# Patient Record
Sex: MALE | Race: BLACK OR AFRICAN AMERICAN | Employment: OTHER | URBAN - METROPOLITAN AREA
[De-identification: names, ages, dates, MRNs, and addresses within clinical notes are randomized per-mention and may not be internally consistent; named-entity substitution may affect disease eponyms.]

---

## 2018-05-22 LAB — COLONOSCOPY, EXTERNAL: NORMAL

## 2019-05-10 ENCOUNTER — APPOINTMENT (OUTPATIENT)
Dept: CT IMAGING | Age: 54
DRG: 199 | End: 2019-05-10
Attending: NURSE PRACTITIONER
Payer: MEDICAID

## 2019-05-10 ENCOUNTER — HOSPITAL ENCOUNTER (INPATIENT)
Age: 54
LOS: 1 days | Discharge: HOME | DRG: 199 | End: 2019-05-11
Attending: EMERGENCY MEDICINE | Admitting: FAMILY MEDICINE
Payer: MEDICAID

## 2019-05-10 ENCOUNTER — APPOINTMENT (OUTPATIENT)
Dept: GENERAL RADIOLOGY | Age: 54
DRG: 199 | End: 2019-05-10
Attending: NURSE PRACTITIONER
Payer: MEDICAID

## 2019-05-10 DIAGNOSIS — R07.9 CHEST PAIN, UNSPECIFIED TYPE: Primary | ICD-10-CM

## 2019-05-10 PROBLEM — I16.1 HYPERTENSIVE EMERGENCY: Status: ACTIVE | Noted: 2019-05-10

## 2019-05-10 PROBLEM — F14.10 COCAINE ABUSE (HCC): Status: ACTIVE | Noted: 2019-05-10

## 2019-05-10 LAB
ALBUMIN SERPL-MCNC: 2.2 G/DL (ref 3.5–5)
ALBUMIN/GLOB SERPL: 0.4 {RATIO} (ref 1.1–2.2)
ALP SERPL-CCNC: 180 U/L (ref 45–117)
ALT SERPL-CCNC: 104 U/L (ref 12–78)
AMPHET UR QL SCN: NEGATIVE
ANION GAP SERPL CALC-SCNC: 7 MMOL/L (ref 5–15)
APPEARANCE UR: CLEAR
AST SERPL-CCNC: 105 U/L (ref 15–37)
BACTERIA URNS QL MICRO: NEGATIVE /HPF
BARBITURATES UR QL SCN: NEGATIVE
BASOPHILS # BLD: 0.1 K/UL (ref 0–0.1)
BASOPHILS NFR BLD: 1 % (ref 0–1)
BENZODIAZ UR QL: NEGATIVE
BILIRUB SERPL-MCNC: 0.3 MG/DL (ref 0.2–1)
BILIRUB UR QL: NEGATIVE
BUN SERPL-MCNC: 14 MG/DL (ref 6–20)
BUN/CREAT SERPL: 11 (ref 12–20)
CALCIUM SERPL-MCNC: 8.4 MG/DL (ref 8.5–10.1)
CANNABINOIDS UR QL SCN: NEGATIVE
CHLORIDE SERPL-SCNC: 106 MMOL/L (ref 97–108)
CK MB CFR SERPL CALC: 0.9 % (ref 0–2.5)
CK MB SERPL-MCNC: 1.8 NG/ML (ref 5–25)
CK SERPL-CCNC: 201 U/L (ref 39–308)
CO2 SERPL-SCNC: 28 MMOL/L (ref 21–32)
COCAINE UR QL SCN: POSITIVE
COLOR UR: ABNORMAL
CREAT SERPL-MCNC: 1.3 MG/DL (ref 0.7–1.3)
DIFFERENTIAL METHOD BLD: ABNORMAL
DRUG SCRN COMMENT,DRGCM: ABNORMAL
EOSINOPHIL # BLD: 0.4 K/UL (ref 0–0.4)
EOSINOPHIL NFR BLD: 5 % (ref 0–7)
EPITH CASTS URNS QL MICRO: ABNORMAL /LPF
ERYTHROCYTE [DISTWIDTH] IN BLOOD BY AUTOMATED COUNT: 11.9 % (ref 11.5–14.5)
ETHANOL SERPL-MCNC: <10 MG/DL
GLOBULIN SER CALC-MCNC: 5.3 G/DL (ref 2–4)
GLUCOSE SERPL-MCNC: 98 MG/DL (ref 65–100)
GLUCOSE UR STRIP.AUTO-MCNC: NEGATIVE MG/DL
HCT VFR BLD AUTO: 45.7 % (ref 36.6–50.3)
HGB BLD-MCNC: 14.6 G/DL (ref 12.1–17)
HGB UR QL STRIP: NEGATIVE
IMM GRANULOCYTES # BLD AUTO: 0 K/UL (ref 0–0.04)
IMM GRANULOCYTES NFR BLD AUTO: 0 % (ref 0–0.5)
KETONES UR QL STRIP.AUTO: NEGATIVE MG/DL
LEUKOCYTE ESTERASE UR QL STRIP.AUTO: NEGATIVE
LIPASE SERPL-CCNC: 381 U/L (ref 73–393)
LYMPHOCYTES # BLD: 3.4 K/UL (ref 0.8–3.5)
LYMPHOCYTES NFR BLD: 45 % (ref 12–49)
MCH RBC QN AUTO: 30 PG (ref 26–34)
MCHC RBC AUTO-ENTMCNC: 31.9 G/DL (ref 30–36.5)
MCV RBC AUTO: 94 FL (ref 80–99)
METHADONE UR QL: NEGATIVE
MONOCYTES # BLD: 0.6 K/UL (ref 0–1)
MONOCYTES NFR BLD: 9 % (ref 5–13)
NEUTS SEG # BLD: 2.9 K/UL (ref 1.8–8)
NEUTS SEG NFR BLD: 40 % (ref 32–75)
NITRITE UR QL STRIP.AUTO: NEGATIVE
NRBC # BLD: 0 K/UL (ref 0–0.01)
NRBC BLD-RTO: 0 PER 100 WBC
OPIATES UR QL: NEGATIVE
PCP UR QL: NEGATIVE
PH UR STRIP: 6 [PH] (ref 5–8)
PLATELET # BLD AUTO: 130 K/UL (ref 150–400)
PMV BLD AUTO: 13 FL (ref 8.9–12.9)
POTASSIUM SERPL-SCNC: 3.9 MMOL/L (ref 3.5–5.1)
PROT SERPL-MCNC: 7.5 G/DL (ref 6.4–8.2)
PROT UR STRIP-MCNC: >300 MG/DL
RBC # BLD AUTO: 4.86 M/UL (ref 4.1–5.7)
RBC #/AREA URNS HPF: ABNORMAL /HPF (ref 0–5)
SODIUM SERPL-SCNC: 141 MMOL/L (ref 136–145)
SP GR UR REFRACTOMETRY: 1.02 (ref 1–1.03)
TROPONIN I SERPL-MCNC: <0.05 NG/ML
UA: UC IF INDICATED,UAUC: ABNORMAL
UROBILINOGEN UR QL STRIP.AUTO: 2 EU/DL (ref 0.2–1)
WBC # BLD AUTO: 7.3 K/UL (ref 4.1–11.1)
WBC URNS QL MICRO: ABNORMAL /HPF (ref 0–4)

## 2019-05-10 PROCEDURE — 85025 COMPLETE CBC W/AUTO DIFF WBC: CPT

## 2019-05-10 PROCEDURE — 93005 ELECTROCARDIOGRAM TRACING: CPT

## 2019-05-10 PROCEDURE — 82550 ASSAY OF CK (CPK): CPT

## 2019-05-10 PROCEDURE — 80307 DRUG TEST PRSMV CHEM ANLYZR: CPT

## 2019-05-10 PROCEDURE — 81001 URINALYSIS AUTO W/SCOPE: CPT

## 2019-05-10 PROCEDURE — 84484 ASSAY OF TROPONIN QUANT: CPT

## 2019-05-10 PROCEDURE — 74011250636 HC RX REV CODE- 250/636: Performed by: FAMILY MEDICINE

## 2019-05-10 PROCEDURE — 74011250636 HC RX REV CODE- 250/636: Performed by: NURSE PRACTITIONER

## 2019-05-10 PROCEDURE — 36415 COLL VENOUS BLD VENIPUNCTURE: CPT

## 2019-05-10 PROCEDURE — 80053 COMPREHEN METABOLIC PANEL: CPT

## 2019-05-10 PROCEDURE — 71045 X-RAY EXAM CHEST 1 VIEW: CPT

## 2019-05-10 PROCEDURE — 70450 CT HEAD/BRAIN W/O DYE: CPT

## 2019-05-10 PROCEDURE — 99285 EMERGENCY DEPT VISIT HI MDM: CPT

## 2019-05-10 PROCEDURE — 96374 THER/PROPH/DIAG INJ IV PUSH: CPT

## 2019-05-10 PROCEDURE — 65660000000 HC RM CCU STEPDOWN

## 2019-05-10 PROCEDURE — 74011250637 HC RX REV CODE- 250/637: Performed by: FAMILY MEDICINE

## 2019-05-10 PROCEDURE — 83690 ASSAY OF LIPASE: CPT

## 2019-05-10 RX ORDER — SODIUM CHLORIDE 0.9 % (FLUSH) 0.9 %
5-40 SYRINGE (ML) INJECTION AS NEEDED
Status: DISCONTINUED | OUTPATIENT
Start: 2019-05-10 | End: 2019-05-10 | Stop reason: SDUPTHER

## 2019-05-10 RX ORDER — LORAZEPAM 1 MG/1
0.5 TABLET ORAL
Status: DISCONTINUED | OUTPATIENT
Start: 2019-05-10 | End: 2019-05-10

## 2019-05-10 RX ORDER — HEPARIN SODIUM 5000 [USP'U]/ML
5000 INJECTION, SOLUTION INTRAVENOUS; SUBCUTANEOUS EVERY 8 HOURS
Status: DISCONTINUED | OUTPATIENT
Start: 2019-05-10 | End: 2019-05-11 | Stop reason: HOSPADM

## 2019-05-10 RX ORDER — HYDRALAZINE HYDROCHLORIDE 20 MG/ML
10 INJECTION INTRAMUSCULAR; INTRAVENOUS
Status: COMPLETED | OUTPATIENT
Start: 2019-05-10 | End: 2019-05-10

## 2019-05-10 RX ORDER — LORAZEPAM 0.5 MG/1
0.5 TABLET ORAL EVERY 6 HOURS
Status: DISCONTINUED | OUTPATIENT
Start: 2019-05-10 | End: 2019-05-11 | Stop reason: HOSPADM

## 2019-05-10 RX ORDER — SODIUM CHLORIDE 0.9 % (FLUSH) 0.9 %
5-40 SYRINGE (ML) INJECTION AS NEEDED
Status: DISCONTINUED | OUTPATIENT
Start: 2019-05-10 | End: 2019-05-11 | Stop reason: HOSPADM

## 2019-05-10 RX ORDER — SODIUM CHLORIDE 0.9 % (FLUSH) 0.9 %
5-40 SYRINGE (ML) INJECTION EVERY 8 HOURS
Status: DISCONTINUED | OUTPATIENT
Start: 2019-05-10 | End: 2019-05-11 | Stop reason: HOSPADM

## 2019-05-10 RX ORDER — HYDRALAZINE HYDROCHLORIDE 20 MG/ML
20 INJECTION INTRAMUSCULAR; INTRAVENOUS
Status: DISCONTINUED | OUTPATIENT
Start: 2019-05-10 | End: 2019-05-11 | Stop reason: HOSPADM

## 2019-05-10 RX ORDER — SODIUM CHLORIDE 0.9 % (FLUSH) 0.9 %
5-40 SYRINGE (ML) INJECTION EVERY 8 HOURS
Status: DISCONTINUED | OUTPATIENT
Start: 2019-05-10 | End: 2019-05-10 | Stop reason: SDUPTHER

## 2019-05-10 RX ORDER — AMLODIPINE BESYLATE 5 MG/1
10 TABLET ORAL DAILY
Status: DISCONTINUED | OUTPATIENT
Start: 2019-05-10 | End: 2019-05-11

## 2019-05-10 RX ADMIN — HYDRALAZINE HYDROCHLORIDE 10 MG: 20 INJECTION, SOLUTION INTRAMUSCULAR; INTRAVENOUS at 11:01

## 2019-05-10 RX ADMIN — HYDRALAZINE HYDROCHLORIDE 20 MG: 20 INJECTION, SOLUTION INTRAMUSCULAR; INTRAVENOUS at 17:33

## 2019-05-10 RX ADMIN — ACETAMINOPHEN 650 MG: 650 SOLUTION ORAL at 20:21

## 2019-05-10 RX ADMIN — HEPARIN SODIUM 5000 UNITS: 5000 INJECTION INTRAVENOUS; SUBCUTANEOUS at 15:45

## 2019-05-10 RX ADMIN — Medication 10 ML: at 15:49

## 2019-05-10 RX ADMIN — AMLODIPINE BESYLATE 10 MG: 5 TABLET ORAL at 15:44

## 2019-05-10 NOTE — ED NOTES
Provided patient with a dinner tray. Updated on plan of care which included managing his blood pressure and getting a bed upstairs for him to be further monitored.

## 2019-05-10 NOTE — ED NOTES
Contacted nursing supervision in regards to getting an inpatient bed due to holding the patient in the ER.

## 2019-05-10 NOTE — ED NOTES
Verbal shift change report given to ELISE Brown (oncoming nurse) by Robert Menjivar (offgoing nurse). Report included the following information SBAR.

## 2019-05-10 NOTE — PROGRESS NOTES
CM reviewed chart. CM confirmed his demographics. Pt said he lives with his daughter Veto Hines [phone], also his emergency contact. Pt said his mother will make his medical decisions Kanika Harrison [phone]. Pt said he does have Medicaid, but does not have the card with him. CM agreed to have a PCP follow-up set up for him. CM scheduled follow-up appointment with [facility] on 5/16/19 at 11am.       Care Management Interventions  PCP Verified by CM: No  Mode of Transport at Discharge: Other (see comment)(pt daughter will transport home )  Transition of Care Consult (CM Consult): Other  Current Support Network: Other(pt lives with his daughter Veto Hines )  Confirm Follow Up Transport: Family  Discharge Location  Discharge Placement: Home      Date of previous inpatient admission/ ED visit? 5/9/14    What brought the patient back to ED? Chest pain, skin problem, and dizziness     Did patient decline recommended services during last admission/ ED visit (if yes, what)? No     Has patient seen a provider since their last inpatient admission/ED visit (if yes, when)? No     CM Interventions:  From previous inpatient admission/ED visit: discharge plan   From current inpatient admission/ED visit: discharge plan- PCP appointment on 5/16/19.      Lucila Fagan, MSW  [phone]

## 2019-05-10 NOTE — PROGRESS NOTES
Spiritual Care Partner Volunteer visited patient in [employer] at [facility] on 5/10/19.   Documented by:  Anabel Fritz

## 2019-05-10 NOTE — ED NOTES
TRANSFER - OUT REPORT:    Verbal report given to [employer] (name) on Kimi Vail  being transferred to [facility] telemetry (unit) for routine progression of care       Report consisted of patients Situation, Background, Assessment and   Recommendations(SBAR). Information from the following report(s) SBAR was reviewed with the receiving nurse. Lines:   Peripheral IV 05/10/19 Right Antecubital (Active)   Site Assessment Clean, dry, & intact 5/10/2019 10:31 AM   Phlebitis Assessment 0 5/10/2019 10:31 AM   Infiltration Assessment 0 5/10/2019 10:31 AM   Dressing Status Clean, dry, & intact 5/10/2019 10:31 AM   Dressing Type Transparent 5/10/2019 10:31 AM   Hub Color/Line Status Pink;Capped;Flushed;Patent 5/10/2019 10:31 AM   Action Taken Blood drawn 5/10/2019 10:31 AM        Opportunity for questions and clarification was provided.       Patient transported with:   Registered Nurse

## 2019-05-10 NOTE — PROGRESS NOTES
TRANSFER - IN REPORT:    Verbal report received from NAVEED Espana(name) on Evans Valencia Sleight  being received from ED(unit) for routine progression of care      Report consisted of patients Situation, Background, Assessment and   Recommendations(SBAR). Information from the following report(s) SBAR, Kardex, Intake/Output, MAR, Recent Results and Cardiac Rhythm NSR was reviewed with the receiving nurse. Opportunity for questions and clarification was provided.

## 2019-05-10 NOTE — ED TRIAGE NOTES
Per pt reports dizziness with standing, headache and blurred vision x 10 days, stopped taking Lisinopril x 6+ months. Pt also reports having a \"knot\" on the right upper chest x 6+ months, +sob and denies chest pain.

## 2019-05-10 NOTE — H&P
[facility] HISTORY AND PHYSICAL Name:  Otto Merritt 
MR#:  [id number] :  1965 ACCOUNT #:  [de-identified] ADMIT DATE:  05/10/2019 CHIEF COMPLAINT:  Headache, chest pain. HISTORY OF PRESENT ILLNESS:  The patient is a 41-year-old -American male with past medical history significant for hypertension and cocaine abuse, who presented to the emergency room with chest pain and headache. The patient reports symptom for a long time, 6 months. The patient reports that he has not been taking his antihypertensive medications for many months, symptom progressively worse. He comes today because he had left-sided numbness in the arm and right-sided chest pain. The patient reports persistent symptom without any alleviating factor. He states that since being in the emergency room, he is feeling better. The patient reports that he has not seen his primary doctor and presented to the emergency room because of the left arm numbness and also tingling and severe headache. PAST MEDICAL HISTORY:  Hypertension. Past Medical History:  
Diagnosis Date  Hypertension PAST SURGICAL HISTORY:  History reviewed. No pertinent surgical history. FAMILY HISTORY:  History reviewed. No pertinent family history. SOCIAL HISTORY:  
Social History Socioeconomic History  Marital status: SINGLE Spouse name: Not on file  Number of children: Not on file  Years of education: Not on file  Highest education level: Not on file Occupational History  Not on file Social Needs  Financial resource strain: Not on file  Food insecurity:  
  Worry: Not on file Inability: Not on file  Transportation needs:  
  Medical: Not on file Non-medical: Not on file Tobacco Use  Smoking status: Current Every Day Smoker Packs/day: 1.00  Smokeless tobacco: Never Used Substance and Sexual Activity  Alcohol use: Yes  Drug use:  Yes  
 Types: Marijuana  Sexual activity: Not on file Lifestyle  Physical activity:  
  Days per week: Not on file Minutes per session: Not on file  Stress: Not on file Relationships  Social connections:  
  Talks on phone: Not on file Gets together: Not on file Attends Voodoo service: Not on file Active member of club or organization: Not on file Attends meetings of clubs or organizations: Not on file Relationship status: Not on file  Intimate partner violence:  
  Fear of current or ex partner: Not on file Emotionally abused: Not on file Physically abused: Not on file Forced sexual activity: Not on file Other Topics Concern  Not on file Social History Narrative  Not on file ALLERGIES: No Known Allergies REVIEW OF SYSTEMS: Review of Systems Constitutional: Negative for chills, fever and weight loss. Respiratory: Negative for cough and shortness of breath. Cardiovascular: Positive for chest pain. Negative for palpitations. Musculoskeletal: Negative for back pain, falls, joint pain, myalgias and neck pain. Skin: Negative for rash. Neurological: Positive for tingling and headaches. Negative for dizziness, tremors, sensory change, focal weakness and weakness. Psychiatric/Behavioral: Negative for depression, hallucinations, substance abuse and suicidal ideas. The patient is not nervous/anxious and does not have insomnia. All other systems reviewed and are negative. PHYSICAL EXAMINATION: Physical Exam  
Constitutional: He is oriented to person, place, and time. No distress. HENT:  
Head: Normocephalic and atraumatic. Eyes: Right eye exhibits no discharge. Left eye exhibits no discharge. No scleral icterus. Neck: No tracheal deviation present. No thyromegaly present. Cardiovascular: Normal rate, regular rhythm, normal heart sounds and intact distal pulses. Exam reveals no gallop. No murmur heard. Pulmonary/Chest: No respiratory distress. He has no wheezes. He has no rales. He exhibits tenderness. Abdominal: He exhibits no distension and no mass. There is no tenderness. There is no rebound and no guarding. Musculoskeletal: He exhibits no edema or tenderness. Lymphadenopathy:  
  He has no cervical adenopathy. Neurological: He is alert and oriented to person, place, and time. He displays normal reflexes. No cranial nerve deficit. He exhibits normal muscle tone. Coordination normal. GCS score is 15. Skin: No rash noted. He is not diaphoretic. No erythema. No pallor. Psychiatric: Affect and judgment normal.  
 
 
LABORATORY DATA:  
Recent Results (from the past 12 hour(s)) METABOLIC PANEL, COMPREHENSIVE Collection Time: 05/11/19  3:40 AM  
Result Value Ref Range Sodium 140 136 - 145 mmol/L Potassium 3.5 3.5 - 5.1 mmol/L Chloride 107 97 - 108 mmol/L  
 CO2 25 21 - 32 mmol/L Anion gap 8 5 - 15 mmol/L Glucose 98 65 - 100 mg/dL BUN 18 6 - 20 MG/DL Creatinine 1.24 0.70 - 1.30 MG/DL  
 BUN/Creatinine ratio 15 12 - 20 GFR est AA >60 >60 ml/min/1.73m2 GFR est non-AA >60 >60 ml/min/1.73m2 Calcium 8.2 (L) 8.5 - 10.1 MG/DL Bilirubin, total 0.4 0.2 - 1.0 MG/DL  
 ALT (SGPT) 88 (H) 12 - 78 U/L  
 AST (SGOT) 83 (H) 15 - 37 U/L Alk. phosphatase 147 (H) 45 - 117 U/L Protein, total 6.7 6.4 - 8.2 g/dL Albumin 1.9 (L) 3.5 - 5.0 g/dL Globulin 4.8 (H) 2.0 - 4.0 g/dL A-G Ratio 0.4 (L) 1.1 - 2.2 IMAGING STUDY: 1. No acute disease in CXR and normal CT of head ASSESSMENT AND PLAN:  The patient is a 44-year-old being admitted with hypertensive urgency. 1.  Hypertensive urgency. We will start the patient on hydralazine, p.r.n. We will start the patient on p.o. amlodipine, admit to telemetry unit, cardiology consult, and echo ordered. 2.  Cocaine abuse.   The patient counseled regarding the complication and side effects of cocaine including a stroke, heart attack, and congestive heart failure. 3.  Deep venous thrombosis prophylaxis, heparin. 4.  Code status, full code. 5.  Baseline, the patient independent. Michael Saldivar MD 
 
 
[id number] 
D:  05/10/2019 15:48 T:  05/10/2019 16:52 
JOB #:  [id number]

## 2019-05-10 NOTE — ED NOTES
Pt presents to ED with c/o dizziness, headache and blurred vision x over six months. Reported he stopped taking lisinopril 6 months ago. Reported he thought he was getting better so he took himself off it. Reports chest pain and SOB. Reports having numbness and tingling in bilateral extremities. Pt is alert, oriented and appropriate. Ambulatory on arrival.       Emergency Department Nursing Plan of Care       The Nursing Plan of Care is developed from the Nursing assessment and Emergency Department Attending provider initial evaluation. The plan of care may be reviewed in the ED Provider note.     The Plan of Care was developed with the following considerations:   Patient / Family readiness to learn indicated by:verbalized understanding  Persons(s) to be included in education: patient  Barriers to Learning/Limitations:No    Signed     Ed Escalante    5/10/2019   10:21 AM

## 2019-05-10 NOTE — PROGRESS NOTES
[facility] Medication Reconciliation     Recommendations/Findings:   1) Patient states that it has been about 6 months since he last took lisinopril. He denies taking any other prescription or OTC medications on a regular basis. Total Time Spent: 20 minutes    Information obtained from:patient, previous medical records    Past Medical History/Disease States:  Past Medical History:   Diagnosis Date    Hypertension          Patient allergies:    Allergies as of 05/10/2019    (No Known Allergies)         None            Thank you,    Mariana Goldberg, PHARMD, BCPS  Contact: [phone]

## 2019-05-10 NOTE — ED NOTES
Pt states he got tired of waiting so went outside, ? for a cigarette. Pt had an IV in situ, does not appear to have been tampered with. Pt does not show any sign of illicit drug use. Staff for PCU have been informed that pt has been outside. Pt has been taken to [address] [place] [address] now. Pt is alert and oriented, states he has a headache still.

## 2019-05-10 NOTE — ED PROVIDER NOTES
EMERGENCY DEPARTMENT HISTORY AND PHYSICAL EXAM      Date: 5/10/2019  Patient Name: Irina Loya    History of Presenting Illness     Chief Complaint   Patient presents with    Dizziness    Skin Problem       History Provided By: Patient    Chief Complaint: headache  Duration: 6 Months  Timing:  Gradual and Progressive  Location: generalized headache  Quality: Aching  Severity: 6 out of 10  Modifying Factors: none  Associated Symptoms: right sided chest pain and numbness tingling of extremities      Additional History (Context): Irina Loya is a 48 y.o. male with hypertension who presents with headache for the past 6 months. Patient states he had been taking lisinopril and took himself off because he was feeling better. Patient endorses feelings of numbness and tingling in his extremities. He endorses right-sided chest pain. He admits to cocaine use last week and states he does smoke marijuana. He said he drank a beer yesterday. He denies previous cardiac work-up. He denies cough wheezing fever abdominal pain nausea vomiting diarrhea. Eyes visual disturbance speech difficulty. He has no other complaints at this time. PCP: Jo Ann Mckeon MD    Current Facility-Administered Medications   Medication Dose Route Frequency Provider Last Rate Last Dose    sodium chloride (NS) flush 5-40 mL  5-40 mL IntraVENous Q8H Savilla Rubinstein, MD        sodium chloride (NS) flush 5-40 mL  5-40 mL IntraVENous PRN Savilla Rubinstein, MD        heparin (porcine) injection 5,000 Units  5,000 Units SubCUTAneous Q8H Savilla Rubinstein, MD        hydrALAZINE (APRESOLINE) 20 mg/mL injection 20 mg  20 mg IntraVENous Q6H PRN Savilla Rubinstein, MD        amLODIPine (NORVASC) tablet 10 mg  10 mg Oral DAILY Savilla Rubinstein, MD           Past History     Past Medical History:  Past Medical History:   Diagnosis Date    Hypertension        Past Surgical History:  No past surgical history on file.     Family History:  No family history on file. Social History:  Social History     Tobacco Use    Smoking status: Current Every Day Smoker     Packs/day: 1.00   Substance Use Topics    Alcohol use: Yes    Drug use: Yes     Types: Marijuana       Allergies:  No Known Allergies      Review of Systems   Review of Systems   Constitutional: Negative for chills, fatigue and fever. HENT: Negative for congestion and sore throat. Eyes: Negative for redness. Respiratory: Positive for shortness of breath. Negative for cough, chest tightness and wheezing. Cardiovascular: Positive for chest pain. Gastrointestinal: Negative for abdominal pain. Genitourinary: Negative for dysuria. Musculoskeletal: Negative for arthralgias, back pain, myalgias, neck pain and neck stiffness. Skin: Negative for rash. Neurological: Positive for numbness and headaches. Negative for dizziness, syncope, weakness and light-headedness. Tingling   Hematological: Negative for adenopathy. All other systems reviewed and are negative. Physical Exam     Vitals:    05/10/19 1300 05/10/19 1315 05/10/19 1345 05/10/19 1449   BP: (!) 194/98 (!) 151/96 (!) 190/98 176/89   Pulse: 75 72 77 77   Resp: 17 22 21 21   Temp:       SpO2: 97% 98% 98% 98%   Weight:       Height:         Physical Exam   Constitutional: He is oriented to person, place, and time. He appears well-developed and well-nourished. HENT:   Head: Normocephalic and atraumatic. Right Ear: External ear normal.   Mouth/Throat: Oropharynx is clear and moist.   Eyes: Conjunctivae are normal. Right eye exhibits no discharge. Left eye exhibits no discharge. Neck: Normal range of motion. Neck supple. Cardiovascular: Normal rate and regular rhythm. Pulmonary/Chest: Effort normal and breath sounds normal. No respiratory distress. He has no wheezes. Abdominal: Soft. Bowel sounds are normal. There is no tenderness. Musculoskeletal: Normal range of motion. He exhibits no edema. Lymphadenopathy:     He has no cervical adenopathy. Neurological: He is alert and oriented to person, place, and time. No cranial nerve deficit. Skin: Skin is warm and dry. Psychiatric: He has a normal mood and affect. His behavior is normal. Judgment and thought content normal.   Nursing note and vitals reviewed. Diagnostic Study Results     Labs -     Recent Results (from the past 12 hour(s))   URINALYSIS W/ REFLEX CULTURE    Collection Time: 05/10/19 10:30 AM   Result Value Ref Range    Color YELLOW/STRAW      Appearance CLEAR CLEAR      Specific gravity 1.025 1.003 - 1.030      pH (UA) 6.0 5.0 - 8.0      Protein >300 (A) NEG mg/dL    Glucose NEGATIVE  NEG mg/dL    Ketone NEGATIVE  NEG mg/dL    Bilirubin NEGATIVE  NEG      Blood NEGATIVE  NEG      Urobilinogen 2.0 (H) 0.2 - 1.0 EU/dL    Nitrites NEGATIVE  NEG      Leukocyte Esterase NEGATIVE  NEG      WBC 0-4 0 - 4 /hpf    RBC 0-5 0 - 5 /hpf    Epithelial cells FEW FEW /lpf    Bacteria NEGATIVE  NEG /hpf    UA:UC IF INDICATED CULTURE NOT INDICATED BY UA RESULT CNI     METABOLIC PANEL, COMPREHENSIVE    Collection Time: 05/10/19 10:45 AM   Result Value Ref Range    Sodium 141 136 - 145 mmol/L    Potassium 3.9 3.5 - 5.1 mmol/L    Chloride 106 97 - 108 mmol/L    CO2 28 21 - 32 mmol/L    Anion gap 7 5 - 15 mmol/L    Glucose 98 65 - 100 mg/dL    BUN 14 6 - 20 MG/DL    Creatinine 1.30 0.70 - 1.30 MG/DL    BUN/Creatinine ratio 11 (L) 12 - 20      GFR est AA >60 >60 ml/min/1.73m2    GFR est non-AA 58 (L) >60 ml/min/1.73m2    Calcium 8.4 (L) 8.5 - 10.1 MG/DL    Bilirubin, total 0.3 0.2 - 1.0 MG/DL    ALT (SGPT) 104 (H) 12 - 78 U/L    AST (SGOT) 105 (H) 15 - 37 U/L    Alk.  phosphatase 180 (H) 45 - 117 U/L    Protein, total 7.5 6.4 - 8.2 g/dL    Albumin 2.2 (L) 3.5 - 5.0 g/dL    Globulin 5.3 (H) 2.0 - 4.0 g/dL    A-G Ratio 0.4 (L) 1.1 - 2.2     CBC WITH AUTOMATED DIFF    Collection Time: 05/10/19 10:45 AM   Result Value Ref Range    WBC 7.3 4.1 - 11.1 K/uL RBC 4.86 4.10 - 5.70 M/uL    HGB 14.6 12.1 - 17.0 g/dL    HCT 45.7 36.6 - 50.3 %    MCV 94.0 80.0 - 99.0 FL    MCH 30.0 26.0 - 34.0 PG    MCHC 31.9 30.0 - 36.5 g/dL    RDW 11.9 11.5 - 14.5 %    PLATELET 946 (L) 558 - 400 K/uL    MPV 13.0 (H) 8.9 - 12.9 FL    NRBC 0.0 0  WBC    ABSOLUTE NRBC 0.00 0.00 - 0.01 K/uL    NEUTROPHILS 40 32 - 75 %    LYMPHOCYTES 45 12 - 49 %    MONOCYTES 9 5 - 13 %    EOSINOPHILS 5 0 - 7 %    BASOPHILS 1 0 - 1 %    IMMATURE GRANULOCYTES 0 0.0 - 0.5 %    ABS. NEUTROPHILS 2.9 1.8 - 8.0 K/UL    ABS. LYMPHOCYTES 3.4 0.8 - 3.5 K/UL    ABS. MONOCYTES 0.6 0.0 - 1.0 K/UL    ABS. EOSINOPHILS 0.4 0.0 - 0.4 K/UL    ABS. BASOPHILS 0.1 0.0 - 0.1 K/UL    ABS. IMM. GRANS. 0.0 0.00 - 0.04 K/UL    DF AUTOMATED     CK W/ CKMB & INDEX    Collection Time: 05/10/19 10:45 AM   Result Value Ref Range     39 - 308 U/L    CK - MB 1.8 <3.6 NG/ML    CK-MB Index 0.9 0.0 - 2.5     TROPONIN I    Collection Time: 05/10/19 10:45 AM   Result Value Ref Range    Troponin-I, Qt. <0.05 <0.05 ng/mL   DRUG SCREEN, URINE    Collection Time: 05/10/19 10:45 AM   Result Value Ref Range    AMPHETAMINES NEGATIVE  NEG      BARBITURATES NEGATIVE  NEG      BENZODIAZEPINES NEGATIVE  NEG      COCAINE POSITIVE (A) NEG      METHADONE NEGATIVE  NEG      OPIATES NEGATIVE  NEG      PCP(PHENCYCLIDINE) NEGATIVE  NEG      THC (TH-CANNABINOL) NEGATIVE  NEG      Drug screen comment (NOTE)    LIPASE    Collection Time: 05/10/19 10:45 AM   Result Value Ref Range    Lipase 381 73 - 393 U/L   ETHYL ALCOHOL    Collection Time: 05/10/19 10:45 AM   Result Value Ref Range    ALCOHOL(ETHYL),SERUM <10 <10 MG/DL       Radiologic Studies -   CT HEAD WO CONT   Final Result   IMPRESSION:   1. No evidence of acute intracranial abnormality. XR CHEST PORT   Final Result   Impression:   1. No acute disease           CT Results  (Last 48 hours)               05/10/19 1055  CT HEAD WO CONT Final result    Impression:  IMPRESSION:   1.  No evidence of acute intracranial abnormality. Narrative:  EXAM:  CT HEAD WO CONT       INDICATION:   Headache       COMPARISON: None. TECHNIQUE: Unenhanced CT of the head was performed using 5 mm images. Brain and   bone windows were generated. CT dose reduction was achieved through use of a   standardized protocol tailored for this examination and automatic exposure   control for dose modulation. FINDINGS:   The ventricles are normal in size and position. Basilar cisterns are patent. No   midline shift. There is no evidence of acute infarct, hemorrhage, or extraaxial   fluid collection. The paranasal sinuses, mastoid air cells, and middle ears are clear. The orbital   contents are within normal limits. There are no significant osseous or   extracranial soft tissue lesions. CXR Results  (Last 48 hours)               05/10/19 1046  XR CHEST PORT Final result    Impression:  Impression:   1. No acute disease           Narrative:  INDICATION:  chest pain        Exam: Portable chest 1036. Comparison: None. Findings: Cardiomediastinal silhouette is within normal limits. Pulmonary   vasculature is not engorged. There are no focal parenchymal opacities,   effusions, or pneumothorax. Right hemidiaphragm is elevated                   Medical Decision Making   I am the first provider for this patient. I reviewed the vital signs, available nursing notes, past medical history, past surgical history, family history and social history. Vital Signs-Reviewed the patient's vital signs. Pulse Oximetry Analysis - 98% on RA    Cardiac Monitor:  Rate: 77  Rhythm:NSR    EKG: Interpreted by the EP. Dr Arizmendi Power   Time Interpreted:10:25am    Rate: 76   Rhythm:NSR   Interpretation:LVH   Comparison:none    Records Reviewed: Nursing Notes    ED Course:    Patient has not complained of chest pain during stay in emergency department. He has had no other complaints.       Disposition:  Admit to hospitalist.    Provider Notes (Medical Decision Making):   DDX uncontrolled hypertension malignant hypertension cocaine induced chest pain  Patient will need admission for tension control as well as further evaluation of chest pain. Procedures:  Procedures    For Hospitalized Patients:    1. Hospitalization Decision Time:  The decision to hospitalize the patient was made by Dr. sheri Roldan . Consultation has been made to Dr. Mckinley Stephen cardiology as well. Patient agrees to admission. Diagnosis     Clinical Impression:   1.  Chest pain, unspecified type        _____________________________

## 2019-05-10 NOTE — CONSULTS
Cardiology consultation:    I was asked by the [facility] ED to assess Mr. Michael Martel. He is a 48year old male coming to the hospital for chest pain and longstanding headache, subsequently discovered to have uncontrolled hypertension with positive screen for cocaine. He has been a diagnosed hypertyensive for several years, previously treated with clonoidine, which he stopped on his own, more recently treated with lisinopril, which he also stopped. 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 He developed left pectoralis pain directly inferior to the left clavicle after an exercise session several days ago and when the pain would not improve he came to the ED. He has not had anginal pattern chest pain. He is a decidedly vague historian who does not identify a primary care source. He denies PND and orthopnea as well as effort symptoms. On exam, he is an early middle aged male appearing less than stated age. He is in no distress supine. BP on initial presentation was 222/103, today it is 181/97. Cardiac auscultation is normal, with normal S2, increased S1 at aortic focus, regular rhythm, no audible murmur or gallop. No abdominal bruit or pulsation. No edema or DVT, [peripheral pulses normal.     12 lead EKG shows sinus rhythm with partial changes for LVH. QS complexes in the midprecordial leads. Chest X ray shows mild LV prominence and increased lung vascular volume:        Hemogram is normal except for mild thrombocytopenia, Chem profile is as follows:    Results for Narinder Cox (MRN [id number]) as of 5/11/2019 00:56   Ref.  Range 5/10/2019 10:45   Sodium Latest Ref Range: 136 - 145 mmol/L 141   Potassium Latest Ref Range: 3.5 - 5.1 mmol/L 3.9   Chloride Latest Ref Range: 97 - 108 mmol/L 106   CO2 Latest Ref Range: 21 - 32 mmol/L 28   Anion gap Latest Ref Range: 5 - 15 mmol/L 7   Glucose Latest Ref Range: 65 - 100 mg/dL 98   BUN Latest Ref Range: 6 - 20 MG/DL 14   Creatinine Latest Ref Range: 0.70 - 1.30 MG/DL 1.30   BUN/Creatinine ratio Latest Ref Range: 12 - 20   11 (L)   Calcium Latest Ref Range: 8.5 - 10.1 MG/DL 8.4 (L)   GFR est non-AA Latest Ref Range: >60 ml/min/1.73m2 58 (L)   GFR est AA Latest Ref Range: >60 ml/min/1.73m2 >60   Bilirubin, total Latest Ref Range: 0.2 - 1.0 MG/DL 0.3   Protein, total Latest Ref Range: 6.4 - 8.2 g/dL 7.5   Albumin Latest Ref Range: 3.5 - 5.0 g/dL 2.2 (L)   Globulin Latest Ref Range: 2.0 - 4.0 g/dL 5.3 (H)   A-G Ratio Latest Ref Range: 1.1 - 2.2   0.4 (L)   ALT (SGPT) Latest Ref Range: 12 - 78 U/L 104 (H)   AST Latest Ref Range: 15 - 37 U/L 105 (H)   Alk. phosphatase Latest Ref Range: 45 - 117 U/L 180 (H)   Lipase Latest Ref Range: 73 - 393 U/L 381   CK Latest Ref Range: 39 - 308 U/L 201   CK - MB Latest Ref Range: <3.6 NG/ML 1.8   CK-MB Index Latest Ref Range: 0.0 - 2.5   0.9   Troponin-I, Qt. Latest Ref Range: <0.05 ng/mL <0.05     Impression; Uncontrolled hypertension                          Unreliable compliance with treatment. Likely pulled superior pectoralis group muscle on right due to poor conditioning, no training    CKD II    Prognosis is quite poor unless he starts to take medications and even stop  follow up    Suggest: CCB as primary BP management of blood pressure    Add labetalol next as needed.      Will follow thank you    Thank you for this consultation    Dmitry Dailey MD [facility] [place]

## 2019-05-11 VITALS
SYSTOLIC BLOOD PRESSURE: 143 MMHG | BODY MASS INDEX: 27.4 KG/M2 | RESPIRATION RATE: 18 BRPM | OXYGEN SATURATION: 98 % | TEMPERATURE: 96.6 F | DIASTOLIC BLOOD PRESSURE: 73 MMHG | HEART RATE: 56 BPM | WEIGHT: 185 LBS | HEIGHT: 69 IN

## 2019-05-11 LAB
ALBUMIN SERPL-MCNC: 1.9 G/DL (ref 3.5–5)
ALBUMIN/GLOB SERPL: 0.4 {RATIO} (ref 1.1–2.2)
ALP SERPL-CCNC: 147 U/L (ref 45–117)
ALT SERPL-CCNC: 88 U/L (ref 12–78)
ANION GAP SERPL CALC-SCNC: 8 MMOL/L (ref 5–15)
AST SERPL-CCNC: 83 U/L (ref 15–37)
BILIRUB SERPL-MCNC: 0.4 MG/DL (ref 0.2–1)
BUN SERPL-MCNC: 18 MG/DL (ref 6–20)
BUN/CREAT SERPL: 15 (ref 12–20)
CALCIUM SERPL-MCNC: 8.2 MG/DL (ref 8.5–10.1)
CHLORIDE SERPL-SCNC: 107 MMOL/L (ref 97–108)
CO2 SERPL-SCNC: 25 MMOL/L (ref 21–32)
CREAT SERPL-MCNC: 1.24 MG/DL (ref 0.7–1.3)
GLOBULIN SER CALC-MCNC: 4.8 G/DL (ref 2–4)
GLUCOSE SERPL-MCNC: 98 MG/DL (ref 65–100)
POTASSIUM SERPL-SCNC: 3.5 MMOL/L (ref 3.5–5.1)
PROT SERPL-MCNC: 6.7 G/DL (ref 6.4–8.2)
SODIUM SERPL-SCNC: 140 MMOL/L (ref 136–145)

## 2019-05-11 PROCEDURE — 74011250637 HC RX REV CODE- 250/637: Performed by: FAMILY MEDICINE

## 2019-05-11 PROCEDURE — 80053 COMPREHEN METABOLIC PANEL: CPT

## 2019-05-11 PROCEDURE — 74011250636 HC RX REV CODE- 250/636: Performed by: FAMILY MEDICINE

## 2019-05-11 PROCEDURE — 74011250637 HC RX REV CODE- 250/637: Performed by: INTERNAL MEDICINE

## 2019-05-11 RX ORDER — DILTIAZEM HYDROCHLORIDE 30 MG/1
90 TABLET, FILM COATED ORAL
Status: DISCONTINUED | OUTPATIENT
Start: 2019-05-11 | End: 2019-05-11 | Stop reason: HOSPADM

## 2019-05-11 RX ORDER — DILTIAZEM HYDROCHLORIDE 90 MG/1
90 TABLET, FILM COATED ORAL
Qty: 90 TAB | Refills: 1 | Status: ON HOLD | OUTPATIENT
Start: 2019-05-11 | End: 2020-06-28

## 2019-05-11 RX ORDER — LABETALOL 100 MG/1
100 TABLET, FILM COATED ORAL 2 TIMES DAILY
Status: DISCONTINUED | OUTPATIENT
Start: 2019-05-11 | End: 2019-05-11 | Stop reason: HOSPADM

## 2019-05-11 RX ORDER — HYDRALAZINE HYDROCHLORIDE 10 MG/1
10 TABLET, FILM COATED ORAL 3 TIMES DAILY
Qty: 30 TAB | Refills: 0 | Status: SHIPPED | OUTPATIENT
Start: 2019-05-11 | End: 2019-05-16 | Stop reason: ALTCHOICE

## 2019-05-11 RX ADMIN — DILTIAZEM HYDROCHLORIDE 90 MG: 30 TABLET, FILM COATED ORAL at 11:40

## 2019-05-11 RX ADMIN — Medication 10 ML: at 06:56

## 2019-05-11 RX ADMIN — LORAZEPAM 0.5 MG: 0.5 TABLET ORAL at 06:56

## 2019-05-11 RX ADMIN — Medication 10 ML: at 00:22

## 2019-05-11 RX ADMIN — LORAZEPAM 0.5 MG: 0.5 TABLET ORAL at 00:24

## 2019-05-11 RX ADMIN — HYDRALAZINE HYDROCHLORIDE 20 MG: 20 INJECTION, SOLUTION INTRAMUSCULAR; INTRAVENOUS at 00:23

## 2019-05-11 RX ADMIN — HEPARIN SODIUM 5000 UNITS: 5000 INJECTION INTRAVENOUS; SUBCUTANEOUS at 08:25

## 2019-05-11 RX ADMIN — HEPARIN SODIUM 5000 UNITS: 5000 INJECTION INTRAVENOUS; SUBCUTANEOUS at 00:24

## 2019-05-11 RX ADMIN — DILTIAZEM HYDROCHLORIDE 90 MG: 30 TABLET, FILM COATED ORAL at 03:32

## 2019-05-11 RX ADMIN — DILTIAZEM HYDROCHLORIDE 90 MG: 30 TABLET, FILM COATED ORAL at 08:25

## 2019-05-11 RX ADMIN — LABETALOL HYDROCHLORIDE 100 MG: 100 TABLET, FILM COATED ORAL at 08:24

## 2019-05-11 RX ADMIN — LORAZEPAM 0.5 MG: 0.5 TABLET ORAL at 11:40

## 2019-05-11 NOTE — DISCHARGE SUMMARY
Hospitalist Discharge Summary     Patient ID:  Ari Pierce  [id number]  60 y.o.  1965  5/10/2019    PCP on record: Dinora Barton MD    Admit date: 5/10/2019  Discharge date and time: 5/11/2019    DISCHARGE DIAGNOSIS:  Hypertensive urgency   ECHO    CONSULTATIONS:  IP CONSULT TO CARDIOLOGY    Excerpted HPI from H&P of Vladimir Walton MD:  HISTORY OF PRESENT ILLNESS:  The patient is a 49-year-old -American male with past medical history significant for hypertension and cocaine abuse, who presented to the emergency room with chest pain and headache. The patient reports symptom for a long time, 6 months. The patient reports that he has not been taking his antihypertensive medications for many months, symptom progressively worse. He comes today because he had left-sided numbness in the arm and right-sided chest pain. The patient reports persistent symptom without any alleviating factor. He states that since being in the emergency room, he is feeling better. The patient reports that he has not seen his primary doctor and presented to the emergency room because of the left arm numbness and also tingling and severe headache.        ______________________________________________________________________  DISCHARGE SUMMARY/HOSPITAL COURSE:  for full details see H&P, daily progress notes, labs, consult notes. ASSESSMENT AND PLAN:  The patient is a 49-year-old admitted with hypertensive urgency. 1.  Hypertensive urgency.  BP stable on current medication  ECHO reviewed , discussed with cardiology   2.  Cocaine abuse.  The patient counseled regarding the complication and side effects of cocaine including a stroke, heart attack, and congestive heart failure. 3.  Deep venous thrombosis prophylaxis, heparin. 4.  Code status, full code.   5.  Baseline, the patient independent.          _______________________________________________________________________  Patient seen and examined by me on discharge day. Pertinent Findings:  Gen:    Not in distress  Chest: Clear lungs  CVS:   Regular rhythm. No edema  Abd:  Soft, not distended, not tender  Neuro:  Alert, oriented x4  _______________________________________________________________________  DISCHARGE MEDICATIONS:   Current Discharge Medication List      START taking these medications    Details   dilTIAZem (CARDIZEM) 90 mg tablet Take 1 Tab by mouth Before breakfast, lunch, and dinner. Qty: 90 Tab, Refills: 1      hydrALAZINE (APRESOLINE) 10 mg tablet Take 1 Tab by mouth three (3) times daily. HOLD IF YOUR BLOOD PRESSURE BELOW 140/90  Qty: 30 Tab, Refills: 0               Patient Follow Up Instructions: Activity: Activity as tolerated  Diet: Cardiac Diet  Wound Care: None needed    Follow-up with PCP  in a few days.   Follow-up tests/labs BMP  Follow-up Information     Follow up With Specialties Details [address]. [facility] [id number] [phone]          ________________________________________________________________    Risk of deterioration: Moderate    Condition at Discharge:  Stable  __________________________________________________________________    Disposition  Home with family, no needs    ____________________________________________________________________    Code Status: Full Code  ___________________________________________________________________      Total time in minutes spent coordinating this discharge (includes going over instructions, follow-up, prescriptions, and preparing report for sign off to her PCP) :  35 minutes    Signed:  Ford De Paz MD

## 2019-05-11 NOTE — DISCHARGE INSTRUCTIONS
Patient Education        Chest Pain: Care Instructions  Your Care Instructions    There are many things that can cause chest pain. Some are not serious and will get better on their own in a few days. But some kinds of chest pain need more testing and treatment. Your doctor may have recommended a follow-up visit in the next 8 to 12 hours. If you are not getting better, you may need more tests or treatment. Even though your doctor has released you, you still need to watch for any problems. The doctor carefully checked you, but sometimes problems can develop later. If you have new symptoms or if your symptoms do not get better, get medical care right away. If you have worse or different chest pain or pressure that lasts more than 5 minutes or you passed out (lost consciousness), call 911 or seek other emergency help right away. A medical visit is only one step in your treatment. Even if you feel better, you still need to do what your doctor recommends, such as going to all suggested follow-up appointments and taking medicines exactly as directed. This will help you recover and help prevent future problems. How can you care for yourself at home? · Rest until you feel better. · Take your medicine exactly as prescribed. Call your doctor if you think you are having a problem with your medicine. · Do not drive after taking a prescription pain medicine. When should you call for help? Call 911 if:    · You passed out (lost consciousness).     · You have severe difficulty breathing.     · You have symptoms of a heart attack. These may include:  ? Chest pain or pressure, or a strange feeling in your chest.  ? Sweating. ? Shortness of breath. ? Nausea or vomiting. ? Pain, pressure, or a strange feeling in your back, neck, jaw, or upper belly or in one or both shoulders or arms. ? Lightheadedness or sudden weakness. ? A fast or irregular heartbeat.   After you call 911, the  may tell you to chew 1 adult-strength or 2 to 4 low-dose aspirin. Wait for an ambulance. Do not try to drive yourself.    Call your doctor today if:    · You have any trouble breathing.     · Your chest pain gets worse.     · You are dizzy or lightheaded, or you feel like you may faint.     · You are not getting better as expected.     · You are having new or different chest pain. Where can you learn more? Go to http://clair-iva.info/. Enter A120 in the search box to learn more about \"Chest Pain: Care Instructions. \"  Current as of: September 23, 2018  Content Version: 11.9  © 4313-6917 [employer]. Care instructions adapted under license by [employer] (which disclaims liability or warranty for this information). If you have questions about a medical condition or this instruction, always ask your healthcare professional. [place] [address] any warranty or liability for your use of this information. Patient Education        Acute High Blood Pressure: Care Instructions  Your Care Instructions    Acute high blood pressure is very high blood pressure. It's a serious problem. Very high blood pressure can damage your brain, heart, eyes, and kidneys. You may have been given medicines to lower your blood pressure. You may have gotten them as pills or through a needle in one of your veins. This is called an IV. And maybe you were given other medicines too. These can be needed when high blood pressure causes other problems. To keep your blood pressure at a lower level, you may need to make healthy lifestyle changes. And you will probably need to take medicines. Be sure to follow up with your doctor about your blood pressure and what you can do about it. Follow-up care is a key part of your treatment and safety. Be sure to make and go to all appointments, and call your doctor if you are having problems.  It's also a good idea to know your test results and keep a list of the medicines you take. How can you care for yourself at home? · See your doctor as often as he or she recommends. This is to make sure your blood pressure is under control. You will probably go at least 2 times a year. But it may be more often at first.  · Take your blood pressure medicine exactly as prescribed. You may take one or more types. They include diuretics, beta-blockers, ACE inhibitors, calcium channel blockers, and angiotensin II receptor blockers. Call your doctor if you think you are having a problem with your medicine. · If you take blood pressure medicine, talk to your doctor before you take decongestants or anti-inflammatory medicine, such as ibuprofen. These can raise blood pressure. · Learn how to check your blood pressure at home. Check it often. · Ask your doctor if it's okay to drink alcohol. · Talk to your doctor about lifestyle changes that can help blood pressure. These include being active and not smoking. When should you call for help? Call 911 anytime you think you may need emergency care. This may mean having symptoms that suggest that your blood pressure is causing a serious heart or blood vessel problem. Your blood pressure may be over 180/120.   For example, call 911 if:    · You have symptoms of a heart attack. These may include:  ? Chest pain or pressure, or a strange feeling in the chest.  ? Sweating. ? Shortness of breath. ? Nausea or vomiting. ? Pain, pressure, or a strange feeling in the back, neck, jaw, or upper belly or in one or both shoulders or arms. ? Lightheadedness or sudden weakness. ? A fast or irregular heartbeat.     · You have symptoms of a stroke. These may include:  ? Sudden numbness, tingling, weakness, or loss of movement in your face, arm, or leg, especially on only one side of your body. ? Sudden vision changes. ? Sudden trouble speaking. ? Sudden confusion or trouble understanding simple statements.   ? Sudden problems with walking or balance. ? A sudden, severe headache that is different from past headaches.     · You have severe back or belly pain.    Do not wait until your blood pressure comes down on its own. Get help right away.   Call your doctor now or seek immediate care if:    · Your blood pressure is much higher than normal (such as 180/120 or higher), but you don't have symptoms.     · You think high blood pressure is causing symptoms, such as:  ? Severe headache.  ? Blurry vision.    Watch closely for changes in your health, and be sure to contact your doctor if:    · Your blood pressure measures higher than your doctor recommends at least 2 times. That means the top number is higher or the bottom number is higher, or both.     · You think you may be having side effects from your blood pressure medicine. Where can you learn more? Go to http://clair-iva.info/. Enter C058 in the search box to learn more about \"Acute High Blood Pressure: Care Instructions. \"  Current as of: July 22, 2018  Content Version: 11.9  © 4472-5696 [employer]. Care instructions adapted under license by [employer] (which disclaims liability or warranty for this information). If you have questions about a medical condition or this instruction, always ask your healthcare professional. [place] [address] any warranty or liability for your use of this information.

## 2019-05-11 NOTE — PROGRESS NOTES
Cardiology Progress Note      5/11/2019 1:23 PM    Admit Date: 5/10/2019    Admit Diagnosis: Hypertensive emergency [I16.1]  Cocaine abuse ([address] [F14.10]  Chest pain [R07.9]      Subjective:     Tyrell Malagon is feeling better today. Denies HA, SOB, chest pain. Is tolerating HTN medications well. No overnight events. Visit Vitals  /66 (BP 1 Location: Left arm, BP Patient Position: At rest;Supine)   Pulse 64   Temp 97.9 °F (36.6 °C)   Resp 18   Ht 5' 9\" (1.753 m)   Wt 185 lb (83.9 kg)   SpO2 99%   BMI 27.32 kg/m²     Current Facility-Administered Medications   Medication Dose Route Frequency    dilTIAZem (CARDIZEM) IR tablet 90 mg  90 mg Oral TIDAC    labetalol (NORMODYNE) tablet 100 mg  100 mg Oral BID    sodium chloride (NS) flush 5-40 mL  5-40 mL IntraVENous Q8H    sodium chloride (NS) flush 5-40 mL  5-40 mL IntraVENous PRN    heparin (porcine) injection 5,000 Units  5,000 Units SubCUTAneous Q8H    hydrALAZINE (APRESOLINE) 20 mg/mL injection 20 mg  20 mg IntraVENous Q6H PRN    LORazepam (ATIVAN) tablet 0.5 mg  0.5 mg Oral Q6H    acetaminophen (TYLENOL) solution 650 mg  650 mg Oral Q4H PRN       A comprehensive ROS was negative and as noted in HPI. Objective:      Physical Exam:  Neck: supple, symmetrical, trachea midline, no adenopathy, thyroid: not enlarged, symmetric, no tenderness/mass/nodules, no carotid bruit and no JVD  Heart: regular rate and rhythm, S1, S2 normal, no murmur, click, rub or gallop  Lungs: clear to auscultation bilaterally  Abdomen: soft, non-tender. Bowel sounds normal. No masses,  no organomegaly  Extremities: extremities normal, atraumatic, no cyanosis or edema  Pulses: 2+ and symmetric  Neurologic: Grossly normal  GEN: WDWN adult male laying comfortably supine in hospital bed. Normal affect. NAD.     Data Review:   Labs:    Recent Results (from the past 24 hour(s))   METABOLIC PANEL, COMPREHENSIVE    Collection Time: 05/11/19  3:40 AM   Result Value Ref Range Sodium 140 136 - 145 mmol/L    Potassium 3.5 3.5 - 5.1 mmol/L    Chloride 107 97 - 108 mmol/L    CO2 25 21 - 32 mmol/L    Anion gap 8 5 - 15 mmol/L    Glucose 98 65 - 100 mg/dL    BUN 18 6 - 20 MG/DL    Creatinine 1.24 0.70 - 1.30 MG/DL    BUN/Creatinine ratio 15 12 - 20      GFR est AA >60 >60 ml/min/1.73m2    GFR est non-AA >60 >60 ml/min/1.73m2    Calcium 8.2 (L) 8.5 - 10.1 MG/DL    Bilirubin, total 0.4 0.2 - 1.0 MG/DL    ALT (SGPT) 88 (H) 12 - 78 U/L    AST (SGOT) 83 (H) 15 - 37 U/L    Alk. phosphatase 147 (H) 45 - 117 U/L    Protein, total 6.7 6.4 - 8.2 g/dL    Albumin 1.9 (L) 3.5 - 5.0 g/dL    Globulin 4.8 (H) 2.0 - 4.0 g/dL    A-G Ratio 0.4 (L) 1.1 - 2.2         Telemetry: normal sinus rhythm      Assessment:     Active Problems:    Chest pain (5/10/2019)      Cocaine abuse ([address] (5/10/2019)      Hypertensive emergency (5/10/2019)    Hemodynamically stable 47 yo male with h/o HTN and positive for cocaine upon admission yesterday. HA on admission is resolved. Pt is tolerating BP meds well and states he feels much better today. Plan:     Disposition: Pt can be discharged with outpatient follow up to be scheduled for this week with Dr. Evette Hodges. HTN: Contine Diltiazem 90 mg every day, Hydralazine 10 mg TID; hold Hydralazine for BP <110/70. (pt has BP monitor at home)    Pt encouraged to establish and keep regular follow up with outpatient cardiology. Reitertation of lifestyle modifications will be necessary until pt can establish care, maintain med management and avoid cardiotoxic substances (I.e. Cocaine). Discussed with Dr. Evette Hodges. Thank you for this opportunity to provide care for this patient.     Lilliana Francis PA-C

## 2019-05-11 NOTE — PROGRESS NOTES
Bedside shift change report given to Blair LUCIO (oncoming nurse) by Catherine Ngo (offgoing nurse). Report included the following information SBAR, Kardex, Intake/Output, MAR, Recent Results and Cardiac Rhythm NSR.

## 2019-05-11 NOTE — PROGRESS NOTES
0710hrs . Judson Light Bedside and Verbal shift change report given to Sherry Casey (oncoming nurse) by Annika Orozco (offgoing nurse). Report included the following information SBAR, Kardex, Intake/Output, MAR, Recent Results, Med Rec Status and Cardiac Rhythm SR/SB . 1715hrs Discharged instruction was discussed with patient, opportunity for questions and clarification was provided. IV access was removed. Medication education was also provided. BRAIN checklist was discussed with patient. 1735hrs Discharged patient to home, he was picked up by his daughter.

## 2019-05-11 NOTE — PROGRESS NOTES
Cardiology:    Patient was advised in very clear terms about the need for ongoing follow up and scrupulous continuation of blood pressure medications. He was also advised about the absolute need to avoid cocaine in any form.  He has been offered follow up in our clinic if interested,     Myke Thompson MD [facility]

## 2019-05-14 ENCOUNTER — TELEPHONE (OUTPATIENT)
Dept: CASE MANAGEMENT | Age: 54
End: 2019-05-14

## 2019-05-14 LAB
ATRIAL RATE: 68 BPM
CALCULATED P AXIS, ECG09: 70 DEGREES
CALCULATED R AXIS, ECG10: 46 DEGREES
CALCULATED T AXIS, ECG11: 76 DEGREES
DIAGNOSIS, 93000: NORMAL
P-R INTERVAL, ECG05: 162 MS
Q-T INTERVAL, ECG07: 438 MS
QRS DURATION, ECG06: 100 MS
QTC CALCULATION (BEZET), ECG08: 465 MS
VENTRICULAR RATE, ECG03: 68 BPM

## 2019-05-14 NOTE — TELEPHONE ENCOUNTER
Case Management Follow-up call  CM reviewed chart. CM called pt to discuss discharge plan. Wrong number is listed on the pt chart. Unable to reach pt.        Renata Waterman, MSW  [phone]

## 2019-05-16 ENCOUNTER — OFFICE VISIT (OUTPATIENT)
Dept: INTERNAL MEDICINE CLINIC | Age: 54
End: 2019-05-16

## 2019-05-16 VITALS
RESPIRATION RATE: 17 BRPM | HEIGHT: 69 IN | WEIGHT: 185 LBS | SYSTOLIC BLOOD PRESSURE: 200 MMHG | HEART RATE: 86 BPM | OXYGEN SATURATION: 98 % | TEMPERATURE: 98.6 F | DIASTOLIC BLOOD PRESSURE: 107 MMHG | BODY MASS INDEX: 27.4 KG/M2

## 2019-05-16 DIAGNOSIS — R06.09 DOE (DYSPNEA ON EXERTION): ICD-10-CM

## 2019-05-16 DIAGNOSIS — I10 MALIGNANT HYPERTENSION: Primary | ICD-10-CM

## 2019-05-16 DIAGNOSIS — Z76.89 ENCOUNTER TO ESTABLISH CARE: ICD-10-CM

## 2019-05-16 DIAGNOSIS — F14.10 COCAINE ABUSE (HCC): ICD-10-CM

## 2019-05-16 DIAGNOSIS — R42 DIZZINESS: ICD-10-CM

## 2019-05-16 RX ORDER — LABETALOL 100 MG/1
100 TABLET, FILM COATED ORAL 2 TIMES DAILY
Qty: 60 TAB | Refills: 2 | Status: SHIPPED | OUTPATIENT
Start: 2019-05-16 | End: 2019-05-24 | Stop reason: SDUPTHER

## 2019-05-16 RX ORDER — HYDRALAZINE HYDROCHLORIDE 100 MG/1
TABLET, FILM COATED ORAL
Refills: 0 | COMMUNITY
Start: 2019-05-12 | End: 2019-05-16 | Stop reason: SINTOL

## 2019-05-16 NOTE — PROGRESS NOTES
Norm CANDICE Hill is a 48 y.o. male and presents with [facility] for chest pain dizziness); Skin Problem (rash on the left shoulder, pt states that he thinks its a side effect of the medication ); and Medication Reaction (pt states that he believes that the medication is he currently taking is causing him to have dizziness and Shortness of breath and sneezing, pt states that he sneezed so much that his nose started bleeding, pt states that he stopped taking  medication yesterday )    Subjective:  Pt here to establish care following recent hospitalization from 5/10 to 5/11 for chest pain and SOB. Contacted or attempted contact within two days of d/c by NN. Diagnosed with hypertensive emergency, cocaine use, and chest pain. Was given medication and counseling to stop using drugs. Instructed to schedule appt with PCP. Reports feeling BETTER THAN when in hospital, but KING continues. Stopped taking BOTH meds due to dizziness and rash. Also with swelling to right collar bone. No fall or injury reported.      Review of Systems  Constitutional: negative for fevers, chills, anorexia and weight loss  Respiratory:  negative for cough, hemoptysis, dyspnea, and wheezing  CV:   negative for chest pain, palpitations, and lower extremity edema  GI:   negative for nausea, vomiting, diarrhea, abdominal pain, and melena  Endo:               negative for polyuria,polydipsia,polyphagia, and heat intolerance  Genitourinary: negative for frequency, urgency, dysuria, retention, and hematuria  Integument:  negative for rash, ulcerations, and pruritus  Hematologic:  negative for easy bruising and bleeding  Musculoskel: negative for arthralgias, muscle weakness,and joint pain/swelling  Neurological:  negative for headaches, dizziness, vertigo,and memory/gait problems  Behavl/Psych: negative for feelings of anxiety, depression, suicide, and mood changes    Past Medical History:   Diagnosis Date    Hypertension      History reviewed. No pertinent surgical history. Social History     Socioeconomic History    Marital status: SINGLE     Spouse name: Not on file    Number of children: Not on file    Years of education: Not on file    Highest education level: Not on file   Tobacco Use    Smoking status: Current Every Day Smoker     Packs/day: 1.00    Smokeless tobacco: Never Used   Substance and Sexual Activity    Alcohol use: Yes    Drug use: Yes     Types: Marijuana     History reviewed. No pertinent family history. Current Outpatient Medications   Medication Sig Dispense Refill    hydrALAZINE (APRESOLINE) 100 mg tablet TAKE ONE TABLET BY MOUTH THREE TIMES A DAY , HOLD IF YOUR BLOOD PRESSURE IS BELOW 140/90  0    dilTIAZem (CARDIZEM) 90 mg tablet Take 1 Tab by mouth Before breakfast, lunch, and dinner. 90 Tab 1    hydrALAZINE (APRESOLINE) 10 mg tablet Take 1 Tab by mouth three (3) times daily. HOLD IF YOUR BLOOD PRESSURE BELOW 140/90 30 Tab 0     No Known Allergies    Objective:  Visit Vitals  BP (!) 200/107 (BP 1 Location: Left arm, BP Patient Position: Sitting)   Pulse 86   Temp 98.6 °F (37 °C) (Oral)   Resp 17   Ht 5' 9\" (1.753 m)   Wt 185 lb (83.9 kg)   SpO2 98%   BMI 27.32 kg/m²     Wt Readings from Last 3 Encounters:   05/16/19 185 lb (83.9 kg)   05/10/19 185 lb (83.9 kg)   05/05/14 200 lb (90.7 kg)     Physical Exam:   General appearance - alert, well appearing, and in no distress. Mental status - A/O x 4, normal mood and affect. Neck - No JVD. Right clavicle with mild enlargement. NT. Chest - CTA. Symmetric chest rise. No wheezing, rales or rhonchi. Heart - Normal rate, regular rhythm. Normal S1, S2. No MGR or clicks. Abdomen - Soft,non-distended. Normoactive BS in all quadrants. NT, no mass or HSM. Ext- Radial, DP pulses, 2+ bilaterally. No pedal edema, clubbing, or cyanosis. Skin-Warm and dry. No hyperpigmentation, ulcerations, or suspicious lesions.   Neuro - Normal speech, no focal findings or movement disorder. Normal strength, gait, and muscle tone. Assessment/Plan:  Stopped nitrate and started BB instead. Continued diltiazem. Will return next week, will get lipid profile then. Medication Side Effects and Warnings were discussed with patient: yes   Patient Labs were reviewed: yes  Patient Past Records were reviewed: yes    See below for other orders       No diagnosis found. Orders Placed This Encounter    hydrALAZINE (APRESOLINE) 100 mg tablet     Sig: TAKE ONE TABLET BY MOUTH THREE TIMES A DAY , HOLD IF YOUR BLOOD PRESSURE IS BELOW 140/90     Refill:  0       Norm X Rosario Negrete expressed understanding of plan. An After Visit Summary was offered/printed and given to the patient.

## 2019-05-16 NOTE — PATIENT INSTRUCTIONS
Low Sodium Diet (2,000 Milligram): Care Instructions  Your Care Instructions    Too much sodium causes your body to hold on to extra water. This can raise your blood pressure and force your heart and kidneys to work harder. In very serious cases, this could cause you to be put in the hospital. It might even be life-threatening. By limiting sodium, you will feel better and lower your risk of serious problems. The most common source of sodium is salt. People get most of the salt in their diet from canned, prepared, and packaged foods. Fast food and restaurant meals also are very high in sodium. Your doctor will probably limit your sodium to less than 2,000 milligrams (mg) a day. This limit counts all the sodium in prepared and packaged foods and any salt you add to your food. Follow-up care is a key part of your treatment and safety. Be sure to make and go to all appointments, and call your doctor if you are having problems. It's also a good idea to know your test results and keep a list of the medicines you take. How can you care for yourself at home? Read food labels  · Read labels on cans and food packages. The labels tell you how much sodium is in each serving. Make sure that you look at the serving size. If you eat more than the serving size, you have eaten more sodium. · Food labels also tell you the Percent Daily Value for sodium. Choose products with low Percent Daily Values for sodium. · Be aware that sodium can come in forms other than salt, including monosodium glutamate (MSG), sodium citrate, and sodium bicarbonate (baking soda). MSG is often added to Asian food. When you eat out, you can sometimes ask for food without MSG or added salt. Buy low-sodium foods  · Buy foods that are labeled \"unsalted\" (no salt added), \"sodium-free\" (less than 5 mg of sodium per serving), or \"low-sodium\" (less than 140 mg of sodium per serving).  Foods labeled \"reduced-sodium\" and \"light sodium\" may still have too much sodium. Be sure to read the label to see how much sodium you are getting. · Buy fresh vegetables, or frozen vegetables without added sauces. Buy low-sodium versions of canned vegetables, soups, and other canned goods. Prepare low-sodium meals  · Cut back on the amount of salt you use in cooking. This will help you adjust to the taste. Do not add salt after cooking. One teaspoon of salt has about 2,300 mg of sodium. · Take the salt shaker off the table. · Flavor your food with garlic, lemon juice, onion, vinegar, herbs, and spices. Do not use soy sauce, lite soy sauce, steak sauce, onion salt, garlic salt, celery salt, mustard, or ketchup on your food. · Use low-sodium salad dressings, sauces, and ketchup. Or make your own salad dressings and sauces without adding salt. · Use less salt (or none) when recipes call for it. You can often use half the salt a recipe calls for without losing flavor. Other foods such as rice, pasta, and grains do not need added salt. · Rinse canned vegetables, and cook them in fresh water. This removes some--but not all--of the salt. · Avoid water that is naturally high in sodium or that has been treated with water softeners, which add sodium. Call your local water company to find out the sodium content of your water supply. If you buy bottled water, read the label and choose a sodium-free brand. Avoid high-sodium foods  · Avoid eating:  ? Smoked, cured, salted, and canned meat, fish, and poultry. ? Ham, nowak, hot dogs, and luncheon meats. ? Regular, hard, and processed cheese and regular peanut butter. ? Crackers with salted tops, and other salted snack foods such as pretzels, chips, and salted popcorn. ? Frozen prepared meals, unless labeled low-sodium. ? Canned and dried soups, broths, and bouillon, unless labeled sodium-free or low-sodium. ? Canned vegetables, unless labeled sodium-free or low-sodium. ? Western Amrita fries, pizza, tacos, and other fast foods.   ? Gerlauro Torrez, olives, ketchup, and other condiments, especially soy sauce, unless labeled sodium-free or low-sodium. Where can you learn more? Go to http://clair-iva.info/. Enter H292 in the search box to learn more about \"Low Sodium Diet (2,000 Milligram): Care Instructions. \"  Current as of: March 28, 2018  Content Version: 11.9  © 2178-1216 [employer]. Care instructions adapted under license by [employer] (which disclaims liability or warranty for this information). If you have questions about a medical condition or this instruction, always ask your healthcare professional. [address] any warranty or liability for your use of this information.

## 2019-05-16 NOTE — PROGRESS NOTES
Pt is here for   Chief Complaint   Patient presents with   [address] [facility] for chest pain dizziness    Skin Problem     rash on the left shoulder, pt states that he thinks its a side effect of the medication     Medication Reaction     pt states that he believes that the medication is he currently taking is causing him to have dizziness and Shortness of breath and sneezing, pt states that he sneezed so much that his nose started bleeding, pt states that he stopped taking  medication yesterday      Pt denies pain at this time     1. Have you been to the ER, urgent care clinic since your last visit? Hospitalized since your last visit? Yes When: 5/10/19 [facility] for Chest pain    2. Have you seen or consulted any other health care providers outside of the [address] [place] [address] since your last visit? Include any pap smears or colon screening.  No    Pt states this morning his BP was 194/102

## 2019-05-24 ENCOUNTER — OFFICE VISIT (OUTPATIENT)
Dept: INTERNAL MEDICINE CLINIC | Age: 54
End: 2019-05-24

## 2019-05-24 ENCOUNTER — HOSPITAL ENCOUNTER (OUTPATIENT)
Dept: GENERAL RADIOLOGY | Age: 54
Discharge: HOME | End: 2019-05-24
Payer: MEDICAID

## 2019-05-24 VITALS
SYSTOLIC BLOOD PRESSURE: 190 MMHG | TEMPERATURE: 97.2 F | HEART RATE: 63 BPM | BODY MASS INDEX: 27.4 KG/M2 | DIASTOLIC BLOOD PRESSURE: 89 MMHG | OXYGEN SATURATION: 94 % | RESPIRATION RATE: 17 BRPM | WEIGHT: 185 LBS | HEIGHT: 69 IN

## 2019-05-24 DIAGNOSIS — M89.8X1 PAIN OF RIGHT CLAVICLE: ICD-10-CM

## 2019-05-24 DIAGNOSIS — I10 MALIGNANT HYPERTENSION: Primary | ICD-10-CM

## 2019-05-24 PROBLEM — F14.10 COCAINE ABUSE (HCC): Status: RESOLVED | Noted: 2019-05-10 | Resolved: 2019-05-24

## 2019-05-24 PROCEDURE — 73000 X-RAY EXAM OF COLLAR BONE: CPT

## 2019-05-24 RX ORDER — LABETALOL 100 MG/1
100 TABLET, FILM COATED ORAL 2 TIMES DAILY
Qty: 60 TAB | Refills: 2 | Status: SHIPPED | OUTPATIENT
Start: 2019-05-24 | End: 2019-11-06 | Stop reason: SDUPTHER

## 2019-05-24 RX ORDER — IBUPROFEN 600 MG/1
600 TABLET ORAL
Qty: 20 TAB | Refills: 0 | Status: SHIPPED | OUTPATIENT
Start: 2019-05-24 | End: 2019-08-06 | Stop reason: SDUPTHER

## 2019-05-24 NOTE — PATIENT INSTRUCTIONS
Broken Collarbone: Care Instructions  Your Care Instructions    You have broken or cracked your collarbone, or clavicle. The collarbone is the long, slightly curved bone that connects the shoulder to the chest. It supports the shoulder. A broken collarbone may take 6 weeks or longer to heal. You will need to wear an arm sling to keep the broken bone from moving while it heals. At first, it may hurt to move your arm. This will get better with time. You heal best when you take good care of yourself. Eat a variety of healthy foods, and don't smoke. Follow-up care is a key part of your treatment and safety. Be sure to make and go to all appointments, and call your doctor if you are having problems. It's also a good idea to know your test results and keep a list of the medicines you take. How can you care for yourself at home? · Wear the sling day and night for as long as your doctor tells you to. You may take off the sling when you bathe. When the sling is off, avoid arm positions or motions that cause or increase pain. · Put ice or a cold pack on your collarbone for 10 to 20 minutes at a time. Try to do this every 1 to 2 hours for the next 3 days (when you are awake) or until the swelling goes down. Put a thin cloth between the ice and your skin. · Be safe with medicines. Take pain medicines exactly as directed. ? If the doctor gave you a prescription medicine for pain, take it as prescribed. ? If you are not taking a prescription pain medicine, ask your doctor if you can take an over-the-counter medicine. ? Do not take two or more pain medicines at the same time unless the doctor told you to. Many pain medicines have acetaminophen, which is Tylenol. Too much acetaminophen (Tylenol) can be harmful. · Try sleeping with pillows propped under your arm for comfort. · After a few days, put your fingers, wrist, and elbow through their full range of motion several times a day.  This will keep them from getting stiff. You may get instructions on rehabilitation exercises you can do when your shoulder starts to heal.  · You may use warm packs after the first 3 days for 15 to 20 minutes at a time to ease pain. · You may notice a bump where the collarbone is broken. Over time, the bump will get smaller. A small bump may remain, but it should not affect your arm's strength or movement. When should you call for help? Call your doctor now or seek immediate medical care if:    · Your fingers become numb, tingly, cool, or pale.     · You cannot move your arm.    Watch closely for changes in your health, and be sure to contact your doctor if:    · You have new or increased pain.     · You have new or increased swelling.     · You do not get better as expected. Where can you learn more? Go to http://clair-iva.info/. Enter P186 in the search box to learn more about \"Broken Collarbone: Care Instructions. \"  Current as of: September 20, 2018  Content Version: 11.9  © 2720-3853 [employer]. Care instructions adapted under license by [employer] (which disclaims liability or warranty for this information). If you have questions about a medical condition or this instruction, always ask your healthcare professional. [address] any warranty or liability for your use of this information.

## 2019-05-24 NOTE — PROGRESS NOTES
Aline Omalley is a 48 y.o. male and presents with Follow-up (HTN, collar bone pain )    Subjective:  Hypertension Review:  The patient has hypertension  Diet and Lifestyle: generally does  try to follow a  low sodium diet, exercises sporadically   Home BP Monitoring: is not measured at home. Pertinent ROS: taking medications as instructed, except LABETOLOL, did NOT realize no medication side effects noted. No TIA's, chest pain on exertion, dyspnea on exertion, or swelling of ankles. BP Readings from Last 3 Encounters:   05/24/19 190/89   05/16/19 (!) 200/107   05/11/19 143/73       Also continues with right collar bone pain. Worse with arm raising. Onset 7 months ago. No fall or injury reported. No treatments tried or work up in the past. Pain Scale: 6/10      Review of Systems  Constitutional: negative for fevers, chills, anorexia and weight loss  Respiratory:  negative for cough, hemoptysis, dyspnea, and wheezing  CV:   negative for chest pain, palpitations, and lower extremity edema  GI:   negative for nausea, vomiting, diarrhea, abdominal pain, and melena  Endo:               negative for polyuria,polydipsia,polyphagia, and heat intolerance  Genitourinary: negative for frequency, urgency, dysuria, retention, and hematuria  Integument:  negative for rash, ulcerations, and pruritus  Hematologic:  negative for easy bruising and bleeding  Musculoskel: negative for arthralgias, muscle weakness,and joint pain/swelling  Neurological:  negative for headaches, dizziness, vertigo,and memory/gait problems  Behavl/Psych: negative for feelings of anxiety, depression, suicide, and mood changes    Past Medical History:   Diagnosis Date    Hypertension      History reviewed. No pertinent surgical history.   Social History     Socioeconomic History    Marital status: SINGLE     Spouse name: Not on file    Number of children: Not on file    Years of education: Not on file    Highest education level: Not on file Tobacco Use    Smoking status: Current Every Day Smoker     Packs/day: 1.00    Smokeless tobacco: Never Used   Substance and Sexual Activity    Alcohol use: Yes    Drug use: Yes     Types: Marijuana    Sexual activity: Not Currently     History reviewed. No pertinent family history. Current Outpatient Medications   Medication Sig Dispense Refill    labetalol (NORMODYNE) 100 mg tablet Take 1 Tab by mouth two (2) times a day. 60 Tab 2    ibuprofen (MOTRIN) 600 mg tablet Take 1 Tab by mouth every six (6) hours as needed for Pain. 20 Tab 0    dilTIAZem (CARDIZEM) 90 mg tablet Take 1 Tab by mouth Before breakfast, lunch, and dinner. 90 Tab 1     No Known Allergies    Objective:  Visit Vitals  /89 (BP 1 Location: Right arm, BP Patient Position: Sitting)   Pulse 63   Temp 97.2 °F (36.2 °C) (Oral)   Resp 17   Ht 5' 9\" (1.753 m)   Wt 185 lb (83.9 kg)   SpO2 94%   BMI 27.32 kg/m²     Wt Readings from Last 3 Encounters:   05/24/19 185 lb (83.9 kg)   05/16/19 185 lb (83.9 kg)   05/10/19 185 lb (83.9 kg)     Physical Exam:   General appearance - alert, well appearing, and in no distress. Mental status - A/O x 4, normal mood and affect. Neck - No JVD. Right clavicle with mild enlargement. NT. Chest - CTA. Symmetric chest rise. No wheezing, rales or rhonchi. Heart - Normal rate, regular rhythm. Normal S1, S2. No MGR or clicks. Abdomen - Soft,non-distended. Normoactive BS in all quadrants. NT, no mass or HSM. Ext- Radial, DP pulses, 2+ bilaterally. No pedal edema, clubbing, or cyanosis. Skin-Warm and dry. No hyperpigmentation, ulcerations, or suspicious lesions. Neuro - Normal speech, no focal findings or movement disorder. Normal strength, gait, and muscle tone. Assessment/Plan:  Asked to START Labetolol as prescribed. Xray for clavicle. Trial of NSAID.    Medication Side Effects and Warnings were discussed with patient: yes   Patient Labs were reviewed: yes  Patient Past Records were reviewed: yes    See below for other orders   Follow-up and Dispositions    · Return in about 2 weeks (around 6/7/2019) for HTN, xray review. ICD-10-CM ICD-9-CM    1. 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 Pain of right clavicle M89.8X1 733.90 XR CLAVICLE RT      ibuprofen (MOTRIN) 600 mg tablet     Orders Placed This Encounter    XR CLAVICLE RT     Standing Status:   Future     Number of Occurrences:   1     Standing Expiration Date:   6/24/2020     Order Specific Question:   Reason for Exam     Answer:   right clavicle pain    labetalol (NORMODYNE) 100 mg tablet     Sig: Take 1 Tab by mouth two (2) times a day. Dispense:  60 Tab     Refill:  2    ibuprofen (MOTRIN) 600 mg tablet     Sig: Take 1 Tab by mouth every six (6) hours as needed for Pain. Dispense:  20 Tab     Refill:  0       Darrenmatthew Bello expressed understanding of plan. An After Visit Summary was offered/printed and given to the patient.

## 2019-05-24 NOTE — PROGRESS NOTES
Pt is here for   Chief Complaint   Patient presents with    Follow-up     HTN, collar bone pain      Pt states pain level is a 6/10 shoulder    1. Have you been to the ER, urgent care clinic since your last visit? Hospitalized since your last visit? No    2. Have you seen or consulted any other health care providers outside of the [address] [place] [address] since your last visit? Include any pap smears or colon screening.  No

## 2019-06-11 ENCOUNTER — OFFICE VISIT (OUTPATIENT)
Dept: INTERNAL MEDICINE CLINIC | Age: 54
End: 2019-06-11

## 2019-06-11 VITALS
OXYGEN SATURATION: 95 % | HEIGHT: 69 IN | SYSTOLIC BLOOD PRESSURE: 199 MMHG | TEMPERATURE: 96.7 F | BODY MASS INDEX: 26.66 KG/M2 | WEIGHT: 180 LBS | DIASTOLIC BLOOD PRESSURE: 106 MMHG | RESPIRATION RATE: 17 BRPM | HEART RATE: 82 BPM

## 2019-06-11 DIAGNOSIS — Z91.199 CURRENT NON-ADHERENCE TO MEDICAL TREATMENT: ICD-10-CM

## 2019-06-11 DIAGNOSIS — F14.10 COCAINE ABUSE (HCC): ICD-10-CM

## 2019-06-11 DIAGNOSIS — M89.8X1 PAIN OF RIGHT CLAVICLE: ICD-10-CM

## 2019-06-11 DIAGNOSIS — I10 MALIGNANT HYPERTENSION: Primary | ICD-10-CM

## 2019-06-11 RX ORDER — SPIRONOLACTONE 25 MG/1
25 TABLET ORAL DAILY
Qty: 90 TAB | Refills: 3 | Status: ON HOLD | OUTPATIENT
Start: 2019-06-11 | End: 2020-06-28

## 2019-06-11 NOTE — PROGRESS NOTES
Pt Is here for   Chief Complaint   Patient presents with    Follow-up     HTN, Xray results         1. Have you been to the ER, urgent care clinic since your last visit? Hospitalized since your last visit? No    2. Have you seen or consulted any other health care providers outside of the [address] [place] [address] since your last visit? Include any pap smears or colon screening.  No

## 2019-06-11 NOTE — PATIENT INSTRUCTIONS
Spironolactone (By mouth)   Spironolactone ([id number]-[id number]-[id number]-tone)  Treats high blood pressure, edema (fluid retention), or high levels of aldosterone (a hormone). This medicine is a potassium-sparing diuretic (water pill). Brand Name(s): Aldactone   There may be other brand names for this medicine. When This Medicine Should Not Be Used: This medicine is not right for everyone. Do not use it if you had an allergic reaction to spironolactone, or if you have Lignum disease. How to Use This Medicine:   Tablet  · Take your medicine as directed. Your dose may need to be changed several times to find what works best for you. · The oral liquid may be taken with or without food, but it should be taken the same way (with or without food) each day. · Measure the oral liquid medicine with a marked measuring spoon, oral syringe, or medicine cup. Shake well before each use. · Missed dose: Take a dose as soon as you remember. If it is almost time for your next dose, wait until then and take a regular dose. Do not take extra medicine to make up for a missed dose. · Store the medicine in a closed container at room temperature, away from heat, moisture, and direct light. Drugs and Foods to Avoid:   Ask your doctor or pharmacist before using any other medicine, including over-the-counter medicines, vitamins, and herbal products. · Do not use this medicine together with eplerenone. · Some foods and medicines can affect how spironolactone works.  Tell your doctor if you are using any of the following:   ¨ Cholestyramine, cisplatin, digoxin, heparin, lithium, trimethoprim  ¨ Another blood pressure medicine, including an angiotensin receptor blocker (ARB) or an ACE inhibitor  ¨ NSAID pain or arthritis medicine (including aspirin, celecoxib, diclofenac, ibuprofen, indomethacin, naproxen)  ¨ Steroid medicine (including hydrocortisone, methylprednisolone, prednisolone, prednisone)  · Ask your doctor before you use any medicine, supplement, or salt substitute that contains potassium. You could have high levels of potassium in your blood that would cause serious health problems. · Alcohol, narcotic pain relievers, or sleeping pills may cause you to feel more lightheaded, dizzy, or faint when used with this medicine. Warnings While Using This Medicine:   · Tell your doctor if you are pregnant or breastfeeding, or if you have kidney disease, liver disease, diabetes, gout, or trouble urinating. · This medicine may cause the following problems:  ¨ Low blood pressure  ¨ Worsening of kidney function  ¨ Electrolyte imbalance  ¨ High uric acid and blood sugar  · This medicine may make you dizzy or drowsy. Do not drive or do anything else that could be dangerous until you know how this medicine affects you. · Do not stop using the medicine without asking your doctor, even if you feel well. This medicine will not cure your high blood pressure, but it will help keep it in the normal range. You may have to take blood pressure medicine for the rest of your life. · Tell any doctor or dentist who treats you that you are using this medicine. · Your doctor will do lab tests at regular visits to check on the effects of this medicine. Keep all appointments. · Keep all medicine out of the reach of children. Never share your medicine with anyone.   Possible Side Effects While Using This Medicine:   Call your doctor right away if you notice any of these side effects:  · Allergic reaction: Itching or hives, swelling in your face or hands, swelling or tingling in your mouth or throat, chest tightness, trouble breathing  · Blistering, peeling, red skin rash  · Blood in your stools or dark stools, vomiting blood or material that looks like coffee grounds  · Confusion, weakness, uneven heartbeat, trouble breathing, numbness or tingling in your hands, feet, or lips  · Dry mouth, increased thirst, muscle cramps, nausea, vomiting  · Increased hunger or thirst, change in how much or how often you urinate, unusual weight loss  · Lightheadedness, dizziness, drowsiness, fainting  · Muscle twitching  · Unusual bleeding, bruising, or weakness  If you notice these less serious side effects, talk with your doctor:   · Breast swelling, enlargement, pain, or tenderness  If you notice other side effects that you think are caused by this medicine, tell your doctor. Call your doctor for medical advice about side effects. You may report side effects to FDA at 4-865-ILY-3033  © 2017 [facility] Information is for End User's use only and may not be sold, redistributed or otherwise used for commercial purposes. The above information is an  only. It is not intended as medical advice for individual conditions or treatments. Talk to your doctor, nurse or pharmacist before following any medical regimen to see if it is safe and effective for you.

## 2019-06-11 NOTE — PROGRESS NOTES
Marguerite Miller is a 48 y.o. male and presents with Follow-up (HTN, Xray results)    Subjective:  Hypertension Review:  The patient has hypertension  Diet and Lifestyle: generally does  try to follow a  low sodium diet, exercises sporadically   Home BP Monitoring: is not measured at home. Pertinent ROS: taking medications as instructed, except LABETOLOL only once daily however. no medication side effects noted. No TIA's, chest pain on exertion, dyspnea on exertion, or swelling of ankles. BP Readings from Last 3 Encounters:   06/11/19 (!) 199/106   05/24/19 190/89   05/16/19 (!) 200/107     Right collar bone pain improved. Worse with arm raising. Pain Scale: 0 - No pain/10. Has tried ibuprofen with relief. Last use of cocaine Wednesday, recently started NA again this weekend. Review of Systems  Constitutional: negative for fevers, chills, anorexia and weight loss  Respiratory:  negative for cough, hemoptysis, dyspnea, and wheezing  CV:   negative for chest pain, palpitations, and lower extremity edema  GI:   negative for nausea, vomiting, diarrhea, abdominal pain, and melena  Endo:               negative for polyuria,polydipsia,polyphagia, and heat intolerance  Genitourinary: negative for frequency, urgency, dysuria, retention, and hematuria  Integument:  negative for rash, ulcerations, and pruritus  Hematologic:  negative for easy bruising and bleeding  Musculoskel: negative for arthralgias, muscle weakness,and joint pain/swelling  Neurological:  negative for headaches, dizziness, vertigo,and memory/gait problems  Behavl/Psych: negative for feelings of anxiety, depression, suicide, and mood changes    Past Medical History:   Diagnosis Date    Hypertension      History reviewed. No pertinent surgical history.   Social History     Socioeconomic History    Marital status: SINGLE     Spouse name: Not on file    Number of children: Not on file    Years of education: Not on file    Highest education level: Not on file   Tobacco Use    Smoking status: Current Every Day Smoker     Packs/day: 1.00    Smokeless tobacco: Never Used   Substance and Sexual Activity    Alcohol use: Yes    Drug use: Yes     Types: Marijuana    Sexual activity: Not Currently     History reviewed. No pertinent family history. Current Outpatient Medications   Medication Sig Dispense Refill    spironolactone (ALDACTONE) 25 mg tablet Take 1 Tab by mouth daily. Indications: high blood pressure 90 Tab 3    labetalol (NORMODYNE) 100 mg tablet Take 1 Tab by mouth two (2) times a day. 60 Tab 2    ibuprofen (MOTRIN) 600 mg tablet Take 1 Tab by mouth every six (6) hours as needed for Pain. 20 Tab 0    dilTIAZem (CARDIZEM) 90 mg tablet Take 1 Tab by mouth Before breakfast, lunch, and dinner. 90 Tab 1     No Known Allergies    Objective:  Visit Vitals  BP (!) 199/106 (BP 1 Location: Left arm, BP Patient Position: Sitting)   Pulse 82   Temp 96.7 °F (35.9 °C) (Oral)   Resp 17   Ht 5' 9\" (1.753 m)   Wt 180 lb (81.6 kg)   SpO2 95%   BMI 26.58 kg/m²     Wt Readings from Last 3 Encounters:   06/11/19 180 lb (81.6 kg)   05/24/19 185 lb (83.9 kg)   05/16/19 185 lb (83.9 kg)     Physical Exam:   General appearance - alert, well appearing, and in no distress. Mental status - A/O x 4, normal mood and affect. Neck - No JVD. Right clavicle with mild enlargement. NT. Chest - CTA. Symmetric chest rise. No wheezing, rales or rhonchi. Heart - Normal rate, regular rhythm. Normal S1, S2. No MGR or clicks. Abdomen - Soft,non-distended. Normoactive BS in all quadrants. NT, no mass or HSM. Ext- Radial, DP pulses, 2+ bilaterally. No pedal edema, clubbing, or cyanosis. Skin-Warm and dry. No hyperpigmentation, ulcerations, or suspicious lesions. Neuro - Normal speech, no focal findings or movement disorder. Normal strength, gait, and muscle tone. Assessment/plan:  Take Labetolol BID, Aldactone started. Asked to get sponsor and remain sober until next OV. Medication Side Effects and Warnings were discussed with patient: yes   Patient Labs were reviewed: yes  Patient Past Records were reviewed: yes    See below for other orders   Follow-up and Dispositions    · Return in about 2 weeks (around 6/25/2019) for HTN, sponsor? .           ICD-10-CM ICD-9-CM    1. 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 Cocaine abuse ([facility] [address] F14.10 305.60    3. Pain of right clavicle M89.8X1 733.90    4. Current non-adherence to medical treatment Z91.19 V15.81      Orders Placed This Encounter    spironolactone (ALDACTONE) 25 mg tablet     Sig: Take 1 Tab by mouth daily. Indications: high blood pressure     Dispense:  90 Tab     Refill:  3       Gary Console expressed understanding of plan. An After Visit Summary was offered/printed and given to the patient.

## 2019-08-06 DIAGNOSIS — M89.8X1 PAIN OF RIGHT CLAVICLE: ICD-10-CM

## 2019-08-06 RX ORDER — IBUPROFEN 600 MG/1
TABLET ORAL
Qty: 20 TAB | Refills: 0 | Status: ON HOLD | OUTPATIENT
Start: 2019-08-06 | End: 2020-06-28

## 2019-11-06 DIAGNOSIS — I10 MALIGNANT HYPERTENSION: ICD-10-CM

## 2019-11-06 RX ORDER — LABETALOL 100 MG/1
TABLET, FILM COATED ORAL
Qty: 60 TAB | Refills: 1 | Status: SHIPPED | OUTPATIENT
Start: 2019-11-06 | End: 2020-02-13

## 2020-02-12 DIAGNOSIS — I10 MALIGNANT HYPERTENSION: ICD-10-CM

## 2020-02-13 RX ORDER — LABETALOL 100 MG/1
TABLET, FILM COATED ORAL
Qty: 60 TAB | Refills: 0 | Status: ON HOLD | OUTPATIENT
Start: 2020-02-13 | End: 2020-06-28

## 2020-06-12 ENCOUNTER — APPOINTMENT (OUTPATIENT)
Dept: GENERAL RADIOLOGY | Age: 55
End: 2020-06-12
Attending: EMERGENCY MEDICINE
Payer: MEDICAID

## 2020-06-12 ENCOUNTER — APPOINTMENT (OUTPATIENT)
Dept: CT IMAGING | Age: 55
End: 2020-06-12
Attending: EMERGENCY MEDICINE
Payer: MEDICAID

## 2020-06-12 ENCOUNTER — HOSPITAL ENCOUNTER (EMERGENCY)
Age: 55
Discharge: LEFT BEFORE DISCHARGE | End: 2020-06-13
Attending: EMERGENCY MEDICINE | Admitting: STUDENT IN AN ORGANIZED HEALTH CARE EDUCATION/TRAINING PROGRAM
Payer: MEDICAID

## 2020-06-12 DIAGNOSIS — N17.9 AKI (ACUTE KIDNEY INJURY) (HCC): ICD-10-CM

## 2020-06-12 DIAGNOSIS — I21.4 NSTEMI (NON-ST ELEVATED MYOCARDIAL INFARCTION) (HCC): Primary | ICD-10-CM

## 2020-06-12 LAB
ALBUMIN SERPL-MCNC: 2 G/DL (ref 3.5–5)
ALBUMIN/GLOB SERPL: 0.4 {RATIO} (ref 1.1–2.2)
ALP SERPL-CCNC: 165 U/L (ref 45–117)
ALT SERPL-CCNC: 174 U/L (ref 12–78)
ANION GAP SERPL CALC-SCNC: 9 MMOL/L (ref 5–15)
APPEARANCE UR: CLEAR
AST SERPL-CCNC: 299 U/L (ref 15–37)
BACTERIA URNS QL MICRO: NEGATIVE /HPF
BASOPHILS # BLD: 0.1 K/UL (ref 0–0.1)
BASOPHILS NFR BLD: 1 % (ref 0–1)
BILIRUB SERPL-MCNC: 0.6 MG/DL (ref 0.2–1)
BILIRUB UR QL: NEGATIVE
BUN SERPL-MCNC: 44 MG/DL (ref 6–20)
BUN/CREAT SERPL: 15 (ref 12–20)
CALCIUM SERPL-MCNC: 8.1 MG/DL (ref 8.5–10.1)
CHLORIDE SERPL-SCNC: 104 MMOL/L (ref 97–108)
CO2 SERPL-SCNC: 22 MMOL/L (ref 21–32)
COLOR UR: ABNORMAL
COMMENT, HOLDF: NORMAL
CREAT SERPL-MCNC: 3 MG/DL (ref 0.7–1.3)
DIFFERENTIAL METHOD BLD: ABNORMAL
EOSINOPHIL # BLD: 0.2 K/UL (ref 0–0.4)
EOSINOPHIL NFR BLD: 3 % (ref 0–7)
EPITH CASTS URNS QL MICRO: ABNORMAL /LPF
ERYTHROCYTE [DISTWIDTH] IN BLOOD BY AUTOMATED COUNT: 11.9 % (ref 11.5–14.5)
GLOBULIN SER CALC-MCNC: 5.3 G/DL (ref 2–4)
GLUCOSE SERPL-MCNC: 71 MG/DL (ref 65–100)
GLUCOSE UR STRIP.AUTO-MCNC: NEGATIVE MG/DL
HCT VFR BLD AUTO: 42.9 % (ref 36.6–50.3)
HGB BLD-MCNC: 13.5 G/DL (ref 12.1–17)
HGB UR QL STRIP: ABNORMAL
HYALINE CASTS URNS QL MICRO: ABNORMAL /LPF (ref 0–5)
IMM GRANULOCYTES # BLD AUTO: 0 K/UL (ref 0–0.04)
IMM GRANULOCYTES NFR BLD AUTO: 0 % (ref 0–0.5)
KETONES UR QL STRIP.AUTO: NEGATIVE MG/DL
LEUKOCYTE ESTERASE UR QL STRIP.AUTO: NEGATIVE
LIPASE SERPL-CCNC: 403 U/L (ref 73–393)
LYMPHOCYTES # BLD: 2.8 K/UL (ref 0.8–3.5)
LYMPHOCYTES NFR BLD: 39 % (ref 12–49)
MCH RBC QN AUTO: 29.3 PG (ref 26–34)
MCHC RBC AUTO-ENTMCNC: 31.5 G/DL (ref 30–36.5)
MCV RBC AUTO: 93.3 FL (ref 80–99)
MONOCYTES # BLD: 0.7 K/UL (ref 0–1)
MONOCYTES NFR BLD: 10 % (ref 5–13)
NEUTS SEG # BLD: 3.3 K/UL (ref 1.8–8)
NEUTS SEG NFR BLD: 47 % (ref 32–75)
NITRITE UR QL STRIP.AUTO: NEGATIVE
NRBC # BLD: 0 K/UL (ref 0–0.01)
NRBC BLD-RTO: 0 PER 100 WBC
PH UR STRIP: 5.5 [PH] (ref 5–8)
PLATELET # BLD AUTO: 138 K/UL (ref 150–400)
PMV BLD AUTO: 12.6 FL (ref 8.9–12.9)
POTASSIUM SERPL-SCNC: 4.1 MMOL/L (ref 3.5–5.1)
PROT SERPL-MCNC: 7.3 G/DL (ref 6.4–8.2)
PROT UR STRIP-MCNC: 300 MG/DL
RBC # BLD AUTO: 4.6 M/UL (ref 4.1–5.7)
RBC #/AREA URNS HPF: ABNORMAL /HPF (ref 0–5)
SAMPLES BEING HELD,HOLD: NORMAL
SODIUM SERPL-SCNC: 135 MMOL/L (ref 136–145)
SP GR UR REFRACTOMETRY: 1.01 (ref 1–1.03)
TROPONIN I SERPL-MCNC: 0.07 NG/ML
UR CULT HOLD, URHOLD: NORMAL
UROBILINOGEN UR QL STRIP.AUTO: 0.2 EU/DL (ref 0.2–1)
WBC # BLD AUTO: 7.2 K/UL (ref 4.1–11.1)
WBC URNS QL MICRO: ABNORMAL /HPF (ref 0–4)

## 2020-06-12 PROCEDURE — 71046 X-RAY EXAM CHEST 2 VIEWS: CPT

## 2020-06-12 PROCEDURE — 84484 ASSAY OF TROPONIN QUANT: CPT

## 2020-06-12 PROCEDURE — 36415 COLL VENOUS BLD VENIPUNCTURE: CPT

## 2020-06-12 PROCEDURE — 99285 EMERGENCY DEPT VISIT HI MDM: CPT

## 2020-06-12 PROCEDURE — 85025 COMPLETE CBC W/AUTO DIFF WBC: CPT

## 2020-06-12 PROCEDURE — 74011250637 HC RX REV CODE- 250/637: Performed by: EMERGENCY MEDICINE

## 2020-06-12 PROCEDURE — 80053 COMPREHEN METABOLIC PANEL: CPT

## 2020-06-12 PROCEDURE — 81001 URINALYSIS AUTO W/SCOPE: CPT

## 2020-06-12 PROCEDURE — 83690 ASSAY OF LIPASE: CPT

## 2020-06-12 PROCEDURE — 74011250636 HC RX REV CODE- 250/636: Performed by: EMERGENCY MEDICINE

## 2020-06-12 PROCEDURE — 74176 CT ABD & PELVIS W/O CONTRAST: CPT

## 2020-06-12 PROCEDURE — 93005 ELECTROCARDIOGRAM TRACING: CPT

## 2020-06-12 RX ORDER — GUAIFENESIN 100 MG/5ML
162 LIQUID (ML) ORAL
Status: COMPLETED | OUTPATIENT
Start: 2020-06-12 | End: 2020-06-12

## 2020-06-12 RX ORDER — NITROGLYCERIN 0.4 MG/1
0.4 TABLET SUBLINGUAL
Status: DISCONTINUED | OUTPATIENT
Start: 2020-06-12 | End: 2020-06-13 | Stop reason: HOSPADM

## 2020-06-12 RX ADMIN — ASPIRIN 81 MG 162 MG: 81 TABLET ORAL at 23:42

## 2020-06-12 RX ADMIN — NITROGLYCERIN 0.4 MG: 0.4 TABLET, ORALLY DISINTEGRATING SUBLINGUAL at 23:42

## 2020-06-12 RX ADMIN — SODIUM CHLORIDE 1000 ML: 900 INJECTION, SOLUTION INTRAVENOUS at 21:20

## 2020-06-12 NOTE — LETTER
[address] 55 
[address] [facility] [address] [id number] [phone] Work/School Note Date: 6/12/2020 To Whom It May concern: 
 
Makayla Weller was seen and treated today in the emergency room by the following provider(s): 
Attending Provider: Sindy House MD.   
 
 
Sincerely, Benita Castle MD

## 2020-06-12 NOTE — ED TRIAGE NOTES
Arrives via EMS for c/o of mid upper that radiates to the LEFT. Otoniel n/v Works at [employer] and was found by a coworker hunched over for 15 min. States supervisor did not let him take his bp medicine.      Hx of Hep C

## 2020-06-12 NOTE — ED NOTES
Pt found in room making pb and jelly sandwich. Pt educated that he is NPO until MD sees him. Pt states understanding and is putting food back into his backpack.

## 2020-06-13 VITALS
BODY MASS INDEX: 26.58 KG/M2 | HEIGHT: 69 IN | DIASTOLIC BLOOD PRESSURE: 105 MMHG | HEART RATE: 82 BPM | TEMPERATURE: 99 F | SYSTOLIC BLOOD PRESSURE: 183 MMHG | RESPIRATION RATE: 10 BRPM | OXYGEN SATURATION: 97 %

## 2020-06-13 PROBLEM — R77.8 ELEVATED TROPONIN: Status: ACTIVE | Noted: 2020-06-13

## 2020-06-13 PROBLEM — N17.9 AKI (ACUTE KIDNEY INJURY) (HCC): Status: ACTIVE | Noted: 2020-06-13

## 2020-06-13 LAB
ATRIAL RATE: 66 BPM
CALCULATED P AXIS, ECG09: 70 DEGREES
CALCULATED R AXIS, ECG10: 66 DEGREES
CALCULATED T AXIS, ECG11: 75 DEGREES
DIAGNOSIS, 93000: NORMAL
P-R INTERVAL, ECG05: 154 MS
Q-T INTERVAL, ECG07: 458 MS
QRS DURATION, ECG06: 96 MS
QTC CALCULATION (BEZET), ECG08: 480 MS
VENTRICULAR RATE, ECG03: 66 BPM

## 2020-06-13 PROCEDURE — 99218 HC RM OBSERVATION: CPT

## 2020-06-13 RX ORDER — MORPHINE SULFATE 2 MG/ML
2 INJECTION, SOLUTION INTRAMUSCULAR; INTRAVENOUS
Status: CANCELLED | OUTPATIENT
Start: 2020-06-13

## 2020-06-13 RX ORDER — DILTIAZEM HYDROCHLORIDE 30 MG/1
90 TABLET, FILM COATED ORAL
Status: CANCELLED | OUTPATIENT
Start: 2020-06-13

## 2020-06-13 RX ORDER — HEPARIN SODIUM 5000 [USP'U]/ML
5000 INJECTION, SOLUTION INTRAVENOUS; SUBCUTANEOUS EVERY 8 HOURS
Status: CANCELLED | OUTPATIENT
Start: 2020-06-13

## 2020-06-13 RX ORDER — NALOXONE HYDROCHLORIDE 0.4 MG/ML
0.4 INJECTION, SOLUTION INTRAMUSCULAR; INTRAVENOUS; SUBCUTANEOUS AS NEEDED
Status: CANCELLED | OUTPATIENT
Start: 2020-06-13

## 2020-06-13 RX ORDER — ACETAMINOPHEN 325 MG/1
650 TABLET ORAL
Status: CANCELLED | OUTPATIENT
Start: 2020-06-13

## 2020-06-13 RX ORDER — HYDRALAZINE HYDROCHLORIDE 20 MG/ML
10 INJECTION INTRAMUSCULAR; INTRAVENOUS
Status: DISCONTINUED | OUTPATIENT
Start: 2020-06-13 | End: 2020-06-13

## 2020-06-13 RX ORDER — ONDANSETRON 2 MG/ML
4 INJECTION INTRAMUSCULAR; INTRAVENOUS
Status: CANCELLED | OUTPATIENT
Start: 2020-06-13

## 2020-06-13 RX ORDER — SPIRONOLACTONE 25 MG/1
25 TABLET ORAL DAILY
Status: CANCELLED | OUTPATIENT
Start: 2020-06-13

## 2020-06-13 RX ORDER — SODIUM CHLORIDE 0.9 % (FLUSH) 0.9 %
5-40 SYRINGE (ML) INJECTION AS NEEDED
Status: CANCELLED | OUTPATIENT
Start: 2020-06-13

## 2020-06-13 RX ORDER — SODIUM CHLORIDE 0.9 % (FLUSH) 0.9 %
5-40 SYRINGE (ML) INJECTION EVERY 8 HOURS
Status: CANCELLED | OUTPATIENT
Start: 2020-06-13

## 2020-06-13 NOTE — ED NOTES
Pt noted resting with eyes closed in the room with one leg on the stretcher and the other leg off the stretcher.

## 2020-06-13 NOTE — ED NOTES
Pt then reports that he \"wants to think about it. \"     Patient refuses to be placed on monitor. \"I want to breathe. \"

## 2020-06-13 NOTE — ED NOTES
Pt was found in room out of stretcher. RN and SN extern at the bedside educating pt on remaining on the stretcher. Pt requesting to use phone to call family. Pt assisted in calling family on hospital phone when pt began c/o RIGHT leg cramp. Pt became agitated when bp cuff went off saying \"it's too tight. \" bp taken off, pt continued to c/o of RIGHT leg cramp. Pt reminded that his family was on hospital phone. Pt began talking on phone and became more agitated, yelling at phone that he wanted to leave. Pt hung phone up. RN asked pt why he wanted to leave and he responded by saying he was tired of being in the hospital. Pt remained on stretcher.

## 2020-06-13 NOTE — ED PROVIDER NOTES
80-year-old male with history of hypertension presents with complaints of left upper abdominal pain starting approximately 2 hours ago. Patient reports that a coworker called 911 he was not sure why an ambulance was called. Patient reports he woke up in an ambulance with paramedics talking to him. Patient concerned that his supervisor did not allow him to take his blood pressure medication. Denies any recent illness. Denies fever, chills, nausea, vomiting. Denies shortness of breath, diaphoresis. Denies coronavirus exposure. Patient reports pain improved prior to ER evaluation. No other complaints. Regular tobacco use    Denies alcohol and drug use  Primary care physician-[place]               Past Medical History:   Diagnosis Date    Hypertension        History reviewed. No pertinent surgical history. History reviewed. No pertinent family history. Social History     Socioeconomic History    Marital status: SINGLE     Spouse name: Not on file    Number of children: Not on file    Years of education: Not on file    Highest education level: Not on file   Occupational History    Not on file   Social Needs    Financial resource strain: Not on file    Food insecurity     Worry: Not on file     Inability: Not on file    Transportation needs     Medical: Not on file     Non-medical: Not on file   Tobacco Use    Smoking status: Current Every Day Smoker     Packs/day: 1.00    Smokeless tobacco: Never Used   Substance and Sexual Activity    Alcohol use:  Yes    Drug use: Yes     Types: Marijuana    Sexual activity: Not Currently   Lifestyle    Physical activity     Days per week: Not on file     Minutes per session: Not on file    Stress: Not on file   Relationships    Social connections     Talks on phone: Not on file     Gets together: Not on file     Attends Yarsani service: Not on file     Active member of club or organization: Not on file     Attends meetings of clubs or organizations: Not on file     Relationship status: Not on file    Intimate partner violence     Fear of current or ex partner: Not on file     Emotionally abused: Not on file     Physically abused: Not on file     Forced sexual activity: Not on file   Other Topics Concern    Not on file   Social History Narrative    Not on file         ALLERGIES: Patient has no known allergies. Review of Systems   Constitutional: Negative for chills and fever. HENT: Negative for congestion, nosebleeds and sore throat. Eyes: Negative for pain and discharge. Respiratory: Negative for cough and shortness of breath. Cardiovascular: Negative for chest pain and palpitations. Gastrointestinal: Positive for abdominal pain. Negative for constipation, nausea and vomiting. Genitourinary: Negative for decreased urine volume, dysuria, flank pain and urgency. Musculoskeletal: Negative for gait problem and myalgias. Skin: Negative for rash and wound. Neurological: Negative for seizures and syncope. Hematological: Does not bruise/bleed easily. Psychiatric/Behavioral: Negative for confusion, self-injury and suicidal ideas. Vitals:    06/12/20 1931 06/12/20 2015   BP: (!) 198/94 189/87   Pulse: 84 84   Resp: 18 14   Temp: 99 °F (37.2 °C)    SpO2: 94% 100%   Height: 5' 9\" (1.753 m)             Physical Exam  Vitals signs and nursing note reviewed. Constitutional:       Appearance: He is well-developed. HENT:      Head: Normocephalic and atraumatic. Eyes:      Pupils: Pupils are equal, round, and reactive to light. Neck:      Musculoskeletal: Normal range of motion and neck supple. Cardiovascular:      Rate and Rhythm: Normal rate and regular rhythm. Heart sounds: Normal heart sounds. Pulmonary:      Effort: Pulmonary effort is normal. No respiratory distress. Breath sounds: Normal breath sounds. No wheezing. Abdominal:      General: Bowel sounds are normal.      Palpations: Abdomen is soft. Tenderness: There is abdominal tenderness in the left upper quadrant. There is no guarding or rebound. Musculoskeletal: Normal range of motion. Skin:     General: Skin is warm and dry. Neurological:      Mental Status: He is alert and oriented to person, place, and time. Psychiatric:         Behavior: Behavior normal.          MDM  Number of Diagnoses or Management Options  ECHO (acute kidney injury) [facility]):   NSTEMI (non-ST elevated myocardial infarction) [facility]):   Diagnosis management comments: 54-year-old male with history of hypertension presents with complaints of left upper abdominal pain and possible syncope. Denies Chest pain patient is afebrile, nontoxic, hemodynamically stable, left upper abdominal mild tenderness to palpation, no respiratory distress, clear to auscultation bilaterally, normal room oxygen saturation. Plan- EKG, cardiac monitor, CBC/CMP/cardiac enzymes, chest x-ray, CT abdomen pelvis. LAbs remarkable for troponin of 0.7, creatinine 3  Chest x-ray unremarkable  CT a/p unremarkable         Amount and/or Complexity of Data Reviewed  Clinical lab tests: ordered and reviewed  Tests in the radiology section of CPT®: ordered and reviewed  Independent visualization of images, tracings, or specimens: yes    Patient Progress  Patient progress: stable         Procedures  ED EKG interpretation:  Rhythm: normal sinus rhythm; and regular . Rate (approx.): 66; Axis: normal; P wave: normal; QRS interval: normal ; ST/T wave: normal; unchanged from 5/10/19, no acute ischemia. This EKG was interpreted by Jamison Cunha MD,ED Provider. 11:13 PM  Patient sleeping, no distress. Discussed results with patient and agreeable with plan for admission. Ken Fan Serve for Admission  11:23 PM    ED Room Number: XF71/41  Patient Name and age:  Milvia Thomas 47 y.o.  male  Working Diagnosis:   1. NSTEMI (non-ST elevated myocardial infarction) ([address]    2.  ECHO (acute kidney injury) ([facility] [address]        COVID-19 Suspicion:  no    Code Status:  Full Code  Readmission: no  Isolation Requirements:  no  Recommended Level of Care:  telemetry  Department:[facility] Adult ED - 21     11:25 PM-11:55pm  Patient reluctant to stay in the hospital.  Discussed leaving [address] with patient. Patient reports he needs time to decide. Advised patient that he can leave if he feels he needs to and welcome back at any time. 12:17 AM  Patient decided he will leave AMA. Accepts responsibility for himself. Welcome at any time.

## 2020-06-13 NOTE — ED NOTES
Patient reports wanting to leave AMA. Education and risks mentioned to patient, accompanied by RN and MD. Patient reports understanding. Pt signs AMA form.

## 2020-06-13 NOTE — ED NOTES
Pt requesting to leave. Pt pacing around room. MD at the bedside.      Report given to Vandana Sadler

## 2020-06-13 NOTE — ED NOTES
Patient refuses vital signs after nitroglycerin administration. IV DC and patient ambulated out of ED with steady gait, showing no signs of distress. Pt reports relief of most intense pain. All questions answered.

## 2020-06-15 ENCOUNTER — NURSE TRIAGE (OUTPATIENT)
Dept: OTHER | Facility: CLINIC | Age: 55
End: 2020-06-15

## 2020-06-15 NOTE — TELEPHONE ENCOUNTER
Reason for Disposition   General information question, no triage required and triager able to answer question    Protocols used: INFORMATION ONLY CALL-ADULT-AH    Pt calling needing to schedule ED follow up from ED. He states he had a heart attack and left the ED on Saturday. No COVID exposure or symptoms. Call soft transferred to [facility] to schedule appt.

## 2020-06-28 ENCOUNTER — HOSPITAL ENCOUNTER (INPATIENT)
Age: 55
LOS: 10 days | Discharge: HOME | DRG: 469 | End: 2020-07-08
Attending: STUDENT IN AN ORGANIZED HEALTH CARE EDUCATION/TRAINING PROGRAM | Admitting: HOSPITALIST
Payer: MEDICAID

## 2020-06-28 ENCOUNTER — APPOINTMENT (OUTPATIENT)
Dept: CT IMAGING | Age: 55
DRG: 469 | End: 2020-06-28
Attending: HOSPITALIST
Payer: MEDICAID

## 2020-06-28 ENCOUNTER — APPOINTMENT (OUTPATIENT)
Dept: GENERAL RADIOLOGY | Age: 55
DRG: 469 | End: 2020-06-28
Attending: NURSE PRACTITIONER
Payer: MEDICAID

## 2020-06-28 DIAGNOSIS — R06.02 SOB (SHORTNESS OF BREATH): Primary | ICD-10-CM

## 2020-06-28 DIAGNOSIS — N17.9 ACUTE KIDNEY INJURY (HCC): ICD-10-CM

## 2020-06-28 LAB
ALBUMIN SERPL-MCNC: 1.9 G/DL (ref 3.5–5)
ALBUMIN/GLOB SERPL: 0.4 {RATIO} (ref 1.1–2.2)
ALP SERPL-CCNC: 178 U/L (ref 45–117)
ALT SERPL-CCNC: 106 U/L (ref 12–78)
ANION GAP SERPL CALC-SCNC: 7 MMOL/L (ref 5–15)
APPEARANCE UR: CLEAR
AST SERPL-CCNC: 105 U/L (ref 15–37)
ATRIAL RATE: 82 BPM
BACTERIA URNS QL MICRO: NEGATIVE /HPF
BASOPHILS # BLD: 0.1 K/UL (ref 0–0.1)
BASOPHILS NFR BLD: 1 % (ref 0–1)
BILIRUB SERPL-MCNC: 0.3 MG/DL (ref 0.2–1)
BILIRUB UR QL: NEGATIVE
BNP SERPL-MCNC: 167 PG/ML
BUN SERPL-MCNC: 47 MG/DL (ref 6–20)
BUN/CREAT SERPL: 12 (ref 12–20)
CALCIUM SERPL-MCNC: 8.1 MG/DL (ref 8.5–10.1)
CALCULATED P AXIS, ECG09: 74 DEGREES
CALCULATED R AXIS, ECG10: 58 DEGREES
CALCULATED T AXIS, ECG11: 94 DEGREES
CHLORIDE SERPL-SCNC: 107 MMOL/L (ref 97–108)
CO2 SERPL-SCNC: 23 MMOL/L (ref 21–32)
COLOR UR: ABNORMAL
COMMENT, HOLDF: NORMAL
COMMENT, HOLDF: NORMAL
CREAT SERPL-MCNC: 3.91 MG/DL (ref 0.7–1.3)
CREAT UR-MCNC: 60.5 MG/DL
D DIMER PPP FEU-MCNC: 0.81 MG/L FEU (ref 0–0.65)
DIAGNOSIS, 93000: NORMAL
DIFFERENTIAL METHOD BLD: NORMAL
EOSINOPHIL # BLD: 0.3 K/UL (ref 0–0.4)
EOSINOPHIL #/AREA URNS HPF: NEGATIVE /[HPF]
EOSINOPHIL NFR BLD: 4 % (ref 0–7)
EPITH CASTS URNS QL MICRO: ABNORMAL /LPF
ERYTHROCYTE [DISTWIDTH] IN BLOOD BY AUTOMATED COUNT: 11.8 % (ref 11.5–14.5)
GLOBULIN SER CALC-MCNC: 5.2 G/DL (ref 2–4)
GLUCOSE SERPL-MCNC: 96 MG/DL (ref 65–100)
GLUCOSE UR STRIP.AUTO-MCNC: NEGATIVE MG/DL
HAV IGM SER QL: NONREACTIVE
HBV CORE IGM SER QL: NONREACTIVE
HBV SURFACE AG SER QL: <0.1 INDEX
HBV SURFACE AG SER QL: NEGATIVE
HCT VFR BLD AUTO: 39.7 % (ref 36.6–50.3)
HCV AB SER IA-ACNC: >11 INDEX
HCV AB SER IA-ACNC: >11 INDEX
HCV AB SERPL QL IA: REACTIVE
HCV AB SERPL QL IA: REACTIVE
HCV COMMENT,HCGAC: ABNORMAL
HCV COMMENT,HCGAC: ABNORMAL
HGB BLD-MCNC: 12.8 G/DL (ref 12.1–17)
HGB UR QL STRIP: ABNORMAL
HIV 1+2 AB+HIV1 P24 AG SERPL QL IA: NONREACTIVE
HIV12 RESULT COMMENT, HHIVC: NORMAL
HYALINE CASTS URNS QL MICRO: ABNORMAL /LPF (ref 0–5)
IMM GRANULOCYTES # BLD AUTO: 0 K/UL (ref 0–0.04)
IMM GRANULOCYTES NFR BLD AUTO: 0 % (ref 0–0.5)
KETONES UR QL STRIP.AUTO: NEGATIVE MG/DL
LEUKOCYTE ESTERASE UR QL STRIP.AUTO: NEGATIVE
LYMPHOCYTES # BLD: 3 K/UL (ref 0.8–3.5)
LYMPHOCYTES NFR BLD: 43 % (ref 12–49)
MCH RBC QN AUTO: 29.3 PG (ref 26–34)
MCHC RBC AUTO-ENTMCNC: 32.2 G/DL (ref 30–36.5)
MCV RBC AUTO: 90.8 FL (ref 80–99)
MONOCYTES # BLD: 0.8 K/UL (ref 0–1)
MONOCYTES NFR BLD: 12 % (ref 5–13)
NEUTS SEG # BLD: 2.8 K/UL (ref 1.8–8)
NEUTS SEG NFR BLD: 40 % (ref 32–75)
NITRITE UR QL STRIP.AUTO: NEGATIVE
NRBC # BLD: 0 K/UL (ref 0–0.01)
NRBC BLD-RTO: 0 PER 100 WBC
P-R INTERVAL, ECG05: 158 MS
PH UR STRIP: 5.5 [PH] (ref 5–8)
PLATELET # BLD AUTO: 180 K/UL (ref 150–400)
PMV BLD AUTO: 12.1 FL (ref 8.9–12.9)
POTASSIUM SERPL-SCNC: 3.8 MMOL/L (ref 3.5–5.1)
PROT SERPL-MCNC: 7.1 G/DL (ref 6.4–8.2)
PROT UR STRIP-MCNC: 300 MG/DL
Q-T INTERVAL, ECG07: 404 MS
QRS DURATION, ECG06: 100 MS
QTC CALCULATION (BEZET), ECG08: 472 MS
RBC # BLD AUTO: 4.37 M/UL (ref 4.1–5.7)
RBC #/AREA URNS HPF: ABNORMAL /HPF (ref 0–5)
SAMPLES BEING HELD,HOLD: NORMAL
SAMPLES BEING HELD,HOLD: NORMAL
SODIUM SERPL-SCNC: 137 MMOL/L (ref 136–145)
SODIUM UR-SCNC: 74 MMOL/L
SP GR UR REFRACTOMETRY: 1.01 (ref 1–1.03)
SP1: ABNORMAL
SP2: ABNORMAL
SP3: ABNORMAL
TROPONIN I SERPL-MCNC: <0.05 NG/ML
UROBILINOGEN UR QL STRIP.AUTO: 0.2 EU/DL (ref 0.2–1)
VENTRICULAR RATE, ECG03: 82 BPM
WBC # BLD AUTO: 7 K/UL (ref 4.1–11.1)
WBC URNS QL MICRO: ABNORMAL /HPF (ref 0–4)

## 2020-06-28 PROCEDURE — 99284 EMERGENCY DEPT VISIT MOD MDM: CPT

## 2020-06-28 PROCEDURE — 93005 ELECTROCARDIOGRAM TRACING: CPT

## 2020-06-28 PROCEDURE — 71250 CT THORAX DX C-: CPT

## 2020-06-28 PROCEDURE — 65660000000 HC RM CCU STEPDOWN

## 2020-06-28 PROCEDURE — 74011250636 HC RX REV CODE- 250/636: Performed by: HOSPITALIST

## 2020-06-28 PROCEDURE — 82784 ASSAY IGA/IGD/IGG/IGM EACH: CPT

## 2020-06-28 PROCEDURE — 74011250637 HC RX REV CODE- 250/637: Performed by: HOSPITALIST

## 2020-06-28 PROCEDURE — 87389 HIV-1 AG W/HIV-1&-2 AB AG IA: CPT

## 2020-06-28 PROCEDURE — 84300 ASSAY OF URINE SODIUM: CPT

## 2020-06-28 PROCEDURE — 80074 ACUTE HEPATITIS PANEL: CPT

## 2020-06-28 PROCEDURE — 83880 ASSAY OF NATRIURETIC PEPTIDE: CPT

## 2020-06-28 PROCEDURE — 36415 COLL VENOUS BLD VENIPUNCTURE: CPT

## 2020-06-28 PROCEDURE — 85379 FIBRIN DEGRADATION QUANT: CPT

## 2020-06-28 PROCEDURE — 82570 ASSAY OF URINE CREATININE: CPT

## 2020-06-28 PROCEDURE — 87205 SMEAR GRAM STAIN: CPT

## 2020-06-28 PROCEDURE — 85025 COMPLETE CBC W/AUTO DIFF WBC: CPT

## 2020-06-28 PROCEDURE — 80053 COMPREHEN METABOLIC PANEL: CPT

## 2020-06-28 PROCEDURE — 84165 PROTEIN E-PHORESIS SERUM: CPT

## 2020-06-28 PROCEDURE — 86803 HEPATITIS C AB TEST: CPT

## 2020-06-28 PROCEDURE — 81001 URINALYSIS AUTO W/SCOPE: CPT

## 2020-06-28 PROCEDURE — 84484 ASSAY OF TROPONIN QUANT: CPT

## 2020-06-28 PROCEDURE — 71046 X-RAY EXAM CHEST 2 VIEWS: CPT

## 2020-06-28 RX ORDER — ACETAMINOPHEN 325 MG/1
650 TABLET ORAL
Status: DISCONTINUED | OUTPATIENT
Start: 2020-06-28 | End: 2020-07-08 | Stop reason: HOSPADM

## 2020-06-28 RX ORDER — DILTIAZEM HYDROCHLORIDE 30 MG/1
90 TABLET, FILM COATED ORAL
Status: CANCELLED | OUTPATIENT
Start: 2020-06-28

## 2020-06-28 RX ORDER — ONDANSETRON 2 MG/ML
4 INJECTION INTRAMUSCULAR; INTRAVENOUS
Status: DISCONTINUED | OUTPATIENT
Start: 2020-06-28 | End: 2020-07-08 | Stop reason: HOSPADM

## 2020-06-28 RX ORDER — AMLODIPINE BESYLATE 5 MG/1
10 TABLET ORAL DAILY
Status: DISCONTINUED | OUTPATIENT
Start: 2020-06-28 | End: 2020-07-08 | Stop reason: HOSPADM

## 2020-06-28 RX ORDER — HEPARIN SODIUM 5000 [USP'U]/ML
5000 INJECTION, SOLUTION INTRAVENOUS; SUBCUTANEOUS EVERY 12 HOURS
Status: DISCONTINUED | OUTPATIENT
Start: 2020-06-28 | End: 2020-07-06

## 2020-06-28 RX ORDER — HYDRALAZINE HYDROCHLORIDE 50 MG/1
50 TABLET, FILM COATED ORAL
Status: DISCONTINUED | OUTPATIENT
Start: 2020-06-28 | End: 2020-07-08 | Stop reason: HOSPADM

## 2020-06-28 RX ORDER — SODIUM CHLORIDE 0.9 % (FLUSH) 0.9 %
5-40 SYRINGE (ML) INJECTION AS NEEDED
Status: DISCONTINUED | OUTPATIENT
Start: 2020-06-28 | End: 2020-07-08 | Stop reason: HOSPADM

## 2020-06-28 RX ORDER — AMLODIPINE BESYLATE 5 MG/1
5 TABLET ORAL DAILY
COMMUNITY
End: 2020-07-08

## 2020-06-28 RX ORDER — SODIUM CHLORIDE 0.9 % (FLUSH) 0.9 %
5-40 SYRINGE (ML) INJECTION EVERY 8 HOURS
Status: DISCONTINUED | OUTPATIENT
Start: 2020-06-28 | End: 2020-07-08 | Stop reason: HOSPADM

## 2020-06-28 RX ORDER — HYDROCHLOROTHIAZIDE 25 MG/1
25 TABLET ORAL DAILY
COMMUNITY
End: 2020-07-08

## 2020-06-28 RX ADMIN — HEPARIN SODIUM 5000 UNITS: 5000 INJECTION INTRAVENOUS; SUBCUTANEOUS at 20:11

## 2020-06-28 RX ADMIN — AMLODIPINE BESYLATE 10 MG: 5 TABLET ORAL at 20:11

## 2020-06-28 RX ADMIN — Medication 10 ML: at 21:36

## 2020-06-28 NOTE — ED PROVIDER NOTES
This is a 80-year-old male who presents ambulatory to the emergency room with complaints of shortness of breath. Patient states about a week ago he started developing shortness of breath that has increased. Patient noticed today his lower extremities are both swollen mostly at his ankles and his blood pressure has been elevating despite him being compliant with his amlodipine. Patient was seen at [facility] and given HCTZ. Also states he uses cocaine as 4-5 times per week. Patient denies any chest pain, dizziness, nausea or vomiting, fever or chills. No known COVID exposure. States he is generally short of breath both sitting and with exertion. Denies any calf pain or tenderness. Denies any recent travel. There are no further complaints at this time. Of note: On chart review was noted to have a NSTEMI, ECHO and left AMA. Mario Bender NP  Past Medical History:  No date: Hypertension  History reviewed. No pertinent surgical history. Past Medical History:   Diagnosis Date    Hypertension        History reviewed. No pertinent surgical history. History reviewed. No pertinent family history. Social History     Socioeconomic History    Marital status: SINGLE     Spouse name: Not on file    Number of children: Not on file    Years of education: Not on file    Highest education level: Not on file   Occupational History    Not on file   Social Needs    Financial resource strain: Not on file    Food insecurity     Worry: Not on file     Inability: Not on file    Transportation needs     Medical: Not on file     Non-medical: Not on file   Tobacco Use    Smoking status: Current Every Day Smoker     Packs/day: 1.00    Smokeless tobacco: Never Used   Substance and Sexual Activity    Alcohol use:  Yes    Drug use: Yes     Types: Marijuana    Sexual activity: Not Currently   Lifestyle    Physical activity     Days per week: Not on file     Minutes per session: Not on file    Stress: Not on file   Relationships    Social connections     Talks on phone: Not on file     Gets together: Not on file     Attends Synagogue service: Not on file     Active member of club or organization: Not on file     Attends meetings of clubs or organizations: Not on file     Relationship status: Not on file    Intimate partner violence     Fear of current or ex partner: Not on file     Emotionally abused: Not on file     Physically abused: Not on file     Forced sexual activity: Not on file   Other Topics Concern    Not on file   Social History Narrative    Not on file         ALLERGIES: Patient has no known allergies. Review of Systems   Constitutional: Negative for activity change, appetite change, chills, fatigue and fever. HENT: Negative for congestion, ear discharge, ear pain, sinus pressure, sinus pain, sore throat and trouble swallowing. Eyes: Negative for photophobia, pain, redness, itching and visual disturbance. Respiratory: Positive for shortness of breath. Negative for chest tightness. Cardiovascular: Positive for leg swelling. Negative for chest pain and palpitations. Gastrointestinal: Negative for abdominal distention, abdominal pain, nausea and vomiting. Endocrine: Negative. Genitourinary: Negative for difficulty urinating, frequency and urgency. Musculoskeletal: Negative for back pain, neck pain and neck stiffness. Skin: Negative for color change, pallor, rash and wound. Allergic/Immunologic: Negative. Neurological: Negative for dizziness, syncope, weakness and headaches. Hematological: Does not bruise/bleed easily. Psychiatric/Behavioral: Negative for behavioral problems. The patient is not nervous/anxious. Vitals:    06/28/20 1401   BP: 175/85   Pulse: 87   Resp: 18   Temp: 98.3 °F (36.8 °C)   SpO2: 97%            Physical Exam  Vitals signs and nursing note reviewed. Constitutional:       General: He is not in acute distress.      Appearance: Normal appearance. He is well-developed. He is not ill-appearing. HENT:      Head: Normocephalic and atraumatic. Right Ear: External ear normal.      Left Ear: External ear normal.      Nose: Nose normal.      Mouth/Throat:      Mouth: Mucous membranes are moist.   Eyes:      General:         Right eye: No discharge. Left eye: No discharge. Conjunctiva/sclera: Conjunctivae normal.      Pupils: Pupils are equal, round, and reactive to light. Neck:      Musculoskeletal: Normal range of motion and neck supple. Vascular: No JVD. Trachea: No tracheal deviation. Cardiovascular:      Rate and Rhythm: Normal rate and regular rhythm. Pulses: Normal pulses. Heart sounds: Normal heart sounds. No murmur. No gallop. Pulmonary:      Effort: Pulmonary effort is normal. No respiratory distress. Breath sounds: Normal breath sounds. No wheezing or rales. Chest:      Chest wall: No tenderness. Abdominal:      General: Bowel sounds are normal. There is no distension. Palpations: Abdomen is soft. Tenderness: There is no abdominal tenderness. There is no guarding or rebound. Genitourinary:     Comments: Negative    Musculoskeletal: Normal range of motion. General: No tenderness. Skin:     General: Skin is warm and dry. Capillary Refill: Capillary refill takes less than 2 seconds. Coloration: Skin is not pale. Findings: No erythema or rash. Neurological:      General: No focal deficit present. Mental Status: He is alert and oriented to person, place, and time. Motor: No weakness. Coordination: Coordination normal.   Psychiatric:         Mood and Affect: Mood normal.         Behavior: Behavior normal.         Thought Content:  Thought content normal.         Judgment: Judgment normal.          MDM  Number of Diagnoses or Management Options  Acute kidney injury [facility]): established and worsening  SOB (shortness of breath): new and requires workup  Diagnosis management comments: Differential diagnosis includes pneumonia, viral syndrome, sequela of cocaine use  And others. Admit to the hospitalist service for further evaluation and treatment. Patient in agreement with plan of care and states he will stay, will not sign out AMA. Discussed with Dr. Nathalia Barrett who is in agreement with plan of care. Amount and/or Complexity of Data Reviewed  Clinical lab tests: ordered and reviewed  Tests in the radiology section of CPT®: ordered and reviewed  Discuss the patient with other providers: yes (Dr. Nathalia Barrett  )      Labs Reviewed   METABOLIC PANEL, COMPREHENSIVE - Abnormal; Notable for the following components:       Result Value    BUN 47 (*)     Creatinine 3.91 (*)     GFR est AA 20 (*)     GFR est non-AA 16 (*)     Calcium 8.1 (*)     ALT (SGPT) 106 (*)     AST (SGOT) 105 (*)     Alk. phosphatase 178 (*)     Albumin 1.9 (*)     Globulin 5.2 (*)     A-G Ratio 0.4 (*)     All other components within normal limits   NT-PRO BNP - Abnormal; Notable for the following components:    NT pro- (*)     All other components within normal limits   D DIMER - Abnormal; Notable for the following components:    D-dimer 0.81 (*)     All other components within normal limits   CBC WITH AUTOMATED DIFF   TROPONIN I   SAMPLES BEING HELD   PROTEIN ELECTROPHORESIS   URINALYSIS W/ RFLX MICROSCOPIC   EOSINOPHILS, URINE   SODIUM, UR, RANDOM   CREATININE, UR, RANDOM   HEPATITIS PANEL, ACUTE   HEPATITIS C AB   IMMUNOELECTROPHORESIS (IMMUNOFIX.)   HIV 1/2 AG/AB, 4TH GENERATION,W RFLX CONFIRM   SAMPLES BEING HELD     Xr Chest Pa Lat    Result Date: 6/28/2020  IMPRESSION: No acute cardiopulmonary process. Ct Chest Wo Cont    Result Date: 6/28/2020  IMPRESSION: Clear lungs. Hospitalist Perfect Serve for Admission  4:37 PM    ED Room Number: R37/R37  Patient Name and age:  Wally Shipley 47 y.o.  male  Working Diagnosis:   1. SOB (shortness of breath)    2.  Acute kidney injury ([facility] [address]        COVID-19 Suspicion:  no    Code Status:  Full Code  Readmission: no  Isolation Requirements:  no  Recommended Level of Care:  telemetry  Department:[facility] Adult ED - 21   Other:  Last cocaine use Friday, agrees with admission despite last time [place]    Procedures

## 2020-06-28 NOTE — ED TRIAGE NOTES
Patient arrives via EMS from home for shortness of breath and increased swelling to legs X 1 week, Reports being seen at [facility] last week and given HCTZ. Patient reports shortness of breath worsening while lying down. + cocaine on Friday reports using every 4-5 days.

## 2020-06-28 NOTE — ROUTINE PROCESS
TRANSFER - OUT REPORT:    Verbal report given to Mirna Peng RN(name) on [facility]  being transferred to [facility](unit) for routine progression of care       Report consisted of patients Situation, Background, Assessment and   Recommendations(SBAR). Information from the following report(s) SBAR was reviewed with the receiving nurse. Lines:   Peripheral IV 06/28/20 Left Antecubital (Active)   Site Assessment Clean, dry, & intact 6/28/2020  2:06 PM   Phlebitis Assessment 0 6/28/2020  2:06 PM   Infiltration Assessment 0 6/28/2020  2:06 PM   Dressing Status Clean, dry, & intact 6/28/2020  2:06 PM   Hub Color/Line Status Green;Capped;Flushed;Patent 6/28/2020  2:06 PM   Action Taken Blood drawn 6/28/2020  2:06 PM        Opportunity for questions and clarification was provided.       Patient transported with:  Transportation

## 2020-06-28 NOTE — H&P
History & Physical    Primary Care Provider: Lena Rivera NP  Source of Information: Patient     History of Presenting Illness:   Silverio Issa is a 47 y.o. male who presents with SOB and leg edema     This is a 51-year-old male who presents ambulatory to the emergency room with complaints of shortness of breath and leg edema. Patient states about a week ago he started developing shortness of breath that has increased. Patient noticed today his lower extremities are both swollen mostly at his ankles and his blood pressure has been elevating despite him being compliant with his amlodipine. Patient was seen at [facility] and given HCTZ. Also states he uses cocaine as 4-5 times per week. Patient was seen in our ER on 6/12 due to LUQ pain and possible syncope at work and the work up in [facility] 19 found his Cr was 3.0, troponin 0.07 and ABD/PELVIC none contrast CT were normal. He was recommended for admission but he signed out AMA from ER. Patient denies any chest pain, dizziness, nausea or vomiting, fever or chills. No known COVID exposure. States he is generally short of breath both sitting and with exertion. Denies any calf pain or tenderness. Denies any recent travel. There are no further complaints at this time     Review of Systems:  General: HPI, no changes of weight  HEENT: no headache, no vision changes, no nose discharge, no hearing changes   RES: no wheezing, no cough, HPI   CVS: no cp, no palpitation. Muscular: no joint swelling, no muscle pain,HPI   Skin: no rash, no itching    GI: no vomiting, no diarrhea  : no dysuria, no hematuria  Hemo: no gum bleeding, no petechial   Neuro: no sensation changes, no focal weakness   Endo: no polydipsia   Psych: denied depression     Past Medical History:   Diagnosis Date    Hypertension       History reviewed. No pertinent surgical history. Prior to Admission medications    Medication Sig Start Date End Date Taking?  Authorizing Provider   labetaloL (NORMODYNE) 100 mg tablet TAKE ONE TABLET BY MOUTH TWICE A DAY 2/13/20   Darren Bauer NP   ibuprofen (MOTRIN) 600 mg tablet TAKE ONE TABLET BY MOUTH EVERY 6 HOURS AS NEEDED FOR PAIN 8/6/19   Darren Bauer NP   spironolactone (ALDACTONE) 25 mg tablet Take 1 Tab by mouth daily. Indications: high blood pressure 6/11/19   Darren Bauer NP   dilTIAZem (CARDIZEM) 90 mg tablet Take 1 Tab by mouth Before breakfast, lunch, and dinner. 5/11/19   Abraham Gonzales MD     No Known Allergies   History reviewed. No pertinent family history. SOCIAL HISTORY:  Patient resides:  Independently x   Assisted Living    SNF    With family care       Smoking history:   None    Former    Chronic x     Alcohol history:   None    Social x   Chronic      Ambulates:   Independently x   w/cane    w/walker    w/wc    CODE STATUS:  DNR    Full x   Other      Objective:     Physical Exam:     Visit Vitals  /85 (BP 1 Location: Right arm, BP Patient Position: Sitting)   Pulse 87   Temp 98.3 °F (36.8 °C)   Resp 18   SpO2 97%      O2 Device: Room air    General:  Alert, cooperative, no distress, appears stated age. Head:  Normocephalic, without obvious abnormality, atraumatic. Eyes:  Conjunctivae/corneas clear. PERRL, EOMs intact. Nose: Nares normal. Septum midline. Mucosa normal. No drainage or sinus tenderness. Throat: Lips, mucosa, and tongue normal. Teeth and gums normal.   Neck: Supple, symmetrical, trachea midline, no adenopathy, thyroid: no enlargement/tenderness/nodules, no carotid bruit and no JVD. Back:   Symmetric, no curvature. ROM normal. No CVA tenderness. Lungs:   Clear to auscultation bilaterally. Chest wall:  No tenderness or deformity. Heart:  Regular rate and rhythm, S1, S2 normal, no murmur, click, rub or gallop. Abdomen:   Soft, non-tender. Bowel sounds normal. No masses,  No organomegaly. Extremities: Extremities normal, atraumatic, ++ lower  leg and ankle edema.    Pulses: 2+ and symmetric all extremities. Skin: Skin color, texture, turgor normal. No rashes or lesions   Neurologic: CNII-XII intact. No focal weakness                Data Review:     Recent Days:  Recent Labs     06/28/20  1413   WBC 7.0   HGB 12.8   HCT 39.7        Recent Labs     06/28/20  1413      K 3.8      CO2 23   GLU 96   BUN 47*   CREA 3.91*   CA 8.1*   ALB 1.9*   *     No results for input(s): PH, PCO2, PO2, HCO3, FIO2 in the last 72 hours. 24 Hour Results:  Recent Results (from the past 24 hour(s))   EKG, 12 LEAD, INITIAL    Collection Time: 06/28/20  2:07 PM   Result Value Ref Range    Ventricular Rate 82 BPM    Atrial Rate 82 BPM    P-R Interval 158 ms    QRS Duration 100 ms    Q-T Interval 404 ms    QTC Calculation (Bezet) 472 ms    Calculated P Axis 74 degrees    Calculated R Axis 58 degrees    Calculated T Axis 94 degrees    Diagnosis       Normal sinus rhythm  Voltage criteria for left ventricular hypertrophy  Nonspecific T wave abnormality  Prolonged QT  When compared with ECG of 12-JUN-2020 21:35,  Nonspecific T wave abnormality now evident in Inferior leads  Nonspecific T wave abnormality, worse in Lateral leads  Confirmed by Claude Recio M.D., Destiney العراقي ([id number]) on 6/28/2020 4:49:09 PM     CBC WITH AUTOMATED DIFF    Collection Time: 06/28/20  2:13 PM   Result Value Ref Range    WBC 7.0 4.1 - 11.1 K/uL    RBC 4.37 4.10 - 5.70 M/uL    HGB 12.8 12.1 - 17.0 g/dL    HCT 39.7 36.6 - 50.3 %    MCV 90.8 80.0 - 99.0 FL    MCH 29.3 26.0 - 34.0 PG    MCHC 32.2 30.0 - 36.5 g/dL    RDW 11.8 11.5 - 14.5 %    PLATELET 668 296 - 935 K/uL    MPV 12.1 8.9 - 12.9 FL    NRBC 0.0 0  WBC    ABSOLUTE NRBC 0.00 0.00 - 0.01 K/uL    NEUTROPHILS 40 32 - 75 %    LYMPHOCYTES 43 12 - 49 %    MONOCYTES 12 5 - 13 %    EOSINOPHILS 4 0 - 7 %    BASOPHILS 1 0 - 1 %    IMMATURE GRANULOCYTES 0 0.0 - 0.5 %    ABS. NEUTROPHILS 2.8 1.8 - 8.0 K/UL    ABS. LYMPHOCYTES 3.0 0.8 - 3.5 K/UL    ABS.  MONOCYTES 0.8 0.0 - 1.0 K/UL    ABS. EOSINOPHILS 0.3 0.0 - 0.4 K/UL    ABS. BASOPHILS 0.1 0.0 - 0.1 K/UL    ABS. IMM. GRANS. 0.0 0.00 - 0.04 K/UL    DF AUTOMATED     METABOLIC PANEL, COMPREHENSIVE    Collection Time: 06/28/20  2:13 PM   Result Value Ref Range    Sodium 137 136 - 145 mmol/L    Potassium 3.8 3.5 - 5.1 mmol/L    Chloride 107 97 - 108 mmol/L    CO2 23 21 - 32 mmol/L    Anion gap 7 5 - 15 mmol/L    Glucose 96 65 - 100 mg/dL    BUN 47 (H) 6 - 20 MG/DL    Creatinine 3.91 (H) 0.70 - 1.30 MG/DL    BUN/Creatinine ratio 12 12 - 20      GFR est AA 20 (L) >60 ml/min/1.73m2    GFR est non-AA 16 (L) >60 ml/min/1.73m2    Calcium 8.1 (L) 8.5 - 10.1 MG/DL    Bilirubin, total 0.3 0.2 - 1.0 MG/DL    ALT (SGPT) 106 (H) 12 - 78 U/L    AST (SGOT) 105 (H) 15 - 37 U/L    Alk. phosphatase 178 (H) 45 - 117 U/L    Protein, total 7.1 6.4 - 8.2 g/dL    Albumin 1.9 (L) 3.5 - 5.0 g/dL    Globulin 5.2 (H) 2.0 - 4.0 g/dL    A-G Ratio 0.4 (L) 1.1 - 2.2     TROPONIN I    Collection Time: 06/28/20  2:13 PM   Result Value Ref Range    Troponin-I, Qt. <0.05 <0.05 ng/mL   SAMPLES BEING HELD    Collection Time: 06/28/20  2:13 PM   Result Value Ref Range    SAMPLES BEING HELD 1blu,1red     COMMENT        Add-on orders for these samples will be processed based on acceptable specimen integrity and analyte stability, which may vary by analyte. NT-PRO BNP    Collection Time: 06/28/20  2:13 PM   Result Value Ref Range    NT pro- (H) <125 PG/ML         Imaging:   Xr Chest Pa Lat    Result Date: 6/28/2020  IMPRESSION: No acute cardiopulmonary process. ABD PELVIC CT 6/12  FINDINGS:   LOWER THORAX: No significant abnormality in the incidentally imaged lower chest.  LIVER: No mass. BILIARY TREE: Gallbladder is within normal limits. CBD is not dilated. SPLEEN: within normal limits. PANCREAS: No focal abnormality. ADRENALS: Unremarkable. KIDNEYS/URETERS: No calculus or hydronephrosis. STOMACH: Unremarkable.   SMALL BOWEL: No dilatation or wall thickening. COLON: No dilatation or wall thickening. APPENDIX: Unremarkable. PERITONEUM: No ascites or pneumoperitoneum. RETROPERITONEUM: No lymphadenopathy or aortic aneurysm. REPRODUCTIVE ORGANS:  URINARY BLADDER: No mass or calculus. BONES: No destructive bone lesion. ABDOMINAL WALL: No mass or hernia. ADDITIONAL COMMENTS: N/A     IMPRESSION  IMPRESSION:  No significant abnormalities. Assessment:     Active Problems:    Acute renal failure (ARF) ([address] (6/28/2020)           Plan:     1. Acute renal failure: Cr up to 3.9 now, was 1.2 in May last year, and found 3.0 on 6/12 and now even worse to 3.9. UA on 6/12 noticed moderate blood and 300 protein. May have acute glomerulonephritis. ABD/PELVIC ct on 6/12 no obstruction, no stones. Will not repeat for now. Will check UA with microscope, urine eosinophil, urine cr, urine sodium. SPEP, IF, hepc, HIV. Renal consult. Pt is actively using cocaine, maybe cocaine induced. 2. Sob: could due to fluid overload related to GN,  May have CHF, none contrast chest CT to further evaluate lung,if normal, will need further work up.  noticed slightly + troponin on 6/12, today is normal. Should worry about PE, will check V/Q scan. Check echo   3. Leg edema: likely due to prop 1, venous doppler pending. May need lasix if indicated. 4. Cocaine abuse: avoid bb   5. HTN: start norvasc, prn hydralazine   6. Hepatitis: ALT> AST, noticed LFT elevated even in 5/2019. May have chronic hep c.  Hepatitis panel pending        Signed By: Richie Gauthier MD     June 28, 2020

## 2020-06-29 ENCOUNTER — APPOINTMENT (OUTPATIENT)
Dept: NON INVASIVE DIAGNOSTICS | Age: 55
DRG: 469 | End: 2020-06-29
Attending: HOSPITALIST
Payer: MEDICAID

## 2020-06-29 ENCOUNTER — APPOINTMENT (OUTPATIENT)
Dept: VASCULAR SURGERY | Age: 55
DRG: 469 | End: 2020-06-29
Attending: HOSPITALIST
Payer: MEDICAID

## 2020-06-29 ENCOUNTER — APPOINTMENT (OUTPATIENT)
Dept: NUCLEAR MEDICINE | Age: 55
DRG: 469 | End: 2020-06-29
Attending: HOSPITALIST
Payer: MEDICAID

## 2020-06-29 LAB
ALBUMIN SERPL-MCNC: 1.7 G/DL (ref 3.5–5)
ALBUMIN/GLOB SERPL: 0.3 {RATIO} (ref 1.1–2.2)
ALP SERPL-CCNC: 165 U/L (ref 45–117)
ALT SERPL-CCNC: 91 U/L (ref 12–78)
ANION GAP SERPL CALC-SCNC: 8 MMOL/L (ref 5–15)
AST SERPL-CCNC: 88 U/L (ref 15–37)
BASOPHILS # BLD: 0.1 K/UL (ref 0–0.1)
BASOPHILS NFR BLD: 1 % (ref 0–1)
BILIRUB SERPL-MCNC: 0.4 MG/DL (ref 0.2–1)
BUN SERPL-MCNC: 43 MG/DL (ref 6–20)
BUN/CREAT SERPL: 13 (ref 12–20)
CALCIUM SERPL-MCNC: 8.2 MG/DL (ref 8.5–10.1)
CHLORIDE SERPL-SCNC: 109 MMOL/L (ref 97–108)
CHOLEST SERPL-MCNC: 170 MG/DL
CK SERPL-CCNC: 149 U/L (ref 39–308)
CO2 SERPL-SCNC: 24 MMOL/L (ref 21–32)
CREAT SERPL-MCNC: 3.26 MG/DL (ref 0.7–1.3)
DIFFERENTIAL METHOD BLD: NORMAL
ECHO AO ROOT DIAM: 2.99 CM
ECHO AV AREA PEAK VELOCITY: 2.75 CM2
ECHO AV AREA/BSA PEAK VELOCITY: 1.5 CM2/M2
ECHO AV PEAK GRADIENT: 11.81 MMHG
ECHO EST RA PRESSURE: 3 MMHG
ECHO LA AREA 4C: 14.33 CM2
ECHO LA VOL 2C: 39.9 ML (ref 18–58)
ECHO LA VOL 4C: 34.99 ML (ref 18–58)
ECHO LA VOLUME INDEX A2C: 21.18 ML/M2 (ref 16–28)
ECHO LA VOLUME INDEX A4C: 18.57 ML/M2 (ref 16–28)
ECHO LV INTERNAL DIMENSION DIASTOLIC: 4.27 CM (ref 4.2–5.9)
ECHO LV INTERNAL DIMENSION SYSTOLIC: 2.61 CM
ECHO LV IVSD: 1.02 CM (ref 0.6–1)
ECHO LV MASS 2D: 151.9 G (ref 88–224)
ECHO LV MASS INDEX 2D: 80.6 G/M2 (ref 49–115)
ECHO LV POSTERIOR WALL DIASTOLIC: 1.09 CM (ref 0.6–1)
ECHO LVOT DIAM: 2.15 CM
ECHO LVOT PEAK GRADIENT: 6.76 MMHG
ECHO MV A VELOCITY: 68.52 CM/S
ECHO MV AREA PHT: 2.84 CM2
ECHO MV E DECELERATION TIME (DT): 0.27 S
ECHO MV E VELOCITY: 52.53 CM/S
ECHO MV E/A RATIO: 0.77
ECHO MV PRESSURE HALF TIME (PHT): 0.08 S
ECHO PV PEAK INSTANTANEOUS GRADIENT SYSTOLIC: 6.72 MMHG
ECHO RIGHT VENTRICULAR SYSTOLIC PRESSURE (RVSP): 19 MMHG
ECHO RV INTERNAL DIMENSION: 3.91 CM
ECHO RV TAPSE: 2.59 CM (ref 1.5–2)
ECHO TV REGURGITANT PEAK GRADIENT: 16 MMHG
EOSINOPHIL # BLD: 0.4 K/UL (ref 0–0.4)
EOSINOPHIL NFR BLD: 6 % (ref 0–7)
ERYTHROCYTE [DISTWIDTH] IN BLOOD BY AUTOMATED COUNT: 11.6 % (ref 11.5–14.5)
EST. AVERAGE GLUCOSE BLD GHB EST-MCNC: 88 MG/DL
GLOBULIN SER CALC-MCNC: 5 G/DL (ref 2–4)
GLUCOSE SERPL-MCNC: 89 MG/DL (ref 65–100)
HBA1C MFR BLD: 4.7 % (ref 4–5.6)
HCT VFR BLD AUTO: 40.5 % (ref 36.6–50.3)
HDLC SERPL-MCNC: 49 MG/DL
HDLC SERPL: 3.5 {RATIO} (ref 0–5)
HGB BLD-MCNC: 13.2 G/DL (ref 12.1–17)
IMM GRANULOCYTES # BLD AUTO: 0 K/UL (ref 0–0.04)
IMM GRANULOCYTES NFR BLD AUTO: 0 % (ref 0–0.5)
LDLC SERPL CALC-MCNC: 73.4 MG/DL (ref 0–100)
LIPID PROFILE,FLP: ABNORMAL
LYMPHOCYTES # BLD: 3.4 K/UL (ref 0.8–3.5)
LYMPHOCYTES NFR BLD: 48 % (ref 12–49)
MAGNESIUM SERPL-MCNC: 2.2 MG/DL (ref 1.6–2.4)
MCH RBC QN AUTO: 29.5 PG (ref 26–34)
MCHC RBC AUTO-ENTMCNC: 32.6 G/DL (ref 30–36.5)
MCV RBC AUTO: 90.4 FL (ref 80–99)
MONOCYTES # BLD: 0.8 K/UL (ref 0–1)
MONOCYTES NFR BLD: 11 % (ref 5–13)
NEUTS SEG # BLD: 2.4 K/UL (ref 1.8–8)
NEUTS SEG NFR BLD: 34 % (ref 32–75)
NRBC # BLD: 0 K/UL (ref 0–0.01)
NRBC BLD-RTO: 0 PER 100 WBC
PHOSPHATE SERPL-MCNC: 4.3 MG/DL (ref 2.6–4.7)
PLATELET # BLD AUTO: 176 K/UL (ref 150–400)
PMV BLD AUTO: 12.4 FL (ref 8.9–12.9)
POTASSIUM SERPL-SCNC: 3.5 MMOL/L (ref 3.5–5.1)
PROT SERPL-MCNC: 6.7 G/DL (ref 6.4–8.2)
RBC # BLD AUTO: 4.48 M/UL (ref 4.1–5.7)
SODIUM SERPL-SCNC: 141 MMOL/L (ref 136–145)
TRIGL SERPL-MCNC: 238 MG/DL (ref ?–150)
VLDLC SERPL CALC-MCNC: 47.6 MG/DL
WBC # BLD AUTO: 7.1 K/UL (ref 4.1–11.1)

## 2020-06-29 PROCEDURE — 74011250636 HC RX REV CODE- 250/636: Performed by: INTERNAL MEDICINE

## 2020-06-29 PROCEDURE — 74011250637 HC RX REV CODE- 250/637: Performed by: INTERNAL MEDICINE

## 2020-06-29 PROCEDURE — 80053 COMPREHEN METABOLIC PANEL: CPT

## 2020-06-29 PROCEDURE — 93970 EXTREMITY STUDY: CPT

## 2020-06-29 PROCEDURE — 84100 ASSAY OF PHOSPHORUS: CPT

## 2020-06-29 PROCEDURE — 65660000000 HC RM CCU STEPDOWN

## 2020-06-29 PROCEDURE — 74011250636 HC RX REV CODE- 250/636: Performed by: HOSPITALIST

## 2020-06-29 PROCEDURE — 74011250637 HC RX REV CODE- 250/637: Performed by: HOSPITALIST

## 2020-06-29 PROCEDURE — 83036 HEMOGLOBIN GLYCOSYLATED A1C: CPT

## 2020-06-29 PROCEDURE — 85025 COMPLETE CBC W/AUTO DIFF WBC: CPT

## 2020-06-29 PROCEDURE — 93306 TTE W/DOPPLER COMPLETE: CPT

## 2020-06-29 PROCEDURE — 82550 ASSAY OF CK (CPK): CPT

## 2020-06-29 PROCEDURE — 83735 ASSAY OF MAGNESIUM: CPT

## 2020-06-29 PROCEDURE — 36415 COLL VENOUS BLD VENIPUNCTURE: CPT

## 2020-06-29 PROCEDURE — A9540 TC99M MAA: HCPCS

## 2020-06-29 PROCEDURE — 80061 LIPID PANEL: CPT

## 2020-06-29 RX ORDER — HYDRALAZINE HYDROCHLORIDE 10 MG/1
10 TABLET, FILM COATED ORAL 3 TIMES DAILY
Status: DISCONTINUED | OUTPATIENT
Start: 2020-06-29 | End: 2020-06-30

## 2020-06-29 RX ORDER — POTASSIUM CHLORIDE 750 MG/1
20 TABLET, FILM COATED, EXTENDED RELEASE ORAL
Status: COMPLETED | OUTPATIENT
Start: 2020-06-29 | End: 2020-06-29

## 2020-06-29 RX ORDER — SODIUM CHLORIDE 9 MG/ML
25 INJECTION, SOLUTION INTRAVENOUS CONTINUOUS
Status: DISCONTINUED | OUTPATIENT
Start: 2020-06-29 | End: 2020-07-03

## 2020-06-29 RX ADMIN — ACETAMINOPHEN 650 MG: 325 TABLET ORAL at 20:27

## 2020-06-29 RX ADMIN — ACETAMINOPHEN 650 MG: 325 TABLET ORAL at 15:02

## 2020-06-29 RX ADMIN — Medication 10 ML: at 06:17

## 2020-06-29 RX ADMIN — POTASSIUM CHLORIDE 20 MEQ: 750 TABLET, FILM COATED, EXTENDED RELEASE ORAL at 17:41

## 2020-06-29 RX ADMIN — HYDRALAZINE HYDROCHLORIDE 10 MG: 10 TABLET, FILM COATED ORAL at 21:27

## 2020-06-29 RX ADMIN — HEPARIN SODIUM 5000 UNITS: 5000 INJECTION INTRAVENOUS; SUBCUTANEOUS at 19:21

## 2020-06-29 RX ADMIN — SODIUM CHLORIDE 75 ML/HR: 900 INJECTION, SOLUTION INTRAVENOUS at 17:41

## 2020-06-29 RX ADMIN — HEPARIN SODIUM 5000 UNITS: 5000 INJECTION INTRAVENOUS; SUBCUTANEOUS at 08:59

## 2020-06-29 RX ADMIN — Medication 10 ML: at 22:00

## 2020-06-29 RX ADMIN — ACETAMINOPHEN 650 MG: 325 TABLET ORAL at 09:38

## 2020-06-29 NOTE — PROGRESS NOTES
NUTRITION  Pt seen for:     [x]           Supplements  [x]             PO intake check   []           Food Allergies  []             Food Preferences/tolerances    []           Rescreen   []             Education    []           Diet order clarification []             Other            RECOMMENDATIONS:      Continue with regular diet as ordered    RD has added Ensure High protein BID, HS snack of PB&crackers    SUBJECTIVE/OBJECTIVE:   Information obtained from:  Pt's nurse (pt was DES getting several tests performed). Pt is a 47year old male admitted on 6/28 with SOB, BLE swelling. Pt's medical hx includes: cocaine abuse, prior MI, ECHO. Spoke with pt's RN, who reports pt is eating everything, and wanting more. RD has added several supplements, and also HS snack of PB&Crax as pt likes these. Pt certainly has significant medical issues going on--much due to his drug abuse--but he is currently not at nutritional risk. Pt has lost weight over the past year, but this is mainly d/t his drug abuse, not because he couldn't eat.     Diet:  regular  Supplements:  RD has added  Intake: [x]           Good     []           Fair      []           Poor   Patient Vitals for the past 100 hrs:   % Diet Eaten   06/29/20 0832 100 %   06/28/20 2105 100 %       Weight Changes:   [x]            Loss  []            Gain  []            Stable  Wt Readings from Last 10 Encounters:   06/29/20 73 kg (161 lb)   06/11/19 81.6 kg (180 lb)   05/24/19 83.9 kg (185 lb)   05/16/19 83.9 kg (185 lb)   05/10/19 83.9 kg (185 lb)   05/05/14 90.7 kg (200 lb)   05/03/14 90.7 kg (200 lb)       Nutrition Problems Identified:  [x]       None: not at nutrition risk  []           Specified food preferences   []           Dislikes supplements              []           Allergies  []           Difficulty chewing or swallowing   []           Poor dentition   []           Nausea/Vomiting  []           Constipation  []           Diarrhea    PLAN:   [] Obtained/adjusted food preferences/tolerances and/or snacks options   []           Dislikes supplements will try a substitution   []           Modify diet for food allergies  []           Adjust texture due to difficulty chewing   [x]    Continue current diet  []           Educated patient  [x]           Add Supplements  []          Amanda Tavares [id number], [facility]  C: [phone]

## 2020-06-29 NOTE — PROGRESS NOTES
0000: Bedside and Verbal shift change report given to GRACIA Pittman RN (oncoming nurse) by [employer], RN (offgoing nurse). Report included the following information SBAR, Kardex, Intake/Output, MAR, Accordion, Recent Results and Alarm Parameters . 0710:  Nuclear medicine confirming need for V/Q scan, will be able to complete around [phone].    0730: Bedside and Verbal shift change report given to ROMEO Farrar RN (oncoming nurse) by GRACIA Pittman RN (offgoing nurse). Report included the following information SBAR, Kardex, Intake/Output, MAR, Accordion, Recent Results, Cardiac Rhythm NSR and Alarm Parameters .

## 2020-06-29 NOTE — PROGRESS NOTES
[address] [facility] Adult  Hospitalist Group                                                                                          Hospitalist Progress Note  Ann Marie Zamora MD  Answering service: [phone] from in house phone        Date of Service:  2020  NAME:  Harlan Ruff  :  1965  MRN:  [id number]      Admission Summary:   59-year-old male who presents ambulatory to the emergency room with complaints of shortness of breath and leg edema    Interval history / Subjective:     Patient seen and examined    States that his breathing is better today, denied any chest pain,nausea/vomiting today     Assessment & Plan:     #Acute renal failure:  -likely pre renal.CK wnl.  -Cr trending down 3.9-->3.2  -Repeat labs tomorrow and further work up based on clinical course  -on IVF  -Nephrology following      #Chronic Hep C  Patient said he was diagnosed with hep C but was never treated    #HTN:  -on amlodipine. BP remains high   -Hold HCTZ  -will avoid beta blockers, add hydralazine      #Cocaine abuse:  -counseled to quit    #Transaminitis:improving  -due to hepc C,drug abuse    #DVT ruled out      Code status: full  DVT prophylaxis: heparin    Care Plan discussed with: patient,nurse  Anticipated Disposition:TBD  Anticipated Discharge:TBD     Hospital Problems  Date Reviewed: 2019          Codes Class Noted POA    Acute renal failure (ARF) ([facility] ICD-10-CM: N17.9  ICD-9-CM: 584.9  2020 Unknown                Review of Systems:   As per HPI      Vital Signs:    Last 24hrs VS reviewed since prior progress note.  Most recent are:  Visit Vitals  /85 (BP 1 Location: Right arm, BP Patient Position: At rest)   Pulse 75   Temp 98.2 °F (36.8 °C)   Resp 16   Ht 5' 9\" (1.753 m)   Wt 73 kg (161 lb)   SpO2 96%   BMI 23.78 kg/m²         Intake/Output Summary (Last 24 hours) at 2020 1834  Last data filed at 2020 1212  Gross per 24 hour   Intake 596 ml   Output 1005 ml   Net -409 ml        Physical Examination:             Constitutional:  No acute distress, cooperative, pleasant    ENT:  Oral mucosa moist, oropharynx benign,EOMI,ancietric sclera   Resp:  CTA bilaterally. No wheezing/rhonchi/rales. No accessory muscle use   CV:  Regular rhythm, normal rate, no murmurs, gallops, rubs    GI:  Soft, non distended, non tender. normoactive bowel sounds    Musculoskeletal:  Minimal LE edema, warm, 2+ pulses throughout    Neurologic:  Moves all extremities. AAOx3, CN II-XII reviewed     Psych: Not anxious nor agitated. Data Review:    Review and/or order of clinical lab test  Review and/or order of tests in the radiology section of CPT  Review and/or order of tests in the medicine section of CPT      Labs:     Recent Labs     06/29/20  0346 06/28/20  1413   WBC 7.1 7.0   HGB 13.2 12.8   HCT 40.5 39.7    180     Recent Labs     06/29/20  0346 06/28/20  1413    137   K 3.5 3.8   * 107   CO2 24 23   BUN 43* 47*   CREA 3.26* 3.91*   GLU 89 96   CA 8.2* 8.1*   MG 2.2  --    PHOS 4.3  --      Recent Labs     06/29/20  0346 06/28/20  1413   ALT 91* 106*   * 178*   TBILI 0.4 0.3   TP 6.7 7.1   ALB 1.7* 1.9*   GLOB 5.0* 5.2*     No results for input(s): INR, PTP, APTT, INREXT in the last 72 hours. No results for input(s): FE, TIBC, PSAT, FERR in the last 72 hours. No results found for: FOL, RBCF   No results for input(s): PH, PCO2, PO2 in the last 72 hours.   Recent Labs     06/29/20  0346 06/28/20  1413     --    TROIQ  --  <0.05     Lab Results   Component Value Date/Time    Cholesterol, total 170 06/29/2020 03:46 AM    HDL Cholesterol 49 06/29/2020 03:46 AM    LDL, calculated 73.4 06/29/2020 03:46 AM    Triglyceride 238 (H) 06/29/2020 03:46 AM    CHOL/HDL Ratio 3.5 06/29/2020 03:46 AM     No results found for: [facility]  Lab Results   Component Value Date/Time    Color YELLOW/STRAW 06/28/2020 08:19 PM    Appearance CLEAR 06/28/2020 08:19 PM    Specific gravity 1.012 06/28/2020 08:19 PM    pH (UA) 5.5 06/28/2020 08:19 PM    Protein 300 (A) 06/28/2020 08:19 PM    Glucose Negative 06/28/2020 08:19 PM    Ketone Negative 06/28/2020 08:19 PM    Bilirubin Negative 06/28/2020 08:19 PM    Urobilinogen 0.2 06/28/2020 08:19 PM    Nitrites Negative 06/28/2020 08:19 PM    Leukocyte Esterase Negative 06/28/2020 08:19 PM    Epithelial cells FEW 06/28/2020 08:19 PM    Bacteria Negative 06/28/2020 08:19 PM    WBC 0-4 06/28/2020 08:19 PM    RBC 0-5 06/28/2020 08:19 PM         Medications Reviewed:     Current Facility-Administered Medications   Medication Dose Route Frequency    0.9% sodium chloride infusion  75 mL/hr IntraVENous CONTINUOUS    amLODIPine (NORVASC) tablet 10 mg  10 mg Oral DAILY    sodium chloride (NS) flush 5-40 mL  5-40 mL IntraVENous Q8H    sodium chloride (NS) flush 5-40 mL  5-40 mL IntraVENous PRN    acetaminophen (TYLENOL) tablet 650 mg  650 mg Oral Q4H PRN    ondansetron (ZOFRAN) injection 4 mg  4 mg IntraVENous Q4H PRN    heparin (porcine) injection 5,000 Units  5,000 Units SubCUTAneous Q12H    hydrALAZINE (APRESOLINE) tablet 50 mg  50 mg Oral Q8H PRN     ______________________________________________________________________  EXPECTED LENGTH OF STAY: 2d 4h  ACTUAL LENGTH OF STAY:          1                 Marty Mancuso MD

## 2020-06-29 NOTE — PROGRESS NOTES
Spiritual Care Assessment/Progress Note  [facility]      NAME: Betina Hwang      MRN: [id number]  AGE: 47 y.o.  SEX: male  Catholic Affiliation: Spiritism   Language: English     6/29/2020     Total Time (in minutes): 25     Spiritual Assessment begun in [facility] through conversation with:         [x]Patient        [] Family    [] Friend(s)        Reason for Consult: Advance medical directive consult, Initial/Spiritual assessment, patient floor     Spiritual beliefs: (Please include comment if needed)     [x] Identifies with a doc tradition: Robyn Simmons        [] Supported by a doc community:            [] Claims no spiritual orientation:           [] Seeking spiritual identity:                [] Adheres to an individual form of spirituality:           [] Not able to assess:                           Identified resources for coping:      [x] Prayer                               [] Music                  [] Guided Imagery     [x] Family/friends                 [] Pet visits     [] Devotional reading                         [] Unknown     [] Other:                                               Interventions offered during this visit: (See comments for more details)    Patient Interventions: Affirmation of emotions/emotional suffering, Affirmation of doc, Catharsis/review of pertinent events in supportive environment, Coping skills reviewed/reinforced, End of life issues discussed, Iconic (affirming the presence of God/Higher Power), Prayer (actual)           Plan of Care:     [] Support spiritual and/or cultural needs    [x] Support AMD and/or advance care planning process      [] Support grieving process   [] Coordinate Rites and/or Rituals    [] Coordination with community clergy   [] No spiritual needs identified at this time   [] Detailed Plan of Care below (See Comments)  [] Make referral to Music Therapy  [] Make referral to Pet Therapy     [] Make referral to Addiction services  [] Make referral to [facility]  [] Make referral to Spiritual Care Partner  [] No future visits requested        [x] Follow up visits as needed     Comments:  visit for initial spiritual assessment and Advance Directive (AMD) consult. Patient reclining in bed resting and watching television. Good eye contact, friendly. Says he is feeling confused. Provided spiritual presence and listening as he spoke of his present thought, feelings, and concerns. Says he is concerned because he has been having feelings of being forgotten about and not cared for by anyone. Provided him the opportunity to reflect on these feelings and process them. In the end, he could not find any basis for them since he says he has good support form family and others and is not all alone. Described a good support system and has been in touch with family. He spoke about his health and the events leading to this hospitalization. Hopes to be able to be discharged hoem as soon as able. He requested prayer and a prayer was offered. He appeared comforted and encouraged as a result of this prayer and visit and expressed gratitude for this prayer and visit.  requested for Advance Directive (AMD) consult. Patient requested information concerning AMD.  Provided a blank copy of AMD, a copy of the booklet, \"Your Right to Decide, and an information card explaining  availability, pastoral care services, and contact information. Explained the document and answered all of his questions. Patient would like to review this material prior to making a decision. Patient says he will ask his nurse to contact the  should he wish to complete AMD during this hospitalization. Visited by Rev. Shaka Daniels MDiv, [facility], [facility]   paging service: Leroy-BEULAH ([phone])

## 2020-06-29 NOTE — ACP (ADVANCE CARE PLANNING)
Advance Care Planning     Advance Care Planning Activator (Inpatient)  Conversation Note      Date of ACP Conversation: 06/29/20     Conversation Conducted with:   Patient with Decision Making Capacity    ACP Activator: Mónica Nogueira makes decisions on behalf of the incapacitated patient: Decision Maker is asked to consider and make decisions based on patient values, known preferences, or best interests. Health Care Decision Maker:    Current Designated Health Care Decision Maker:   Primary Decision Maker: Vanna Sequeira Partner - [phone]    Secondary Decision Maker: Colleen Thayer [facility] [phone]    Care Preferences    Ventilation: \"If you were in your present state of health and suddenly became very ill and were unable to breathe on your own, what would your preference be about the use of a ventilator (breathing machine) if it were available to you? \"  YES    \"If your health worsens and it becomes clear that your chance of recovery is unlikely, what would your preference be about the use of a ventilator (breathing machine) if it were available to you? \" YES    Resuscitation  \"CPR works best to restart the heart when there is a sudden event, like a heart attack, in someone who is otherwise healthy. Unfortunately, CPR does not typically restart the heart for people who have serious health conditions or who are very sick. \" YES    [x] Yes  [] No   Educated Patient / Andres Hwang regarding differences between Advance Directives and portable DNR orders.     Length of ACP Conversation in minutes:  15    Conversation Outcomes:  [x] ACP discussion completed  [] Existing advance directive reviewed with patient; no changes to patient's previously recorded wishes     [] New Advance Directive completed   [] Portable Do Not Resuscitate prepared for Provider review and signature  [] POLST/POST/MOLST/MOST prepared for Provider review and signature      Follow-up plan:    [] Schedule follow-up conversation to continue planning  [x] Referred individual to Provider for additional questions/concerns   [] Advised patient/agent/surrogate to review completed ACP document and update if needed with changes in condition, patient preferences or care setting     [] This note routed to one or more involved healthcare providers      CRM: Julieth Sosa, MPH,  Preetas Josiah; Z: [phone]

## 2020-06-29 NOTE — PROGRESS NOTES
0730- assumed care of pt this am. Pt is resting in bed with noted chest rise and fall. 0745- pt now alert and oriented resting in bed. Pt has no complaints at this time. Pt updated about procedures to be done today, unsure times.

## 2020-06-29 NOTE — CONSULTS
Assessment:  ECHO: Cr 3.9-> 3.2mg/dl. Pre-renal state -> improving with IVF. R/o Cocaine induced Rhabdomyolysis. HTN: Poorly controlled    Cocaine abuse    Hypokalemia    Plan/Recommendations:  Check CK level  Gentle saline  Oral KCl 20meq x1 dose  Renal ultrasound  PRN IV Hydralazine  Strict I/Os, avoid nephrotoxins  Am labs    Discussed with patient    Thanks for the consultation. Renal service will follow patient with you. Please contact me with any questions or concerns. Initial Consult note         Patient name: Tala Fatima  MR no: [id number]  Date of admission: 2020  Date of consultation: 2020  Requested by: Dr. Shiloh Joseph  Reason for consult: ECHO    Patient seen and examined. History obtained from patient and chart review. Relevant labs, data and notes reviewed. HPI: Tala Fatima is a 47 y.o. male with PMH significant for HTN who presented yesterday to ED with SOB/LE edema. Patient was noted to have SBP in the 170s. Serum Cr 3.9mg/dl. Admitted to using cocaine multiple times  Has been having issues with uncontrolled HTN-> reports being admitted at [facility] last week. No CP. Denies any NSAID use. No hematuria. No new rashes. Nephrology consulted to evaluate and manage ECHO. Denies having seen a nephrologist in the past.     PMH:  Past Medical History:   Diagnosis Date    Hypertension      PSH:  History reviewed. No pertinent surgical history. Social history:   Social History     Tobacco Use    Smoking status: Current Every Day Smoker     Packs/day: 1.00    Smokeless tobacco: Never Used   Substance Use Topics    Alcohol use: Yes    Drug use: Yes     Types: Marijuana       Family history:   Mother with ESRD previously on HD ()    No Known Allergies    Current Facility-Administered Medications   Medication Dose Route Frequency Last Dose    amLODIPine (NORVASC) tablet 10 mg  10 mg Oral DAILY 10 mg at 20    sodium chloride (NS) flush 5-40 mL  5-40 mL IntraVENous Q8H 10 mL at 06/29/20 0617    sodium chloride (NS) flush 5-40 mL  5-40 mL IntraVENous PRN      acetaminophen (TYLENOL) tablet 650 mg  650 mg Oral Q4H  mg at 06/29/20 1502    ondansetron (ZOFRAN) injection 4 mg  4 mg IntraVENous Q4H PRN      heparin (porcine) injection 5,000 Units  5,000 Units SubCUTAneous Q12H 5,000 Units at 06/29/20 0859    hydrALAZINE (APRESOLINE) tablet 50 mg  50 mg Oral Q8H PRN         ROS (besides HPI):    General: No fever. No weight changes  ENT: No hearing loss or visual changes  Cardiovascular: No Chest pain. +LE edema  Pulmonary: +SOB  GI: No abdominal pain. No Nausea/Vomiting/Diarrhea. No blood in stool  : No blood in urine. No foamy or cloudy urine  Musculoskeletal: No joint swelling or redness. No morning stiffness  Endocrine: no cold or heat intolerance  Psych: denies anxiety or depression  Neuro: No light headedness or dizziness    Objective   Visit Vitals  /85 (BP 1 Location: Right arm, BP Patient Position: At rest)   Pulse 75   Temp 98.2 °F (36.8 °C)   Resp 16   Ht 5' 9\" (1.753 m)   Wt 73 kg (161 lb)   SpO2 96%   BMI 23.78 kg/m²       Physical Exam:    Gen: NAD    HEENT: AT/NC, EOMI, moist mucous membrane, no scleral icterus    Neck: no JVD, no cervical lymphadenopathy, no carotid bruit    Lungs/Chest wall: Breath sounds normal. Symmetrical chest wall expansion. No accessory muscle use. Clear to auscultation    Cardiovascular: Normal S1/S2, normal rate, regular rhythm. Abdomen: soft, NT, ND, BS+, no HSM    Ext: no clubbing or cyanosis. No edema    Skin: warm and dry. No rashes    : no CVA tenderness    CNS: alert awake. Answers appropriately.      Labs/Data:    Lab Results   Component Value Date/Time    Sodium 141 06/29/2020 03:46 AM    Potassium 3.5 06/29/2020 03:46 AM    Chloride 109 (H) 06/29/2020 03:46 AM    CO2 24 06/29/2020 03:46 AM    Anion gap 8 06/29/2020 03:46 AM    Glucose 89 06/29/2020 03:46 AM    BUN 43 (H) 06/29/2020 03:46 AM    Creatinine 3.26 (H) 06/29/2020 03:46 AM    BUN/Creatinine ratio 13 06/29/2020 03:46 AM    GFR est AA 24 (L) 06/29/2020 03:46 AM    GFR est non-AA 20 (L) 06/29/2020 03:46 AM    Calcium 8.2 (L) 06/29/2020 03:46 AM       Lab Results   Component Value Date/Time    WBC 7.1 06/29/2020 03:46 AM    HGB 13.2 06/29/2020 03:46 AM    HCT 40.5 06/29/2020 03:46 AM    PLATELET 225 08/50/1759 03:46 AM    MCV 90.4 06/29/2020 03:46 AM       Urine analysis: 6/28 Reviewed      Renal US: ordered    No components found for: SPEP, UPEP  No results found for: PUQ, PROTU2, PROTU1, BJP1, CPE1, IMEL1, MET2  No results found for: MCACR, MCA1, MCA2, MCA3, MCAU, MCAU2, MCALPOCT      Intake/Output Summary (Last 24 hours) at 6/29/2020 1657  Last data filed at 6/29/2020 1212  Gross per 24 hour   Intake 596 ml   Output 1005 ml   Net -409 ml       Wt Readings from Last 3 Encounters:   06/29/20 73 kg (161 lb)   06/11/19 81.6 kg (180 lb)   05/24/19 83.9 kg (185 lb)       Signed by:  Lupe Cid MD  Nephrology and Hypertension  [facility]

## 2020-06-29 NOTE — PROGRESS NOTES
Pharmacist Admission Medication Reconciliation:    Information obtained from: This medication history was obtained from the patient by phone. RxQuery data available¹:  YES    Summary:     Medications added: amlodipine, HCTZ  Medications deleted: diltiazem, ibuprofen, labetalol, spironolactone     Active and held inpatient orders were reviewed and no changes are needed. ¹RxQuery pharmacy benefit data reflects medications processed through the patient's insurance, however this data does NOT capture whether the medication is currently being taken by the patient. Allergies:  Patient has no known allergies. Significant PMH/Disease States:   Past Medical History:   Diagnosis Date    Hypertension      Chief Complaint for this Admission:    Chief Complaint   Patient presents with    Shortness of Breath     Prior to Admission Medications:   Prior to Admission Medications   Prescriptions Last Dose Informant Patient Reported? Taking? amLODIPine (NORVASC) 5 mg tablet 6/28/2020 at 8:30  Yes Yes   Sig: Take 5 mg by mouth daily. hydroCHLOROthiazide (HYDRODIURIL) 25 mg tablet 6/28/2020 at 8:30  Yes Yes   Sig: Take 25 mg by mouth daily. Facility-Administered Medications: None       Thank you for allowing me to participate in this patient's care. Please call  or  with any questions. Verna Long, Pharm. D., BCPS, BCPPS

## 2020-06-29 NOTE — PROGRESS NOTES
TRANSITIONS OF CARE PLAN:   1. DESTINATION: TBD   2. TRANSPORT: Sister  3. ADDITIONAL SUPPORT: Sister, [place] Serve  4. DME: [facility]  5. HOME HEALTH: TBD  6. CODE STATUS/AMD STATUS: Full Code; not on file; ACP completed, AMD referral submitted  7. FOLLOW UP APPOINTMENTS: PCP, likely Nephro  8. STILL NEEDS: Nephro Consult pending, Echo pending, VQ Scan, possible cardio cnosult    Reason for Admission:   Acute Renal Failure                   RUR Score:          12%           Plan for utilizing home health:      TBD    PCP: First and Last name:  NP [address]   Name of Practice: [address]   Are you a current patient: Yes/No: YES   Approximate date of last visit: 4-5 months   Can you participate in a virtual visit with your PCP: YES                    Current Advanced Directive/Advance Care Plan: Full Code; not on file - ACP completed, AMD referral submitted                         Transition of Care Plan:     Patient has no hx of HH, IPR, or SNF. Pharmacy preference is [facility] on [address]. Patient lives with sharlene in a first floor apartment with no exterior or interior steps. Patient has 4 adult children, 3 in the area and 1 in [place]. Patient is also supported by his rodricke and sister. CM to follow for discussion regarding substance abuse support. Care Management Interventions  PCP Verified by CM: Yes(Last seen by EDIS Soto 4-5 months ago)  Palliative Care Criteria Met (RRAT>21 & CHF Dx)?: No  Mode of Transport at Discharge:  Other (see comment)(sister to transport)  Transition of Care Consult (CM Consult): Discharge Planning  MyChart Signup: No  Discharge Durable Medical Equipment: No(has DME of: [facility])  Health Maintenance Reviewed: Yes(cm met with patient, with patient alert and oriented x 4)  Physical Therapy Consult: No  Occupational Therapy Consult: No  Speech Therapy Consult: No  Current Support Network: Own Home, Lives with Spouse, Family Lives [place] Follow Up Transport: Self(independent in adls, to include driving)  [employer] Provided?: No  Discharge Location  Discharge Placement: Unable to determine at this time(lives with sharlene in a first floor apartment, no interior or exterior steps)    CRM: Jan Mayorga, MPH, [address] [place] [address]; Z: [phone]

## 2020-06-29 NOTE — ACP (ADVANCE CARE PLANNING)
requested for Advance Directive (AMD) consult. Patient requested information concerning AMD.  Provided a blank copy of AMD, a copy of the booklet, \"Your Right to Decide, and an information card explaining  availability, pastoral care services, and contact information. Explained the document and answered all of his questions. Patient would like to review this material prior to making a decision. Patient says he will ask his nurse to contact the  should he wish to complete AMD during this hospitalization. Visited by Rev. Solo De Dios MDiv, [facility], [facility]   paging service: [phone])

## 2020-06-30 ENCOUNTER — APPOINTMENT (OUTPATIENT)
Dept: ULTRASOUND IMAGING | Age: 55
DRG: 469 | End: 2020-06-30
Attending: INTERNAL MEDICINE
Payer: MEDICAID

## 2020-06-30 LAB
ALBUMIN SERPL ELPH-MCNC: 2.6 G/DL (ref 2.9–4.4)
ALBUMIN SERPL-MCNC: 1.7 G/DL (ref 3.5–5)
ALBUMIN SERPL-MCNC: 1.8 G/DL (ref 3.5–5)
ALBUMIN/GLOB SERPL: 0.3 {RATIO} (ref 1.1–2.2)
ALBUMIN/GLOB SERPL: 0.7 {RATIO} (ref 0.7–1.7)
ALP SERPL-CCNC: 172 U/L (ref 45–117)
ALPHA1 GLOB SERPL ELPH-MCNC: 0.2 G/DL (ref 0–0.4)
ALPHA2 GLOB SERPL ELPH-MCNC: 1 G/DL (ref 0.4–1)
ALT SERPL-CCNC: 84 U/L (ref 12–78)
ANION GAP SERPL CALC-SCNC: 6 MMOL/L (ref 5–15)
ANION GAP SERPL CALC-SCNC: 6 MMOL/L (ref 5–15)
AST SERPL-CCNC: 78 U/L (ref 15–37)
B-GLOBULIN SERPL ELPH-MCNC: 1 G/DL (ref 0.7–1.3)
BASOPHILS # BLD: 0 K/UL (ref 0–0.1)
BASOPHILS NFR BLD: 1 % (ref 0–1)
BILIRUB SERPL-MCNC: 0.3 MG/DL (ref 0.2–1)
BUN SERPL-MCNC: 34 MG/DL (ref 6–20)
BUN SERPL-MCNC: 34 MG/DL (ref 6–20)
BUN/CREAT SERPL: 13 (ref 12–20)
BUN/CREAT SERPL: 13 (ref 12–20)
CALCIUM SERPL-MCNC: 7.8 MG/DL (ref 8.5–10.1)
CALCIUM SERPL-MCNC: 8 MG/DL (ref 8.5–10.1)
CHLORIDE SERPL-SCNC: 109 MMOL/L (ref 97–108)
CHLORIDE SERPL-SCNC: 110 MMOL/L (ref 97–108)
CK SERPL-CCNC: 100 U/L (ref 39–308)
CO2 SERPL-SCNC: 22 MMOL/L (ref 21–32)
CO2 SERPL-SCNC: 23 MMOL/L (ref 21–32)
CREAT SERPL-MCNC: 2.55 MG/DL (ref 0.7–1.3)
CREAT SERPL-MCNC: 2.55 MG/DL (ref 0.7–1.3)
CREAT UR-MCNC: 45.1 MG/DL
DIFFERENTIAL METHOD BLD: NORMAL
EOSINOPHIL # BLD: 0.3 K/UL (ref 0–0.4)
EOSINOPHIL NFR BLD: 4 % (ref 0–7)
ERYTHROCYTE [DISTWIDTH] IN BLOOD BY AUTOMATED COUNT: 11.6 % (ref 11.5–14.5)
GAMMA GLOB SERPL ELPH-MCNC: 1.7 G/DL (ref 0.4–1.8)
GLOBULIN SER CALC-MCNC: 3.9 G/DL (ref 2.2–3.9)
GLOBULIN SER CALC-MCNC: 5.2 G/DL (ref 2–4)
GLUCOSE SERPL-MCNC: 81 MG/DL (ref 65–100)
GLUCOSE SERPL-MCNC: 82 MG/DL (ref 65–100)
HCT VFR BLD AUTO: 40.8 % (ref 36.6–50.3)
HGB BLD-MCNC: 13.1 G/DL (ref 12.1–17)
IGA SERPL-MCNC: 512 MG/DL (ref 90–386)
IGG SERPL-MCNC: 2125 MG/DL (ref 603–1613)
IGM SERPL-MCNC: 100 MG/DL (ref 20–172)
IMM GRANULOCYTES # BLD AUTO: 0 K/UL (ref 0–0.04)
IMM GRANULOCYTES NFR BLD AUTO: 0 % (ref 0–0.5)
LYMPHOCYTES # BLD: 3.2 K/UL (ref 0.8–3.5)
LYMPHOCYTES NFR BLD: 41 % (ref 12–49)
M PROTEIN SERPL ELPH-MCNC: ABNORMAL G/DL
MAGNESIUM SERPL-MCNC: 1.9 MG/DL (ref 1.6–2.4)
MCH RBC QN AUTO: 29.3 PG (ref 26–34)
MCHC RBC AUTO-ENTMCNC: 32.1 G/DL (ref 30–36.5)
MCV RBC AUTO: 91.3 FL (ref 80–99)
MONOCYTES # BLD: 0.8 K/UL (ref 0–1)
MONOCYTES NFR BLD: 10 % (ref 5–13)
NEUTS SEG # BLD: 3.4 K/UL (ref 1.8–8)
NEUTS SEG NFR BLD: 44 % (ref 32–75)
NRBC # BLD: 0 K/UL (ref 0–0.01)
NRBC BLD-RTO: 0 PER 100 WBC
PHOSPHATE SERPL-MCNC: 2.7 MG/DL (ref 2.6–4.7)
PHOSPHATE SERPL-MCNC: 2.8 MG/DL (ref 2.6–4.7)
PLATELET # BLD AUTO: 173 K/UL (ref 150–400)
PMV BLD AUTO: 12.1 FL (ref 8.9–12.9)
POTASSIUM SERPL-SCNC: 3.8 MMOL/L (ref 3.5–5.1)
POTASSIUM SERPL-SCNC: 3.8 MMOL/L (ref 3.5–5.1)
PROT PATTERN SERPL IFE-IMP: ABNORMAL
PROT SERPL-MCNC: 6.5 G/DL (ref 6–8.5)
PROT SERPL-MCNC: 7 G/DL (ref 6.4–8.2)
PROT UR-MCNC: 319 MG/DL (ref 0–11.9)
PROT/CREAT UR-RTO: 7.1
RBC # BLD AUTO: 4.47 M/UL (ref 4.1–5.7)
SODIUM SERPL-SCNC: 137 MMOL/L (ref 136–145)
SODIUM SERPL-SCNC: 139 MMOL/L (ref 136–145)
WBC # BLD AUTO: 7.7 K/UL (ref 4.1–11.1)

## 2020-06-30 PROCEDURE — 76770 US EXAM ABDO BACK WALL COMP: CPT

## 2020-06-30 PROCEDURE — 36415 COLL VENOUS BLD VENIPUNCTURE: CPT

## 2020-06-30 PROCEDURE — 87522 HEPATITIS C REVRS TRNSCRPJ: CPT

## 2020-06-30 PROCEDURE — 74011250636 HC RX REV CODE- 250/636: Performed by: INTERNAL MEDICINE

## 2020-06-30 PROCEDURE — 83735 ASSAY OF MAGNESIUM: CPT

## 2020-06-30 PROCEDURE — 74011250637 HC RX REV CODE- 250/637: Performed by: HOSPITALIST

## 2020-06-30 PROCEDURE — 80069 RENAL FUNCTION PANEL: CPT

## 2020-06-30 PROCEDURE — 74011250637 HC RX REV CODE- 250/637: Performed by: INTERNAL MEDICINE

## 2020-06-30 PROCEDURE — 74011250636 HC RX REV CODE- 250/636: Performed by: HOSPITALIST

## 2020-06-30 PROCEDURE — 82550 ASSAY OF CK (CPK): CPT

## 2020-06-30 PROCEDURE — 84100 ASSAY OF PHOSPHORUS: CPT

## 2020-06-30 PROCEDURE — 65660000000 HC RM CCU STEPDOWN

## 2020-06-30 PROCEDURE — 80053 COMPREHEN METABOLIC PANEL: CPT

## 2020-06-30 PROCEDURE — 85025 COMPLETE CBC W/AUTO DIFF WBC: CPT

## 2020-06-30 PROCEDURE — 84156 ASSAY OF PROTEIN URINE: CPT

## 2020-06-30 RX ORDER — HYDRALAZINE HYDROCHLORIDE 25 MG/1
25 TABLET, FILM COATED ORAL 3 TIMES DAILY
Status: DISCONTINUED | OUTPATIENT
Start: 2020-06-30 | End: 2020-07-03

## 2020-06-30 RX ORDER — CLONIDINE HYDROCHLORIDE 0.1 MG/1
0.1 TABLET ORAL EVERY 8 HOURS
Status: DISCONTINUED | OUTPATIENT
Start: 2020-06-30 | End: 2020-07-04

## 2020-06-30 RX ADMIN — ACETAMINOPHEN 650 MG: 325 TABLET ORAL at 06:49

## 2020-06-30 RX ADMIN — HYDRALAZINE HYDROCHLORIDE 25 MG: 25 TABLET, FILM COATED ORAL at 09:03

## 2020-06-30 RX ADMIN — Medication 10 ML: at 06:00

## 2020-06-30 RX ADMIN — SODIUM CHLORIDE 75 ML/HR: 900 INJECTION, SOLUTION INTRAVENOUS at 17:49

## 2020-06-30 RX ADMIN — HYDRALAZINE HYDROCHLORIDE 50 MG: 50 TABLET, FILM COATED ORAL at 03:06

## 2020-06-30 RX ADMIN — HEPARIN SODIUM 5000 UNITS: 5000 INJECTION INTRAVENOUS; SUBCUTANEOUS at 20:47

## 2020-06-30 RX ADMIN — CLONIDINE HYDROCHLORIDE 0.1 MG: 0.1 TABLET ORAL at 20:46

## 2020-06-30 RX ADMIN — CLONIDINE HYDROCHLORIDE 0.1 MG: 0.1 TABLET ORAL at 13:24

## 2020-06-30 RX ADMIN — HYDRALAZINE HYDROCHLORIDE 25 MG: 25 TABLET, FILM COATED ORAL at 20:47

## 2020-06-30 RX ADMIN — ACETAMINOPHEN 650 MG: 325 TABLET ORAL at 03:05

## 2020-06-30 RX ADMIN — AMLODIPINE BESYLATE 10 MG: 5 TABLET ORAL at 09:03

## 2020-06-30 RX ADMIN — HEPARIN SODIUM 5000 UNITS: 5000 INJECTION INTRAVENOUS; SUBCUTANEOUS at 09:03

## 2020-06-30 RX ADMIN — SODIUM CHLORIDE 75 ML/HR: 900 INJECTION, SOLUTION INTRAVENOUS at 06:23

## 2020-06-30 RX ADMIN — HYDRALAZINE HYDROCHLORIDE 25 MG: 25 TABLET, FILM COATED ORAL at 16:40

## 2020-06-30 RX ADMIN — Medication 10 ML: at 13:25

## 2020-06-30 NOTE — PROGRESS NOTES
Problem: Falls - Risk of  Goal: *Absence of Falls  Description: Document Ivar Du Fall Risk and appropriate interventions in the flowsheet. Outcome: Progressing Towards Goal  Note: Fall Risk Interventions:            Medication Interventions: Evaluate medications/consider consulting pharmacy                   Problem: Patient Education: Go to Patient Education Activity  Goal: Patient/Family Education  Outcome: Progressing Towards Goal     Problem: Nutrition Deficit  Goal: *Optimize nutritional status  Outcome: Progressing Towards Goal     Problem: Discharge Planning  Goal: *Discharge to safe environment  Description: See CM Notes.   CRM: Angelica Flaherty, MPH, [facility]; Z: [phone]     Outcome: Progressing Towards Goal  Goal: *Knowledge of medication management  Outcome: Progressing Towards Goal  Goal: *Knowledge of discharge instructions  Outcome: Progressing Towards Goal

## 2020-06-30 NOTE — PROGRESS NOTES
Bedside and Verbal shift change report given to [address] (oncoming nurse) by Hardy Green (offgoing nurse). Report included the following information SBAR, MAR and Cardiac Rhythm Sinus Rhythm.

## 2020-06-30 NOTE — PROGRESS NOTES
6/30/2020 -   LALY:  - RUR: 13%  - Disposition is TBD pending progression  - Transport to be provided by sister    - IVF continues  - Renal Ultrasound is pending  - Further workup is dependent on lab trends  CRM: Nicky Kaplan, MPH, [address] [place] [address]; Z: [phone]

## 2020-06-30 NOTE — PROGRESS NOTES
Bedside and Verbal shift change report given to [address] (oncoming nurse) by Donna Chaney (offgoing nurse). Report included the following information SBAR, Kardex, ED Summary, OR Summary, Intake/Output, MAR, Recent Results and Cardiac Rhythm NSR,ST depression.

## 2020-06-30 NOTE — PROGRESS NOTES
[facility] Adult  Hospitalist Group                                                                                          Hospitalist Progress Note  Suzy Chapa MD  Answering service: [phone] OR [phone] from in house phone        Date of Service:  2020  NAME:  Sen Benites  :  1965  MRN:  [id number]      Admission Summary:   59-year-old male who presents ambulatory to the emergency room with complaints of shortness of breath and leg edema    Interval history / Subjective:     Patient seen and examined    States that his breathing is better today, denied any chest pain,nausea/vomiting today     Assessment & Plan:     #Acute renal failure:  #Proteinuria:  -urine protein/cr -7.1  -Cr trending down to 2.5. With significant proteinuria and h/o hep C and drug abuse-will need further eval of glomerulonephritis  -Nephrology following- labs pending complements,LUCIA,cryo and ANCA  -May need renal biopsy  -Renal USG-Increased parenchymal echogenicity of the kidneys, consistent with medical  renal disease. #Chronic Hep C  Patient said he was diagnosed with hep C but was never treated  -Hep C RNA pending    #HTN:  -on amlodipine. Added hydralazine today  -Hold HCTZ  -will avoid beta blockers due to cocaine use      #Cocaine abuse:  -counseled to quit    #Transaminitis:improving  -due to hepc C,drug abuse  Check labs tomorrow    #DVT ruled out      Code status: full  DVT prophylaxis: heparin    Care Plan discussed with: patient,nurse  Anticipated Disposition:TBD  Anticipated Discharge:TBD     Hospital Problems  Date Reviewed: 2019          Codes Class Noted POA    Acute renal failure (ARF) ([facility] ICD-10-CM: N17.9  ICD-9-CM: 584.9  2020 Unknown                Review of Systems:   As per HPI      Vital Signs:    Last 24hrs VS reviewed since prior progress note.  Most recent are:  Visit Vitals  /84 (BP 1 Location: Right arm, BP Patient Position: At rest)   Pulse 90   Temp 98.6 °F (37 °C)   Resp 16   Ht 5' 9\" (1.753 m)   Wt 71.1 kg (156 lb 12 oz)   SpO2 96%   BMI 23.15 kg/m²         Intake/Output Summary (Last 24 hours) at 6/30/2020 1741  Last data filed at 6/30/2020 1158  Gross per 24 hour   Intake 1602.5 ml   Output 575 ml   Net 1027.5 ml        Physical Examination:             Constitutional:  No acute distress, cooperative, pleasant    ENT:  Oral mucosa moist, oropharynx benign,EOMI,ancietric sclera   Resp:  clear to auscultation b/l,no wheezing   CV:  Regular rhythm, normal rate, S1,S2 wnl    GI:  Soft, non distended, non tender. normoactive bowel sounds    Musculoskeletal:  Minimal LE edema, warm, 2+ pulses throughout    Neurologic:  Moves all extremities. AAOx3, CN II-XII reviewed     Psych: Not anxious nor agitated. Skin; no rashes       Data Review:    Review and/or order of clinical lab test  Review and/or order of tests in the radiology section of CPT  Review and/or order of tests in the medicine section of CPT      Labs:     Recent Labs     06/30/20 0620 06/29/20  0346   WBC 7.7 7.1   HGB 13.1 13.2   HCT 40.8 40.5    176     Recent Labs     06/30/20 0305 06/30/20 0258 06/29/20  0346    137 141   K 3.8 3.8 3.5   * 109* 109*   CO2 23 22 24   BUN 34* 34* 43*   CREA 2.55* 2.55* 3.26*   GLU 81 82 89   CA 7.8* 8.0* 8.2*   MG  --  1.9 2.2   PHOS 2.7 2.8 4.3     Recent Labs     06/30/20  0305 06/30/20  0258 06/29/20  0346 06/28/20  1413   ALT  --  84* 91* 106*   AP  --  172* 165* 178*   TBILI  --  0.3 0.4 0.3   TP  --  7.0 6.7 6.5  7.1   ALB 1.7* 1.8* 1.7* 1.9*   GLOB  --  5.2* 5.0* 5.2*     No results for input(s): INR, PTP, APTT, INREXT, INREXT in the last 72 hours. No results for input(s): FE, TIBC, PSAT, FERR in the last 72 hours. No results found for: FOL, RBCF   No results for input(s): PH, PCO2, PO2 in the last 72 hours.   Recent Labs     06/30/20  0258 06/29/20  0346 06/28/20  1413    149  --    TROIQ  --   --  <0.05     Lab Results Component Value Date/Time    Cholesterol, total 170 06/29/2020 03:46 AM    HDL Cholesterol 49 06/29/2020 03:46 AM    LDL, calculated 73.4 06/29/2020 03:46 AM    Triglyceride 238 (H) 06/29/2020 03:46 AM    CHOL/HDL Ratio 3.5 06/29/2020 03:46 AM     No results found for: [facility]  Lab Results   Component Value Date/Time    Color YELLOW/STRAW 06/28/2020 08:19 PM    Appearance CLEAR 06/28/2020 08:19 PM    Specific gravity 1.012 06/28/2020 08:19 PM    pH (UA) 5.5 06/28/2020 08:19 PM    Protein 300 (A) 06/28/2020 08:19 PM    Glucose Negative 06/28/2020 08:19 PM    Ketone Negative 06/28/2020 08:19 PM    Bilirubin Negative 06/28/2020 08:19 PM    Urobilinogen 0.2 06/28/2020 08:19 PM    Nitrites Negative 06/28/2020 08:19 PM    Leukocyte Esterase Negative 06/28/2020 08:19 PM    Epithelial cells FEW 06/28/2020 08:19 PM    Bacteria Negative 06/28/2020 08:19 PM    WBC 0-4 06/28/2020 08:19 PM    RBC 0-5 06/28/2020 08:19 PM         Medications Reviewed:     Current Facility-Administered Medications   Medication Dose Route Frequency    hydrALAZINE (APRESOLINE) tablet 25 mg  25 mg Oral TID    cloNIDine HCL (CATAPRES) tablet 0.1 mg  0.1 mg Oral Q8H    0.9% sodium chloride infusion  75 mL/hr IntraVENous CONTINUOUS    amLODIPine (NORVASC) tablet 10 mg  10 mg Oral DAILY    sodium chloride (NS) flush 5-40 mL  5-40 mL IntraVENous Q8H    sodium chloride (NS) flush 5-40 mL  5-40 mL IntraVENous PRN    acetaminophen (TYLENOL) tablet 650 mg  650 mg Oral Q4H PRN    ondansetron (ZOFRAN) injection 4 mg  4 mg IntraVENous Q4H PRN    heparin (porcine) injection 5,000 Units  5,000 Units SubCUTAneous Q12H    hydrALAZINE (APRESOLINE) tablet 50 mg  50 mg Oral Q8H PRN     ______________________________________________________________________  EXPECTED LENGTH OF STAY: 2d 4h  ACTUAL LENGTH OF STAY:          2                 Keshav Madrid MD

## 2020-07-01 LAB
ALBUMIN SERPL-MCNC: 1.6 G/DL (ref 3.5–5)
ALBUMIN SERPL-MCNC: 1.6 G/DL (ref 3.5–5)
ALBUMIN/GLOB SERPL: 0.3 {RATIO} (ref 1.1–2.2)
ALP SERPL-CCNC: 152 U/L (ref 45–117)
ALT SERPL-CCNC: 73 U/L (ref 12–78)
ANION GAP SERPL CALC-SCNC: 7 MMOL/L (ref 5–15)
AST SERPL-CCNC: 76 U/L (ref 15–37)
BASOPHILS # BLD: 0 K/UL (ref 0–0.1)
BASOPHILS NFR BLD: 1 % (ref 0–1)
BILIRUB DIRECT SERPL-MCNC: <0.1 MG/DL (ref 0–0.2)
BILIRUB SERPL-MCNC: 0.2 MG/DL (ref 0.2–1)
BUN SERPL-MCNC: 28 MG/DL (ref 6–20)
BUN/CREAT SERPL: 12 (ref 12–20)
CALCIUM SERPL-MCNC: 7.6 MG/DL (ref 8.5–10.1)
CHLORIDE SERPL-SCNC: 111 MMOL/L (ref 97–108)
CO2 SERPL-SCNC: 23 MMOL/L (ref 21–32)
CREAT SERPL-MCNC: 2.26 MG/DL (ref 0.7–1.3)
DIFFERENTIAL METHOD BLD: NORMAL
EOSINOPHIL # BLD: 0.3 K/UL (ref 0–0.4)
EOSINOPHIL NFR BLD: 4 % (ref 0–7)
ERYTHROCYTE [DISTWIDTH] IN BLOOD BY AUTOMATED COUNT: 11.8 % (ref 11.5–14.5)
GLOBULIN SER CALC-MCNC: 4.7 G/DL (ref 2–4)
GLUCOSE SERPL-MCNC: 129 MG/DL (ref 65–100)
HCT VFR BLD AUTO: 41 % (ref 36.6–50.3)
HGB BLD-MCNC: 12.8 G/DL (ref 12.1–17)
IMM GRANULOCYTES # BLD AUTO: 0 K/UL (ref 0–0.04)
IMM GRANULOCYTES NFR BLD AUTO: 0 % (ref 0–0.5)
LYMPHOCYTES # BLD: 2.9 K/UL (ref 0.8–3.5)
LYMPHOCYTES NFR BLD: 40 % (ref 12–49)
MCH RBC QN AUTO: 29.3 PG (ref 26–34)
MCHC RBC AUTO-ENTMCNC: 31.2 G/DL (ref 30–36.5)
MCV RBC AUTO: 93.8 FL (ref 80–99)
MONOCYTES # BLD: 0.8 K/UL (ref 0–1)
MONOCYTES NFR BLD: 11 % (ref 5–13)
NEUTS SEG # BLD: 3.2 K/UL (ref 1.8–8)
NEUTS SEG NFR BLD: 44 % (ref 32–75)
NRBC # BLD: 0 K/UL (ref 0–0.01)
NRBC BLD-RTO: 0 PER 100 WBC
PHOSPHATE SERPL-MCNC: 2.1 MG/DL (ref 2.6–4.7)
PLATELET # BLD AUTO: 170 K/UL (ref 150–400)
PMV BLD AUTO: 12.3 FL (ref 8.9–12.9)
POTASSIUM SERPL-SCNC: 4 MMOL/L (ref 3.5–5.1)
PROT SERPL-MCNC: 6.3 G/DL (ref 6.4–8.2)
RBC # BLD AUTO: 4.37 M/UL (ref 4.1–5.7)
SODIUM SERPL-SCNC: 141 MMOL/L (ref 136–145)
WBC # BLD AUTO: 7.2 K/UL (ref 4.1–11.1)

## 2020-07-01 PROCEDURE — 86160 COMPLEMENT ANTIGEN: CPT

## 2020-07-01 PROCEDURE — 74011250637 HC RX REV CODE- 250/637: Performed by: HOSPITALIST

## 2020-07-01 PROCEDURE — 36415 COLL VENOUS BLD VENIPUNCTURE: CPT

## 2020-07-01 PROCEDURE — 74011250636 HC RX REV CODE- 250/636: Performed by: HOSPITALIST

## 2020-07-01 PROCEDURE — 80076 HEPATIC FUNCTION PANEL: CPT

## 2020-07-01 PROCEDURE — 82595 ASSAY OF CRYOGLOBULIN: CPT

## 2020-07-01 PROCEDURE — 74011250636 HC RX REV CODE- 250/636: Performed by: INTERNAL MEDICINE

## 2020-07-01 PROCEDURE — 86038 ANTINUCLEAR ANTIBODIES: CPT

## 2020-07-01 PROCEDURE — 86334 IMMUNOFIX E-PHORESIS SERUM: CPT

## 2020-07-01 PROCEDURE — 65660000000 HC RM CCU STEPDOWN

## 2020-07-01 PROCEDURE — 80069 RENAL FUNCTION PANEL: CPT

## 2020-07-01 PROCEDURE — 85025 COMPLETE CBC W/AUTO DIFF WBC: CPT

## 2020-07-01 PROCEDURE — 74011250637 HC RX REV CODE- 250/637: Performed by: INTERNAL MEDICINE

## 2020-07-01 PROCEDURE — 83883 ASSAY NEPHELOMETRY NOT SPEC: CPT

## 2020-07-01 PROCEDURE — 83520 IMMUNOASSAY QUANT NOS NONAB: CPT

## 2020-07-01 RX ADMIN — Medication 10 ML: at 13:29

## 2020-07-01 RX ADMIN — HEPARIN SODIUM 5000 UNITS: 5000 INJECTION INTRAVENOUS; SUBCUTANEOUS at 08:32

## 2020-07-01 RX ADMIN — CLONIDINE HYDROCHLORIDE 0.1 MG: 0.1 TABLET ORAL at 03:43

## 2020-07-01 RX ADMIN — HYDRALAZINE HYDROCHLORIDE 25 MG: 25 TABLET, FILM COATED ORAL at 08:32

## 2020-07-01 RX ADMIN — Medication 10 ML: at 22:00

## 2020-07-01 RX ADMIN — Medication 10 ML: at 03:44

## 2020-07-01 RX ADMIN — HYDRALAZINE HYDROCHLORIDE 25 MG: 25 TABLET, FILM COATED ORAL at 22:12

## 2020-07-01 RX ADMIN — SODIUM CHLORIDE 75 ML/HR: 900 INJECTION, SOLUTION INTRAVENOUS at 08:37

## 2020-07-01 RX ADMIN — ACETAMINOPHEN 650 MG: 325 TABLET ORAL at 16:12

## 2020-07-01 RX ADMIN — CLONIDINE HYDROCHLORIDE 0.1 MG: 0.1 TABLET ORAL at 21:01

## 2020-07-01 RX ADMIN — HEPARIN SODIUM 5000 UNITS: 5000 INJECTION INTRAVENOUS; SUBCUTANEOUS at 21:01

## 2020-07-01 RX ADMIN — AMLODIPINE BESYLATE 10 MG: 5 TABLET ORAL at 08:32

## 2020-07-01 RX ADMIN — CLONIDINE HYDROCHLORIDE 0.1 MG: 0.1 TABLET ORAL at 12:21

## 2020-07-01 RX ADMIN — HYDRALAZINE HYDROCHLORIDE 25 MG: 25 TABLET, FILM COATED ORAL at 16:12

## 2020-07-01 NOTE — PROGRESS NOTES
7/1/2020 -   LALY:  - RUR: 13%    - Patient may need a kidney biopsy   - IVF continues  - Labs continue to be monitored    10:40 -   CM met with patient at bedside to discuss addiction rehab services. Patient identified that he has been using cocaine for 30+ years, including at least 2 inpatient addiction rehab programs ([facility] and [facility]). Patient stated that recently he has decreased his use of cocaine and does not use every day. Patient is interested in additional information for addiction and sobriety support services. CM provided patient with contact information for [facility] ([phone]), [facility] ([phone]), [facility] ([phone]), and [facility] ([phone]). CM discussed need for patient to contact facility's directly to initiate review process; patient expressed understanding and plan to call today. CM to follow up 7/2.   CRM: Julieth Sosa, MPH, [address]; Z: [phone]

## 2020-07-01 NOTE — PROGRESS NOTES
1930 Bedside and Verbal shift change report given to Cat RN (oncoming nurse) by Florencia Cooper (offgoing nurse). Report included the following information SBAR.     0345 Labs drawn    0730 Bedside and Verbal shift change report given to [address] (oncoming nurse) by Cat RN (offgoing nurse). Report included the following information SBAR.

## 2020-07-01 NOTE — PROGRESS NOTES
[facility] Adult  Hospitalist Group                                                                                          Hospitalist Progress Note  Ananth English MD  Answering service: [phone] OR [phone] from in house phone        Date of Service:  2020  NAME:  Zackary Momin  :  1965  MRN:  [id number]      Admission Summary:   58-year-old male who presents ambulatory to the emergency room with complaints of shortness of breath and leg edema    Interval history / Subjective:     Patient seen and examined    States that he has some  LE edema today, otherwise denied any problems. Assessment & Plan:     #Acute renal failure:  #Proteinuria:  -urine protein/cr -7.1  -Cr 2.2. With significant proteinuria and h/o hep C and drug abuse-will need further eval of glomerulonephritis  -Nephrology following- labs pending complements,LUCIA,cryo and ANCA  -May need renal biopsy  -Renal USG-Increased parenchymal echogenicity of the kidneys, consistent with medical  renal disease. #Chronic Hep C  Patient said he was diagnosed with hep C but was never treated  -Hep C RNA pending    #HTN:  -on amlodipine,clonidine and hydralazine.  -Hold HCTZ  -will avoid beta blockers due to cocaine use      #Cocaine abuse:  Snorts cocaine,denied use of IV drugs  -counseled to quit  -HIV negative. #Transaminitis:improving  -due to hepc C,drug abuse  Check labs tomorrow    #DVT ruled out      Code status: full  DVT prophylaxis: heparin    Care Plan discussed with: patient,nurse  Anticipated Disposition:TBD  Anticipated Discharge:TBD     Hospital Problems  Date Reviewed: 2019          Codes Class Noted POA    Acute renal [facility] ([facility]) ([facility] ICD-10-CM: N17.9  ICD-9-CM: 584.9  2020 Unknown                Review of Systems:   As per HPI      Vital Signs:    Last 24hrs VS reviewed since prior progress note.  Most recent are:  Visit Vitals  /83 (BP 1 Location: Right arm, BP Patient Position: At rest)   Pulse 69   Temp 98.1 °F (36.7 °C)   Resp 17   Ht 5' 9\" (1.753 m)   Wt 71 kg (156 lb 8.4 oz)   SpO2 97%   BMI 23.11 kg/m²         Intake/Output Summary (Last 24 hours) at 7/1/2020 1728  Last data filed at 7/1/2020 1214  Gross per 24 hour   Intake 2788.75 ml   Output 1950 ml   Net 838.75 ml        Physical Examination:             Constitutional:  No acute distress, cooperative, pleasant    ENT:  Oral mucosa moist, oropharynx benign,EOMI,ancietric sclera   Resp:  no wheezing,clear to auscultation b/l   CV:  Regular rhythm, normal rate, S1,S2 wnl    GI:  Soft, non distended, non tender. normoactive bowel sounds    Musculoskeletal:  Minimal LE edema    Neurologic:  Moves all extremities. AAOx3, CN II-XII reviewed     Psych: Not anxious nor agitated. Skin; no rashes       Data Review:    Review and/or order of clinical lab test  Review and/or order of tests in the medicine section of [facility]      Labs:     Recent Labs     07/01/20 0345 06/30/20 0620   WBC 7.2 7.7   HGB 12.8 13.1   HCT 41.0 40.8    173     Recent Labs     07/01/20 0345 06/30/20 0305 06/30/20 0258 06/29/20 0346    139 137 141   K 4.0 3.8 3.8 3.5   * 110* 109* 109*   CO2 23 23 22 24   BUN 28* 34* 34* 43*   CREA 2.26* 2.55* 2.55* 3.26*   * 81 82 89   CA 7.6* 7.8* 8.0* 8.2*   MG  --   --  1.9 2.2   PHOS 2.1* 2.7 2.8 4.3     Recent Labs     07/01/20 0345 06/30/20 0305 06/30/20 0258 06/29/20 0346   ALT 73  --  84* 91*   *  --  172* 165*   TBILI 0.2  --  0.3 0.4   TP 6.3*  --  7.0 6.7   ALB 1.6*  1.6* 1.7* 1.8* 1.7*   GLOB 4.7*  --  5.2* 5.0*     No results for input(s): INR, PTP, APTT, INREXT, INREXT in the last 72 hours. No results for input(s): FE, TIBC, PSAT, FERR in the last 72 hours. No results found for: FOL, RBCF   No results for input(s): PH, PCO2, PO2 in the last 72 hours.   Recent Labs     06/30/20  0258 06/29/20  0346    149     Lab Results   Component Value Date/Time    Cholesterol, total 170 06/29/2020 03:46 AM    HDL Cholesterol 49 06/29/2020 03:46 AM    LDL, calculated 73.4 06/29/2020 03:46 AM    Triglyceride 238 (H) 06/29/2020 03:46 AM    CHOL/HDL Ratio 3.5 06/29/2020 03:46 AM     No results found for: [facility]  Lab Results   Component Value Date/Time    Color YELLOW/STRAW 06/28/2020 08:19 PM    Appearance CLEAR 06/28/2020 08:19 PM    Specific gravity 1.012 06/28/2020 08:19 PM    pH (UA) 5.5 06/28/2020 08:19 PM    Protein 300 (A) 06/28/2020 08:19 PM    Glucose Negative 06/28/2020 08:19 PM    Ketone Negative 06/28/2020 08:19 PM    Bilirubin Negative 06/28/2020 08:19 PM    Urobilinogen 0.2 06/28/2020 08:19 PM    Nitrites Negative 06/28/2020 08:19 PM    Leukocyte Esterase Negative 06/28/2020 08:19 PM    Epithelial cells FEW 06/28/2020 08:19 PM    Bacteria Negative 06/28/2020 08:19 PM    WBC 0-4 06/28/2020 08:19 PM    RBC 0-5 06/28/2020 08:19 PM         Medications Reviewed:     Current Facility-Administered Medications   Medication Dose Route Frequency    hydrALAZINE (APRESOLINE) tablet 25 mg  25 mg Oral TID    cloNIDine HCL (CATAPRES) tablet 0.1 mg  0.1 mg Oral Q8H    0.9% sodium chloride infusion  25 mL/hr IntraVENous CONTINUOUS    amLODIPine (NORVASC) tablet 10 mg  10 mg Oral DAILY    sodium chloride (NS) flush 5-40 mL  5-40 mL IntraVENous Q8H    sodium chloride (NS) flush 5-40 mL  5-40 mL IntraVENous PRN    acetaminophen (TYLENOL) tablet 650 mg  650 mg Oral Q4H PRN    ondansetron (ZOFRAN) injection 4 mg  4 mg IntraVENous Q4H PRN    heparin (porcine) injection 5,000 Units  5,000 Units SubCUTAneous Q12H    hydrALAZINE (APRESOLINE) tablet 50 mg  50 mg Oral Q8H PRN     ______________________________________________________________________  EXPECTED LENGTH OF STAY: 2d 4h  ACTUAL LENGTH OF STAY:          3                 Tony Boyd MD

## 2020-07-01 NOTE — PROGRESS NOTES
Bedside and Verbal shift change report given to Aramis (oncoming nurse) by Nilam Donovan (offgoing nurse). Report included the following information SBAR, MAR and Cardiac Rhythm Sinus Rhythm.

## 2020-07-01 NOTE — PROGRESS NOTES
Patient name: Kel Vyas  MRN: [id number]    Nephrology Progress note:    Assessment:  ECHO: Cr 3.9-> 3.2->2.5->2.2mg/dl. Partially Pre-renal state -> improving with IVF. R/o Cocaine induced kidney injury. CK unremarkable. No urine eos to suggest AIN. No significant hematuria. +Significant nephrotic range proteinuria (7.1gm) is concerning particularly given + underlying Hep C     HTN: Poorly controlled     Cocaine abuse: chronic use     Hypokalemia    Hep C+: heroin use    Plan/Recommendations:  Gentle saline-> drop to KVO  F/u Hep C PCR  F/u Cryo along with GN w/u  Discussed with patient-> will need to consider CT guided renal biopsy  Renally adjust new meds  Avoid nephrotoxins  Am labs      Subjective:  \" I'm definitely better\". ROS:   No nausea, no vomiting  No chest pain, no shortness of breath    Exam:  Visit Vitals  /88 (BP 1 Location: Right arm, BP Patient Position: At rest)   Pulse 68   Temp 97.9 °F (36.6 °C)   Resp 16   Ht 5' 9\" (1.753 m)   Wt 71 kg (156 lb 8.4 oz)   SpO2 98%   BMI 23.11 kg/m²     Wt Readings from Last 3 Encounters:   07/01/20 71 kg (156 lb 8.4 oz)   06/11/19 81.6 kg (180 lb)   05/24/19 83.9 kg (185 lb)       Intake/Output Summary (Last 24 hours) at 7/1/2020 1423  Last data filed at 7/1/2020 1214  Gross per 24 hour   Intake 3028.75 ml   Output 2250 ml   Net 778.75 ml       Gen: NAD  HEENT:  AT/NC  Lungs/Chest wall: Clear. No accessory muscle use. Cardiovascular: Regular rate, normal rhythm. Abdomen/: Soft, NT, ND, BS+.    Ext:  No peripheral edema  CNS: alert and awake. Answers appropriately      Current Facility-Administered Medications   Medication Dose Route Frequency Last Dose    hydrALAZINE (APRESOLINE) tablet 25 mg  25 mg Oral TID 25 mg at 07/01/20 0832    cloNIDine HCL (CATAPRES) tablet 0.1 mg  0.1 mg Oral Q8H 0.1 mg at 07/01/20 1221    0.9% sodium chloride infusion  75 mL/hr IntraVENous CONTINUOUS 75 mL/hr at 07/01/20 0837    amLODIPine (NORVASC) tablet 10 mg  10 mg Oral DAILY 10 mg at 07/01/20 6670    sodium chloride (NS) flush 5-40 mL  5-40 mL IntraVENous Q8H 10 mL at 07/01/20 1329    sodium chloride (NS) flush 5-40 mL  5-40 mL IntraVENous PRN      acetaminophen (TYLENOL) tablet 650 mg  650 mg Oral Q4H  mg at 06/30/20 0649    ondansetron (ZOFRAN) injection 4 mg  4 mg IntraVENous Q4H PRN      heparin (porcine) injection 5,000 Units  5,000 Units SubCUTAneous Q12H 5,000 Units at 07/01/20 7347    hydrALAZINE (APRESOLINE) tablet 50 mg  50 mg Oral Q8H PRN 50 mg at 06/30/20 0306       Labs/Data:    Lab Results   Component Value Date/Time    WBC 7.2 07/01/2020 03:45 AM    HGB 12.8 07/01/2020 03:45 AM    HCT 41.0 07/01/2020 03:45 AM    PLATELET 943 26/95/9125 03:45 AM    MCV 93.8 07/01/2020 03:45 AM       Lab Results   Component Value Date/Time    Sodium 141 07/01/2020 03:45 AM    Potassium 4.0 07/01/2020 03:45 AM    Chloride 111 (H) 07/01/2020 03:45 AM    CO2 23 07/01/2020 03:45 AM    Anion gap 7 07/01/2020 03:45 AM    Glucose 129 (H) 07/01/2020 03:45 AM    BUN 28 (H) 07/01/2020 03:45 AM    Creatinine 2.26 (H) 07/01/2020 03:45 AM    BUN/Creatinine ratio 12 07/01/2020 03:45 AM    GFR est AA 37 (L) 07/01/2020 03:45 AM    GFR est non-AA 30 (L) 07/01/2020 03:45 AM    Calcium 7.6 (L) 07/01/2020 03:45 AM       Patient seen and examined. Chart reviewed. Labs, data and other pertinent notes reviewed in last 24 hrs.     Discussed with patient and Dr. Malcom Mariscal by:  Dallas Tomas MD  [address] [facility]

## 2020-07-02 LAB
ALBUMIN SERPL-MCNC: 1.5 G/DL (ref 3.5–5)
ALBUMIN/GLOB SERPL: 0.3 {RATIO} (ref 1.1–2.2)
ALP SERPL-CCNC: 159 U/L (ref 45–117)
ALT SERPL-CCNC: 74 U/L (ref 12–78)
ANION GAP SERPL CALC-SCNC: 6 MMOL/L (ref 5–15)
AST SERPL-CCNC: 69 U/L (ref 15–37)
BILIRUB SERPL-MCNC: 0.2 MG/DL (ref 0.2–1)
BUN SERPL-MCNC: 25 MG/DL (ref 6–20)
BUN/CREAT SERPL: 12 (ref 12–20)
C-ANCA TITR SER IF: NORMAL TITER
C3 SERPL-MCNC: 100 MG/DL (ref 82–167)
C4 SERPL-MCNC: 17 MG/DL (ref 14–44)
CALCIUM SERPL-MCNC: 7.7 MG/DL (ref 8.5–10.1)
CHLORIDE SERPL-SCNC: 111 MMOL/L (ref 97–108)
CO2 SERPL-SCNC: 23 MMOL/L (ref 21–32)
CREAT SERPL-MCNC: 2.04 MG/DL (ref 0.7–1.3)
ERYTHROCYTE [DISTWIDTH] IN BLOOD BY AUTOMATED COUNT: 11.7 % (ref 11.5–14.5)
GLOBULIN SER CALC-MCNC: 4.4 G/DL (ref 2–4)
GLUCOSE SERPL-MCNC: 100 MG/DL (ref 65–100)
HCT VFR BLD AUTO: 37.8 % (ref 36.6–50.3)
HCV RNA SERPL NAA+PROBE-ACNC: NORMAL IU/ML
HCV RNA SERPL NAA+PROBE-LOG IU: 6.95 LOG10 IU/ML
HGB BLD-MCNC: 12 G/DL (ref 12.1–17)
KAPPA LC FREE SER-MCNC: 195.2 MG/L (ref 3.3–19.4)
KAPPA LC FREE/LAMBDA FREE SER: 1.4 {RATIO} (ref 0.26–1.65)
LAMBDA LC FREE SERPL-MCNC: 139.9 MG/L (ref 5.7–26.3)
MAGNESIUM SERPL-MCNC: 1.8 MG/DL (ref 1.6–2.4)
MCH RBC QN AUTO: 29.4 PG (ref 26–34)
MCHC RBC AUTO-ENTMCNC: 31.7 G/DL (ref 30–36.5)
MCV RBC AUTO: 92.6 FL (ref 80–99)
MYELOPEROXIDASE AB SER IA-ACNC: <9 U/ML (ref 0–9)
NRBC # BLD: 0 K/UL (ref 0–0.01)
NRBC BLD-RTO: 0 PER 100 WBC
P-ANCA ATYPICAL TITR SER IF: NORMAL TITER
P-ANCA TITR SER IF: NORMAL TITER
PHOSPHATE SERPL-MCNC: 2.5 MG/DL (ref 2.6–4.7)
PLATELET # BLD AUTO: 174 K/UL (ref 150–400)
PMV BLD AUTO: 12.1 FL (ref 8.9–12.9)
POTASSIUM SERPL-SCNC: 3.9 MMOL/L (ref 3.5–5.1)
PROT SERPL-MCNC: 5.9 G/DL (ref 6.4–8.2)
PROTEINASE3 AB SER IA-ACNC: <3.5 U/ML (ref 0–3.5)
RBC # BLD AUTO: 4.08 M/UL (ref 4.1–5.7)
SERIAL MONITORING: NORMAL
SODIUM SERPL-SCNC: 140 MMOL/L (ref 136–145)
WBC # BLD AUTO: 7.3 K/UL (ref 4.1–11.1)

## 2020-07-02 PROCEDURE — 74011250637 HC RX REV CODE- 250/637: Performed by: HOSPITALIST

## 2020-07-02 PROCEDURE — 85027 COMPLETE CBC AUTOMATED: CPT

## 2020-07-02 PROCEDURE — 74011250636 HC RX REV CODE- 250/636: Performed by: HOSPITALIST

## 2020-07-02 PROCEDURE — 83735 ASSAY OF MAGNESIUM: CPT

## 2020-07-02 PROCEDURE — 36415 COLL VENOUS BLD VENIPUNCTURE: CPT

## 2020-07-02 PROCEDURE — 74011250636 HC RX REV CODE- 250/636: Performed by: INTERNAL MEDICINE

## 2020-07-02 PROCEDURE — 87522 HEPATITIS C REVRS TRNSCRPJ: CPT

## 2020-07-02 PROCEDURE — 80053 COMPREHEN METABOLIC PANEL: CPT

## 2020-07-02 PROCEDURE — 65660000000 HC RM CCU STEPDOWN

## 2020-07-02 PROCEDURE — 87902 NFCT AGT GNTYP ALYS HEP C: CPT

## 2020-07-02 PROCEDURE — 84100 ASSAY OF PHOSPHORUS: CPT

## 2020-07-02 PROCEDURE — 74011250637 HC RX REV CODE- 250/637: Performed by: INTERNAL MEDICINE

## 2020-07-02 RX ADMIN — HYDRALAZINE HYDROCHLORIDE 25 MG: 25 TABLET, FILM COATED ORAL at 17:21

## 2020-07-02 RX ADMIN — CLONIDINE HYDROCHLORIDE 0.1 MG: 0.1 TABLET ORAL at 20:24

## 2020-07-02 RX ADMIN — HEPARIN SODIUM 5000 UNITS: 5000 INJECTION INTRAVENOUS; SUBCUTANEOUS at 08:32

## 2020-07-02 RX ADMIN — ACETAMINOPHEN 650 MG: 325 TABLET ORAL at 00:31

## 2020-07-02 RX ADMIN — Medication 10 ML: at 06:24

## 2020-07-02 RX ADMIN — CLONIDINE HYDROCHLORIDE 0.1 MG: 0.1 TABLET ORAL at 03:06

## 2020-07-02 RX ADMIN — Medication 10 ML: at 22:45

## 2020-07-02 RX ADMIN — ACETAMINOPHEN 650 MG: 325 TABLET ORAL at 10:19

## 2020-07-02 RX ADMIN — AMLODIPINE BESYLATE 10 MG: 5 TABLET ORAL at 08:31

## 2020-07-02 RX ADMIN — Medication 10 ML: at 17:21

## 2020-07-02 RX ADMIN — CLONIDINE HYDROCHLORIDE 0.1 MG: 0.1 TABLET ORAL at 12:30

## 2020-07-02 RX ADMIN — HYDRALAZINE HYDROCHLORIDE 25 MG: 25 TABLET, FILM COATED ORAL at 22:44

## 2020-07-02 RX ADMIN — HEPARIN SODIUM 5000 UNITS: 5000 INJECTION INTRAVENOUS; SUBCUTANEOUS at 20:24

## 2020-07-02 RX ADMIN — SODIUM CHLORIDE 25 ML/HR: 900 INJECTION, SOLUTION INTRAVENOUS at 09:54

## 2020-07-02 RX ADMIN — HYDRALAZINE HYDROCHLORIDE 25 MG: 25 TABLET, FILM COATED ORAL at 08:32

## 2020-07-02 NOTE — PROGRESS NOTES
Patient name: Everton Casey  MRN: [id number]    Nephrology Progress note:    Assessment:  ECHO: Cr 3.9-> 3.2->2.5->2.2->2.0mg/dl. Partially Pre-renal state -> improving with IVF. R/o Cocaine induced kidney injury-> CK unremarkable. No urine eos to suggest AIN. No significant hematuria. +Significant nephrotic range proteinuria (7.1gm) is concerning particularly given + underlying Hep C     HTN: Poorly controlled     Cocaine abuse: chronic use     Hypokalemia    Hep C+: new dx-> awaiting PCR-> +heroin use    Plan/Recommendations:  F/u Hep C PCR  F/u Cryo along with GN w/u  Discussed with patient-> will need to consider CT guided renal biopsy early next week-> agreeable  Renally adjust new meds  Avoid nephrotoxins  Am labs      Subjective:  \" I feel good\" No complaints    ROS:   No nausea, no vomiting  No chest pain, no shortness of breath    Exam:  Visit Vitals  BP (!) 148/91 (BP 1 Location: Right arm, BP Patient Position: Sitting)   Pulse 62   Temp 97.8 °F (36.6 °C)   Resp 14   Ht 5' 9\" (1.753 m)   Wt 71.4 kg (157 lb 6.5 oz)   SpO2 100%   BMI 23.25 kg/m²     Wt Readings from Last 3 Encounters:   07/02/20 71.4 kg (157 lb 6.5 oz)   06/11/19 81.6 kg (180 lb)   05/24/19 83.9 kg (185 lb)       Intake/Output Summary (Last 24 hours) at 7/2/2020 1311  Last data filed at 7/2/2020 1224  Gross per 24 hour   Intake 2392.5 ml   Output 2250 ml   Net 142.5 ml       Gen: NAD  HEENT:  AT/NC  Lungs/Chest wall: Clear. No accessory muscle use. Cardiovascular: Regular rate, normal rhythm.   Abdomen/: Soft, NT, ND, BS+. Ext:  No peripheral edema  CNS: alert and awake. Answers appropriately      Current Facility-Administered Medications   Medication Dose Route Frequency Last Dose    hydrALAZINE (APRESOLINE) tablet 25 mg  25 mg Oral TID 25 mg at 07/02/20 4984    cloNIDine HCL (CATAPRES) tablet 0.1 mg  0.1 mg Oral Q8H 0.1 mg at 07/02/20 1230    0.9% sodium chloride infusion  25 mL/hr IntraVENous CONTINUOUS 25 mL/hr at 07/02/20 0954    amLODIPine (NORVASC) tablet 10 mg  10 mg Oral DAILY 10 mg at 07/02/20 0831    sodium chloride (NS) flush 5-40 mL  5-40 mL IntraVENous Q8H 10 mL at 07/02/20 7666    sodium chloride (NS) flush 5-40 mL  5-40 mL IntraVENous PRN      acetaminophen (TYLENOL) tablet 650 mg  650 mg Oral Q4H  mg at 07/02/20 1019    ondansetron (ZOFRAN) injection 4 mg  4 mg IntraVENous Q4H PRN      heparin (porcine) injection 5,000 Units  5,000 Units SubCUTAneous Q12H 5,000 Units at 07/02/20 5633    hydrALAZINE (APRESOLINE) tablet 50 mg  50 mg Oral Q8H PRN 50 mg at 06/30/20 0306       Labs/Data:    Lab Results   Component Value Date/Time    WBC 7.3 07/02/2020 03:09 AM    HGB 12.0 (L) 07/02/2020 03:09 AM    HCT 37.8 07/02/2020 03:09 AM    PLATELET 668 90/81/6325 03:09 AM    MCV 92.6 07/02/2020 03:09 AM       Lab Results   Component Value Date/Time    Sodium 140 07/02/2020 03:09 AM    Potassium 3.9 07/02/2020 03:09 AM    Chloride 111 (H) 07/02/2020 03:09 AM    CO2 23 07/02/2020 03:09 AM    Anion gap 6 07/02/2020 03:09 AM    Glucose 100 07/02/2020 03:09 AM    BUN 25 (H) 07/02/2020 03:09 AM    Creatinine 2.04 (H) 07/02/2020 03:09 AM    BUN/Creatinine ratio 12 07/02/2020 03:09 AM    GFR est AA 41 (L) 07/02/2020 03:09 AM    GFR est non-AA 34 (L) 07/02/2020 03:09 AM    Calcium 7.7 (L) 07/02/2020 03:09 AM       Patient seen and examined. Chart reviewed. Labs, data and other pertinent notes reviewed in last 24 hrs.     Discussed with patient     Signed by:  Oumou Wagner MD  [address] [facility]

## 2020-07-02 NOTE — PROGRESS NOTES
[facility]  Hospitalist Group                                                                                          Hospitalist Progress Note  Osbaldo Del Angel MD  Answering service: [phone] from in house phone        Date of Service:  2020  NAME:  Catherine York  :  1965  MRN:  [id number]      Admission Summary:   42-year-old male who presents ambulatory to the emergency room with complaints of shortness of breath and leg edema    Interval history / Subjective:   Patient seen and examined  Feeling ok. Edema + but feeling better. Understands the risks. Willing for Renal Bx next week. Assessment & Plan:     #Acute renal failure: improving creatinine  #Nephrotic range Proteinuria  -urine protein/cr -7.1  -Cr 2.2. With significant proteinuria and h/o hep C and drug abuse-will need further eval of glomerulonephritis  -Nephrology following- labs pending complements,LUCIA,cryo and ANCA  -CT guided renal biopsy - early next week  -Renal USG-Increased parenchymal echogenicity of the kidneys, consistent with medical  renal disease. #Chronic Hep C  Patient said he was diagnosed with hep C but was never treated  -Hep C PCR RNA pending. Ordered    #HTN  -on amlodipine,clonidine and hydralazine.  -Hold HCTZ  -will avoid beta blockers due to cocaine use    #Cocaine abuse  Snorts cocaine,denied use of IV drugs  -counseled to quit  -HIV negative. #Transaminitis: improving  -due to hepc C,drug abuse  Check labs tomorrow    #DVT ruled out      Code status: full  DVT prophylaxis: heparin    Care Plan discussed with: patient,nurse  Anticipated Disposition:TBD  Anticipated Discharge:TBD     Hospital Problems  Date Reviewed: 2019          Codes Class Noted POA    [facility] ICD-10-CM: N17.9  ICD-9-CM: 584.9  2020 Unknown                Review of Systems:   As per HPI    Vital Signs:    Last 24hrs VS reviewed since prior progress note.  Most recent are:  Visit Vitals  /90 (BP 1 Location: Right arm, BP Patient Position: At rest)   Pulse 61   Temp 97.7 °F (36.5 °C)   Resp 14   Ht 5' 9\" (1.753 m)   Wt 71.4 kg (157 lb 6.5 oz)   SpO2 99%   BMI 23.25 kg/m²         Intake/Output Summary (Last 24 hours) at 7/2/2020 1653  Last data filed at 7/2/2020 1612  Gross per 24 hour   Intake 2392.5 ml   Output 1950 ml   Net 442.5 ml        Physical Examination:             Constitutional:  No acute distress, cooperative, pleasant    ENT:  Oral mucosa moist, oropharynx benign,EOMI,ancietric sclera   Resp:  no wheezing,clear to auscultation b/l   CV:  Regular rhythm, normal rate, S1,S2 wnl    GI:  Soft, non distended, non tender. normoactive bowel sounds    Musculoskeletal:  Minimal LE edema    Neurologic:  Moves all extremities. AAOx3, CN II-XII reviewed     Psych: Not anxious nor agitated. Skin; no rashes       Data Review:    Review and/or order of clinical lab test  Review and/or order of tests in the medicine section of CPT    Nm Lung Scan Perf    Result Date: 6/29/2020  IMPRESSION: Normal perfusion imaging. The patient could not tolerate ventilation imaging. Xr Chest Pa Lat    Result Date: 6/28/2020  IMPRESSION: No acute cardiopulmonary process. Ct Chest Wo Cont    Result Date: 6/28/2020  IMPRESSION: Clear lungs. Us Retroperitoneum Comp    Result Date: 6/30/2020  IMPRESSION: 1. No hydronephrosis. 2. Increased parenchymal echogenicity of the kidneys, consistent with medical renal disease.        Labs:     Recent Labs     07/02/20  0309 07/01/20 0345   WBC 7.3 7.2   HGB 12.0* 12.8   HCT 37.8 41.0    170     Recent Labs     07/02/20  0309 07/01/20  0345 06/30/20  0305 06/30/20  0258    141 139 137   K 3.9 4.0 3.8 3.8   * 111* 110* 109*   CO2 23 23 23 22   BUN 25* 28* 34* 34*   CREA 2.04* 2.26* 2.55* 2.55*    129* 81 82   CA 7.7* 7.6* 7.8* 8.0*   MG 1.8  --   --  1.9   PHOS 2.5* 2.1* 2.7 2.8     Recent Labs     07/02/20  0305 07/01/20  0345 06/30/20  0305 06/30/20  0258   ALT 74 73  --  84*   * 152*  --  172*   TBILI 0.2 0.2  --  0.3   TP 5.9* 6.3*  --  7.0   ALB 1.5* 1.6*  1.6* 1.7* 1.8*   GLOB 4.4* 4.7*  --  5.2*     No results for input(s): INR, PTP, APTT, INREXT, INREXT in the last 72 hours. No results for input(s): FE, TIBC, PSAT, FERR in the last 72 hours. No results found for: FOL, RBCF   No results for input(s): PH, PCO2, PO2 in the last 72 hours.   Recent Labs     06/30/20 0258        Lab Results   Component Value Date/Time    Cholesterol, total 170 06/29/2020 03:46 AM    HDL Cholesterol 49 06/29/2020 03:46 AM    LDL, calculated 73.4 06/29/2020 03:46 AM    Triglyceride 238 (H) 06/29/2020 03:46 AM    CHOL/HDL Ratio 3.5 06/29/2020 03:46 AM     No results found for: Cyntha Kimberliet  Lab Results   Component Value Date/Time    Color YELLOW/STRAW 06/28/2020 08:19 PM    Appearance CLEAR 06/28/2020 08:19 PM    Specific gravity 1.012 06/28/2020 08:19 PM    pH (UA) 5.5 06/28/2020 08:19 PM    Protein 300 (A) 06/28/2020 08:19 PM    Glucose Negative 06/28/2020 08:19 PM    Ketone Negative 06/28/2020 08:19 PM    Bilirubin Negative 06/28/2020 08:19 PM    Urobilinogen 0.2 06/28/2020 08:19 PM    Nitrites Negative 06/28/2020 08:19 PM    Leukocyte Esterase Negative 06/28/2020 08:19 PM    Epithelial cells FEW 06/28/2020 08:19 PM    Bacteria Negative 06/28/2020 08:19 PM    WBC 0-4 06/28/2020 08:19 PM    RBC 0-5 06/28/2020 08:19 PM         Medications Reviewed:     Current Facility-Administered Medications   Medication Dose Route Frequency    hydrALAZINE (APRESOLINE) tablet 25 mg  25 mg Oral TID    cloNIDine HCL (CATAPRES) tablet 0.1 mg  0.1 mg Oral Q8H    0.9% sodium chloride infusion  25 mL/hr IntraVENous CONTINUOUS    amLODIPine (NORVASC) tablet 10 mg  10 mg Oral DAILY    sodium chloride (NS) flush 5-40 mL  5-40 mL IntraVENous Q8H    sodium chloride (NS) flush 5-40 mL  5-40 mL IntraVENous PRN    acetaminophen (TYLENOL) tablet 650 mg  650 mg Oral Q4H PRN    ondansetron (ZOFRAN) injection 4 mg  4 mg IntraVENous Q4H PRN    heparin (porcine) injection 5,000 Units  5,000 Units SubCUTAneous Q12H    hydrALAZINE (APRESOLINE) tablet 50 mg  50 mg Oral Q8H PRN     ______________________________________________________________________  EXPECTED LENGTH OF STAY: 2d 4h  ACTUAL LENGTH OF STAY:          4                 Augusto Haq MD

## 2020-07-02 NOTE — PROGRESS NOTES
7/2/2020 -   LALY:  - RUR: 14%  - Disposition is TBD: [facility] [address]; info is on AVS    - Patient may need a kidney biopsy   - IVF continues  - Labs continue to be monitored    10:10 -   CM met with patient at bedside to discuss rehab resources. Patient has reached out to [address] with plan to contact the facility again today to speak with  Kira Osorio).     CRM: Dianne Encinas, MPH, [address] [place] [address]; Z: [phone]

## 2020-07-02 NOTE — PROGRESS NOTES
Bedside and Verbal shift change report given to [address] [place] [address] (oncoming nurse) by Miguel Smith (offgoing nurse). Report included the following information SBAR, Kardex, ED Summary, Procedure Summary, Intake/Output, MAR, Recent Results and Cardiac Rhythm NSR.

## 2020-07-02 NOTE — PROGRESS NOTES
Bedside shift change report given to Luisa Pradhan RN (oncoming nurse) by Felipe Whitaker RN (offgoing nurse). Report included the following information SBAR, Kardex, ED Summary, Procedure Summary, Intake/Output, MAR, Accordion, Recent Results, Med Rec Status, Cardiac Rhythm NSR, Alarm Parameters , Pre Procedure Checklist, Procedure Verification and Quality Measures. Problem: Falls - Risk of  Goal: *Absence of Falls  Description: Document Dorys Damian Fall Risk and appropriate interventions in the flowsheet.   Outcome: Progressing Towards Goal  Note: Fall Risk Interventions:    Medication Interventions: Evaluate medications/consider consulting pharmacy    Problem: Acute Renal Failure: Day 3  Goal: Medications  Outcome: Progressing Towards Goal  Goal: Respiratory  Outcome: Progressing Towards Goal

## 2020-07-03 LAB
ALBUMIN SERPL-MCNC: 1.6 G/DL (ref 3.5–5)
ANION GAP SERPL CALC-SCNC: 6 MMOL/L (ref 5–15)
BUN SERPL-MCNC: 25 MG/DL (ref 6–20)
BUN/CREAT SERPL: 13 (ref 12–20)
CALCIUM SERPL-MCNC: 8.2 MG/DL (ref 8.5–10.1)
CHLORIDE SERPL-SCNC: 112 MMOL/L (ref 97–108)
CO2 SERPL-SCNC: 22 MMOL/L (ref 21–32)
CREAT SERPL-MCNC: 1.99 MG/DL (ref 0.7–1.3)
GLUCOSE SERPL-MCNC: 94 MG/DL (ref 65–100)
PHOSPHATE SERPL-MCNC: 2.9 MG/DL (ref 2.6–4.7)
POTASSIUM SERPL-SCNC: 4.1 MMOL/L (ref 3.5–5.1)
SODIUM SERPL-SCNC: 140 MMOL/L (ref 136–145)

## 2020-07-03 PROCEDURE — 74011250637 HC RX REV CODE- 250/637: Performed by: INTERNAL MEDICINE

## 2020-07-03 PROCEDURE — 74011250637 HC RX REV CODE- 250/637: Performed by: HOSPITALIST

## 2020-07-03 PROCEDURE — 36415 COLL VENOUS BLD VENIPUNCTURE: CPT

## 2020-07-03 PROCEDURE — 80069 RENAL FUNCTION PANEL: CPT

## 2020-07-03 PROCEDURE — 74011250636 HC RX REV CODE- 250/636: Performed by: INTERNAL MEDICINE

## 2020-07-03 PROCEDURE — 65660000000 HC RM CCU STEPDOWN

## 2020-07-03 PROCEDURE — 74011250636 HC RX REV CODE- 250/636: Performed by: HOSPITALIST

## 2020-07-03 RX ORDER — HYDRALAZINE HYDROCHLORIDE 50 MG/1
50 TABLET, FILM COATED ORAL 3 TIMES DAILY
Status: DISCONTINUED | OUTPATIENT
Start: 2020-07-03 | End: 2020-07-05

## 2020-07-03 RX ADMIN — AMLODIPINE BESYLATE 10 MG: 5 TABLET ORAL at 08:40

## 2020-07-03 RX ADMIN — HEPARIN SODIUM 5000 UNITS: 5000 INJECTION INTRAVENOUS; SUBCUTANEOUS at 20:07

## 2020-07-03 RX ADMIN — HEPARIN SODIUM 5000 UNITS: 5000 INJECTION INTRAVENOUS; SUBCUTANEOUS at 08:41

## 2020-07-03 RX ADMIN — CLONIDINE HYDROCHLORIDE 0.1 MG: 0.1 TABLET ORAL at 20:07

## 2020-07-03 RX ADMIN — Medication 10 ML: at 13:32

## 2020-07-03 RX ADMIN — SODIUM CHLORIDE 25 ML/HR: 900 INJECTION, SOLUTION INTRAVENOUS at 13:03

## 2020-07-03 RX ADMIN — CLONIDINE HYDROCHLORIDE 0.1 MG: 0.1 TABLET ORAL at 12:06

## 2020-07-03 RX ADMIN — ACETAMINOPHEN 650 MG: 325 TABLET ORAL at 20:07

## 2020-07-03 RX ADMIN — CLONIDINE HYDROCHLORIDE 0.1 MG: 0.1 TABLET ORAL at 03:45

## 2020-07-03 RX ADMIN — HYDRALAZINE HYDROCHLORIDE 50 MG: 50 TABLET, FILM COATED ORAL at 22:22

## 2020-07-03 RX ADMIN — Medication 10 ML: at 08:10

## 2020-07-03 RX ADMIN — ACETAMINOPHEN 650 MG: 325 TABLET ORAL at 10:32

## 2020-07-03 RX ADMIN — HYDRALAZINE HYDROCHLORIDE 50 MG: 50 TABLET, FILM COATED ORAL at 16:02

## 2020-07-03 RX ADMIN — HYDRALAZINE HYDROCHLORIDE 50 MG: 50 TABLET, FILM COATED ORAL at 08:40

## 2020-07-03 RX ADMIN — Medication 10 ML: at 20:12

## 2020-07-03 NOTE — PROGRESS NOTES
Patient name: Eloy Barboza  MRN: [id number]    Nephrology Progress note:    Assessment:  ECHO: Creatinine peaked at 3.9; now stable at ~ 2. Partially Pre-renal state -> improvied with IVF. R/o Cocaine induced kidney injury-> CK unremarkable. No urine eos to suggest AIN. No significant hematuria. nephrotic range proteinuria (7.1gm) is concerning particularly given + underlying Hep C     HTN: Poorly controlled     Cocaine abuse: chronic use     Hypokalemia    Hep C+: new dx-> awaiting PCR-> +heroin use    Plan/Recommendations:  Creatinine stable at 2, good UOP  Increase Hydralazine dose (would like SBP < 130-140 pre biopsy, if done)  F/u Hep C PCR  F/u Cryo along with GN w/u  Discussed with patient-> will need to consider CT guided renal biopsy early next week-> agreeable  Renally adjust new meds  Avoid nephrotoxins  Am labs      Subjective:  \" I feel good\" No complaints    ROS:   No nausea, no vomiting  No chest pain, no shortness of breath    Exam:  Visit Vitals  /84   Pulse 60   Temp 97.7 °F (36.5 °C)   Resp 18   Ht 5' 9\" (1.753 m)   Wt 71.4 kg (157 lb 6.5 oz)   SpO2 100%   BMI 23.25 kg/m²     Wt Readings from Last 3 Encounters:   07/03/20 71.4 kg (157 lb 6.5 oz)   06/11/19 81.6 kg (180 lb)   05/24/19 83.9 kg (185 lb)       Intake/Output Summary (Last 24 hours) at 7/3/2020 0723  Last data filed at 7/3/2020 0030  Gross per 24 hour   Intake 960 ml   Output 2000 ml   Net -1040 ml       Gen: NAD  HEENT:  AT/NC  Lungs/Chest wall: Clear. No accessory muscle use. Cardiovascular: Regular rate, normal rhythm. Abdomen/: Soft, NT, ND, BS+. Ext:  No peripheral edema  CNS: alert and awake. Answers appropriately      Current Facility-Administered Medications   Medication Dose Route Frequency Last Dose    hydrALAZINE (APRESOLINE) tablet 25 mg  25 mg Oral TID 25 mg at 07/02/20 2244    cloNIDine HCL (CATAPRES) tablet 0.1 mg  0.1 mg Oral Q8H 0.1 mg at 07/03/20 0345    0.9% sodium chloride infusion  25 mL/hr IntraVENous CONTINUOUS 25 mL/hr at 07/02/20 0954    amLODIPine (NORVASC) tablet 10 mg  10 mg Oral DAILY 10 mg at 07/02/20 0831    sodium chloride (NS) flush 5-40 mL  5-40 mL IntraVENous Q8H 10 mL at 07/02/20 2245    sodium chloride (NS) flush 5-40 mL  5-40 mL IntraVENous PRN      acetaminophen (TYLENOL) tablet 650 mg  650 mg Oral Q4H  mg at 07/02/20 1019    ondansetron (ZOFRAN) injection 4 mg  4 mg IntraVENous Q4H PRN      heparin (porcine) injection 5,000 Units  5,000 Units SubCUTAneous Q12H 5,000 Units at 07/02/20 2024    hydrALAZINE (APRESOLINE) tablet 50 mg  50 mg Oral Q8H PRN 50 mg at 06/30/20 0306       Labs/Data:    Lab Results   Component Value Date/Time    WBC 7.3 07/02/2020 03:09 AM    HGB 12.0 (L) 07/02/2020 03:09 AM    HCT 37.8 07/02/2020 03:09 AM    PLATELET 276 80/75/8268 03:09 AM    MCV 92.6 07/02/2020 03:09 AM       Lab Results   Component Value Date/Time    Sodium 140 07/03/2020 03:47 AM    Potassium 4.1 07/03/2020 03:47 AM    Chloride 112 (H) 07/03/2020 03:47 AM    CO2 22 07/03/2020 03:47 AM    Anion gap 6 07/03/2020 03:47 AM    Glucose 94 07/03/2020 03:47 AM    BUN 25 (H) 07/03/2020 03:47 AM    Creatinine 1.99 (H) 07/03/2020 03:47 AM    BUN/Creatinine ratio 13 07/03/2020 03:47 AM    GFR est AA 43 (L) 07/03/2020 03:47 AM    GFR est non-AA 35 (L) 07/03/2020 03:47 AM    Calcium 8.2 (L) 07/03/2020 03:47 AM       Patient seen and examined. Chart reviewed. Labs, data and other pertinent notes reviewed in last 24 hrs.     Discussed with patient Signed by:  Jackie Kerns MD  [address]

## 2020-07-03 NOTE — PROGRESS NOTES
Problem: Falls - Risk of  Goal: *Absence of Falls  Description: Document Carlos Medrano Fall Risk and appropriate interventions in the flowsheet.   Outcome: Progressing Towards Goal  Note: Fall Risk Interventions:            Medication Interventions: Teach patient to arise slowly                   Problem: Nutrition Deficit  Goal: *Optimize nutritional status  Outcome: Progressing Towards Goal

## 2020-07-03 NOTE — PROGRESS NOTES
Bedside and Verbal shift change report given to NAVEED Weller (oncoming nurse) by Dany Gallegos (offgoing nurse). Report included the following information SBAR, Kardex, ED Summary, Procedure Summary, Intake/Output, MAR, Recent Results and Cardiac Rhythm NSR.

## 2020-07-03 NOTE — PROGRESS NOTES
7/3/2020 -   LALY:  - RUR: 15%  - Disposition is TBD: [facility]; info is on AVS    - Patient to have renal biopsy next week  - Hep C panels are pending  CRM: Yvonne Paniagua, MPH, [address] [place] [address]; Z: [phone]

## 2020-07-04 LAB
ALBUMIN SERPL-MCNC: 1.6 G/DL (ref 3.5–5)
ANION GAP SERPL CALC-SCNC: 6 MMOL/L (ref 5–15)
BUN SERPL-MCNC: 30 MG/DL (ref 6–20)
BUN/CREAT SERPL: 14 (ref 12–20)
CALCIUM SERPL-MCNC: 7.6 MG/DL (ref 8.5–10.1)
CHLORIDE SERPL-SCNC: 111 MMOL/L (ref 97–108)
CO2 SERPL-SCNC: 23 MMOL/L (ref 21–32)
CREAT SERPL-MCNC: 2.07 MG/DL (ref 0.7–1.3)
ERYTHROCYTE [DISTWIDTH] IN BLOOD BY AUTOMATED COUNT: 11.9 % (ref 11.5–14.5)
GLUCOSE SERPL-MCNC: 110 MG/DL (ref 65–100)
HCT VFR BLD AUTO: 39.3 % (ref 36.6–50.3)
HGB BLD-MCNC: 12.2 G/DL (ref 12.1–17)
MCH RBC QN AUTO: 29 PG (ref 26–34)
MCHC RBC AUTO-ENTMCNC: 31 G/DL (ref 30–36.5)
MCV RBC AUTO: 93.3 FL (ref 80–99)
NRBC # BLD: 0 K/UL (ref 0–0.01)
NRBC BLD-RTO: 0 PER 100 WBC
PHOSPHATE SERPL-MCNC: 3 MG/DL (ref 2.6–4.7)
PLATELET # BLD AUTO: 165 K/UL (ref 150–400)
PMV BLD AUTO: 11.8 FL (ref 8.9–12.9)
POTASSIUM SERPL-SCNC: 4.1 MMOL/L (ref 3.5–5.1)
RBC # BLD AUTO: 4.21 M/UL (ref 4.1–5.7)
SODIUM SERPL-SCNC: 140 MMOL/L (ref 136–145)
WBC # BLD AUTO: 8.2 K/UL (ref 4.1–11.1)

## 2020-07-04 PROCEDURE — 74011250637 HC RX REV CODE- 250/637: Performed by: INTERNAL MEDICINE

## 2020-07-04 PROCEDURE — 80069 RENAL FUNCTION PANEL: CPT

## 2020-07-04 PROCEDURE — 65660000000 HC RM CCU STEPDOWN

## 2020-07-04 PROCEDURE — 74011250637 HC RX REV CODE- 250/637: Performed by: HOSPITALIST

## 2020-07-04 PROCEDURE — 36415 COLL VENOUS BLD VENIPUNCTURE: CPT

## 2020-07-04 PROCEDURE — 74011250637 HC RX REV CODE- 250/637: Performed by: NURSE PRACTITIONER

## 2020-07-04 PROCEDURE — 85027 COMPLETE CBC AUTOMATED: CPT

## 2020-07-04 PROCEDURE — 74011250636 HC RX REV CODE- 250/636: Performed by: HOSPITALIST

## 2020-07-04 RX ORDER — LANOLIN ALCOHOL/MO/W.PET/CERES
3 CREAM (GRAM) TOPICAL
Status: DISCONTINUED | OUTPATIENT
Start: 2020-07-04 | End: 2020-07-08 | Stop reason: HOSPADM

## 2020-07-04 RX ORDER — CLONIDINE HYDROCHLORIDE 0.2 MG/1
0.2 TABLET ORAL EVERY 8 HOURS
Status: DISCONTINUED | OUTPATIENT
Start: 2020-07-04 | End: 2020-07-08 | Stop reason: HOSPADM

## 2020-07-04 RX ORDER — CARVEDILOL 3.12 MG/1
3.12 TABLET ORAL 2 TIMES DAILY WITH MEALS
Status: DISCONTINUED | OUTPATIENT
Start: 2020-07-04 | End: 2020-07-08 | Stop reason: HOSPADM

## 2020-07-04 RX ADMIN — MELATONIN 3 MG: at 23:15

## 2020-07-04 RX ADMIN — HYDRALAZINE HYDROCHLORIDE 50 MG: 50 TABLET, FILM COATED ORAL at 21:38

## 2020-07-04 RX ADMIN — HYDRALAZINE HYDROCHLORIDE 50 MG: 50 TABLET, FILM COATED ORAL at 16:35

## 2020-07-04 RX ADMIN — Medication 10 ML: at 21:38

## 2020-07-04 RX ADMIN — CLONIDINE HYDROCHLORIDE 0.2 MG: 0.2 TABLET ORAL at 19:59

## 2020-07-04 RX ADMIN — HEPARIN SODIUM 5000 UNITS: 5000 INJECTION INTRAVENOUS; SUBCUTANEOUS at 20:00

## 2020-07-04 RX ADMIN — CLONIDINE HYDROCHLORIDE 0.1 MG: 0.1 TABLET ORAL at 03:37

## 2020-07-04 RX ADMIN — CARVEDILOL 3.12 MG: 3.12 TABLET, FILM COATED ORAL at 12:48

## 2020-07-04 RX ADMIN — ACETAMINOPHEN 650 MG: 325 TABLET ORAL at 16:35

## 2020-07-04 RX ADMIN — Medication 10 ML: at 03:38

## 2020-07-04 RX ADMIN — HEPARIN SODIUM 5000 UNITS: 5000 INJECTION INTRAVENOUS; SUBCUTANEOUS at 09:29

## 2020-07-04 RX ADMIN — AMLODIPINE BESYLATE 10 MG: 5 TABLET ORAL at 09:28

## 2020-07-04 RX ADMIN — HYDRALAZINE HYDROCHLORIDE 50 MG: 50 TABLET, FILM COATED ORAL at 09:29

## 2020-07-04 RX ADMIN — Medication 10 ML: at 16:35

## 2020-07-04 RX ADMIN — MELATONIN 3 MG: at 01:01

## 2020-07-04 RX ADMIN — CLONIDINE HYDROCHLORIDE 0.2 MG: 0.2 TABLET ORAL at 12:07

## 2020-07-04 NOTE — PROGRESS NOTES
[facility] [facility] Adult  Hospitalist Group                                                                                          Hospitalist Progress Note  Ruma Cardozo MD  Answering service: [phone] from in house phone        Date of Service:  7/3/2020  NAME:  Tala Fatima  :  1965  MRN:  [id number]      Admission Summary:   79-year-old male who presents ambulatory to the emergency room with complaints of shortness of breath and leg edema    Interval history / Subjective: Follow up ARF. Patient seen and examined at the bedside. Labs, images and notes reviewed  Discussed with nursing staff, orders reviewed. Plan discussed with patient/Family  Episode of nosebleed early a.m. Resolved spontaneously. No dizziness or lightheadedness. Creatinine continues to improve. No other overnight events or concerns. Assessment & Plan:     #Acute renal failure: improving creatinine  #Nephrotic range Proteinuria  -urine protein/cr -7.1  -Cr 2.2. With significant proteinuria and h/o hep C and drug abuse-will need further eval of glomerulonephritis  -Nephrology following- labs pending complements,LUCIA,cryo and ANCA  -CT guided renal biopsy - early next week. Better BP control warranted prebiopsy. Appreciate nephrology assistance. -Renal USG-Increased parenchymal echogenicity of the kidneys, consistent with medical  renal disease.  -Polyclonal gammopathy with increased kappa and lambda chains with maintained-normal ratio  -Cryo pending  -C-ANCA, p-ANCA, C3 WNL  -LUCIA pending    #Chronic Hep C  Patient said he was diagnosed with hep C but was never treated  -Hep C PCR RNA pending.  Ordered    #HTN, less adequately controlled  -on amlodipine,clonidine and hydralazine.  -Hydralazine uptitrated per nephrology  -Hold HCTZ  -will avoid beta blockers due to cocaine use and bradycardia HR in 60s  -If needed would consider adding alpha blockade cautiously    #Cocaine abuse  -Suggested fever for smoking it, less snorting- few times only,denied use of IV drugs  -counseled to quit  -HIV negative. #Transaminitis: improving  -due to hepc C,drug abuse  -Check CMP periodically    #DVT ruled out      Code status: full  DVT prophylaxis: heparin    Care Plan discussed with: patient,nurse  Anticipated Disposition:TBD  Anticipated Discharge:TBD     Hospital Problems  Date Reviewed: 6/11/2019          Codes Class Noted POA    Acute renal failure (ARF) ([facility] [address] ICD-10-CM: N17.9  ICD-9-CM: 584.9  6/28/2020 Unknown                Review of Systems:   As per HPI    Vital Signs:    Last 24hrs VS reviewed since prior progress note. Most recent are:  Visit Vitals  BP (!) 160/93 (BP 1 Location: Right arm, BP Patient Position: At rest)   Pulse 77   Temp 98.1 °F (36.7 °C)   Resp 16   Ht 5' 9\" (1.753 m)   Wt 71.4 kg (157 lb 6.5 oz)   SpO2 99%   BMI 23.25 kg/m²         Intake/Output Summary (Last 24 hours) at 7/3/2020 2132  Last data filed at 7/3/2020 1755  Gross per 24 hour   Intake 960 ml   Output 1650 ml   Net -690 ml        Physical Examination:             Constitutional:  No acute distress, cooperative, pleasant    ENT:  Oral mucosa moist, oropharynx benign,EOMI,ancietric sclera   Resp:  no wheezing,clear to auscultation b/l   CV:  Regular rhythm, normal rate, S1,S2 wnl    GI:  Soft, non distended, non tender. normoactive bowel sounds    Musculoskeletal:  Minimal LE edema    Neurologic:  Moves all extremities. AAOx3, CN II-XII reviewed     Psych: Not anxious nor agitated. Skin; no rashes       Data Review:    Review and/or order of clinical lab test  Review and/or order of tests in the medicine section of CPT    Nm Lung Scan Perf    Result Date: 6/29/2020  IMPRESSION: Normal perfusion imaging. The patient could not tolerate ventilation imaging. Xr Chest Pa Lat    Result Date: 6/28/2020  IMPRESSION: No acute cardiopulmonary process. Ct Chest Wo Cont    Result Date: 6/28/2020  IMPRESSION: Clear lungs.      Us Retroperitoneum Comp    Result Date: 6/30/2020  IMPRESSION: 1. No hydronephrosis. 2. Increased parenchymal echogenicity of the kidneys, consistent with medical renal disease. Labs:     Recent Labs     07/02/20 0309 07/01/20 0345   WBC 7.3 7.2   HGB 12.0* 12.8   HCT 37.8 41.0    170     Recent Labs     07/03/20 0347 07/02/20 0309 07/01/20 0345    140 141   K 4.1 3.9 4.0   * 111* 111*   CO2 22 23 23   BUN 25* 25* 28*   CREA 1.99* 2.04* 2.26*   GLU 94 100 129*   CA 8.2* 7.7* 7.6*   MG  --  1.8  --    PHOS 2.9 2.5* 2.1*     Recent Labs     07/03/20 0347 07/02/20 0309 07/01/20 0345   ALT  --  74 73   AP  --  159* 152*   TBILI  --  0.2 0.2   TP  --  5.9* 6.3*   ALB 1.6* 1.5* 1.6*  1.6*   GLOB  --  4.4* 4.7*     No results for input(s): INR, PTP, APTT, INREXT, INREXT in the last 72 hours. No results for input(s): FE, TIBC, PSAT, FERR in the last 72 hours. No results found for: FOL, RBCF   No results for input(s): PH, PCO2, PO2 in the last 72 hours. No results for input(s): CPK, CKNDX, TROIQ in the last 72 hours.     No lab exists for component: CPKMB  Lab Results   Component Value Date/Time    Cholesterol, total 170 06/29/2020 03:46 AM    HDL Cholesterol 49 06/29/2020 03:46 AM    LDL, calculated 73.4 06/29/2020 03:46 AM    Triglyceride 238 (H) 06/29/2020 03:46 AM    CHOL/HDL Ratio 3.5 06/29/2020 03:46 AM     No results found for: [facility]  Lab Results   Component Value Date/Time    Color YELLOW/STRAW 06/28/2020 08:19 PM    Appearance CLEAR 06/28/2020 08:19 PM    Specific gravity 1.012 06/28/2020 08:19 PM    pH (UA) 5.5 06/28/2020 08:19 PM    Protein 300 (A) 06/28/2020 08:19 PM    Glucose Negative 06/28/2020 08:19 PM    Ketone Negative 06/28/2020 08:19 PM    Bilirubin Negative 06/28/2020 08:19 PM    Urobilinogen 0.2 06/28/2020 08:19 PM    Nitrites Negative 06/28/2020 08:19 PM    Leukocyte Esterase Negative 06/28/2020 08:19 PM    Epithelial cells FEW 06/28/2020 08:19 PM    Bacteria Negative 06/28/2020 08:19 PM    WBC 0-4 06/28/2020 08:19 PM    RBC 0-5 06/28/2020 08:19 PM         Medications Reviewed:     Current Facility-Administered Medications   Medication Dose Route Frequency    hydrALAZINE (APRESOLINE) tablet 50 mg  50 mg Oral TID    cloNIDine HCL (CATAPRES) tablet 0.1 mg  0.1 mg Oral Q8H    amLODIPine (NORVASC) tablet 10 mg  10 mg Oral DAILY    sodium chloride (NS) flush 5-40 mL  5-40 mL IntraVENous Q8H    sodium chloride (NS) flush 5-40 mL  5-40 mL IntraVENous PRN    acetaminophen (TYLENOL) tablet 650 mg  650 mg Oral Q4H PRN    ondansetron (ZOFRAN) injection 4 mg  4 mg IntraVENous Q4H PRN    heparin (porcine) injection 5,000 Units  5,000 Units SubCUTAneous Q12H    hydrALAZINE (APRESOLINE) tablet 50 mg  50 mg Oral Q8H PRN     ______________________________________________________________________  EXPECTED LENGTH OF STAY: 2d 4h  ACTUAL LENGTH OF STAY:          5                 Ashley Talbert MD

## 2020-07-04 NOTE — PROGRESS NOTES
Problem: Falls - Risk of  Goal: *Absence of Falls  Description: Document Nell Dudley Fall Risk and appropriate interventions in the flowsheet.   Outcome: Progressing Towards Goal  Note: Fall Risk Interventions:            Medication Interventions: Teach patient to arise slowly    Elimination Interventions: Call light in reach              Problem: Nutrition Deficit  Goal: *Optimize nutritional status  Outcome: Progressing Towards Goal     Problem: Pain  Goal: *Control of Pain  Outcome: Progressing Towards Goal

## 2020-07-04 NOTE — PROGRESS NOTES
[facility] [facility] Adult  Hospitalist Group                                                                                          Hospitalist Progress Note  Geraldo Mace MD  Answering service: [phone] from in house phone        Date of Service:  2020  NAME:  Kylie Herbert  :  1965  MRN:  [id number]      Admission Summary:   59-year-old male who presents ambulatory to the emergency room with complaints of shortness of breath and leg edema    Interval history / Subjective: Follow up ARF. Patient seen and examined at the bedside. Labs, images and notes reviewed  Discussed with nursing staff, orders reviewed. Plan discussed with patient/Family  Doing ok. No overnight events. No new concerns. Answered questions related to proteinuria and renal Bx. D/w pt about better BP control. Assessment & Plan:     #Acute renal failure: stable creatinine  #Nephrotic range Proteinuria  -urine protein/cr -7.1  -Cr 2.2. With significant proteinuria and h/o hep C and drug abuse-will need further eval of glomerulonephritis  -Nephrology following- labs pending complements,LUCIA,cryo and ANCA  -CT guided renal biopsy - early next week. Better BP control warranted prebiopsy. Appreciate nephrology assistance. -Renal USG-Increased parenchymal echogenicity of the kidneys, consistent with medical  renal disease.  -Polyclonal gammopathy with increased kappa and lambda chains with maintained-normal ratio  -Cryo pending  -C-ANCA, p-ANCA, C3 WNL  -LUCIA pending    #Chronic Hep C  Patient said he was diagnosed with hep C but was never treated  -Hep C PCR RNA pending.  Ordered    #HTN, less adequately controlled  -on amlodipine,clonidine and hydralazine.  -Hydralazine further uptitrated per nephrology  -Hold HCTZ  -will add low dose Coreg very cautiously given cocaine use and bradycardia HR in 60s  -If needed would consider adding alpha blockade cautiously    #Cocaine abuse  -Suggested fever for smoking it, less snorting- few times only,denied use of IV drugs  -counseled to quit  -HIV negative. #Transaminitis: improving  -due to hepc C,drug abuse  -Check CMP periodically    #DVT ruled out      Code status: full  DVT prophylaxis: heparin    Care Plan discussed with: patient,nurse  Anticipated Disposition:TBD  Anticipated Discharge:TBD     Hospital Problems  Date Reviewed: 6/11/2019          Codes Class Noted POA    [facility] ICD-10-CM: N17.9  ICD-9-CM: 584.9  6/28/2020 Unknown                Review of Systems:   As per HPI    Vital Signs:    Last 24hrs VS reviewed since prior progress note. Most recent are:  Visit Vitals  /88   Pulse 67   Temp 97.4 °F (36.3 °C)   Resp 18   Ht 5' 9\" (1.753 m)   Wt 75.9 kg (167 lb 5.3 oz)   SpO2 98%   BMI 24.71 kg/m²         Intake/Output Summary (Last 24 hours) at 7/4/2020 1324  Last data filed at 7/4/2020 1002  Gross per 24 hour   Intake 960 ml   Output 550 ml   Net 410 ml        Physical Examination:             Constitutional:  No acute distress, cooperative, pleasant    ENT:  Oral mucosa moist, oropharynx benign,EOMI,ancietric sclera   Resp:  no wheezing,clear to auscultation b/l   CV:  Regular rhythm, normal rate, S1,S2 wnl    GI:  Soft, non distended, non tender. normoactive bowel sounds    Musculoskeletal:  Minimal LE edema    Neurologic:  Moves all extremities. AAOx3, CN II-XII reviewed     Psych: Not anxious nor agitated. Skin; no rashes       Data Review:    Review and/or order of clinical lab test  Review and/or order of tests in the medicine section of CPT    Nm Lung Scan Perf    Result Date: 6/29/2020  IMPRESSION: Normal perfusion imaging. The patient could not tolerate ventilation imaging. Xr Chest Pa Lat    Result Date: 6/28/2020  IMPRESSION: No acute cardiopulmonary process. Ct Chest Wo Cont    Result Date: 6/28/2020  IMPRESSION: Clear lungs. Us Retroperitoneum Comp    Result Date: 6/30/2020  IMPRESSION: 1. No hydronephrosis. 2. Increased parenchymal echogenicity of the kidneys, consistent with medical renal disease. Labs:     Recent Labs     07/04/20 0327 07/02/20 0309   WBC 8.2 7.3   HGB 12.2 12.0*   HCT 39.3 37.8    174     Recent Labs     07/04/20 0327 07/03/20 0347 07/02/20 0309    140 140   K 4.1 4.1 3.9   * 112* 111*   CO2 23 22 23   BUN 30* 25* 25*   CREA 2.07* 1.99* 2.04*   * 94 100   CA 7.6* 8.2* 7.7*   MG  --   --  1.8   PHOS 3.0 2.9 2.5*     Recent Labs     07/04/20 0327 07/03/20 0347 07/02/20 0309   ALT  --   --  74   AP  --   --  159*   TBILI  --   --  0.2   TP  --   --  5.9*   ALB 1.6* 1.6* 1.5*   GLOB  --   --  4.4*     No results for input(s): INR, PTP, APTT, INREXT, INREXT in the last 72 hours. No results for input(s): FE, TIBC, PSAT, FERR in the last 72 hours. No results found for: FOL, RBCF   No results for input(s): PH, PCO2, PO2 in the last 72 hours. No results for input(s): CPK, CKNDX, TROIQ in the last 72 hours.     No lab exists for component: CPKMB  Lab Results   Component Value Date/Time    Cholesterol, total 170 06/29/2020 03:46 AM    HDL Cholesterol 49 06/29/2020 03:46 AM    LDL, calculated 73.4 06/29/2020 03:46 AM    Triglyceride 238 (H) 06/29/2020 03:46 AM    CHOL/HDL Ratio 3.5 06/29/2020 03:46 AM     No results found for: [facility]  Lab Results   Component Value Date/Time    Color YELLOW/STRAW 06/28/2020 08:19 PM    Appearance CLEAR 06/28/2020 08:19 PM    Specific gravity 1.012 06/28/2020 08:19 PM    pH (UA) 5.5 06/28/2020 08:19 PM    Protein 300 (A) 06/28/2020 08:19 PM    Glucose Negative 06/28/2020 08:19 PM    Ketone Negative 06/28/2020 08:19 PM    Bilirubin Negative 06/28/2020 08:19 PM    Urobilinogen 0.2 06/28/2020 08:19 PM    Nitrites Negative 06/28/2020 08:19 PM    Leukocyte Esterase Negative 06/28/2020 08:19 PM    Epithelial cells FEW 06/28/2020 08:19 PM    Bacteria Negative 06/28/2020 08:19 PM    WBC 0-4 06/28/2020 08:19 PM    RBC 0-5 06/28/2020 08:19 PM Medications Reviewed:     Current Facility-Administered Medications   Medication Dose Route Frequency    melatonin tablet 3 mg  3 mg Oral QHS PRN    cloNIDine HCL (CATAPRES) tablet 0.2 mg  0.2 mg Oral Q8H    carvediloL (COREG) tablet 3.125 mg  3.125 mg Oral BID WITH MEALS    hydrALAZINE (APRESOLINE) tablet 50 mg  50 mg Oral TID    amLODIPine (NORVASC) tablet 10 mg  10 mg Oral DAILY    sodium chloride (NS) flush 5-40 mL  5-40 mL IntraVENous Q8H    sodium chloride (NS) flush 5-40 mL  5-40 mL IntraVENous PRN    acetaminophen (TYLENOL) tablet 650 mg  650 mg Oral Q4H PRN    ondansetron (ZOFRAN) injection 4 mg  4 mg IntraVENous Q4H PRN    heparin (porcine) injection 5,000 Units  5,000 Units SubCUTAneous Q12H    hydrALAZINE (APRESOLINE) tablet 50 mg  50 mg Oral Q8H PRN     ______________________________________________________________________  EXPECTED LENGTH OF STAY: 2d 4h  ACTUAL LENGTH OF STAY:          6                 Brandyn Jacobson MD

## 2020-07-04 NOTE — ROUTINE PROCESS
Bedside and Verbal shift change report given to Hank Broussard 23 (oncoming nurse) by Mello Ariza RN (offgoing nurse). Report included the following information SBAR, Kardex, Intake/Output, MAR, Recent Results and Cardiac Rhythm NSR.

## 2020-07-04 NOTE — PROGRESS NOTES
Bedside shift change report given to Mary Lou (oncoming nurse) by Kaley Mccoy (offgoing nurse). Report included the following information SBAR, Kardex, MAR and Cardiac Rhythm NSR.

## 2020-07-04 NOTE — PROGRESS NOTES
Patient name: Antoinette Mccarty  MRN: [id number]    Nephrology Progress note:    Assessment:  ECHO: Creatinine peaked at 3.9; now stable at ~ 2. Partially Pre-renal state -> improvied with IVF. R/o Cocaine induced kidney injury-> CK unremarkable. No urine eos to suggest AIN. No significant hematuria. nephrotic range proteinuria (7.1gm) is concerning particularly given + underlying Hep C     HTN: Poorly controlled     Cocaine abuse: chronic use     Hypokalemia    Hep C+: new dx-> awaiting PCR-> +heroin use    Plan/Recommendations:    Creatinine stable at 2, good UOP    Increased Hydralazine dose (would like SBP < 130-140 pre biopsy, if done)    Increased clonidine. LUCIA, cryo pending. Hep B, ANCA, HIV negative. Normal light chain ratio. C3 OK. C4 at low end of normal.  Discussed with patient-> will need to consider CT guided renal biopsy early next week-> agreeable        Subjective:  \" I feel fine. \" No N/V. No dyspnea. No pain. Exam:  Visit Vitals  /88   Pulse 65   Temp 97.3 °F (36.3 °C)   Resp 18   Ht 5' 9\" (1.753 m)   Wt 75.9 kg (167 lb 5.3 oz)   SpO2 99%   BMI 24.71 kg/m²     Wt Readings from Last 3 Encounters:   07/04/20 75.9 kg (167 lb 5.3 oz)   06/11/19 81.6 kg (180 lb)   05/24/19 83.9 kg (185 lb)       Intake/Output Summary (Last 24 hours) at 7/4/2020 1101  Last data filed at 7/4/2020 1002  Gross per 24 hour   Intake 1200 ml   Output 550 ml   Net 650 ml       Gen: NAD  HEENT:  AT/NC  Lungs/Chest wall: Clear. No accessory muscle use.    Cardiovascular: Regular rate, normal rhythm. Abdomen/: Soft, NT, ND, BS+. Ext:  No peripheral edema  CNS: alert and awake. Answers appropriately      Current Facility-Administered Medications   Medication Dose Route Frequency Last Dose    melatonin tablet 3 mg  3 mg Oral QHS PRN 3 mg at 07/04/20 0101    hydrALAZINE (APRESOLINE) tablet 50 mg  50 mg Oral TID 50 mg at 07/04/20 0929    cloNIDine HCL (CATAPRES) tablet 0.1 mg  0.1 mg Oral Q8H 0.1 mg at 07/04/20 0337    amLODIPine (NORVASC) tablet 10 mg  10 mg Oral DAILY 10 mg at 07/04/20 8372    sodium chloride (NS) flush 5-40 mL  5-40 mL IntraVENous Q8H 10 mL at 07/04/20 0338    sodium chloride (NS) flush 5-40 mL  5-40 mL IntraVENous PRN      acetaminophen (TYLENOL) tablet 650 mg  650 mg Oral Q4H  mg at 07/03/20 2007    ondansetron (ZOFRAN) injection 4 mg  4 mg IntraVENous Q4H PRN      heparin (porcine) injection 5,000 Units  5,000 Units SubCUTAneous Q12H 5,000 Units at 07/04/20 0929    hydrALAZINE (APRESOLINE) tablet 50 mg  50 mg Oral Q8H PRN 50 mg at 06/30/20 0306       Labs/Data:    Lab Results   Component Value Date/Time    WBC 8.2 07/04/2020 03:27 AM    HGB 12.2 07/04/2020 03:27 AM    HCT 39.3 07/04/2020 03:27 AM    PLATELET 109 55/[birth year]/1963 03:27 AM    MCV 93.3 07/04/2020 03:27 AM       Lab Results   Component Value Date/Time    Sodium 140 07/04/2020 03:27 AM    Potassium 4.1 07/04/2020 03:27 AM    Chloride 111 (H) 07/04/2020 03:27 AM    CO2 23 07/04/2020 03:27 AM    Anion gap 6 07/04/2020 03:27 AM    Glucose 110 (H) 07/04/2020 03:27 AM    BUN 30 (H) 07/04/2020 03:27 AM    Creatinine 2.07 (H) 07/04/2020 03:27 AM    BUN/Creatinine ratio 14 07/04/2020 03:27 AM    GFR est AA 41 (L) 07/04/2020 03:27 AM    GFR est non-AA 34 (L) 07/04/2020 03:27 AM    Calcium 7.6 (L) 07/04/2020 03:27 AM       Patient seen and examined. Chart reviewed. Labs, data and other pertinent notes reviewed in last 24 hrs.     Discussed with patient     Signed by:  Mary Ann Braxton MD  [address] [facility]

## 2020-07-04 NOTE — PROGRESS NOTES
Problem: Falls - Risk of  Goal: *Absence of Falls  Description: Document Trixie Alvarez Fall Risk and appropriate interventions in the flowsheet.   Outcome: Progressing Towards Goal  Note: Fall Risk Interventions:            Medication Interventions: Teach patient to arise slowly    Elimination Interventions: Call light in reach              Problem: Patient Education: Go to Patient Education Activity  Goal: Patient/Family Education  Outcome: Progressing Towards Goal     Problem: Nutrition Deficit  Goal: *Optimize nutritional status  Outcome: Progressing Towards Goal     Problem: Discharge Planning  Goal: *Knowledge of medication management  Outcome: Progressing Towards Goal     Problem: Acute Renal Failure: Day 4  Goal: Activity/Safety  Outcome: Progressing Towards Goal  Goal: Nutrition/Diet  Outcome: Progressing Towards Goal  Goal: Respiratory  Outcome: Progressing Towards Goal  Goal: *Tolerating diet  Outcome: Progressing Towards Goal

## 2020-07-04 NOTE — PROGRESS NOTES
1253: pt asked RN if he could get something to help him sleep; RN paged hospitalist, Tomasa Anderson NP, ordered melatonin 3mg    0110: pt started walking laps around the unit, stating he \"felt short of breath and clogged up\" with observation pt looked restless and uncomfortable; pt blew nose, clear mucus was present; lung sounds were clear, VS: RR 18, /82, O2 100%; pt asked \"how long will till the sleep med kick in\" RN let pt know the melatonin will take some time to work. Will continue to monitor.

## 2020-07-05 LAB
ALBUMIN SERPL-MCNC: 1.7 G/DL (ref 3.5–5)
ANION GAP SERPL CALC-SCNC: 7 MMOL/L (ref 5–15)
BUN SERPL-MCNC: 28 MG/DL (ref 6–20)
BUN/CREAT SERPL: 14 (ref 12–20)
CALCIUM SERPL-MCNC: 8.4 MG/DL (ref 8.5–10.1)
CHLORIDE SERPL-SCNC: 110 MMOL/L (ref 97–108)
CO2 SERPL-SCNC: 22 MMOL/L (ref 21–32)
COMMENT, HOLDF: NORMAL
CREAT SERPL-MCNC: 2.06 MG/DL (ref 0.7–1.3)
ERYTHROCYTE [DISTWIDTH] IN BLOOD BY AUTOMATED COUNT: 12 % (ref 11.5–14.5)
GLUCOSE SERPL-MCNC: 94 MG/DL (ref 65–100)
HCT VFR BLD AUTO: 40.4 % (ref 36.6–50.3)
HCV GENOTYPE: NORMAL
HCV GENTYP SERPL NAA+PROBE: NORMAL
HCV RNA SERPL NAA+PROBE-ACNC: NORMAL IU/ML
HCV RNA SERPL NAA+PROBE-LOG IU: 6.66 LOG10 IU/ML
HGB BLD-MCNC: 12.8 G/DL (ref 12.1–17)
MCH RBC QN AUTO: 29.5 PG (ref 26–34)
MCHC RBC AUTO-ENTMCNC: 31.7 G/DL (ref 30–36.5)
MCV RBC AUTO: 93.1 FL (ref 80–99)
NRBC # BLD: 0 K/UL (ref 0–0.01)
NRBC BLD-RTO: 0 PER 100 WBC
PHOSPHATE SERPL-MCNC: 3.6 MG/DL (ref 2.6–4.7)
PLATELET # BLD AUTO: 182 K/UL (ref 150–400)
PLEASE NOTE, 550474: NORMAL
PMV BLD AUTO: 12.8 FL (ref 8.9–12.9)
POTASSIUM SERPL-SCNC: 4.2 MMOL/L (ref 3.5–5.1)
RBC # BLD AUTO: 4.34 M/UL (ref 4.1–5.7)
SAMPLES BEING HELD,HOLD: NORMAL
SODIUM SERPL-SCNC: 139 MMOL/L (ref 136–145)
TEST INFORMATION, 550045: NORMAL
WBC # BLD AUTO: 8.1 K/UL (ref 4.1–11.1)

## 2020-07-05 PROCEDURE — 74011000250 HC RX REV CODE- 250: Performed by: NURSE PRACTITIONER

## 2020-07-05 PROCEDURE — 74011250637 HC RX REV CODE- 250/637: Performed by: INTERNAL MEDICINE

## 2020-07-05 PROCEDURE — 36415 COLL VENOUS BLD VENIPUNCTURE: CPT

## 2020-07-05 PROCEDURE — 74011250637 HC RX REV CODE- 250/637: Performed by: HOSPITALIST

## 2020-07-05 PROCEDURE — 80069 RENAL FUNCTION PANEL: CPT

## 2020-07-05 PROCEDURE — 74011250636 HC RX REV CODE- 250/636: Performed by: HOSPITALIST

## 2020-07-05 PROCEDURE — 65660000000 HC RM CCU STEPDOWN

## 2020-07-05 PROCEDURE — 85027 COMPLETE CBC AUTOMATED: CPT

## 2020-07-05 RX ORDER — BACITRACIN 500 UNIT/G
1 PACKET (EA) TOPICAL 3 TIMES DAILY
Status: DISCONTINUED | OUTPATIENT
Start: 2020-07-05 | End: 2020-07-08 | Stop reason: HOSPADM

## 2020-07-05 RX ADMIN — CLONIDINE HYDROCHLORIDE 0.2 MG: 0.2 TABLET ORAL at 20:34

## 2020-07-05 RX ADMIN — CLONIDINE HYDROCHLORIDE 0.2 MG: 0.2 TABLET ORAL at 03:41

## 2020-07-05 RX ADMIN — Medication 10 ML: at 13:27

## 2020-07-05 RX ADMIN — HYDRALAZINE HYDROCHLORIDE 75 MG: 50 TABLET, FILM COATED ORAL at 22:41

## 2020-07-05 RX ADMIN — CARVEDILOL 3.12 MG: 3.12 TABLET, FILM COATED ORAL at 09:07

## 2020-07-05 RX ADMIN — CLONIDINE HYDROCHLORIDE 0.2 MG: 0.2 TABLET ORAL at 12:16

## 2020-07-05 RX ADMIN — HEPARIN SODIUM 5000 UNITS: 5000 INJECTION INTRAVENOUS; SUBCUTANEOUS at 09:08

## 2020-07-05 RX ADMIN — HEPARIN SODIUM 5000 UNITS: 5000 INJECTION INTRAVENOUS; SUBCUTANEOUS at 20:35

## 2020-07-05 RX ADMIN — Medication 10 ML: at 09:18

## 2020-07-05 RX ADMIN — Medication 10 ML: at 22:42

## 2020-07-05 RX ADMIN — Medication 10 ML: at 16:34

## 2020-07-05 RX ADMIN — BACITRACIN 1 PACKET: 500 OINTMENT TOPICAL at 16:34

## 2020-07-05 RX ADMIN — HYDRALAZINE HYDROCHLORIDE 75 MG: 50 TABLET, FILM COATED ORAL at 16:35

## 2020-07-05 RX ADMIN — HYDRALAZINE HYDROCHLORIDE 75 MG: 50 TABLET, FILM COATED ORAL at 09:07

## 2020-07-05 RX ADMIN — BACITRACIN 1 PACKET: 500 OINTMENT TOPICAL at 22:40

## 2020-07-05 RX ADMIN — BACITRACIN 1 PACKET: 500 OINTMENT TOPICAL at 09:08

## 2020-07-05 RX ADMIN — CARVEDILOL 3.12 MG: 3.12 TABLET, FILM COATED ORAL at 16:35

## 2020-07-05 RX ADMIN — AMLODIPINE BESYLATE 10 MG: 5 TABLET ORAL at 09:07

## 2020-07-05 RX ADMIN — ACETAMINOPHEN 650 MG: 325 TABLET ORAL at 12:16

## 2020-07-05 NOTE — PROGRESS NOTES
[address] [facility] Adult  Hospitalist Group                                                                                          Hospitalist Progress Note  Nhung Carbone MD  Answering service: [phone] OR [phone] from in house phone        Date of Service:  2020  NAME:  Iliana Whitney  :  1965  MRN:  [id number]      Admission Summary:   25-year-old male who presents ambulatory to the emergency room with complaints of shortness of breath and leg edema    Interval history / Subjective: Follow up ARF. Patient seen and examined at the bedside. Labs, images and notes reviewed  Discussed with nursing staff, orders reviewed. Plan discussed with patient/Family  Doing ok. BP still 150s. HR in 60s on Coreg lowest dose. Tolerating ok. No other concerns or overnight events. Agreeable with Bx. Willing for disposition to [facility] [address] rehab. Assessment & Plan:     #Acute renal failure: stable creatinine around 2.07  #Nephrotic range Proteinuria  -urine protein/cr -7.1  -Cr 2.2. With significant proteinuria and h/o hep C and drug abuse-will need further eval of glomerulonephritis  -Nephrology following- labs noted  -CT guided renal biopsy - early next week. Better BP control warranted prebiopsy. Appreciate nephrology assistance. -Renal USG-Increased parenchymal echogenicity of the kidneys, consistent with medical  renal disease.  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 -C4 at low end of normal.    #Chronic Hep C  Patient said he was diagnosed with hep C but was never treated  -Hep C PCR RNA pending.  Ordered    #HTN, less adequately controlled  -on amlodipine,clonidine and hydralazine.  -Hydralazine further uptitrated from 50 mg to 75 mg TID  -Hold HCTZ  -Tolerating low dose Coreg 3.125 mg BID, HR in 60s, caution given cocaine use, HR monitoring  -If needed would consider adding alpha blockade cautiously    #Cocaine abuse  -Suggested fever for smoking it, less snorting- few times only,denied use of IV drugs  -counseled to quit  -HIV negative. #Transaminitis: improving  -due to hepc C,drug abuse  -Check CMP periodically    #DVT ruled out      Code status: full  DVT prophylaxis: heparin    Care Plan discussed with: patient,nurse  Anticipated Disposition:TBD  Anticipated Discharge:TBD. Likely CT guided renal Bx per renal next week     Hospital Problems  Date Reviewed: 6/11/2019          Codes Class Noted POA    Acute renal failure (ARF) ([facility] [address] ICD-10-CM: N17.9  ICD-9-CM: 584.9  6/28/2020 Unknown                Review of Systems:   As per HPI    Vital Signs:    Last 24hrs VS reviewed since prior progress note. Most recent are:  Visit Vitals  /80 (BP 1 Location: Right arm, BP Patient Position: At rest)   Pulse 61   Temp 98.2 °F (36.8 °C)   Resp 16   Ht 5' 9\" (1.753 m)   Wt 76.1 kg (167 lb 12.3 oz)   SpO2 99%   BMI 24.78 kg/m²         Intake/Output Summary (Last 24 hours) at 7/5/2020 0846  Last data filed at 7/4/2020 1545  Gross per 24 hour   Intake 240 ml   Output 300 ml   Net -60 ml        Physical Examination:             Constitutional:  No acute distress, cooperative, pleasant    ENT:  Oral mucosa moist, oropharynx benign,EOMI,ancietric sclera   Resp:  no wheezing,clear to auscultation b/l   CV:  Regular rhythm, normal rate, S1,S2 wnl    GI:  Soft, non distended, non tender. normoactive bowel sounds    Musculoskeletal:  Minimal LE edema    Neurologic:  Moves all extremities. AAOx3, CN II-XII reviewed     Psych: Not anxious nor agitated. Skin; no rashes       Data Review:    Review and/or order of clinical lab test  Review and/or order of tests in the medicine section of CPT    Nm Lung Scan Perf    Result Date: 6/29/2020  IMPRESSION: Normal perfusion imaging. The patient could not tolerate ventilation imaging.      Xr Chest Pa Lat    Result Date: 6/28/2020  IMPRESSION: No acute cardiopulmonary process. Ct Chest Wo Cont    Result Date: 6/28/2020  IMPRESSION: Clear lungs. Us Retroperitoneum Comp    Result Date: 6/30/2020  IMPRESSION: 1. No hydronephrosis. 2. Increased parenchymal echogenicity of the kidneys, consistent with medical renal disease. Labs:     Recent Labs     07/05/20 0340 07/04/20 0327   WBC 8.1 8.2   HGB 12.8 12.2   HCT 40.4 39.3    165     Recent Labs     07/05/20 0340 07/04/20 0327 07/03/20 0347    140 140   K 4.2 4.1 4.1   * 111* 112*   CO2 22 23 22   BUN 28* 30* 25*   CREA 2.06* 2.07* 1.99*   GLU 94 110* 94   CA 8.4* 7.6* 8.2*   PHOS 3.6 3.0 2.9     Recent Labs     07/05/20 0340 07/04/20 0327 07/03/20 0347   ALB 1.7* 1.6* 1.6*     No results for input(s): INR, PTP, APTT, INREXT, INREXT in the last 72 hours. No results for input(s): FE, TIBC, PSAT, FERR in the last 72 hours. No results found for: FOL, RBCF   No results for input(s): PH, PCO2, PO2 in the last 72 hours. No results for input(s): CPK, CKNDX, TROIQ in the last 72 hours.     No lab exists for component: CPKMB  Lab Results   Component Value Date/Time    Cholesterol, total 170 06/29/2020 03:46 AM    HDL Cholesterol 49 06/29/2020 03:46 AM    LDL, calculated 73.4 06/29/2020 03:46 AM    Triglyceride 238 (H) 06/29/2020 03:46 AM    CHOL/HDL Ratio 3.5 06/29/2020 03:46 AM     No results found for: [facility]  Lab Results   Component Value Date/Time    Color YELLOW/STRAW 06/28/2020 08:19 PM    Appearance CLEAR 06/28/2020 08:19 PM    Specific gravity 1.012 06/28/2020 08:19 PM    pH (UA) 5.5 06/28/2020 08:19 PM    Protein 300 (A) 06/28/2020 08:19 PM    Glucose Negative 06/28/2020 08:19 PM    Ketone Negative 06/28/2020 08:19 PM    Bilirubin Negative 06/28/2020 08:19 PM    Urobilinogen 0.2 06/28/2020 08:19 PM    Nitrites Negative 06/28/2020 08:19 PM    Leukocyte Esterase Negative 06/28/2020 08:19 PM    Epithelial cells FEW 06/28/2020 08:19 PM    Bacteria Negative 06/28/2020 08:19 PM    WBC 0-4 06/28/2020 08:19 PM    RBC 0-5 06/28/2020 08:19 PM         Medications Reviewed:     Current Facility-Administered Medications   Medication Dose Route Frequency    bacitracin 500 unit/gram packet 1 Packet  1 Packet Topical TID    hydrALAZINE (APRESOLINE) tablet 75 mg  75 mg Oral TID    melatonin tablet 3 mg  3 mg Oral QHS PRN    cloNIDine HCL (CATAPRES) tablet 0.2 mg  0.2 mg Oral Q8H    carvediloL (COREG) tablet 3.125 mg  3.125 mg Oral BID WITH MEALS    amLODIPine (NORVASC) tablet 10 mg  10 mg Oral DAILY    sodium chloride (NS) flush 5-40 mL  5-40 mL IntraVENous Q8H    sodium chloride (NS) flush 5-40 mL  5-40 mL IntraVENous PRN    acetaminophen (TYLENOL) tablet 650 mg  650 mg Oral Q4H PRN    ondansetron (ZOFRAN) injection 4 mg  4 mg IntraVENous Q4H PRN    heparin (porcine) injection 5,000 Units  5,000 Units SubCUTAneous Q12H    hydrALAZINE (APRESOLINE) tablet 50 mg  50 mg Oral Q8H PRN     ______________________________________________________________________  EXPECTED LENGTH OF STAY: 2d 4h  ACTUAL LENGTH OF STAY:          7                 Laine Yung MD

## 2020-07-05 NOTE — PROGRESS NOTES
0315: pt called out stating that he got a nose bleed and stated that he was \" feeling like he can't breathe\"; day shift RN stated he had a nose bleeds during the day; I was his RN last night and he had a similar episode around; Visit Vitals  /80 (BP 1 Location: Left arm, BP Patient Position: Sitting)   Pulse 62   Temp 99.1 °F (37.3 °C)   Resp 18   Ht 5' 9\" (1.753 m)   Wt 75.9 kg (167 lb 5.3 oz)   SpO2 100%   BMI 24.71 kg/m²   will call hospitalist.    0470: Oriana Abreu NP came on to unit and assessed pt. Ordered bactracin for nares; will continue to monitor. Bedside and Verbal shift change report given to Clemente Spear (oncoming nurse) by Sujatha Asher (offgoing nurse). Report included the following information SBAR, Intake/Output, Recent Results and Cardiac Rhythm NSR.

## 2020-07-05 NOTE — PROGRESS NOTES
Patient name: Catherine York  MRN: [id number]    Nephrology Progress note:    Assessment:  ECHO: Creatinine peaked at 3.9; now stable at ~ 2. Partially Pre-renal state -> improvied with IVF. R/o Cocaine induced kidney injury-> CK unremarkable. No urine eos to suggest AIN. No significant hematuria. nephrotic range proteinuria (7.1gm) is concerning particularly given + underlying Hep C     HTN: Poorly controlled     Cocaine abuse: chronic use     Hypokalemia    Hep C+: new dx-> awaiting PCR-> +heroin use    Plan/Recommendations:    Creatinine stable at 2, good UOP    Increased Hydralazine dose (would like SBP < 130-140 pre biopsy, if done)    Increased clonidine. Overall SBP better. LUCIA, cryo pending. Hep B, ANCA, HIV negative. Normal light chain ratio. C3 OK. C4 at low end of normal.    Discussed with patient-> will need CT guided renal biopsy early next week and he agrees to proceed. Will check coags in AM.        Subjective:  \" I feel better. \"  No N/V. No dyspnea. No pain. No HA. No CP.       Exam:  Visit Vitals  /75 (BP 1 Location: Left arm, BP Patient Position: At rest)   Pulse 65   Temp 97.5 °F (36.4 °C)   Resp 16   Ht 5' 9\" (1.753 m)   Wt 76.1 kg (167 lb 12.3 oz)   SpO2 95%   BMI 24.78 kg/m²     Wt Readings from Last 3 Encounters:   07/05/20 76.1 kg (167 lb 12.3 oz)   06/11/19 81.6 kg (180 lb)   05/24/19 83.9 kg (185 lb)       Intake/Output Summary (Last 24 hours) at 7/5/2020 1223  Last data filed at 7/4/2020 1545  Gross per 24 hour   Intake --   Output 300 ml   Net -300 ml       Gen: NAD  HEENT:  AT/NC  Lungs/Chest wall: Clear. No accessory muscle use. Cardiovascular: Regular rate, normal rhythm. Abdomen/: Soft, NT, ND, BS+. Ext:  No peripheral edema  CNS: alert and awake.  Answers appropriately      Current Facility-Administered Medications   Medication Dose Route Frequency Last Dose    bacitracin 500 unit/gram packet 1 Packet  1 Packet Topical TID 1 Packet at 07/05/20 0908    hydrALAZINE (APRESOLINE) tablet 75 mg  75 mg Oral TID 75 mg at 07/05/20 0907    melatonin tablet 3 mg  3 mg Oral QHS PRN 3 mg at 07/04/20 2315    cloNIDine HCL (CATAPRES) tablet 0.2 mg  0.2 mg Oral Q8H 0.2 mg at 07/05/20 1216    carvediloL (COREG) tablet 3.125 mg  3.125 mg Oral BID WITH MEALS 3.125 mg at 07/05/20 0907    amLODIPine (NORVASC) tablet 10 mg  10 mg Oral DAILY 10 mg at 07/05/20 5681    sodium chloride (NS) flush 5-40 mL  5-40 mL IntraVENous Q8H 10 mL at 07/05/20 0918    sodium chloride (NS) flush 5-40 mL  5-40 mL IntraVENous PRN      acetaminophen (TYLENOL) tablet 650 mg  650 mg Oral Q4H  mg at 07/05/20 1216    ondansetron (ZOFRAN) injection 4 mg  4 mg IntraVENous Q4H PRN      heparin (porcine) injection 5,000 Units  5,000 Units SubCUTAneous Q12H 5,000 Units at 07/05/20 0908    hydrALAZINE (APRESOLINE) tablet 50 mg  50 mg Oral Q8H PRN 50 mg at 06/30/20 0306       Labs/Data:    Lab Results   Component Value Date/Time    WBC 8.1 07/05/2020 03:40 AM    HGB 12.8 07/05/2020 03:40 AM    HCT 40.4 07/05/2020 03:40 AM    PLATELET 017 32/93/5419 03:40 AM    MCV 93.1 07/05/2020 03:40 AM       Lab Results   Component Value Date/Time    Sodium 139 07/05/2020 03:40 AM    Potassium 4.2 07/05/2020 03:40 AM    Chloride 110 (H) 07/05/2020 03:40 AM    CO2 22 07/05/2020 03:40 AM    Anion gap 7 07/05/2020 03:40 AM    Glucose 94 07/05/2020 03:40 AM    BUN 28 (H) 07/05/2020 03:40 AM    Creatinine 2.06 (H) 07/05/2020 03:40 AM    BUN/Creatinine ratio 14 07/05/2020 03:40 AM    GFR est AA 41 (L) 07/05/2020 03:40 AM    GFR est non-AA 34 (L) 07/05/2020 03:40 AM    Calcium 8.4 (L) 07/05/2020 03:40 AM       Patient seen and examined. Chart reviewed. Labs, data and other pertinent notes reviewed in last 24 hrs.     Discussed with patient     Signed by:  Laura Wu MD  [facility]

## 2020-07-05 NOTE — PROGRESS NOTES
Problem: Falls - Risk of  Goal: *Absence of Falls  Description: Document Haydee Dear Fall Risk and appropriate interventions in the flowsheet.   Outcome: Progressing Towards Goal  Note: Fall Risk Interventions:            Medication Interventions: Teach patient to arise slowly    Elimination Interventions: Call light in reach              Problem: Nutrition Deficit  Goal: *Optimize nutritional status  Outcome: Progressing Towards Goal     Problem: Pain  Goal: *Control of Pain  Outcome: Progressing Towards Goal

## 2020-07-06 LAB
ALBUMIN SERPL-MCNC: 1.8 G/DL (ref 3.5–5)
ANA TITR SER IF: NEGATIVE {TITER}
ANION GAP SERPL CALC-SCNC: 7 MMOL/L (ref 5–15)
APTT PPP: 28.3 SEC (ref 22.1–32)
BUN SERPL-MCNC: 33 MG/DL (ref 6–20)
BUN/CREAT SERPL: 14 (ref 12–20)
CALCIUM SERPL-MCNC: 8.2 MG/DL (ref 8.5–10.1)
CHLORIDE SERPL-SCNC: 109 MMOL/L (ref 97–108)
CO2 SERPL-SCNC: 22 MMOL/L (ref 21–32)
COMMENT, HOLDF: NORMAL
CREAT SERPL-MCNC: 2.36 MG/DL (ref 0.7–1.3)
ERYTHROCYTE [DISTWIDTH] IN BLOOD BY AUTOMATED COUNT: 12.2 % (ref 11.5–14.5)
GLUCOSE SERPL-MCNC: 107 MG/DL (ref 65–100)
HCT VFR BLD AUTO: 39.4 % (ref 36.6–50.3)
HGB BLD-MCNC: 12.6 G/DL (ref 12.1–17)
INR PPP: 1 (ref 0.9–1.1)
MCH RBC QN AUTO: 30 PG (ref 26–34)
MCHC RBC AUTO-ENTMCNC: 32 G/DL (ref 30–36.5)
MCV RBC AUTO: 93.8 FL (ref 80–99)
NRBC # BLD: 0 K/UL (ref 0–0.01)
NRBC BLD-RTO: 0 PER 100 WBC
PHOSPHATE SERPL-MCNC: 4 MG/DL (ref 2.6–4.7)
PLATELET # BLD AUTO: 180 K/UL (ref 150–400)
PMV BLD AUTO: 12.1 FL (ref 8.9–12.9)
POTASSIUM SERPL-SCNC: 4 MMOL/L (ref 3.5–5.1)
PROTHROMBIN TIME: 10.5 SEC (ref 9–11.1)
RBC # BLD AUTO: 4.2 M/UL (ref 4.1–5.7)
SAMPLES BEING HELD,HOLD: NORMAL
SODIUM SERPL-SCNC: 138 MMOL/L (ref 136–145)
THERAPEUTIC RANGE,PTTT: NORMAL SECS (ref 58–77)
WBC # BLD AUTO: 7.7 K/UL (ref 4.1–11.1)

## 2020-07-06 PROCEDURE — 85027 COMPLETE CBC AUTOMATED: CPT

## 2020-07-06 PROCEDURE — 74011250637 HC RX REV CODE- 250/637: Performed by: NURSE PRACTITIONER

## 2020-07-06 PROCEDURE — 85730 THROMBOPLASTIN TIME PARTIAL: CPT

## 2020-07-06 PROCEDURE — 85610 PROTHROMBIN TIME: CPT

## 2020-07-06 PROCEDURE — 74011000250 HC RX REV CODE- 250: Performed by: NURSE PRACTITIONER

## 2020-07-06 PROCEDURE — 65660000000 HC RM CCU STEPDOWN

## 2020-07-06 PROCEDURE — 80069 RENAL FUNCTION PANEL: CPT

## 2020-07-06 PROCEDURE — 74011250637 HC RX REV CODE- 250/637: Performed by: INTERNAL MEDICINE

## 2020-07-06 PROCEDURE — 74011250637 HC RX REV CODE- 250/637: Performed by: HOSPITALIST

## 2020-07-06 PROCEDURE — 74011250636 HC RX REV CODE- 250/636: Performed by: HOSPITALIST

## 2020-07-06 PROCEDURE — 36415 COLL VENOUS BLD VENIPUNCTURE: CPT

## 2020-07-06 RX ORDER — HYDRALAZINE HYDROCHLORIDE 50 MG/1
100 TABLET, FILM COATED ORAL 3 TIMES DAILY
Status: DISCONTINUED | OUTPATIENT
Start: 2020-07-06 | End: 2020-07-08 | Stop reason: HOSPADM

## 2020-07-06 RX ORDER — HYDRALAZINE HYDROCHLORIDE 25 MG/1
25 TABLET, FILM COATED ORAL ONCE
Status: COMPLETED | OUTPATIENT
Start: 2020-07-06 | End: 2020-07-06

## 2020-07-06 RX ORDER — FLUTICASONE PROPIONATE 50 MCG
2 SPRAY, SUSPENSION (ML) NASAL
Status: DISCONTINUED | OUTPATIENT
Start: 2020-07-06 | End: 2020-07-08 | Stop reason: HOSPADM

## 2020-07-06 RX ORDER — CALCIUM CARBONATE 200(500)MG
200 TABLET,CHEWABLE ORAL
Status: DISCONTINUED | OUTPATIENT
Start: 2020-07-06 | End: 2020-07-08 | Stop reason: HOSPADM

## 2020-07-06 RX ADMIN — CALCIUM CARBONATE (ANTACID) CHEW TAB 500 MG 200 MG: 500 CHEW TAB at 21:29

## 2020-07-06 RX ADMIN — HYDRALAZINE HYDROCHLORIDE 25 MG: 25 TABLET, FILM COATED ORAL at 11:18

## 2020-07-06 RX ADMIN — BACITRACIN 1 PACKET: 500 OINTMENT TOPICAL at 21:30

## 2020-07-06 RX ADMIN — AMLODIPINE BESYLATE 10 MG: 5 TABLET ORAL at 08:49

## 2020-07-06 RX ADMIN — Medication 10 ML: at 11:19

## 2020-07-06 RX ADMIN — CLONIDINE HYDROCHLORIDE 0.2 MG: 0.2 TABLET ORAL at 07:16

## 2020-07-06 RX ADMIN — HEPARIN SODIUM 5000 UNITS: 5000 INJECTION INTRAVENOUS; SUBCUTANEOUS at 08:50

## 2020-07-06 RX ADMIN — ACETAMINOPHEN 650 MG: 325 TABLET ORAL at 16:50

## 2020-07-06 RX ADMIN — BACITRACIN 1 PACKET: 500 OINTMENT TOPICAL at 08:50

## 2020-07-06 RX ADMIN — CARVEDILOL 3.12 MG: 3.12 TABLET, FILM COATED ORAL at 16:50

## 2020-07-06 RX ADMIN — HYDRALAZINE HYDROCHLORIDE 75 MG: 50 TABLET, FILM COATED ORAL at 08:48

## 2020-07-06 RX ADMIN — BACITRACIN 1 PACKET: 500 OINTMENT TOPICAL at 15:35

## 2020-07-06 RX ADMIN — CLONIDINE HYDROCHLORIDE 0.2 MG: 0.2 TABLET ORAL at 15:35

## 2020-07-06 RX ADMIN — CARVEDILOL 3.12 MG: 3.12 TABLET, FILM COATED ORAL at 08:49

## 2020-07-06 RX ADMIN — HYDRALAZINE HYDROCHLORIDE 100 MG: 50 TABLET, FILM COATED ORAL at 15:35

## 2020-07-06 RX ADMIN — HYDRALAZINE HYDROCHLORIDE 100 MG: 50 TABLET, FILM COATED ORAL at 21:30

## 2020-07-06 RX ADMIN — CLONIDINE HYDROCHLORIDE 0.2 MG: 0.2 TABLET ORAL at 23:53

## 2020-07-06 RX ADMIN — Medication 10 ML: at 21:32

## 2020-07-06 RX ADMIN — Medication 10 ML: at 07:16

## 2020-07-06 NOTE — PROGRESS NOTES
[address] [facility] Adult  Hospitalist Group                                                                                          Hospitalist Progress Note  Della Patel MD  Answering service: [phone] from in house phone        Date of Service:  2020  NAME:  Harlan Ruff  :  1965  MRN:  [id number]      Admission Summary:   27-year-old male who presents ambulatory to the emergency room with complaints of shortness of breath and leg edema    Interval history / Subjective: Follow up ARF. Patient seen and examined at the bedside. Labs, images and notes reviewed  Discussed with nursing staff, orders reviewed. Plan discussed with patient/Family  Feeling ok. No overnight events. BP still 150s. Goal 130-140's. Would uptitrate Hydralazine to 100mg TID. HR in 60s on Coreg lowest dose. Tolerating ok. No other concerns or overnight events. Agreeable with Bx. Willing for disposition to [facility] [address] rehab. CM would assist with the same. Assessment & Plan:     #Acute renal failure: creatinine up at 2.36  #Nephrotic range Proteinuria  -urine protein/cr -7.1  -Cr 2.2. With significant proteinuria and h/o hep C and drug abuse-will need further eval of glomerulonephritis  -Nephrology following- labs noted. [place] further to renal team.  -CT guided renal biopsy - early next week. Better BP control warranted prebiopsy. Appreciate nephrology assistance. -Renal USG-Increased parenchymal echogenicity of the kidneys, consistent with medical  renal disease.  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 -C4 at low end of normal.    #Chronic Hep C  Patient said he was diagnosed with hep C but was never treated  -Hep C PCR RNA pending. Ordered    #HTN, better controlled  -on amlodipine,clonidine and hydralazine.  -Goal Pre-Bx 130-140s. Currently in 140s.   -Hydralazine further uptitrated from 75 mg to 100 mg TID  -Hold HCTZ  -Tolerating low dose Coreg 3.125 mg BID, HR in 60s, caution given cocaine use, HR monitoring  -If needed would consider adding alpha blockade cautiously    #Cocaine abuse  -Suggested fever for smoking it, less snorting- few times only,denied use of IV drugs  -counseled to quit  -HIV negative. #Transaminitis: improving  -due to hepc C,drug abuse  -Check CMP periodically    #DVT ruled out      Code status: full  DVT prophylaxis: heparin    Care Plan discussed with: patient,nurse  Anticipated Disposition:TBD. [facility] [place] Pt has to make the call. Pt aware. Appreciate CM assistance. Anticipated Discharge: TBD. Likely CT guided renal Bx per renal next week     Hospital Problems  Date Reviewed: 6/11/2019          Codes Class Noted POA    Acute renal failure (ARF) ([facility] [address] ICD-10-CM: N17.9  ICD-9-CM: 584.9  6/28/2020 Unknown                Review of Systems:   As per HPI    Vital Signs:    Last 24hrs VS reviewed since prior progress note. Most recent are:  Visit Vitals  /78   Pulse 68   Temp 98.1 °F (36.7 °C)   Resp 16   Ht 5' 9\" (1.753 m)   Wt 77.1 kg (169 lb 15.6 oz)   SpO2 95%   BMI 25.10 kg/m²         Intake/Output Summary (Last 24 hours) at 7/6/2020 0907  Last data filed at 7/6/2020 0846  Gross per 24 hour   Intake 960 ml   Output 600 ml   Net 360 ml        Physical Examination:             Constitutional:  No acute distress, cooperative, pleasant    ENT:  Oral mucosa moist, oropharynx benign,EOMI,ancietric sclera   Resp:  no wheezing,clear to auscultation b/l   CV:  Regular rhythm, normal rate, S1,S2 wnl    GI:  Soft, non distended, non tender. normoactive bowel sounds    Musculoskeletal:  Minimal LE edema    Neurologic:  Moves all extremities. AAOx3, CN II-XII reviewed     Psych: Not anxious nor agitated.   Skin; no rashes       Data Review:    Review and/or order of clinical lab test  Review and/or order of tests in the medicine section of CPT    Nm Lung Scan Perf    Result Date: 6/29/2020  IMPRESSION: Normal perfusion imaging. The patient could not tolerate ventilation imaging. Xr Chest Pa Lat    Result Date: 6/28/2020  IMPRESSION: No acute cardiopulmonary process. Ct Chest Wo Cont    Result Date: 6/28/2020  IMPRESSION: Clear lungs. Us Retroperitoneum Comp    Result Date: 6/30/2020  IMPRESSION: 1. No hydronephrosis. 2. Increased parenchymal echogenicity of the kidneys, consistent with medical renal disease. Labs:     Recent Labs     07/06/20  0421 07/05/20  0340   WBC 7.7 8.1   HGB 12.6 12.8   HCT 39.4 40.4    182     Recent Labs     07/06/20  0433 07/05/20  0340 07/04/20  0327    139 140   K 4.0 4.2 4.1   * 110* 111*   CO2 22 22 23   BUN 33* 28* 30*   CREA 2.36* 2.06* 2.07*   * 94 110*   CA 8.2* 8.4* 7.6*   PHOS 4.0 3.6 3.0     Recent Labs     07/06/20  0433 07/05/20  0340 07/04/20  0327   ALB 1.8* 1.7* 1.6*     Recent Labs     07/06/20  0421   INR 1.0   PTP 10.5   APTT 28.3      No results for input(s): FE, TIBC, PSAT, FERR in the last 72 hours. No results found for: FOL, RBCF   No results for input(s): PH, PCO2, PO2 in the last 72 hours. No results for input(s): CPK, CKNDX, TROIQ in the last 72 hours.     No lab exists for component: CPKMB  Lab Results   Component Value Date/Time    Cholesterol, total 170 06/29/2020 03:46 AM    HDL Cholesterol 49 06/29/2020 03:46 AM    LDL, calculated 73.4 06/29/2020 03:46 AM    Triglyceride 238 (H) 06/29/2020 03:46 AM    CHOL/HDL Ratio 3.5 06/29/2020 03:46 AM     No results found for: [facility]  Lab Results   Component Value Date/Time    Color YELLOW/STRAW 06/28/2020 08:19 PM    Appearance CLEAR 06/28/2020 08:19 PM    Specific gravity 1.012 06/28/2020 08:19 PM    pH (UA) 5.5 06/28/2020 08:19 PM    Protein 300 (A) 06/28/2020 08:19 PM    Glucose Negative 06/28/2020 08:19 PM    Ketone Negative 06/28/2020 08:19 PM Bilirubin Negative 06/28/2020 08:19 PM    Urobilinogen 0.2 06/28/2020 08:19 PM    Nitrites Negative 06/28/2020 08:19 PM    Leukocyte Esterase Negative 06/28/2020 08:19 PM    Epithelial cells FEW 06/28/2020 08:19 PM    Bacteria Negative 06/28/2020 08:19 PM    WBC 0-4 06/28/2020 08:19 PM    RBC 0-5 06/28/2020 08:19 PM         Medications Reviewed:     Current Facility-Administered Medications   Medication Dose Route Frequency    hydrALAZINE (APRESOLINE) tablet 100 mg  100 mg Oral TID    hydrALAZINE (APRESOLINE) tablet 25 mg  25 mg Oral ONCE    bacitracin 500 unit/gram packet 1 Packet  1 Packet Topical TID    melatonin tablet 3 mg  3 mg Oral QHS PRN    cloNIDine HCL (CATAPRES) tablet 0.2 mg  0.2 mg Oral Q8H    carvediloL (COREG) tablet 3.125 mg  3.125 mg Oral BID WITH MEALS    amLODIPine (NORVASC) tablet 10 mg  10 mg Oral DAILY    sodium chloride (NS) flush 5-40 mL  5-40 mL IntraVENous Q8H    sodium chloride (NS) flush 5-40 mL  5-40 mL IntraVENous PRN    acetaminophen (TYLENOL) tablet 650 mg  650 mg Oral Q4H PRN    ondansetron (ZOFRAN) injection 4 mg  4 mg IntraVENous Q4H PRN    heparin (porcine) injection 5,000 Units  5,000 Units SubCUTAneous Q12H    hydrALAZINE (APRESOLINE) tablet 50 mg  50 mg Oral Q8H PRN     ______________________________________________________________________  EXPECTED LENGTH OF STAY: 2d 4h  ACTUAL LENGTH OF STAY:          8                 Krishan Nick MD

## 2020-07-06 NOTE — PROGRESS NOTES
7/6/2020 -   LALY:  - RUR: 14%  - Disposition is TBD; patient is wanting to have an inpatient rehab placement    - Patient to have kidney biopsy this week pending nephro input    10:35 -   CM met with patient at bedside to discuss disposition planning; patient has not heard back from [facility] and requested for this CM to contact the agency. CM contacted [facility]: [phone]) and was informed that the program serves only veterans and HIV+ patients. CM advised patient of the above. Patient plans to contact [facility], and CM to research potential additional resources.   CRM: Dianne Encinas, MPH,  Preetas Josiah; Z: [phone]

## 2020-07-06 NOTE — PROGRESS NOTES
Pt is alert and oriented X 4. Up and chelsey. Hourly rounding. NSR, room air. Bedside and Verbal shift change report given to [employer] (oncoming nurse) by [facility] (offgoing nurse). Report included the following information SBAR, Kardex, Procedure Summary, Intake/Output, MAR, Recent Results, Med Rec Status and Cardiac Rhythm paced.

## 2020-07-06 NOTE — PROGRESS NOTES
RENAL  PROGRESS NOTE        Subjective:   Some SOB  Objective:   VITALS SIGNS:    Visit Vitals  /78   Pulse 68   Temp 98.1 °F (36.7 °C)   Resp 16   Ht 5' 9\" (1.753 m)   Wt 77.1 kg (169 lb 15.6 oz)   SpO2 95%   BMI 25.10 kg/m²       O2 Device: Room air       Temp (24hrs), Av.7 °F (36.5 °C), Min:97.4 °F (36.3 °C), Max:98.1 °F (36.7 °C)         PHYSICAL EXAM:  +1 edema    DATA REVIEW:     INTAKE / OUTPUT:   Last shift:      701 - 1900  In: 360 [P.O.:360]  Out: 600 [Urine:600]  Last 3 shifts: 1901 - 700  In: 600 [P.O.:600]  Out: -     Intake/Output Summary (Last 24 hours) at 2020 1115  Last data filed at 2020 0846  Gross per 24 hour   Intake 960 ml   Output 600 ml   Net 360 ml         LABS:   Recent Labs     20  0421 20  0340 20  0327   WBC 7.7 8.1 8.2   HGB 12.6 12.8 12.2   HCT 39.4 40.4 39.3    182 165     Recent Labs     20  0433 20  0421 20  0340 20  0327     --  139 140   K 4.0  --  4.2 4.1   *  --  110* 111*   CO2 22  --  22 23   *  --  94 110*   BUN 33*  --  28* 30*   CREA 2.36*  --  2.06* 2.07*   CA 8.2*  --  8.4* 7.6*   PHOS 4.0  --  3.6 3.0   ALB 1.8*  --  1.7* 1.6*   INR  --  1.0  --   --            Assessment:       ECHO: Creatinine peaked at 3.9; now stable at ~ 2.  with nephrotic syndrome  (7.1gm) is concerning particularly given + underlying Hep C;pos cryog but C3. C4 normal   ;need kidney biopsy     HTN: Poorly controlled     Cocaine abuse: chronic use     Hypokalemia     Hep C+     Plan:   Aware of risk of kidney biopsy /bleeding(incudes the need of PRBC,intervention to stop bleed),he is aware of benefit of biopsy for diagnostic and therapeutic purposes  Kidney biopsy in AM  Stop hep sq    Discussed in length with him  Harmeet Young MD

## 2020-07-06 NOTE — PROGRESS NOTES
Problem: Falls - Risk of  Goal: *Absence of Falls  Description: Document Slime Roland Fall Risk and appropriate interventions in the flowsheet.   Outcome: Progressing Towards Goal  Note: Fall Risk Interventions:            Medication Interventions: Teach patient to arise slowly    Elimination Interventions: Call light in reach              Problem: General Medical Care Plan  Goal: *Vital signs within specified parameters  Outcome: Progressing Towards Goal  Goal: *Labs within defined limits  Outcome: Progressing Towards Goal  Goal: *Skin integrity maintained  Outcome: Progressing Towards Goal  Goal: *Anxiety reduced or absent  Outcome: Progressing Towards Goal

## 2020-07-07 ENCOUNTER — APPOINTMENT (OUTPATIENT)
Dept: CT IMAGING | Age: 55
DRG: 469 | End: 2020-07-07
Attending: INTERNAL MEDICINE
Payer: MEDICAID

## 2020-07-07 LAB
ALBUMIN SERPL-MCNC: 1.7 G/DL (ref 3.5–5)
ALBUMIN SERPL-MCNC: 1.7 G/DL (ref 3.5–5)
ALBUMIN/GLOB SERPL: 0.4 {RATIO} (ref 1.1–2.2)
ALP SERPL-CCNC: 165 U/L (ref 45–117)
ALT SERPL-CCNC: 91 U/L (ref 12–78)
ANION GAP SERPL CALC-SCNC: 7 MMOL/L (ref 5–15)
AST SERPL-CCNC: 78 U/L (ref 15–37)
BILIRUB DIRECT SERPL-MCNC: <0.1 MG/DL (ref 0–0.2)
BILIRUB SERPL-MCNC: 0.2 MG/DL (ref 0.2–1)
BUN SERPL-MCNC: 35 MG/DL (ref 6–20)
BUN/CREAT SERPL: 15 (ref 12–20)
CALCIUM SERPL-MCNC: 8.2 MG/DL (ref 8.5–10.1)
CHLORIDE SERPL-SCNC: 106 MMOL/L (ref 97–108)
CO2 SERPL-SCNC: 24 MMOL/L (ref 21–32)
CREAT SERPL-MCNC: 2.35 MG/DL (ref 0.7–1.3)
GLOBULIN SER CALC-MCNC: 4.8 G/DL (ref 2–4)
GLUCOSE SERPL-MCNC: 97 MG/DL (ref 65–100)
HCT VFR BLD AUTO: 39.6 % (ref 36.6–50.3)
PHOSPHATE SERPL-MCNC: 4.6 MG/DL (ref 2.6–4.7)
POTASSIUM SERPL-SCNC: 4.5 MMOL/L (ref 3.5–5.1)
PROT SERPL-MCNC: 6.5 G/DL (ref 6.4–8.2)
SODIUM SERPL-SCNC: 137 MMOL/L (ref 136–145)

## 2020-07-07 PROCEDURE — 74011250637 HC RX REV CODE- 250/637: Performed by: INTERNAL MEDICINE

## 2020-07-07 PROCEDURE — 0TB13ZX EXCISION OF LEFT KIDNEY, PERCUTANEOUS APPROACH, DIAGNOSTIC: ICD-10-PCS | Performed by: RADIOLOGY

## 2020-07-07 PROCEDURE — 77030014007 HC SPNG HEMSTAT J&J -B

## 2020-07-07 PROCEDURE — 65660000000 HC RM CCU STEPDOWN

## 2020-07-07 PROCEDURE — 77030020268 HC MISC GENERAL SUPPLY

## 2020-07-07 PROCEDURE — 80076 HEPATIC FUNCTION PANEL: CPT

## 2020-07-07 PROCEDURE — 85014 HEMATOCRIT: CPT

## 2020-07-07 PROCEDURE — 99152 MOD SED SAME PHYS/QHP 5/>YRS: CPT

## 2020-07-07 PROCEDURE — 74011000250 HC RX REV CODE- 250: Performed by: NURSE PRACTITIONER

## 2020-07-07 PROCEDURE — 77030014115

## 2020-07-07 PROCEDURE — 80069 RENAL FUNCTION PANEL: CPT

## 2020-07-07 PROCEDURE — 74011250636 HC RX REV CODE- 250/636: Performed by: RADIOLOGY

## 2020-07-07 PROCEDURE — 74011250637 HC RX REV CODE- 250/637: Performed by: HOSPITALIST

## 2020-07-07 PROCEDURE — 77030003666 HC NDL SPINAL BD -A

## 2020-07-07 PROCEDURE — 36415 COLL VENOUS BLD VENIPUNCTURE: CPT

## 2020-07-07 PROCEDURE — 74011250636 HC RX REV CODE- 250/636

## 2020-07-07 RX ORDER — MIDAZOLAM HYDROCHLORIDE 1 MG/ML
5 INJECTION, SOLUTION INTRAMUSCULAR; INTRAVENOUS
Status: DISCONTINUED | OUTPATIENT
Start: 2020-07-07 | End: 2020-07-07

## 2020-07-07 RX ORDER — CLONIDINE HYDROCHLORIDE 0.1 MG/1
0.1 TABLET ORAL AS NEEDED
Status: DISCONTINUED | OUTPATIENT
Start: 2020-07-07 | End: 2020-07-08 | Stop reason: HOSPADM

## 2020-07-07 RX ORDER — LIDOCAINE HYDROCHLORIDE 20 MG/ML
20 INJECTION, SOLUTION INFILTRATION; PERINEURAL
Status: DISCONTINUED | OUTPATIENT
Start: 2020-07-07 | End: 2020-07-07 | Stop reason: HOSPADM

## 2020-07-07 RX ORDER — MIDAZOLAM HYDROCHLORIDE 1 MG/ML
INJECTION, SOLUTION INTRAMUSCULAR; INTRAVENOUS
Status: COMPLETED
Start: 2020-07-07 | End: 2020-07-07

## 2020-07-07 RX ORDER — FENTANYL CITRATE 50 UG/ML
200 INJECTION, SOLUTION INTRAMUSCULAR; INTRAVENOUS
Status: DISCONTINUED | OUTPATIENT
Start: 2020-07-07 | End: 2020-07-07

## 2020-07-07 RX ORDER — SODIUM CHLORIDE 9 MG/ML
50 INJECTION, SOLUTION INTRAVENOUS CONTINUOUS
Status: DISCONTINUED | OUTPATIENT
Start: 2020-07-07 | End: 2020-07-07 | Stop reason: HOSPADM

## 2020-07-07 RX ADMIN — CLONIDINE HYDROCHLORIDE 0.2 MG: 0.2 TABLET ORAL at 23:37

## 2020-07-07 RX ADMIN — HYDRALAZINE HYDROCHLORIDE 100 MG: 50 TABLET, FILM COATED ORAL at 16:30

## 2020-07-07 RX ADMIN — SODIUM CHLORIDE 50 ML/HR: 900 INJECTION, SOLUTION INTRAVENOUS at 11:00

## 2020-07-07 RX ADMIN — Medication 10 ML: at 22:20

## 2020-07-07 RX ADMIN — Medication 10 ML: at 06:59

## 2020-07-07 RX ADMIN — CARVEDILOL 3.12 MG: 3.12 TABLET, FILM COATED ORAL at 16:31

## 2020-07-07 RX ADMIN — CLONIDINE HYDROCHLORIDE 0.2 MG: 0.2 TABLET ORAL at 07:00

## 2020-07-07 RX ADMIN — HYDRALAZINE HYDROCHLORIDE 100 MG: 50 TABLET, FILM COATED ORAL at 08:08

## 2020-07-07 RX ADMIN — BACITRACIN 1 PACKET: 500 OINTMENT TOPICAL at 16:31

## 2020-07-07 RX ADMIN — FENTANYL CITRATE 25 MCG: 50 INJECTION INTRAMUSCULAR; INTRAVENOUS at 11:57

## 2020-07-07 RX ADMIN — MIDAZOLAM HYDROCHLORIDE 1 MG: 1 INJECTION, SOLUTION INTRAMUSCULAR; INTRAVENOUS at 11:30

## 2020-07-07 RX ADMIN — AMLODIPINE BESYLATE 10 MG: 5 TABLET ORAL at 08:08

## 2020-07-07 RX ADMIN — BACITRACIN 1 PACKET: 500 OINTMENT TOPICAL at 22:20

## 2020-07-07 RX ADMIN — CARVEDILOL 3.12 MG: 3.12 TABLET, FILM COATED ORAL at 08:08

## 2020-07-07 RX ADMIN — BACITRACIN 1 PACKET: 500 OINTMENT TOPICAL at 08:08

## 2020-07-07 RX ADMIN — Medication 10 ML: at 14:25

## 2020-07-07 RX ADMIN — HYDRALAZINE HYDROCHLORIDE 100 MG: 50 TABLET, FILM COATED ORAL at 22:20

## 2020-07-07 RX ADMIN — MIDAZOLAM HYDROCHLORIDE 1 MG: 1 INJECTION, SOLUTION INTRAMUSCULAR; INTRAVENOUS at 11:40

## 2020-07-07 RX ADMIN — FENTANYL CITRATE 50 MCG: 50 INJECTION INTRAMUSCULAR; INTRAVENOUS at 11:30

## 2020-07-07 NOTE — PROGRESS NOTES
Pt. In angio holding for further monitoring. Po fluids offered and provided as well as crackers.  Voicing no complaints

## 2020-07-07 NOTE — PROGRESS NOTES
RENAL  PROGRESS NOTE        Subjective:   Doing well,in IR waiting for kidney biopsy  Objective:   VITALS SIGNS:    Visit Vitals  /68 (BP 1 Location: Right arm, BP Patient Position: Head of bed elevated (Comment degrees))   Pulse 62   Temp 98.2 °F (36.8 °C)   Resp 12   Ht 5' 9\" (1.753 m)   Wt 77.2 kg (170 lb 3.1 oz)   SpO2 100%   BMI 25.13 kg/m²       O2 Device: Room air       Temp (24hrs), Av °F (36.7 °C), Min:97.6 °F (36.4 °C), Max:98.4 °F (36.9 °C)         PHYSICAL EXAM:  +1 edema    DATA REVIEW:     INTAKE / OUTPUT:   Last shift:      701 - 1900  In: 0   Out: 100 [Urine:100]  Last 3 shifts: 1901 -  07  In: 960 [P.O.:960]  Out: 1000 [Urine:1000]    Intake/Output Summary (Last 24 hours) at 2020 1139  Last data filed at 2020 0751  Gross per 24 hour   Intake 360 ml   Output 500 ml   Net -140 ml         LABS:   Recent Labs     20  0421 20  0340   WBC 7.7 8.1   HGB 12.6 12.8   HCT 39.4 40.4    182     Recent Labs     20  0447 20  0433 20  0421 20  0340    138  --  139   K 4.5 4.0  --  4.2    109*  --  110*   CO2 24 22  --  22   GLU 97 107*  --  94   BUN 35* 33*  --  28*   CREA 2.35* 2.36*  --  2.06*   CA 8.2* 8.2*  --  8.4*   PHOS 4.6 4.0  --  3.6   ALB 1.7*  1.7* 1.8*  --  1.7*   TBILI 0.2  --   --   --    ALT 91*  --   --   --    INR  --   --  1.0  --            Assessment:       ECHO: Creatinine peaked at 3.9; now stable at ~ 2.  with nephrotic syndrome  (7.1gm) is concerning particularly given + underlying Hep C;pos cryog but C3. C4 normal   ;need kidney biopsy     HTN: Poorly controlled     Cocaine abuse: chronic use     Hypokalemia     Hep C+     Plan:   Aware of risk of kidney biopsy /bleeding(incudes the need of PRBC,intervention to stop bleed),he is aware of benefit of biopsy for diagnostic and therapeutic purposes  Kidney biopsy today  Post biopsy orders including h/h  Hep sq on hold       Discussed in length with him,radiology team  Deshaun Urias MD

## 2020-07-07 NOTE — PROGRESS NOTES
Bedside and Verbal shift change report given to [address] [place] [address] (oncoming nurse) by Hardy Green (offgoing nurse). Report included the following information SBAR, MAR, Recent Results and Med Rec Status.

## 2020-07-07 NOTE — ROUTINE PROCESS
TRANSFER - OUT REPORT:    Verbal report given to Lemuel(name) on Betina Knee  being transferred to [facility](unit) for routine progression of care       Report consisted of patients Situation, Background, Assessment and   Recommendations(SBAR). Information from the following report(s) SBAR, Procedure Summary and MAR was reviewed with the receiving nurse. Lines:   Peripheral IV 06/28/20 Left Antecubital (Active)   Site Assessment Clean, dry, & intact 7/7/2020  7:51 AM   Phlebitis Assessment 0 7/7/2020  7:51 AM   Infiltration Assessment 0 7/7/2020  7:51 AM   Dressing Status Clean, dry, & intact 7/7/2020  7:51 AM   Dressing Type Tape;Transparent 7/7/2020  7:51 AM   Hub Color/Line Status Capped 7/7/2020  7:51 AM   Action Taken Open ports on tubing capped 7/7/2020  7:51 AM   Alcohol Cap Used Yes 7/7/2020  7:51 AM        Opportunity for questions and clarification was provided.       Patient transported with:   [employer]

## 2020-07-07 NOTE — PROGRESS NOTES
Pt arrives via stretcher to angio department accompanied by the transporter for CT guided renal bx procedure. All assessments completed and consent was reviewed. Education given was regarding procedure, conscious sedation, post-procedure care and  management/follow-up. Opportunity for questions was provided and all questions and concerns were addressed.

## 2020-07-07 NOTE — PROGRESS NOTES
Problem: Falls - Risk of  Goal: *Absence of Falls  Description: Document Bryon Jorgensen Fall Risk and appropriate interventions in the flowsheet. Outcome: Progressing Towards Goal  Note: Fall Risk Interventions:            Medication Interventions: Patient to call before getting OOB    Elimination Interventions: Call light in reach         Patient is alert and oriented X 4. Up and chelsey. No chest pain or SOB. Complains of acid reflux, gas pain 2/10. Provider notified. Orders received. CHG bath given. NPO since midnight. Heparin held for kidney biopsy.              Bedside and Verbal shift change report given to [address]) by Miki (offgoing nurse).  Report included the following information SBAR, Kardex, Procedure Summary, Intake/Output, MAR, Recent Results, Med Rec Status and Cardiac Rhythm.

## 2020-07-07 NOTE — H&P
Radiology History and Physical    Patient: Kylie Dec 47 y.o. male       Chief Complaint: Shortness of Breath      History of Present Illness: Nephrotic syndrome    History:    Past Medical History:   Diagnosis Date    Hypertension      History reviewed. No pertinent family history. Social History     Socioeconomic History    Marital status: SINGLE     Spouse name: Not on file    Number of children: Not on file    Years of education: Not on file    Highest education level: Not on file   Occupational History    Not on file   Social Needs    Financial resource strain: Not on file    Food insecurity     Worry: Not on file     Inability: Not on file    Transportation needs     Medical: Not on file     Non-medical: Not on file   Tobacco Use    Smoking status: Current Every Day Smoker     Packs/day: 1.00    Smokeless tobacco: Never Used   Substance and Sexual Activity    Alcohol use:  Yes    Drug use: Yes     Types: Marijuana    Sexual activity: Not Currently   Lifestyle    Physical activity     Days per week: Not on file     Minutes per session: Not on file    Stress: Not on file   Relationships    Social connections     Talks on phone: Not on file     Gets together: Not on file     Attends Bahai service: Not on file     Active member of club or organization: Not on file     Attends meetings of clubs or organizations: Not on file     Relationship status: Not on file    Intimate partner violence     Fear of current or ex partner: Not on file     Emotionally abused: Not on file     Physically abused: Not on file     Forced sexual activity: Not on file   Other Topics Concern    Not on file   Social History Narrative    Not on file       Allergies: No Known Allergies    Current Medications:  Current Facility-Administered Medications   Medication Dose Route Frequency    midazolam (PF) (VERSED) 1 mg/mL injection        0.9% sodium chloride infusion  50 mL/hr IntraVENous CONTINUOUS    midazolam (VERSED) injection 5 mg  5 mg IntraVENous Rad Multiple    fentaNYL citrate (PF) injection 200 mcg  200 mcg IntraVENous Rad Multiple    hydrALAZINE (APRESOLINE) tablet 100 mg  100 mg Oral TID    fluticasone propionate (FLONASE) 50 mcg/actuation nasal spray 2 Spray  2 Spray Both Nostrils DAILY PRN    calcium carbonate (TUMS) chewable tablet 200 mg [elemental]  200 mg Oral TID PRN    bacitracin 500 unit/gram packet 1 Packet  1 Packet Topical TID    melatonin tablet 3 mg  3 mg Oral QHS PRN    cloNIDine HCL (CATAPRES) tablet 0.2 mg  0.2 mg Oral Q8H    carvediloL (COREG) tablet 3.125 mg  3.125 mg Oral BID WITH MEALS    amLODIPine (NORVASC) tablet 10 mg  10 mg Oral DAILY    sodium chloride (NS) flush 5-40 mL  5-40 mL IntraVENous Q8H    sodium chloride (NS) flush 5-40 mL  5-40 mL IntraVENous PRN    acetaminophen (TYLENOL) tablet 650 mg  650 mg Oral Q4H PRN    ondansetron (ZOFRAN) injection 4 mg  4 mg IntraVENous Q4H PRN    hydrALAZINE (APRESOLINE) tablet 50 mg  50 mg Oral Q8H PRN        Physical Exam:  Blood pressure 133/68, pulse 62, temperature 98.2 °F (36.8 °C), resp. rate 12, height 5' 9\" (1.753 m), weight 77.2 kg (170 lb 3.1 oz), SpO2 100 %. LUNG: clear to auscultation bilaterally, HEART: regular rate and rhythm      Alerts:    Hospital Problems  Date Reviewed: 6/11/2019          Codes Class Noted POA    Acute renal failure (ARF) ([facility] ICD-10-CM: N17.9  ICD-9-CM: 584.9  6/28/2020 Unknown              Laboratory:      Recent Labs     07/07/20  0447  07/06/20  0421   HGB  --   --  12.6   HCT  --   --  39.4   WBC  --   --  7.7   PLT  --   --  180   INR  --   --  1.0   BUN 35*   < >  --    CREA 2.35*   < >  --    K 4.5   < >  --     < > = values in this interval not displayed. Plan of Care/Planned Procedure:  Risks, benefits, and alternatives reviewed with patient and he agrees to proceed with the procedure. Moderate Sedation performed with Versed and Fentanyl.   CT guided renal biopsy site to be determined      Rex Mooney MD

## 2020-07-07 NOTE — PROGRESS NOTES
7/7/2020 -   LALY:  - RUR: 14%  - Disposition is TBD - potential placement at [facility]  - Transport will likely be via patient's sister    - Patient to have kidney biopsy today, 7/7  - BP is still elevated    09:10 -   CM spoke with patient regarding disposition planning. Patient has not called [facility] yet; CM encouraged patient to call this morning, and CM reminded patient of need to establish initial point of contact on own. Patient expressed understanding and plan to call; CM to follow up with patient this afternoon.   CRM: Moises Jo, MPH, [address] [place] [address]; Z: [phone]

## 2020-07-08 VITALS
WEIGHT: 167.55 LBS | SYSTOLIC BLOOD PRESSURE: 122 MMHG | DIASTOLIC BLOOD PRESSURE: 68 MMHG | TEMPERATURE: 97.8 F | BODY MASS INDEX: 24.82 KG/M2 | RESPIRATION RATE: 20 BRPM | HEIGHT: 69 IN | HEART RATE: 75 BPM | OXYGEN SATURATION: 96 %

## 2020-07-08 LAB
ALBUMIN SERPL-MCNC: 1.7 G/DL (ref 3.5–5)
ANION GAP SERPL CALC-SCNC: 7 MMOL/L (ref 5–15)
BUN SERPL-MCNC: 41 MG/DL (ref 6–20)
BUN/CREAT SERPL: 16 (ref 12–20)
CALCIUM SERPL-MCNC: 8 MG/DL (ref 8.5–10.1)
CHLORIDE SERPL-SCNC: 104 MMOL/L (ref 97–108)
CO2 SERPL-SCNC: 24 MMOL/L (ref 21–32)
CREAT SERPL-MCNC: 2.52 MG/DL (ref 0.7–1.3)
ERYTHROCYTE [DISTWIDTH] IN BLOOD BY AUTOMATED COUNT: 12.6 % (ref 11.5–14.5)
GLUCOSE SERPL-MCNC: 96 MG/DL (ref 65–100)
HCT VFR BLD AUTO: 39.9 % (ref 36.6–50.3)
HGB BLD-MCNC: 12.5 G/DL (ref 12.1–17)
MCH RBC QN AUTO: 29.4 PG (ref 26–34)
MCHC RBC AUTO-ENTMCNC: 31.3 G/DL (ref 30–36.5)
MCV RBC AUTO: 93.9 FL (ref 80–99)
NRBC # BLD: 0 K/UL (ref 0–0.01)
NRBC BLD-RTO: 0 PER 100 WBC
PHOSPHATE SERPL-MCNC: 5 MG/DL (ref 2.6–4.7)
PLATELET # BLD AUTO: 201 K/UL (ref 150–400)
PMV BLD AUTO: 11.7 FL (ref 8.9–12.9)
POTASSIUM SERPL-SCNC: 4.4 MMOL/L (ref 3.5–5.1)
RBC # BLD AUTO: 4.25 M/UL (ref 4.1–5.7)
SODIUM SERPL-SCNC: 135 MMOL/L (ref 136–145)
WBC # BLD AUTO: 7.9 K/UL (ref 4.1–11.1)

## 2020-07-08 PROCEDURE — 85027 COMPLETE CBC AUTOMATED: CPT

## 2020-07-08 PROCEDURE — 74011250637 HC RX REV CODE- 250/637: Performed by: HOSPITALIST

## 2020-07-08 PROCEDURE — 74011250637 HC RX REV CODE- 250/637: Performed by: INTERNAL MEDICINE

## 2020-07-08 PROCEDURE — 36415 COLL VENOUS BLD VENIPUNCTURE: CPT

## 2020-07-08 PROCEDURE — 74011000250 HC RX REV CODE- 250: Performed by: NURSE PRACTITIONER

## 2020-07-08 PROCEDURE — 80069 RENAL FUNCTION PANEL: CPT

## 2020-07-08 RX ORDER — FLUTICASONE PROPIONATE 50 MCG
SPRAY, SUSPENSION (ML) NASAL
Qty: 1 BOTTLE | Refills: 0 | Status: SHIPPED | OUTPATIENT
Start: 2020-07-08 | End: 2021-03-18

## 2020-07-08 RX ORDER — ACETAMINOPHEN 325 MG/1
650 TABLET ORAL
Qty: 30 TAB | Refills: 0 | Status: SHIPPED
Start: 2020-07-08 | End: 2021-03-18

## 2020-07-08 RX ORDER — CLONIDINE HYDROCHLORIDE 0.2 MG/1
0.2 TABLET ORAL EVERY 8 HOURS
Qty: 90 TAB | Refills: 0 | Status: SHIPPED | OUTPATIENT
Start: 2020-07-08 | End: 2021-01-15

## 2020-07-08 RX ORDER — HYDRALAZINE HYDROCHLORIDE 100 MG/1
100 TABLET, FILM COATED ORAL 3 TIMES DAILY
Qty: 90 TAB | Refills: 0 | Status: SHIPPED | OUTPATIENT
Start: 2020-07-08 | End: 2021-03-18

## 2020-07-08 RX ORDER — CARVEDILOL 3.12 MG/1
3.12 TABLET ORAL 2 TIMES DAILY WITH MEALS
Qty: 60 TAB | Refills: 0 | Status: SHIPPED | OUTPATIENT
Start: 2020-07-08 | End: 2021-03-18

## 2020-07-08 RX ORDER — AMLODIPINE BESYLATE 10 MG/1
10 TABLET ORAL DAILY
Qty: 30 TAB | Refills: 0 | Status: SHIPPED | OUTPATIENT
Start: 2020-07-09 | End: 2022-01-19

## 2020-07-08 RX ADMIN — Medication 10 ML: at 06:27

## 2020-07-08 RX ADMIN — CLONIDINE HYDROCHLORIDE 0.2 MG: 0.2 TABLET ORAL at 06:25

## 2020-07-08 RX ADMIN — CARVEDILOL 3.12 MG: 3.12 TABLET, FILM COATED ORAL at 08:33

## 2020-07-08 RX ADMIN — BACITRACIN 1 PACKET: 500 OINTMENT TOPICAL at 08:33

## 2020-07-08 RX ADMIN — ACETAMINOPHEN 650 MG: 325 TABLET ORAL at 08:42

## 2020-07-08 RX ADMIN — AMLODIPINE BESYLATE 10 MG: 5 TABLET ORAL at 08:33

## 2020-07-08 RX ADMIN — HYDRALAZINE HYDROCHLORIDE 100 MG: 50 TABLET, FILM COATED ORAL at 08:33

## 2020-07-08 NOTE — PROGRESS NOTES
Problem: Falls - Risk of  Goal: *Absence of Falls  Description: Document De[place] Fall Risk and appropriate interventions in the flowsheet. Outcome: Progressing Towards Goal  Note: Fall Risk Interventions:            Medication Interventions: Teach patient to arise slowly, Patient to call before getting OOB    Elimination Interventions: Call light in reach              Problem: Patient Education: Go to Patient Education Activity  Goal: Patient/Family Education  Outcome: Progressing Towards Goal     Problem: Nutrition Deficit  Goal: *Optimize nutritional status  Outcome: Progressing Towards Goal     Problem: Discharge Planning  Goal: *Discharge to safe environment  Description: See CM Notes.   CRM: Joey Tucker, MPH, [facility]; Z: [phone]     Outcome: Progressing Towards Goal     Problem: Acute Renal Failure: Day 4  Goal: Discharge Planning  Outcome: Progressing Towards Goal

## 2020-07-08 NOTE — PROGRESS NOTES
Problem: Falls - Risk of  Goal: *Absence of Falls  Description: Document Dl Mondragon Fall Risk and appropriate interventions in the flowsheet. Outcome: Progressing Towards Goal  Note: Fall Risk Interventions:        Pt ambulates in gregorio without difficulty.     Medication Interventions: Teach patient to arise slowly, Patient to call before getting OOB    Elimination Interventions: Call light in reach              Problem: Acute Renal Failure: Day 2  Goal: Diagnostic Test/Procedures  Outcome: Progressing Towards Goal  Note: Continue to monitor lab values

## 2020-07-08 NOTE — DISCHARGE SUMMARY
Discharge Summary       PATIENT ID: Silverio Issa  MRN: [id number]   YOB: 1965    DATE OF ADMISSION: 6/28/2020  2:18 PM    DATE OF DISCHARGE: 07/08/20   PRIMARY CARE PROVIDER: Lena Rivera NP     ATTENDING PHYSICIAN: Anneliese Garduno MD  DISCHARGING PROVIDER: Anneliese Garduno MD    To contact this individual call [phone] and ask the  to page. If unavailable ask to be transferred the Adult Hospitalist Department. CONSULTATIONS: IP CONSULT TO NEPHROLOGY    PROCEDURES/SURGERIES: * No surgery found *    ADMITTING DIAGNOSES & HOSPITAL COURSE:   # Acute renal failure, Creatinine peaked at 3.9; now stable at ~ 2. S/p renal [id number] on 7/7/2020. Positive cryoglobulin, normal C3-C4, ANCA negative, hepatitis B/HIV negative  # Nephrotic range Proteinuria, urine protein/cr - 7.1  #Chronic hepatitis C  #Hypertension, better controlled on amlodipine, carvedilol, clonidine and hydralazine  #History of chronic cocaine use. Not willing for inpatient rehab for the addiction at present. Suggested us to have good social support and going to live with his . #Transaminitis, secondary to hepatitis C and drug abuse    Admission Summary:   51-year-old male who presents ambulatory to the emergency room with complaints of shortness of breath and leg edema     Interval history / Subjective: Follow up ARF. Patient seen and examined at the bedside. Labs, images and notes reviewed  Discussed with nursing staff, orders reviewed. Plan discussed with patient/Family  Feeling well. Wants to go home. Nephrology okay with the discharge. CBC/hemoglobin stable. No loss hematuria renal biopsy on 7/7. No flank pain. No abdominal pain or any other concerns. No other overnight events. DISCHARGE DIAGNOSES / PLAN:      #Acute renal failure: Creatinine peaked at 3.9, now stable around 2.   #Nephrotic range Proteinuria, 7.1 g  -urine protein/cr -7.1  -With significant proteinuria and h/o hep C and drug abuse-eval of glomerulonephritis  -Nephrology following- labs noted. CT guided renal Bx done on 7/7. Report awaiting. -CBC-H/H stable post biopsy  -Renal USG-Increased parenchymal echogenicity of the kidneys, consistent with medical renal disease.  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 -C4 at low end of normal.  -No aspirin for 1 week  -No heavy lifting for 2-3 weeks  -Nephrology follow-up in 1 week    #Chronic Hep C  -Patient said he was diagnosed with hep C but was never treated  -Hep C PCR RNA noted  -Outpatient hepatology consultation encouraged.     #HTN, better controlled  -on amlodipine, carvedilol, clonidine and hydralazine.  -Goal Pre-Bx 130-140s. -Hydralazine further uptitrated from 75 mg to 100 mg TID, tolerating well with better blood pressure control  -DC HCTZ  -Tolerating low dose Coreg 3.125 mg BID, HR in 60s, caution given cocaine use, HR monitoring  -Further follow-up and monitoring with PCP, nephrologist     #Cocaine abuse  -Suggested fever for smoking it, less snorting- few times only,denied use of IV drugs  -counseled to quit. Patient agreeable and motivated. -HIV negative.  -Patient refusing voluntary inpatient direction program.  Suggested to have good support system with [facility] and is going to live with his . Patient has contact numbers for direction rehab centers. Counseled extensively about cessation and abstinence. Patient voiced understanding the same.     #Transaminitis: improving  -due to hepc C,drug abuse  -Check CMP periodically  -Outpatient hepatology follow-up as noted above.        Code status: full  DVT prophylaxis: heparin     Care Plan discussed with: patient,nurse  Anticipated Disposition: Home with  today.      ADDITIONAL CARE RECOMMENDATIONS:   Outpatient PCP follow-up in 1 week with CBC, CMP, BP monitoring  Outpatient nephrology follow-up in 1 week with CBC, CMP, BP monitoring, renal biopsy follow-up  Outpatient hepatology establishment and follow-up in 1 month for hepatitis C positive status with transaminitis. Defer to PCP for further monitoring and arrangement if any needed  · It is important that you take the medication exactly as they are prescribed. · Keep your medication in the bottles provided by the pharmacist and keep a list of the medication names, dosages, and times to be taken in your wallet. · Do not take other medications without consulting your doctor. · No drinking alcohol or driving car or operating machinery if you are on narcotic pain medications. Donot take sedating mediations if you are sleepy or confused. · Fall Precautions  · Keep Well Hydrated  · Report to your medical provider if you feel you have  developed allergies to medications  · Follow up with your PCP or Consultant for medication adjustments and refills  · Monitor for signs of fevers,chills,bleeding,chest pain and seek medical attention if you do so. PENDING TEST RESULTS:   At the time of discharge the following test results are still pending: Renal biopsy follow-up    FOLLOW UP APPOINTMENTS:    Follow-up Information     Follow up With Specialties Details Why Contact Info    [facility]   Potential inpatient addiction rehab resource P: [phone])    [facility]   Potential inpatient addiction rehab resource P: [address]   Potential inpatient and outpatient addiction rehab resources P: [address], Marilee Adams NP Nurse Practitioner In 1 week For post hospital follow up, CBC, Alvar[address] As[address], monitoring [place]  [address] [facility] [address] [address] [facility] [address]      Kaushik Monique MD Nephrology In 1 week For Renal biopsy lesion and follow up related to kidney issues including Protein leak in urine and renal failure.  [address] [place] [address] [id number]  [address] [place] [address]  [phone] DIET: Cardiac-Renal Diet    ACTIVITY: Activity as tolerated    WOUND CARE: N/A    EQUIPMENT needed: N/A      DISCHARGE MEDICATIONS:  Current Discharge Medication List      START taking these medications    Details   acetaminophen (TYLENOL) 325 mg tablet Take 2 Tabs by mouth every six (6) hours as needed for Pain or Fever. Qty: 30 Tab, Refills: 0      carvediloL (COREG) 3.125 mg tablet Take 1 Tab by mouth two (2) times daily (with meals). Indications: high blood pressure  Qty: 60 Tab, Refills: 0      cloNIDine HCL (CATAPRES) 0.2 mg tablet Take 1 Tab by mouth every eight (8) hours. Hold if:  SBP is less than or equal to 130  Qty: 90 Tab, Refills: 0      fluticasone propionate (FLONASE) 50 mcg/actuation nasal spray 2 sprays in each nostrils daily  Qty: 1 Bottle, Refills: 0      hydrALAZINE (APRESOLINE) 100 mg tablet Take 1 Tab by mouth three (3) times daily. Qty: 90 Tab, Refills: 0         CONTINUE these medications which have CHANGED    Details   amLODIPine (NORVASC) 10 mg tablet Take 1 Tab by mouth daily. Qty: 30 Tab, Refills: 0         STOP taking these medications       hydroCHLOROthiazide (HYDRODIURIL) 25 mg tablet Comments:   Reason for Stopping:                 NOTIFY YOUR PHYSICIAN FOR ANY OF THE FOLLOWING:   Fever over 101 degrees for 24 hours. Chest pain, shortness of breath, fever, chills, nausea, vomiting, diarrhea, change in mentation, falling, weakness, bleeding. Severe pain or pain not relieved by medications. Or, any other signs or symptoms that you may have questions about.     DISPOSITION:  X Home With:   OT  PT  HH  RN       Long term SNF/Inpatient Rehab    Independent/assisted living    Hospice    Other:       PATIENT CONDITION AT DISCHARGE:     Functional status    Poor     Deconditioned    X Independent      Cognition   X Lucid     Forgetful     Dementia      Catheters/lines (plus indication)    [place]     PICC     PEG    X None      Code status   X Full code     DNR      PHYSICAL EXAMINATION AT DISCHARGE:  Constitutional:  No acute distress, cooperative, pleasant    ENT:  Oral mucosa moist, oropharynx benign,EOMI,ancietric sclera   Resp:  no wheezing,clear to auscultation b/l   CV:  Regular rhythm, normal rate, S1,S2 wnl    GI:  Soft, non distended, non tender. normoactive bowel sounds    Musculoskeletal:  Minimal LE edema    Neurologic:  Moves all extremities. AAOx3, CN II-XII reviewed                         Psych: Not anxious nor agitated. Skin; no rashes        CHRONIC MEDICAL DIAGNOSES:  Problem List as of 7/8/2020 Date Reviewed: 6/11/2019          Codes Class Noted - Resolved    Acute renal failure (ARF) ([facility] ICD-10-CM: N17.9  ICD-9-CM: 584.9  6/28/2020 - Present        Elevated troponin ICD-10-CM: R79.89  ICD-9-CM: 790.6  6/13/2020 - Present        ECHO (acute kidney injury) ([facility] ICD-10-CM: N17.9  ICD-9-CM: 584.9  6/13/2020 - Present        Chest pain ICD-10-CM: R07.9  ICD-9-CM: 786.50  5/10/2019 - Present        Hypertensive emergency ICD-10-CM: I16.1  ICD-9-CM: 401.9  5/10/2019 - Present        RESOLVED: Cocaine abuse ([facility] ICD-10-CM: F14.10  ICD-9-CM: 305.60  5/10/2019 - 5/24/2019            Nm Lung Scan Perf    Result Date: 6/29/2020  IMPRESSION: Normal perfusion imaging. The patient could not tolerate ventilation imaging. Xr Chest Pa Lat    Result Date: 6/28/2020  IMPRESSION: No acute cardiopulmonary process. Ct Chest Wo Cont    Result Date: 6/28/2020  IMPRESSION: Clear lungs. Us Retroperitoneum Comp    Result Date: 6/30/2020  IMPRESSION: 1. No hydronephrosis. 2. Increased parenchymal echogenicity of the kidneys, consistent with medical renal disease. Ct Bx Renal Ndl Perc    Result Date: 7/7/2020  IMPRESSION: Successful CT guided left kidney biopsy. Pathology pending.      Recent Results (from the past 24 hour(s))   HEMATOCRIT    Collection Time: 07/07/20  5:06 PM   Result Value Ref Range    HCT 39.6 36.6 - 50.3 %   RENAL FUNCTION PANEL    Collection Time: 07/08/20  4:56 AM   Result Value Ref Range    Sodium 135 (L) 136 - 145 mmol/L    Potassium 4.4 3.5 - 5.1 mmol/L    Chloride 104 97 - 108 mmol/L    CO2 24 21 - 32 mmol/L    Anion gap 7 5 - 15 mmol/L    Glucose 96 65 - 100 mg/dL    BUN 41 (H) 6 - 20 MG/DL    Creatinine 2.52 (H) 0.70 - 1.30 MG/DL    BUN/Creatinine ratio 16 12 - 20      GFR est AA 32 (L) >60 ml/min/1.73m2    GFR est non-AA 27 (L) >60 ml/min/1.73m2    Calcium 8.0 (L) 8.5 - 10.1 MG/DL    Phosphorus 5.0 (H) 2.6 - 4.7 MG/DL    Albumin 1.7 (L) 3.5 - 5.0 g/dL   CBC W/O DIFF    Collection Time: 07/08/20 12:41 PM   Result Value Ref Range    WBC 7.9 4.1 - 11.1 K/uL    RBC 4.25 4. 10 - 5.70 M/uL    HGB 12.5 12.1 - 17.0 g/dL    HCT 39.9 36.6 - 50.3 %    MCV 93.9 80.0 - 99.0 FL    MCH 29.4 26.0 - 34.0 PG    MCHC 31.3 30.0 - 36.5 g/dL    RDW 12.6 11.5 - 14.5 %    PLATELET 727 866 - 081 K/uL    MPV 11.7 8.9 - 12.9 FL    NRBC 0.0 0  WBC    ABSOLUTE NRBC 0.00 0.00 - 0.01 K/uL       Greater than 40 minutes were spent with the patient on counseling and coordination of care    Signed:   Kimberly Brown MD  7/8/2020  2:40 PM

## 2020-07-08 NOTE — DISCHARGE INSTRUCTIONS
Discharge Instructions       PATIENT ID: Tonny Brown  MRN: [id number]   YOB: 1965    DATE OF ADMISSION: 6/28/2020  2:18 PM    DATE OF DISCHARGE: 7/8/2020    PRIMARY CARE PROVIDER: Fortino West NP     ATTENDING PHYSICIAN: Leticia Acuna MD  DISCHARGING PROVIDER: Tiarra Truong MD    To contact this individual call [phone] and ask the  to page. If unavailable ask to be transferred the Adult Hospitalist Department. DISCHARGE DIAGNOSES   # Acute renal failure, Creatinine peaked at 3.9; now stable at ~ 2. S/p renal [id number] on 7/7/2020. Positive cryoglobulin, normal C3-C4, ANCA negative, hepatitis B/HIV negative  # Nephrotic range Proteinuria, urine protein/cr - 7.1  #Chronic hepatitis C  #Hypertension, better controlled on amlodipine, carvedilol, clonidine and hydralazine  #History of chronic cocaine use. Not willing for inpatient rehab for the addiction at present. Suggested us to have good social support and going to live with his .   #Transaminitis, secondary to hepatitis C and drug abuse    CONSULTATIONS: IP CONSULT TO NEPHROLOGY    PROCEDURES/SURGERIES: * No surgery found *    PENDING TEST RESULTS:   At the time of discharge the following test results are still pending: Renal biopsy with nephrologist-kidney doctor    FOLLOW UP APPOINTMENTS:   Follow-up Information     Follow up With Specialties Details Why Contact Info    [facility]   Potential inpatient addiction rehab resource P: [phone])    [facility]   Potential inpatient addiction rehab resource P: [address]   Potential inpatient and outpatient addiction rehab resources P: [address], [address], NP Nurse Practitioner In 1 week For post hospital follow up, CBC, [address], monitoring [facility]  [address] [facility]      Elizabeth Lehman MD Nephrology In 1 week For Renal biopsy lesion and follow up related to kidney issues including Protein leak in urine and renal failure. Nicole Lange MD Hepatology, Liver Disease, Internal Medicine Call Please call office to set up an appointment with liver doctor-hepatologist [address] [address]  [phone]             ADDITIONAL CARE RECOMMENDATIONS:   Outpatient PCP follow-up in 1 week with CBC, CMP, BP monitoring  Outpatient nephrology follow-up in 1 week with CBC, CMP, BP monitoring, renal biopsy follow-up  Outpatient hepatology establishment and follow-up in 1 month for hepatitis C positive status with transaminitis. Defer to PCP for further monitoring and arrangement if any needed  · It is important that you take the medication exactly as they are prescribed. · Keep your medication in the bottles provided by the pharmacist and keep a list of the medication names, dosages, and times to be taken in your wallet. · Do not take other medications without consulting your doctor. · No drinking alcohol or driving car or operating machinery if you are on narcotic pain medications. Donot take sedating mediations if you are sleepy or confused. · Fall Precautions  · Keep Well Hydrated  · Report to your medical provider if you feel you have  developed allergies to medications  · Follow up with your PCP or Consultant for medication adjustments and refills  · Monitor for signs of fevers,chills,bleeding,chest pain and seek medical attention if you do so. DIET: Cardiac-Renal Diet    ACTIVITY: Activity as tolerated    WOUND CARE: N/A    EQUIPMENT needed: N/A      DISCHARGE MEDICATIONS:   See Medication Reconciliation Form    · It is important that you take the medication exactly as they are prescribed. · Keep your medication in the bottles provided by the pharmacist and keep a list of the medication names, dosages, and times to be taken in your wallet.    · Do not take other medications without consulting your doctor. NOTIFY YOUR PHYSICIAN FOR ANY OF THE FOLLOWING:   Fever over 101 degrees for 24 hours. Chest pain, shortness of breath, fever, chills, nausea, vomiting, diarrhea, change in mentation, falling, weakness, bleeding. Severe pain or pain not relieved by medications. Or, any other signs or symptoms that you may have questions about. DISPOSITION:  x  Home With:   OT  PT  HH  RN       SNF/Inpatient Rehab/LTAC    Independent/assisted living    Hospice    Other:     CDMP Checked:   Yes x     PROBLEM LIST Updated:  Yes x        My Medications      START taking these medications      Instructions Each Dose to Equal Morning Noon Evening Bedtime   acetaminophen 325 mg tablet  Commonly known as:  TYLENOL    Your last dose was: Your next dose is: Take 2 Tabs by mouth every six (6) hours as needed for Pain or Fever. 650 mg                 carvediloL 3.125 mg tablet  Commonly known as:  COREG    Your last dose was: Your next dose is: Take 1 Tab by mouth two (2) times daily (with meals). Indications: high blood pressure   3.125 mg                 cloNIDine HCL 0.2 mg tablet  Commonly known as:  CATAPRES    Your last dose was: Your next dose is: Take 1 Tab by mouth every eight (8) hours. Hold if:  SBP is less than or equal to 130   0.2 mg                 fluticasone propionate 50 mcg/actuation nasal spray  Commonly known as:  FLONASE    Your last dose was: Your next dose is:          2 sprays in each nostrils daily                  hydrALAZINE 100 mg tablet  Commonly known as:  APRESOLINE    Your last dose was: Your next dose is: Take 1 Tab by mouth three (3) times daily.    100 mg                    CHANGE how you take these medications      Instructions Each Dose to Equal Morning Noon Evening Bedtime   amLODIPine 10 mg tablet  Commonly known as:  NORVASC  Start taking on:  July 9, 2020  What changed:    · medication strength  · how much to take    Your last dose was: Your next dose is: Take 1 Tab by mouth daily. 10 mg                    STOP taking these medications    hydroCHLOROthiazide 25 mg tablet  Commonly known as:  HYDRODIURIL              Where to Get Your Medications      Information on where to get these meds will be given to you by the nurse or doctor.     Ask your nurse or doctor about these medications  · acetaminophen 325 mg tablet  · amLODIPine 10 mg tablet  · carvediloL 3.125 mg tablet  · cloNIDine HCL 0.2 mg tablet  · fluticasone propionate 50 mcg/actuation nasal spray  · hydrALAZINE 100 mg tablet         Signed:   Emily Woo MD  7/8/2020  2:40 PM

## 2020-07-08 NOTE — PROGRESS NOTES
[address] Adult  Hospitalist Group                                                                                          Hospitalist Progress Note  Levi Ching MD  Answering service: [phone] from in house phone        Date of Service:  2020  NAME:  Ananth Mortensen  :  1965  MRN:  [id number]      Admission Summary:   80-year-old male who presents ambulatory to the emergency room with complaints of shortness of breath and leg edema    Interval history / Subjective: Follow up ARF. Patient seen and examined at the bedside. Labs, images and notes reviewed  Discussed with nursing staff, orders reviewed. Plan discussed with patient/Family  Feeling ok. Tolerated Renal Bx well. No other concerns or overnight events. Assessment & Plan:     #Acute renal failure: creatinine up at 2.36  #Nephrotic range Proteinuria  -urine protein/cr -7.1  -Cr 2.2. With significant proteinuria and h/o hep C and drug abuse-will need further eval of glomerulonephritis  -Nephrology following- labs noted. CT guided renal Bx.  -Hold Heparin. H/H monitoring.  -Renal USG-Increased parenchymal echogenicity of the kidneys, consistent with medical  renal disease.  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 -C4 at low end of normal.    #Chronic Hep C  Patient said he was diagnosed with hep C but was never treated  -Hep C PCR RNA pending. Ordered    #HTN, better controlled  -on amlodipine,clonidine and hydralazine.  -Goal Pre-Bx 130-140s. Currently in 140s.   -Hydralazine further uptitrated from 75 mg to 100 mg TID  -Hold HCTZ  -Tolerating low dose Coreg 3.125 mg BID, HR in 60s, caution given cocaine use, HR monitoring  -If needed would consider adding alpha blockade cautiously    #Cocaine abuse  -Suggested fever for smoking it, less snorting- few times only,denied use of IV drugs  -counseled to quit  -HIV negative. #Transaminitis: improving  -due to hepc C,drug abuse  -Check CMP periodically    #DVT ruled out      Code status: full  DVT prophylaxis: heparin    Care Plan discussed with: patient,nurse  Anticipated Disposition:TBD. To [facility] addiction [facility] [place]. Pt has to make the call. Pt aware. Appreciate CM assistance. Anticipated Discharge: TBD. Post CT Guided renal bx and further plans. Hospital Problems  Date Reviewed: 6/11/2019          Codes Class Noted POA    Acute renal failure (ARF) ([facility] [address] ICD-10-CM: N17.9  ICD-9-CM: 584.9  6/28/2020 Unknown                Review of Systems:   As per HPI    Vital Signs:    Last 24hrs VS reviewed since prior progress note. Most recent are:  Visit Vitals  /57 (BP 1 Location: Right arm, BP Patient Position: At rest)   Pulse 72   Temp 98.2 °F (36.8 °C)   Resp 18   Ht 5' 9\" (1.753 m)   Wt 77.2 kg (170 lb 3.1 oz)   SpO2 94%   BMI 25.13 kg/m²         Intake/Output Summary (Last 24 hours) at 7/7/2020 2157  Last data filed at 7/7/2020 1257  Gross per 24 hour   Intake 0 ml   Output 400 ml   Net -400 ml        Physical Examination:        Constitutional:  No acute distress, cooperative, pleasant    ENT:  Oral mucosa moist, oropharynx benign,EOMI,ancietric sclera   Resp:  no wheezing,clear to auscultation b/l   CV:  Regular rhythm, normal rate, S1,S2 wnl    GI:  Soft, non distended, non tender. normoactive bowel sounds    Musculoskeletal:  Minimal LE edema    Neurologic:  Moves all extremities. AAOx3, CN II-XII reviewed     Psych: Not anxious nor agitated. Skin; no rashes       Data Review:    Review and/or order of clinical lab test  Review and/or order of tests in the medicine section of CPT    Nm Lung Scan Perf    Result Date: 6/29/2020  IMPRESSION: Normal perfusion imaging. The patient could not tolerate ventilation imaging.      Xr Chest Pa Lat    Result Date: 6/28/2020  IMPRESSION: No acute cardiopulmonary process. Ct Chest Wo Cont    Result Date: 6/28/2020  IMPRESSION: Clear lungs. Us Retroperitoneum Comp    Result Date: 6/30/2020  IMPRESSION: 1. No hydronephrosis. 2. Increased parenchymal echogenicity of the kidneys, consistent with medical renal disease. Ct Bx Renal Ndl Perc    Result Date: 7/7/2020  IMPRESSION: Successful CT guided left kidney biopsy. Pathology pending. Labs:     Recent Labs     07/07/20  1706 07/06/20  0421 07/05/20  0340   WBC  --  7.7 8.1   HGB  --  12.6 12.8   HCT 39.6 39.4 40.4   PLT  --  180 182     Recent Labs     07/07/20 0447 07/06/20  0433 07/05/20  0340    138 139   K 4.5 4.0 4.2    109* 110*   CO2 24 22 22   BUN 35* 33* 28*   CREA 2.35* 2.36* 2.06*   GLU 97 107* 94   CA 8.2* 8.2* 8.4*   PHOS 4.6 4.0 3.6     Recent Labs     07/07/20 0447 07/06/20  0433 07/05/20  0340   ALT 91*  --   --    *  --   --    TBILI 0.2  --   --    TP 6.5  --   --    ALB 1.7*  1.7* 1.8* 1.7*   GLOB 4.8*  --   --      Recent Labs     07/06/20 0421   INR 1.0   PTP 10.5   APTT 28.3      No results for input(s): FE, TIBC, PSAT, FERR in the last 72 hours. No results found for: FOL, RBCF   No results for input(s): PH, PCO2, PO2 in the last 72 hours. No results for input(s): CPK, CKNDX, TROIQ in the last 72 hours.     No lab exists for component: CPKMB  Lab Results   Component Value Date/Time    Cholesterol, total 170 06/29/2020 03:46 AM    HDL Cholesterol 49 06/29/2020 03:46 AM    LDL, calculated 73.4 06/29/2020 03:46 AM    Triglyceride 238 (H) 06/29/2020 03:46 AM    CHOL/HDL Ratio 3.5 06/29/2020 03:46 AM     No results found for: [facility]  Lab Results   Component Value Date/Time    Color YELLOW/STRAW 06/28/2020 08:19 PM    Appearance CLEAR 06/28/2020 08:19 PM    Specific gravity 1.012 06/28/2020 08:19 PM    pH (UA) 5.5 06/28/2020 08:19 PM    Protein 300 (A) 06/28/2020 08:19 PM    Glucose Negative 06/28/2020 08:19 PM    Ketone Negative 06/28/2020 08:19 PM Bilirubin Negative 06/28/2020 08:19 PM    Urobilinogen 0.2 06/28/2020 08:19 PM    Nitrites Negative 06/28/2020 08:19 PM    Leukocyte Esterase Negative 06/28/2020 08:19 PM    Epithelial cells FEW 06/28/2020 08:19 PM    Bacteria Negative 06/28/2020 08:19 PM    WBC 0-4 06/28/2020 08:19 PM    RBC 0-5 06/28/2020 08:19 PM         Medications Reviewed:     Current Facility-Administered Medications   Medication Dose Route Frequency    cloNIDine HCL (CATAPRES) tablet 0.1 mg  0.1 mg Oral PRN    hydrALAZINE (APRESOLINE) tablet 100 mg  100 mg Oral TID    fluticasone propionate (FLONASE) 50 mcg/actuation nasal spray 2 Spray  2 Spray Both Nostrils DAILY PRN    calcium carbonate (TUMS) chewable tablet 200 mg [elemental]  200 mg Oral TID PRN    bacitracin 500 unit/gram packet 1 Packet  1 Packet Topical TID    melatonin tablet 3 mg  3 mg Oral QHS PRN    cloNIDine HCL (CATAPRES) tablet 0.2 mg  0.2 mg Oral Q8H    carvediloL (COREG) tablet 3.125 mg  3.125 mg Oral BID WITH MEALS    amLODIPine (NORVASC) tablet 10 mg  10 mg Oral DAILY    sodium chloride (NS) flush 5-40 mL  5-40 mL IntraVENous Q8H    sodium chloride (NS) flush 5-40 mL  5-40 mL IntraVENous PRN    acetaminophen (TYLENOL) tablet 650 mg  650 mg Oral Q4H PRN    ondansetron (ZOFRAN) injection 4 mg  4 mg IntraVENous Q4H PRN    hydrALAZINE (APRESOLINE) tablet 50 mg  50 mg Oral Q8H PRN     ______________________________________________________________________  EXPECTED LENGTH OF STAY: 2d 4h  ACTUAL LENGTH OF STAY:          9                 Krishan Nick MD

## 2020-07-08 NOTE — PROGRESS NOTES
7/8/2020 -   LALY:  - RUR: 14%  - Disposition is for discharge to own home with transport via his sister    - Renal biopsy results are pending   - Nephro input is pending biopsy results    09:00 -   CM met with patient who identified that he has spoken to his  this morning regarding potential addiction rehab placement. Per the patient, his family and [employer] community have a plan in place to help support his sober living, and patient has declined inpatient placement at this time. CM offered to place resource information on AVS, to which patient agreed.   CRM: Joey Tucker, MPH, [address] [place] [address]; Z: [phone]

## 2020-07-08 NOTE — PROGRESS NOTES
RENAL  PROGRESS NOTE        Subjective:   Doing well post biopsy,no macrohematuria as per him,no back pain  Objective:   VITALS SIGNS:    Visit Vitals  /72 (BP 1 Location: Right arm, BP Patient Position: At rest)   Pulse 66   Temp 98.3 °F (36.8 °C)   Resp 18   Ht 5' 9\" (1.753 m)   Wt 76 kg (167 lb 8.8 oz)   SpO2 96%   BMI 24.74 kg/m²       O2 Device: Room air   O2 Flow Rate (L/min): 3 l/min   Temp (24hrs), Av.9 °F (36.6 °C), Min:97.4 °F (36.3 °C), Max:98.3 °F (36.8 °C)         PHYSICAL EXAM:  +1 edema    DATA REVIEW:     INTAKE / OUTPUT:   Last shift:      701 - 1900  In: 240 [P.O.:240]  Out: -   Last 3 shifts: 1901 - 700  In: 360 [P.O.:360]  Out: 800 [Urine:800]    Intake/Output Summary (Last 24 hours) at 2020 1057  Last data filed at 2020 0834  Gross per 24 hour   Intake 240 ml   Output 300 ml   Net -60 ml         LABS:   Recent Labs     20  1706 20  0421   WBC  --  7.7   HGB  --  12.6   HCT 39.6 39.4   PLT  --  180     Recent Labs     20  0456 20  0447 20  0433 20  0421   * 137 138  --    K 4.4 4.5 4.0  --     106 109*  --    CO2 24 24 22  --    GLU 96 97 107*  --    BUN 41* 35* 33*  --    CREA 2.52* 2.35* 2.36*  --    CA 8.0* 8.2* 8.2*  --    PHOS 5.0* 4.6 4.0  --    ALB 1.7* 1.7*  1.7* 1.8*  --    TBILI  --  0.2  --   --    ALT  --  91*  --   --    INR  --   --   --  1.0           Assessment:       ECHO: Creatinine peaked at 3.9; now stable at ~ 2.  with nephrotic syndrome  (7.1gm) is concerning particularly given + underlying Hep C;pos cryog but C3. C4 normal   ;need kidney biopsy     HTN: Poorly controlled     Cocaine abuse: chronic use     Hypokalemia     Hep C+     Plan:    s/p Kidney biopsy on 20  Can dc home today if CBC stable  No ASA for 1 week  No heavy lifting for 2-3 weeks  Follow up in 1 week in the office       Discussed in length with him,   Sonia Manuel MD

## 2020-07-09 ENCOUNTER — PATIENT OUTREACH (OUTPATIENT)
Dept: FAMILY MEDICINE CLINIC | Age: 55
End: 2020-07-09

## 2020-07-09 NOTE — PROGRESS NOTES
Patient contacted regarding recent discharge and COVID-19 risk. Discussed COVID-19 related testing which was not done at this time. Test results were not done. Patient informed of results, if available? n/a    Care Transition Nurse/ Ambulatory Care Manager contacted the patient by telephone to perform post discharge assessment. Verified name and  with patient as identifiers. Patient has following risk factors of: HTN, ECHO, CTN/ACM reviewed discharge instructions, medical action plan and red flags related to discharge diagnosis. Reviewed and educated them on any new and changed medications related to discharge diagnosis. Advised obtaining a 90-day supply of all daily and as-needed medications. Education provided regarding infection prevention, and signs and symptoms of COVID-19 and when to seek medical attention with patient who verbalized understanding. Discussed exposure protocols and quarantine from [address] you at higher risk for severe illness  and given an opportunity for questions and concerns. The patient agrees to contact the COVID-19 hotline [phone] or PCP office for questions related to their healthcare. CTN/ACM provided contact information for future reference. From CDC: Are you at higher risk for severe illness?  Wash your hands often.  Avoid close contact (6 feet, which is about two arm lengths) with people who are sick.  Put distance between yourself and other people if COVID-19 is spreading in your community.  Clean and disinfect frequently touched surfaces.  Avoid all cruise travel and non-essential air travel.  Call your healthcare professional if you have concerns about COVID-19 and your underlying condition or if you are sick.     For more information on steps you can take to protect yourself, see CDC's How to Protect Yourself      Patient/family/caregiver given information for Cherri Rodrigez and agrees to enroll no  Patient's preferred e-mail:n/a  Patient's preferred phone number:n/a  Based on Loop alert triggers, patient will be contacted by nurse care manager for worsening symptoms. Plan for follow-up call in 7-14 days based on severity of symptoms and risk factors.   Follow up scheduled for 7/16/20

## 2020-07-15 LAB
CRYOGLOB SER QL 1D COLD INC: ABNORMAL
Lab: 0 %
Lab: NORMAL

## 2020-07-23 ENCOUNTER — PATIENT OUTREACH (OUTPATIENT)
Dept: CASE MANAGEMENT | Age: 55
End: 2020-07-23

## 2020-07-23 NOTE — PROGRESS NOTES
Patient resolved from Transition of Care episode on 7/23/20  Discussed COVID-19 related testing which was not done at this time. Test results were not done. Patient informed of results, if available? n/a     Patient/family has been provided the following resources and education related to COVID-19:                         Signs, symptoms and red flags related to COVID-19            CDC exposure and quarantine guidelines            [facility] exposure contact - [phone]            Contact for their local Department of Health                 Patient currently reports that the following symptoms have improved:  .for acute renal failure    No further outreach scheduled with this CTN/ACM/LPN/HC/ MA. Episode of Care resolved. Patient has this CTN/ACM/LPN/HC/MA contact information if future needs arise.

## 2020-08-12 ENCOUNTER — TELEPHONE (OUTPATIENT)
Dept: INTERNAL MEDICINE CLINIC | Age: 55
End: 2020-08-12

## 2020-08-14 NOTE — TELEPHONE ENCOUNTER
----- Message from Kristy Montalvo sent at 8/12/2020 11:50 AM EDT -----  Regarding: NP Charles/Refill  Medication Refill    Caller (if not patient):      Relationship of caller (if not patient):      Best contact number(s): [phone]      Name of medication and dosage if known: \"hydrochlorothiazide\" , and three other Rx that she doesn't know the name of      Is patient out of this medication (yes/no): yes      Pharmacy name: [facility] listed in chart? (yes/no):n/a    Pharmacy phone number: [phone]      Details to clarify the request: 185/190; headaches; transferred.       Kristy Montalvo
Is he referring to hydralazine, clonidine, coreg, and amlodipine? How long has he been out? When does he see cardio again, as they would fill several of these.
Unable to leave voice message for pt
1 person assist

## 2020-08-31 ENCOUNTER — VIRTUAL VISIT (OUTPATIENT)
Dept: INTERNAL MEDICINE CLINIC | Age: 55
End: 2020-08-31
Payer: MEDICAID

## 2020-08-31 DIAGNOSIS — N17.9 ACUTE RENAL FAILURE, UNSPECIFIED ACUTE RENAL FAILURE TYPE (HCC): Primary | ICD-10-CM

## 2020-08-31 DIAGNOSIS — R60.0 BILATERAL LEG EDEMA: ICD-10-CM

## 2020-08-31 DIAGNOSIS — B18.2 CHRONIC HEPATITIS C WITHOUT HEPATIC COMA (HCC): ICD-10-CM

## 2020-08-31 DIAGNOSIS — R06.02 SOB (SHORTNESS OF BREATH): ICD-10-CM

## 2020-08-31 PROCEDURE — 99442 PR PHYS/QHP TELEPHONE EVALUATION 11-20 MIN: CPT | Performed by: NURSE PRACTITIONER

## 2020-08-31 RX ORDER — HYDROXYZINE 50 MG/1
TABLET, FILM COATED ORAL
COMMUNITY
Start: 2020-08-27 | End: 2021-03-18

## 2020-08-31 RX ORDER — DIPHENHYDRAMINE HCL 25 MG
CAPSULE ORAL
COMMUNITY
Start: 2020-08-27 | End: 2021-01-15

## 2020-08-31 RX ORDER — HYDROCHLOROTHIAZIDE 25 MG/1
TABLET ORAL
COMMUNITY
Start: 2020-06-22 | End: 2021-03-18

## 2020-08-31 RX ORDER — SERTRALINE HYDROCHLORIDE 25 MG/1
TABLET, FILM COATED ORAL
COMMUNITY
Start: 2020-08-27 | End: 2021-03-18

## 2020-08-31 NOTE — PROGRESS NOTES
Deyanira Ray is a 47 y.o. male evaluated via audio only technology on 8/31/2020. Consent: He and/or his health care decision maker is aware that he may receive a bill for this audio only encounter, depending on his insurance coverage, and has provided verbal consent to proceed: Yes    I communicated with the patient and/or health care decision maker about the nature and details of the following:  Assessment & Plan:   Diagnoses and all orders for this visit:    1. Acute renal failure, unspecified acute renal failure type ([address]  -     CBC WITH AUTOMATED DIFF  -     METABOLIC PANEL, COMPREHENSIVE    2. Chronic hepatitis C without hepatic coma (HCC)  -     METABOLIC PANEL, COMPREHENSIVE  -     REFERRAL TO LIVER HEPATOLOGY    3. SOB (shortness of breath)    4. Bilateral leg edema      Pt instructed to keep appts as scheduled, call [facility] to schedule lab collection TODAY. Given # for hepatology to address hep C and elevated liver enzymes as this may partially explain his persistent edema and dyspnea also. Follow-up and Dispositions    · Return in about 4 weeks (around 9/28/2020) for OV- edema, dyspnea, and lab review. 12  Subjective:   Deyanira Ray is a 47 y.o. male who was seen for Transitions Of Care [facility] 6/28/2020 for heart attack Phone Call)      Pt is here for hospital follow-up transition of care from 6/28 to 7/8 for SOB and leg swelling. Diagnosed with ARF. Was given lab testing, ECHO, ultrasound imaging, and biopsy. Instructed to schedule appt with NEPHROLOGY/HEPATOLOGY/PCP. Has seen specialist since hospitalization about 1-2 weeks later, but reports no labs done. Will return on the 3rd. Reports feeling SAME AS when in hospital, has SOB and leg swelling. Recalls elevated BP with last OV to see NEPHROLOGY, thinks he was started on new med but unsure of name. Currently in rehab for detox at [facility]. Last use of cocaine, ~ 3 weeks ago.      Prior to Admission medications    Medication Sig Start Date End Date Taking? Authorizing Provider   hydroCHLOROthiazide (HYDRODIURIL) 25 mg tablet 25 mg = 1 tab each dose, PO, daily, # 30 tab, 0 Refills 6/22/20  Yes Provider, Historical   amLODIPine (NORVASC) 10 mg tablet Take 1 Tab by mouth daily. 7/9/20  Yes Ade Carmona MD   carvediloL (COREG) 3.125 mg tablet Take 1 Tab by mouth two (2) times daily (with meals). Indications: high blood pressure 7/8/20  Yes Joaquin Laguna MD   cloNIDine HCL (CATAPRES) 0.2 mg tablet Take 1 Tab by mouth every eight (8) hours. Hold if:  SBP is less than or equal to 130 7/8/20  Yes Ade Carmona MD   fluticasone propionate (FLONASE) 50 mcg/actuation nasal spray 2 sprays in each nostrils daily 7/8/20  Yes Ade Carmona MD   hydrALAZINE (APRESOLINE) 100 mg tablet Take 1 Tab by mouth three (3) times daily. 7/8/20  Yes Ade Carmona MD   hydrOXYzine HCL (ATARAX) 50 mg tablet  8/27/20   Provider, Historical   sertraline (ZOLOFT) 25 mg tablet  8/27/20   Provider, Historical   nicotine (NICORETTE) 4 mg gum  8/27/20   Provider, Historical   acetaminophen (TYLENOL) 325 mg tablet Take 2 Tabs by mouth every six (6) hours as needed for Pain or Fever. 7/8/20   Ade Carmona MD     No Known Allergies    Patient Active Problem List   Diagnosis Code    Chest pain R07.9    Hypertensive emergency I16.1    Elevated troponin R79.89    ECHO (acute kidney injury) ([email or web address] N17.9    Acute renal failure (ARF) ([facility] N17.9     Patient Active Problem List    Diagnosis Date Noted    Acute renal failure (ARF) ([email or web address] 06/28/2020    Elevated troponin 06/13/2020    ECHO (acute kidney injury) ([email or web address] 06/13/2020    Chest pain 05/10/2019    Hypertensive emergency 05/10/2019     Current Outpatient Medications   Medication Sig Dispense Refill    hydroCHLOROthiazide (HYDRODIURIL) 25 mg tablet 25 mg = 1 tab each dose, PO, daily, # 30 tab, 0 Refills      amLODIPine (NORVASC) 10 mg tablet Take 1 Tab by mouth daily.  30 Tab 0    carvediloL (COREG) 3.125 mg tablet Take 1 Tab by mouth two (2) times daily (with meals). Indications: high blood pressure 60 Tab 0    cloNIDine HCL (CATAPRES) 0.2 mg tablet Take 1 Tab by mouth every eight (8) hours. Hold if:  SBP is less than or equal to 130 90 Tab 0    fluticasone propionate (FLONASE) 50 mcg/actuation nasal spray 2 sprays in each nostrils daily 1 Bottle 0    hydrALAZINE (APRESOLINE) 100 mg tablet Take 1 Tab by mouth three (3) times daily. 90 Tab 0    hydrOXYzine HCL (ATARAX) 50 mg tablet       sertraline (ZOLOFT) 25 mg tablet       nicotine (NICORETTE) 4 mg gum       acetaminophen (TYLENOL) 325 mg tablet Take 2 Tabs by mouth every six (6) hours as needed for Pain or Fever. 30 Tab 0     No Known Allergies  Past Medical History:   Diagnosis Date    Hypertension      History reviewed. No pertinent surgical history. History reviewed. No pertinent family history. Social History     Tobacco Use    Smoking status: Current Every Day Smoker     Packs/day: 1.00    Smokeless tobacco: Never Used   Substance Use Topics    Alcohol use: Yes       ROS    I affirm this is a Patient-Initiated Episode with a Patient who has not had a related appointment within my department in the past 7 days or scheduled within the next 24 hours.     Total Time: minutes: 11-20 minutes    Note: not billable if this call serves to triage the patient into an appointment for the relevant concern      Jaz Aguilar NP

## 2020-08-31 NOTE — PATIENT INSTRUCTIONS
Hepatitis C: Care Instructions Your Care Instructions Hepatitis C is an infection of the liver caused by a virus. This virus spreads when blood or body fluids from an infected person enter another person's body. This occurs most often when people share needles that have the virus on them. In the past, people got the virus through blood transfusions and organ transplants. But since 1992, all donated blood and organs have been screened for hepatitis C. So getting the virus this way is now very rare. Less often, hepatitis C can spread through sex and sharing items such as razor blades or toothbrushes. Needles used for tattoos and body piercings can also spread the virus. The virus doesn't always cause symptoms. But you may feel tired. And you may have a headache, sore muscles, nausea, and pain in the upper right belly. Other symptoms include yellowish skin and dark urine. Home treatment can help ease symptoms. And your doctor may prescribe antiviral medicine. Long-term infection can lead to severe liver damage. So make sure to go to your follow-up appointments. Follow-up care is a key part of your treatment and safety. Be sure to make and go to all appointments, and call your doctor if you are having problems. It's also a good idea to know your test results and keep a list of the medicines you take. How can you care for yourself at home? · Be safe with medicines. If your doctor prescribes antiviral medicine, take it exactly as prescribed. Call your doctor if you think you are having a problem with your medicine. · Do not drink alcohol. Alcohol can damage the liver. Tell your doctor if you need help to quit. Counseling, support groups, and sometimes medicines can help you stay sober. · Do not take drugs or herbal medicines. They can make liver problems worse. · Make sure your doctor knows all of the medicines you take. Some medicines, such as acetaminophen (Tylenol), can make liver problems worse. Do not take any new medicines unless your doctor tells you to. This includes over-the-counter medicines. · Maintain a healthy lifestyle. Get plenty of exercise if you feel up to it. Eat a healthy diet. · Drink plenty of fluids, enough so that your urine is light yellow or clear like water. If you have kidney, heart, or liver disease and have to limit fluids, talk with your doctor before you increase the amount of fluids you drink. · Get the vaccines (if you have not already) to protect yourself from hepatitis A and hepatitis B, influenza, and pneumococcus. · The infection can make you itch. Keep cool and stay out of the sun. Try to wear cotton clothing. Talk to your doctor about using over-the-counter medicines for itching. These include diphenhydramine (Benadryl) and chlorpheniramine (Chlor-Trimeton). Follow the instructions on the label. · If you feel depressed, talk to your doctor about treatment. Many people who have long-term illnesses get depressed. Keep in mind that antiviral medicine can make depression worse. To avoid spreading hepatitis C to others · Tell the people that you live with or have sex with about your illness as soon as you can. · Don't share needles to inject drugs. Don't share other equipment (such as cotton, spoons, and water) with others. Find out if a needle exchange program is available in your area, and use it. Get into a drug treatment program. 
· Practice safer sex. Reduce your number of sex partners if you have more than one. Unless you are in a long-term relationship in which neither partner has sex with anyone else, always use latex condoms when you have sex. · Don't donate blood or blood products, organs, semen, or eggs (ova). · Make sure that all equipment is sterilized if you get a tattoo, have your body pierced, or have acupuncture. · Do not share your personal items. These include razors, toothbrushes, towels, and nail files. · Tell your doctor, dentist, and anyone else who may come in contact with your blood about your illness. · Prevent others from coming in contact with your blood and other body fluids. Keep any cuts, scrapes, or blisters covered. · Wash your handsand any object that has come in contact with your bloodthoroughly with water and soap. When should you call for help? [id number] anytime you think you may need emergency care. For example, call if: 
· You have trouble breathing. · You vomit blood or what looks like coffee grounds. Call your doctor now or seek immediate medical care if: 
· You feel very sleepy or confused. · You have a fever. · There is a new or increasing yellow tint to your skin or the whites of your eyes. · You have new or worse belly pain. · You have any abnormal bleeding, such as: 
? Nosebleeds. ? Vaginal bleeding that is different (heavier, more frequent, at a different time of the month) than what you are used to. 
? Bloody or black stools, or rectal bleeding. ? Bloody or pink urine. Watch closely for changes in your health, and be sure to contact your doctor if: 
· You have any problems. · Your belly is getting bigger. · You are gaining weight. Where can you learn more? Go to http://www.gray.com/ Enter P042 in the search box to learn more about \"Hepatitis C: Care Instructions. \" Current as of: February 11, 2020               Content Version: 12.5 © 2095-8334 [employer]`. Care instructions adapted under license by [employer] (which disclaims liability or warranty for this information). If you have questions about a medical condition or this instruction, always ask your healthcare professional. [place] [address] any warranty or liability for your use of this information. Acute Kidney Injury: Care Instructions Your Care Instructions Acute kidney injury (ECHO) is a sudden decrease in kidney function.  This can happen over a period of hours, days or, in some cases, weeks. ECHO used to be called acute renal failure, but kidney failure doesn't always happen with ECHO. Common causes of ECHO are dehydration, blood loss, and medicines. When ECHO happens, the kidneys have trouble removing waste and excess fluids from the body. The waste and fluids build up and become harmful. Kidney function may return to normal if the cause of ECHO is treated quickly. Your chance of a full recovery depends on what caused the problem, how quickly the cause was treated, and what other medical problems you have. A machine may be used to help your kidneys remove waste and fluids for a short period of time. This is called dialysis. Follow-up care is a key part of your treatment and safety. Be sure to make and go to all appointments, and call your doctor if you are having problems. It's also a good idea to know your test results and keep a list of the medicines you take. How can you care for yourself at home? · Talk to your doctor about how much fluid you should drink. · Eat a balanced diet. Talk to your doctor or a dietitian about what type of diet may be best for you. You may need to limit sodium, potassium, and phosphorus. · If you need dialysis, follow the instructions and schedule for dialysis that your doctor gives you. · Do not smoke. Smoking can make your condition worse. If you need help quitting, talk to your doctor about stop-smoking programs and medicines. These can increase your chances of quitting for good. · Do not drink alcohol. · Review all of your medicines with your doctor. Do not take any medicines, including nonsteroidal anti-inflammatory drugs (NSAIDs) such as ibuprofen (Advil, Motrin) or naproxen (Aleve), unless your doctor says it is safe for you to do so. · Make sure that anyone treating you for any health problem knows that you have had ECHO. When should you call for help? [id number] anytime you think you may need emergency care. For example, call if: 
· You passed out (lost consciousness). Call your doctor now or seek immediate medical care if: 
· You have new or worse nausea and vomiting. · You have much less urine than normal, or you have no urine. · You are feeling confused or cannot think clearly. · You have new or more blood in your urine. · You have new swelling. · You are dizzy or lightheaded, or feel like you may faint. Watch closely for changes in your health, and be sure to contact your doctor if: 
· You do not get better as expected. Where can you learn more? Go to http://www.gray.com/ Enter X214 in the search box to learn more about \"Acute Kidney Injury: Care Instructions. \" Current as of: August 12, 2019               Content Version: 12.5 © 2438-1540 [employer]. Care instructions adapted under license by [employer] (which disclaims liability or warranty for this information). If you have questions about a medical condition or this instruction, always ask your healthcare professional. [address] any warranty or liability for your use of this information.

## 2020-08-31 NOTE — PROGRESS NOTES
Pt is here for   Chief Complaint   Patient presents with   [address]     [facility] 6/28/2020 for heart attack Phone Call         Pt denies pain at this time    1. Have you been to the ER, urgent care clinic since your last visit? Hospitalized since your last visit? Yes When: 6/28/2020 [facility] for heart attack    2. Have you seen or consulted any other health care providers outside of the [address] [place] [address] since your last visit? Include any pap smears or colon screening.  No

## 2020-09-12 LAB
ALBUMIN SERPL-MCNC: 3.2 G/DL (ref 3.8–4.9)
ALBUMIN/GLOB SERPL: 0.8 {RATIO} (ref 1.2–2.2)
ALP SERPL-CCNC: 166 IU/L (ref 39–117)
ALT SERPL-CCNC: 55 IU/L (ref 0–44)
AST SERPL-CCNC: 55 IU/L (ref 0–40)
BASOPHILS # BLD AUTO: 0.1 X10E3/UL (ref 0–0.2)
BASOPHILS NFR BLD AUTO: 1 %
BILIRUB SERPL-MCNC: 0.3 MG/DL (ref 0–1.2)
BUN SERPL-MCNC: 43 MG/DL (ref 6–24)
BUN/CREAT SERPL: 11 (ref 9–20)
CALCIUM SERPL-MCNC: 8.3 MG/DL (ref 8.7–10.2)
CHLORIDE SERPL-SCNC: 97 MMOL/L (ref 96–106)
CO2 SERPL-SCNC: 23 MMOL/L (ref 20–29)
CREAT SERPL-MCNC: 3.82 MG/DL (ref 0.76–1.27)
EOSINOPHIL # BLD AUTO: 0.3 X10E3/UL (ref 0–0.4)
EOSINOPHIL NFR BLD AUTO: 4 %
ERYTHROCYTE [DISTWIDTH] IN BLOOD BY AUTOMATED COUNT: 11.1 % (ref 11.6–15.4)
GLOBULIN SER CALC-MCNC: 4.1 G/DL (ref 1.5–4.5)
GLUCOSE SERPL-MCNC: 116 MG/DL (ref 65–99)
HCT VFR BLD AUTO: 37.7 % (ref 37.5–51)
HGB BLD-MCNC: 12.2 G/DL (ref 13–17.7)
IMM GRANULOCYTES # BLD AUTO: 0 X10E3/UL (ref 0–0.1)
IMM GRANULOCYTES NFR BLD AUTO: 0 %
INTERPRETATION: NORMAL
LYMPHOCYTES # BLD AUTO: 4.1 X10E3/UL (ref 0.7–3.1)
LYMPHOCYTES NFR BLD AUTO: 42 %
MCH RBC QN AUTO: 28.9 PG (ref 26.6–33)
MCHC RBC AUTO-ENTMCNC: 32.4 G/DL (ref 31.5–35.7)
MCV RBC AUTO: 89 FL (ref 79–97)
MONOCYTES # BLD AUTO: 1.2 X10E3/UL (ref 0.1–0.9)
MONOCYTES NFR BLD AUTO: 13 %
NEUTROPHILS # BLD AUTO: 3.8 X10E3/UL (ref 1.4–7)
NEUTROPHILS NFR BLD AUTO: 40 %
PLATELET # BLD AUTO: 152 X10E3/UL (ref 150–450)
POTASSIUM SERPL-SCNC: 5.1 MMOL/L (ref 3.5–5.2)
PROT SERPL-MCNC: 7.3 G/DL (ref 6–8.5)
RBC # BLD AUTO: 4.22 X10E6/UL (ref 4.14–5.8)
SODIUM SERPL-SCNC: 134 MMOL/L (ref 134–144)
WBC # BLD AUTO: 9.5 X10E3/UL (ref 3.4–10.8)

## 2020-09-14 ENCOUNTER — HOSPITAL ENCOUNTER (EMERGENCY)
Age: 55
Discharge: HOME | End: 2020-09-14
Attending: EMERGENCY MEDICINE | Admitting: EMERGENCY MEDICINE
Payer: MEDICAID

## 2020-09-14 ENCOUNTER — APPOINTMENT (OUTPATIENT)
Dept: GENERAL RADIOLOGY | Age: 55
End: 2020-09-14
Attending: EMERGENCY MEDICINE
Payer: MEDICAID

## 2020-09-14 VITALS
OXYGEN SATURATION: 97 % | DIASTOLIC BLOOD PRESSURE: 81 MMHG | HEART RATE: 70 BPM | TEMPERATURE: 98 F | RESPIRATION RATE: 13 BRPM | SYSTOLIC BLOOD PRESSURE: 171 MMHG

## 2020-09-14 DIAGNOSIS — R60.0 PEDAL EDEMA: Primary | ICD-10-CM

## 2020-09-14 DIAGNOSIS — R06.02 SOB (SHORTNESS OF BREATH): ICD-10-CM

## 2020-09-14 DIAGNOSIS — N18.9 CHRONIC KIDNEY DISEASE, UNSPECIFIED CKD STAGE: ICD-10-CM

## 2020-09-14 LAB
ALBUMIN SERPL-MCNC: 2.3 G/DL (ref 3.5–5)
ALBUMIN/GLOB SERPL: 0.4 {RATIO} (ref 1.1–2.2)
ALP SERPL-CCNC: 178 U/L (ref 45–117)
ALT SERPL-CCNC: 74 U/L (ref 12–78)
ANION GAP SERPL CALC-SCNC: 5 MMOL/L (ref 5–15)
AST SERPL-CCNC: 68 U/L (ref 15–37)
ATRIAL RATE: 72 BPM
BASOPHILS # BLD: 0.1 K/UL (ref 0–0.1)
BASOPHILS NFR BLD: 1 % (ref 0–1)
BILIRUB SERPL-MCNC: 0.3 MG/DL (ref 0.2–1)
BNP SERPL-MCNC: 392 PG/ML
BUN SERPL-MCNC: 48 MG/DL (ref 6–20)
BUN/CREAT SERPL: 13 (ref 12–20)
CALCIUM SERPL-MCNC: 8.6 MG/DL (ref 8.5–10.1)
CALCULATED P AXIS, ECG09: 67 DEGREES
CALCULATED R AXIS, ECG10: 57 DEGREES
CALCULATED T AXIS, ECG11: 66 DEGREES
CHLORIDE SERPL-SCNC: 103 MMOL/L (ref 97–108)
CO2 SERPL-SCNC: 27 MMOL/L (ref 21–32)
COMMENT, HOLDF: NORMAL
CREAT SERPL-MCNC: 3.66 MG/DL (ref 0.7–1.3)
DIAGNOSIS, 93000: NORMAL
DIFFERENTIAL METHOD BLD: ABNORMAL
EOSINOPHIL # BLD: 0.4 K/UL (ref 0–0.4)
EOSINOPHIL NFR BLD: 6 % (ref 0–7)
ERYTHROCYTE [DISTWIDTH] IN BLOOD BY AUTOMATED COUNT: 11.9 % (ref 11.5–14.5)
GLOBULIN SER CALC-MCNC: 5.5 G/DL (ref 2–4)
GLUCOSE SERPL-MCNC: 104 MG/DL (ref 65–100)
HCT VFR BLD AUTO: 36 % (ref 36.6–50.3)
HGB BLD-MCNC: 11.4 G/DL (ref 12.1–17)
IMM GRANULOCYTES # BLD AUTO: 0 K/UL (ref 0–0.04)
IMM GRANULOCYTES NFR BLD AUTO: 0 % (ref 0–0.5)
LYMPHOCYTES # BLD: 2.6 K/UL (ref 0.8–3.5)
LYMPHOCYTES NFR BLD: 45 % (ref 12–49)
MCH RBC QN AUTO: 29.4 PG (ref 26–34)
MCHC RBC AUTO-ENTMCNC: 31.7 G/DL (ref 30–36.5)
MCV RBC AUTO: 92.8 FL (ref 80–99)
MONOCYTES # BLD: 0.8 K/UL (ref 0–1)
MONOCYTES NFR BLD: 14 % (ref 5–13)
NEUTS SEG # BLD: 2 K/UL (ref 1.8–8)
NEUTS SEG NFR BLD: 34 % (ref 32–75)
NRBC # BLD: 0 K/UL (ref 0–0.01)
NRBC BLD-RTO: 0 PER 100 WBC
P-R INTERVAL, ECG05: 174 MS
PLATELET # BLD AUTO: 128 K/UL (ref 150–400)
PMV BLD AUTO: 12 FL (ref 8.9–12.9)
POTASSIUM SERPL-SCNC: 4.8 MMOL/L (ref 3.5–5.1)
PROT SERPL-MCNC: 7.8 G/DL (ref 6.4–8.2)
Q-T INTERVAL, ECG07: 450 MS
QRS DURATION, ECG06: 98 MS
QTC CALCULATION (BEZET), ECG08: 492 MS
RBC # BLD AUTO: 3.88 M/UL (ref 4.1–5.7)
RBC MORPH BLD: ABNORMAL
SAMPLES BEING HELD,HOLD: NORMAL
SODIUM SERPL-SCNC: 135 MMOL/L (ref 136–145)
TROPONIN I SERPL-MCNC: <0.05 NG/ML
VENTRICULAR RATE, ECG03: 72 BPM
WBC # BLD AUTO: 5.9 K/UL (ref 4.1–11.1)

## 2020-09-14 PROCEDURE — 84484 ASSAY OF TROPONIN QUANT: CPT

## 2020-09-14 PROCEDURE — 85025 COMPLETE CBC W/AUTO DIFF WBC: CPT

## 2020-09-14 PROCEDURE — 83880 ASSAY OF NATRIURETIC PEPTIDE: CPT

## 2020-09-14 PROCEDURE — 36415 COLL VENOUS BLD VENIPUNCTURE: CPT

## 2020-09-14 PROCEDURE — 93005 ELECTROCARDIOGRAM TRACING: CPT

## 2020-09-14 PROCEDURE — 87635 SARS-COV-2 COVID-19 AMP PRB: CPT

## 2020-09-14 PROCEDURE — 99285 EMERGENCY DEPT VISIT HI MDM: CPT

## 2020-09-14 PROCEDURE — 80053 COMPREHEN METABOLIC PANEL: CPT

## 2020-09-14 PROCEDURE — 71046 X-RAY EXAM CHEST 2 VIEWS: CPT

## 2020-09-14 NOTE — ED TRIAGE NOTES
Pt c/o bilateral leg pain and swelling x 3-4 days, +sob, fever of 104, had some chest pressure, +lower ext edema noted

## 2020-09-14 NOTE — ED PROVIDER NOTES
Mr. Stephanie Lopez is a 68-year-old male who presents to the ER with complaints of leg swelling. He said that his symptoms have been present for 3 to 4 days. He does report a history of mild swelling in his legs, however, he said the swelling is never been as much that is his right now. He reports that he feels somewhat short of breath. He said that he feels a little bit short of breath here in the ER. Said the shortness of breath is not made worse with exertion. Denies any chest pain. He said occasionally has some abdominal pain, however, he denies any pain in his abdomen right now. He reports having at home of 102 103. He reports a very mild cough. No runny nose or sore throat. He denies any changes with his urine or bowel movements. He said that he has been taking all of his medicines as prescribed. Past Medical History:   Diagnosis Date    Hepatitis C     Hypertension        History reviewed. No pertinent surgical history. History reviewed. No pertinent family history. Social History     Socioeconomic History    Marital status: SINGLE     Spouse name: Not on file    Number of children: Not on file    Years of education: Not on file    Highest education level: Not on file   Occupational History    Not on file   Social Needs    Financial resource strain: Not on file    Food insecurity     Worry: Not on file     Inability: Not on file    Transportation needs     Medical: Not on file     Non-medical: Not on file   Tobacco Use    Smoking status: Current Every Day Smoker     Packs/day: 1.00    Smokeless tobacco: Never Used   Substance and Sexual Activity    Alcohol use:  Yes    Drug use: Yes     Types: Marijuana    Sexual activity: Not Currently   Lifestyle    Physical activity     Days per week: Not on file     Minutes per session: Not on file    Stress: Not on file   Relationships    Social connections     Talks on phone: Not on file     Gets together: Not on file     Attends Restorationism service: Not on file     Active member of club or organization: Not on file     Attends meetings of clubs or organizations: Not on file     Relationship status: Not on file    Intimate partner violence     Fear of current or ex partner: Not on file     Emotionally abused: Not on file     Physically abused: Not on file     Forced sexual activity: Not on file   Other Topics Concern    Not on file   Social History Narrative    Not on file         ALLERGIES: Patient has no known allergies. Review of Systems   Constitutional: Positive for fever. Negative for chills. HENT: Negative for rhinorrhea and sore throat. Respiratory: Positive for shortness of breath. Negative for cough. Cardiovascular: Positive for leg swelling. Negative for chest pain. Gastrointestinal: Negative for abdominal pain, diarrhea, nausea and vomiting. Genitourinary: Negative for dysuria and hematuria. Musculoskeletal: Negative for arthralgias and myalgias. Skin: Negative for pallor and rash. Neurological: Negative for dizziness, weakness and light-headedness. All other systems reviewed and are negative. Vitals:    09/14/20 1007 09/14/20 1235   BP: (!) 173/91 (!) 168/85   Pulse: 73 69   Resp: 16 18   Temp: 98.9 °F (37.2 °C)    SpO2: 98% 99%            Physical Exam     Vital signs reviewed. Nursing notes reviewed.     Const:  No acute distress, well developed, well nourished  Head:  Atraumatic, normocephalic  Eyes:  PERRL, conjunctiva normal, no scleral icterus  Neck:  Supple, trachea midline  Cardiovascular: Regular rate and rhythm   resp:  No resp distress, no increased work of breathing  Abd:  Soft, non-tender, non-distended, no rebound, no guarding, no CVA tenderness  MSK: Bilateral pedal edema to the upper calves, normal ROM  Neuro:  Alert and oriented x3, no cranial nerve defect  Skin:  No erythema, no wounds to the legs or feet  Psych: normal mood and affect, behavior is normal, judgement and thought content is normal          MDM  Number of Diagnoses or Management Options  Chronic kidney disease, unspecified CKD stage:   Pedal edema:   SOB (shortness of breath):      Amount and/or Complexity of Data Reviewed  Clinical lab tests: reviewed  Tests in the radiology section of CPT®: reviewed  Decide to obtain previous medical records or to obtain history from someone other than the patient: yes    Patient Progress  Patient progress: stable          Mr. Sandford Koyanagi is a 54yo male who presents to the ER with bilateral lower extremity edema and SOB. He is well appearing and is not SOB in the ER. His swelling is bilateral.  I do not believe that he has a DVT. No erythema or signs of infection. No edema on xray. Pt. Has known CKD. His creatinine is no different than his last check, but this could be a factor in his sx. Pt. To f/u with his PCP and nephrologist or return to the ER with new or worsening sx.       Procedures

## 2020-09-14 NOTE — DISCHARGE INSTRUCTIONS
Patient Education        Leg and Ankle Edema: Care Instructions  Your Care Instructions  Swelling in the legs, ankles, and feet is called edema. It is common after you sit or stand for a while. Long plane flights or car rides often cause swelling in the legs and feet. You may also have swelling if you have to stand for long periods of time at your job. Problems with the veins in the legs (varicose veins) and changes in hormones can also cause swelling. Sometimes the swelling in the ankles and feet is caused by a more serious problem, such as heart failure, infection, blood clots, or liver or kidney disease. Follow-up care is a key part of your treatment and safety. Be sure to make and go to all appointments, and call your doctor if you are having problems. It's also a good idea to know your test results and keep a list of the medicines you take. How can you care for yourself at home? · If your doctor gave you medicine, take it as prescribed. Call your doctor if you think you are having a problem with your medicine. · Whenever you are resting, raise your legs up. Try to keep the swollen area higher than the level of your heart. · Take breaks from standing or sitting in one position. ? Walk around to increase the blood flow in your lower legs. ? Move your feet and ankles often while you stand, or tighten and relax your leg muscles. · Wear support stockings. Put them on in the morning, before swelling gets worse. · Eat a balanced diet. Lose weight if you need to. · Limit the amount of salt (sodium) in your diet. Salt holds fluid in the body and may increase swelling. When should you call for help? Call 911 anytime you think you may need emergency care. For example, call if:    · You have symptoms of a blood clot in your lung (called a pulmonary embolism). These may include:  ? Sudden chest pain. ? Trouble breathing. ? Coughing up blood.    Call your doctor now or seek immediate medical care if:    · You have signs of a blood clot, such as:  ? Pain in your calf, back of the knee, thigh, or groin. ? Redness and swelling in your leg or groin.     · You have symptoms of infection, such as:  ? Increased pain, swelling, warmth, or redness. ? Red streaks or pus. ? A fever. Watch closely for changes in your health, and be sure to contact your doctor if:    · Your swelling is getting worse.     · You have new or worsening pain in your legs.     · You do not get better as expected. Where can you learn more? Go to http://www.gray.com/  Enter Z412 in the search box to learn more about \"Leg and Ankle Edema: Care Instructions. \"  Current as of: June 26, 2019               Content Version: 12.6  © 5337-3979 [employer]. Care instructions adapted under license by [employer] (which disclaims liability or warranty for this information). If you have questions about a medical condition or this instruction, always ask your healthcare professional. [place] [address] any warranty or liability for your use of this information.

## 2020-09-14 NOTE — PROGRESS NOTES
Followed by nephrology already, no further intervention needed for worsening renal function this time by this writer. Liver function stable, referred to hepatology at visit on 31st. Has appt scheduled 10/6. Anemia overall stable considering worsening renal function, also deferred to nephrology at this time.

## 2020-09-15 LAB
HEALTH STATUS, XMCV2T: NORMAL
SARS-COV-2, COV2NT: NOT DETECTED
SOURCE, COVRS: NORMAL
SPECIMEN SOURCE, FCOV2M: NORMAL
SPECIMEN TYPE, XMCV1T: NORMAL

## 2020-09-17 ENCOUNTER — HOSPITAL ENCOUNTER (EMERGENCY)
Age: 55
Discharge: HOME | End: 2020-09-17
Attending: EMERGENCY MEDICINE | Admitting: EMERGENCY MEDICINE
Payer: MEDICAID

## 2020-09-17 ENCOUNTER — APPOINTMENT (OUTPATIENT)
Dept: GENERAL RADIOLOGY | Age: 55
End: 2020-09-17
Attending: EMERGENCY MEDICINE
Payer: MEDICAID

## 2020-09-17 VITALS
OXYGEN SATURATION: 97 % | RESPIRATION RATE: 18 BRPM | SYSTOLIC BLOOD PRESSURE: 182 MMHG | HEART RATE: 78 BPM | DIASTOLIC BLOOD PRESSURE: 97 MMHG | TEMPERATURE: 98.2 F

## 2020-09-17 DIAGNOSIS — R60.9 EDEMA, UNSPECIFIED TYPE: Primary | ICD-10-CM

## 2020-09-17 DIAGNOSIS — M79.604 PAIN IN BOTH LOWER EXTREMITIES: ICD-10-CM

## 2020-09-17 DIAGNOSIS — M79.605 PAIN IN BOTH LOWER EXTREMITIES: ICD-10-CM

## 2020-09-17 LAB
ALBUMIN SERPL-MCNC: 2.4 G/DL (ref 3.5–5)
ALBUMIN/GLOB SERPL: 0.4 {RATIO} (ref 1.1–2.2)
ALP SERPL-CCNC: 222 U/L (ref 45–117)
ALT SERPL-CCNC: 91 U/L (ref 12–78)
AMPHET UR QL SCN: NEGATIVE
ANION GAP SERPL CALC-SCNC: 4 MMOL/L (ref 5–15)
APPEARANCE UR: CLEAR
AST SERPL-CCNC: 98 U/L (ref 15–37)
ATRIAL RATE: 76 BPM
BACTERIA URNS QL MICRO: NEGATIVE /HPF
BARBITURATES UR QL SCN: NEGATIVE
BASOPHILS # BLD: 0.1 K/UL (ref 0–0.1)
BASOPHILS NFR BLD: 1 % (ref 0–1)
BENZODIAZ UR QL: NEGATIVE
BILIRUB SERPL-MCNC: 0.3 MG/DL (ref 0.2–1)
BILIRUB UR QL: NEGATIVE
BNP SERPL-MCNC: 227 PG/ML
BUN SERPL-MCNC: 58 MG/DL (ref 6–20)
BUN/CREAT SERPL: 15 (ref 12–20)
CALCIUM SERPL-MCNC: 8.7 MG/DL (ref 8.5–10.1)
CALCULATED P AXIS, ECG09: 66 DEGREES
CALCULATED R AXIS, ECG10: 48 DEGREES
CALCULATED T AXIS, ECG11: 75 DEGREES
CANNABINOIDS UR QL SCN: NEGATIVE
CHLORIDE SERPL-SCNC: 102 MMOL/L (ref 97–108)
CO2 SERPL-SCNC: 29 MMOL/L (ref 21–32)
COCAINE UR QL SCN: NEGATIVE
COLOR UR: ABNORMAL
CREAT SERPL-MCNC: 3.82 MG/DL (ref 0.7–1.3)
DIAGNOSIS, 93000: NORMAL
DIFFERENTIAL METHOD BLD: ABNORMAL
DRUG SCRN COMMENT,DRGCM: NORMAL
EOSINOPHIL # BLD: 0.4 K/UL (ref 0–0.4)
EOSINOPHIL NFR BLD: 6 % (ref 0–7)
EPITH CASTS URNS QL MICRO: ABNORMAL /LPF
ERYTHROCYTE [DISTWIDTH] IN BLOOD BY AUTOMATED COUNT: 11.7 % (ref 11.5–14.5)
GLOBULIN SER CALC-MCNC: 5.9 G/DL (ref 2–4)
GLUCOSE SERPL-MCNC: 73 MG/DL (ref 65–100)
GLUCOSE UR STRIP.AUTO-MCNC: NEGATIVE MG/DL
HCT VFR BLD AUTO: 37.6 % (ref 36.6–50.3)
HGB BLD-MCNC: 11.9 G/DL (ref 12.1–17)
HGB UR QL STRIP: NEGATIVE
HYALINE CASTS URNS QL MICRO: ABNORMAL /LPF (ref 0–5)
IMM GRANULOCYTES # BLD AUTO: 0 K/UL (ref 0–0.04)
IMM GRANULOCYTES NFR BLD AUTO: 0 % (ref 0–0.5)
KETONES UR QL STRIP.AUTO: NEGATIVE MG/DL
LEUKOCYTE ESTERASE UR QL STRIP.AUTO: NEGATIVE
LIPASE SERPL-CCNC: 325 U/L (ref 73–393)
LYMPHOCYTES # BLD: 2.7 K/UL (ref 0.8–3.5)
LYMPHOCYTES NFR BLD: 44 % (ref 12–49)
MAGNESIUM SERPL-MCNC: 2.1 MG/DL (ref 1.6–2.4)
MCH RBC QN AUTO: 29.2 PG (ref 26–34)
MCHC RBC AUTO-ENTMCNC: 31.6 G/DL (ref 30–36.5)
MCV RBC AUTO: 92.2 FL (ref 80–99)
METHADONE UR QL: NEGATIVE
MONOCYTES # BLD: 0.8 K/UL (ref 0–1)
MONOCYTES NFR BLD: 13 % (ref 5–13)
NEUTS SEG # BLD: 2.2 K/UL (ref 1.8–8)
NEUTS SEG NFR BLD: 36 % (ref 32–75)
NITRITE UR QL STRIP.AUTO: NEGATIVE
NRBC # BLD: 0 K/UL (ref 0–0.01)
NRBC BLD-RTO: 0 PER 100 WBC
OPIATES UR QL: NEGATIVE
P-R INTERVAL, ECG05: 156 MS
PCP UR QL: NEGATIVE
PH UR STRIP: 7 [PH] (ref 5–8)
PHOSPHATE SERPL-MCNC: 5.2 MG/DL (ref 2.6–4.7)
PLATELET # BLD AUTO: 141 K/UL (ref 150–400)
PMV BLD AUTO: 11.6 FL (ref 8.9–12.9)
POTASSIUM SERPL-SCNC: 4.5 MMOL/L (ref 3.5–5.1)
PROT SERPL-MCNC: 8.3 G/DL (ref 6.4–8.2)
PROT UR STRIP-MCNC: 100 MG/DL
Q-T INTERVAL, ECG07: 410 MS
QRS DURATION, ECG06: 92 MS
QTC CALCULATION (BEZET), ECG08: 461 MS
RBC # BLD AUTO: 4.08 M/UL (ref 4.1–5.7)
RBC #/AREA URNS HPF: ABNORMAL /HPF (ref 0–5)
SODIUM SERPL-SCNC: 135 MMOL/L (ref 136–145)
SP GR UR REFRACTOMETRY: 1.01 (ref 1–1.03)
TROPONIN I SERPL-MCNC: <0.05 NG/ML
UR CULT HOLD, URHOLD: NORMAL
UROBILINOGEN UR QL STRIP.AUTO: 1 EU/DL (ref 0.2–1)
VENTRICULAR RATE, ECG03: 76 BPM
WBC # BLD AUTO: 6.2 K/UL (ref 4.1–11.1)
WBC URNS QL MICRO: ABNORMAL /HPF (ref 0–4)

## 2020-09-17 PROCEDURE — 93005 ELECTROCARDIOGRAM TRACING: CPT

## 2020-09-17 PROCEDURE — 83690 ASSAY OF LIPASE: CPT

## 2020-09-17 PROCEDURE — 74011250636 HC RX REV CODE- 250/636: Performed by: EMERGENCY MEDICINE

## 2020-09-17 PROCEDURE — 36415 COLL VENOUS BLD VENIPUNCTURE: CPT

## 2020-09-17 PROCEDURE — 80053 COMPREHEN METABOLIC PANEL: CPT

## 2020-09-17 PROCEDURE — 84100 ASSAY OF PHOSPHORUS: CPT

## 2020-09-17 PROCEDURE — 85025 COMPLETE CBC W/AUTO DIFF WBC: CPT

## 2020-09-17 PROCEDURE — 83880 ASSAY OF NATRIURETIC PEPTIDE: CPT

## 2020-09-17 PROCEDURE — 80307 DRUG TEST PRSMV CHEM ANLYZR: CPT

## 2020-09-17 PROCEDURE — 81001 URINALYSIS AUTO W/SCOPE: CPT

## 2020-09-17 PROCEDURE — 71046 X-RAY EXAM CHEST 2 VIEWS: CPT

## 2020-09-17 PROCEDURE — 84484 ASSAY OF TROPONIN QUANT: CPT

## 2020-09-17 PROCEDURE — 83735 ASSAY OF MAGNESIUM: CPT

## 2020-09-17 PROCEDURE — 96374 THER/PROPH/DIAG INJ IV PUSH: CPT

## 2020-09-17 PROCEDURE — 99284 EMERGENCY DEPT VISIT MOD MDM: CPT

## 2020-09-17 RX ORDER — FUROSEMIDE 10 MG/ML
20 INJECTION INTRAMUSCULAR; INTRAVENOUS
Status: COMPLETED | OUTPATIENT
Start: 2020-09-17 | End: 2020-09-17

## 2020-09-17 RX ADMIN — FUROSEMIDE 20 MG: 10 INJECTION, SOLUTION INTRAMUSCULAR; INTRAVENOUS at 12:01

## 2020-09-17 NOTE — ED TRIAGE NOTES
Pt c/o bilateral leg swelling, here this week for the same, +edema noted, denies cp or sob, +swelling slightly more than when seen earlier this week

## 2020-09-17 NOTE — DISCHARGE INSTRUCTIONS
Patient Education        Leg and Ankle Edema: Care Instructions  Your Care Instructions  Swelling in the legs, ankles, and feet is called edema. It is common after you sit or stand for a while. Long plane flights or car rides often cause swelling in the legs and feet. You may also have swelling if you have to stand for long periods of time at your job. Problems with the veins in the legs (varicose veins) and changes in hormones can also cause swelling. Sometimes the swelling in the ankles and feet is caused by a more serious problem, such as heart failure, infection, blood clots, or liver or kidney disease. Follow-up care is a key part of your treatment and safety. Be sure to make and go to all appointments, and call your doctor if you are having problems. It's also a good idea to know your test results and keep a list of the medicines you take. How can you care for yourself at home? · If your doctor gave you medicine, take it as prescribed. Call your doctor if you think you are having a problem with your medicine. · Whenever you are resting, raise your legs up. Try to keep the swollen area higher than the level of your heart. · Take breaks from standing or sitting in one position. ? Walk around to increase the blood flow in your lower legs. ? Move your feet and ankles often while you stand, or tighten and relax your leg muscles. · Wear support stockings. Put them on in the morning, before swelling gets worse. · Eat a balanced diet. Lose weight if you need to. · Limit the amount of salt (sodium) in your diet. Salt holds fluid in the body and may increase swelling. When should you call for help? Call 911 anytime you think you may need emergency care. For example, call if:    · You have symptoms of a blood clot in your lung (called a pulmonary embolism). These may include:  ? Sudden chest pain. ? Trouble breathing. ? Coughing up blood.    Call your doctor now or seek immediate medical care if:    · You have signs of a blood clot, such as:  ? Pain in your calf, back of the knee, thigh, or groin. ? Redness and swelling in your leg or groin.     · You have symptoms of infection, such as:  ? Increased pain, swelling, warmth, or redness. ? Red streaks or pus. ? A fever. Watch closely for changes in your health, and be sure to contact your doctor if:    · Your swelling is getting worse.     · You have new or worsening pain in your legs.     · You do not get better as expected. Where can you learn more? Go to http://www.gray.com/  Enter E910 in the search box to learn more about \"Leg and Ankle Edema: Care Instructions. \"  Current as of: June 26, 2019               Content Version: 12.6  © 0736-3790 [employer]wise, [employer]orporated. Care instructions adapted under license by [employer] (which disclaims liability or warranty for this information). If you have questions about a medical condition or this instruction, always ask your healthcare professional. [address] any warranty or liability for your use of this information.

## 2020-09-17 NOTE — ED PROVIDER NOTES
Please note that this dictation was completed with [employer], the computer voice recognition software.  Quite often unanticipated grammatical, syntax, homophones, and other interpretive errors are inadvertently transcribed by the computer software.  Please disregard these errors.  Please excuse any errors that have escaped final proofreading. Fluid from the waist down x 4 d; worsening  'jegs and feet on 'fire'; Needs liver and my kidneys checked/ in rehab now/ had used cocaine / heroin / last month was last time  'was here the other day/ getting worse not better/ 'they give me my pills at the house so I know Sunshine Cavazos been taking them';     59-year-old Afro-American male past medical history Hellen Martinez for hepatitis C hypertension presents the ER by EMS complaining of \"leg swelling getting worse times the past 4 to 5 days/leg and foot pain secondary to the swelling, \"feels like my feet are on fire\", and requesting to have labs checked his renal and liver functions. Patient states he has a long history of heroin and cocaine abuse last used approximately 3 to 4 weeks ago denies current usage. Patient is currently in a rehab facility where they dispense his medicines to him so he states he knows he has been compliant with his medications. Patient states he was here for evaluation the other day, we will recheck labs for recheck and EKG we will not send this COVID test as it was negative the other day and is not symptomatic currently. pt denies HA, vison changes, diff swallowing, CP, SOB, Abd pain, F/Ch, N/V, D/Cons or other current systemic complaints    Social/ PSH reviewed in EMR    EMR Chart Reviewed           Past Medical History:   Diagnosis Date    Hepatitis C     Hypertension        History reviewed. No pertinent surgical history. History reviewed. No pertinent family history.     Social History     Socioeconomic History    Marital status: SINGLE     Spouse name: Not on file    Number of children: Not on file  Years of education: Not on file    Highest education level: Not on file   Occupational History    Not on file   Social Needs    Financial resource strain: Not on file    Food insecurity     Worry: Not on file     Inability: Not on file    Transportation needs     Medical: Not on file     Non-medical: Not on file   Tobacco Use    Smoking status: Current Every Day Smoker     Packs/day: 1.00    Smokeless tobacco: Never Used   Substance and Sexual Activity    Alcohol use: Yes    Drug use: Yes     Types: Marijuana    Sexual activity: Not Currently   Lifestyle    Physical activity     Days per week: Not on file     Minutes per session: Not on file    Stress: Not on file   Relationships    Social connections     Talks on phone: Not on file     Gets together: Not on file     Attends Evangelical service: Not on file     Active member of club or organization: Not on file     Attends meetings of clubs or organizations: Not on file     Relationship status: Not on file    Intimate partner violence     Fear of current or ex partner: Not on file     Emotionally abused: Not on file     Physically abused: Not on file     Forced sexual activity: Not on file   Other Topics Concern    Not on file   Social History Narrative    Not on file         ALLERGIES: Patient has no known allergies. Review of Systems   Constitutional: Positive for activity change. Negative for appetite change, chills, fatigue and fever. HENT: Negative for trouble swallowing and voice change. Eyes: Negative for visual disturbance. Respiratory: Negative for cough, choking, chest tightness and shortness of breath. Cardiovascular: Positive for leg swelling. Negative for chest pain and palpitations. Gastrointestinal: Negative for abdominal pain. Genitourinary: Negative for dysuria. Musculoskeletal: Positive for arthralgias, gait problem and myalgias. Skin: Negative for rash and wound.    Neurological: Negative for facial asymmetry and speech difficulty. All other systems reviewed and are negative. Vitals:    09/17/20 1020   BP: (!) 192/94   Pulse: 90   Resp: 16   Temp: 97.6 °F (36.4 °C)   SpO2: 98%            Physical Exam  Vitals signs and nursing note reviewed. Constitutional:       General: He is not in acute distress. Appearance: Normal appearance. He is well-developed. He is not ill-appearing, toxic-appearing or diaphoretic. Comments: NAD, AxOx4, speaking in complete sentences     HENT:      Head: Normocephalic and atraumatic. Right Ear: External ear normal.      Left Ear: External ear normal.      Mouth/Throat:      Pharynx: No oropharyngeal exudate. Eyes:      General: No scleral icterus. Right eye: No discharge. Left eye: No discharge. Extraocular Movements: Extraocular movements intact. Conjunctiva/sclera: Conjunctivae normal.      Pupils: Pupils are equal, round, and reactive to light. Neck:      Musculoskeletal: Normal range of motion and neck supple. No muscular tenderness. Cardiovascular:      Rate and Rhythm: Normal rate and regular rhythm. Pulses: Normal pulses. Heart sounds: Normal heart sounds. No murmur. No friction rub. No gallop. Pulmonary:      Effort: Pulmonary effort is normal. No respiratory distress. Breath sounds: Normal breath sounds. No wheezing or rales. Chest:      Chest wall: No tenderness. Abdominal:      General: Bowel sounds are normal. There is no distension. Palpations: Abdomen is soft. There is no mass. Tenderness: There is no abdominal tenderness. There is no guarding or rebound. Genitourinary:     Comments: Pt denies urinary/ Testicular/ scrotal or penile  complaints  Musculoskeletal: Normal range of motion. General: Swelling and tenderness present. No deformity or signs of injury. Right lower leg: Edema present. Left lower leg: Edema present.       Comments: Noted BLE , equal   Lymphadenopathy: Cervical: No cervical adenopathy. Skin:     General: Skin is warm and dry. Capillary Refill: Capillary refill takes less than 2 seconds. Coloration: Skin is not jaundiced or pale. Findings: No bruising, erythema, lesion or rash. Neurological:      General: No focal deficit present. Mental Status: He is alert and oriented to person, place, and time. Cranial Nerves: No cranial nerve deficit. Sensory: No sensory deficit. Motor: No weakness. Coordination: Coordination normal.      Gait: Gait normal.      Deep Tendon Reflexes: Reflexes normal.      Comments: pt has motor/ CV/ Sensation grossly intact to all extremities, R = L in strength;          MDM       Procedures    Chief Complaint   Patient presents with    Leg Swelling       10:22 AM  The patients presenting problems have been discussed, and they are in agreement with the care plan formulated and outlined with them. I have encouraged them to ask questions as they arise throughout their visit. MEDICATIONS GIVEN:  Medications - No data to display    LABS REVIEWED:  Labs Reviewed - No data to display    RADIOLOGY RESULTS:  The following have been ordered and reviewed:  _____________________________________________________________________  _____________________________________________________________________    EKG interpretation:   Rhythm: normal sinus rhythm in an LVH pattern; and regular . Rate (approx.): 76; Axis: normal; P wave: normal; QRS interval: normal ; ST/T wave: normal; Negative acute significant segmental elevations/ improved LVH pattern compoared to study dated 09/14/2020    PROCEDURES:        CONSULTATIONS:       PROGRESS NOTES:      DIAGNOSIS:    1. Edema, unspecified type    2. Pain in both lower extremities        PLAN:  1-increase lasix or decrease PO liquids x 3d; Cardiology/ Nephrology follow-up      ED COURSE: The patients hospital course has been uncomplicated.       12:56 PM  Tj Somers results have been reviewed with him. He has been counseled regarding his diagnosis. He verbally conveys understanding and agreement of the signs, symptoms, diagnosis, treatment and prognosis and additionally agrees to Call/ Arrange follow up as recommended with Wilver Cramer, NP in 24 - 48 hours. He also agrees with the care-plan and conveys that all of his questions have been answered. I have also put together some discharge instructions for him that include: 1) educational information regarding their diagnosis, 2) how to care for their diagnosis at home, as well a 3) list of reasons why they would want to return to the ED prior to their follow-up appointment, should their condition change or for concerns.

## 2020-10-02 ENCOUNTER — TELEPHONE (OUTPATIENT)
Dept: HEMATOLOGY | Age: 55
End: 2020-10-02

## 2021-01-15 ENCOUNTER — OFFICE VISIT (OUTPATIENT)
Dept: HEMATOLOGY | Age: 56
End: 2021-01-15
Payer: MEDICAID

## 2021-01-15 VITALS
WEIGHT: 190 LBS | TEMPERATURE: 97.7 F | DIASTOLIC BLOOD PRESSURE: 76 MMHG | RESPIRATION RATE: 18 BRPM | HEART RATE: 99 BPM | SYSTOLIC BLOOD PRESSURE: 138 MMHG | HEIGHT: 69 IN | OXYGEN SATURATION: 98 % | BODY MASS INDEX: 28.14 KG/M2

## 2021-01-15 DIAGNOSIS — B18.2 CHRONIC HEPATITIS C WITHOUT HEPATIC COMA (HCC): Primary | ICD-10-CM

## 2021-01-15 PROBLEM — I10 HYPERTENSION: Status: ACTIVE | Noted: 2019-05-10

## 2021-01-15 PROBLEM — N17.9 ACUTE RENAL FAILURE (ARF) (HCC): Status: RESOLVED | Noted: 2020-06-28 | Resolved: 2021-01-15

## 2021-01-15 PROBLEM — N18.9 CKD (CHRONIC KIDNEY DISEASE): Status: ACTIVE | Noted: 2020-06-13

## 2021-01-15 PROBLEM — R77.8 ELEVATED TROPONIN: Status: RESOLVED | Noted: 2020-06-13 | Resolved: 2021-01-15

## 2021-01-15 PROCEDURE — 99203 OFFICE O/P NEW LOW 30 MIN: CPT | Performed by: INTERNAL MEDICINE

## 2021-01-15 RX ORDER — FUROSEMIDE 40 MG/1
TABLET ORAL DAILY
COMMUNITY
Start: 2020-11-20 | End: 2021-03-18

## 2021-01-15 RX ORDER — LISINOPRIL 40 MG/1
TABLET ORAL
Status: ON HOLD | COMMUNITY
Start: 2020-11-20 | End: 2022-01-17 | Stop reason: ALTCHOICE

## 2021-01-15 RX ORDER — ASPIRIN 81 MG/1
81 TABLET ORAL DAILY
COMMUNITY
Start: 2020-11-20 | End: 2021-03-18

## 2021-01-15 RX ORDER — FUROSEMIDE 20 MG/1
20 TABLET ORAL DAILY
COMMUNITY
Start: 2020-10-20 | End: 2022-01-19

## 2021-01-15 RX ORDER — POTASSIUM CHLORIDE 20 MEQ/1
20 TABLET, EXTENDED RELEASE ORAL DAILY
COMMUNITY
Start: 2020-11-20 | End: 2021-03-18

## 2021-01-15 NOTE — PROGRESS NOTES
Sean Dance [address]      Lise Jack MD, Rick Hale, Amy Mukherjee MD, MPH      Chris Wyatt, PA-ERIC Baeza, [facility]-BC     Hawa Smith, [facility]   [facility]    Cleopatra Palacios, [facility]       Alvaroani Foster [facility] 136    at [address]    [address] [facility], [address] [place] [address], [facility] 22.    [phone]    FAX: [address] [place] [address] Avenue    [facility]    [address], [address], [address]    [phone]    FAX: [phone]       Patient Care Team:  Curt Rivera NP as PCP - General (Nurse Practitioner)  Curt Rivera NP as PCP - [facility] Giselle Corona Provider  Other, MD Brian      Problem List  Date Reviewed: 1/15/2021          Codes Class Noted    Chronic hepatitis C [facility]) ICD-10-CM: B18.2  ICD-9-CM: 070.54  1/15/2021        CKD (chronic kidney disease) ICD-10-CM: N18.9  ICD-9-CM: 585.9  6/13/2020        Chest pain ICD-10-CM: R07.9  ICD-9-CM: 786.50  5/10/2019        Hypertension ICD-10-CM: I10  ICD-9-CM: 401.9  5/10/2019              The clinicians listed above have asked me to see Godwin Brennan in consultation regarding chronic HCV and its management. All medical records sent by the referring physicians were reviewed including imaging studies     The patient is a 54 y.o. Black male who was found to have abnormalities in liver chemistries and subsequently tested positive for chronic HCV in 2010. Risk factors for acquiring HCV are inhaling cocaine in 1980s - 8/2020. There was no history of acute icteric hepatitis at the time of these risk factors. CT scan of the liver was performed in 6/2020. The results of the imaging   demonstrated a normal appearing liver. An assessment of liver fibrosis with biopsy or elastography has not been performed.       The patient has never received treatment for chronic HCV. The patient has no symptoms which can be attributed to the liver disorder. The patient is not currently experiencing the following symptoms of liver disease:    Fatigue, pain in the right side over the liver,     The patient has Moderate limitations in functional activities which can be attributed to other medical problems that are not related to the liver disease. ASSESSMENT AND PLAN:  Chronic HCV   Chronic HCV of unclear severity. Liver transaminases are elevated. ALP is elevated. Liver function is normal.  Serum albumin is depressed. The platelet count is depressed. Based upon laboratory studies the patient may have advanced liver disease or cirrhosis. Will perform laboratory testing to monitor liver function and degree of liver injury. This included BMP, hepatic panel, CBC with platelet count, INR. Will perform and/or review results of HCV viral load, HCV genotype to define the specific treatment and duration of treatment that will be required. Will perform serologic and virologic studies to assess for other causes of chronic liver disease. Will perform imaging of the liver with ultrasound. The need to perform an assessment of liver fibrosis was discussed with the patient. The Fibroscan can assess liver fibrosis and determine if a patient has advanced fibrosis or cirrhosis without the need for liver biopsy. This will be performed at the next office visit. Chronic HCV Treatment  The patient has not been treated for HCV. The patient has HCV genotype that is not yet defined. Discussed the treatment alternatives. The SVR/cure rate for HCV now exceeds 97% without significant side effects for most patients with HCV. The specific treatment is dependent upon genotype, viral load and histology. Screening for Esophageal varices   The patient has not had an EGD to screen for varices.     Will schedule for EGD to assess for varices     Screening for Hepatocellular Carcinoma  HCC screening has not been not been performed   AFP was ordered today and ultrasound will be scheduled. Treatment of other medical problems in patients with chronic liver disease  There are no contraindications for the patient to take most medications that are necessary for treatment of other medical issues. The patient has cirrhrosis and should avoid taking NSAIDs which are associated with a higher rate of developing ECHO. Counseling for alcohol in patients with chronic liver disease  The patient was counseled regarding alcohol consumption and the effect of alcohol on chronic liver disease. The patient does not consume any significant amount of alcohol. Substance Use  The patient was counseled regarding the risk of overdose and death from using opioids and other narcotic drugs. Discussed the risk of becoming reinfected with HCV once they are cured if they resume IV drug use or inhaling drugs nasally. The patient has not used drugs since 8/2020. Vaccinations   The need for vaccination against viral hepatitis A and B will be assessed with serologic and instituted as appropriate. Routine vaccinations against other bacterial and viral agents can be performed as indicated. Annual flu vaccination should be administered if indicated. ALLERGIES  No Known Allergies    MEDICATIONS  Current Outpatient Medications   Medication Sig    potassium chloride (K-DUR, KLOR-CON) 20 mEq tablet Take 20 mEq by mouth daily.  lisinopriL (PRINIVIL, ZESTRIL) 40 mg tablet 40 mg = 1 tab each dose, PO, daily, 0 Refills    aspirin delayed-release 81 mg tablet Take 81 mg by mouth daily.  furosemide (LASIX) 40 mg tablet daily.  furosemide (LASIX) 20 mg tablet every evening.     hydroCHLOROthiazide (HYDRODIURIL) 25 mg tablet 25 mg = 1 tab each dose, PO, daily, # 30 tab, 0 Refills    hydrOXYzine HCL (ATARAX) 50 mg tablet     sertraline (ZOLOFT) 25 mg tablet     amLODIPine (NORVASC) 10 mg tablet Take 1 Tab by mouth daily.  acetaminophen (TYLENOL) 325 mg tablet Take 2 Tabs by mouth every six (6) hours as needed for Pain or Fever.  carvediloL (COREG) 3.125 mg tablet Take 1 Tab by mouth two (2) times daily (with meals). Indications: high blood pressure    fluticasone propionate (FLONASE) 50 mcg/actuation nasal spray 2 sprays in each nostrils daily    hydrALAZINE (APRESOLINE) 100 mg tablet Take 1 Tab by mouth three (3) times daily. No current facility-administered medications for this visit. SYSTEM REVIEW NOT RELATED TO LIVER DISEASE OR REVIEWED ABOVE:  Constitution systems: Negative for fever, chills, weight gain, weight loss. Eyes: Negative for visual changes. ENT: Negative for sore throat, painful swallowing. Respiratory: Negative for cough, hemoptysis, SOB. Cardiology: Negative for chest pain, palpitations. GI:  Negative for constipation or diarrhea. : Negative for urinary frequency, dysuria, hematuria, nocturia. Skin: Negative for rash. Hematology: Negative for easy bruising, blood clots. Musculo-skelatal: Negative for back pain, muscle pain, weakness. Neurologic: Negative for headaches, dizziness, vertigo, memory problems not related to HE. Psychology: Negative for anxiety, depression. FAMILY HISTORY:  The father  of accident. The mother  of ESRD. There is no family history of liver disease. SOCIAL HISTORY:  The patient has never been . The patient has 4 children, and 4 grandchildren. The patient stopped using tobacco products in 2020. The patient has never consumed significant amounts of alcohol. The patient used to work construction. The patient has not worked since .         PHYSICAL EXAMINATION:  Visit Vitals  /76 (BP 1 Location: Right arm, BP Patient Position: Sitting)   Pulse 99   Temp 97.7 °F (36.5 °C) (Temporal)   Resp 18   Ht 5' 9\" (1.753 m) Wt 190 lb (86.2 kg)   SpO2 98%   BMI 28.06 kg/m²     General: No acute distress. Eyes: Sclera anicteric. ENT: No oral lesions. Thyroid normal.  Nodes: No adenopathy. Skin: No spider angiomata. No jaundice. No palmar erythema. Respiratory: Lungs clear to auscultation. Cardiovascular: Regular heart rate. No murmurs. No JVD. Abdomen: Soft non-tender. Liver size normal to percussion/palpation. Spleen not palpable. No obvious ascites. Extremities: No edema. No muscle wasting. No gross arthritic changes. Neurologic: Alert and oriented. Cranial nerves grossly intact. No asterixis.     LABORATORY STUDIES:  Liver [place] [address] & Units 9/17/2020 9/14/2020   WBC 4.1 - 11.1 K/uL 6.2 5.9   ANC 1.8 - 8.0 K/UL 2.2 2.0   HGB 12.1 - 17.0 g/dL 11.9 (L) 11.4 (L)    - 400 K/uL 141 (L) 128 (L)   INR 0.9 - 1.1       AST 15 - 37 U/L 98 (H) 68 (H)   ALT 12 - 78 U/L 91 (H) 74   Alk Phos 45 - 117 U/L 222 (H) 178 (H)   Bili, Total 0.2 - 1.0 MG/DL 0.3 0.3   Bili, Direct 0.0 - 0.2 MG/DL     Albumin 3.5 - 5.0 g/dL 2.4 (L) 2.3 (L)   BUN 6 - 20 MG/DL 58 (H) 48 (H)   Creat 0.70 - 1.30 MG/DL 3.82 (H) 3.66 (H)   Na 136 - 145 mmol/L 135 (L) 135 (L)   K 3.5 - 5.1 mmol/L 4.5 4.8   Cl 97 - 108 mmol/L 102 103   CO2 21 - 32 mmol/L 29 27   Glucose 65 - 100 mg/dL 73 104 (H)   Magnesium 1.6 - 2.4 mg/dL 2.1      SEROLOGIES:  Serologies Latest Ref Rng & Units 7/2/2020 7/1/2020   HCV RT-PCR, Quant IU/mL 4,570,000    HCV Log10 log10 IU/mL 6.660    LUCIA, IFA   Negative   C-ANCA Neg:<1:20 titer  <1:20   P-ANCA Neg:<1:20 titer  <1:20   ANCA Neg:<1:20 titer  <1:20     Serologies Latest Ref Rng & Units 6/28/2020   Hep B Surface Ag Index <0.10   Hep B Surface Ag Interp NEG   Negative   Hep C Ab NR   REACTIVE (A)     LIVER HISTOLOGY:  Not available or performed    ENDOSCOPIC PROCEDURES:  Not available or performed    RADIOLOGY:  Not available or performed    OTHER TESTING:  Not available or performed    FOLLOW-UP:  All of the issues listed above in the Assessment and Plan were discussed with the patient. All questions were answered. The patient expressed a clear understanding of the above. [address] [place] [address] in 4 weeks for Fibroscan to review all data and determine the treatment plan.       MD Sherri Elkins[address] [place] [address].  [phone]  [address] [place] [address]

## 2021-01-15 NOTE — PROGRESS NOTES
Identified pt with two pt identifiers(name and ). Reviewed record in preparation for visit and have obtained necessary documentation. Chief Complaint   Patient presents with   [address] [facility] F/U      Vitals:    01/15/21 1028   BP: 138/76   Pulse: 99   Resp: 18   Temp: 97.7 °F (36.5 °C)   TempSrc: Temporal   SpO2: 98%   Weight: 190 lb (86.2 kg)   Height: 5' 9\" (1.753 m)   PainSc:   7   PainLoc: Hip       Health Maintenance Review: Patient reminded of \"due or due soon\" health maintenance. I have asked the patient to contact his/her primary care provider (PCP) for follow-up on his/her health maintenance. Coordination of Care Questionnaire:  :   1) Have you been to an emergency room, urgent care, or hospitalized since your last visit? If yes, where when, and reason for visit? no       2. Have seen or consulted any other health care provider since your last visit? If yes, where when, and reason for visit? NO      Patient is accompanied by self I have received verbal consent from Cm Awan to discuss any/all medical information while they are present in the room.

## 2021-01-17 LAB
HAV AB SER QL IA: POSITIVE
HBV CORE AB SERPL QL IA: NEGATIVE

## 2021-01-18 LAB
AFP L3 MFR SERPL: 5.8 % (ref 0–9.9)
AFP SERPL-MCNC: 35.4 NG/ML (ref 0–8)

## 2021-01-19 LAB
HCV RNA SERPL NAA+PROBE-ACNC: NORMAL IU/ML
HCV RNA SERPL NAA+PROBE-LOG IU: 6.18 LOG10 IU/ML
TEST INFORMATION:, 550031: NORMAL

## 2021-01-20 LAB
ALBUMIN SERPL-MCNC: 3.2 G/DL (ref 3.5–5)
ALBUMIN/GLOB SERPL: 0.7 {RATIO} (ref 1.1–2.2)
ALP SERPL-CCNC: 230 U/L (ref 45–117)
ALT SERPL-CCNC: 90 U/L (ref 12–78)
ANION GAP SERPL CALC-SCNC: 8 MMOL/L (ref 5–15)
AST SERPL-CCNC: 71 U/L (ref 15–37)
BASOPHILS # BLD: 0.1 K/UL (ref 0–0.1)
BASOPHILS NFR BLD: 1 % (ref 0–1)
BILIRUB DIRECT SERPL-MCNC: 0.1 MG/DL (ref 0–0.2)
BILIRUB SERPL-MCNC: 0.3 MG/DL (ref 0.2–1)
BUN SERPL-MCNC: 63 MG/DL (ref 6–20)
BUN/CREAT SERPL: 16 (ref 12–20)
CALCIUM SERPL-MCNC: 8.4 MG/DL (ref 8.5–10.1)
CHLORIDE SERPL-SCNC: 105 MMOL/L (ref 97–108)
CO2 SERPL-SCNC: 25 MMOL/L (ref 21–32)
CREAT SERPL-MCNC: 4 MG/DL (ref 0.7–1.3)
DIFFERENTIAL METHOD BLD: ABNORMAL
EOSINOPHIL # BLD: 0.4 K/UL (ref 0–0.4)
EOSINOPHIL NFR BLD: 6 % (ref 0–7)
ERYTHROCYTE [DISTWIDTH] IN BLOOD BY AUTOMATED COUNT: 12.2 % (ref 11.5–14.5)
FERRITIN SERPL-MCNC: 831 NG/ML (ref 26–388)
GLOBULIN SER CALC-MCNC: 4.9 G/DL (ref 2–4)
GLUCOSE SERPL-MCNC: 93 MG/DL (ref 65–100)
HBV SURFACE AB SER QL: NONREACTIVE
HBV SURFACE AB SER-ACNC: <3.1 MIU/ML
HBV SURFACE AG SER QL: 0.51 INDEX
HBV SURFACE AG SER QL: NEGATIVE
HCT VFR BLD AUTO: 31.8 % (ref 36.6–50.3)
HCV GENTYP SERPL NAA+PROBE: NORMAL
HCV RNA SERPL QL NAA+PROBE: POSITIVE
HGB BLD-MCNC: 9.9 G/DL (ref 12.1–17)
IMM GRANULOCYTES # BLD AUTO: 0 K/UL (ref 0–0.04)
IMM GRANULOCYTES NFR BLD AUTO: 0 % (ref 0–0.5)
INR PPP: 1.1 (ref 0.9–1.1)
IRON SATN MFR SERPL: 45 % (ref 20–50)
IRON SERPL-MCNC: 133 UG/DL (ref 35–150)
LYMPHOCYTES # BLD: 3.4 K/UL (ref 0.8–3.5)
LYMPHOCYTES NFR BLD: 48 % (ref 12–49)
MCH RBC QN AUTO: 27.8 PG (ref 26–34)
MCHC RBC AUTO-ENTMCNC: 31.1 G/DL (ref 30–36.5)
MCV RBC AUTO: 89.3 FL (ref 80–99)
MONOCYTES # BLD: 0.8 K/UL (ref 0–1)
MONOCYTES NFR BLD: 11 % (ref 5–13)
NEUTS SEG # BLD: 2.4 K/UL (ref 1.8–8)
NEUTS SEG NFR BLD: 34 % (ref 32–75)
NRBC # BLD: 0 K/UL (ref 0–0.01)
NRBC BLD-RTO: 0 PER 100 WBC
PLATELET # BLD AUTO: 115 K/UL (ref 150–400)
PLEASE NOTE, 550474: NORMAL
PMV BLD AUTO: 12.7 FL (ref 8.9–12.9)
POTASSIUM SERPL-SCNC: 4.3 MMOL/L (ref 3.5–5.1)
PROT SERPL-MCNC: 8.1 G/DL (ref 6.4–8.2)
PROTHROMBIN TIME: 11.7 SEC (ref 9–11.1)
RBC # BLD AUTO: 3.56 M/UL (ref 4.1–5.7)
SODIUM SERPL-SCNC: 138 MMOL/L (ref 136–145)
TIBC SERPL-MCNC: 294 UG/DL (ref 250–450)
WBC # BLD AUTO: 6.9 K/UL (ref 4.1–11.1)

## 2021-01-28 ENCOUNTER — HOSPITAL ENCOUNTER (OUTPATIENT)
Dept: ULTRASOUND IMAGING | Age: 56
Discharge: HOME | End: 2021-01-28
Attending: INTERNAL MEDICINE
Payer: MEDICAID

## 2021-01-28 DIAGNOSIS — B18.2 CHRONIC HEPATITIS C WITHOUT HEPATIC COMA (HCC): ICD-10-CM

## 2021-01-28 PROCEDURE — 76705 ECHO EXAM OF ABDOMEN: CPT

## 2021-02-15 ENCOUNTER — OFFICE VISIT (OUTPATIENT)
Dept: HEMATOLOGY | Age: 56
End: 2021-02-15
Payer: MEDICAID

## 2021-02-15 VITALS
HEIGHT: 69 IN | SYSTOLIC BLOOD PRESSURE: 162 MMHG | DIASTOLIC BLOOD PRESSURE: 89 MMHG | RESPIRATION RATE: 18 BRPM | HEART RATE: 92 BPM | TEMPERATURE: 97.9 F | WEIGHT: 183.8 LBS | OXYGEN SATURATION: 99 % | BODY MASS INDEX: 27.22 KG/M2

## 2021-02-15 DIAGNOSIS — D64.9 ANEMIA, UNSPECIFIED TYPE: ICD-10-CM

## 2021-02-15 DIAGNOSIS — B18.2 CHRONIC HEPATITIS C WITHOUT HEPATIC COMA (HCC): Primary | ICD-10-CM

## 2021-02-15 PROBLEM — D50.9 IRON DEFICIENCY ANEMIA: Status: ACTIVE | Noted: 2021-02-15

## 2021-02-15 PROCEDURE — 99214 OFFICE O/P EST MOD 30 MIN: CPT | Performed by: NURSE PRACTITIONER

## 2021-02-15 NOTE — PROGRESS NOTES
Identified pt with two pt identifiers(name and ). Reviewed record in preparation for visit and have obtained necessary documentation. Chief Complaint   Patient presents with    Hepatitis C     f/u      Vitals:    02/15/21 1055   BP: (!) 166/92   Pulse: 92   Resp: 18   Temp: 97.9 °F (36.6 °C)   TempSrc: Temporal   SpO2: 99%   Weight: 183 lb 12.8 oz (83.4 kg)   Height: 5' 9\" (1.753 m)   PainSc:   8   PainLoc: Abdomen   Patient states he has had pain and blood in the stool for a week or more    Health Maintenance Review: Patient reminded of \"due or due soon\" health maintenance. I have asked the patient to contact his/her primary care provider (PCP) for follow-up on his/her health maintenance. Coordination of Care Questionnaire:  :   1) Have you been to an emergency room, urgent care, or hospitalized since your last visit? If yes, where when, and reason for visit? no       2. Have seen or consulted any other health care provider since your last visit? If yes, where when, and reason for visit? NO      Patient is accompanied by self I have received verbal consent from Cm Awan to discuss any/all medical information while they are present in the room.

## 2021-02-15 NOTE — PROGRESS NOTES
[address] [facility], [place] SPARROW Conchita Qua, MD, MPH      CONNER Phelps Che, [facility]     Hawa Smith, [facility]   SKYLA Eaton, [facility]       Alvaro Foster [facility] 136    at [address], [address] [place] [address], [facility] 22.    [phone]    FAX: [address] [place] [address], [address]    [phone]    FAX: [phone]     Patient Care Team:  Lukas Sierra NP as PCP - General (Nurse Practitioner)  Lukas Sierra NP as PCP - [facility]  Brian Damon MD    Problem List  Date Reviewed: 1/15/2021          Codes Class Noted    Chronic hepatitis C [facility]) ICD-10-CM: B18.2  ICD-9-CM: 070.54  1/15/2021        CKD (chronic kidney disease) ICD-10-CM: N18.9  ICD-9-CM: 585.9  6/13/2020        Chest pain ICD-10-CM: R07.9  ICD-9-CM: 786.50  5/10/2019        Hypertension ICD-10-CM: I10  ICD-9-CM: 401.9  5/10/2019            Krishna Barragan is being seen at [address] for management of chronic HCV. The active problem list, all pertinent past medical history, medications, radiologic findings and laboratory findings related to the liver disorder were reviewed with the patient. The patient is a 54 y.o. Black male who was found to have abnormalities in liver chemistries and subsequently tested positive for chronic HCV in 2010. CT scan of the liver was performed in 6/2020. The results of the imaging demonstrated a normal appearing liver. An assessment of liver fibrosis with biopsy or elastography has not been performed. A FibroScan is not approved by his insurance. The patient has never received treatment for chronic HCV.       The patient has no symptoms which can be attributed to the liver disorder. The patient is not currently experiencing the following symptoms of liver disease: fatigue or pain in the right side over the liver. The patient has moderate limitations in functional activities which can be attributed to other medical problems not related to the liver disease. ASSESSMENT AND PLAN:  Chronic HCV   Chronic HCV of unclear severity. Liver transaminases are elevated. ALP is elevated. Liver function is normal. Serum albumin is depressed. The platelet count is depressed. Based upon laboratory studies, the patient may have advanced liver disease or cirrhosis. FibroScan is not a covered procedure by his insurance. I will order a FibroSure and order the medication based on his results. Chronic HCV treatment  The patient has not been treated for HCV. The patient has HCV genotype 1B. Treatment agreement signed in the office today. Will likely order Mavyret once the FibroSure has resulted. His imaging does not show signs of cirrhosis per report. Will need to see what FSure shows. Screening for esophageal varices   The patient has not had an EGD to screen for varices. EGD scheduled with [facility] 3/25/2021. Anemia  His hemoglobin shows a 2 point drop from previous check. He is having some rectal bleeding (blood on tissue) but no karlee blood loss or black stool. I will see where it is today, along with an iron panel. His baseline anemia likely due to his chronic kidney disease, but this represents an acute drop. Screening for hepatocellular carcinoma  1/2021: AFP elevated with normal L3%. Ultrasound without mass or lesion. Treatment of other medical problems in patients with chronic liver disease  There are no contraindications for the patient to take most medications necessary for treatment of other medical issues.     The patient has cirrhosis and should avoid taking NSAIDs which are associated with a higher rate of developing ECHO. Counseling for alcohol in patients with chronic liver disease  The patient was counseled regarding alcohol consumption and the effect of alcohol on chronic liver disease. The patient previously drank alcohol in excess. He has not drank since 10/2020. Substance use  The patient was counseled regarding the risk of overdose and death from using opioids and other narcotic drugs. Discussed the risk of becoming reinfected with HCV once they are cured if they resume IV drug use or inhaling drugs nasally. The patient has not used drugs since 10/2020. Vaccinations   Recommend vaccination against viral hepatitis B. He has documented immunity to viral hepatitis A. Routine vaccinations against other bacterial and viral agents can be performed as indicated. Annual flu vaccination should be administered if indicated. ALLERGIES  Allergies   Allergen Reactions    Baclofen Rash     All over       MEDICATIONS  Current Outpatient Medications   Medication Sig    potassium chloride (K-DUR, KLOR-CON) 20 mEq tablet Take 20 mEq by mouth daily.  lisinopriL (PRINIVIL, ZESTRIL) 40 mg tablet 40 mg = 1 tab each dose, PO, daily, 0 Refills    aspirin delayed-release 81 mg tablet Take 81 mg by mouth daily.  furosemide (LASIX) 20 mg tablet every evening.  furosemide (LASIX) 40 mg tablet daily.  hydroCHLOROthiazide (HYDRODIURIL) 25 mg tablet 25 mg = 1 tab each dose, PO, daily, # 30 tab, 0 Refills    hydrOXYzine HCL (ATARAX) 50 mg tablet     sertraline (ZOLOFT) 25 mg tablet     amLODIPine (NORVASC) 10 mg tablet Take 1 Tab by mouth daily.  acetaminophen (TYLENOL) 325 mg tablet Take 2 Tabs by mouth every six (6) hours as needed for Pain or Fever.  carvediloL (COREG) 3.125 mg tablet Take 1 Tab by mouth two (2) times daily (with meals).  Indications: high blood pressure    fluticasone propionate (FLONASE) 50 mcg/actuation nasal spray 2 sprays in each nostrils daily    hydrALAZINE (APRESOLINE) 100 mg tablet Take 1 Tab by mouth three (3) times daily. No current facility-administered medications for this visit. SYSTEM REVIEW NOT RELATED TO LIVER DISEASE OR REVIEWED ABOVE:  Constitution systems: Negative for fever, chills, weight gain, weight loss. Eyes: Negative for visual changes. ENT: Negative for sore throat, painful swallowing. Respiratory: Negative for cough, hemoptysis, SOB. Cardiology: Negative for chest pain, palpitations. GI:  Negative for constipation or diarrhea. : Negative for urinary frequency, dysuria, hematuria, nocturia. Skin: Negative for rash. Hematology: Negative for easy bruising, blood clots. Musculo-skeletal: Negative for back pain, muscle pain, weakness. Neurologic: Negative for headaches, dizziness, vertigo, memory problems not related to HE. Psychology: Negative for anxiety, depression. FAMILY HISTORY:  The father  of an accident. The mother  of ESRD. There is no family history of liver disease. SOCIAL HISTORY:  The patient has never been . The patient has 4 children and 4 grandchildren. The patient stopped using tobacco products in 2020. The patient drank alcohol in excess. He has not drank alcohol since 10/2020. The patient used to work construction. The patient has not worked since . PHYSICAL EXAMINATION:  Visit Vitals  BP (!) 162/89   Pulse 92   Temp 97.9 °F (36.6 °C) (Temporal)   Resp 18   Ht 5' 9\" (1.753 m)   Wt 183 lb 12.8 oz (83.4 kg)   SpO2 99%   BMI 27.14 kg/m²     General: No acute distress. Eyes: Sclera anicteric. ENT: No oral lesions. Nodes: No adenopathy. Skin: No spider angiomata. No jaundice. No palmar erythema. Respiratory: Lungs clear to auscultation. Cardiovascular: Regular heart rate. No murmurs. No JVD. Abdomen: Soft non-tender, liver size normal to percussion/palpation. Spleen not palpable. No obvious ascites. Extremities: No edema. No muscle wasting.  No gross arthritic changes. Neurologic: Alert and oriented. Cranial nerves grossly intact. No asterixis.     LABORATORY STUDIES:  [address] & Units 1/15/2021 9/17/2020   WBC 4.1 - 11.1 K/uL 6.9 6.2   ANC 1.8 - 8.0 K/UL 2.4 2.2   HGB 12.1 - 17.0 g/dL 9.9 (L) 11.9 (L)    - 400 K/uL 115 (L) 141 (L)   INR 0.9 - 1.1   1.1    AST 15 - 37 U/L 71 (H) 98 (H)   ALT 12 - 78 U/L 90 (H) 91 (H)   Alk Phos 45 - 117 U/L 230 (H) 222 (H)   Bili, Total 0.2 - 1.0 MG/DL 0.3 0.3   Bili, Direct 0.0 - 0.2 MG/DL 0.1    Albumin 3.5 - 5.0 g/dL 3.2 (L) 2.4 (L)   BUN 6 - 20 MG/DL 63 (H) 58 (H)   Creat 0.70 - 1.30 MG/DL 4.00 (H) 3.82 (H)   Na 136 - 145 mmol/L 138 135 (L)   K 3.5 - 5.1 mmol/L 4.3 4.5   Cl 97 - 108 mmol/L 105 102   CO2 21 - 32 mmol/L 25 29   Glucose 65 - 100 mg/dL 93 73   Magnesium 1.6 - 2.4 mg/dL  2.1     Liver [facility] Latest Ref Rng & Units 9/14/2020   WBC 4.1 - 11.1 K/uL 5.9   ANC 1.8 - 8.0 K/UL 2.0   HGB 12.1 - 17.0 g/dL 11.4 (L)    - 400 K/uL 128 (L)   INR 0.9 - 1.1      AST 15 - 37 U/L 68 (H)   ALT 12 - 78 U/L 74   Alk Phos 45 - 117 U/L 178 (H)   Bili, Total 0.2 - 1.0 MG/DL 0.3   Bili, Direct 0.0 - 0.2 MG/DL    Albumin 3.5 - 5.0 g/dL 2.3 (L)   BUN 6 - 20 MG/DL 48 (H)   Creat 0.70 - 1.30 MG/DL 3.66 (H)   Na 136 - 145 mmol/L 135 (L)   K 3.5 - 5.1 mmol/L 4.8   Cl 97 - 108 mmol/L 103   CO2 21 - 32 mmol/L 27   Glucose 65 - 100 mg/dL 104 (H)   Magnesium 1.6 - 2.4 mg/dL      SEROLOGIES:  Serologies Latest Ref Rng & Units 1/15/2021 7/2/2020   Hep A Ab, Total Negative   Positive (A)    Hep B Surface Ag Index 0.51    Hep B Surface Ag Interp Negative   Negative    Hep B Core Ab, Total Negative   Negative    Hep B Surface Ab mIU/mL <3.10    Hep B Surface Ab Interp NONREACTIVE   NONREACTIVE    Hep C Ab NR       Hep C Genotype  1b    HCV RT-PCR, Quant IU/mL 1,510,000 4,570,000   HCV Log10 log10 IU/mL  6.660   Ferritin 26 - 388 NG/ (H)    Iron % Saturation 20 - 50 % 45    LUCIA, IFA C-ANCA Neg:<1:20 titer     P-ANCA Neg:<1:20 titer     ANCA Neg:<1:20 titer       Serologies Latest Ref Rng & Units 7/1/2020 6/30/2020   Hep A Ab, Total Negative       Hep B Surface Ag Index     Hep B Surface Ag Interp Negative       Hep B Core Ab, Total Negative       Hep B Surface Ab mIU/mL     Hep B Surface Ab Interp NONREACTIVE       Hep C Ab NR       Hep C Genotype      HCV RT-PCR, Quant IU/mL  8,940,000   HCV Log10 log10 IU/mL  6.951   Ferritin 26 - 388 NG/ML     Iron % Saturation 20 - 50 %     LUCIA, IFA  Negative    C-ANCA Neg:<1:20 titer <1:20    P-ANCA Neg:<1:20 titer <1:20    ANCA Neg:<1:20 titer <1:20      Serologies Latest Ref Rng & Units 6/28/2020   Hep A Ab, Total Negative      Hep B Surface Ag Index <0.10   Hep B Surface Ag Interp Negative   Negative   Hep B Core Ab, Total Negative      Hep B Surface Ab mIU/mL    Hep B Surface Ab Interp NONREACTIVE      Hep C Ab NR   REACTIVE (A)   Hep C Genotype     HCV RT-PCR, Quant IU/mL    HCV Log10 log10 IU/mL    Ferritin 26 - 388 NG/ML    Iron % Saturation 20 - 50 %    LUCIA, IFA     C-ANCA Neg:<1:20 titer    P-ANCA Neg:<1:20 titer    ANCA Neg:<1:20 titer      LIVER HISTOLOGY:  Not available or performed    ENDOSCOPIC PROCEDURES:  Not available or performed    RADIOLOGY:  1/2021. RUQ ultrasound. Heterogenous liver without mass, lesion. No ascites. Cholelithiasis and gallbladder sludge. OTHER TESTING:  Not available or performed    FOLLOW-UP:  All of the issues listed above in the assessment and plan were discussed with the patient. All questions were answered. The patient expressed a clear understanding of the above. [facility] 87 4 weeks after initiation of medical therapy. The patient was advised to call the office when he receives his medication to provide us with his start date and to schedule his 4 week treatment follow up appointment.      Antonino Roberts, [facility]  Liver [facility], [address] [place], [place]  22.  [phone]

## 2021-02-16 LAB
ERYTHROCYTE [DISTWIDTH] IN BLOOD BY AUTOMATED COUNT: 12.4 % (ref 11.6–15.4)
HCT VFR BLD AUTO: 38.8 % (ref 37.5–51)
HGB BLD-MCNC: 13 G/DL (ref 13–17.7)
IRON SATN MFR SERPL: 46 % (ref 15–55)
IRON SERPL-MCNC: 158 UG/DL (ref 38–169)
MCH RBC QN AUTO: 29 PG (ref 26.6–33)
MCHC RBC AUTO-ENTMCNC: 33.5 G/DL (ref 31.5–35.7)
MCV RBC AUTO: 86 FL (ref 79–97)
PLATELET # BLD AUTO: 169 X10E3/UL (ref 150–450)
RBC # BLD AUTO: 4.49 X10E6/UL (ref 4.14–5.8)
TIBC SERPL-MCNC: 347 UG/DL (ref 250–450)
UIBC SERPL-MCNC: 189 UG/DL (ref 111–343)
WBC # BLD AUTO: 7.6 X10E3/UL (ref 3.4–10.8)

## 2021-02-17 LAB
A2 MACROGLOB SERPL-MCNC: 387 MG/DL (ref 110–276)
ALT SERPL W P-5'-P-CCNC: 98 IU/L (ref 0–55)
APO A-I SERPL-MCNC: 165 MG/DL (ref 101–178)
BILIRUB SERPL-MCNC: 0.3 MG/DL (ref 0–1.2)
COMMENT: ABNORMAL
FIBROSIS SCORING:, 550107: ABNORMAL
FIBROSIS STAGE SERPL QL: ABNORMAL
GGT SERPL-CCNC: 64 IU/L (ref 0–65)
HAPTOGLOB SERPL-MCNC: 96 MG/DL (ref 29–370)
INTERPRETATION, 550106: ABNORMAL
LIVER FIBR SCORE SERPL CALC.FIBROSURE: 0.52 (ref 0–0.21)
NECROINFLAMM ACTIVITY SCORING:, 550121: ABNORMAL
NECROINFLAMMATORY ACT GRADE SERPL QL: ABNORMAL
NECROINFLAMMATORY ACT SCORE SERPL: 0.64 (ref 0–0.17)
SERVICE CMNT-IMP: ABNORMAL

## 2021-02-18 LAB
AMPHETAMINES BLD QL SCN: NEGATIVE NG/ML
BARBITURATES SERPLBLD QL: NEGATIVE UG/ML
CANNABINOIDS BLD QL SCN: NEGATIVE NG/ML
COCAINE+BZE SERPLBLD QL SCN: NEGATIVE NG/ML
OPIATES BLD QL SCN: NEGATIVE NG/ML
OXYCODONES, 738315: NEGATIVE NG/ML
PCP BLD QL SCN: NEGATIVE NG/ML

## 2021-03-18 RX ORDER — AMLODIPINE BESYLATE 10 MG/1
10 TABLET ORAL
Status: ON HOLD | COMMUNITY
End: 2022-01-17

## 2021-03-18 RX ORDER — NALOXONE HYDROCHLORIDE 4 MG/.1ML
1 SPRAY NASAL
Status: ON HOLD | COMMUNITY
End: 2022-05-17

## 2021-03-18 RX ORDER — POTASSIUM CHLORIDE 1500 MG/1
20 TABLET, FILM COATED, EXTENDED RELEASE ORAL DAILY
COMMUNITY
End: 2022-03-25

## 2021-03-18 RX ORDER — BUPRENORPHINE AND NALOXONE 8; 2 MG/1; MG/1
1 FILM, SOLUBLE BUCCAL; SUBLINGUAL 2 TIMES DAILY
COMMUNITY

## 2021-03-21 ENCOUNTER — HOSPITAL ENCOUNTER (OUTPATIENT)
Dept: PREADMISSION TESTING | Age: 56
Discharge: HOME | End: 2021-03-21
Payer: MEDICAID

## 2021-03-21 ENCOUNTER — TRANSCRIBE ORDER (OUTPATIENT)
Dept: REGISTRATION | Age: 56
End: 2021-03-21

## 2021-03-21 DIAGNOSIS — Z01.812 PRE-PROCEDURE LAB EXAM: Primary | ICD-10-CM

## 2021-03-21 DIAGNOSIS — Z01.812 PRE-PROCEDURE LAB EXAM: ICD-10-CM

## 2021-03-21 PROCEDURE — U0003 INFECTIOUS AGENT DETECTION BY NUCLEIC ACID (DNA OR RNA); SEVERE ACUTE RESPIRATORY SYNDROME CORONAVIRUS 2 (SARS-COV-2) (CORONAVIRUS DISEASE [COVID-19]), AMPLIFIED PROBE TECHNIQUE, MAKING USE OF HIGH THROUGHPUT TECHNOLOGIES AS DESCRIBED BY CMS-2020-01-R: HCPCS

## 2021-03-22 LAB — SARS-COV-2, COV2NT: NOT DETECTED

## 2021-03-23 RX ORDER — GLECAPREVIR AND PIBRENTASVIR 40; 100 MG/1; MG/1
3 TABLET, FILM COATED ORAL DAILY
Qty: 84 TAB | Refills: 1 | Status: SHIPPED | OUTPATIENT
Start: 2021-03-23 | End: 2022-03-12 | Stop reason: ALTCHOICE

## 2021-03-25 ENCOUNTER — HOSPITAL ENCOUNTER (OUTPATIENT)
Age: 56
Setting detail: OUTPATIENT SURGERY
Discharge: HOME | End: 2021-03-25
Attending: INTERNAL MEDICINE | Admitting: INTERNAL MEDICINE
Payer: MEDICAID

## 2021-03-25 ENCOUNTER — ANESTHESIA EVENT (OUTPATIENT)
Dept: ENDOSCOPY | Age: 56
End: 2021-03-25
Payer: MEDICAID

## 2021-03-25 ENCOUNTER — ANESTHESIA (OUTPATIENT)
Dept: ENDOSCOPY | Age: 56
End: 2021-03-25
Payer: MEDICAID

## 2021-03-25 VITALS
SYSTOLIC BLOOD PRESSURE: 141 MMHG | HEIGHT: 69 IN | DIASTOLIC BLOOD PRESSURE: 90 MMHG | RESPIRATION RATE: 21 BRPM | TEMPERATURE: 98.6 F | WEIGHT: 186 LBS | OXYGEN SATURATION: 97 % | HEART RATE: 75 BPM | BODY MASS INDEX: 27.55 KG/M2

## 2021-03-25 DIAGNOSIS — Z00.00 NORMAL EXAM: ICD-10-CM

## 2021-03-25 PROCEDURE — 2709999900 HC NON-CHARGEABLE SUPPLY: Performed by: INTERNAL MEDICINE

## 2021-03-25 PROCEDURE — 74011250636 HC RX REV CODE- 250/636: Performed by: NURSE ANESTHETIST, CERTIFIED REGISTERED

## 2021-03-25 PROCEDURE — 76040000019: Performed by: INTERNAL MEDICINE

## 2021-03-25 PROCEDURE — 76060000031 HC ANESTHESIA FIRST 0.5 HR: Performed by: INTERNAL MEDICINE

## 2021-03-25 PROCEDURE — 74011000250 HC RX REV CODE- 250: Performed by: NURSE ANESTHETIST, CERTIFIED REGISTERED

## 2021-03-25 PROCEDURE — 43235 EGD DIAGNOSTIC BRUSH WASH: CPT | Performed by: INTERNAL MEDICINE

## 2021-03-25 RX ORDER — SODIUM CHLORIDE 9 MG/ML
50 INJECTION, SOLUTION INTRAVENOUS CONTINUOUS
Status: DISCONTINUED | OUTPATIENT
Start: 2021-03-25 | End: 2021-03-25 | Stop reason: HOSPADM

## 2021-03-25 RX ORDER — SODIUM CHLORIDE 0.9 % (FLUSH) 0.9 %
5-40 SYRINGE (ML) INJECTION AS NEEDED
Status: DISCONTINUED | OUTPATIENT
Start: 2021-03-25 | End: 2021-03-25 | Stop reason: HOSPADM

## 2021-03-25 RX ORDER — FLUMAZENIL 0.1 MG/ML
0.2 INJECTION INTRAVENOUS
Status: DISCONTINUED | OUTPATIENT
Start: 2021-03-25 | End: 2021-03-25 | Stop reason: HOSPADM

## 2021-03-25 RX ORDER — PROPOFOL 10 MG/ML
INJECTION, EMULSION INTRAVENOUS AS NEEDED
Status: DISCONTINUED | OUTPATIENT
Start: 2021-03-25 | End: 2021-03-25 | Stop reason: HOSPADM

## 2021-03-25 RX ORDER — LIDOCAINE HYDROCHLORIDE 20 MG/ML
INJECTION, SOLUTION EPIDURAL; INFILTRATION; INTRACAUDAL; PERINEURAL AS NEEDED
Status: DISCONTINUED | OUTPATIENT
Start: 2021-03-25 | End: 2021-03-25 | Stop reason: HOSPADM

## 2021-03-25 RX ORDER — SODIUM CHLORIDE 9 MG/ML
INJECTION, SOLUTION INTRAVENOUS
Status: DISCONTINUED | OUTPATIENT
Start: 2021-03-25 | End: 2021-03-25 | Stop reason: HOSPADM

## 2021-03-25 RX ORDER — SODIUM CHLORIDE 0.9 % (FLUSH) 0.9 %
5-40 SYRINGE (ML) INJECTION EVERY 8 HOURS
Status: DISCONTINUED | OUTPATIENT
Start: 2021-03-25 | End: 2021-03-25 | Stop reason: HOSPADM

## 2021-03-25 RX ORDER — FENTANYL CITRATE 50 UG/ML
50-200 INJECTION, SOLUTION INTRAMUSCULAR; INTRAVENOUS
Status: DISCONTINUED | OUTPATIENT
Start: 2021-03-25 | End: 2021-03-25 | Stop reason: HOSPADM

## 2021-03-25 RX ORDER — EPINEPHRINE 0.1 MG/ML
1 INJECTION INTRACARDIAC; INTRAVENOUS
Status: DISCONTINUED | OUTPATIENT
Start: 2021-03-25 | End: 2021-03-25 | Stop reason: HOSPADM

## 2021-03-25 RX ORDER — DEXTROMETHORPHAN/PSEUDOEPHED 2.5-7.5/.8
1.2 DROPS ORAL
Status: DISCONTINUED | OUTPATIENT
Start: 2021-03-25 | End: 2021-03-25 | Stop reason: HOSPADM

## 2021-03-25 RX ORDER — NALOXONE HYDROCHLORIDE 0.4 MG/ML
0.4 INJECTION, SOLUTION INTRAMUSCULAR; INTRAVENOUS; SUBCUTANEOUS
Status: DISCONTINUED | OUTPATIENT
Start: 2021-03-25 | End: 2021-03-25 | Stop reason: HOSPADM

## 2021-03-25 RX ORDER — ATROPINE SULFATE 0.1 MG/ML
0.5 INJECTION INTRAVENOUS
Status: DISCONTINUED | OUTPATIENT
Start: 2021-03-25 | End: 2021-03-25 | Stop reason: HOSPADM

## 2021-03-25 RX ORDER — MIDAZOLAM HYDROCHLORIDE 1 MG/ML
5-10 INJECTION, SOLUTION INTRAMUSCULAR; INTRAVENOUS
Status: DISCONTINUED | OUTPATIENT
Start: 2021-03-25 | End: 2021-03-25 | Stop reason: HOSPADM

## 2021-03-25 RX ADMIN — PROPOFOL 50 MG: 10 INJECTION, EMULSION INTRAVENOUS at 12:44

## 2021-03-25 RX ADMIN — PROPOFOL 50 MG: 10 INJECTION, EMULSION INTRAVENOUS at 12:43

## 2021-03-25 RX ADMIN — PROPOFOL 100 MG: 10 INJECTION, EMULSION INTRAVENOUS at 12:40

## 2021-03-25 RX ADMIN — PROPOFOL 50 MG: 10 INJECTION, EMULSION INTRAVENOUS at 12:42

## 2021-03-25 RX ADMIN — SODIUM CHLORIDE: 900 INJECTION, SOLUTION INTRAVENOUS at 12:34

## 2021-03-25 RX ADMIN — LIDOCAINE HYDROCHLORIDE 100 MG: 20 INJECTION, SOLUTION EPIDURAL; INFILTRATION; INTRACAUDAL; PERINEURAL at 12:39

## 2021-03-25 RX ADMIN — PROPOFOL 50 MG: 10 INJECTION, EMULSION INTRAVENOUS at 12:41

## 2021-03-25 NOTE — ANESTHESIA PREPROCEDURE EVALUATION
Relevant Problems   No relevant active problems       Anesthetic History   No history of anesthetic complications            Review of Systems / Medical History  Patient summary reviewed, nursing notes reviewed and pertinent labs reviewed    Pulmonary  Within defined limits                 Neuro/Psych   Within defined limits           Cardiovascular  Within defined limits  Hypertension                   GI/Hepatic/Renal  Within defined limits       Renal disease  Liver disease     Endo/Other  Within defined limits           Other Findings              Physical Exam    Airway  Mallampati: II  TM Distance: > 6 cm  Neck ROM: normal range of motion   Mouth opening: Normal     Cardiovascular  Regular rate and rhythm,  S1 and S2 normal,  no murmur, click, rub, or gallop             Dental  No notable dental hx       Pulmonary  Breath sounds clear to auscultation               Abdominal  GI exam deferred       Other Findings            Anesthetic Plan    ASA: 3  Anesthesia type: MAC            Anesthetic plan and risks discussed with: Patient

## 2021-03-25 NOTE — H&P
Chuy Pollock MD, Laney Amaro MD, MPH      Alyson Perez, CONNER Cochran, [facility]-BC     April S Sarah, [facility]   Maykel Alphonse, FNP-C    Lemuel James, [facility]       Alvaro Piper [place] 136    at [address], [address] [place]  22.    [phone]    FAX: [address] [place] [address], [address]    [phone]    FAX: [phone]         PRE-PROCEDURE NOTE - EGD    H and P from last office visit reviewed. Allergies reviewed. Out-patient medicaton list reviewed. Patient Active Problem List   Diagnosis Code    Chest pain R07.9    Hypertension I10    CKD (chronic kidney disease) N18.9    Chronic hepatitis C (HCC) B18.2    Iron deficiency anemia D50.9       Allergies   Allergen Reactions    Baclofen Rash     All over       No current facility-administered medications on file prior to encounter. Current Outpatient Medications on File Prior to Encounter   Medication Sig Dispense Refill    naloxone (Narcan) 4 mg/actuation nasal spray 1 [place] by IntraNASal route once as needed for Overdose. Use 1 spray intranasally, then discard. Repeat with new spray every 2 min as needed for opioid overdose symptoms, alternating nostrils.  amLODIPine (NORVASC) 10 mg tablet Take 10 mg by mouth daily as needed.  potassium chloride SR (K-TAB) 20 mEq tablet Take 20 mEq by mouth daily.  buprenorphine-naloxone (Suboxone) 8-2 mg film sublingaul film 1 Film by SubLINGual route two (2) times a day.  lisinopriL (PRINIVIL, ZESTRIL) 40 mg tablet 40 mg = 1 tab each dose, PO, daily, 0 Refills      furosemide (LASIX) 20 mg tablet Take 20 mg by mouth daily.       amLODIPine (NORVASC) 10 mg tablet Take 1 Tab by mouth daily. 30 Tab 0       For EGD to assess for esophageal and gastric varices. Plan to perform banding if indicated based upon variceal size and appearance. The risks of the procedure were discussed with the patient. These included reaction to anesthesia, pain, perforation and bleeding. All questions were answered. The patient wishes to proceed with the procedure. The patient was counseled at length about the risks of natty Covid-19 in the fani-operative and post-operative states including the recovery window of their procedure. The patient was made aware that natty Covid-19 after a surgical procedure may worsen their prognosis for recovering from the virus and lend to a higher morbidity and or mortality risk. The patient was given the options of postponing their procedure. All of the risks, benefits, and alternatives were discussed. The patient does  wish to proceed with the procedure. PHYSICAL EXAMINATION:  There were no vitals taken for this visit. General: No acute distress. Eyes: Sclera anicteric. ENT: No oral lesions. Thyroid normal.  Nodes: No adenopathy. Skin: No spider angiomata. No jaundice. No palmar erythema. Respiratory: Lungs clear to auscultation. Cardiovascular: Regular heart rate. No murmurs. No JVD. Abdomen: Soft non-tender, liver size normal to percussion/palpation. Spleen not palpable. No obvious ascites. Extremities: No edema. No muscle wasting. No gross arthritic changes. Neurologic: Alert and oriented. Cranial nerves grossly intact. No asterixis.       MOST RECENT LABORATORY STUDIES:  Lab Results   Component Value Date/Time    WBC 7.6 02/15/2021 12:17 PM    HGB 13.0 02/15/2021 12:17 PM    HCT 38.8 02/15/2021 12:17 PM    PLATELET 651 96/74/0963 12:17 PM    MCV 86 02/15/2021 12:17 PM     Lab Results   Component Value Date/Time    INR 1.1 01/15/2021 11:25 AM    INR 1.0 07/06/2020 04:21 AM    Prothrombin time 11.7 (H) 01/15/2021 11:25 AM    Prothrombin time 10.5 07/06/2020 04:21 AM       ASSESSMENT AND PLAN:  EGD to assess for esophageal and/or gastric varices.   Sedation per anesthesiology      Jermain Reed MD  [address] 22.  [phone]  [address] [place] [address]

## 2021-03-25 NOTE — PROCEDURES
[facility], MD, Tigist Metzger MD, MPH      Jone Mohan, CONNER Zamorat, [facility]     Hawa Smith, [facility]   Kartik Miranda BRIGID Mata [facility]       Alvaro Foster [place] 136    at [address], [address] [place] [address] 22.    [phone]    FAX: [address] [place] [address], [address] [place] [address] - [address]    [phone]    FAX: [phone]         UPPER ENDOSCOPY PROCEDURE NOTE    Floridalma Diallo  1965    INDICATION: Cirrhosis. Screening for esophageal varices with variceal ligation if  indicated. : Winsome Peña MD    SURGICAL ASSISTANT:  None    PROSTHETIC DEVISES, TISSUE GRAFTS, ORGAN TRANSPLANTS:  Not applicable     ANESTHESIA/SEDATION: Sedation per anesthesiology      PROCEDURE DESCRIPTION:  [employer] consent was obtained from the patient for the procedure. All risks and benefits of the procedure explained. The procedure was performed in the endoscopy suite. The patient was laying on a stretcher and moved to the left lateral decubitus position prior to administration of sedation. Sedation was administered by anesthesiology. See their note for details. The endoscope was inserted into the mouth and advanced under direct vision to the second portion of the duodenum. Careful inspection of upper gastrointestinal tract was made as the endoscope was inserted and withdrawn. Retroflexion of the endoscope to view of the cardia of the stomach was performed. The findings and interventions are described below.       FINDINGS:  Esophagus:    Normal.      Stomach:   Normal    Duodenum:   Normal bulb and second portion    SPECIMENS COLLECTED: None    INTERVENTIONS:  None    COMPLICATIONS: None. The patient tolerated the procedure well. EBL: Negligible. RECOMMENDATIONS:  Observe until discharge parameters are achieved. May resume previous diet. Repeat endoscopy to reassess varices and need for banding in 3 years. Follow-up Liver [facility] office as scheduled.       MD Sha Mcconnell [facility] [address] [place] [address] 22.  [phone]  [address] [place] [address]

## 2021-03-25 NOTE — ROUTINE PROCESS
Radha Waldropllor  1965  [id number]    Situation:  Verbal report received from: Gerald Ortiz RN  Procedure: Procedure(s):  ESOPHAGOGASTRODUODENOSCOPY (EGD)   :-    Background:    Preoperative diagnosis: egd  Postoperative diagnosis: Normal    :  Dr. Yves Primrose  Assistant(s): Endoscopy Technician-1: Anastasia Holguin  Endoscopy RN-1: Michelle Mcduffie RN    Specimens: * No specimens in log *  H. Pylori  no    Assessment:  Intra-procedure medications   Anesthesia gave intra-procedure sedation and medications, see anesthesia flow sheet yes    Intravenous fluids: NS@ KVO     Vital signs stable     Abdominal assessment: round and soft     Recommendation:  Discharge patient per MD order.     Family or Friend   Permission to share finding with family or friend yes

## 2021-03-25 NOTE — ANESTHESIA POSTPROCEDURE EVALUATION
Procedure(s):  ESOPHAGOGASTRODUODENOSCOPY (EGD)   :-.    MAC    Anesthesia Post Evaluation        Patient location during evaluation: PACU  Patient participation: complete - patient participated  Level of consciousness: awake and alert  Pain management: adequate  Airway patency: patent  Anesthetic complications: no  Cardiovascular status: acceptable  Respiratory status: acceptable  Hydration status: acceptable  Comments: I have seen and evaluated the patient and is ready for discharge. Marisol Alfredo MD    Post anesthesia nausea and vomiting:  none      INITIAL Post-op Vital signs:   Vitals Value Taken Time   /77 03/25/21 1255   Temp 36.7 °C (98.1 °F) 03/25/21 1250   Pulse 80 03/25/21 1257   Resp 18 03/25/21 1257   SpO2 97 % 03/25/21 1257   Vitals shown include unvalidated device data.

## 2021-03-25 NOTE — DISCHARGE INSTRUCTIONS
0873 [facility] MD Tresa, Roscoe Huffman MD, MPH      CONNER Branham, [facility]     April S Sarah, [facility]   Dale Harley P-C Luwanna Closs, [facility]       [facility] 136    at [address], [address] [place]  22.    [phone]    FAX: [address] [place] [address], [address]    [phone]    FAX: [phone]         ENDOSCOPY DISCHARGE INSTRUCTIONS      Milvia Thomas  1965  Date: 3/25/2021    DISCOMFORT:  Use lozenges or warm salt water gargle for sore thoat  Apply warm compress to IV site if red. If redness or soreness persists call the office. You may experience gas and bloating. Walking and belching will help relieve this. You may experience chest discomfort or pressure especially if banding of esophageal varices was performed. DIET:  You may resume your normal diet. ACTIVITY:  Spend the remainder of the day resting. Avoid any strenuous activity. You may not operate a vehicle for 12 hours. You may not engage in an occupation involving machinery or appliances for rest of today. Avoid making any critical or financial decisions for at least 24 hour. Call the [facility] [address] of [address] if you have any of the following:  Increasing chest or abdominal pain, nausea, vomiting, vomiting blood, abdominal distension or swelling, fever or chills, bloody discharge from nose or mouth or shortness of breath. Follow-up Instructions:  Call Dr. Lambert Girard for any questions or problems at the phone number listed above. If a biopsy was performed, you will be contacted by the office staff or Dr Lambert Girard within 1 week.    If you have not heard from us by then you may call the office at the phone number listed above to inquire about the results. ENDOSCOPY FINDINGS:  Your endoscopy was normal.    Will repeat EGD to look for development of varices in 3 years. Keep follow-up appointment with *** on ***/***/***.    DISCHARGE SUMMARY from the Nurse: The following personal items collected during your admission are returned to you:   Dental Appliance: Dental Appliances: None  Vision: Visual Aid: None  Hearing Aid:    Jewelry:    Clothing:    Other Valuables:    Valuables sent to safe:                          Learning About Coronavirus (COVID-19)  Coronavirus (COVID-19): Overview  What is coronavirus ([id number])? The coronavirus disease (COVID-19) is caused by a virus. It is an illness that was first found in [place], [place], in December 2019. It has since spread worldwide. The virus can cause fever, cough, and trouble breathing. In severe cases, it can cause pneumonia and make it hard to breathe without help. It can cause death. Coronaviruses are a large group of viruses. They cause the common cold. They also cause more serious illnesses like Middle East respiratory syndrome (MERS) and severe acute respiratory syndrome (SARS). COVID-19 is caused by a novel coronavirus. That means it's a new type that has not been seen in people before. This virus spreads person-to-person through droplets from coughing and sneezing. It can also spread when you are close to someone who is infected. And it can spread when you touch something that has the virus on it, such as a doorknob or a tabletop. What can you do to protect yourself from coronavirus (COVID-19)? The best way to protect yourself from getting sick is to:  · Avoid areas where there is an outbreak. · Avoid contact with people who may be infected. · Wash your hands often with soap or alcohol-based hand sanitizers. · Avoid crowds and try to stay at least 6 feet away from other people.   · Wash your hands often, especially after you cough or sneeze. Use soap and water, and scrub for at least 20 seconds. If soap and water aren't available, use an alcohol-based hand . · Avoid touching your mouth, nose, and eyes. What can you do to avoid spreading the virus to others? To help avoid spreading the virus to others:  · Cover your mouth with a tissue when you cough or sneeze. Then throw the tissue in the trash. · Use a disinfectant to clean things that you touch often. · Stay home if you are sick or have been exposed to the virus. Don't go to school, work, or public areas. And don't use public transportation. · If you are sick:  ? Leave your home only if you need to get medical care. But call the doctor's office first so they know you're coming. And wear a face mask, if you have one.  ? If you have a face mask, wear it whenever you're around other people. It can help stop the spread of the virus when you cough or sneeze. ? Clean and disinfect your home every day. Use household  and disinfectant wipes or sprays. Take special care to clean things that you grab with your hands. These include doorknobs, remote controls, phones, and handles on your refrigerator and microwave. And don't forget countertops, tabletops, bathrooms, and computer keyboards. When to call for help  Call 911 anytime you think you may need emergency care. For example, call if:  · You have severe trouble breathing. (You can't talk at all.)  · You have constant chest pain or pressure. · You are severely dizzy or lightheaded. · You are confused or can't think clearly. · Your face and lips have a blue color. · You pass out (lose consciousness) or are very hard to wake up. Call your doctor now if you develop symptoms such as:  · Shortness of breath. · Fever. · Cough. If you need to get care, call ahead to the doctor's office for instructions before you go.  Make sure you wear a face mask, if you have one, to prevent exposing other people to the virus. Where can you get the latest information? The following health organizations are tracking and studying this virus. Their websites contain the most up-to-date information. Shawnee Gonsales also learn what to do if you think you may have been exposed to the virus. · U.S. Centers for Disease Control and Prevention (CDC): The CDC provides updated news about the disease and travel advice. The website also tells you how to prevent the spread of infection. www.cdc.gov  · World Health Organization [facility]): WHO offers information about the virus outbreaks. WHO also has travel advice. www.who.int  Current as of: April 1, 2020               Content Version: 12.4  © 6849-0280 [employer]. Care instructions adapted under license by your healthcare professional. If you have questions about a medical condition or this instruction, always ask your healthcare professional. [address] any warranty or liability for your use of this information.

## 2021-03-25 NOTE — PROGRESS NOTES

## 2021-04-29 ENCOUNTER — OFFICE VISIT (OUTPATIENT)
Dept: HEMATOLOGY | Age: 56
End: 2021-04-29
Payer: MEDICAID

## 2021-04-29 VITALS
TEMPERATURE: 96.9 F | BODY MASS INDEX: 26.22 KG/M2 | DIASTOLIC BLOOD PRESSURE: 79 MMHG | OXYGEN SATURATION: 100 % | RESPIRATION RATE: 17 BRPM | HEART RATE: 73 BPM | WEIGHT: 177 LBS | SYSTOLIC BLOOD PRESSURE: 177 MMHG | HEIGHT: 69 IN

## 2021-04-29 DIAGNOSIS — B18.2 CHRONIC HEPATITIS C WITHOUT HEPATIC COMA (HCC): Primary | ICD-10-CM

## 2021-04-29 LAB
ERYTHROCYTE [DISTWIDTH] IN BLOOD BY AUTOMATED COUNT: 11.4 % (ref 11.6–15.4)
HCT VFR BLD AUTO: 37 % (ref 37.5–51)
HGB BLD-MCNC: 12.2 G/DL (ref 13–17.7)
MCH RBC QN AUTO: 28.7 PG (ref 26.6–33)
MCHC RBC AUTO-ENTMCNC: 33 G/DL (ref 31.5–35.7)
MCV RBC AUTO: 87 FL (ref 79–97)
PLATELET # BLD AUTO: 222 X10E3/UL (ref 150–450)
RBC # BLD AUTO: 4.25 X10E6/UL (ref 4.14–5.8)
WBC # BLD AUTO: 9.2 X10E3/UL (ref 3.4–10.8)

## 2021-04-29 PROCEDURE — 99213 OFFICE O/P EST LOW 20 MIN: CPT | Performed by: NURSE PRACTITIONER

## 2021-04-29 NOTE — PROGRESS NOTES
Identified pt with two pt identifiers(name and ). Reviewed record in preparation for visit and have obtained necessary documentation. Chief Complaint   Patient presents with    Hepatitis C     4 week HCV f/u      Vitals:    21 0916 21 0924   BP: (!) 179/87 (!) 177/79   Pulse: 73    Resp: 17    Temp: 96.9 °F (36.1 °C)    TempSrc: Temporal    SpO2: 100%    Weight: 177 lb (80.3 kg)    Height: 5' 9\" (1.753 m)    PainSc:   0 - No pain      Patient states he has no appetite and has to force himself to eat for the last couple of weeks. He has pain when digesting food as well  Health Maintenance Review: Patient reminded of \"due or due soon\" health maintenance. I have asked the patient to contact his/her primary care provider (PCP) for follow-up on his/her health maintenance. Coordination of Care Questionnaire:  :   1) Have you been to an emergency room, urgent care, or hospitalized since your last visit? If yes, where when, and reason for visit? no       2. Have seen or consulted any other health care provider since your last visit? If yes, where when, and reason for visit? NO      Patient is accompanied by self I have received verbal consent from Leif Card to discuss any/all medical information while they are present in the room.

## 2021-04-29 NOTE — PROGRESS NOTES
Meg Mejía MD, Marcus Monge, Abdiel Brooks MD, MPH      CONNER Lopez, [facility]     Hawa Smith, [facility]   Austin Claudio, FNP-C    Slime Hernodn, [facility]       Alvaro Foster [address] 136    at [address], [address] [place] [address], [facility] 22.    [phone]    FAX: [address] [place] [address], [address]    [phone]    FAX: [phone]     Patient Care Team:  Lemuel Drake NP as PCP - General (Nurse Practitioner)  Lemuel Drake NP as PCP - [facility]  Other, MD Brian    Problem List  Date Reviewed: 1/15/2021          Codes Class Noted    Iron deficiency anemia ICD-10-CM: D50.9  ICD-9-CM: 280.9  2/15/2021        Chronic hepatitis C ([facility] [address] ICD-10-CM: B18.2  ICD-9-CM: 070.54  1/15/2021        CKD (chronic kidney disease) ICD-10-CM: N18.9  ICD-9-CM: 585.9  6/13/2020        Chest pain ICD-10-CM: R07.9  ICD-9-CM: 786.50  5/10/2019        Hypertension ICD-10-CM: I10  ICD-9-CM: 401.9  5/10/2019            Anika Gee is being seen at [address] for management of chronic HCV. The active problem list, all pertinent past medical history, medications, radiologic findings and laboratory findings related to the liver disorder were reviewed with the patient. The patient is a 54 y.o. Black male who was found to have abnormalities in liver chemistries and subsequently tested positive for chronic HCV in 2010. CT scan of the liver was performed in 6/2020. The results of the imaging demonstrated a normal appearing liver. An assessment of liver fibrosis with HCV FibroSure suggested F2 fibrosis. The patient is currently on 8 weeks of Almon Paci (started 3/2021).     The patient has no symptoms which can be attributed to the liver disorder. The patient is not currently experiencing the following symptoms of liver disease: fatigue or pain in the right side over the liver. The patient has moderate limitations in functional activities which can be attributed to other medical problems not related to the liver disease. ASSESSMENT AND PLAN:  Chronic HCV   Chronic HCV with F2 fibrosis. Liver transaminases are elevated. ALP is elevated. Liver function is normal. Serum albumin is depressed. The platelet count is depressed. Based upon laboratory studies, the patient may have advanced liver disease or cirrhosis. Chronic HCV treatment  The patient is currently on 8 weeks of [address]. He is having some itching but otherwise, is tolerating it ok. He has received his refill. Screening for esophageal varices   His FibroSure showed F2 fibrosis but he had thrombocytopenia. His EGD was performed by [facility] 3/25/2021 with normal anatomy. Anemia  His hemoglobin at last check was normal.     Screening for hepatocellular carcinoma  1/2021: AFP elevated with normal L3%. Ultrasound without mass or lesion. Treatment of other medical problems in patients with chronic liver disease  There are no contraindications for the patient to take most medications necessary for treatment of other medical issues. The patient has cirrhosis and should avoid taking NSAIDs which are associated with a higher rate of developing ECHO. Counseling for alcohol in patients with chronic liver disease  The patient was counseled regarding alcohol consumption and the effect of alcohol on chronic liver disease. The patient previously drank alcohol in excess. He has not drank since 10/2020. Substance use  He had a relapse. We reviewed staying drug free. He remains on suboxone. Vaccinations   Recommend vaccination against viral hepatitis B.  He has documented immunity to viral hepatitis A. Routine vaccinations against other bacterial and viral agents can be performed as indicated. Annual flu vaccination should be administered if indicated. ALLERGIES  Allergies   Allergen Reactions    Baclofen Rash     All over     MEDICATIONS  Current Outpatient Medications   Medication Sig    glecaprevir-pibrentasvir (Mavyret) 100-40 mg tab Take 3 Tabs by mouth daily. Indications: chronic infection of genotype 1 hepatitis C virus    naloxone (Narcan) 4 mg/actuation nasal spray 1 [place] by IntraNASal route once as needed for Overdose. Use 1 spray intranasally, then discard. Repeat with new spray every 2 min as needed for opioid overdose symptoms, alternating nostrils.  amLODIPine (NORVASC) 10 mg tablet Take 10 mg by mouth daily as needed.  potassium chloride SR (K-TAB) 20 mEq tablet Take 20 mEq by mouth daily.  buprenorphine-naloxone (Suboxone) 8-2 mg film sublingaul film 1 Film by SubLINGual route two (2) times a day.  lisinopriL (PRINIVIL, ZESTRIL) 40 mg tablet 40 mg = 1 tab each dose, PO, daily, 0 Refills    furosemide (LASIX) 20 mg tablet Take 20 mg by mouth daily.  amLODIPine (NORVASC) 10 mg tablet Take 1 Tab by mouth daily. No current facility-administered medications for this visit. FAMILY HISTORY:  The father  of an accident. The mother  of ESRD. There is no family history of liver disease. SOCIAL HISTORY:  The patient has never been . The patient has 4 children and 4 grandchildren. The patient stopped using tobacco products in 2020. The patient drank alcohol in excess. He has not drank alcohol since 10/2020. The patient used to work construction. The patient has not worked since . PHYSICAL EXAMINATION:  Visit Vitals  BP (!) 177/79   Pulse 73   Temp 96.9 °F (36.1 °C) (Temporal)   Resp 17   Ht 5' 9\" (1.753 m)   Wt 177 lb (80.3 kg)   SpO2 100%   BMI 26.14 kg/m²     General: No acute distress. Eyes: Sclera anicteric.    ENT: No oral lesions. Nodes: No adenopathy. Skin: No spider angiomata. No jaundice. No palmar erythema. Respiratory: Lungs clear to auscultation. Cardiovascular: Regular heart rate. No murmurs. No JVD. Abdomen: Soft non-tender, liver size normal to percussion/palpation. Spleen not palpable. No obvious ascites. Extremities: No edema. No muscle wasting. No gross arthritic changes. Neurologic: Alert and oriented. Cranial nerves grossly intact. No asterixis.     LABORATORY STUDIES:  [address] & Units 2/15/2021 1/15/2021   WBC 3.4 - 10.8 x10E3/uL 7.6 6.9   ANC 1.8 - 8.0 K/UL  2.4   HGB 13.0 - 17.7 g/dL 13.0 9.9 (L)    - 450 x10E3/uL 169 115 (L)   INR 0.9 - 1.1    1.1   AST 15 - 37 U/L  71 (H)   ALT 0 - 55 IU/L 98 (H) 90 (H)   Alk Phos 45 - 117 U/L  230 (H)   Bili, Total 0.0 - 1.2 mg/dL 0.3 0.3   Bili, Direct 0.0 - 0.2 MG/DL  0.1   Albumin 3.5 - 5.0 g/dL  3.2 (L)   BUN 6 - 20 MG/DL  63 (H)   Creat 0.70 - 1.30 MG/DL  4.00 (H)   Na 136 - 145 mmol/L  138   K 3.5 - 5.1 mmol/L  4.3   Cl 97 - 108 mmol/L  105   CO2 21 - 32 mmol/L  25   Glucose 65 - 100 mg/dL  93   Magnesium 1.6 - 2.4 mg/dL       Liver [facility] Latest Ref Rng & Units 9/17/2020   WBC 3.4 - 10.8 x10E3/uL 6.2   ANC 1.8 - 8.0 K/UL 2.2   HGB 13.0 - 17.7 g/dL 11.9 (L)    - 450 x10E3/uL 141 (L)   INR 0.9 - 1.1      AST 15 - 37 U/L 98 (H)   ALT 0 - 55 IU/L 91 (H)   Alk Phos 45 - 117 U/L 222 (H)   Bili, Total 0.0 - 1.2 mg/dL 0.3   Bili, Direct 0.0 - 0.2 MG/DL    Albumin 3.5 - 5.0 g/dL 2.4 (L)   BUN 6 - 20 MG/DL 58 (H)   Creat 0.70 - 1.30 MG/DL 3.82 (H)   Na 136 - 145 mmol/L 135 (L)   K 3.5 - 5.1 mmol/L 4.5   Cl 97 - 108 mmol/L 102   CO2 21 - 32 mmol/L 29   Glucose 65 - 100 mg/dL 73   Magnesium 1.6 - 2.4 mg/dL 2.1     SEROLOGIES:  Serologies Latest Ref Rng & Units 1/15/2021 7/2/2020   Hep A Ab, Total Negative   Positive (A)    Hep B Surface Ag Index 0.51    Hep B Surface Ag Interp Negative   Negative    Hep B Core Ab, Total Negative   Negative    Hep B Surface Ab mIU/mL <3.10    Hep B Surface Ab Interp NONREACTIVE   NONREACTIVE    Hep C Ab NR       Hep C Genotype  1b    HCV RT-PCR, Quant IU/mL 1,510,000 4,570,000   HCV Log10 log10 IU/mL  6.660   Ferritin 26 - 388 NG/ (H)    Iron % Saturation 15 - 55 % 45    LUCIA, IFA      C-ANCA Neg:<1:20 titer     P-ANCA Neg:<1:20 titer     ANCA Neg:<1:20 titer       Serologies Latest Ref Rng & Units 7/1/2020   Hep A Ab, Total Negative      Hep B Surface Ag Index    Hep B Surface Ag Interp Negative      Hep B Core Ab, Total Negative      Hep B Surface Ab mIU/mL    Hep B Surface Ab Interp NONREACTIVE      Hep C Ab NR      Hep C Genotype     HCV RT-PCR, Quant IU/mL    HCV Log10 log10 IU/mL    Ferritin 26 - 388 NG/ML    Iron % Saturation 15 - 55 %    LUCIA, IFA  Negative   C-ANCA Neg:<1:20 titer <1:20   P-ANCA Neg:<1:20 titer <1:20   ANCA Neg:<1:20 titer <1:20     LIVER HISTOLOGY:  2/15/2021. HCV FibroSure. F2    ENDOSCOPIC PROCEDURES:  3/25/2021. EGD by [facility]. Normal anatomy. No varices. RADIOLOGY:  1/2021. RUQ ultrasound. Heterogenous liver without mass, lesion. No ascites. Cholelithiasis and gallbladder sludge. OTHER TESTING:  Not available or performed    FOLLOW-UP:  All of the issues listed above in the assessment and plan were discussed with the patient. All questions were answered. The patient expressed a clear understanding of the above. Follow up in 4 months to check SVR.      Radha Pascual, [facility]  Liver [place] [address], [address] [place]  22.  [phone]

## 2021-04-30 LAB
ALBUMIN SERPL-MCNC: 4 G/DL (ref 3.8–4.9)
ALP SERPL-CCNC: 174 IU/L (ref 39–117)
ALT SERPL-CCNC: 14 IU/L (ref 0–44)
AST SERPL-CCNC: 21 IU/L (ref 0–40)
BILIRUB DIRECT SERPL-MCNC: 0.11 MG/DL (ref 0–0.4)
BILIRUB SERPL-MCNC: 0.3 MG/DL (ref 0–1.2)
BUN SERPL-MCNC: 38 MG/DL (ref 6–24)
BUN/CREAT SERPL: 10 (ref 9–20)
CALCIUM SERPL-MCNC: 9.3 MG/DL (ref 8.7–10.2)
CHLORIDE SERPL-SCNC: 112 MMOL/L (ref 96–106)
CO2 SERPL-SCNC: 21 MMOL/L (ref 20–29)
CREAT SERPL-MCNC: 3.91 MG/DL (ref 0.76–1.27)
GLUCOSE SERPL-MCNC: 78 MG/DL (ref 65–99)
POTASSIUM SERPL-SCNC: 5.9 MMOL/L (ref 3.5–5.2)
PROT SERPL-MCNC: 7.7 G/DL (ref 6–8.5)
SODIUM SERPL-SCNC: 145 MMOL/L (ref 134–144)

## 2021-05-02 LAB — HCV RNA SERPL QL NAA+PROBE: NEGATIVE

## 2021-06-03 ENCOUNTER — HOSPITAL ENCOUNTER (EMERGENCY)
Age: 56
Discharge: HOME | End: 2021-06-04
Attending: EMERGENCY MEDICINE
Payer: MEDICAID

## 2021-06-03 DIAGNOSIS — I10 ESSENTIAL HYPERTENSION: ICD-10-CM

## 2021-06-03 DIAGNOSIS — T50.901A POLYSUBSTANCE OVERDOSE, ACCIDENTAL OR UNINTENTIONAL, INITIAL ENCOUNTER: Primary | ICD-10-CM

## 2021-06-03 PROCEDURE — 96360 HYDRATION IV INFUSION INIT: CPT

## 2021-06-03 PROCEDURE — 74011250636 HC RX REV CODE- 250/636: Performed by: EMERGENCY MEDICINE

## 2021-06-03 PROCEDURE — 82077 ASSAY SPEC XCP UR&BREATH IA: CPT

## 2021-06-03 PROCEDURE — 99284 EMERGENCY DEPT VISIT MOD MDM: CPT

## 2021-06-03 RX ORDER — LORAZEPAM 2 MG/ML
0.5 INJECTION INTRAMUSCULAR
Status: DISCONTINUED | OUTPATIENT
Start: 2021-06-03 | End: 2021-06-04

## 2021-06-03 RX ADMIN — SODIUM CHLORIDE 1000 ML: 9 INJECTION, SOLUTION INTRAVENOUS at 23:45

## 2021-06-04 VITALS
OXYGEN SATURATION: 100 % | SYSTOLIC BLOOD PRESSURE: 199 MMHG | HEIGHT: 69 IN | TEMPERATURE: 98.7 F | BODY MASS INDEX: 26.66 KG/M2 | HEART RATE: 98 BPM | RESPIRATION RATE: 20 BRPM | WEIGHT: 180 LBS | DIASTOLIC BLOOD PRESSURE: 91 MMHG

## 2021-06-04 LAB — ETHANOL SERPL-MCNC: <10 MG/DL

## 2021-06-04 PROCEDURE — 96361 HYDRATE IV INFUSION ADD-ON: CPT

## 2021-06-04 PROCEDURE — 74011250637 HC RX REV CODE- 250/637: Performed by: EMERGENCY MEDICINE

## 2021-06-04 RX ORDER — LISINOPRIL 20 MG/1
20 TABLET ORAL
Status: COMPLETED | OUTPATIENT
Start: 2021-06-04 | End: 2021-06-04

## 2021-06-04 RX ORDER — AMLODIPINE BESYLATE 5 MG/1
5 TABLET ORAL
Status: COMPLETED | OUTPATIENT
Start: 2021-06-04 | End: 2021-06-04

## 2021-06-04 RX ADMIN — AMLODIPINE BESYLATE 5 MG: 5 TABLET ORAL at 00:33

## 2021-06-04 RX ADMIN — LISINOPRIL 20 MG: 20 TABLET ORAL at 00:33

## 2021-06-04 NOTE — ED NOTES
Patient remains hypertensive. Dr. Jun Emanuel aware. Patient  given copy of dc instructions and 0 script(s). Patient  verbalized understanding of instructions and script (s). Patient given a current medication reconciliation form and verbalized understanding of their medications. Patient  verbalized understanding of the importance of discussing medications with  his or her physician or clinic they will be following up with. Patient alert and oriented and in no acute distress. Patient discharged home ambulatory.

## 2021-06-04 NOTE — ED NOTES
Patient sitting up in chair, eating and drinking po fluids. Tolerating po well. Will continue to monitor.

## 2021-06-04 NOTE — ED PROVIDER NOTES
59-year-old male with a history of chronic kidney disease, hypertension, hep C arrives via EMS with chief complaint of heroin and cocaine overdose. Bystanders alerted police to the fact that he was somnolent. Patient has pinpoint pupils but is breathing and answering questions. Admits to using heroin and cocaine, stating \"I relapsed. \" Denies pain, dyspnea, fever, or nausea/vomiting. Past Medical History:   Diagnosis Date    Chronic kidney disease     Hypertension     Liver disease        No past surgical history on file. No family history on file. Social History     Socioeconomic History    Marital status: SINGLE     Spouse name: Not on file    Number of children: Not on file    Years of education: Not on file    Highest education level: Not on file   Occupational History    Not on file   Tobacco Use    Smoking status: Current Some Day Smoker     Packs/day: 1.00     Last attempt to quit: 2020     Years since quittin.8    Smokeless tobacco: Never Used    Tobacco comment: started back smoking in last couple months   Vaping Use    Vaping Use: Never used   Substance and Sexual Activity    Alcohol use: Not Currently    Drug use: Yes     Types: Marijuana, Cocaine, Heroin     Comment: last used 1 week ago    Sexual activity: Not Currently   Other Topics Concern    Not on file   Social History Narrative    Not on file     Social Determinants of Health     Financial Resource Strain:     Difficulty of Paying Living Expenses:    Food Insecurity:     Worried About Running Out of Food in the Last Year:     [address] in the Last Year:    Transportation Needs:     Lack of Transportation (Medical):      Lack of Transportation (Non-Medical):    Physical Activity:     Days of Exercise per Week:     Minutes of Exercise per Session:    Stress:     Feeling of Stress :    Social Connections:     Frequency of Communication with Friends and Family:     Frequency of Social Gatherings with Friends and Family:     Attends [employer] Services:     Active Member of Clubs or Organizations:     Attends Club or Organization Meetings:     Marital Status:    Intimate Partner Violence:     Fear of Current or Ex-Partner:     Emotionally Abused:     Physically Abused:     Sexually Abused: ALLERGIES: Baclofen    Review of Systems   Constitutional: Positive for activity change. Negative for fever. HENT: Negative. Negative for drooling, facial swelling and trouble swallowing. Eyes: Negative. Negative for discharge and redness. Respiratory: Negative. Negative for chest tightness, shortness of breath and wheezing. Cardiovascular: Negative. Negative for chest pain. Gastrointestinal: Negative. Negative for abdominal distention, abdominal pain, constipation, diarrhea, nausea and vomiting. Endocrine: Negative. Genitourinary: Negative. Negative for difficulty urinating and dysuria. Musculoskeletal: Negative. Negative for arthralgias and myalgias. Skin: Negative. Negative for color change and rash. Allergic/Immunologic: Negative. Neurological: Negative. Negative for syncope, facial asymmetry and speech difficulty. Hematological: Negative. Psychiatric/Behavioral: Negative. Negative for agitation and confusion. All other systems reviewed and are negative. There were no vitals filed for this visit. Physical Exam  Vitals and nursing note reviewed. Constitutional:       Appearance: Normal appearance. He is well-developed. HENT:      Head: Normocephalic and atraumatic. Eyes:      Conjunctiva/sclera: Conjunctivae normal.   Cardiovascular:      Rate and Rhythm: Normal rate and regular rhythm. Pulmonary:      Effort: No accessory muscle usage or respiratory distress. Abdominal:      Palpations: Abdomen is soft. Tenderness: There is no abdominal tenderness. Musculoskeletal:         General: Normal range of motion.       Cervical back: Neck supple. Skin:     General: Skin is warm and dry. Neurological:      Mental Status: He is alert and oriented to person, place, and time. Psychiatric:         Behavior: Behavior normal.         Thought Content: Thought content normal.          [facility]  ED Course as of Jun 04 0555   Thu Jun 03, 2021   1595 He is A&O x3.  States he just relapsed today. He does have a counselor at [facility] who he can follow-up with. States he does take his blood pressure medication and took it today. Will dose antihypertensive before d/c.   [SS]      ED Course User Index  [SS] Rebecca Valdivia MD       Procedures    LABORATORY TESTS:  Recent Results (from the past 12 hour(s))   ETHYL ALCOHOL    Collection Time: 06/03/21 11:30 PM   Result Value Ref Range    ALCOHOL(ETHYL),SERUM <10 <10 MG/DL       IMAGING RESULTS:  No orders to display       MEDICATIONS GIVEN:  Medications   sodium chloride 0.9 % bolus infusion 1,000 mL (0 mL IntraVENous IV Completed 6/4/21 0118)   amLODIPine (NORVASC) tablet 5 mg (5 mg Oral Given 6/4/21 0033)   lisinopriL (PRINIVIL, ZESTRIL) tablet 20 mg (20 mg Oral Given 6/4/21 0033)       IMPRESSION:  1. Polysubstance overdose, accidental or unintentional, initial encounter    2. Essential hypertension        PLAN:  1. Discharge Medication List as of 6/4/2021 12:56 AM        2.    Follow-up Information     Follow up With Specialties Details Why [address] [place] [id number]  [phone]        Return to ED if worse

## 2021-06-04 NOTE — ED NOTES
Patient to ED via EMS. EMS states that bystanders called EMS because they saw patient \"nodding off\". Patient states he snorted heroin a few hours ago. Patient is A&Ox3. Respirations even and unlabored. VSS. NAD noted. Emergency Department Nursing Plan of Care       The Nursing Plan of Care is developed from the Nursing assessment and Emergency Department Attending provider initial evaluation. The plan of care may be reviewed in the ED Provider note.     The Plan of Care was developed with the following considerations:   Patient / Family readiness to learn indicated by:verbalized understanding  Persons(s) to be included in education: patient  Barriers to Learning/Limitations:No    Signed     Junior [facility]    6/3/2021   10:32 PM

## 2021-06-04 NOTE — ED TRIAGE NOTES
Patient to ED via EMS. EMS states that they were called by bystanders for patient \"nodding off\" Patient A&Ox3. No respiratory distress.

## 2022-01-15 ENCOUNTER — HOSPITAL ENCOUNTER (INPATIENT)
Age: 57
LOS: 4 days | Discharge: HOME | DRG: 199 | End: 2022-01-19
Attending: EMERGENCY MEDICINE | Admitting: INTERNAL MEDICINE
Payer: MEDICAID

## 2022-01-15 ENCOUNTER — APPOINTMENT (OUTPATIENT)
Dept: GENERAL RADIOLOGY | Age: 57
DRG: 199 | End: 2022-01-15
Attending: EMERGENCY MEDICINE
Payer: MEDICAID

## 2022-01-15 DIAGNOSIS — Z99.2 ESRD ON HEMODIALYSIS (HCC): ICD-10-CM

## 2022-01-15 DIAGNOSIS — J81.0 ACUTE PULMONARY EDEMA (HCC): ICD-10-CM

## 2022-01-15 DIAGNOSIS — N18.6 ESRD ON HEMODIALYSIS (HCC): ICD-10-CM

## 2022-01-15 DIAGNOSIS — I16.1 HYPERTENSIVE EMERGENCY: Primary | ICD-10-CM

## 2022-01-15 LAB
ALBUMIN SERPL-MCNC: 2.6 G/DL (ref 3.5–5)
ALBUMIN/GLOB SERPL: 0.6 {RATIO} (ref 1.1–2.2)
ALP SERPL-CCNC: 80 U/L (ref 45–117)
ALT SERPL-CCNC: 12 U/L (ref 12–78)
ANION GAP SERPL CALC-SCNC: 7 MMOL/L (ref 5–15)
AST SERPL-CCNC: 9 U/L (ref 15–37)
BASOPHILS # BLD: 0.1 K/UL (ref 0–0.1)
BASOPHILS NFR BLD: 1 % (ref 0–1)
BILIRUB SERPL-MCNC: 0.4 MG/DL (ref 0.2–1)
BNP SERPL-MCNC: ABNORMAL PG/ML
BUN SERPL-MCNC: 44 MG/DL (ref 6–20)
BUN/CREAT SERPL: 5 (ref 12–20)
CALCIUM SERPL-MCNC: 9.1 MG/DL (ref 8.5–10.1)
CHLORIDE SERPL-SCNC: 104 MMOL/L (ref 97–108)
CK SERPL-CCNC: 51 U/L (ref 39–308)
CO2 SERPL-SCNC: 27 MMOL/L (ref 21–32)
CREAT SERPL-MCNC: 9.26 MG/DL (ref 0.7–1.3)
DIFFERENTIAL METHOD BLD: ABNORMAL
EOSINOPHIL # BLD: 0.5 K/UL (ref 0–0.4)
EOSINOPHIL NFR BLD: 5 % (ref 0–7)
ERYTHROCYTE [DISTWIDTH] IN BLOOD BY AUTOMATED COUNT: 14.9 % (ref 11.5–14.5)
GLOBULIN SER CALC-MCNC: 4.7 G/DL (ref 2–4)
GLUCOSE SERPL-MCNC: 102 MG/DL (ref 65–100)
HCT VFR BLD AUTO: 32.3 % (ref 36.6–50.3)
HGB BLD-MCNC: 10 G/DL (ref 12.1–17)
IMM GRANULOCYTES # BLD AUTO: 0 K/UL (ref 0–0.04)
IMM GRANULOCYTES NFR BLD AUTO: 0 % (ref 0–0.5)
LYMPHOCYTES # BLD: 2.8 K/UL (ref 0.8–3.5)
LYMPHOCYTES NFR BLD: 29 % (ref 12–49)
MAGNESIUM SERPL-MCNC: 2.2 MG/DL (ref 1.6–2.4)
MCH RBC QN AUTO: 27.8 PG (ref 26–34)
MCHC RBC AUTO-ENTMCNC: 31 G/DL (ref 30–36.5)
MCV RBC AUTO: 89.7 FL (ref 80–99)
MONOCYTES # BLD: 0.9 K/UL (ref 0–1)
MONOCYTES NFR BLD: 9 % (ref 5–13)
NEUTS SEG # BLD: 5.4 K/UL (ref 1.8–8)
NEUTS SEG NFR BLD: 56 % (ref 32–75)
NRBC # BLD: 0 K/UL (ref 0–0.01)
NRBC BLD-RTO: 0 PER 100 WBC
PHOSPHATE SERPL-MCNC: 5.2 MG/DL (ref 2.6–4.7)
PLATELET # BLD AUTO: 200 K/UL (ref 150–400)
PMV BLD AUTO: 10.8 FL (ref 8.9–12.9)
POTASSIUM SERPL-SCNC: 4.6 MMOL/L (ref 3.5–5.1)
PROT SERPL-MCNC: 7.3 G/DL (ref 6.4–8.2)
RBC # BLD AUTO: 3.6 M/UL (ref 4.1–5.7)
SODIUM SERPL-SCNC: 138 MMOL/L (ref 136–145)
TROPONIN-HIGH SENSITIVITY: 34 NG/L (ref 0–76)
WBC # BLD AUTO: 9.8 K/UL (ref 4.1–11.1)

## 2022-01-15 PROCEDURE — 96374 THER/PROPH/DIAG INJ IV PUSH: CPT

## 2022-01-15 PROCEDURE — 74011000250 HC RX REV CODE- 250: Performed by: INTERNAL MEDICINE

## 2022-01-15 PROCEDURE — 82550 ASSAY OF CK (CPK): CPT

## 2022-01-15 PROCEDURE — 74011000258 HC RX REV CODE- 258: Performed by: EMERGENCY MEDICINE

## 2022-01-15 PROCEDURE — 85025 COMPLETE CBC W/AUTO DIFF WBC: CPT

## 2022-01-15 PROCEDURE — 99285 EMERGENCY DEPT VISIT HI MDM: CPT

## 2022-01-15 PROCEDURE — 93005 ELECTROCARDIOGRAM TRACING: CPT

## 2022-01-15 PROCEDURE — 94761 N-INVAS EAR/PLS OXIMETRY MLT: CPT

## 2022-01-15 PROCEDURE — 90935 HEMODIALYSIS ONE EVALUATION: CPT

## 2022-01-15 PROCEDURE — 74011000250 HC RX REV CODE- 250: Performed by: EMERGENCY MEDICINE

## 2022-01-15 PROCEDURE — 65660000000 HC RM CCU STEPDOWN

## 2022-01-15 PROCEDURE — 74011250637 HC RX REV CODE- 250/637: Performed by: INTERNAL MEDICINE

## 2022-01-15 PROCEDURE — 80053 COMPREHEN METABOLIC PANEL: CPT

## 2022-01-15 PROCEDURE — 71046 X-RAY EXAM CHEST 2 VIEWS: CPT

## 2022-01-15 PROCEDURE — 74011250636 HC RX REV CODE- 250/636: Performed by: INTERNAL MEDICINE

## 2022-01-15 PROCEDURE — 84100 ASSAY OF PHOSPHORUS: CPT

## 2022-01-15 PROCEDURE — 84484 ASSAY OF TROPONIN QUANT: CPT

## 2022-01-15 PROCEDURE — 5A1D70Z PERFORMANCE OF URINARY FILTRATION, INTERMITTENT, LESS THAN 6 HOURS PER DAY: ICD-10-PCS | Performed by: INTERNAL MEDICINE

## 2022-01-15 PROCEDURE — 74011000258 HC RX REV CODE- 258: Performed by: INTERNAL MEDICINE

## 2022-01-15 PROCEDURE — 74011250636 HC RX REV CODE- 250/636: Performed by: EMERGENCY MEDICINE

## 2022-01-15 PROCEDURE — 83880 ASSAY OF NATRIURETIC PEPTIDE: CPT

## 2022-01-15 PROCEDURE — 36415 COLL VENOUS BLD VENIPUNCTURE: CPT

## 2022-01-15 PROCEDURE — 83735 ASSAY OF MAGNESIUM: CPT

## 2022-01-15 RX ORDER — NICARDIPINE HYDROCHLORIDE 0.1 MG/ML
5-15 INJECTION INTRAVENOUS
Status: DISCONTINUED | OUTPATIENT
Start: 2022-01-15 | End: 2022-01-15

## 2022-01-15 RX ORDER — BUPRENORPHINE AND NALOXONE 8; 2 MG/1; MG/1
1 FILM, SOLUBLE BUCCAL; SUBLINGUAL 2 TIMES DAILY
Status: DISCONTINUED | OUTPATIENT
Start: 2022-01-15 | End: 2022-01-19 | Stop reason: HOSPADM

## 2022-01-15 RX ORDER — HYDRALAZINE HYDROCHLORIDE 20 MG/ML
20 INJECTION INTRAMUSCULAR; INTRAVENOUS
Status: COMPLETED | OUTPATIENT
Start: 2022-01-15 | End: 2022-01-15

## 2022-01-15 RX ORDER — SODIUM CHLORIDE 0.9 % (FLUSH) 0.9 %
5-40 SYRINGE (ML) INJECTION AS NEEDED
Status: DISCONTINUED | OUTPATIENT
Start: 2022-01-15 | End: 2022-01-19 | Stop reason: HOSPADM

## 2022-01-15 RX ORDER — ONDANSETRON 4 MG/1
4 TABLET, ORALLY DISINTEGRATING ORAL
Status: DISCONTINUED | OUTPATIENT
Start: 2022-01-15 | End: 2022-01-19 | Stop reason: HOSPADM

## 2022-01-15 RX ORDER — BUMETANIDE 0.25 MG/ML
2 INJECTION INTRAMUSCULAR; INTRAVENOUS
Status: COMPLETED | OUTPATIENT
Start: 2022-01-15 | End: 2022-01-15

## 2022-01-15 RX ORDER — FUROSEMIDE 20 MG/1
20 TABLET ORAL DAILY
Status: DISCONTINUED | OUTPATIENT
Start: 2022-01-16 | End: 2022-01-16

## 2022-01-15 RX ORDER — LISINOPRIL 40 MG/1
40 TABLET ORAL DAILY
Status: DISCONTINUED | OUTPATIENT
Start: 2022-01-16 | End: 2022-01-16

## 2022-01-15 RX ORDER — ACETAMINOPHEN 650 MG/1
650 SUPPOSITORY RECTAL
Status: DISCONTINUED | OUTPATIENT
Start: 2022-01-15 | End: 2022-01-19 | Stop reason: HOSPADM

## 2022-01-15 RX ORDER — POTASSIUM CHLORIDE 750 MG/1
20 TABLET, FILM COATED, EXTENDED RELEASE ORAL DAILY
Status: DISCONTINUED | OUTPATIENT
Start: 2022-01-16 | End: 2022-01-19 | Stop reason: HOSPADM

## 2022-01-15 RX ORDER — AMLODIPINE BESYLATE 5 MG/1
10 TABLET ORAL DAILY
Status: DISCONTINUED | OUTPATIENT
Start: 2022-01-16 | End: 2022-01-15

## 2022-01-15 RX ORDER — ACETAMINOPHEN 325 MG/1
650 TABLET ORAL
Status: DISCONTINUED | OUTPATIENT
Start: 2022-01-15 | End: 2022-01-19 | Stop reason: HOSPADM

## 2022-01-15 RX ORDER — ACETAMINOPHEN 325 MG/1
650 TABLET ORAL
Status: DISCONTINUED | OUTPATIENT
Start: 2022-01-15 | End: 2022-01-15 | Stop reason: SDUPTHER

## 2022-01-15 RX ORDER — ONDANSETRON 2 MG/ML
4 INJECTION INTRAMUSCULAR; INTRAVENOUS
Status: DISCONTINUED | OUTPATIENT
Start: 2022-01-15 | End: 2022-01-19 | Stop reason: HOSPADM

## 2022-01-15 RX ORDER — HEPARIN SODIUM 5000 [USP'U]/ML
5000 INJECTION, SOLUTION INTRAVENOUS; SUBCUTANEOUS EVERY 8 HOURS
Status: DISCONTINUED | OUTPATIENT
Start: 2022-01-15 | End: 2022-01-19 | Stop reason: HOSPADM

## 2022-01-15 RX ORDER — POLYETHYLENE GLYCOL 3350 17 G/17G
17 POWDER, FOR SOLUTION ORAL DAILY PRN
Status: DISCONTINUED | OUTPATIENT
Start: 2022-01-15 | End: 2022-01-19 | Stop reason: HOSPADM

## 2022-01-15 RX ORDER — SODIUM CHLORIDE 0.9 % (FLUSH) 0.9 %
5-40 SYRINGE (ML) INJECTION EVERY 8 HOURS
Status: DISCONTINUED | OUTPATIENT
Start: 2022-01-15 | End: 2022-01-19 | Stop reason: HOSPADM

## 2022-01-15 RX ORDER — AMLODIPINE BESYLATE 5 MG/1
10 TABLET ORAL DAILY
Status: DISCONTINUED | OUTPATIENT
Start: 2022-01-15 | End: 2022-01-18

## 2022-01-15 RX ADMIN — SODIUM CHLORIDE, PRESERVATIVE FREE 10 ML: 5 INJECTION INTRAVENOUS at 21:46

## 2022-01-15 RX ADMIN — AMLODIPINE BESYLATE 10 MG: 5 TABLET ORAL at 16:29

## 2022-01-15 RX ADMIN — SODIUM CHLORIDE 5 MG/HR: 9 INJECTION, SOLUTION INTRAVENOUS at 17:45

## 2022-01-15 RX ADMIN — HEPARIN SODIUM 5000 UNITS: 5000 INJECTION INTRAVENOUS; SUBCUTANEOUS at 16:44

## 2022-01-15 RX ADMIN — SODIUM CHLORIDE 7.5 MG/HR: 9 INJECTION, SOLUTION INTRAVENOUS at 21:45

## 2022-01-15 RX ADMIN — BUMETANIDE 2 MG: 0.25 INJECTION, SOLUTION INTRAMUSCULAR; INTRAVENOUS at 11:58

## 2022-01-15 RX ADMIN — HEPARIN SODIUM 5000 UNITS: 5000 INJECTION INTRAVENOUS; SUBCUTANEOUS at 21:46

## 2022-01-15 RX ADMIN — HEPARIN SODIUM 1800 UNITS: 1000 INJECTION INTRAVENOUS; SUBCUTANEOUS at 23:40

## 2022-01-15 RX ADMIN — HYDRALAZINE HYDROCHLORIDE 20 MG: 20 INJECTION INTRAMUSCULAR; INTRAVENOUS at 10:11

## 2022-01-15 RX ADMIN — SODIUM CHLORIDE 5 MG/HR: 9 INJECTION, SOLUTION INTRAVENOUS at 11:58

## 2022-01-15 RX ADMIN — SODIUM CHLORIDE, PRESERVATIVE FREE 5 ML: 5 INJECTION INTRAVENOUS at 14:00

## 2022-01-15 RX ADMIN — SODIUM CHLORIDE 5 MG/HR: 9 INJECTION, SOLUTION INTRAVENOUS at 21:19

## 2022-01-15 NOTE — ED NOTES
1000: Assumed care of patient from triage. Patient transitioned to stretcher. SR x 2. Call bell in reach. Monitor x 3.     1215: Patient resting in stretcher at this time. Remains on monitor x 3.     1325: Hospitalist at bedside. 1634: Patient is being transferred to [address], Room # [phone]. Report given to Delia Bechkam RN on Dieter Schulz for routine progression of care. Report consisted of the following information SBAR, Kardex, ED Summary, Intake/Output, MAR and Recent Results. Patient transferred to receiving unit by:  (RN or tech name). Outstanding consults needed: No     Next labs due: No     The following personal items will be sent with the patient during transfer to the floor:     All valuables:    Cardiac monitoring ordered: Yes    The following CURRENT information was reported to the receiving RN:    Code status: Full Code at time of transfer    Last set of vital signs:  Vital Signs  Level of Consciousness: Alert (0) (01/15/22 0916)  Temp: 97.9 °F (36.6 °C) (01/15/22 0916)  Temp Source: Oral (01/15/22 0916)  Pulse (Heart Rate): 65 (01/15/22 1615)  Resp Rate: 17 (01/15/22 1615)  BP: 125/68 (01/15/22 1615)  MAP (Monitor): 86 (01/15/22 1615)  MAP (Calculated): 87 (01/15/22 1615)  BP 1 Location: Left upper arm (01/15/22 0916)  MEWS Score: 3 (01/15/22 0916)         Oxygen Therapy  O2 Sat (%): 96 % (01/15/22 1315)  Pulse via Oximetry: 71 beats per minute (01/15/22 1315)  O2 Device: None (Room air) (01/15/22 1010)      Last pain assessment:  Pain 1  Pain Scale 1: Numeric (0 - 10)  Pain Intensity 1: 7      Wounds: No     Urinary catheter: oliguric  Is there a brown order: No     LDAs:       Peripheral IV 01/15/22 Left Forearm (Active)   Site Assessment Clean, dry, & intact 01/15/22 0940   Phlebitis Assessment 0 01/15/22 0940   Infiltration Assessment 0 01/15/22 0940   Dressing Status Clean, dry, & intact 01/15/22 0940   Dressing Type Tape;Transparent 01/15/22 0940   Hub Color/Line Status Pink;Flushed;Patent 01/15/22 0940   Action Taken Blood drawn 01/15/22 0940   Alcohol Cap Used No 01/15/22 0940         Opportunity for questions and clarification was provided. Ashtyn Clark    [id number]: Nephrology at bedside.

## 2022-01-15 NOTE — ED PROVIDER NOTES
EMERGENCY DEPARTMENT HISTORY AND PHYSICAL EXAM      Date: 1/15/2022  Patient Name: Dominga Bland    History of Presenting Illness     Chief Complaint   Patient presents with    Hypertension     bp in 240s at dialysis this morning; gave him 2 clonidines. he did not have dialysis; says he is short of breath; 579/571- [id number] per rescue       History Provided By: Patient    HPI: Dominga Bland, 64 y.o. male presents to the ED with cc of hypertension. Patient states history of hypertension has had to be admitted secondary to malignant hypertension in the past.  He also has a history of end-stage renal disease on hemodialysis. He had reported to his dialysis center this morning and was found to have blood pressures with systolic in the 539O to 33G he was given 2 doses of 0.1 mg clonidine there without any significant improvement. He was then referred to the emergency department for further evaluation and treatment. Here in the emergency department patient is complaining of some shortness of breath. He states worse with exertion and orthopnea. He denies any headache. He denies any vision loss. He denies any loss of sensation or loss of strength. He denies any abdominal pain, nausea, vomiting or diarrhea. He denies any chest pain. There are no other complaints, changes, or physical findings at this time. PCP: Other, MD Brian    No current facility-administered medications on file prior to encounter. Current Outpatient Medications on File Prior to Encounter   Medication Sig Dispense Refill    glecaprevir-pibrentasvir (Mavyret) 100-40 mg tab Take 3 Tabs by mouth daily. Indications: chronic infection of genotype 1 hepatitis C virus 84 Tab 1    naloxone (Narcan) 4 mg/actuation nasal spray 1 [place] by IntraNASal route once as needed for Overdose. Use 1 spray intranasally, then discard. Repeat with new spray every 2 min as needed for opioid overdose symptoms, alternating nostrils.       amLODIPine (NORVASC) 10 mg tablet Take 10 mg by mouth daily as needed.  potassium chloride SR (K-TAB) 20 mEq tablet Take 20 mEq by mouth daily.  buprenorphine-naloxone (Suboxone) 8-2 mg film sublingaul film 1 Film by SubLINGual route two (2) times a day.  lisinopriL (PRINIVIL, ZESTRIL) 40 mg tablet 40 mg = 1 tab each dose, PO, daily, 0 Refills      furosemide (LASIX) 20 mg tablet Take 20 mg by mouth daily.  amLODIPine (NORVASC) 10 mg tablet Take 1 Tab by mouth daily. 30 Tab 0       Past History     Past Medical History:  Past Medical History:   Diagnosis Date    Chronic kidney disease     Hypertension     Liver disease        Past Surgical History:  No past surgical history on file. Family History:  No family history on file. Social History:  Social History     Tobacco Use    Smoking status: Current Some Day Smoker     Packs/day: 1.00     Last attempt to quit: 2020     Years since quittin.4    Smokeless tobacco: Never Used    Tobacco comment: started back smoking in last couple months   Vaping Use    Vaping Use: Never used   Substance Use Topics    Alcohol use: Not Currently    Drug use: Yes     Types: Marijuana, Cocaine, Heroin     Comment: last used 1 week ago       Allergies: Allergies   Allergen Reactions    Baclofen Rash     All over         Review of Systems   Review of Systems   Constitutional: Negative. Negative for appetite change, chills, fatigue and fever. HENT: Negative. Negative for congestion, rhinorrhea, sinus pressure and sore throat. Eyes: Negative. Respiratory: Positive for shortness of breath. Negative for cough, choking, chest tightness and wheezing. Cardiovascular: Negative for chest pain, palpitations and leg swelling. Hypertension   Gastrointestinal: Negative for abdominal pain, constipation, diarrhea, nausea and vomiting. Endocrine: Negative. Genitourinary: Negative. Negative for difficulty urinating, dysuria, flank pain and urgency. Musculoskeletal: Negative. Skin: Negative. Neurological: Negative. Negative for dizziness, speech difficulty, weakness, light-headedness, numbness and headaches. Psychiatric/Behavioral: Negative. All other systems reviewed and are negative. Physical Exam   Physical Exam  Vitals and nursing note reviewed. Constitutional:       General: He is not in acute distress. Appearance: Normal appearance. He is well-developed. He is not diaphoretic. HENT:      Head: Normocephalic and atraumatic. Mouth/Throat:      Mouth: Mucous membranes are moist.      Pharynx: No oropharyngeal exudate. Eyes:      General: No scleral icterus. Extraocular Movements: Extraocular movements intact. Conjunctiva/sclera: Conjunctivae normal.      Pupils: Pupils are equal, round, and reactive to light. Neck:      Vascular: No JVD. Trachea: No tracheal deviation. Cardiovascular:      Rate and Rhythm: Normal rate and regular rhythm. Heart sounds: Normal heart sounds. No murmur heard. Pulmonary:      Effort: Pulmonary effort is normal. No respiratory distress. Breath sounds: No stridor. Rales present. No wheezing. Abdominal:      General: There is no distension. Palpations: Abdomen is soft. Tenderness: There is no abdominal tenderness. There is no guarding or rebound. Musculoskeletal:         General: No tenderness. Normal range of motion. Cervical back: Normal range of motion and neck supple. Right lower leg: Edema present. Left lower leg: Edema present. Skin:     General: Skin is warm and dry. Capillary Refill: Capillary refill takes less than 2 seconds. Neurological:      Mental Status: He is alert and oriented to person, place, and time. Cranial Nerves: No cranial nerve deficit.       Comments: No gross motor or sensory deficits    Psychiatric:         Mood and Affect: Mood normal.         Behavior: Behavior normal.         Diagnostic Study Results     Labs -     Recent Results (from the past 12 hour(s))   EKG, 12 LEAD, INITIAL    Collection Time: 01/15/22  9:26 AM   Result Value Ref Range    Ventricular Rate 74 BPM    Atrial Rate 74 BPM    P-R Interval 172 ms    QRS Duration 90 ms    Q-T Interval 470 ms    QTC Calculation (Bezet) 521 ms    Calculated P Axis 66 degrees    Calculated R Axis 60 degrees    Calculated T Axis 103 degrees    Diagnosis       Normal sinus rhythm  Moderate voltage criteria for LVH, may be normal variant  T wave abnormality, consider lateral ischemia  Prolonged QT  When compared with ECG of 17-SEP-2020 10:41,  Nonspecific T wave abnormality no longer evident in Inferior leads  QT has lengthened     CBC WITH AUTOMATED DIFF    Collection Time: 01/15/22  9:37 AM   Result Value Ref Range    WBC 9.8 4.1 - 11.1 K/uL    RBC 3.60 (L) 4.10 - 5.70 M/uL    HGB 10.0 (L) 12.1 - 17.0 g/dL    HCT 32.3 (L) 36.6 - 50.3 %    MCV 89.7 80.0 - 99.0 FL    MCH 27.8 26.0 - 34.0 PG    MCHC 31.0 30.0 - 36.5 g/dL    RDW 14.9 (H) 11.5 - 14.5 %    PLATELET 365 064 - 407 K/uL    MPV 10.8 8.9 - 12.9 FL    NRBC 0.0 0  WBC    ABSOLUTE NRBC 0.00 0.00 - 0.01 K/uL    NEUTROPHILS 56 32 - 75 %    LYMPHOCYTES 29 12 - 49 %    MONOCYTES 9 5 - 13 %    EOSINOPHILS 5 0 - 7 %    BASOPHILS 1 0 - 1 %    IMMATURE GRANULOCYTES 0 0.0 - 0.5 %    ABS. NEUTROPHILS 5.4 1.8 - 8.0 K/UL    ABS. LYMPHOCYTES 2.8 0.8 - 3.5 K/UL    ABS. MONOCYTES 0.9 0.0 - 1.0 K/UL    ABS. EOSINOPHILS 0.5 (H) 0.0 - 0.4 K/UL    ABS. BASOPHILS 0.1 0.0 - 0.1 K/UL    ABS. IMM.  GRANS. 0.0 0.00 - 0.04 K/UL    DF AUTOMATED     METABOLIC PANEL, COMPREHENSIVE    Collection Time: 01/15/22  9:37 AM   Result Value Ref Range    Sodium 138 136 - 145 mmol/L    Potassium 4.6 3.5 - 5.1 mmol/L    Chloride 104 97 - 108 mmol/L    CO2 27 21 - 32 mmol/L    Anion gap 7 5 - 15 mmol/L    Glucose 102 (H) 65 - 100 mg/dL    BUN 44 (H) 6 - 20 MG/DL    Creatinine 9.26 (H) 0.70 - 1.30 MG/DL    BUN/Creatinine ratio 5 (L) 12 - 20 GFR est AA 7 (L) >60 ml/min/1.73m2    GFR est non-AA 6 (L) >60 ml/min/1.73m2    Calcium 9.1 8.5 - 10.1 MG/DL    Bilirubin, total 0.4 0.2 - 1.0 MG/DL    ALT (SGPT) 12 12 - 78 U/L    AST (SGOT) 9 (L) 15 - 37 U/L    Alk. phosphatase 80 45 - 117 U/L    Protein, total 7.3 6.4 - 8.2 g/dL    Albumin 2.6 (L) 3.5 - 5.0 g/dL    Globulin 4.7 (H) 2.0 - 4.0 g/dL    A-G Ratio 0.6 (L) 1.1 - 2.2     CK W/ REFLX CKMB    Collection Time: 01/15/22  9:37 AM   Result Value Ref Range    CK 51 39 - 308 U/L   TROPONIN-HIGH SENSITIVITY    Collection Time: 01/15/22  9:37 AM   Result Value Ref Range    Troponin-High Sensitivity 34 0 - 76 ng/L   NT-PRO BNP    Collection Time: 01/15/22  9:37 AM   Result Value Ref Range    NT pro-BNP 30,921 (H) <125 PG/ML   MAGNESIUM    Collection Time: 01/15/22  9:37 AM   Result Value Ref Range    Magnesium 2.2 1.6 - 2.4 mg/dL   PHOSPHORUS    Collection Time: 01/15/22  9:37 AM   Result Value Ref Range    Phosphorus 5.2 (H) 2.6 - 4.7 MG/DL       Radiologic Studies -   XR CHEST PA LAT   Final Result   Bilateral airspace disease favoring pulmonary edema over   pneumonia-correlate clinically. CT Results  (Last 48 hours)    None        CXR Results  (Last 48 hours)               01/15/22 0954  XR CHEST PA LAT Final result    Impression:  Bilateral airspace disease favoring pulmonary edema over   pneumonia-correlate clinically. Narrative:  INDICATION: Shortness of Breath       EXAM: CXR 2 View. COMPARISON: 9/17/2020       FINDINGS: Frontal and lateral views of the chest show interval placement of   hemodialysis catheter via the right IJ with tip at the superior cavoatrial   junction without pneumothorax. New bilateral pulmonary haziness favors pulmonary edema, although difficult to   exclude pneumonia. Heart size is normal.                 Medical Decision Making   I am the first provider for this patient.     I reviewed the vital signs, available nursing notes, past medical history, past surgical history, family history and social history. Vital Signs-Reviewed the patient's vital signs. Patient Vitals for the past 12 hrs:   Temp Pulse Resp BP SpO2   01/15/22 1245 -- 67 13 (!) 149/83 94 %   01/15/22 1215 -- 69 14 (!) 166/93 96 %   01/15/22 1158 -- 61 -- (!) 215/105 --   01/15/22 1116 -- 67 18 (!) 209/107 97 %   01/15/22 1101 -- 65 16 (!) 209/102 97 %   01/15/22 1011 -- 65 -- (!) 237/118 --   01/15/22 0916 97.9 °F (36.6 °C) 77 18 (!) 231/109 100 %       EKG interpretation: (Preliminary)  NSR, rate 74, normal axis/pr/qrs, no acute ST changes, prolonged QTc. Records Reviewed: Nursing Notes, Old Medical Records, Ambulance Run Sheet, Previous Radiology Studies and Previous Laboratory Studies    Provider Notes (Medical Decision Making):   DDx- Volume overload, Hypertensive crisis, ESRD on HD    ED Course:   Initial assessment performed. The patients presenting problems have been discussed, and they are in agreement with the care plan formulated and outlined with them. I have encouraged them to ask questions as they arise throughout their visit. Patient given a push dose of hydralazine with minimal improvement in his blood pressure. Patient has pulmonary edema on his chest x-ray patient states he still makes urine we will order 2 mg of Bumex IV push. We will start the patient on nicardipine drip for blood pressure control and consult nephrology for dialysis. Consult note:  Case discussed with hospitalist.  They will evaluate and admit.     Critical Care Time:   CRITICAL CARE NOTE :    1:20 PM  IMPENDING DETERIORATION -Respiratory and Cardiovascular  ASSOCIATED RISK FACTORS - Malignant hypertension, pulmonary edema in the setting of ESRD on HD  MANAGEMENT- Bedside Assessment and Supervision of Care  INTERPRETATION -  Xrays, ECG, Blood Pressure, Cardiac Output Measures  and labs  INTERVENTIONS - hemodynamic mngmt- Hydralazine, Cardene gtt, Bumex   CASE REVIEW - Hospitalist/Intensivist, Medical Sub-Specialist and Nursing  TREATMENT RESPONSE -Stable  PERFORMED BY - Self    NOTES   :  I have spent 40 minutes of critical care time involved in lab review, consultations with specialist, family decision- making, bedside attention and documentation. This time excludes time spent in any separate billed procedures. During this entire length of time I was immediately available to the patient . Beatrice Cosme, DO      Disposition:  Admit      Diagnosis     Clinical Impression:   1. Hypertensive emergency    2. Acute pulmonary edema (HCC)    3. ESRD on hemodialysis [facility])        Attestations:    Beatrice Cosme, DO    Please note that this dictation was completed with [employer], the computer voice recognition software. Quite often unanticipated grammatical, syntax, homophones, and other interpretive errors are inadvertently transcribed by the computer software. Please disregard these errors. Please excuse any errors that have escaped final proofreading. Thank you.

## 2022-01-15 NOTE — CONSULTS
[facility]   [address] [address], [facility] [address], [facility]  Phone: [phone] NOTE     Patient: Tushar Whitaker MRN: [id number]  PCP: Sy Cid MD   :     1965  Age:   64 y.o. Sex:  male      Referring physician: Shaun Valdes MD  Reason for consultation: 64 y.o. male with Hypertensive emergency [X77.3] complicated by ECHO   Admission Date: 1/15/2022  9:16 AM  LOS: 0 days      ASSESSMENT and PLAN :   1 ESRD   - on HD T/T/S  At [facility] under Dr Jus Gil HD today as pt missed HD   - 3 kg UF planned     2 HTN Urgency  - presented with high bp in HD unit   - send to ER and on cardene GGT  - restart home meds  - pt has Clonidine 0.2 TID, carvedilol 25 bid with him  - also on hydralazine 100 TID as per pt   - restart  Home meds       3 elevated Probnp  - will recommend Echo       4 ACD  - epo on HD if hbg < 10     5 MBD  - will check pTH       6 H/o Hep C  - used to see Dr Rosa Elena Jones in    - was not treated at that time       7 Nep will follow     Care Plan discussed with:  Pt and Er nurse         Thank you for consulting [facility] in the care of your patient. Subjective:   HPI: Tushar Whitaker is a 64 y.o.  male who has been admitted to the hospital for High Bp. Pt does HD at [facility] and presented there with Bp 240/120. Pt was asked to come to Er. In Er his Bp is running high > 200. Pt was started on cardene GGT. Bp is better now. Nep has been consulted  D/w [facility] team and will do HD tonight. D/w pt in er     Past Medical Hx:   Past Medical History:   Diagnosis Date    Chronic kidney disease     Hypertension     Liver disease         Past Surgical Hx:   No past surgical history on file. Allergies   Allergen Reactions    Baclofen Rash     All over       Social Hx:  reports that he has been smoking. He has been smoking about 1.00 pack per day.  He has never used smokeless tobacco. He reports previous alcohol use. He reports current drug use. Drugs: Marijuana, Cocaine, and Heroin. No family history on file. Review of Systems:  A thorough twelve point review of system was performed today. Pertinent positives and negatives are mentioned in the HPI. The reminder of the ROS is negative and noncontributory. Objective:    Vitals:    Vitals:    01/15/22 1500 01/15/22 1600 01/15/22 1615 01/15/22 1629   BP: (!) 141/71 136/77 125/68 (!) 141/79   Pulse: 71 69 65 71   Resp: 18 14 17    Temp:       SpO2:       Weight:       Height:         I&O's:  No intake/output data recorded. Visit Vitals  BP (!) 141/79   Pulse 71   Temp 97.9 °F (36.6 °C)   Resp 17   Ht 5' 9\" (1.753 m)   Wt 78.5 kg (173 lb)   SpO2 96%   BMI 25.55 kg/m²       Physical Exam:  General:  No apparent Distress  HEENT: PERRL,  No Pallor , No Icterus  Neck: Supple,no mass palpable  Lungs : CTA  CVS: RRR, S1 S2 normal, No murmur   Abdomen: Soft, NT, BS +  Extremities:2 +  Edema  Skin: No rash or lesions.   MS: No joint swelling, erythema, warmth  Neurologic: non focal, AAO x 3  Psych: normal affect  Acces: RT IJ permacath     Laboratory Results:    Recent Labs     01/15/22  0937      K 4.6      CO2 27   *   BUN 44*   CREA 9.26*   CA 9.1   MG 2.2   PHOS 5.2*   ALB 2.6*   ALT 12     Recent Labs     01/15/22  0937   WBC 9.8   HGB 10.0*   HCT 32.3*        No results found for: SDES  No results found for: CULT  Recent Results (from the past 24 hour(s))   EKG, 12 LEAD, INITIAL    Collection Time: 01/15/22  9:26 AM   Result Value Ref Range    Ventricular Rate 74 BPM    Atrial Rate 74 BPM    P-R Interval 172 ms    QRS Duration 90 ms    Q-T Interval 470 ms    QTC Calculation (Bezet) 521 ms    Calculated P Axis 66 degrees    Calculated R Axis 60 degrees    Calculated T Axis 103 degrees    Diagnosis       Normal sinus rhythm  Moderate voltage criteria for LVH, may be normal variant  T wave abnormality, consider lateral ischemia  Prolonged QT  When compared with ECG of 17-SEP-2020 10:41,  Nonspecific T wave abnormality no longer evident in Inferior leads  QT has lengthened     CBC WITH AUTOMATED DIFF    Collection Time: 01/15/22  9:37 AM   Result Value Ref Range    WBC 9.8 4.1 - 11.1 K/uL    RBC 3.60 (L) 4.10 - 5.70 M/uL    HGB 10.0 (L) 12.1 - 17.0 g/dL    HCT 32.3 (L) 36.6 - 50.3 %    MCV 89.7 80.0 - 99.0 FL    MCH 27.8 26.0 - 34.0 PG    MCHC 31.0 30.0 - 36.5 g/dL    RDW 14.9 (H) 11.5 - 14.5 %    PLATELET 557 708 - 736 K/uL    MPV 10.8 8.9 - 12.9 FL    NRBC 0.0 0  WBC    ABSOLUTE NRBC 0.00 0.00 - 0.01 K/uL    NEUTROPHILS 56 32 - 75 %    LYMPHOCYTES 29 12 - 49 %    MONOCYTES 9 5 - 13 %    EOSINOPHILS 5 0 - 7 %    BASOPHILS 1 0 - 1 %    IMMATURE GRANULOCYTES 0 0.0 - 0.5 %    ABS. NEUTROPHILS 5.4 1.8 - 8.0 K/UL    ABS. LYMPHOCYTES 2.8 0.8 - 3.5 K/UL    ABS. MONOCYTES 0.9 0.0 - 1.0 K/UL    ABS. EOSINOPHILS 0.5 (H) 0.0 - 0.4 K/UL    ABS. BASOPHILS 0.1 0.0 - 0.1 K/UL    ABS. IMM. GRANS. 0.0 0.00 - 0.04 K/UL    DF AUTOMATED     METABOLIC PANEL, COMPREHENSIVE    Collection Time: 01/15/22  9:37 AM   Result Value Ref Range    Sodium 138 136 - 145 mmol/L    Potassium 4.6 3.5 - 5.1 mmol/L    Chloride 104 97 - 108 mmol/L    CO2 27 21 - 32 mmol/L    Anion gap 7 5 - 15 mmol/L    Glucose 102 (H) 65 - 100 mg/dL    BUN 44 (H) 6 - 20 MG/DL    Creatinine 9.26 (H) 0.70 - 1.30 MG/DL    BUN/Creatinine ratio 5 (L) 12 - 20      GFR est AA 7 (L) >60 ml/min/1.73m2    GFR est non-AA 6 (L) >60 ml/min/1.73m2    Calcium 9.1 8.5 - 10.1 MG/DL    Bilirubin, total 0.4 0.2 - 1.0 MG/DL    ALT (SGPT) 12 12 - 78 U/L    AST (SGOT) 9 (L) 15 - 37 U/L    Alk.  phosphatase 80 45 - 117 U/L    Protein, total 7.3 6.4 - 8.2 g/dL    Albumin 2.6 (L) 3.5 - 5.0 g/dL    Globulin 4.7 (H) 2.0 - 4.0 g/dL    A-G Ratio 0.6 (L) 1.1 - 2.2     CK W/ REFLX CKMB    Collection Time: 01/15/22  9:37 AM   Result Value Ref Range    CK 51 39 - 308 U/L TROPONIN-HIGH SENSITIVITY    Collection Time: 01/15/22  9:37 AM   Result Value Ref Range    Troponin-High Sensitivity 34 0 - 76 ng/L   NT-PRO BNP    Collection Time: 01/15/22  9:37 AM   Result Value Ref Range    NT pro-BNP 30,921 (H) <125 PG/ML   MAGNESIUM    Collection Time: 01/15/22  9:37 AM   Result Value Ref Range    Magnesium 2.2 1.6 - 2.4 mg/dL   PHOSPHORUS    Collection Time: 01/15/22  9:37 AM   Result Value Ref Range    Phosphorus 5.2 (H) 2.6 - 4.7 MG/DL         Urine dipstick:   Lab Results   Component Value Date/Time    Color YELLOW/STRAW 09/17/2020 10:56 AM    Appearance CLEAR 09/17/2020 10:56 AM    Specific gravity 1.008 09/17/2020 10:56 AM    pH (UA) 7.0 09/17/2020 10:56 AM    Protein 100 (A) 09/17/2020 10:56 AM    Glucose Negative 09/17/2020 10:56 AM    Ketone Negative 09/17/2020 10:56 AM    Bilirubin Negative 09/17/2020 10:56 AM    Urobilinogen 1.0 09/17/2020 10:56 AM    Nitrites Negative 09/17/2020 10:56 AM    Leukocyte Esterase Negative 09/17/2020 10:56 AM    Epithelial cells FEW 09/17/2020 10:56 AM    Bacteria Negative 09/17/2020 10:56 AM    WBC 0-4 09/17/2020 10:56 AM    RBC 0-5 09/17/2020 10:56 AM       I have reviewed the following: All pertinent labs, microbiology data, radiology imaging for my assessment     Medications list Personally Reviewed   [x]      Yes     []               No       Medications:  Prior to Admission medications    Medication Sig Start Date End Date Taking? Authorizing Provider   glecaprevir-pibrentasvir (Mavyret) 100-40 mg tab Take 3 Tabs by mouth daily. Indications: chronic infection of genotype 1 hepatitis C virus 3/23/21   Hafsa Vásquez, EIDS   naloxone (Narcan) 4 mg/actuation nasal spray 1 [place] by IntraNASal route once as needed for Overdose. Use 1 spray intranasally, then discard. Repeat with new spray every 2 min as needed for opioid overdose symptoms, alternating nostrils.     Provider, Historical   amLODIPine (NORVASC) 10 mg tablet Take 10 mg by mouth daily as needed. Provider, Historical   potassium chloride SR (K-TAB) 20 mEq tablet Take 20 mEq by mouth daily. Provider, Historical   buprenorphine-naloxone (Suboxone) 8-2 mg film sublingaul film 1 Film by SubLINGual route two (2) times a day. Provider, Historical   lisinopriL (PRINIVIL, ZESTRIL) 40 mg tablet 40 mg = 1 tab each dose, PO, daily, 0 Refills 11/20/20   Provider, Historical   furosemide (LASIX) 20 mg tablet Take 20 mg by mouth daily. 10/20/20   Provider, Historical   amLODIPine (NORVASC) 10 mg tablet Take 1 Tab by mouth daily. 7/9/20   Vincenzo Ragland MD        Thank you for allowing us to participate in the care of this patient. We will follow patient. Please dont hesitate to call with any questions    Medardo Alexander MD  [facility]   [address] [address]  Phone - [phone]   Fax - [phone]  [email or web address]

## 2022-01-15 NOTE — PROGRESS NOTES
[address] [place] REPORT:    Verbal report received from João Ellis RN (name) on Vijay Crew  being received from ED(unit) for routine progression of care      Report consisted of patients Situation, Background, Assessment and   Recommendations(SBAR). Information from the following report(s) SBAR, Kardex, ED Summary, Intake/Output, MAR, Recent Results and Cardiac Rhythm normal sinus was reviewed with the receiving nurse. Opportunity for questions and clarification was provided. Assessment completed upon patients arrival to unit and care assumed. 1722 Patient arrived to unit, VSS and patient is resting quietly    Primary Nurse Iris Lopez RN and Angelito Morejon RN performed a dual skin assessment on this patient No impairment noted  Brock score is 21    End of Shift Note    Bedside shift change report given to  (oncoming nurse) by Iris Lopez RN (offgoing nurse). Report included the following information SBAR, Kardex, ED Summary, Intake/Output, MAR, Recent Results and Cardiac Rhythm NSR    Shift worked:  7am-7pm     Shift summary and any significant changes:     patient arrived from ED on cardene drip      Concerns for physician to address:  pain management      Zone phone for oncoming shift:          Activity:  Activity Level:  Up with Assistance  Number times ambulated in hallways past shift: 0  Number of times OOB to chair past shift: 1    Cardiac:   Cardiac Monitoring: Yes      Cardiac Rhythm: Sinus Rhythm    Access:   Current line(s): PIV and HD access     Genitourinary:   Urinary status: voiding and oliguric    Respiratory:   O2 Device: None (Room air)  Chronic home O2 use?: NO  Incentive spirometer at bedside: NO     GI:  Last Bowel Movement Date: 01/14/22  Current diet:  ADULT DIET Regular; Low Fat/Low Chol/High Fiber/CAPRI; Low Potassium (Less than 3000 mg/day)  Passing flatus: YES  Tolerating current diet: YES       Pain Management:   Patient states pain is manageable on current regimen: NO    Skin:  Brock Score: 22  Interventions: increase time out of bed, PT/OT consult and nutritional support     Patient Safety:  Fall Score:  Total Score: 1  Interventions: bed/chair alarm, assistive device (walker, cane, etc), gripper socks, pt to call before getting OOB and stay with me (per policy)       Length of Stay:  Expected LOS: - - -  Actual LOS: 0      Prem Wilburn RN

## 2022-01-15 NOTE — PROGRESS NOTES
Suboxone Consult      Per pt - he is not taking, but states he last obtained from [facility] [employer]  [phone] - closed until Monday 8am    Thank you,  Fanny Mclean, [facility]

## 2022-01-15 NOTE — H&P
Hospitalist Admission Note    NAME: Ishmael Galeano   :  1965   MRN:  [id number]     Date/Time:  1/15/2022 1:52 PM    Patient PCP: Brian Damon MD  ______________________________________________________________________  Given the patient's current clinical presentation, I have a high level of concern for decompensation if discharged from the emergency department. Complex decision making was performed, which includes reviewing the patient's available past medical records, laboratory results, and x-ray films. My assessment of this patient's clinical condition and my plan of care is as follows. Assessment / Plan:  Hypertensive emergency leading to pulmonary edema  End stage renal disease on hemodialysis  and Saturday  We will admit to stepdown unit, status post IV hydralazine followed by Cardene drip. We will continue with Cardene drip, status post IV Bumex. Patient should get his hemodialysis today. Nephrology consulted by ED  Continue with home dose of Lasix, continue Norvasc and lisinopril tomorrow  We will repeat 2D echo    ? History of substance abuse  History history hepatitis C  Resume medication once verified by pharmacy  Pharmacy consult for med rec  Please verify hepatitis C treatment and resume  Code Status: Full code  Surrogate Decision Maker:    DVT Prophylaxis: Heparin  GI Prophylaxis: not indicated    Baseline: Ambulatory      Subjective:   CHIEF COMPLAINT: Elevated blood pressure    HISTORY OF PRESENT ILLNESS:     Fang Del Rio is a 64 y.o.  male history of hypertension, liver disease, end-stage renal disease on hemodialysis  and Saturday, was sent to the ED from the dialysis unit for significantly elevated blood pressure above 200. He was unable to start his dialysis. Patient reported some shortness of breath and leg swelling. He reported compliance to his medication and dialysis sessions.   In the ED, his BP on admission was 231/109, he received IV hydralazine without effect on the blood pressure, therefore was started on Cardene drip. Patient was GIVEN IV Bumex. In the ED, his labs showed normal CBC, BMP showed significantly elevated creatinine,  proBNP above 30,000. EKG showed no acute ST changes. Chest x-ray showed pulmonary edema  We were asked to admit for work up and evaluation of the above problems. Past Medical History:   Diagnosis Date    Chronic kidney disease     Hypertension     Liver disease         No past surgical history on file. Social History     Tobacco Use    Smoking status: Current Some Day Smoker     Packs/day: 1.00     Last attempt to quit: 2020     Years since quittin.4    Smokeless tobacco: Never Used    Tobacco comment: started back smoking in last couple months   Substance Use Topics    Alcohol use: Not Currently        No family history on file. Allergies   Allergen Reactions    Baclofen Rash     All over        Prior to Admission medications    Medication Sig Start Date End Date Taking? Authorizing Provider   glecaprevir-pibrentasvir (Mavyret) 100-40 mg tab Take 3 Tabs by mouth daily. Indications: chronic infection of genotype 1 hepatitis C virus 3/23/21   Batsheva Perez, EDIS   naloxone (Narcan) 4 mg/actuation nasal spray 1 [place] by IntraNASal route once as needed for Overdose. Use 1 spray intranasally, then discard. Repeat with new spray every 2 min as needed for opioid overdose symptoms, alternating nostrils. Provider, Historical   amLODIPine (NORVASC) 10 mg tablet Take 10 mg by mouth daily as needed. Provider, Historical   potassium chloride SR (K-TAB) 20 mEq tablet Take 20 mEq by mouth daily. Provider, Historical   buprenorphine-naloxone (Suboxone) 8-2 mg film sublingaul film 1 Film by SubLINGual route two (2) times a day.     Provider, Historical   lisinopriL (PRINIVIL, ZESTRIL) 40 mg tablet 40 mg = 1 tab each dose, PO, daily, 0 Refills 20 Provider, Historical   furosemide (LASIX) 20 mg tablet Take 20 mg by mouth daily. 10/20/20   Provider, Historical   amLODIPine (NORVASC) 10 mg tablet Take 1 Tab by mouth daily. 7/9/20   Talisha Kraft MD       REVIEW OF SYSTEMS:     I am not able to complete the review of systems because: The patient is intubated and sedated    The patient has altered mental status due to his acute medical problems    The patient has baseline aphasia from prior stroke(s)    The patient has baseline dementia and is not reliable historian    The patient is in acute medical distress and unable to provide information           Total of 12 systems reviewed as follows:       POSITIVE= underlined text  Negative = text not underlined  General:  fever, chills, sweats, generalized weakness, weight loss/gain,      loss of appetite   Eyes:    blurred vision, eye pain, loss of vision, double vision  ENT:    rhinorrhea, pharyngitis   Respiratory:   cough, sputum production, SOB, KING, wheezing, pleuritic pain   Cardiology:   chest pain, palpitations, orthopnea, PND, edema, syncope   Gastrointestinal:  abdominal pain , N/V, diarrhea, dysphagia, constipation, bleeding   Genitourinary:  frequency, urgency, dysuria, hematuria, incontinence   Muskuloskeletal :  arthralgia, myalgia, back pain  Hematology:  easy bruising, nose or gum bleeding, lymphadenopathy   Dermatological: rash, ulceration, pruritis, color change / jaundice  Endocrine:   hot flashes or polydipsia   Neurological:  headache, dizziness, confusion, focal weakness, paresthesia,     Speech difficulties, memory loss, gait difficulty  Psychological: Feelings of anxiety, depression, agitation    Objective:   VITALS:    Visit Vitals  BP (!) 141/76   Pulse 71   Temp 97.9 °F (36.6 °C)   Resp 20   Ht 5' 9\" (1.753 m)   Wt 78.5 kg (173 lb)   SpO2 96%   BMI 25.55 kg/m²       PHYSICAL EXAM:    General:    Alert, cooperative, no distress, appears stated age.      HEENT: Atraumatic, anicteric sclerae, pink conjunctivae     No oral ulcers, mucosa moist, throat clear, dentition fair  Neck:  Supple, symmetrical,  thyroid: non tender  Lungs:   Clear to auscultation bilaterally. No Wheezing or Rhonchi. No rales. Chest wall:  No tenderness  No Accessory muscle use. Heart:   Regular  rhythm,  No  murmur   lower extremity edema p2+  Abdomen:   Soft, non-tender. Not distended. Bowel sounds normal  Extremities: No cyanosis. No clubbing,      Skin turgor normal, Capillary refill normal, Radial dial pulse 2+  Skin:     Not pale. Not Jaundiced  No rashes   Psych:  Good insight. Not depressed. Not anxious or agitated. Neurologic: EOMs intact. No facial asymmetry. No aphasia or slurred speech. Symmetrical strength, Sensation grossly intact. Alert and oriented X 4.     _______________________________________________________________________  Care Plan discussed with:    Comments   Patient x    Family      RN x    Care Manager                    Consultant:      _______________________________________________________________________  Expected  Disposition:   Home with Family    HH/PT/OT/RN    SNF/LTC    ILIR    ________________________________________________________________________  TOTAL TIME:   Minutes    Critical Care Provided 65    Minutes non procedure based      Comments     Reviewed previous records   >50% of visit spent in counseling and coordination of care  Discussion with patient and/or family and questions answered       ________________________________________________________________________  Signed: Anali Chacon MD    Procedures: see electronic medical records for all procedures/Xrays and details which were not copied into this note but were reviewed prior to creation of Plan.     LAB DATA REVIEWED:    Recent Results (from the past 24 hour(s))   EKG, 12 LEAD, INITIAL    Collection Time: 01/15/22  9:26 AM   Result Value Ref Range    Ventricular Rate 74 BPM    Atrial Rate 74 BPM    P-R Interval 172 ms    QRS Duration 90 ms    Q-T Interval 470 ms    QTC Calculation (Bezet) 521 ms    Calculated P Axis 66 degrees    Calculated R Axis 60 degrees    Calculated T Axis 103 degrees    Diagnosis       Normal sinus rhythm  Moderate voltage criteria for LVH, may be normal variant  T wave abnormality, consider lateral ischemia  Prolonged QT  When compared with ECG of 17-SEP-2020 10:41,  Nonspecific T wave abnormality no longer evident in Inferior leads  QT has lengthened     CBC WITH AUTOMATED DIFF    Collection Time: 01/15/22  9:37 AM   Result Value Ref Range    WBC 9.8 4.1 - 11.1 K/uL    RBC 3.60 (L) 4.10 - 5.70 M/uL    HGB 10.0 (L) 12.1 - 17.0 g/dL    HCT 32.3 (L) 36.6 - 50.3 %    MCV 89.7 80.0 - 99.0 FL    MCH 27.8 26.0 - 34.0 PG    MCHC 31.0 30.0 - 36.5 g/dL    RDW 14.9 (H) 11.5 - 14.5 %    PLATELET 810 086 - 759 K/uL    MPV 10.8 8.9 - 12.9 FL    NRBC 0.0 0  WBC    ABSOLUTE NRBC 0.00 0.00 - 0.01 K/uL    NEUTROPHILS 56 32 - 75 %    LYMPHOCYTES 29 12 - 49 %    MONOCYTES 9 5 - 13 %    EOSINOPHILS 5 0 - 7 %    BASOPHILS 1 0 - 1 %    IMMATURE GRANULOCYTES 0 0.0 - 0.5 %    ABS. NEUTROPHILS 5.4 1.8 - 8.0 K/UL    ABS. LYMPHOCYTES 2.8 0.8 - 3.5 K/UL    ABS. MONOCYTES 0.9 0.0 - 1.0 K/UL    ABS. EOSINOPHILS 0.5 (H) 0.0 - 0.4 K/UL    ABS. BASOPHILS 0.1 0.0 - 0.1 K/UL    ABS. IMM.  GRANS. 0.0 0.00 - 0.04 K/UL    DF AUTOMATED     METABOLIC PANEL, COMPREHENSIVE    Collection Time: 01/15/22  9:37 AM   Result Value Ref Range    Sodium 138 136 - 145 mmol/L    Potassium 4.6 3.5 - 5.1 mmol/L    Chloride 104 97 - 108 mmol/L    CO2 27 21 - 32 mmol/L    Anion gap 7 5 - 15 mmol/L    Glucose 102 (H) 65 - 100 mg/dL    BUN 44 (H) 6 - 20 MG/DL    Creatinine 9.26 (H) 0.70 - 1.30 MG/DL    BUN/Creatinine ratio 5 (L) 12 - 20      GFR est AA 7 (L) >60 ml/min/1.73m2    GFR est non-AA 6 (L) >60 ml/min/1.73m2    Calcium 9.1 8.5 - 10.1 MG/DL    Bilirubin, total 0.4 0.2 - 1.0 MG/DL    ALT (SGPT) 12 12 - 78 U/L    AST (SGOT) 9 (L) 15 - 37 U/L Alk. phosphatase 80 45 - 117 U/L    Protein, total 7.3 6.4 - 8.2 g/dL    Albumin 2.6 (L) 3.5 - 5.0 g/dL    Globulin 4.7 (H) 2.0 - 4.0 g/dL    A-G Ratio 0.6 (L) 1.1 - 2.2     CK W/ REFLX CKMB    Collection Time: 01/15/22  9:37 AM   Result Value Ref Range    CK 51 39 - 308 U/L   TROPONIN-HIGH SENSITIVITY    Collection Time: 01/15/22  9:37 AM   Result Value Ref Range    Troponin-High Sensitivity 34 0 - 76 ng/L   NT-PRO BNP    Collection Time: 01/15/22  9:37 AM   Result Value Ref Range    NT pro-BNP 30,921 (H) <125 PG/ML   MAGNESIUM    Collection Time: 01/15/22  9:37 AM   Result Value Ref Range    Magnesium 2.2 1.6 - 2.4 mg/dL   PHOSPHORUS    Collection Time: 01/15/22  9:37 AM   Result Value Ref Range    Phosphorus 5.2 (H) 2.6 - 4.7 MG/DL

## 2022-01-16 ENCOUNTER — APPOINTMENT (OUTPATIENT)
Dept: NON INVASIVE DIAGNOSTICS | Age: 57
DRG: 199 | End: 2022-01-16
Attending: INTERNAL MEDICINE
Payer: MEDICAID

## 2022-01-16 LAB
ANION GAP SERPL CALC-SCNC: 6 MMOL/L (ref 5–15)
ATRIAL RATE: 74 BPM
BUN SERPL-MCNC: 23 MG/DL (ref 6–20)
BUN/CREAT SERPL: 4 (ref 12–20)
CALCIUM SERPL-MCNC: 9.1 MG/DL (ref 8.5–10.1)
CALCULATED P AXIS, ECG09: 66 DEGREES
CALCULATED R AXIS, ECG10: 60 DEGREES
CALCULATED T AXIS, ECG11: 103 DEGREES
CHLORIDE SERPL-SCNC: 104 MMOL/L (ref 97–108)
CO2 SERPL-SCNC: 28 MMOL/L (ref 21–32)
COVID-19 RAPID TEST, COVR: NOT DETECTED
CREAT SERPL-MCNC: 5.69 MG/DL (ref 0.7–1.3)
DIAGNOSIS, 93000: NORMAL
ERYTHROCYTE [DISTWIDTH] IN BLOOD BY AUTOMATED COUNT: 14.8 % (ref 11.5–14.5)
GLUCOSE SERPL-MCNC: 99 MG/DL (ref 65–100)
HCT VFR BLD AUTO: 32.9 % (ref 36.6–50.3)
HGB BLD-MCNC: 10.1 G/DL (ref 12.1–17)
MCH RBC QN AUTO: 27.9 PG (ref 26–34)
MCHC RBC AUTO-ENTMCNC: 30.7 G/DL (ref 30–36.5)
MCV RBC AUTO: 90.9 FL (ref 80–99)
NRBC # BLD: 0 K/UL (ref 0–0.01)
NRBC BLD-RTO: 0 PER 100 WBC
P-R INTERVAL, ECG05: 172 MS
PLATELET # BLD AUTO: 218 K/UL (ref 150–400)
PMV BLD AUTO: 11.1 FL (ref 8.9–12.9)
POTASSIUM SERPL-SCNC: 4 MMOL/L (ref 3.5–5.1)
Q-T INTERVAL, ECG07: 470 MS
QRS DURATION, ECG06: 90 MS
QTC CALCULATION (BEZET), ECG08: 521 MS
RBC # BLD AUTO: 3.62 M/UL (ref 4.1–5.7)
SODIUM SERPL-SCNC: 138 MMOL/L (ref 136–145)
SOURCE, COVRS: NORMAL
VENTRICULAR RATE, ECG03: 74 BPM
WBC # BLD AUTO: 9.3 K/UL (ref 4.1–11.1)

## 2022-01-16 PROCEDURE — 80048 BASIC METABOLIC PNL TOTAL CA: CPT

## 2022-01-16 PROCEDURE — 65660000000 HC RM CCU STEPDOWN

## 2022-01-16 PROCEDURE — 74011000250 HC RX REV CODE- 250: Performed by: INTERNAL MEDICINE

## 2022-01-16 PROCEDURE — 93306 TTE W/DOPPLER COMPLETE: CPT

## 2022-01-16 PROCEDURE — 74011250637 HC RX REV CODE- 250/637: Performed by: INTERNAL MEDICINE

## 2022-01-16 PROCEDURE — 74011000258 HC RX REV CODE- 258: Performed by: INTERNAL MEDICINE

## 2022-01-16 PROCEDURE — 74011250637 HC RX REV CODE- 250/637: Performed by: GENERAL ACUTE CARE HOSPITAL

## 2022-01-16 PROCEDURE — 74011250636 HC RX REV CODE- 250/636: Performed by: INTERNAL MEDICINE

## 2022-01-16 PROCEDURE — 36415 COLL VENOUS BLD VENIPUNCTURE: CPT

## 2022-01-16 PROCEDURE — 87635 SARS-COV-2 COVID-19 AMP PRB: CPT

## 2022-01-16 PROCEDURE — 85027 COMPLETE CBC AUTOMATED: CPT

## 2022-01-16 RX ORDER — OXYCODONE HYDROCHLORIDE 5 MG/1
5 TABLET ORAL
Status: DISCONTINUED | OUTPATIENT
Start: 2022-01-16 | End: 2022-01-19 | Stop reason: HOSPADM

## 2022-01-16 RX ORDER — BUMETANIDE 1 MG/1
2 TABLET ORAL DAILY
Status: DISCONTINUED | OUTPATIENT
Start: 2022-01-17 | End: 2022-01-19 | Stop reason: HOSPADM

## 2022-01-16 RX ORDER — HYDRALAZINE HYDROCHLORIDE 50 MG/1
100 TABLET, FILM COATED ORAL 3 TIMES DAILY
Status: DISCONTINUED | OUTPATIENT
Start: 2022-01-17 | End: 2022-01-19 | Stop reason: HOSPADM

## 2022-01-16 RX ORDER — CARVEDILOL 12.5 MG/1
25 TABLET ORAL 2 TIMES DAILY WITH MEALS
Status: DISCONTINUED | OUTPATIENT
Start: 2022-01-16 | End: 2022-01-19 | Stop reason: HOSPADM

## 2022-01-16 RX ORDER — CLONIDINE HYDROCHLORIDE 0.1 MG/1
0.2 TABLET ORAL 3 TIMES DAILY
Status: DISCONTINUED | OUTPATIENT
Start: 2022-01-16 | End: 2022-01-19 | Stop reason: HOSPADM

## 2022-01-16 RX ORDER — MORPHINE SULFATE 2 MG/ML
1 INJECTION, SOLUTION INTRAMUSCULAR; INTRAVENOUS
Status: DISCONTINUED | OUTPATIENT
Start: 2022-01-16 | End: 2022-01-19 | Stop reason: HOSPADM

## 2022-01-16 RX ADMIN — CLONIDINE HYDROCHLORIDE 0.2 MG: 0.1 TABLET ORAL at 21:03

## 2022-01-16 RX ADMIN — AMLODIPINE BESYLATE 10 MG: 5 TABLET ORAL at 09:17

## 2022-01-16 RX ADMIN — CLONIDINE HYDROCHLORIDE 0.2 MG: 0.1 TABLET ORAL at 18:06

## 2022-01-16 RX ADMIN — POTASSIUM CHLORIDE 20 MEQ: 750 TABLET, FILM COATED, EXTENDED RELEASE ORAL at 09:18

## 2022-01-16 RX ADMIN — SODIUM CHLORIDE, PRESERVATIVE FREE 10 ML: 5 INJECTION INTRAVENOUS at 21:05

## 2022-01-16 RX ADMIN — SODIUM CHLORIDE, PRESERVATIVE FREE 10 ML: 5 INJECTION INTRAVENOUS at 05:46

## 2022-01-16 RX ADMIN — HEPARIN SODIUM 5000 UNITS: 5000 INJECTION INTRAVENOUS; SUBCUTANEOUS at 14:00

## 2022-01-16 RX ADMIN — HEPARIN SODIUM 5000 UNITS: 5000 INJECTION INTRAVENOUS; SUBCUTANEOUS at 05:47

## 2022-01-16 RX ADMIN — SODIUM CHLORIDE 7.5 MG/HR: 9 INJECTION, SOLUTION INTRAVENOUS at 00:18

## 2022-01-16 RX ADMIN — CARVEDILOL 25 MG: 12.5 TABLET, FILM COATED ORAL at 18:06

## 2022-01-16 RX ADMIN — OXYCODONE 5 MG: 5 TABLET ORAL at 11:54

## 2022-01-16 RX ADMIN — SODIUM CHLORIDE 7.5 MG/HR: 9 INJECTION, SOLUTION INTRAVENOUS at 04:16

## 2022-01-16 RX ADMIN — FUROSEMIDE 20 MG: 20 TABLET ORAL at 09:17

## 2022-01-16 RX ADMIN — SODIUM CHLORIDE 10 MG/HR: 9 INJECTION, SOLUTION INTRAVENOUS at 14:42

## 2022-01-16 RX ADMIN — LISINOPRIL 40 MG: 40 TABLET ORAL at 09:18

## 2022-01-16 RX ADMIN — SODIUM CHLORIDE, PRESERVATIVE FREE 5 ML: 5 INJECTION INTRAVENOUS at 14:00

## 2022-01-16 RX ADMIN — SODIUM CHLORIDE 10 MG/HR: 9 INJECTION, SOLUTION INTRAVENOUS at 21:17

## 2022-01-16 RX ADMIN — SODIUM CHLORIDE 7.5 MG/HR: 9 INJECTION, SOLUTION INTRAVENOUS at 08:00

## 2022-01-16 RX ADMIN — SODIUM CHLORIDE 10 MG/HR: 9 INJECTION, SOLUTION INTRAVENOUS at 18:06

## 2022-01-16 NOTE — PROGRESS NOTES
[facility]   [address] [address], [address], [facility]  Phone: [phone]   [phone]       Nephrology Progress Note  Fausto Candelaria     1965     [id number]  Date of Admission : 1/15/2022  01/16/22    CC:  Follow up for esrd    Assessment and Plan   1 ESRD   - on HD T/T/S  At [facility] under Dr Vibha Cortes HD today as pt missed HD   - Had HD yesterday   - plan Hd tomorrow also      2 HTN Urgency  - presented with high bp in HD unit   - send to ER and on cardene GGT  - amlodipine also  - pt has Clonidine 0.2 TID, carvedilol 25 bid with him  - also on hydralazine 100 TID as per pt   - restart  Home meds   - wean GGT once bp improves         3 elevated Probnp  - will recommend Echo         4 ACD  - epo on HD if hbg < 10      5 MBD  - will check pTH         6 H/o Hep C  - used to see Dr Garland Barrett in 2021   - was not treated at that time         7 Nep will follow      Care Plan discussed with:  Pt a     Interval History:  Seen in evening. bp meds adjusted by primary team. On GGT still. Wean as bp reilly. Pt has no complains       Review of Systems: A comprehensive review of systems was negative except for that written in the HPI.     Current Medications:   Current Facility-Administered Medications   Medication Dose Route Frequency    oxyCODONE IR (ROXICODONE) tablet 5 mg  5 mg Oral Q4H PRN    morphine injection 1 mg  1 mg IntraVENous Q4H PRN    [START ON 1/17/2022] hydrALAZINE (APRESOLINE) tablet 100 mg  100 mg Oral TID    cloNIDine HCL (CATAPRES) tablet 0.2 mg  0.2 mg Oral TID    [START ON 1/17/2022] bumetanide (BUMEX) tablet 2 mg  2 mg Oral DAILY    carvediloL (COREG) tablet 25 mg  25 mg Oral BID WITH MEALS    niCARdipine (CARDENE) 25 mg in 0.9% sodium chloride 250 mL infusion  0-15 mg/hr IntraVENous TITRATE    potassium chloride SR (KLOR-CON 10) tablet 20 mEq  20 mEq Oral DAILY    [Held by provider] buprenorphine-naloxone (SUBOXONE) 8mg-2mg SL film  1 Film SubLINGual BID    sodium chloride (NS) flush 5-40 mL  5-40 mL IntraVENous Q8H    sodium chloride (NS) flush 5-40 mL  5-40 mL IntraVENous PRN    acetaminophen (TYLENOL) tablet 650 mg  650 mg Oral Q6H PRN    Or    acetaminophen (TYLENOL) suppository 650 mg  650 mg Rectal Q6H PRN    polyethylene glycol (MIRALAX) packet 17 g  17 g Oral DAILY PRN    ondansetron (ZOFRAN ODT) tablet 4 mg  4 mg Oral Q8H PRN    Or    ondansetron (ZOFRAN) injection 4 mg  4 mg IntraVENous Q6H PRN    heparin (porcine) injection 5,000 Units  5,000 Units SubCUTAneous Q8H    amLODIPine (NORVASC) tablet 10 mg  10 mg Oral DAILY    heparin (porcine) 1,000 unit/mL injection 1,800 Units  1,800 Units InterCATHeter DIALYSIS PRN    And    heparin (porcine) 1,000 unit/mL injection 1,800 Units  1,800 Units InterCATHeter DIALYSIS PRN      Allergies   Allergen Reactions    Baclofen Rash     All over       Objective:  Vitals:    Vitals:    01/16/22 1441 01/16/22 1442 01/16/22 1500 01/16/22 1519   BP: (!) 202/88 (!) 202/88 (!) 195/82 (!) 195/82   Pulse:   84    Resp:   21    Temp:   98.2 °F (36.8 °C)    SpO2:   99%    Weight:    78.5 kg (173 lb 1 oz)   Height:    5' 9\" (1.753 m)     Intake and Output:  01/16 0701 - 01/16 1900  In: 1107.9 [I.V.:1107.9]  Out: 150 [Urine:150]  01/14 1901 - 01/16 0700  In: 300.8 [I.V.:300.8]  Out: 3000     Physical Examination:  Pt intubated    Yes / No  General: NAD,Conversant   Neck:  Supple, no mass  Resp:  Lungs CTA B/L, no wheezing , normal respiratory effort  CV:  RRR,  no murmur or rub,LE edema  GI:  Soft, NT, + Bowel sounds, no hepatosplenomegaly  Neurologic:  Non focal  Psych:             AAO x 3 appropriate affect  Skin:  No Rash  :  No brown     []    High complexity decision making was performed  []    Patient is at high-risk of decompensation with multiple organ involvement    Lab Data Personally Reviewed: I have reviewed all the pertinent labs, microbiology data and radiology studies during assessment.     Recent Labs     01/16/22  0551 01/15/22  0937    138   K 4.0 4.6    104   CO2 28 27   GLU 99 102*   BUN 23* 44*   CREA 5.69* 9.26*   CA 9.1 9.1   MG  --  2.2   PHOS  --  5.2*   ALB  --  2.6*   ALT  --  12     Recent Labs     01/16/22  0551 01/15/22  0937   WBC 9.3 9.8   HGB 10.1* 10.0*   HCT 32.9* 32.3*    200     No results found for: SDES  No results found for: CULT  Recent Results (from the past 24 hour(s))   METABOLIC PANEL, BASIC    Collection Time: 01/16/22  5:51 AM   Result Value Ref Range    Sodium 138 136 - 145 mmol/L    Potassium 4.0 3.5 - 5.1 mmol/L    Chloride 104 97 - 108 mmol/L    CO2 28 21 - 32 mmol/L    Anion gap 6 5 - 15 mmol/L    Glucose 99 65 - 100 mg/dL    BUN 23 (H) 6 - 20 MG/DL    Creatinine 5.69 (H) 0.70 - 1.30 MG/DL    BUN/Creatinine ratio 4 (L) 12 - 20      GFR est AA 13 (L) >60 ml/min/1.73m2    GFR est non-AA 10 (L) >60 ml/min/1.73m2    Calcium 9.1 8.5 - 10.1 MG/DL   CBC W/O DIFF    Collection Time: 01/16/22  5:51 AM   Result Value Ref Range    WBC 9.3 4.1 - 11.1 K/uL    RBC 3.62 (L) 4.10 - 5.70 M/uL    HGB 10.1 (L) 12.1 - 17.0 g/dL    HCT 32.9 (L) 36.6 - 50.3 %    MCV 90.9 80.0 - 99.0 FL    MCH 27.9 26.0 - 34.0 PG    MCHC 30.7 30.0 - 36.5 g/dL    RDW 14.8 (H) 11.5 - 14.5 %    PLATELET 188 904 - 546 K/uL    MPV 11.1 8.9 - 12.9 FL    NRBC 0.0 0  WBC    ABSOLUTE NRBC 0.00 0.00 - 0.01 K/uL   ECHO ADULT COMPLETE    Collection Time: 01/16/22  4:26 PM   Result Value Ref Range    IVSd 1.9 (A) 0.6 - 1.0 cm    LVIDd 4.4 4.2 - 5.9 cm    LVIDs 3.1 cm    LVOT Diameter 2.1 cm    LVPWd 1.9 (A) 0.6 - 1.0 cm    LVOT Peak Gradient 7 mmHg    LVOT Peak Velocity 1.4 m/s    RV Free Wall Peak S' 16 cm/s    LA Diameter 4.0 cm    LA Volume A/L 93 mL    LA Volume 2C 102 (A) 18 - 58 mL    LA Volume 4C 78 (A) 18 - 58 mL    LA Volume BP 90 (A) 18 - 58 mL    LA Volume BP 90 (A) 18 - 58 mL    AV Area by Peak Velocity 3.1 cm2    AV Area by Peak Velocity 3.1 cm2    AV Peak Gradient 10 mmHg    AV Peak Velocity 1.6 m/s    MV A Velocity 0.72 m/s    MV E Wave Deceleration Time 149.5 ms    MV E Velocity 0.61 m/s    LV E' Lateral Velocity 8 cm/s    LV E' Septal Velocity 8 cm/s    MR Peak Gradient 85 mmHg    MR Peak Velocity 4.6 m/s    PV Peak Gradient 4 mmHg    PV Max Velocity 1.1 m/s    TR Peak Gradient 34 mmHg    TR Max Velocity 2.90 m/s    Fractional Shortening 2D 30 28 - 44 %    LVIDd Index 2.27 cm/m2    LVIDs Index 1.60 cm/m2    LV RWT Ratio 0.86     LV Mass 2D 388.5 (A) 88 - 224 g    LV Mass 2D Index 200.2 (A) 49 - 115 g/m2    MV E/A 0.85     E/E' Ratio (Averaged) 7.63     E/E' Lateral 7.63     E/E' Septal 7.63     LA Volume Index A/L 48 16 - 34 mL/m2    LVOT Area 3.5 cm2    LA Volume Index 2C 53 (A) 16 - 34 mL/m2    LA Volume Index 4C 40 (A) 16 - 34 mL/m2    LA Size Index 2.06 cm/m2    AV Velocity Ratio 0.88            Total time spent with patient:  xxx   min. Care Plan discussed with:  Patient     Family      RN      Consulting Physician [address],         I have reviewed the flowsheets. Chart and Pertinent Notes have been reviewed. No change in PMH ,family and social history from Consult note.       Radha Stratton MD

## 2022-01-16 NOTE — PROCEDURES
Hemodialysis / [phone]    Vitals Pre Post Assessment Pre Post   BP BP: (!) 158/87 (01/15/22 2030) 166/79 LOC A&Ox4, cooperative A&Ox4, cooperative   HR Pulse (Heart Rate): 71 (01/15/22 2030) 81 Lungs Diminished bilaterally  Diminished bilaterally   Resp Resp Rate: 19 (01/15/22 2030) 18 Cardiac Regular, S1, S2 Regular, S1, S2   Temp Temp: 97.5 °F (36.4 °C) (01/15/22 2030) 99.1 Skin R IJ permacath R IJ permacath   Weight  N/A N/A Edema 2+ 2+   Tele status NSR NSR Pain Pain Intensity 1: 0 (01/15/22 1947) 0     Orders   Duration: Start: 2030 End: 0001 Total: 3.5 hours   Dialyzer: Dialyzer/Set Up Inspection: Dominic Jensen (01/15/22 2030)   K Bath: Dialysate K (mEq/L): 2 (01/15/22 2030)   Ca Bath: Dialysate CA (mEq/L): 2.5 (01/15/22 2030)   Na: Dialysate NA (mEq/L): 140 (01/15/22 2030)   Bicarb: Dialysate HCO3 (mEq/L): 35 (01/15/22 2030)   Target Fluid Removal: Goal/Amount of Fluid to Remove (mL): 3000 mL (01/15/22 2030)     Access   Type & Location: R IJ permacath   Comments:             RIJ CVC: Dressing CDI. No s/s of infection. New sterile dressing applied today, 01/15/22. Both lumens aspirate & flush well. Running well at .                                 Labs   HBsAg (Antigen) / date: 01/13/22 Negative                                            HBsAb (Antibody) / date: 09/23/21 Susceptible    Source:    Obtained/Reviewed  Critical Results Called HGB   Date Value Ref Range Status   01/15/2022 10.0 (L) 12.1 - 17.0 g/dL Final     Potassium   Date Value Ref Range Status   01/15/2022 4.6 3.5 - 5.1 mmol/L Final     Calcium   Date Value Ref Range Status   01/15/2022 9.1 8.5 - 10.1 MG/DL Final     BUN   Date Value Ref Range Status   01/15/2022 44 (H) 6 - 20 MG/DL Final     Creatinine   Date Value Ref Range Status   01/15/2022 9.26 (H) 0.70 - 1.30 MG/DL Final        Meds Given   Name Dose Route   Heparin 1:1000 3600 Units  IV hep lock                Adequacy / Fluid    Total Liters Process: 78.8   Net Fluid Removed: 3000 mL      Comments   Time Out Done:   (Time) @ 2025   Admitting Diagnosis: Hypertensive emergency leading to pulmonary edema   Consent obtained/signed: Informed Consent Verified: Yes (01/15/22 2030)   Machine / RO # Machine Number: [id number] (01/15/22 2030)   Primary Nurse Rpt Pre: Lana Nina RN   Primary Nurse Rpt Post: Lana Nina RN   Pt Education: Ordered Dialysis Treatment   Care Plan: Catheter Infection Prevention   Pts outpatient [facility]: [facility]      Tx Summary   Comments:    SBAR received from Primary RN. Pt A&Ox4, reviewed ordered treatment with patient, pt in agreement, physical assessment and machine preparation performed per policy. Consent signed & on file. 2030: Each catheter limb disinfected per p&p, caps removed, hubs disinfected per p&p. Each lumen aspirated for blood return and flushed with Normal Saline per policy. VSS. Dialysis Tx initiated. 2130: Pt resting quietly, VSS, lines patent and intact. 2230: Pt resting quietly, VSS, lines patent and intact. 0001: Tx ended. VSS. Each dialysis catheter limb disinfected per p&p, all possible blood returned per p&p, and each dialysis hub disinfected per p&p. Each lumen flushed, post dialysis catheter Heparin dwell instilled per order, and caps applied. Bed locked and in the lowest position, call bell and belongings in reach. SBAR given to Primary, RN. Patient is stable at time of my departure. All Dialysis related medications have been reviewed.

## 2022-01-16 NOTE — PROGRESS NOTES
Problem: Falls - Risk of  Goal: *Absence of Falls  Description: Document Cookie Aminjoselilysoco Fall Risk and appropriate interventions in the flowsheet.   Outcome: Progressing Towards Goal  Note: Fall Risk Interventions:            Medication Interventions: Bed/chair exit alarm,Patient to call before getting OOB,Teach patient to arise slowly                   Problem: Patient Education: Go to Patient Education Activity  Goal: Patient/Family Education  Outcome: Progressing Towards Goal

## 2022-01-16 NOTE — PROGRESS NOTES
7612 Bedside and Verbal shift change report given to leilani villalba (oncoming nurse) by Leighton West (offgoing nurse). Report included the following information SBAR, Kardex, Procedure Summary, Intake/Output, MAR and Recent Results. 0800 I started a new bag of cardene at 7.5mg/hr - will continue to monitor patient's BP every 15 min    0900 echo is currently at bedside with patient    0930 I gave the patient their scheduled PO BP medications, I will wait an hour and recheck BP to see if cardene drip is still needed. 1102 The patient's SBP has sustained in the 150's for the last hour after giving PO BP medications- pausing cardene drip for now and will continue to monitor patient's BP q 15 min    1200 Cardene drip restarted at 7.5 mg/hr d/t the patient's SBP sustaining in the 180's for an hour after the drip had been paused- will continue to monitor BP q 15 min    1442 New bag of cardene administered, I increased rate by 2.5 mg/hr and the rate is now at 10 mg/hr (maxed out) d/t patient's SBP sustaining in the 190's after restarting the drip . I will let Dr. Suma Okeefe know. 200 Dr. Suma Okeefe is at bedside with patient, he told me that he is going to order the patient's home BP medications (hydralazine and clonidine) and asked me to try to wean patient off drip if possible after giving the new scheduled medications. End of Shift Note    Bedside shift change report given to  (oncoming nurse) by Genet Robles RN (offgoing nurse). Report included the following information SBAR, Kardex, Intake/Output, MAR and Recent Results    Shift worked:  7am-7pm     Shift summary and any significant changes:     read note above     Concerns for physician to address:  BP control     Zone phone for oncoming shift:          Activity:  Activity Level:  Up with Assistance  Number times ambulated in hallways past shift: 0  Number of times OOB to chair past shift: 3    Cardiac:   Cardiac Monitoring: Yes      Cardiac Rhythm: Sinus Rhythm,ST Depression    Access:   Current line(s): PIV and HD access     Genitourinary:   Urinary status: voiding    Respiratory:   O2 Device: None (Room air)  Chronic home O2 use?: NO  Incentive spirometer at bedside: NO     GI:  Last Bowel Movement Date: 01/14/22  Current diet:  ADULT DIET Regular; Low Fat/Low Chol/High Fiber/CAPRI; Low Potassium (Less than 3000 mg/day)  Passing flatus: YES  Tolerating current diet: YES       Pain Management:   Patient states pain is manageable on current regimen: YES    Skin:  Brock Score: 22  Interventions: increase time out of bed and nutritional support     Patient Safety:  Fall Score:  Total Score: 1  Interventions: bed/chair alarm, assistive device (walker, cane, etc), gripper socks, pt to call before getting OOB and stay with me (per policy)       Length of Stay:  Expected LOS: - - -  Actual LOS: 1      Isa Lott RN

## 2022-01-17 LAB
ECHO AV AREA PEAK VELOCITY: 3.1 CM2
ECHO AV AREA PEAK VELOCITY: 3.1 CM2
ECHO AV PEAK GRADIENT: 10 MMHG
ECHO AV PEAK VELOCITY: 1.6 M/S
ECHO AV VELOCITY RATIO: 0.88
ECHO LA DIAMETER INDEX: 2.06 CM/M2
ECHO LA DIAMETER: 4 CM
ECHO LA VOL 2C: 102 ML (ref 18–58)
ECHO LA VOL 4C: 78 ML (ref 18–58)
ECHO LA VOL BP: 90 ML (ref 18–58)
ECHO LA VOL BP: 90 ML (ref 18–58)
ECHO LA VOLUME AREA LENGTH: 93 ML
ECHO LA VOLUME INDEX A2C: 53 ML/M2 (ref 16–34)
ECHO LA VOLUME INDEX A4C: 40 ML/M2 (ref 16–34)
ECHO LA VOLUME INDEX AREA LENGTH: 48 ML/M2 (ref 16–34)
ECHO LV E' LATERAL VELOCITY: 8 CM/S
ECHO LV E' SEPTAL VELOCITY: 8 CM/S
ECHO LV FRACTIONAL SHORTENING: 30 % (ref 28–44)
ECHO LV INTERNAL DIMENSION DIASTOLE INDEX: 2.27 CM/M2
ECHO LV INTERNAL DIMENSION DIASTOLIC: 4.4 CM (ref 4.2–5.9)
ECHO LV INTERNAL DIMENSION SYSTOLIC INDEX: 1.6 CM/M2
ECHO LV INTERNAL DIMENSION SYSTOLIC: 3.1 CM
ECHO LV IVSD: 1.9 CM (ref 0.6–1)
ECHO LV MASS 2D: 388.5 G (ref 88–224)
ECHO LV MASS INDEX 2D: 200.2 G/M2 (ref 49–115)
ECHO LV POSTERIOR WALL DIASTOLIC: 1.9 CM (ref 0.6–1)
ECHO LV RELATIVE WALL THICKNESS RATIO: 0.86
ECHO LVOT AREA: 3.5 CM2
ECHO LVOT DIAM: 2.1 CM
ECHO LVOT PEAK GRADIENT: 7 MMHG
ECHO LVOT PEAK VELOCITY: 1.4 M/S
ECHO MV A VELOCITY: 0.72 M/S
ECHO MV E DECELERATION TIME (DT): 149.5 MS
ECHO MV E VELOCITY: 0.61 M/S
ECHO MV E/A RATIO: 0.85
ECHO MV E/E' LATERAL: 7.63
ECHO MV E/E' RATIO (AVERAGED): 7.63
ECHO MV E/E' SEPTAL: 7.63
ECHO MV REGURGITANT PEAK GRADIENT: 85 MMHG
ECHO MV REGURGITANT PEAK VELOCITY: 4.6 M/S
ECHO PV MAX VELOCITY: 1.1 M/S
ECHO PV PEAK GRADIENT: 4 MMHG
ECHO RV FREE WALL PEAK S': 16 CM/S
ECHO TV REGURGITANT MAX VELOCITY: 2.9 M/S
ECHO TV REGURGITANT PEAK GRADIENT: 34 MMHG

## 2022-01-17 PROCEDURE — 74011250636 HC RX REV CODE- 250/636: Performed by: INTERNAL MEDICINE

## 2022-01-17 PROCEDURE — 74011250637 HC RX REV CODE- 250/637: Performed by: GENERAL ACUTE CARE HOSPITAL

## 2022-01-17 PROCEDURE — 74011250637 HC RX REV CODE- 250/637: Performed by: INTERNAL MEDICINE

## 2022-01-17 PROCEDURE — 74011250636 HC RX REV CODE- 250/636: Performed by: GENERAL ACUTE CARE HOSPITAL

## 2022-01-17 PROCEDURE — 74011000258 HC RX REV CODE- 258: Performed by: INTERNAL MEDICINE

## 2022-01-17 PROCEDURE — 74011000250 HC RX REV CODE- 250: Performed by: INTERNAL MEDICINE

## 2022-01-17 PROCEDURE — 65660000000 HC RM CCU STEPDOWN

## 2022-01-17 PROCEDURE — 74011636637 HC RX REV CODE- 636/637: Performed by: INTERNAL MEDICINE

## 2022-01-17 RX ORDER — HYDRALAZINE HYDROCHLORIDE 100 MG/1
100 TABLET, FILM COATED ORAL 3 TIMES DAILY
Status: ON HOLD | COMMUNITY
End: 2022-09-28 | Stop reason: SDUPTHER

## 2022-01-17 RX ORDER — DOXAZOSIN 2 MG/1
2 TABLET ORAL DAILY
Status: DISCONTINUED | OUTPATIENT
Start: 2022-01-17 | End: 2022-01-18

## 2022-01-17 RX ORDER — ASCORBIC ACID 500 MG
500 TABLET ORAL DAILY
COMMUNITY

## 2022-01-17 RX ORDER — GLUCOSAMINE SULFATE 1500 MG
1000 POWDER IN PACKET (EA) ORAL DAILY
COMMUNITY

## 2022-01-17 RX ORDER — CLONIDINE HYDROCHLORIDE 0.2 MG/1
0.2 TABLET ORAL 3 TIMES DAILY
Status: ON HOLD | COMMUNITY
End: 2022-05-17

## 2022-01-17 RX ADMIN — HYDRALAZINE HYDROCHLORIDE 100 MG: 50 TABLET, FILM COATED ORAL at 08:22

## 2022-01-17 RX ADMIN — SODIUM CHLORIDE, PRESERVATIVE FREE 10 ML: 5 INJECTION INTRAVENOUS at 06:42

## 2022-01-17 RX ADMIN — SODIUM CHLORIDE, PRESERVATIVE FREE 10 ML: 5 INJECTION INTRAVENOUS at 14:31

## 2022-01-17 RX ADMIN — SODIUM CHLORIDE 10 MG/HR: 9 INJECTION, SOLUTION INTRAVENOUS at 22:16

## 2022-01-17 RX ADMIN — HYDRALAZINE HYDROCHLORIDE 100 MG: 50 TABLET, FILM COATED ORAL at 16:19

## 2022-01-17 RX ADMIN — SODIUM CHLORIDE 10 MG/HR: 9 INJECTION, SOLUTION INTRAVENOUS at 07:41

## 2022-01-17 RX ADMIN — CLONIDINE HYDROCHLORIDE 0.2 MG: 0.1 TABLET ORAL at 21:36

## 2022-01-17 RX ADMIN — HYDRALAZINE HYDROCHLORIDE 100 MG: 50 TABLET, FILM COATED ORAL at 21:36

## 2022-01-17 RX ADMIN — BUMETANIDE 2 MG: 1 TABLET ORAL at 08:22

## 2022-01-17 RX ADMIN — SODIUM CHLORIDE 10 MG/HR: 9 INJECTION, SOLUTION INTRAVENOUS at 00:10

## 2022-01-17 RX ADMIN — HEPARIN SODIUM 1800 UNITS: 1000 INJECTION INTRAVENOUS; SUBCUTANEOUS at 15:55

## 2022-01-17 RX ADMIN — HEPARIN SODIUM 1800 UNITS: 1000 INJECTION INTRAVENOUS; SUBCUTANEOUS at 15:56

## 2022-01-17 RX ADMIN — SODIUM CHLORIDE 7.5 MG/HR: 9 INJECTION, SOLUTION INTRAVENOUS at 16:55

## 2022-01-17 RX ADMIN — POTASSIUM CHLORIDE 20 MEQ: 750 TABLET, FILM COATED, EXTENDED RELEASE ORAL at 08:22

## 2022-01-17 RX ADMIN — CARVEDILOL 25 MG: 12.5 TABLET, FILM COATED ORAL at 08:22

## 2022-01-17 RX ADMIN — CARVEDILOL 25 MG: 12.5 TABLET, FILM COATED ORAL at 16:19

## 2022-01-17 RX ADMIN — SODIUM CHLORIDE 10 MG/HR: 9 INJECTION, SOLUTION INTRAVENOUS at 04:03

## 2022-01-17 RX ADMIN — BUPRENORPHINE AND NALOXONE 1 FILM: 8; 2 FILM BUCCAL; SUBLINGUAL at 18:18

## 2022-01-17 RX ADMIN — MORPHINE SULFATE 1 MG: 2 INJECTION, SOLUTION INTRAMUSCULAR; INTRAVENOUS at 19:53

## 2022-01-17 RX ADMIN — AMLODIPINE BESYLATE 10 MG: 5 TABLET ORAL at 08:22

## 2022-01-17 RX ADMIN — HEPARIN SODIUM 5000 UNITS: 5000 INJECTION INTRAVENOUS; SUBCUTANEOUS at 21:36

## 2022-01-17 RX ADMIN — CLONIDINE HYDROCHLORIDE 0.2 MG: 0.1 TABLET ORAL at 16:19

## 2022-01-17 RX ADMIN — SODIUM CHLORIDE, PRESERVATIVE FREE 10 ML: 5 INJECTION INTRAVENOUS at 21:36

## 2022-01-17 RX ADMIN — CLONIDINE HYDROCHLORIDE 0.2 MG: 0.1 TABLET ORAL at 08:22

## 2022-01-17 RX ADMIN — SODIUM CHLORIDE 10 MG/HR: 9 INJECTION, SOLUTION INTRAVENOUS at 19:53

## 2022-01-17 RX ADMIN — DOXAZOSIN 2 MG: 2 TABLET ORAL at 10:44

## 2022-01-17 NOTE — PROGRESS NOTES
Pharmacy Medication Reconciliation     The patient was interviewed regarding current PTA medication list, use and drug allergies; called patient via telephone in room due to active COVID-19 infection and obtained permission from patient to discuss drug regimen. The patient was questioned regarding use of any other inhalers, topical products, over the counter medications, herbal medications, vitamin products or ophthalmic/nasal/otic medication use. Allergy Update: Baclofen    Recommendations/Findings: The following amendments were made to the patient's active medication list on file at [address]:   1) Additions: clonidine 0.2 mg TID, hydralazine 100 mg TID, vitamin C 500 mg daily, cholecalciferol 1000 units daily    2) Deletions: amlodipine 10 mg (duplicate entry into list)    3) Changes: N/A      Pertinent Findings: Received a consult from hospitalist to verify Suboxone dose. Confirmed with both patient and [facility] that patient is taking 1 film sublingually twice daily. Clarified PTA med list with patient. PTA medication list was corrected to the following:     Prior to Admission Medications   Prescriptions Last Dose Informant Taking? amLODIPine (NORVASC) 10 mg tablet   Yes   Sig: Take 1 Tab by mouth daily. ascorbic acid, vitamin C, (Vitamin C) 500 mg tablet   Yes   Sig: Take 500 mg by mouth daily. buprenorphine-naloxone (Suboxone) 8-2 mg film sublingaul film   Yes   Si [place] by SubLINGual route two (2) times a day. cholecalciferol (VITAMIN D3) 25 mcg (1,000 unit) cap   Yes   Sig: Take 1,000 Units by mouth daily. cloNIDine HCL (CATAPRES) 0.2 mg tablet   Yes   Sig: Take 0.2 mg by mouth three (3) times daily. furosemide (LASIX) 20 mg tablet   Yes   Sig: Take 20 mg by mouth daily. glecaprevir-pibrentasvir (Mavyret) 100-40 mg tab   Yes   Sig: Take 3 Tabs by mouth daily.  Indications: chronic infection of genotype 1 hepatitis C virus   hydrALAZINE (APRESOLINE) 100 mg tablet   Yes Sig: Take 100 mg by mouth three (3) times daily. naloxone (Narcan) 4 mg/actuation nasal spray   Yes   Si [place] by IntraNASal route once as needed for Overdose. Use 1 spray intranasally, then discard. Repeat with new spray every 2 min as needed for opioid overdose symptoms, alternating nostrils. potassium chloride SR (K-TAB) 20 mEq tablet   No   Sig: Take 20 mEq by mouth daily.       Facility-Administered Medications: None        Thank you,  ANA MARIA Garcia

## 2022-01-17 NOTE — PROGRESS NOTES
[facility]   [address] [address], [facility] [address], [facility]  Phone: [phone]   [phone]       Nephrology Progress Note  Tamika Manuel     1965     [id number]  Date of Admission : 1/15/2022  01/17/22    CC:  Follow up for esrd    Assessment and Plan   1 ESRD   - on HD T/T/S  At [facility] under Dr Luis Dobbs   - HD again today     2 HTN Urgency  - presented with high bp in HD unit   - send to ER and on cardene GGT  - amlodipine 10 mg,  Clonidine 0.2 TID, carvedilol 25 bid, hydralazine 100 TID as per pt   -added doxazosin for now   - wean GGT once bp improves         3 elevated Probnp  - will recommend Echo         4 ACD  - epo on HD if hbg < 10      5 MBD  - will check pTH         6 H/o Hep C  - used to see Dr Alyce Arguelles in 2021   - was not treated at that time         7 Nep will follow      Care Plan discussed with:  Pt a     Interval History:  Seen in am . bp meds adjusted On GGT still. Wean as bp reilly. Pt has no complains       Review of Systems: A comprehensive review of systems was negative except for that written in the HPI.     Current Medications:   Current Facility-Administered Medications   Medication Dose Route Frequency    doxazosin (CARDURA) tablet 2 mg  2 mg Oral DAILY    oxyCODONE IR (ROXICODONE) tablet 5 mg  5 mg Oral Q4H PRN    morphine injection 1 mg  1 mg IntraVENous Q4H PRN    hydrALAZINE (APRESOLINE) tablet 100 mg  100 mg Oral TID    cloNIDine HCL (CATAPRES) tablet 0.2 mg  0.2 mg Oral TID    bumetanide (BUMEX) tablet 2 mg  2 mg Oral DAILY    carvediloL (COREG) tablet 25 mg  25 mg Oral BID WITH MEALS    niCARdipine (CARDENE) 25 mg in 0.9% sodium chloride 250 mL infusion  0-15 mg/hr IntraVENous TITRATE    potassium chloride SR (KLOR-CON 10) tablet 20 mEq  20 mEq Oral DAILY    [Held by provider] buprenorphine-naloxone (SUBOXONE) 8mg-2mg SL film  1 Film SubLINGual BID    sodium chloride (NS) flush 5-40 mL  5-40 mL IntraVENous Q8H    sodium chloride (NS) flush 5-40 mL  5-40 mL IntraVENous PRN    acetaminophen (TYLENOL) tablet 650 mg  650 mg Oral Q6H PRN    Or    acetaminophen (TYLENOL) suppository 650 mg  650 mg Rectal Q6H PRN    polyethylene glycol (MIRALAX) packet 17 g  17 g Oral DAILY PRN    ondansetron (ZOFRAN ODT) tablet 4 mg  4 mg Oral Q8H PRN    Or    ondansetron (ZOFRAN) injection 4 mg  4 mg IntraVENous Q6H PRN    heparin (porcine) injection 5,000 Units  5,000 Units SubCUTAneous Q8H    amLODIPine (NORVASC) tablet 10 mg  10 mg Oral DAILY    heparin (porcine) 1,000 unit/mL injection 1,800 Units  1,800 Units InterCATHeter DIALYSIS PRN    And    heparin (porcine) 1,000 unit/mL injection 1,800 Units  1,800 Units InterCATHeter DIALYSIS PRN      Allergies   Allergen Reactions    Baclofen Rash     All over       Objective:  Vitals:    Vitals:    01/17/22 0800 01/17/22 0830 01/17/22 0900 01/17/22 0915   BP: (!) 169/83 (!) 172/79 (!) 147/68 (!) 147/71   Pulse: 81 83 80    Resp: 30 18 18    Temp:       SpO2:       Weight:       Height:         Intake and Output:  No intake/output data recorded. 01/15 1901 - 01/17 0700  In: 1340.4 [I.V.:1340.4]  Out: 3365 [Urine:365]    Physical Examination:  Pt intubated    Yes / No  General: NAD,Conversant   Neck:  Supple, no mass  Resp:  Lungs CTA B/L, no wheezing , normal respiratory effort  CV:  RRR,  no murmur or rub,LE edema  GI:  Soft, NT, + Bowel sounds, no hepatosplenomegaly  Neurologic:  Non focal  Psych:             AAO x 3 appropriate affect  Skin:  No Rash  :  No brown     []    High complexity decision making was performed  []    Patient is at high-risk of decompensation with multiple organ involvement    Lab Data Personally Reviewed: I have reviewed all the pertinent labs, microbiology data and radiology studies during assessment.     Recent Labs     01/16/22  0551 01/15/22  0937    138   K 4.0 4.6    104   CO2 28 27   GLU 99 102*   BUN 23* 44*   CREA 5.69* 9.26*   CA 9.1 9.1   MG --  2.2   PHOS  --  5.2*   ALB  --  2.6*   ALT  --  12     Recent Labs     01/16/22  0551 01/15/22  0937   WBC 9.3 9.8   HGB 10.1* 10.0*   HCT 32.9* 32.3*    200     No results found for: SDES  No results found for: CULT  Recent Results (from the past 24 hour(s))   ECHO ADULT COMPLETE    Collection Time: 01/16/22  4:26 PM   Result Value Ref Range    IVSd 1.9 (A) 0.6 - 1.0 cm    LVIDd 4.4 4.2 - 5.9 cm    LVIDs 3.1 cm    LVOT Diameter 2.1 cm    LVPWd 1.9 (A) 0.6 - 1.0 cm    LVOT Peak Gradient 7 mmHg    LVOT Peak Velocity 1.4 m/s    RV Free Wall Peak S' 16 cm/s    LA Diameter 4.0 cm    LA Volume A/L 93 mL    LA Volume 2C 102 (A) 18 - 58 mL    LA Volume 4C 78 (A) 18 - 58 mL    LA Volume BP 90 (A) 18 - 58 mL    LA Volume BP 90 (A) 18 - 58 mL    AV Area by Peak Velocity 3.1 cm2    AV Area by Peak Velocity 3.1 cm2    AV Peak Gradient 10 mmHg    AV Peak Velocity 1.6 m/s    MV A Velocity 0.72 m/s    MV E Wave Deceleration Time 149.5 ms    MV E Velocity 0.61 m/s    LV E' Lateral Velocity 8 cm/s    LV E' Septal Velocity 8 cm/s    MR Peak Gradient 85 mmHg    MR Peak Velocity 4.6 m/s    PV Peak Gradient 4 mmHg    PV Max Velocity 1.1 m/s    TR Peak Gradient 34 mmHg    TR Max Velocity 2.90 m/s    Fractional Shortening 2D 30 28 - 44 %    LVIDd Index 2.27 cm/m2    LVIDs Index 1.60 cm/m2    LV RWT Ratio 0.86     LV Mass 2D 388.5 (A) 88 - 224 g    LV Mass 2D Index 200.2 (A) 49 - 115 g/m2    MV E/A 0.85     E/E' Ratio (Averaged) 7.63     E/E' Lateral 7.63     E/E' Septal 7.63     LA Volume Index A/L 48 16 - 34 mL/m2    LVOT Area 3.5 cm2    LA Volume Index 2C 53 (A) 16 - 34 mL/m2    LA Volume Index 4C 40 (A) 16 - 34 mL/m2    LA Size Index 2.06 cm/m2    AV Velocity Ratio 0.88    COVID-19 RAPID TEST    Collection Time: 01/16/22  9:07 PM   Result Value Ref Range    Specimen source Nasopharyngeal      COVID-19 rapid test Not detected NOTD             Total time spent with patient:  xxx   min.                                Care Plan discussed with:  Patient     Family      RN      Consulting Physician [address] [facility]        I have reviewed the flowsheets. Chart and Pertinent Notes have been reviewed. No change in PMH ,family and social history from Consult note.       Nahomi Mares MD

## 2022-01-17 NOTE — PROGRESS NOTES
Hospitalist Progress Note    NAME: Estill Ganser   :  1965   MRN:  [id number]       Assessment / Plan:    Hypertensive emergency leading to pulmonary edema  End stage renal disease on hemodialysis  and Saturday  We will admit to stepdown unit, status post IV hydralazine followed by Cardene drip. We will continue with Cardene drip  Had hemodialysis yesterday. Nephrology consulted by ED  Resume home meds, norvasc, bumex, coreg, clonidine, hydralazine  repeat 2D echo  Check rapid covid, pt unvaccinated, not interested in getting vaccines     ? History of substance abuse  History history hepatitis C  Resume medication once verified by pharmacy  Pharmacy consult for med rec  Please verify hepatitis C treatment and resume  Code Status: Full code  Surrogate Decision Maker:     DVT Prophylaxis: Heparin  GI Prophylaxis: not indicated     Baseline: Ambulatory  Recommended Disposition: Home w/Family  Anticipated Discharge Date:  24-48 hours        Subjective:     Discussed with RN events overnight. Feels better  C/o chills and mild fever at home, sneezing as well  No Cough  SOB resolved    Review of Systems:  Symptom Y/N Comments  Symptom Y/N Comments   Fever/Chills    Chest Pain     Poor Appetite    Edema     Cough    Abdominal Pain     Sputum    Joint Pain     SOB/KING    Pruritis/Rash     Nausea/vomit    Tolerating PT/OT     Diarrhea    Tolerating Diet     Constipation    Other       PO intake: No data found.     Wt Readings from Last 10 Encounters:   22 78.5 kg (173 lb 1 oz)   21 81.6 kg (180 lb)   21 80.3 kg (177 lb)   21 84.4 kg (186 lb)   02/15/21 83.4 kg (183 lb 12.8 oz)   01/15/21 86.2 kg (190 lb)   20 76 kg (167 lb 8.8 oz)   19 81.6 kg (180 lb)   19 83.9 kg (185 lb)   19 83.9 kg (185 lb)       Objective:     VITALS:   Last 24hrs VS reviewed since prior progress note.  Most recent are:  Patient Vitals for the past 24 hrs:   Temp Pulse Resp BP SpO2   01/17/22 0400 -- 75 -- -- --   01/17/22 0327 98.5 °F (36.9 °C) 66 28 (!) 144/74 98 %   01/17/22 0257 -- 64 13 136/70 --   01/17/22 0227 -- 67 18 (!) 141/74 --   01/17/22 0157 -- 68 17 (!) 143/76 --   01/17/22 0142 -- 71 18 (!) 169/78 --   01/17/22 0127 -- 74 15 (!) 162/85 --   01/17/22 0112 -- 74 17 (!) 152/76 --   01/17/22 0057 -- 72 16 (!) 148/78 --   01/17/22 0042 -- 71 14 (!) 142/69 --   01/17/22 0027 -- 68 15 136/68 --   01/17/22 0012 -- 68 17 135/70 --   01/16/22 2357 -- 74 14 137/69 --   01/16/22 2257 98.3 °F (36.8 °C) 77 18 139/65 97 %   01/16/22 2133 -- 81 20 (!) 147/73 --   01/16/22 2118 -- 81 22 (!) 161/80 --   01/16/22 2103 -- 83 23 (!) 188/79 --   01/16/22 2015 -- 79 18 (!) 180/74 --   01/16/22 2000 98.4 °F (36.9 °C) 76 20 (!) 176/75 98 %   01/16/22 1945 -- 73 14 (!) 163/76 --   01/16/22 1930 -- 75 23 (!) 182/75 --   01/16/22 1915 -- -- -- (!) 179/80 --   01/16/22 1900 -- -- -- (!) 181/79 --   01/16/22 1845 -- -- -- (!) 187/80 --   01/16/22 1830 -- -- -- (!) 196/79 --   01/16/22 1815 -- -- -- (!) 199/88 --   01/16/22 1806 -- -- -- (!) 205/80 --   01/16/22 1745 -- -- -- (!) 193/82 --   01/16/22 1730 -- -- -- (!) 203/82 --   01/16/22 1519 -- -- -- (!) 195/82 --   01/16/22 1500 98.2 °F (36.8 °C) 84 21 (!) 195/82 99 %   01/16/22 1442 -- -- -- (!) 202/88 --   01/16/22 1441 -- -- -- (!) 202/88 --   01/16/22 1300 -- 78 16 (!) 185/99 --   01/16/22 1200 -- 90 -- -- --   01/16/22 1045 98.4 °F (36.9 °C) 82 17 (!) 155/87 98 %   01/16/22 0800 -- 84 -- (!) 157/75 --   01/16/22 0757 -- -- -- -- 100 %   01/16/22 0700 98.3 °F (36.8 °C) 82 20 (!) 155/75 100 %   01/16/22 0615 -- 81 20 (!) 146/62 --   01/16/22 0530 -- 79 22 (!) 141/62 --       Intake/Output Summary (Last 24 hours) at 1/17/2022 0523  Last data filed at 1/16/2022 1500  Gross per 24 hour   Intake 1340.42 ml   Output 365 ml   Net 975.42 ml        I had a face to face encounter, and independently examined this patient on 1/17/2022, as outlined below:    PHYSICAL EXAM:  General:    No distress     HEENT: Atraumatic, anicteric sclerae, pink conjunctivae, MMM  Neck:  Supple, symmetrical  Lungs:   CTA. No Wheezing/Rhonchi. No rales. No tenderness. No Accessory muscle use. Heart:   Regular rhythm. No murmur. No JVD   GI/:   Soft. NT. ND. BS normal  Extremities: No edema. No cyanosis. No clubbing. Skin:     Not pale. Not Jaundiced. No rashes   Psych:  Good insight. Not depressed. Not anxious or agitated. Neurologic: Alert and oriented X 4. EOMs intact. No facial asymmetry. No slurred speech. Symmetrical strength, Sensation grossly intact. Labs     I reviewed today's most current labs and imaging studies. Pertinent labs include:  Recent Labs     01/16/22  0551 01/15/22  0937   WBC 9.3 9.8   HGB 10.1* 10.0*   HCT 32.9* 32.3*    200     Recent Labs     01/16/22  0551 01/15/22  0937    138   K 4.0 4.6    104   CO2 28 27   GLU 99 102*   BUN 23* 44*   CREA 5.69* 9.26*   CA 9.1 9.1   MG  --  2.2   PHOS  --  5.2*   ALB  --  2.6*   TBILI  --  0.4   ALT  --  12     XR CHEST PA LAT    Result Date: 1/15/2022  Bilateral airspace disease favoring pulmonary edema over pneumonia-correlate clinically. No results found. 06/28/20    ECHO ADULT COMPLETE 06/29/2020 6/29/2020    Interpretation Summary  · Normal cavity size, wall thickness and systolic function (ejection fraction normal). Estimated left ventricular ejection fraction is 60 - 65%. Inconclusive left ventricular diastolic function.     Signed by: Kendell Puckett MD on 6/29/2020  4:07 PM       Current Medications:     Current Facility-Administered Medications:     oxyCODONE IR (ROXICODONE) tablet 5 mg, 5 mg, Oral, Q4H PRN, Nancy Ferraro MD, 5 mg at 01/16/22 1154    morphine injection 1 mg, 1 mg, IntraVENous, Q4H PRN, King García Ferraro MD    hydrALAZINE (APRESOLINE) tablet 100 mg, 100 mg, Oral, TID, Esdras Ferraro MD    cloNIDine HCL (CATAPRES) tablet 0.2 mg, 0.2 mg, Oral, TID, Nancy Ferraro MD, 0.2 mg at 01/16/22 2103    bumetanide (BUMEX) tablet 2 mg, 2 mg, Oral, DAILY, Esdras Ferraro MD    carvediloL (COREG) tablet 25 mg, 25 mg, Oral, BID WITH MEALS, Esdras Ferraro MD, 25 mg at 01/16/22 1806    niCARdipine (CARDENE) 25 mg in 0.9% sodium chloride 250 mL infusion, 0-15 mg/hr, IntraVENous, TITRATE, Marlon Robles MD, Last Rate: 100 mL/hr at 01/17/22 0403, 10 mg/hr at 01/17/22 0403    potassium chloride SR (KLOR-CON 10) tablet 20 mEq, 20 mEq, Oral, DAILY, Marlon Robles MD, 20 mEq at 01/16/22 0918    [Held by provider] buprenorphine-naloxone (SUBOXONE) 8mg-2mg SL film, 1 Film, SubLINGual, BID, Maroln Robles MD    sodium chloride (NS) flush 5-40 mL, 5-40 mL, IntraVENous, Q8H, Marlon Robles MD, 10 mL at 01/16/22 2105    sodium chloride (NS) flush 5-40 mL, 5-40 mL, IntraVENous, PRN, Marlon Robles MD    acetaminophen (TYLENOL) tablet 650 mg, 650 mg, Oral, Q6H PRN **OR** acetaminophen (TYLENOL) suppository 650 mg, 650 mg, Rectal, Q6H PRN, Marlon Robles MD    polyethylene glycol (MIRALAX) packet 17 g, 17 g, Oral, DAILY PRN, Marlon Robles MD    ondansetron (ZOFRAN ODT) tablet 4 mg, 4 mg, Oral, Q8H PRN **OR** ondansetron (ZOFRAN) injection 4 mg, 4 mg, IntraVENous, Q6H PRN, Marlon Robles MD    heparin (porcine) injection 5,000 Units, 5,000 Units, SubCUTAneous, Q8H, Marlon Robles MD, 5,000 Units at 01/16/22 1400    amLODIPine (NORVASC) tablet 10 mg, 10 mg, Oral, DAILY, Marlon Robles MD, 10 mg at 01/16/22 0917    heparin (porcine) 1,000 unit/mL injection 1,800 Units, 1,800 Units, InterCATHeter, DIALYSIS PRN, 1,800 Units at 01/15/22 2340 **AND** heparin (porcine) 1,000 unit/mL injection 1,800 Units, 1,800 Units, InterCATHeter, DIALYSIS PRN, Manuelito Germain MD, 1,800 Units at 01/15/22 2340     Procedures: see electronic medical records for all procedures/Xrays and details which were not copied into this note but were reviewed prior to creation of Plan. Reviewed most current lab test results and cultures  YES  Reviewed most current radiology test results   YES  Review and summation of old records today    NO  Reviewed patient's current orders and MAR    YES  PMH/SH reviewed - no change compared to H&P  ________________________________________________________________________  Care Plan discussed with:    Comments   Patient x    Family      RN x    Care Manager     Consultant                        Multidiciplinary team rounds were held today with , nursing, pharmacist and clinical coordinator. Patient's plan of care was discussed; medications were reviewed and discharge planning was addressed.      ________________________________________________________________________  Total NON critical care TIME:  34   Minutes    Total CRITICAL CARE TIME Spent:   Minutes non procedure based      Comments   >50% of visit spent in counseling and coordination of care x     This includes time during multidisciplinary rounds if indicated above   ________________________________________________________________________  Eligio Peña MD

## 2022-01-17 NOTE — PROGRESS NOTES
Hospitalist Progress Note    NAME: Rachana Jaimes   :  1965   MRN:  [id number]       Assessment / Plan:    Hypertensive emergency leading to pulmonary edema  End stage renal disease on hemodialysis  and Saturday  status post IV hydralazine followed by Cardene drip. We will continue with Cardene drip  Nephrology consulted by ED. Hemodialysis since admission for 2days straight  BP remains elevated, patient continues to be on Cardene drip  Resume home meds, norvasc, bumex, coreg, clonidine, hydralazine  Cardura added by nephrology  repeat 2D echo showed EF of 60%  Rapid COVID-negative pt unvaccinated, not interested in getting vaccines     ? History of substance abuse  History history hepatitis C  We will resume Suboxone  Code Status: Full code  Surrogate Decision Maker:     DVT Prophylaxis: Heparin  GI Prophylaxis: not indicated     Baseline: Ambulatory  Recommended Disposition: Home w/Family  Anticipated Discharge Date:  24-48 hours        Subjective: Follow-up hypertensive emergency  Patient BP remains elevated    Review of Systems:  Symptom Y/N Comments  Symptom Y/N Comments   Fever/Chills n   Chest Pain n    Poor Appetite    Edema     Cough    Abdominal Pain n    Sputum    Joint Pain     SOB/KING n   Pruritis/Rash     Nausea/vomit n   Tolerating PT/OT     Diarrhea    Tolerating Diet y    Constipation    Other       PO intake: No data found. Wt Readings from Last 10 Encounters:   22 78.5 kg (173 lb 1 oz)   21 81.6 kg (180 lb)   21 80.3 kg (177 lb)   21 84.4 kg (186 lb)   02/15/21 83.4 kg (183 lb 12.8 oz)   01/15/21 86.2 kg (190 lb)   20 76 kg (167 lb 8.8 oz)   19 81.6 kg (180 lb)   19 83.9 kg (185 lb)   19 83.9 kg (185 lb)       Objective:     VITALS:   Last 24hrs VS reviewed since prior progress note.  Most recent are:  Patient Vitals for the past 24 hrs:   Temp Pulse Resp BP SpO2   01/17/22 0741 97.7 °F (36.5 °C) 73 20 (!) 156/77 --   01/17/22 0400 -- 75 -- -- --   01/17/22 0327 98.5 °F (36.9 °C) 66 28 (!) 144/74 98 %   01/17/22 0257 -- 64 13 136/70 --   01/17/22 0227 -- 67 18 (!) 141/74 --   01/17/22 0157 -- 68 17 (!) 143/76 --   01/17/22 0142 -- 71 18 (!) 169/78 --   01/17/22 0127 -- 74 15 (!) 162/85 --   01/17/22 0112 -- 74 17 (!) 152/76 --   01/17/22 0057 -- 72 16 (!) 148/78 --   01/17/22 0042 -- 71 14 (!) 142/69 --   01/17/22 0027 -- 68 15 136/68 --   01/17/22 0012 -- 68 17 135/70 --   01/16/22 2357 -- 74 14 137/69 --   01/16/22 2257 98.3 °F (36.8 °C) 77 18 139/65 97 %   01/16/22 2133 -- 81 20 (!) 147/73 --   01/16/22 2118 -- 81 22 (!) 161/80 --   01/16/22 2103 -- 83 23 (!) 188/79 --   01/16/22 2015 -- 79 18 (!) 180/74 --   01/16/22 2000 98.4 °F (36.9 °C) 76 20 (!) 176/75 98 %   01/16/22 1945 -- 73 14 (!) 163/76 --   01/16/22 1930 -- 75 23 (!) 182/75 --   01/16/22 1915 -- -- -- (!) 179/80 --   01/16/22 1900 -- -- -- (!) 181/79 --   01/16/22 1845 -- -- -- (!) 187/80 --   01/16/22 1830 -- -- -- (!) 196/79 --   01/16/22 1815 -- -- -- (!) 199/88 --   01/16/22 1806 -- -- -- (!) 205/80 --   01/16/22 1745 -- -- -- (!) 193/82 --   01/16/22 1730 -- -- -- (!) 203/82 --   01/16/22 1519 -- -- -- (!) 195/82 --   01/16/22 1500 98.2 °F (36.8 °C) 84 21 (!) 195/82 99 %   01/16/22 1442 -- -- -- (!) 202/88 --   01/16/22 1441 -- -- -- (!) 202/88 --   01/16/22 1300 -- 78 16 (!) 185/99 --   01/16/22 1200 -- 90 -- -- --   01/16/22 1045 98.4 °F (36.9 °C) 82 17 (!) 155/87 98 %   01/16/22 0800 -- 84 -- (!) 157/75 --       Intake/Output Summary (Last 24 hours) at 1/17/2022 0757  Last data filed at 1/16/2022 1500  Gross per 24 hour   Intake 1340.42 ml   Output 365 ml   Net 975.42 ml        I had a face to face encounter, and independently examined this patient on 1/17/2022, as outlined below:    PHYSICAL EXAM:  General:    No distress     HEENT: Atraumatic, anicteric sclerae, pink conjunctivae, MMM  Neck:  Supple, symmetrical  Lungs:   CTA. No Wheezing/Rhonchi. No rales. No tenderness. No Accessory muscle use. Heart:   Regular rhythm. No murmur. No JVD   GI/:   Soft. NT. ND. BS normal  Extremities: No edema. No cyanosis. No clubbing. Skin:     Not pale. Not Jaundiced. No rashes   Psych:  Good insight. Not depressed. Not anxious or agitated. Neurologic: Alert and oriented X 4. EOMs intact. No facial asymmetry. No slurred speech. Symmetrical strength, Sensation grossly intact. Labs     I reviewed today's most current labs and imaging studies. Pertinent labs include:  Recent Labs     01/16/22  0551 01/15/22  0937   WBC 9.3 9.8   HGB 10.1* 10.0*   HCT 32.9* 32.3*    200     Recent Labs     01/16/22  0551 01/15/22  0937    138   K 4.0 4.6    104   CO2 28 27   GLU 99 102*   BUN 23* 44*   CREA 5.69* 9.26*   CA 9.1 9.1   MG  --  2.2   PHOS  --  5.2*   ALB  --  2.6*   TBILI  --  0.4   ALT  --  12     XR CHEST PA LAT    Result Date: 1/15/2022  Bilateral airspace disease favoring pulmonary edema over pneumonia-correlate clinically. No results found. 06/28/20    ECHO ADULT COMPLETE 06/29/2020 6/29/2020    Interpretation Summary  · Normal cavity size, wall thickness and systolic function (ejection fraction normal). Estimated left ventricular ejection fraction is 60 - 65%. Inconclusive left ventricular diastolic function.     Signed by: Rashad Bernal MD on 6/29/2020  4:07 PM       Current Medications:     Current Facility-Administered Medications:     oxyCODONE IR (ROXICODONE) tablet 5 mg, 5 mg, Oral, Q4H PRN, Elton Ferraro MD, 5 mg at 01/16/22 1154    morphine injection 1 mg, 1 mg, IntraVENous, Q4H PRN, Elton Ferraro MD    hydrALAZINE (APRESOLINE) tablet 100 mg, 100 mg, Oral, TID, Elton Ferraro MD    cloNIDine HCL (CATAPRES) tablet 0.2 mg, 0.2 mg, Oral, TID, Nancy Ferraro MD, 0.2 mg at 01/16/22 2103    bumetanide (BUMEX) tablet 2 mg, 2 mg, Oral, DAILY, King García Ferraro MD    carvediloL (COREG) tablet 25 mg, 25 mg, Oral, BID WITH MEALS, King García Ferraro MD, 25 mg at 01/16/22 1806    niCARdipine (CARDENE) 25 mg in 0.9% sodium chloride 250 mL infusion, 0-15 mg/hr, IntraVENous, TITRATE, Pallavi Bullock MD, Last Rate: 100 mL/hr at 01/17/22 0741, 10 mg/hr at 01/17/22 0741    potassium chloride SR (KLOR-CON 10) tablet 20 mEq, 20 mEq, Oral, DAILY, Pallavi Bullock MD, 20 mEq at 01/16/22 0918    [Held by provider] buprenorphine-naloxone (SUBOXONE) 8mg-2mg SL film, 1 Film, SubLINGual, BID, Pallavi Bullock MD    sodium chloride (NS) flush 5-40 mL, 5-40 mL, IntraVENous, Q8H, Pallavi Bullock MD, 10 mL at 01/17/22 0015    sodium chloride (NS) flush 5-40 mL, 5-40 mL, IntraVENous, PRN, Pallavi Bullock MD    acetaminophen (TYLENOL) tablet 650 mg, 650 mg, Oral, Q6H PRN **OR** acetaminophen (TYLENOL) suppository 650 mg, 650 mg, Rectal, Q6H PRN, Pallavi Bullock MD    polyethylene glycol (MIRALAX) packet 17 g, 17 g, Oral, DAILY PRN, Pallavi Bullock MD    ondansetron (ZOFRAN ODT) tablet 4 mg, 4 mg, Oral, Q8H PRN **OR** ondansetron (ZOFRAN) injection 4 mg, 4 mg, IntraVENous, Q6H PRN, Pallavi Bullock MD    heparin (porcine) injection 5,000 Units, 5,000 Units, SubCUTAneous, Q8H, Pallavi Bullock MD, 5,000 Units at 01/16/22 1400    amLODIPine (NORVASC) tablet 10 mg, 10 mg, Oral, DAILY, Pallavi Bullock MD, 10 mg at 01/16/22 0917    heparin (porcine) 1,000 unit/mL injection 1,800 Units, 1,800 Units, InterCATHeter, DIALYSIS PRN, 1,800 Units at 01/15/22 2340 **AND** heparin (porcine) 1,000 unit/mL injection 1,800 Units, 1,800 Units, InterCATHeter, DIALYSIS PRN, Shi Jenkins MD, 1,800 Units at 01/15/22 2340     Procedures: see electronic medical records for all procedures/Xrays and details which were not copied into this note but were reviewed prior to creation of Plan.     Reviewed most current lab test results and cultures  YES  Reviewed most current radiology test results   YES  Review and summation of old records today    NO  Reviewed patient's current orders and MAR    YES  PMH/SH reviewed - no change compared to H&P  ________________________________________________________________________  Care Plan discussed with:    Comments   Patient x    Family      RN x    Care Manager     Consultant                        Multidiciplinary team rounds were held today with , nursing, pharmacist and clinical coordinator. Patient's plan of care was discussed; medications were reviewed and discharge planning was addressed.      ________________________________________________________________________  Total NON critical care TIME:  34   Minutes    Total CRITICAL CARE TIME Spent:   Minutes non procedure based      Comments   >50% of visit spent in counseling and coordination of care x     This includes time during multidisciplinary rounds if indicated above   ________________________________________________________________________  Nina Pitts MD

## 2022-01-17 NOTE — PROGRESS NOTES
Transition of Care Plan:    RUR: 10%  Disposition:Home with Follow-up Appointments  Follow up appointments: PCP, Nephrologist   DME needed: None  HD:  [facility] on T/T[facility]S at 6:00am.  Transportation at Discharge:Pt's sister Bruno Son will transport at d/c.   Justin Baton or means to access home:   Pt has keys      IM Medicare Letter: N/A  Is patient a BCPI-A Bundle:   N/A        If yes, was Bundle Letter given?:  N/A  Is patient a  and connected with the [place]? N/A  If yes, was [employer] transfer form completed and [facility] notified? N/A  Caregiver Contact: Bruno Son- Sister [phone]  Discharge Caregiver contacted prior to discharge? CM will notify caregiver at d/c.     Reason for Admission:   Hypertensive emergency                    RUR Score:   10%               PCP: First and Last name:   Dr. Joey Shepard   Name of Practice:  [address] [place] [address]   Are you a current patient: Yes/No:  Yes   Approximate date of last visit: 2022   Can you participate in a virtual visit if needed: No    Do you (patient/family) have any concerns for transition/discharge? None              Plan for utilizing home health:   Pt has not had home health in the past. Pt will not be utilizing home rajeev at d/c. Current Advanced Directive/Advance Care Plan:  Full Code; Pt does not have an ACP on file. CM addressed with pt about AMD. Pt declined. Pt stated that his sister Bruno Son would be his primary decision maker. Pt stated that Santiago Daniele his life partner is . Advance Care Planning     General Advance Care Planning (ACP) Conversation      Date of Conversation: 1/15/2022  Conducted with: Patient with Decision Making Capacity    Healthcare Decision Maker: Von Son- Sister   Today we documented Decision Maker(s) consistent with Legal Next of Kin hierarchy.     Content/Action Overview:   DECLINED ACP conversation - will revisit periodically   Reviewed DNR/DNI and patient elects Full Code (Attempt Resuscitation)  Length of Voluntary ACP Conversation in minutes:  16 minutes          CM met with pt at bedside to discuss d/c plan. Pt was alert and oriented. CM verified pt's demographics, insurance and PCP. Pt is a 65 y/o  male admitted to [facility] on 1/15/2022 for hypertensive emergency. Pt's NP is Antwan Bennett with [facility]. Pt uses [facility] on [address] for Rx. Pt resides with his sister in a two level home with no DEV. Pt is independent with ADL's and IADL's. Pt does not drive. Pt uses Medicaid transport for d/c. Pt has a blood pressure cup. Pt receives HD at [facility] on T/T/S at 6:00am. No HH, SNF or IPR on the past. Pt is FULL code status. Pt does not have an ACP on file. Pt's sister will transport at d/c. Care Management Interventions  PCP Verified by CM: Yes (Pt's NP is Antwan Bennett)  Palliative Care Criteria Met (RRAT>21 & CHF Dx)?: No  Mode of Transport at Discharge: Other (see comment) (Pt's sister will transport at d/c.)  Transition of Care Consult (CM Consult): Discharge Planning (Home with Follow-up Appointments)  Discharge Durable Medical Equipment: No (Blood pressure cup)  Physical Therapy Consult: No  Occupational Therapy Consult: No  Speech Therapy Consult: No  Support Systems: Other Family Member(s) (Pt resides in a two level home with no DEV along with sister)  Confirm Follow Up Transport: Other (see comment) (Pt does not drive. Pt uses Medicaid for transportation )  [address] Provided?: No  Discharge Location  Discharge Placement:  (Home with Follow-up Appointments. )    CM will continue to follow patient for discharge planning needs and arrange for services as deemed necessary.     Kateryna Dillon [address]  [phone]

## 2022-01-17 NOTE — PROGRESS NOTES
0700- Report given to Bailey Mueller RN by off going nurse. 1900- Report given to oncoming nurse by Bailey Mueller RN.

## 2022-01-17 NOTE — PROCEDURES
Hemodialysis / [phone]    Vitals Pre Post Assessment Pre Post   BP BP: (!) 162/82 (01/17/22 1300) 172/83 LOC A&Ox4, very lethargic  A&Ox4   HR Pulse (Heart Rate): 80 (01/17/22 1300) 85 Lungs Clear michelle Clear michelle   Resp Resp Rate: 18 (01/17/22 1300) 20 Cardiac NSR NSR   Temp Temp: 97.8 °F (36.6 °C) (01/17/22 1245) 98.5 Skin Warm and dry Warm and dry   Weight  n/a n/a Edema Stomach distended  none   Tele status tele tele Pain Pain Intensity 1: 0 (01/17/22 1045) 0     Orders   Duration: Start: 4586 End: 1612 Total: 3.2hrs   Dialyzer: Dialyzer/Set Up Inspection: Vanesa Chavez (01/15/22 2030)   K Bath: Dialysate K (mEq/L): 2 (01/15/22 2030)   Ca Bath: Dialysate CA (mEq/L): 2.5 (01/15/22 2030)   Na: Dialysate NA (mEq/L): 140 (01/15/22 2030)   Bicarb: Dialysate HCO3 (mEq/L): 35 (01/15/22 2030)   Target Fluid Removal: Goal/Amount of Fluid to Remove (mL): 3000 mL (01/15/22 2030)     Access   Type & Location: RIJ CVC: Dressing CDI. No s/s of infection. Both lumens aspirate & flush well. Running well at .    Comments:                                        Labs   HBsAg (Antigen) / date:     Neg 1/13/22                                          HBsAb (Antibody) / date: Lakesha 9/23/21   Source: PP   Obtained/Reviewed  Critical Results Called HGB   Date Value Ref Range Status   01/16/2022 10.1 (L) 12.1 - 17.0 g/dL Final     Potassium   Date Value Ref Range Status   01/16/2022 4.0 3.5 - 5.1 mmol/L Final     Calcium   Date Value Ref Range Status   01/16/2022 9.1 8.5 - 10.1 MG/DL Final     BUN   Date Value Ref Range Status   01/16/2022 23 (H) 6 - 20 MG/DL Final     Creatinine   Date Value Ref Range Status   01/16/2022 5.69 (H) 0.70 - 1.30 MG/DL Final     Comment:     INVESTIGATED PER DELTA CHECK PROTOCOL        Meds Given   Name Dose Route   Hep art and anabel 3.6ml ICD               Adequacy / Fluid    Total Liters Process: 76.4L   Net Fluid Removed: 2940ml      Comments   Time Out Done:   (Time) 1245   Admitting Diagnosis: Hypertensive crisis    Consent obtained/signed: Informed Consent Verified: Yes (01/15/22 2030)   Machine / RO # Machine Number: Conchetta Charter (01/15/22 2030)   Primary Nurse Rpt Pre: Fransico Britt   Primary Nurse Rpt Post: Fransico Britt   Pt Education: Access care   Care Plan: To not miss txs    Pts outpatient clinic: [facility] Summary   Comments:     SBAR received from Primary RN. Pt at bedside A&Ox4. Consent signed & on file. 1255: Each catheter limb disinfected per p&p, caps removed, hubs disinfected per p&p. Each lumen aspirated for blood return and flushed with Normal Saline per policy. Labs drawn per request/ order. VSS. Dialysis Tx initiated. 1325: VSS, Bp has been running high, pt on a drip to control bp  1355: No distress  1425: Resting with eyes shut  1455: VSS     1612: Tx ended, pt taken off a few mins early due to anabel. Chamber clotted. VSS. Each dialysis catheter limb disinfected per p&p, all possible blood returned per p&p, and each dialysis hub disinfected per p&p. Each lumen flushed, post dialysis catheter Heparin dwell instilled per order, and caps applied. Bed locked and in the lowest position, call bell and belongings in reach. SBAR given to Primary, RN. Patient is stable at time of their/ my departure. All Dialysis related medications have been reviewed.

## 2022-01-18 PROCEDURE — 74011000258 HC RX REV CODE- 258: Performed by: INTERNAL MEDICINE

## 2022-01-18 PROCEDURE — 74011636637 HC RX REV CODE- 636/637: Performed by: INTERNAL MEDICINE

## 2022-01-18 PROCEDURE — 74011250637 HC RX REV CODE- 250/637: Performed by: GENERAL ACUTE CARE HOSPITAL

## 2022-01-18 PROCEDURE — 74011250636 HC RX REV CODE- 250/636: Performed by: INTERNAL MEDICINE

## 2022-01-18 PROCEDURE — 74011250637 HC RX REV CODE- 250/637: Performed by: INTERNAL MEDICINE

## 2022-01-18 PROCEDURE — 74011000250 HC RX REV CODE- 250: Performed by: INTERNAL MEDICINE

## 2022-01-18 PROCEDURE — 65660000000 HC RM CCU STEPDOWN

## 2022-01-18 PROCEDURE — 74011250636 HC RX REV CODE- 250/636: Performed by: GENERAL ACUTE CARE HOSPITAL

## 2022-01-18 RX ORDER — HYDRALAZINE HYDROCHLORIDE 20 MG/ML
10 INJECTION INTRAMUSCULAR; INTRAVENOUS
Status: DISCONTINUED | OUTPATIENT
Start: 2022-01-18 | End: 2022-01-19 | Stop reason: HOSPADM

## 2022-01-18 RX ORDER — NIFEDIPINE 30 MG/1
60 TABLET, EXTENDED RELEASE ORAL 2 TIMES DAILY
Status: DISCONTINUED | OUTPATIENT
Start: 2022-01-18 | End: 2022-01-19 | Stop reason: HOSPADM

## 2022-01-18 RX ORDER — DOXAZOSIN 2 MG/1
2 TABLET ORAL ONCE
Status: COMPLETED | OUTPATIENT
Start: 2022-01-18 | End: 2022-01-18

## 2022-01-18 RX ORDER — DOXAZOSIN 2 MG/1
4 TABLET ORAL DAILY
Status: DISCONTINUED | OUTPATIENT
Start: 2022-01-19 | End: 2022-01-19 | Stop reason: HOSPADM

## 2022-01-18 RX ADMIN — SODIUM CHLORIDE 10 MG/HR: 9 INJECTION, SOLUTION INTRAVENOUS at 01:01

## 2022-01-18 RX ADMIN — SODIUM CHLORIDE 15 MG/HR: 9 INJECTION, SOLUTION INTRAVENOUS at 10:04

## 2022-01-18 RX ADMIN — SODIUM CHLORIDE 15 MG/HR: 9 INJECTION, SOLUTION INTRAVENOUS at 07:33

## 2022-01-18 RX ADMIN — OXYCODONE 5 MG: 5 TABLET ORAL at 21:36

## 2022-01-18 RX ADMIN — SODIUM CHLORIDE, PRESERVATIVE FREE 10 ML: 5 INJECTION INTRAVENOUS at 05:34

## 2022-01-18 RX ADMIN — NIFEDIPINE 60 MG: 30 TABLET, EXTENDED RELEASE ORAL at 10:01

## 2022-01-18 RX ADMIN — HYDRALAZINE HYDROCHLORIDE 100 MG: 50 TABLET, FILM COATED ORAL at 21:36

## 2022-01-18 RX ADMIN — SODIUM CHLORIDE, PRESERVATIVE FREE 10 ML: 5 INJECTION INTRAVENOUS at 21:37

## 2022-01-18 RX ADMIN — MORPHINE SULFATE 1 MG: 2 INJECTION, SOLUTION INTRAMUSCULAR; INTRAVENOUS at 03:43

## 2022-01-18 RX ADMIN — ONDANSETRON 4 MG: 2 INJECTION INTRAMUSCULAR; INTRAVENOUS at 05:55

## 2022-01-18 RX ADMIN — CLONIDINE HYDROCHLORIDE 0.2 MG: 0.1 TABLET ORAL at 21:36

## 2022-01-18 RX ADMIN — HYDRALAZINE HYDROCHLORIDE 100 MG: 50 TABLET, FILM COATED ORAL at 17:09

## 2022-01-18 RX ADMIN — POTASSIUM CHLORIDE 20 MEQ: 750 TABLET, FILM COATED, EXTENDED RELEASE ORAL at 09:55

## 2022-01-18 RX ADMIN — CARVEDILOL 25 MG: 12.5 TABLET, FILM COATED ORAL at 17:09

## 2022-01-18 RX ADMIN — SODIUM CHLORIDE 15 MG/HR: 9 INJECTION, SOLUTION INTRAVENOUS at 06:05

## 2022-01-18 RX ADMIN — NIFEDIPINE 60 MG: 30 TABLET, EXTENDED RELEASE ORAL at 17:09

## 2022-01-18 RX ADMIN — HEPARIN SODIUM 5000 UNITS: 5000 INJECTION INTRAVENOUS; SUBCUTANEOUS at 17:09

## 2022-01-18 RX ADMIN — DOXAZOSIN 2 MG: 2 TABLET ORAL at 09:56

## 2022-01-18 RX ADMIN — BUPRENORPHINE AND NALOXONE 1 FILM: 8; 2 FILM BUCCAL; SUBLINGUAL at 09:55

## 2022-01-18 RX ADMIN — HEPARIN SODIUM 5000 UNITS: 5000 INJECTION INTRAVENOUS; SUBCUTANEOUS at 05:35

## 2022-01-18 RX ADMIN — CARVEDILOL 25 MG: 12.5 TABLET, FILM COATED ORAL at 09:54

## 2022-01-18 RX ADMIN — CLONIDINE HYDROCHLORIDE 0.2 MG: 0.1 TABLET ORAL at 09:55

## 2022-01-18 RX ADMIN — SODIUM CHLORIDE 15 MG/HR: 9 INJECTION, SOLUTION INTRAVENOUS at 09:00

## 2022-01-18 RX ADMIN — SODIUM CHLORIDE 10 MG/HR: 9 INJECTION, SOLUTION INTRAVENOUS at 03:34

## 2022-01-18 RX ADMIN — SODIUM CHLORIDE 12.5 MG/HR: 9 INJECTION, SOLUTION INTRAVENOUS at 10:50

## 2022-01-18 RX ADMIN — BUMETANIDE 2 MG: 1 TABLET ORAL at 09:55

## 2022-01-18 RX ADMIN — CLONIDINE HYDROCHLORIDE 0.2 MG: 0.1 TABLET ORAL at 17:09

## 2022-01-18 RX ADMIN — HYDRALAZINE HYDROCHLORIDE 100 MG: 50 TABLET, FILM COATED ORAL at 09:54

## 2022-01-18 RX ADMIN — HEPARIN SODIUM 5000 UNITS: 5000 INJECTION INTRAVENOUS; SUBCUTANEOUS at 21:37

## 2022-01-18 RX ADMIN — SODIUM CHLORIDE, PRESERVATIVE FREE 10 ML: 5 INJECTION INTRAVENOUS at 17:58

## 2022-01-18 NOTE — PROGRESS NOTES
Problem: Falls - Risk of  Goal: *Absence of Falls  Description: Document Natalyajack Mejia Fall Risk and appropriate interventions in the flowsheet.   Outcome: Progressing Towards Goal  Note: Fall Risk Interventions:            Medication Interventions: Bed/chair exit alarm         History of Falls Interventions: Bed/chair exit alarm         Problem: Patient Education: Go to Patient Education Activity  Goal: Patient/Family Education  Outcome: Progressing Towards Goal

## 2022-01-18 NOTE — PROGRESS NOTES
End of Shift Note    Bedside shift change report given to [facility] (oncoming nurse) by Ailyn Menjivar (offgoing nurse). Report included the following information SBAR, Kardex and ED Summary    Shift worked:  DAY      Shift summary and any significant changes:     Weaned off of cardene drip  Continue Procardia XL  PRN Hydralazine added     Concerns for physician to address:       Zone phone for oncoming shift:          Activity:  Activity Level: Up with Assistance  Number times ambulated in hallways past shift: 1  Number of times OOB to chair past shift: 1    Cardiac:   Cardiac Monitoring: Yes      Cardiac Rhythm: Sinus Rhythm    Access:   Current line(s): PIV     Genitourinary:   Urinary status: voiding    Respiratory:   O2 Device: None (Room air)  Chronic home O2 use?: NO  Incentive spirometer at bedside: YES     GI:  Last Bowel Movement Date: 01/14/22  Current diet:  ADULT DIET Regular; Low Fat/Low Chol/High Fiber/CAPRI; Low Potassium (Less than 3000 mg/day)  Passing flatus: YES  Tolerating current diet: YES       Pain Management:   Patient states pain is manageable on current regimen: YES    Skin:  Brock Score: 23  Interventions: increase time out of bed    Patient Safety:  Fall Score:  Total Score: 1  Interventions: bed/chair alarm and gripper socks       Length of Stay:  Expected LOS: 2d 21h  Actual LOS: 3      Ailyn Menjivar

## 2022-01-18 NOTE — PROGRESS NOTES
Spiritual Care Assessment/Progress Note  [facility]      NAME: Stephani Livingston      MRN: [id number]  AGE: 64 y.o.  SEX: male  Hinduism Affiliation: Anabaptist   Language: English     1/18/2022     Total Time (in minutes): 22     Spiritual Assessment begun in [facility] PROGRESSIVE CARE through conversation with:         [x]Patient        [] Family    [] Friend(s)        Reason for Consult: Initial/Spiritual assessment, patient floor     Spiritual beliefs: (Please include comment if needed)     [x] Identifies with a doc tradition:   Anabaptist       [] Supported by a doc community:            [] Claims no spiritual orientation:           [] Seeking spiritual identity:                [] Adheres to an individual form of spirituality:           [] Not able to assess:                           Identified resources for coping:      [x] Prayer                               [] Music                  [] Guided Imagery     [x] Family/friends                 [] Pet visits     [] Devotional reading                          [] Unknown     [] Other:                                               Interventions offered during this visit: (See comments for more details)    Patient Interventions: Affirmation of emotions/emotional suffering,Initial/Spiritual assessment, patient floor,Life review/legacy,Prayer (actual),Integration of medical assessment with existing values and beliefs,Catharsis/review of pertinent events in supportive environment,Affirmation of doc,Hinduism beliefs/image of God discussed,Iconic (affirming the presence of God/Higher Power),Normalization of emotional/spiritual concerns           Plan of Care:     [] Support spiritual and/or cultural needs    [] Support AMD and/or advance care planning process      [] Support grieving process   [] Coordinate Rites and/or Rituals    [] Coordination with community clergy   [] No spiritual needs identified at this time   [] Detailed Plan of Care below (See Comments)  [] Make referral to Music Therapy  [] Make referral to Pet Therapy     [] Make referral to Addiction services  [] Make referral to [facility]  [] Make referral to Spiritual Care Partner  [] No future visits requested        [x] Contact Spiritual Care for further referrals     Comments:  's visit was on [facility] unit for initial spiritual assessment. Patient, Mr. Herminia Stinson was in bed and appeared to have a lot on hi mind. He share his thoughts and a procedure he was to undergo in the near future. His has a very supportive family and doc is important for coping.  listened with empathy and affirmation. He requested for prayer when asked how best he would like to be supported. Prayer was offered and patient stated that he feels like some pressure was off his chest. He thanked  for the visit and prayer. Visited by: Santana Ford.    Paging Service: [phone])

## 2022-01-18 NOTE — PROGRESS NOTES
Hospitalist Progress Note    NAME: Althea Varner   :  1965   MRN:  [id number]       Assessment / Plan:    Hypertensive emergency leading to pulmonary edema  End stage renal disease on hemodialysis  and Saturday  status post IV hydralazine followed by Cardene drip. We will continue with Cardene drip  Nephrology consulted by ED. Hemodialysis since admission for 2days straight  BP remains elevated, patient continues to be on Cardene drip  Resume home meds, norvasc, bumex, coreg, clonidine, hydralazine  Cardura added by nephrology  repeat 2D echo showed EF of 60%  Rapid COVID-negative pt unvaccinated, not interested in getting vaccines  Was on Cardizem drip this morning, weaned off the afternoon  BP meds adjusted, amlodipine. Switch to nifedipine  Plan for hemodialysis tomorrow  As needed hydralazine  ? History of substance abuse  History history hepatitis C  We will resume Suboxone  Code Status: Full code  Surrogate Decision Maker:     DVT Prophylaxis: Heparin  GI Prophylaxis: not indicated     Baseline: Ambulatory  Recommended Disposition: Home w/Family  Anticipated Discharge Date:  24-48 hours        Subjective: Follow-up hypertensive emergency  No acute complaints    Review of Systems:  Symptom Y/N Comments  Symptom Y/N Comments   Fever/Chills n   Chest Pain n    Poor Appetite    Edema     Cough    Abdominal Pain n    Sputum    Joint Pain     SOB/KING n   Pruritis/Rash     Nausea/vomit n   Tolerating PT/OT     Diarrhea    Tolerating Diet y    Constipation    Other       PO intake: No data found.     Wt Readings from Last 10 Encounters:   22 78.5 kg (173 lb 1 oz)   21 81.6 kg (180 lb)   21 80.3 kg (177 lb)   21 84.4 kg (186 lb)   02/15/21 83.4 kg (183 lb 12.8 oz)   01/15/21 86.2 kg (190 lb)   20 76 kg (167 lb 8.8 oz)   19 81.6 kg (180 lb)   19 83.9 kg (185 lb)   05/16/19 83.9 kg (185 lb)       Objective:     VITALS:   Last 24hrs VS reviewed since prior progress note.  Most recent are:  Patient Vitals for the past 24 hrs:   Temp Pulse Resp BP SpO2   01/18/22 0601 -- -- -- (!) 201/79 --   01/18/22 0334 -- -- -- (!) 185/83 --   01/18/22 0315 98 °F (36.7 °C) 83 23 (!) 169/85 96 %   01/17/22 2315 98.2 °F (36.8 °C) 75 14 (!) 160/74 96 %   01/17/22 1915 98 °F (36.7 °C) 81 23 (!) 151/68 98 %   01/17/22 1800 -- 85 17 (!) 156/77 --   01/17/22 1745 -- 83 15 (!) 140/69 --   01/17/22 1730 -- 84 17 (!) 151/72 --   01/17/22 1715 -- 90 18 (!) 166/68 --   01/17/22 1700 -- 86 24 (!) 169/82 --   01/17/22 1645 -- 87 20 (!) 148/64 --   01/17/22 1630 -- 86 21 (!) 170/77 --   01/17/22 1615 98.5 °F (36.9 °C) 83 18 (!) 172/83 --   01/17/22 1555 -- 88 -- (!) 174/81 --   01/17/22 1540 -- 85 -- (!) 175/52 --   01/17/22 1525 98.5 °F (36.9 °C) 81 20 (!) 158/66 --   01/17/22 1510 -- 81 -- (!) 162/80 --   01/17/22 1455 -- 76 -- (!) 164/82 --   01/17/22 1442 -- 80 -- (!) 157/78 --   01/17/22 1425 -- 81 -- (!) 158/75 --   01/17/22 1410 -- 83 -- (!) 151/77 --   01/17/22 1355 -- 76 -- (!) 149/72 --   01/17/22 1340 -- 77 -- (!) 154/72 --   01/17/22 1325 -- 79 -- (!) 150/79 --   01/17/22 1310 -- 79 -- (!) 161/76 --   01/17/22 1300 -- 80 18 (!) 162/82 --   01/17/22 1255 -- 80 -- (!) 162/82 --   01/17/22 1245 97.8 °F (36.6 °C) 78 18 (!) 161/80 --   01/17/22 1230 -- 86 21 (!) 175/84 --   01/17/22 1200 -- 79 15 (!) 169/84 --   01/17/22 1145 -- 74 14 (!) 161/80 --   01/17/22 1130 -- 74 14 (!) 156/79 --   01/17/22 1115 -- 74 17 (!) 156/80 --   01/17/22 1100 -- 71 15 (!) 152/77 --   01/17/22 1045 97.9 °F (36.6 °C) 71 14 (!) 149/71 --   01/17/22 1030 -- 73 16 (!) 149/74 --   01/17/22 1015 -- 73 14 (!) 151/73 --   01/17/22 1000 -- 72 15 (!) 148/72 --   01/17/22 0945 -- 74 18 (!) 144/75 --   01/17/22 0930 -- 75 16 138/71 --       Intake/Output Summary (Last 24 hours) at 1/18/2022 0920  Last data filed at 1/17/2022 1615  Gross per 24 hour   Intake 600 ml   Output 3160 ml   Net -2560 ml        I had a face to face encounter, and independently examined this patient on 1/18/2022, as outlined below:    PHYSICAL EXAM:  General:    No distress     HEENT: Atraumatic, anicteric sclerae, pink conjunctivae, MMM  Neck:  Supple, symmetrical  Lungs:   CTA. No Wheezing/Rhonchi. No rales. No tenderness. No Accessory muscle use. Heart:   Regular rhythm. No murmur. No JVD   GI/:   Soft. NT. ND. BS normal  Extremities: No edema. No cyanosis. No clubbing. Skin:     Not pale. Not Jaundiced. No rashes   Psych:  Good insight. Not depressed. Not anxious or agitated. Neurologic: Alert and oriented X 4. EOMs intact. No facial asymmetry. No slurred speech. Symmetrical strength, Sensation grossly intact. Labs     I reviewed today's most current labs and imaging studies. Pertinent labs include:  Recent Labs     01/16/22  0551 01/15/22  0937   WBC 9.3 9.8   HGB 10.1* 10.0*   HCT 32.9* 32.3*    200     Recent Labs     01/16/22  0551 01/15/22  0937    138   K 4.0 4.6    104   CO2 28 27   GLU 99 102*   BUN 23* 44*   CREA 5.69* 9.26*   CA 9.1 9.1   MG  --  2.2   PHOS  --  5.2*   ALB  --  2.6*   TBILI  --  0.4   ALT  --  12     XR CHEST PA LAT    Result Date: 1/15/2022  Bilateral airspace disease favoring pulmonary edema over pneumonia-correlate clinically. No results found. 06/28/20    ECHO ADULT COMPLETE 06/29/2020 6/29/2020    Interpretation Summary  · Normal cavity size, wall thickness and systolic function (ejection fraction normal). Estimated left ventricular ejection fraction is 60 - 65%. Inconclusive left ventricular diastolic function.     Signed by: Francie Claudio MD on 6/29/2020  4:07 PM       Current Medications:     Current Facility-Administered Medications:     NIFEdipine ER (PROCARDIA XL) tablet 60 mg, 60 mg, Oral, BID, Janessa Corral MD    [START ON 1/19/2022] doxazosin (CARDURA) tablet 4 mg, 4 mg, Oral, DAILY, Elissa Cheek MD    doxazosin (CARDURA) tablet 2 mg, 2 mg, Oral, ONCE, Elissa Cheek MD    oxyCODONE IR (ROXICODONE) tablet 5 mg, 5 mg, Oral, Q4H PRN, Flynn Ferraro MD, 5 mg at 01/16/22 1154    morphine injection 1 mg, 1 mg, IntraVENous, Q4H PRN, Flynn Ferraro MD, 1 mg at 01/18/22 0343    hydrALAZINE (APRESOLINE) tablet 100 mg, 100 mg, Oral, TID, Nancy Ferraro MD, 100 mg at 01/17/22 2136    cloNIDine HCL (CATAPRES) tablet 0.2 mg, 0.2 mg, Oral, TID, Nancy Ferraro MD, 0.2 mg at 01/17/22 2136    bumetanide (BUMEX) tablet 2 mg, 2 mg, Oral, DAILY, Flynn Ferraro MD, 2 mg at 01/17/22 6633    carvediloL (COREG) tablet 25 mg, 25 mg, Oral, BID WITH MEALS, Elissa Cheek MD, 25 mg at 01/17/22 1619    niCARdipine (CARDENE) 25 mg in 0.9% sodium chloride 250 mL infusion, 0-15 mg/hr, IntraVENous, TITRATE, Elissa Cheek MD, Last Rate: 150 mL/hr at 01/18/22 0733, 15 mg/hr at 01/18/22 0733    potassium chloride SR (KLOR-CON 10) tablet 20 mEq, 20 mEq, Oral, DAILY, Elissa Cheek MD, 20 mEq at 01/17/22 9911    buprenorphine-naloxone (SUBOXONE) 8mg-2mg SL film, 1 Film, SubLINGual, BID, Elissa Cheek MD, 1 Film at 01/17/22 1818    sodium chloride (NS) flush 5-40 mL, 5-40 mL, IntraVENous, Q8H, Elissa Cheek MD, 10 mL at 01/18/22 0534    sodium chloride (NS) flush 5-40 mL, 5-40 mL, IntraVENous, PRN, Elissa Cheek MD    acetaminophen (TYLENOL) tablet 650 mg, 650 mg, Oral, Q6H PRN **OR** acetaminophen (TYLENOL) suppository 650 mg, 650 mg, Rectal, Q6H PRN, Elissa Cheek MD    polyethylene glycol (MIRALAX) packet 17 g, 17 g, Oral, DAILY PRN, Elissa Cheek MD    ondansetron (ZOFRAN ODT) tablet 4 mg, 4 mg, Oral, Q8H PRN **OR** ondansetron (ZOFRAN) injection 4 mg, 4 mg, IntraVENous, Q6H PRN, Elissa Cheek MD, 4 mg at 01/18/22 0555    heparin (porcine) injection 5,000 Units, 5,000 Units, SubCUTAneous, Q8H, Elissa Cheek MD, 5,000 Units at 01/18/22 0535    heparin (porcine) 1,000 unit/mL injection 1,800 Units, 1,800 Units, InterCATHeter, DIALYSIS PRN, 1,800 Units at 01/17/22 1556 **AND** heparin (porcine) 1,000 unit/mL injection 1,800 Units, 1,800 Units, InterCATHeter, DIALYSIS PRN, Remigio Henriquez MD, 1,800 Units at 01/17/22 1555     Procedures: see electronic medical records for all procedures/Xrays and details which were not copied into this note but were reviewed prior to creation of Plan. Reviewed most current lab test results and cultures  YES  Reviewed most current radiology test results   YES  Review and summation of old records today    NO  Reviewed patient's current orders and MAR    YES  PMH/ reviewed - no change compared to H&P  ________________________________________________________________________  Care Plan discussed with:    Comments   Patient x    Family      RN x    Care Manager     Consultant                        Multidiciplinary team rounds were held today with , nursing, pharmacist and clinical coordinator. Patient's plan of care was discussed; medications were reviewed and discharge planning was addressed.      ________________________________________________________________________  Total NON critical care TIME:  34   Minutes    Total CRITICAL CARE TIME Spent:   Minutes non procedure based      Comments   >50% of visit spent in counseling and coordination of care x     This includes time during multidisciplinary rounds if indicated above   ________________________________________________________________________  Esther Gaytan MD

## 2022-01-19 VITALS
OXYGEN SATURATION: 98 % | DIASTOLIC BLOOD PRESSURE: 67 MMHG | WEIGHT: 181.5 LBS | HEART RATE: 85 BPM | BODY MASS INDEX: 26.88 KG/M2 | HEIGHT: 69 IN | RESPIRATION RATE: 18 BRPM | SYSTOLIC BLOOD PRESSURE: 156 MMHG | TEMPERATURE: 98.2 F

## 2022-01-19 PROCEDURE — 74011250637 HC RX REV CODE- 250/637: Performed by: INTERNAL MEDICINE

## 2022-01-19 PROCEDURE — 74011250636 HC RX REV CODE- 250/636: Performed by: INTERNAL MEDICINE

## 2022-01-19 PROCEDURE — 74011250637 HC RX REV CODE- 250/637: Performed by: GENERAL ACUTE CARE HOSPITAL

## 2022-01-19 PROCEDURE — 74011000250 HC RX REV CODE- 250: Performed by: INTERNAL MEDICINE

## 2022-01-19 PROCEDURE — 90935 HEMODIALYSIS ONE EVALUATION: CPT

## 2022-01-19 RX ORDER — CARVEDILOL 25 MG/1
25 TABLET ORAL 2 TIMES DAILY WITH MEALS
Qty: 60 TABLET | Refills: 2 | Status: SHIPPED | OUTPATIENT
Start: 2022-01-19 | End: 2022-04-19

## 2022-01-19 RX ORDER — NIFEDIPINE 60 MG/1
60 TABLET, EXTENDED RELEASE ORAL 2 TIMES DAILY
Qty: 60 TABLET | Refills: 2 | Status: SHIPPED | OUTPATIENT
Start: 2022-01-19 | End: 2022-04-19

## 2022-01-19 RX ORDER — BUMETANIDE 2 MG/1
2 TABLET ORAL DAILY
Qty: 30 TABLET | Refills: 2 | Status: SHIPPED | OUTPATIENT
Start: 2022-01-19 | End: 2022-04-19

## 2022-01-19 RX ORDER — DOXAZOSIN 4 MG/1
4 TABLET ORAL DAILY
Qty: 30 TABLET | Refills: 2 | Status: SHIPPED | OUTPATIENT
Start: 2022-01-19 | End: 2022-04-19

## 2022-01-19 RX ADMIN — HYDRALAZINE HYDROCHLORIDE 100 MG: 50 TABLET, FILM COATED ORAL at 17:40

## 2022-01-19 RX ADMIN — SODIUM CHLORIDE, PRESERVATIVE FREE 10 ML: 5 INJECTION INTRAVENOUS at 17:44

## 2022-01-19 RX ADMIN — POTASSIUM CHLORIDE 20 MEQ: 750 TABLET, FILM COATED, EXTENDED RELEASE ORAL at 17:39

## 2022-01-19 RX ADMIN — NIFEDIPINE 60 MG: 30 TABLET, EXTENDED RELEASE ORAL at 17:39

## 2022-01-19 RX ADMIN — HEPARIN SODIUM 1800 UNITS: 1000 INJECTION INTRAVENOUS; SUBCUTANEOUS at 16:12

## 2022-01-19 RX ADMIN — BUMETANIDE 2 MG: 1 TABLET ORAL at 17:39

## 2022-01-19 RX ADMIN — CLONIDINE HYDROCHLORIDE 0.2 MG: 0.1 TABLET ORAL at 17:40

## 2022-01-19 RX ADMIN — DOXAZOSIN 4 MG: 2 TABLET ORAL at 17:40

## 2022-01-19 RX ADMIN — SODIUM CHLORIDE, PRESERVATIVE FREE 10 ML: 5 INJECTION INTRAVENOUS at 05:48

## 2022-01-19 RX ADMIN — CARVEDILOL 25 MG: 12.5 TABLET, FILM COATED ORAL at 17:40

## 2022-01-19 NOTE — PROCEDURES
Hemodialysis / [phone]    Vitals Pre Post Assessment Pre Post   BP BP: (!) 127/52 (01/19/22 1248) 167/83 LOC A&Ox4 same   HR Pulse (Heart Rate): 69 (01/19/22 1248) 86 Lungs Dim bases same   Resp Resp Rate: 18 (01/19/22 1248) 18 Cardiac RRR same   Temp Temp: 98.5 °F (36.9 °C) (01/19/22 1248) 98.7 Skin CDI same   Weight    Edema 2+ BLE / Abd dis same   Tele status   Pain Pain Intensity 1: 0 (01/19/22 0800)      Orders   Duration: Start: 4961 End: 1605 Total: 3.5 hrs   Dialyzer: Dialyzer/Set Up Inspection: Revaclear (01/19/22 1248)   K Bath: Dialysate K (mEq/L): 2 (01/19/22 1248)   Ca Bath: Dialysate CA (mEq/L): 2.5 (01/19/22 1248)   Na: Dialysate NA (mEq/L): 138 (01/19/22 1248)   Bicarb: Dialysate HCO3 (mEq/L): 35 (01/19/22 1248)   Target Fluid Removal: Goal/Amount of Fluid to Remove (mL): 3000 mL (01/19/22 1248)     Access   Type & Location: RIJ CVC: Dressing changed. No s/s of infection. Both lumens aspirate & flush well. Running well at . SBAR received from Primary RN. Pt arrived to HD suite A&Ox4. Consent signed & on file. Each catheter limb disinfected per p&p, caps removed, hubs disinfected per p&p. Each lumen aspirated for blood return and flushed with Normal Saline per policy. VSS. Dialysis Tx initiated. Comments:  Dressing changed. No s/s of infection noted.                                      Labs   HBsAg (Antigen) / date:  Neg 1/15/22                                             HBsAb (Antibody) / date:  Susc 1/15/22   Source:  EPIC   Obtained/Reviewed  Critical Results Called HGB   Date Value Ref Range Status   01/16/2022 10.1 (L) 12.1 - 17.0 g/dL Final     Potassium   Date Value Ref Range Status   01/16/2022 4.0 3.5 - 5.1 mmol/L Final     Calcium   Date Value Ref Range Status   01/16/2022 9.1 8.5 - 10.1 MG/DL Final     BUN   Date Value Ref Range Status   01/16/2022 23 (H) 6 - 20 MG/DL Final     Creatinine   Date Value Ref Range Status   01/16/2022 5.69 (H) 0.70 - 1.30 MG/DL Final Comment:     INVESTIGATED PER DELTA CHECK PROTOCOL        Meds Given   Name Dose Route   Heparin 1:1000 1.8 Marcin@[employer]   Heparin 1:1000 1.8 Marcin@[employer]          Adequacy / Fluid    Total Liters Process: 77.0   Net Fluid Removed: 3000mL      Comments   Time Out Done:   (Time) 1226   Admitting Diagnosis: Pulmonary Edema / Hypertensive Emergency   Consent obtained/signed: Informed Consent Verified: Yes (01/19/22 1248)   Machine / Berdie Senters # Machine Number: Galilea Dumont (01/19/22 [id number])   Primary Nurse Rpt Pre: Deshawn Riley RN   Primary Nurse Rpt Post: Deshawn Riley RN   Pt Education: procedural   Care Plan: On going   Pts outpatient clinic: [facility]     Tx Summary  2522:  RIJ CVC: Dressing changed. No s/s of infection. Both lumens aspirate & flush well. Running well at . SBAR received from Primary RN. Pt arrived to HD suite A&Ox4. Consent signed & on file. Each catheter limb disinfected per p&p, caps removed, hubs disinfected per p&p. Each lumen aspirated for blood return and flushed with Normal Saline per policy. VSS. Dialysis Tx initiated. 1245:  Pt resting  1300:  Pt resting  1315:  Pt resting; lines reversed  1330:  Pt resting  1345:  Pt resting  1400:  Pt resting  1415:  Pt resting  1430:  Pt resting  1445:  Pt resting  1500:  Pt resting  1515:  Pt resting  1530:  Pt resting  1545:  Pt resting  1600:  Pt resting  1605: Tx ended. VSS. Each dialysis catheter limb disinfected per p&p, all possible blood returned per p&p, and each dialysis hub disinfected per p&p. Each lumen flushed, post dialysis catheter Heparin dwell instilled per order, and caps applied. Bed locked and in the lowest position, call bell and belongings in reach. SBAR given to Primary, RN. Patient is stable at time of their/ my departure. All Dialysis related medications have been reviewed. Comments:   Assessment performed by RN.   Procedure and documentation observed and reviewed by Jose L Arizmendi RN

## 2022-01-19 NOTE — DISCHARGE INSTRUCTIONS
DISCHARGE DIAGNOSIS:  Hypertensive emergency leading to pulmonary edema  End stage renal disease on hemodialysis Tuesday Thursday and Saturday  ?  History of substance abuse  History history hepatitis C    MEDICATIONS:  · It is important that you take the medication exactly as they are prescribed. · Keep your medication in the bottles provided by the pharmacist and keep a list of the medication names, dosages, and times to be taken in your wallet. · Do not take other medications without consulting your doctor. Pain Management: per above medications    What to do at Home    Recommended diet:  Renal Diet    Recommended activity: Activity as tolerated    If you have questions regarding the hospital related prescriptions or hospital related issues please call [email or web address] [facility] at . You can always direct your questions to your primary care doctor if you are unable to reach your hospital physician; your PCP works as an extension of your hospital doctor just like your hospital doctor is an extension of your PCP for your time at the hospital [facility], [facility]). If you experience any of the following symptoms then please call your primary care physician or return to the emergency room if you cannot get hold of your doctor:  Fever, chills, nausea, vomiting, diarrhea, change in mentation, falling, bleeding, shortness of breath  Patient Education        DASH Diet: Care Instructions  Your Care Instructions     The DASH diet is an eating plan that can help lower your blood pressure. DASH stands for Dietary Approaches to Stop Hypertension. Hypertension is high blood pressure. The DASH diet focuses on eating foods that are high in calcium, potassium, and magnesium. These nutrients can lower blood pressure. The foods that are highest in these nutrients are fruits, vegetables, low-fat dairy products, nuts, seeds, and legumes.  But taking calcium, potassium, and magnesium supplements instead of eating foods that are high in those nutrients does not have the same effect. The DASH diet also includes whole grains, fish, and poultry. The DASH diet is one of several lifestyle changes your doctor may recommend to lower your high blood pressure. Your doctor may also want you to decrease the amount of sodium in your diet. Lowering sodium while following the DASH diet can lower blood pressure even further than just the DASH diet alone. Follow-up care is a key part of your treatment and safety. Be sure to make and go to all appointments, and call your doctor if you are having problems. It's also a good idea to know your test results and keep a list of the medicines you take. How can you care for yourself at home? Following the DASH diet  · Eat 4 to 5 servings of fruit each day. A serving is 1 medium-sized piece of fruit, ½ cup chopped or canned fruit, 1/4 cup dried fruit, or 4 ounces (½ cup) of fruit juice. Choose fruit more often than fruit juice. · Eat 4 to 5 servings of vegetables each day. A serving is 1 cup of lettuce or raw leafy vegetables, ½ cup of chopped or cooked vegetables, or 4 ounces (½ cup) of vegetable juice. Choose vegetables more often than vegetable juice. · Get 2 to 3 servings of low-fat and fat-free dairy each day. A serving is 8 ounces of milk, 1 cup of yogurt, or 1 ½ ounces of cheese. · Eat 6 to 8 servings of grains each day. A serving is 1 slice of bread, 1 ounce of dry cereal, or ½ cup of cooked rice, pasta, or cooked cereal. Try to choose whole-grain products as much as possible. · Limit lean meat, poultry, and fish to 2 servings each day. A serving is 3 ounces, about the size of a deck of cards. · Eat 4 to 5 servings of nuts, seeds, and legumes (cooked dried beans, lentils, and split peas) each week. A serving is 1/3 cup of nuts, 2 tablespoons of seeds, or ½ cup of cooked beans or peas. · Limit fats and oils to 2 to 3 servings each day.  A serving is 1 teaspoon of vegetable oil or 2 tablespoons of salad dressing. · Limit sweets and added sugars to 5 servings or less a week. A serving is 1 tablespoon jelly or jam, ½ cup sorbet, or 1 cup of lemonade. · Eat less than 2,300 milligrams (mg) of sodium a day. If you limit your sodium to 1,500 mg a day, you can lower your blood pressure even more. · Be aware that all of these are the suggested number of servings for people who eat 1,800 to 2,000 calories a day. Your recommended number of servings may be different if you need more or fewer calories. Tips for success  · Start small. Do not try to make dramatic changes to your diet all at once. You might feel that you are missing out on your favorite foods and then be more likely to not follow the plan. Make small changes, and stick with them. Once those changes become habit, add a few more changes. · Try some of the following:  ? Make it a goal to eat a fruit or vegetable at every meal and at snacks. This will make it easy to get the recommended amount of fruits and vegetables each day. ? Try yogurt topped with fruit and nuts for a snack or healthy dessert. ? Add lettuce, tomato, cucumber, and onion to sandwiches. ? Combine a ready-made pizza crust with low-fat mozzarella cheese and lots of vegetable toppings. Try using tomatoes, squash, spinach, broccoli, carrots, cauliflower, and onions. ? Have a variety of cut-up vegetables with a low-fat dip as an appetizer instead of chips and dip. ? Sprinkle sunflower seeds or chopped almonds over salads. Or try adding chopped walnuts or almonds to cooked vegetables. ? Try some vegetarian meals using beans and peas. Add garbanzo or kidney beans to salads. Make burritos and tacos with mashed sweeney beans or black beans. Where can you learn more? Go to http://www.bui.com/  Enter H967 in the search box to learn more about \"DASH Diet: Care Instructions. \"  Current as of: April 29, 2021               Content Version: 13.0  © 7901-2172 [employer]. Care instructions adapted under license by [employer] (which disclaims liability or warranty for this information). If you have questions about a medical condition or this instruction, always ask your healthcare professional. [address] any warranty or liability for your use of this information.

## 2022-01-19 NOTE — PROGRESS NOTES
Physician Progress Note      PATIENT:               Britt Adjutant  CSN #:                  [id number]  :                       1965  ADMIT DATE:       1/15/2022 9:16 AM  DISCH DATE:  RESPONDING  PROVIDER #:        Eliverto Essex MD          QUERY TEXT:    Pt admitted with HTN emergency. Pt noted to have pulmonary edema. If possible, please document in the progress notes and discharge summary if you are evaluating and/or treating any of the following: The medical record reflects the following:  Risk Factors: ESRD; HTN    Clinical Indicators:    CXR- Bilateral airspace disease favoring pulmonary edema over pneumonia-correlate clinically    H&P-  Hypertensive emergency leading to pulmonary edema  Patient should get his hemodialysis today. status post IV hydralazine followed by Cardene drip  Continue with home dose of Lasix, continue Norvasc and lisinopril tomorrow    Treatment: CXR; HD; Bumex 2mg IV x 1; Lasix 20mg PO x 1; Bumex 2mg PO daily      Thank you,  Irwin Partida RN, BSN, CCDS, CRCR, SMART  Clinical Documentation  Options provided:  -- Noncardiogenic chronic pulmonary edema due to HTN  -- Noncardiogenic acute pulmonary edema due to HTN  -- Other - I will add my own diagnosis  -- Disagree - Not applicable / Not valid  -- Disagree - Clinically unable to determine / Unknown  -- Refer to Clinical Documentation Reviewer    PROVIDER RESPONSE TEXT:    This patient has noncardiogenic acute pulmonary edema due to HTN.     Query created by: Lanette Claude on 2022 2:56 PM      Electronically signed by:  Eliverto Essex MD 2022 4:45 PM

## 2022-01-19 NOTE — PROGRESS NOTES
Nephrology Progress Note  Luc Lane     www. [email or web address]  Phone - [phone]   Patient: Lou Findjulio c    YOB: 1965        Date- 1/19/2022   Admit Date: 1/15/2022  CC: Follow up for ESRD         IMPRESSION & PLAN:    ESRD on TTS schedule at [facility] under Dr. Leandro Felix Hypertensive urgency   Pulmonary edema   Anemia of CKD   History of hep C    PLAN-   Plan for hemodialysis today, we will keep you on Monday Wednesday Friday schedule while being inpatient.  Blood pressure better controlled we will continue the present medications.  Off Cardene gtt.  Discussed with  Dr. Génesis Carpenter Patient can be discharged from renal standpoint his blood pressure is better controlled and he can resume care with dialysis at his unit from tomorrow     Subjective: Interval History:   -Seen and examined this morning.  -Blood pressure much better controlled    Objective:   Vitals:    01/19/22 0400 01/19/22 0600 01/19/22 0700 01/19/22 0800   BP: 123/73 (!) 140/70 (!) 143/71 (!) 148/75   Pulse: (!) 55 (!) 59 (!) 54 60   Resp: 12 20 16 14   Temp: 98 °F (36.7 °C)   98 °F (36.7 °C)   SpO2: 96%   96%   Weight: 82.3 kg (181 lb 8 oz)      Height:          01/18 0701 - 01/19 0700  In: -   Out: 500 [Urine:500]  Last 3 Recorded Weights in this Encounter    01/15/22 0916 01/16/22 1519 01/19/22 0400   Weight: 78.5 kg (173 lb) 78.5 kg (173 lb 1 oz) 82.3 kg (181 lb 8 oz)      Physical exam:   GEN: NAD  NECK- Supple, no mass  RESP: No wheezing, Clear b/l  CVS: S1,S2  RRR  NEURO: Normal speech, Non focal  EXT: No Edema   PSYCH: Normal Mood    Chart reviewed. Pertinent Notes reviewed. Data Review :  No results for input(s): NA, K, CL, CO2, BUN, CREA, GLU, CA, MG, PHOS, URICA in the last 72 hours. No results for input(s): WBC, HGB, HCT, PLT, HGBEXT, HCTEXT, PLTEXT, HGBEXT, HCTEXT, PLTEXT in the last 72 hours.   No results for input(s): FE, TIBC, PSAT, FERR in the last 72 hours.    Medication list  reviewed  Current Facility-Administered Medications   Medication Dose Route Frequency    NIFEdipine ER (PROCARDIA XL) tablet 60 mg  60 mg Oral BID    doxazosin (CARDURA) tablet 4 mg  4 mg Oral DAILY    hydrALAZINE (APRESOLINE) 20 mg/mL injection 10 mg  10 mg IntraVENous Q6H PRN    oxyCODONE IR (ROXICODONE) tablet 5 mg  5 mg Oral Q4H PRN    morphine injection 1 mg  1 mg IntraVENous Q4H PRN    hydrALAZINE (APRESOLINE) tablet 100 mg  100 mg Oral TID    cloNIDine HCL (CATAPRES) tablet 0.2 mg  0.2 mg Oral TID    bumetanide (BUMEX) tablet 2 mg  2 mg Oral DAILY    carvediloL (COREG) tablet 25 mg  25 mg Oral BID WITH MEALS    niCARdipine (CARDENE) 25 mg in 0.9% sodium chloride 250 mL infusion  0-15 mg/hr IntraVENous TITRATE    potassium chloride SR (KLOR-CON 10) tablet 20 mEq  20 mEq Oral DAILY    buprenorphine-naloxone (SUBOXONE) 8mg-2mg SL film  1 Film SubLINGual BID    sodium chloride (NS) flush 5-40 mL  5-40 mL IntraVENous Q8H    sodium chloride (NS) flush 5-40 mL  5-40 mL IntraVENous PRN    acetaminophen (TYLENOL) tablet 650 mg  650 mg Oral Q6H PRN    Or    acetaminophen (TYLENOL) suppository 650 mg  650 mg Rectal Q6H PRN    polyethylene glycol (MIRALAX) packet 17 g  17 g Oral DAILY PRN    ondansetron (ZOFRAN ODT) tablet 4 mg  4 mg Oral Q8H PRN    Or    ondansetron (ZOFRAN) injection 4 mg  4 mg IntraVENous Q6H PRN    heparin (porcine) injection 5,000 Units  5,000 Units SubCUTAneous Q8H    heparin (porcine) 1,000 unit/mL injection 1,800 Units  1,800 Units InterCATHeter DIALYSIS PRN    And    heparin (porcine) 1,000 unit/mL injection 1,800 Units  1,800 Units InterCATHeter DIALYSIS PRN          Dayton Sheikh MD              [address]  [facility]  Alvaro Marquez , Jessica Tirado  [place], [address]  Phone - [phone]               Fax - [phone]

## 2022-01-19 NOTE — PROGRESS NOTES
Attempted to schedule hospital follow up PCP appointment. Unable to reach anyone, left voicemail. Pending patient discharge.  Angela Richards, Care Management Specialist

## 2022-01-19 NOTE — PROGRESS NOTES
1900 Bedside and Verbal shift change report given to [address] (oncoming nurse) by NAVEED Joe (offgoing nurse). Report included the following information SBAR, Kardex, Intake/Output, MAR, Recent Results and Cardiac Rhythm NSR/ST. End of Shift Note    Bedside shift change report given to NAVEED Joe (oncoming nurse) by Sri Yepez RN (offgoing nurse). Report included the following information SBAR, Kardex, Intake/Output, MAR, Recent Results and Cardiac Rhythm NSR/SB    Shift worked:  1900 - 0700     Shift summary and any significant changes:    BP remains stable off of Cardene gtt, but c/o dizziness this AM. PRN Roxicodone given 1x for c/o bilateral leg pain. Dialysis scheduled for today. CHG bath given. Concerns for physician to address:       Zone phone for oncoming shift:          Activity:  Activity Level: Up with Assistance  Number times ambulated in hallways past shift: 0  Number of times OOB to chair past shift: 0    Cardiac:   Cardiac Monitoring: Yes      Cardiac Rhythm: Sinus Tahir    Access:   Current line(s): PIV and HD access     Genitourinary:   Urinary status: voiding    Respiratory:   O2 Device: None (Room air)  Chronic home O2 use?: NO  Incentive spirometer at bedside: YES     GI:  Last Bowel Movement Date: 01/17/22  Current diet:  ADULT DIET Regular; Low Fat/Low Chol/High Fiber/CAPRI; Low Potassium (Less than 3000 mg/day)  Passing flatus: YES  Tolerating current diet: YES       Pain Management:   Patient states pain is manageable on current regimen: YES    Skin:  Brock Score: 23  Interventions: increase time out of bed and nutritional support     Patient Safety:  Fall Score:  Total Score: 1  Interventions: assistive device (walker, cane, etc), gripper socks and pt to call before getting OOB       Length of Stay:  Expected LOS: 2d 21h  Actual LOS: [facility]30, RN

## 2022-01-19 NOTE — DISCHARGE SUMMARY
Hospitalist Discharge Summary     Patient ID:  Violet Xiao  [id number]  39 y.o.  1965  1/15/2022    PCP on record: Brian Damon MD    Admit date: 1/15/2022  Discharge date and time: 1/19/2022    DISCHARGE DIAGNOSIS:  Hypertensive emergency leading to pulmonary edema  End stage renal disease on hemodialysis Tuesday Thursday and Saturday  ?  History of substance abuse  History history hepatitis C    CONSULTATIONS:  IP CONSULT TO NEPHROLOGY    Excerpted HPI from H&P of Marylee Hilding, MD:    Subjective:   CHIEF COMPLAINT: Elevated blood pressure     HISTORY OF PRESENT ILLNESS:     Fidel Cardoza is a 64 y.o.  male history of hypertension, liver disease, end-stage renal disease on hemodialysis Tuesday Thursday and Saturday, was sent to the ED from the dialysis unit for significantly elevated blood pressure above 200. He was unable to start his dialysis. Patient reported some shortness of breath and leg swelling. He reported compliance to his medication and dialysis sessions. In the ED, his BP on admission was 231/109, he received IV hydralazine without effect on the blood pressure, therefore was started on Cardene drip. Patient was GIVEN IV Bumex. In the ED, his labs showed normal CBC, BMP showed significantly elevated creatinine,  proBNP above 30,000. EKG showed no acute ST changes. Chest x-ray showed pulmonary edema  We were asked to admit for work up and evaluation of the above problems. ______________________________________________________________________  DISCHARGE SUMMARY/HOSPITAL COURSE:  for full details see H&P, daily progress notes, labs, consult notes. Hypertensive emergency leading to pulmonary edema  End stage renal disease on hemodialysis Tuesday Thursday and Saturday  status post IV hydralazine followed by Cardene drip. s/p Cardene drip  Nephrology consulted by ED.   Hemodialysis since admission for 2days straight  Resume home meds, norvasc, bumex, coreg, clonidine, hydralazine  Cardura added by nephrology  repeat 2D echo showed EF of 60%  Rapid COVID-negative pt unvaccinated, not interested in getting vaccines  Was on Cardizem drip this morning, weaned off the afternoon  BP meds adjusted, amlodipine Switch to nifedipine  As needed hydralazine  bp controlled   ?  History of substance abuse  History history hepatitis C  _______________________________________________________________________  Patient seen and examined by me on discharge day. Pertinent Findings:  Gen:    Not in distress  Chest: Clear lungs  CVS:   Regular rhythm. No edema  Abd:  Soft, not distended, not tender  Neuro:  Alert, orientedx3  _______________________________________________________________________  DISCHARGE MEDICATIONS:   Current Discharge Medication List      START taking these medications    Details   carvediloL (COREG) 25 mg tablet Take 1 Tablet by mouth two (2) times daily (with meals) for 90 days. Qty: 60 Tablet, Refills: 2  Start date: 1/19/2022, End date: 4/19/2022      bumetanide (BUMEX) 2 mg tablet Take 1 Tablet by mouth daily for 90 days. Qty: 30 Tablet, Refills: 2  Start date: 1/19/2022, End date: 4/19/2022      doxazosin (CARDURA) 4 mg tablet Take 1 Tablet by mouth daily for 90 days. Qty: 30 Tablet, Refills: 2  Start date: 1/19/2022, End date: 4/19/2022      NIFEdipine ER (PROCARDIA XL) 60 mg ER tablet Take 1 Tablet by mouth two (2) times a day for 90 days. Qty: 60 Tablet, Refills: 2  Start date: 1/19/2022, End date: 4/19/2022         CONTINUE these medications which have NOT CHANGED    Details   cloNIDine HCL (CATAPRES) 0.2 mg tablet Take 0.2 mg by mouth three (3) times daily. hydrALAZINE (APRESOLINE) 100 mg tablet Take 100 mg by mouth three (3) times daily. ascorbic acid, vitamin C, (Vitamin C) 500 mg tablet Take 500 mg by mouth daily. cholecalciferol (VITAMIN D3) 25 mcg (1,000 unit) cap Take 1,000 Units by mouth daily.       Krishan Dickens (Mavyret) 100-40 mg tab Take 3 Tabs by mouth daily. Indications: chronic infection of genotype 1 hepatitis C virus  Qty: 84 Tab, Refills: 1    Comments: TA coming over via email. naloxone (Narcan) 4 mg/actuation nasal spray 1 [place] by IntraNASal route once as needed for Overdose. Use 1 spray intranasally, then discard. Repeat with new spray every 2 min as needed for opioid overdose symptoms, alternating nostrils. buprenorphine-naloxone (Suboxone) 8-2 mg film sublingaul film 1 Film by SubLINGual route two (2) times a day. Comments: .      potassium chloride SR (K-TAB) 20 mEq tablet Take 20 mEq by mouth daily. STOP taking these medications       furosemide (LASIX) 20 mg tablet Comments:   Reason for Stopping:         amLODIPine (NORVASC) 10 mg tablet Comments:   Reason for Stopping:                 Patient Follow Up Instructions: Activity: Activity as tolerated  Diet: Cardiac Diet and Renal Diet  Wound Care: None needed    F    Follow-up Information     Follow up With Specialties Details Why Contact Info    Montana De La Cruz NP  Schedule an appointment as soon as possible for a visit in 1 week To schedule your PCP hospital follow up appointment.   [address], [address] [place] [address]  [phone]        ________________________________________________________________    Risk of deterioration: High    Condition at Discharge:  Stable  __________________________________________________________________    Disposition  Home with family, no needs    ____________________________________________________________________    Code Status: Full Code  ___________________________________________________________________      Total time in minutes spent coordinating this discharge (includes going over instructions, follow-up, prescriptions, and preparing report for sign off to her PCP) :  >30 minutes    Signed:  Merceda Crigler, MD

## 2022-01-19 NOTE — PROGRESS NOTES
Problem: Falls - Risk of  Goal: *Absence of Falls  Description: Document Cookie Fothergill Fall Risk and appropriate interventions in the flowsheet.   Outcome: Progressing Towards Goal  Note: Fall Risk Interventions:            Medication Interventions: Evaluate medications/consider consulting pharmacy         History of Falls Interventions: Room close to nurse's station         Problem: Patient Education: Go to Patient Education Activity  Goal: Patient/Family Education  Outcome: Progressing Towards Goal

## 2022-01-19 NOTE — PROGRESS NOTES
Transition of Care Plan:     RUR: 10%  Disposition:Home with Follow-up Appointments  Follow up appointments: PCP, Nephrologist   DME needed: None  HD:  Dora Pollen on T/T/S at 6:00am.  Transportation at Discharge:Pt's sister Rafael Diaz will transport at d/c.   Zara Glow or means to access home:   Pt has keys      IM Medicare Letter: N/A  Is patient a BCPI-A Bundle:   N/A                   If yes, was Bundle Letter given?:  N/A  Is patient a [place] and connected with the [facility]? N/A  If yes, was [employer] transfer form completed and [facility] notified? N/A  Caregiver Contact: Rafael Diaz- Sister [phone]  Discharge Caregiver contacted prior to discharge? CM will notify caregiver at d/c.     11:30am-No further CM needs identified. CM notified pt's nurse of d/c.    11:00am-CM sent emailed to [address] Specialist to schedule pt's follow-up appointment. 10:45am- CM met with pt at bedside to discuss d/c plan. CM inquired with pt about any needs. No new needs at this time. CM stated that his sister will transport at d/c. Care Management Interventions  PCP Verified by CM: Yes  Palliative Care Criteria Met (RRAT>21 & CHF Dx)?: No  Mode of Transport at Discharge: Other (see comment)  Transition of Care Consult (CM Consult):  Other (Home with Follow-up appointments)  Discharge Durable Medical Equipment: No  Physical Therapy Consult: No  Occupational Therapy Consult: No  Speech Therapy Consult: No  Support Systems: Other Family Member(s)  Confirm Follow Up Transport: Other (see comment)  [address] Provided?: No  Discharge Location  Patient Expects to be Discharged to[de-identified] Home (Home with Follow-up Appointments)  Read-Only, Retired: Discharge Placement:  (Home with Follow-up Appointments. )      Kateryna Hernandez [address]  [phone]

## 2022-03-12 ENCOUNTER — APPOINTMENT (OUTPATIENT)
Dept: GENERAL RADIOLOGY | Age: 57
DRG: 304 | End: 2022-03-12
Attending: EMERGENCY MEDICINE
Payer: MEDICARE

## 2022-03-12 ENCOUNTER — HOSPITAL ENCOUNTER (INPATIENT)
Age: 57
LOS: 2 days | Discharge: HOME | DRG: 304 | End: 2022-03-14
Attending: EMERGENCY MEDICINE | Admitting: INTERNAL MEDICINE
Payer: MEDICARE

## 2022-03-12 DIAGNOSIS — N18.6 END STAGE RENAL DISEASE (HCC): ICD-10-CM

## 2022-03-12 DIAGNOSIS — I16.0 HYPERTENSIVE URGENCY: Primary | ICD-10-CM

## 2022-03-12 PROBLEM — Z91.199 NON-COMPLIANCE: Status: ACTIVE | Noted: 2022-03-12

## 2022-03-12 PROBLEM — Z99.2 ESRD (END STAGE RENAL DISEASE) ON DIALYSIS (HCC): Status: ACTIVE | Noted: 2022-03-12

## 2022-03-12 LAB
ALBUMIN SERPL-MCNC: 3.2 G/DL (ref 3.5–5)
ALBUMIN/GLOB SERPL: 0.7 {RATIO} (ref 1.1–2.2)
ALP SERPL-CCNC: 62 U/L (ref 45–117)
ALT SERPL-CCNC: 15 U/L (ref 12–78)
ANION GAP SERPL CALC-SCNC: 10 MMOL/L (ref 5–15)
AST SERPL-CCNC: 12 U/L (ref 15–37)
BASOPHILS # BLD: 0.1 K/UL (ref 0–0.1)
BASOPHILS NFR BLD: 1 % (ref 0–1)
BILIRUB SERPL-MCNC: 0.4 MG/DL (ref 0.2–1)
BUN SERPL-MCNC: 59 MG/DL (ref 6–20)
BUN/CREAT SERPL: 5 (ref 12–20)
CALCIUM SERPL-MCNC: 9.2 MG/DL (ref 8.5–10.1)
CHLORIDE SERPL-SCNC: 100 MMOL/L (ref 97–108)
CO2 SERPL-SCNC: 22 MMOL/L (ref 21–32)
CREAT SERPL-MCNC: 12.1 MG/DL (ref 0.7–1.3)
DIFFERENTIAL METHOD BLD: ABNORMAL
EOSINOPHIL # BLD: 1.1 K/UL (ref 0–0.4)
EOSINOPHIL NFR BLD: 11 % (ref 0–7)
ERYTHROCYTE [DISTWIDTH] IN BLOOD BY AUTOMATED COUNT: 12.9 % (ref 11.5–14.5)
GLOBULIN SER CALC-MCNC: 4.8 G/DL (ref 2–4)
GLUCOSE SERPL-MCNC: 91 MG/DL (ref 65–100)
HCT VFR BLD AUTO: 30.6 % (ref 36.6–50.3)
HGB BLD-MCNC: 10 G/DL (ref 12.1–17)
IMM GRANULOCYTES # BLD AUTO: 0 K/UL (ref 0–0.04)
IMM GRANULOCYTES NFR BLD AUTO: 0 % (ref 0–0.5)
LYMPHOCYTES # BLD: 2.4 K/UL (ref 0.8–3.5)
LYMPHOCYTES NFR BLD: 24 % (ref 12–49)
MCH RBC QN AUTO: 28.6 PG (ref 26–34)
MCHC RBC AUTO-ENTMCNC: 32.7 G/DL (ref 30–36.5)
MCV RBC AUTO: 87.4 FL (ref 80–99)
MONOCYTES # BLD: 1.1 K/UL (ref 0–1)
MONOCYTES NFR BLD: 11 % (ref 5–13)
NEUTS SEG # BLD: 5.1 K/UL (ref 1.8–8)
NEUTS SEG NFR BLD: 53 % (ref 32–75)
NRBC # BLD: 0 K/UL (ref 0–0.01)
NRBC BLD-RTO: 0 PER 100 WBC
PLATELET # BLD AUTO: 119 K/UL (ref 150–400)
PMV BLD AUTO: 11.5 FL (ref 8.9–12.9)
POTASSIUM SERPL-SCNC: 5 MMOL/L (ref 3.5–5.1)
PROT SERPL-MCNC: 8 G/DL (ref 6.4–8.2)
RBC # BLD AUTO: 3.5 M/UL (ref 4.1–5.7)
RBC MORPH BLD: ABNORMAL
SODIUM SERPL-SCNC: 132 MMOL/L (ref 136–145)
WBC # BLD AUTO: 9.8 K/UL (ref 4.1–11.1)

## 2022-03-12 PROCEDURE — 85025 COMPLETE CBC W/AUTO DIFF WBC: CPT

## 2022-03-12 PROCEDURE — 96375 TX/PRO/DX INJ NEW DRUG ADDON: CPT

## 2022-03-12 PROCEDURE — 74011250637 HC RX REV CODE- 250/637: Performed by: EMERGENCY MEDICINE

## 2022-03-12 PROCEDURE — 80053 COMPREHEN METABOLIC PANEL: CPT

## 2022-03-12 PROCEDURE — 74011250636 HC RX REV CODE- 250/636: Performed by: EMERGENCY MEDICINE

## 2022-03-12 PROCEDURE — 65660000000 HC RM CCU STEPDOWN

## 2022-03-12 PROCEDURE — 74011250636 HC RX REV CODE- 250/636: Performed by: INTERNAL MEDICINE

## 2022-03-12 PROCEDURE — 74011250637 HC RX REV CODE- 250/637: Performed by: INTERNAL MEDICINE

## 2022-03-12 PROCEDURE — 36415 COLL VENOUS BLD VENIPUNCTURE: CPT

## 2022-03-12 PROCEDURE — 93005 ELECTROCARDIOGRAM TRACING: CPT

## 2022-03-12 PROCEDURE — 5A1D70Z PERFORMANCE OF URINARY FILTRATION, INTERMITTENT, LESS THAN 6 HOURS PER DAY: ICD-10-PCS | Performed by: INTERNAL MEDICINE

## 2022-03-12 PROCEDURE — 96365 THER/PROPH/DIAG IV INF INIT: CPT

## 2022-03-12 PROCEDURE — 74011000250 HC RX REV CODE- 250: Performed by: EMERGENCY MEDICINE

## 2022-03-12 PROCEDURE — 74011000258 HC RX REV CODE- 258: Performed by: EMERGENCY MEDICINE

## 2022-03-12 PROCEDURE — 99285 EMERGENCY DEPT VISIT HI MDM: CPT

## 2022-03-12 PROCEDURE — 71045 X-RAY EXAM CHEST 1 VIEW: CPT

## 2022-03-12 PROCEDURE — 74011000250 HC RX REV CODE- 250: Performed by: INTERNAL MEDICINE

## 2022-03-12 PROCEDURE — 90935 HEMODIALYSIS ONE EVALUATION: CPT

## 2022-03-12 RX ORDER — DOXAZOSIN 2 MG/1
4 TABLET ORAL DAILY
Status: DISCONTINUED | OUTPATIENT
Start: 2022-03-13 | End: 2022-03-14 | Stop reason: HOSPADM

## 2022-03-12 RX ORDER — HYDRALAZINE HYDROCHLORIDE 20 MG/ML
20 INJECTION INTRAMUSCULAR; INTRAVENOUS ONCE
Status: COMPLETED | OUTPATIENT
Start: 2022-03-12 | End: 2022-03-12

## 2022-03-12 RX ORDER — LOSARTAN POTASSIUM 50 MG/1
50 TABLET ORAL
Status: COMPLETED | OUTPATIENT
Start: 2022-03-12 | End: 2022-03-12

## 2022-03-12 RX ORDER — NIFEDIPINE 60 MG/1
60 TABLET, EXTENDED RELEASE ORAL
Status: COMPLETED | OUTPATIENT
Start: 2022-03-12 | End: 2022-03-12

## 2022-03-12 RX ORDER — DOXAZOSIN 2 MG/1
1 TABLET ORAL
Status: COMPLETED | OUTPATIENT
Start: 2022-03-12 | End: 2022-03-12

## 2022-03-12 RX ORDER — HEPARIN SODIUM 5000 [USP'U]/ML
5000 INJECTION, SOLUTION INTRAVENOUS; SUBCUTANEOUS EVERY 8 HOURS
Status: DISCONTINUED | OUTPATIENT
Start: 2022-03-12 | End: 2022-03-14 | Stop reason: HOSPADM

## 2022-03-12 RX ORDER — CLONIDINE HYDROCHLORIDE 0.1 MG/1
0.2 TABLET ORAL 3 TIMES DAILY
Status: DISCONTINUED | OUTPATIENT
Start: 2022-03-12 | End: 2022-03-14 | Stop reason: HOSPADM

## 2022-03-12 RX ORDER — GLUCOSAMINE SULFATE 1500 MG
1000 POWDER IN PACKET (EA) ORAL DAILY
Status: DISCONTINUED | OUTPATIENT
Start: 2022-03-13 | End: 2022-03-13

## 2022-03-12 RX ORDER — ONDANSETRON 2 MG/ML
4 INJECTION INTRAMUSCULAR; INTRAVENOUS
Status: DISCONTINUED | OUTPATIENT
Start: 2022-03-12 | End: 2022-03-14 | Stop reason: HOSPADM

## 2022-03-12 RX ORDER — CLONIDINE HYDROCHLORIDE 0.1 MG/1
0.2 TABLET ORAL
Status: COMPLETED | OUTPATIENT
Start: 2022-03-12 | End: 2022-03-12

## 2022-03-12 RX ORDER — CARVEDILOL 12.5 MG/1
25 TABLET ORAL 2 TIMES DAILY WITH MEALS
Status: DISCONTINUED | OUTPATIENT
Start: 2022-03-12 | End: 2022-03-14 | Stop reason: HOSPADM

## 2022-03-12 RX ORDER — ACETAMINOPHEN 650 MG/1
650 SUPPOSITORY RECTAL
Status: DISCONTINUED | OUTPATIENT
Start: 2022-03-12 | End: 2022-03-14 | Stop reason: HOSPADM

## 2022-03-12 RX ORDER — ASCORBIC ACID 500 MG
500 TABLET ORAL DAILY
Status: DISCONTINUED | OUTPATIENT
Start: 2022-03-13 | End: 2022-03-14 | Stop reason: HOSPADM

## 2022-03-12 RX ORDER — HYDRALAZINE HYDROCHLORIDE 50 MG/1
100 TABLET, FILM COATED ORAL 3 TIMES DAILY
Status: DISCONTINUED | OUTPATIENT
Start: 2022-03-12 | End: 2022-03-14 | Stop reason: HOSPADM

## 2022-03-12 RX ORDER — ACETAMINOPHEN 325 MG/1
650 TABLET ORAL
Status: DISCONTINUED | OUTPATIENT
Start: 2022-03-12 | End: 2022-03-14 | Stop reason: HOSPADM

## 2022-03-12 RX ORDER — POLYETHYLENE GLYCOL 3350 17 G/17G
17 POWDER, FOR SOLUTION ORAL DAILY PRN
Status: DISCONTINUED | OUTPATIENT
Start: 2022-03-12 | End: 2022-03-14 | Stop reason: HOSPADM

## 2022-03-12 RX ORDER — NITROGLYCERIN 0.4 MG/1
0.4 TABLET SUBLINGUAL ONCE
Status: COMPLETED | OUTPATIENT
Start: 2022-03-12 | End: 2022-03-12

## 2022-03-12 RX ORDER — ONDANSETRON 4 MG/1
4 TABLET, ORALLY DISINTEGRATING ORAL
Status: DISCONTINUED | OUTPATIENT
Start: 2022-03-12 | End: 2022-03-14 | Stop reason: HOSPADM

## 2022-03-12 RX ORDER — NIFEDIPINE 30 MG/1
60 TABLET, EXTENDED RELEASE ORAL 2 TIMES DAILY
Status: DISCONTINUED | OUTPATIENT
Start: 2022-03-12 | End: 2022-03-14 | Stop reason: HOSPADM

## 2022-03-12 RX ORDER — BUPRENORPHINE AND NALOXONE 8; 2 MG/1; MG/1
1 FILM, SOLUBLE BUCCAL; SUBLINGUAL 2 TIMES DAILY
Status: DISCONTINUED | OUTPATIENT
Start: 2022-03-13 | End: 2022-03-14 | Stop reason: HOSPADM

## 2022-03-12 RX ORDER — BUMETANIDE 1 MG/1
2 TABLET ORAL DAILY
Status: DISCONTINUED | OUTPATIENT
Start: 2022-03-13 | End: 2022-03-14 | Stop reason: HOSPADM

## 2022-03-12 RX ORDER — SODIUM CHLORIDE 0.9 % (FLUSH) 0.9 %
5-40 SYRINGE (ML) INJECTION AS NEEDED
Status: DISCONTINUED | OUTPATIENT
Start: 2022-03-12 | End: 2022-03-14 | Stop reason: HOSPADM

## 2022-03-12 RX ORDER — SODIUM CHLORIDE 0.9 % (FLUSH) 0.9 %
5-40 SYRINGE (ML) INJECTION EVERY 8 HOURS
Status: DISCONTINUED | OUTPATIENT
Start: 2022-03-12 | End: 2022-03-14 | Stop reason: HOSPADM

## 2022-03-12 RX ORDER — CARVEDILOL 12.5 MG/1
25 TABLET ORAL
Status: COMPLETED | OUTPATIENT
Start: 2022-03-12 | End: 2022-03-12

## 2022-03-12 RX ADMIN — HYDRALAZINE HYDROCHLORIDE 100 MG: 50 TABLET, FILM COATED ORAL at 22:04

## 2022-03-12 RX ADMIN — NITROGLYCERIN 1 INCH: 20 OINTMENT TOPICAL at 11:27

## 2022-03-12 RX ADMIN — SODIUM CHLORIDE, PRESERVATIVE FREE 10 ML: 5 INJECTION INTRAVENOUS at 13:19

## 2022-03-12 RX ADMIN — DOXAZOSIN 1 MG: 2 TABLET ORAL at 11:25

## 2022-03-12 RX ADMIN — NIFEDIPINE 60 MG: 60 TABLET, EXTENDED RELEASE ORAL at 11:25

## 2022-03-12 RX ADMIN — LOSARTAN POTASSIUM 50 MG: 50 TABLET, FILM COATED ORAL at 10:04

## 2022-03-12 RX ADMIN — HEPARIN SODIUM 2000 UNITS: 1000 INJECTION INTRAVENOUS; SUBCUTANEOUS at 18:50

## 2022-03-12 RX ADMIN — SODIUM CHLORIDE, PRESERVATIVE FREE 10 ML: 5 INJECTION INTRAVENOUS at 22:06

## 2022-03-12 RX ADMIN — HEPARIN SODIUM 5000 UNITS: 5000 INJECTION INTRAVENOUS; SUBCUTANEOUS at 22:05

## 2022-03-12 RX ADMIN — NITROGLYCERIN 0.4 MG: 0.4 TABLET, ORALLY DISINTEGRATING SUBLINGUAL at 11:27

## 2022-03-12 RX ADMIN — HEPARIN SODIUM 5000 UNITS: 5000 INJECTION INTRAVENOUS; SUBCUTANEOUS at 12:58

## 2022-03-12 RX ADMIN — SODIUM CHLORIDE 5 MG/HR: 9 INJECTION, SOLUTION INTRAVENOUS at 11:46

## 2022-03-12 RX ADMIN — HEPARIN SODIUM 2100 UNITS: 1000 INJECTION INTRAVENOUS; SUBCUTANEOUS at 18:51

## 2022-03-12 RX ADMIN — NIFEDIPINE 60 MG: 30 TABLET, FILM COATED, EXTENDED RELEASE ORAL at 20:07

## 2022-03-12 RX ADMIN — CLONIDINE HYDROCHLORIDE 0.2 MG: 0.1 TABLET ORAL at 22:04

## 2022-03-12 RX ADMIN — CLONIDINE HYDROCHLORIDE 0.2 MG: 0.1 TABLET ORAL at 10:04

## 2022-03-12 RX ADMIN — HYDRALAZINE HYDROCHLORIDE 20 MG: 20 INJECTION INTRAMUSCULAR; INTRAVENOUS at 10:05

## 2022-03-12 RX ADMIN — CARVEDILOL 25 MG: 12.5 TABLET, FILM COATED ORAL at 10:04

## 2022-03-12 NOTE — H&P
Hospitalist Admission Note    NAME: Li Burton   :  1965   MRN:  [id number]     Date/Time:  3/12/2022 12:32 PM    Patient PCP: Brian Damon MD  ______________________________________________________________________  Given the patient's current clinical presentation, I have a high level of concern for decompensation if discharged from the emergency department. Complex decision making was performed, which includes reviewing the patient's available past medical records, laboratory results, and x-ray films. My assessment of this patient's clinical condition and my plan of care is as follows. Assessment / Plan:  Hypertensive emergency with  blood pressure in the 264/120  End-stage renal disease on hemodialysis  and Saturday  Uncontrolled hypertension with noncompliance to medication  We will admit to stepdown unit, status post p.o. home BP meds, IV hydralazine. Started on Cardene drip, will continue. Continue with home dose of clonidine, Coreg, Bumex, Cardura, hydralazine  Patient is clinically euvolemic. Will consult nephrology for dialysis needs  Will check UDS    Recent heart block status post pacemaker placement at her regular doctor 3 days ago  Monitor pacemaker site    History of substance abuse  Patient is on Suboxone, restart once verified by pharmacy  History hepatitis C  Continue with Mayyret     I have spent critical care time involved in lab review, consultations with specialist, family decision-making, and documentation. During this entire length of time I was immediately available to the patient. The reason for providing this level of medical care for this critically ill patient was due to a critical illness that impaired one or more vital organ systems such that there was a high probability of imminent or life threatening deterioration in the patients condition.  This care involved high complexity decision making to assess, manipulate, and support vital system functions, to treat this degreee vital organ system failure and to prevent further life threatening deterioration of the patients condition. Code Status: Full code  Surrogate Decision Maker:    DVT Prophylaxis: Heparin  GI Prophylaxis: not indicated    Baseline: Ambulatory      Subjective:   CHIEF COMPLAINT: Elevated blood pressure    HISTORY OF PRESENT ILLNESS:     Maki Choudhary is a 64 y.o.  male with history of end-stage renal disease on hemodialysis Tuesday Thursday and Saturday, uncontrolled hypertension, hepatitis C, history of heroine abuse, recent heart block status post pacemaker who presents with his dialysis center for elevated blood pressure. Patient was recently discharged from her  [place] doctor on Thursday after being treated for heart block, had a pacemaker placed. He reported that during his hospital stay his blood pressure was difficult to control but when he was discharged his systolic blood pressure was in the 160s. He reported that some of his blood pressure medication was not refilled therefore has not been taking them since his discharge. His last session of hemodialysis was in the hospital last Wednesday. He went for dialysis today, his blood pressure was found to be Bp 264/120, patient did not get dialysis and was sent to the ED. Patient denies any headaches, nausea vomiting or focal deficit  In the ED, upon presentation his blood pressure was 258/123, patient received his home dose BP meds including clonidine hydralazine losartan nifedipine, but because of persistent systolic blood pressure in the 240s despite oral BP meds and IV hydralazine, patient was started on nicardipine drip. Labs showed hyponatremia, elevated creatinine  His chest x-ray is clear  We were asked to admit for work up and evaluation of the above problems.      Past Medical History:   Diagnosis Date    Chronic kidney disease     Hypertension     Liver disease         History reviewed. No pertinent surgical history. Social History     Tobacco Use    Smoking status: Current Some Day Smoker     Packs/day: 1.00     Last attempt to quit: 2020     Years since quittin.6    Smokeless tobacco: Never Used    Tobacco comment: started back smoking in last couple months   Substance Use Topics    Alcohol use: Not Currently        No family history on file. Allergies   Allergen Reactions    Baclofen Rash     All over        Prior to Admission medications    Medication Sig Start Date End Date Taking? Authorizing Provider   carvediloL (COREG) 25 mg tablet Take 1 Tablet by mouth two (2) times daily (with meals) for 90 days. 22  Braulio Mejia MD   bumetanide (BUMEX) 2 mg tablet Take 1 Tablet by mouth daily for 90 days. 22  Braulio Mejia MD   doxazosin (CARDURA) 4 mg tablet Take 1 Tablet by mouth daily for 90 days. 22  Braulio Mejia MD   NIFEdipine ER (PROCARDIA XL) 60 mg ER tablet Take 1 Tablet by mouth two (2) times a day for 90 days. 22  Braulio Mejia MD   cloNIDine HCL (CATAPRES) 0.2 mg tablet Take 0.2 mg by mouth three (3) times daily. Provider, Historical   hydrALAZINE (APRESOLINE) 100 mg tablet Take 100 mg by mouth three (3) times daily. Provider, Historical   ascorbic acid, vitamin C, (Vitamin C) 500 mg tablet Take 500 mg by mouth daily. Provider, Historical   cholecalciferol (VITAMIN D3) 25 mcg (1,000 unit) cap Take 1,000 Units by mouth daily. Provider, Historical   glecaprevir-pibrentasvir (Mavyret) 100-40 mg tab Take 3 Tabs by mouth daily. Indications: chronic infection of genotype 1 hepatitis C virus 3/23/21   Nevaeh Amaro., NP   naloxone (Narcan) 4 mg/actuation nasal spray 1 [place] by IntraNASal route once as needed for Overdose. Use 1 spray intranasally, then discard. Repeat with new spray every 2 min as needed for opioid overdose symptoms, alternating nostrils.     Provider, Historical   potassium chloride SR (K-TAB) 20 mEq tablet Take 20 mEq by mouth daily. Provider, Historical   buprenorphine-naloxone (Suboxone) 8-2 mg film sublingaul film 1 Film by SubLINGual route two (2) times a day. Provider, Historical       REVIEW OF SYSTEMS:     I am not able to complete the review of systems because: The patient is intubated and sedated    The patient has altered mental status due to his acute medical problems    The patient has baseline aphasia from prior stroke(s)    The patient has baseline dementia and is not reliable historian    The patient is in acute medical distress and unable to provide information           Total of 12 systems reviewed as follows:       POSITIVE= underlined text  Negative = text not underlined  General:  fever, chills, sweats, generalized weakness, weight loss/gain,      loss of appetite   Eyes:    blurred vision, eye pain, loss of vision, double vision  ENT:    rhinorrhea, pharyngitis   Respiratory:   cough, sputum production, SOB, KING, wheezing, pleuritic pain   Cardiology:   chest pain, palpitations, orthopnea, PND, edema, syncope   Gastrointestinal:  abdominal pain , N/V, diarrhea, dysphagia, constipation, bleeding   Genitourinary:  frequency, urgency, dysuria, hematuria, incontinence   Muskuloskeletal :  arthralgia, myalgia, back pain  Hematology:  easy bruising, nose or gum bleeding, lymphadenopathy   Dermatological: rash, ulceration, pruritis, color change / jaundice  Endocrine:   hot flashes or polydipsia   Neurological:  headache, dizziness, confusion, focal weakness, paresthesia,     Speech difficulties, memory loss, gait difficulty  Psychological: Feelings of anxiety, depression, agitation    Objective:   VITALS:    Visit Vitals  BP (!) 199/95   Pulse 68   Temp 97.8 °F (36.6 °C)   Resp 16   Ht 5' 9\" (1.753 m)   Wt 79.9 kg (176 lb 2.4 oz)   SpO2 97%   BMI 26.01 kg/m²       PHYSICAL EXAM:    General:    Alert, cooperative, no distress, appears stated age. HEENT: Atraumatic, anicteric sclerae, pink conjunctivae     No oral ulcers, mucosa moist, throat clear, dentition fair  Neck:  Supple, symmetrical,  thyroid: non tender  Lungs:   Clear to auscultation bilaterally. No Wheezing or Rhonchi. No rales. Chest wall:  No tenderness  No Accessory muscle use. Heart:   Regular  rhythm,  No  murmur   No edema  Abdomen:   Soft, non-tender. Not distended. Bowel sounds normal  Extremities: No cyanosis. No clubbing,      Skin turgor normal, Capillary refill normal, Radial dial pulse 2+  Skin:     Not pale. Not Jaundiced  No rashes   Psych:  Good insight. Not depressed. Not anxious or agitated. Neurologic: EOMs intact. No facial asymmetry. No aphasia or slurred speech. Symmetrical strength, Sensation grossly intact. Alert and oriented X 4.     _______________________________________________________________________  Care Plan discussed with:    Comments   Patient x    Family      RN x    Care Manager                    Consultant:      _______________________________________________________________________  Expected  Disposition:   Home with Family    HH/PT/OT/RN    SNF/LTC    [facility]    ________________________________________________________________________  TOTAL TIME:   Minutes    Critical Care Provided   70  Minutes non procedure based      Comments     Reviewed previous records   >50% of visit spent in counseling and coordination of care  Discussion with patient and/or family and questions answered       ________________________________________________________________________  Signed: Marylee Hilding, MD    Procedures: see electronic medical records for all procedures/Xrays and details which were not copied into this note but were reviewed prior to creation of Plan.     LAB DATA REVIEWED:    Recent Results (from the past 24 hour(s))   EKG, 12 LEAD, INITIAL    Collection Time: 03/12/22  9:57 AM   Result Value Ref Range    Ventricular Rate 65 BPM    Atrial Rate 65 BPM P-R Interval 218 ms    QRS Duration 96 ms    Q-T Interval 490 ms    QTC Calculation (Bezet) 509 ms    Calculated P Axis 66 degrees    Calculated R Axis 61 degrees    Calculated T Axis 93 degrees    Diagnosis       ** Poor data quality, interpretation may be adversely affected  Sinus rhythm with 1st degree AV block  Possible Left atrial enlargement  Left ventricular hypertrophy  Anterior infarct , age undetermined  Prolonged QT  When compared with ECG of 15-DIXIE-2022 09:26,  NH interval has increased  Anterior infarct is now present  T wave inversion less evident in Anterior leads     CBC WITH AUTOMATED DIFF    Collection Time: 03/12/22 10:03 AM   Result Value Ref Range    WBC 9.8 4.1 - 11.1 K/uL    RBC 3.50 (L) 4.10 - 5.70 M/uL    HGB 10.0 (L) 12.1 - 17.0 g/dL    HCT 30.6 (L) 36.6 - 50.3 %    MCV 87.4 80.0 - 99.0 FL    MCH 28.6 26.0 - 34.0 PG    MCHC 32.7 30.0 - 36.5 g/dL    RDW 12.9 11.5 - 14.5 %    PLATELET 064 (L) 524 - 400 K/uL    MPV 11.5 8.9 - 12.9 FL    NRBC 0.0 0  WBC    ABSOLUTE NRBC 0.00 0.00 - 0.01 K/uL    NEUTROPHILS 53 32 - 75 %    LYMPHOCYTES 24 12 - 49 %    MONOCYTES 11 5 - 13 %    EOSINOPHILS 11 (H) 0 - 7 %    BASOPHILS 1 0 - 1 %    IMMATURE GRANULOCYTES 0 0.0 - 0.5 %    ABS. NEUTROPHILS 5.1 1.8 - 8.0 K/UL    ABS. LYMPHOCYTES 2.4 0.8 - 3.5 K/UL    ABS. MONOCYTES 1.1 (H) 0.0 - 1.0 K/UL    ABS. EOSINOPHILS 1.1 (H) 0.0 - 0.4 K/UL    ABS. BASOPHILS 0.1 0.0 - 0.1 K/UL    ABS. IMM.  GRANS. 0.0 0.00 - 0.04 K/UL    DF SMEAR SCANNED      RBC COMMENTS NORMOCYTIC, NORMOCHROMIC     METABOLIC PANEL, COMPREHENSIVE    Collection Time: 03/12/22 10:03 AM   Result Value Ref Range    Sodium 132 (L) 136 - 145 mmol/L    Potassium 5.0 3.5 - 5.1 mmol/L    Chloride 100 97 - 108 mmol/L    CO2 22 21 - 32 mmol/L    Anion gap 10 5 - 15 mmol/L    Glucose 91 65 - 100 mg/dL    BUN 59 (H) 6 - 20 MG/DL    Creatinine 12.10 (H) 0.70 - 1.30 MG/DL    BUN/Creatinine ratio 5 (L) 12 - 20      GFR est AA 5 (L) >60 ml/min/1.73m2    GFR est non-AA 4 (L) >60 ml/min/1.73m2    Calcium 9.2 8.5 - 10.1 MG/DL    Bilirubin, total 0.4 0.2 - 1.0 MG/DL    ALT (SGPT) 15 12 - 78 U/L    AST (SGOT) 12 (L) 15 - 37 U/L    Alk.  phosphatase 62 45 - 117 U/L    Protein, total 8.0 6.4 - 8.2 g/dL    Albumin 3.2 (L) 3.5 - 5.0 g/dL    Globulin 4.8 (H) 2.0 - 4.0 g/dL    A-G Ratio 0.7 (L) 1.1 - 2.2

## 2022-03-12 NOTE — ED PROVIDER NOTES
EMERGENCY DEPARTMENT HISTORY AND PHYSICAL EXAM      Date: 3/12/2022  Patient Name: Brian Cook    History of Presenting Illness     Chief Complaint   Patient presents with    Hypertension     Pt arrives via EMS from [facility] d/t htn. Pt only taking clonidine and hydralazine at home before dialysis, upon arriving at dialysis /120's. Pt given clonidine at 0716 and 0755. BP for /120. Pt Tuesday/Thursday/Saturday dialysis patient, last session off schedule on Wednesday while admitted at [facility] for hypertension. History Provided By: Patient    HPI: Brian Cook, 64 y.o. male presents to the ED with cc of elevated blood pressure. 80-year-old male with a history of end-stage renal disease, hypertension presents emergency department for treatment of elevated blood pressure. Patient reports he went to his dialysis center today and his blood pressure is elevated. They gave patient 0.2 and 0.1 mg of clonidine and were unable to get his blood pressure low enough for dialysis. His last dialysis was Wednesday. Patient was recently admitted to [facility] for hypertensive urgency, he was dialyzed at discharge Wednesday. He had a new medicine added but has been unable to pick this up from the pharmacy. Patient is on multiple antihypertensives including nifedipine, doxazosin, losartan and Coreg. He reports he went to the pharmacy and these were not filled. He is out of Coreg. He does have clonidine and hydralazine and reports compliance. Patient denies any chest pain, shortness of breath, headaches, numbness or weakness, abdominal pain, nausea, vomiting or diarrhea. He does still make urine. There are no other complaints, changes, or physical findings at this time. PCP: Marisol, MD Brian    No current facility-administered medications on file prior to encounter.      Current Outpatient Medications on File Prior to Encounter   Medication Sig Dispense Refill    cloNIDine HCL (CATAPRES) 0.2 mg tablet Take 0.2 mg by mouth three (3) times daily.  hydrALAZINE (APRESOLINE) 100 mg tablet Take 100 mg by mouth three (3) times daily.  ascorbic acid, vitamin C, (Vitamin C) 500 mg tablet Take 500 mg by mouth daily.  cholecalciferol (VITAMIN D3) 25 mcg (1,000 unit) cap Take 1,000 Units by mouth daily.  potassium chloride SR (K-TAB) 20 mEq tablet Take 20 mEq by mouth daily.  buprenorphine-naloxone (Suboxone) 8-2 mg film sublingaul film 1 Film by SubLINGual route two (2) times a day.  carvediloL (COREG) 25 mg tablet Take 1 Tablet by mouth two (2) times daily (with meals) for 90 days. 60 Tablet 2    bumetanide (BUMEX) 2 mg tablet Take 1 Tablet by mouth daily for 90 days. 30 Tablet 2    doxazosin (CARDURA) 4 mg tablet Take 1 Tablet by mouth daily for 90 days. 30 Tablet 2    NIFEdipine ER (PROCARDIA XL) 60 mg ER tablet Take 1 Tablet by mouth two (2) times a day for 90 days. 60 Tablet 2    [DISCONTINUED] glecaprevir-pibrentasvir (Mavyret) 100-40 mg tab Take 3 Tabs by mouth daily. Indications: chronic infection of genotype 1 hepatitis C virus 84 Tab 1    naloxone (Narcan) 4 mg/actuation nasal spray 1 [place]sey by IntraNASal route once as needed for Overdose. Use 1 spray intranasally, then discard. Repeat with new spray every 2 min as needed for opioid overdose symptoms, alternating nostrils. Past History     Past Medical History:  Past Medical History:   Diagnosis Date    Chronic kidney disease     Hypertension     Liver disease        Past Surgical History:  History reviewed. No pertinent surgical history. Family History:  No family history on file.     Social History:  Social History     Tobacco Use    Smoking status: Current Some Day Smoker     Packs/day: 1.00     Last attempt to quit: 2020     Years since quittin.6    Smokeless tobacco: Never Used    Tobacco comment: started back smoking in last couple months   Vaping Use    Vaping Use: Never used Substance Use Topics    Alcohol use: Not Currently    Drug use: Yes     Types: Marijuana, Cocaine, Heroin     Comment: last used 1 week ago       Allergies: Allergies   Allergen Reactions    Baclofen Rash     All over         Review of Systems   Review of Systems   Constitutional: Negative for chills and fever. HENT: Negative for voice change. Eyes: Negative for pain and redness. Respiratory: Negative for cough and chest tightness. Cardiovascular: Negative for chest pain and leg swelling. Gastrointestinal: Negative for abdominal pain, diarrhea, nausea and vomiting. Genitourinary: Negative for hematuria. Musculoskeletal: Negative for gait problem. Skin: Negative for color change, pallor and rash. Neurological: Negative for facial asymmetry, weakness and headaches. Hematological: Does not bruise/bleed easily. Psychiatric/Behavioral: Negative for behavioral problems. All other systems reviewed and are negative. Physical Exam   Physical Exam  Vitals and nursing note reviewed. Constitutional:       Comments: 49-year-old male, resting in bed, no acute distress   HENT:      Head: Normocephalic and atraumatic. Nose: Nose normal.      Mouth/Throat:      Mouth: Mucous membranes are moist.   Eyes:      Pupils: Pupils are equal, round, and reactive to light. Cardiovascular:      Rate and Rhythm: Normal rate and regular rhythm. Pulses: Normal pulses. Heart sounds: No murmur heard. No friction rub. No gallop. Pulmonary:      Effort: Pulmonary effort is normal.      Breath sounds: Normal breath sounds. No wheezing, rhonchi or rales. Abdominal:      General: Abdomen is flat. There is no distension. Palpations: Abdomen is soft. Tenderness: There is no abdominal tenderness. Musculoskeletal:         General: No swelling. Normal range of motion. Cervical back: Normal range of motion. Right lower leg: No edema. Left lower leg: No edema.    Skin: General: Skin is warm and dry. Capillary Refill: Capillary refill takes less than 2 seconds. Neurological:      General: No focal deficit present. Mental Status: He is alert. Psychiatric:         Mood and Affect: Mood normal.         Diagnostic Study Results     Labs -     Recent Results (from the past 12 hour(s))   EKG, 12 LEAD, INITIAL    Collection Time: 03/12/22  9:57 AM   Result Value Ref Range    Ventricular Rate 65 BPM    Atrial Rate 65 BPM    P-R Interval 218 ms    QRS Duration 96 ms    Q-T Interval 490 ms    QTC Calculation (Bezet) 509 ms    Calculated P Axis 66 degrees    Calculated R Axis 61 degrees    Calculated T Axis 93 degrees    Diagnosis       ** Poor data quality, interpretation may be adversely affected  Sinus rhythm with 1st degree AV block  Possible Left atrial enlargement  Left ventricular hypertrophy  Anterior infarct , age undetermined  Prolonged QT  When compared with ECG of 15-DIXIE-2022 09:26,  RI interval has increased  Anterior infarct is now present  T wave inversion less evident in Anterior leads     CBC WITH AUTOMATED DIFF    Collection Time: 03/12/22 10:03 AM   Result Value Ref Range    WBC 9.8 4.1 - 11.1 K/uL    RBC 3.50 (L) 4.10 - 5.70 M/uL    HGB 10.0 (L) 12.1 - 17.0 g/dL    HCT 30.6 (L) 36.6 - 50.3 %    MCV 87.4 80.0 - 99.0 FL    MCH 28.6 26.0 - 34.0 PG    MCHC 32.7 30.0 - 36.5 g/dL    RDW 12.9 11.5 - 14.5 %    PLATELET 993 (L) 187 - 400 K/uL    MPV 11.5 8.9 - 12.9 FL    NRBC 0.0 0  WBC    ABSOLUTE NRBC 0.00 0.00 - 0.01 K/uL    NEUTROPHILS 53 32 - 75 %    LYMPHOCYTES 24 12 - 49 %    MONOCYTES 11 5 - 13 %    EOSINOPHILS 11 (H) 0 - 7 %    BASOPHILS 1 0 - 1 %    IMMATURE GRANULOCYTES 0 0.0 - 0.5 %    ABS. NEUTROPHILS 5.1 1.8 - 8.0 K/UL    ABS. LYMPHOCYTES 2.4 0.8 - 3.5 K/UL    ABS. MONOCYTES 1.1 (H) 0.0 - 1.0 K/UL    ABS. EOSINOPHILS 1.1 (H) 0.0 - 0.4 K/UL    ABS. BASOPHILS 0.1 0.0 - 0.1 K/UL    ABS. IMM.  GRANS. 0.0 0.00 - 0.04 K/UL    DF SMEAR SCANNED      RBC COMMENTS NORMOCYTIC, NORMOCHROMIC     METABOLIC PANEL, COMPREHENSIVE    Collection Time: 03/12/22 10:03 AM   Result Value Ref Range    Sodium 132 (L) 136 - 145 mmol/L    Potassium 5.0 3.5 - 5.1 mmol/L    Chloride 100 97 - 108 mmol/L    CO2 22 21 - 32 mmol/L    Anion gap 10 5 - 15 mmol/L    Glucose 91 65 - 100 mg/dL    BUN 59 (H) 6 - 20 MG/DL    Creatinine 12.10 (H) 0.70 - 1.30 MG/DL    BUN/Creatinine ratio 5 (L) 12 - 20      GFR est AA 5 (L) >60 ml/min/1.73m2    GFR est non-AA 4 (L) >60 ml/min/1.73m2    Calcium 9.2 8.5 - 10.1 MG/DL    Bilirubin, total 0.4 0.2 - 1.0 MG/DL    ALT (SGPT) 15 12 - 78 U/L    AST (SGOT) 12 (L) 15 - 37 U/L    Alk. phosphatase 62 45 - 117 U/L    Protein, total 8.0 6.4 - 8.2 g/dL    Albumin 3.2 (L) 3.5 - 5.0 g/dL    Globulin 4.8 (H) 2.0 - 4.0 g/dL    A-G Ratio 0.7 (L) 1.1 - 2.2         Radiologic Studies -   XR CHEST PORT   Final Result   No acute process. CT Results  (Last 48 hours)    None        CXR Results  (Last 48 hours)               03/12/22 1014  XR CHEST PORT Final result    Impression:  No acute process. Narrative: Indication: Hemodialysis, hypertension       Comparison: 1/15/2022       Portable exam of the chest obtained at 1014 demonstrates normal heart size. There is no acute process in the lung fields. The osseous structures are   unremarkable. Central venous catheter dual lumens project over the right atrium. Medical Decision Making   I am the first provider for this patient. I reviewed the vital signs, available nursing notes, past medical history, past surgical history, family history and social history. Vital Signs-Reviewed the patient's vital signs.   Patient Vitals for the past 12 hrs:   Temp Pulse Resp BP SpO2   03/12/22 1500 97.9 °F (36.6 °C) 64 15 (!) 182/87 98 %   03/12/22 1428 -- 64 15 (!) 168/87 97 %   03/12/22 1358 -- 65 14 (!) 171/88 97 %   03/12/22 1328 -- 67 21 (!) 165/85 98 %   03/12/22 1313 97.3 °F (36.3 °C) 67 20 (!) 177/85 97 %   03/12/22 1252 -- 65 -- (!) 162/88 --   03/12/22 1238 -- 67 15 (!) 162/88 96 %   03/12/22 1232 -- 67 15 (!) 158/85 96 %   03/12/22 1217 -- 67 15 (!) 171/90 96 %   03/12/22 1211 -- 68 -- -- --   03/12/22 1155 -- 62 16 (!) 199/95 97 %   03/12/22 1140 -- 65 17 (!) 208/107 96 %   03/12/22 1125 -- 66 -- (!) 241/114 --   03/12/22 1055 -- 61 17 (!) 249/107 97 %   03/12/22 1050 -- 61 16 (!) 258/116 97 %   03/12/22 1004 -- 65 -- (!) 264/120 --   03/12/22 0952 97.8 °F (36.6 °C) 65 21 (!) 258/123 97 %       Records Reviewed: Nursing Notes and Old Medical Records    Provider Notes (Medical Decision Making):     70-year-old male presents emergency department with a chief complaint of elevated blood pressure. He does have end-stage renal disease and is missed dialysis which was scheduled today. His blood pressure was elevated for /120. He does not appear significantly hypervolemic. He is asymptomatic although his blood pressure is quite elevated. Question fluid overload, rebound hypertension in the setting of missed medications, medication noncompliance. Will check EKG and basic labs. Troponin would not be useful given patient has no chest pain and end-stage renal disease. We will medicate with patient's blood pressure medicines, continue to monitor and reassess. ED Course:   Initial assessment performed. The patients presenting problems have been discussed, and they are in agreement with the care plan formulated and outlined with them. I have encouraged them to ask questions as they arise throughout their visit. ED Course as of 03/12/22 1550   Sat Mar 12, 2022   1002 Preliminary EKG interpreted by me. Shows normal sinus rhythm with a HR of 65. No ST elevations or depressions concerning for ischemia. First-degree heart block [MB]   1049 Labs are consistent with end-stage renal disease. Chest x-ray unremarkable.  [MB]   9742 Despite administration patient's home medicines as well as hydralazine IV, patient continues to be significantly hypertensive. Given nitro sublingually Nitropaste. Will start nicardipine drip. [MB]   1201 D/w Dr. Herbie Krabbe for  [MB]      ED Course User Index  [MB] Saranya Young MD     Critical Care Time:   CRITICAL CARE NOTE :    9:51 AM    IMPENDING DETERIORATION -Cardiovascular  ASSOCIATED RISK FACTORS - Vascular Compromise and CNS Decompensation  MANAGEMENT- Bedside Assessment  INTERPRETATION -  Xrays and Blood Pressure  INTERVENTIONS - hemodynamic mngmt  CASE REVIEW - Hospitalist/Intensivist and Nursing  TREATMENT RESPONSE -Improved  PERFORMED BY - Self    NOTES   :  I have spent 40 minutes of critical care time involved in lab review, consultations with specialist, family decision- making, bedside attention and documentation. This time excludes time spent in any separate billed procedures. During this entire length of time I was immediately available to the patient . Yesenia Ramires MD      Disposition:    Admitted    Diagnosis     Clinical Impression:   1. Hypertensive urgency    2. End stage renal disease [facility])        Attestations:    Yesenia Ramires MD    Please note that this dictation was completed with [employer], the computer voice recognition software. Quite often unanticipated grammatical, syntax, homophones, and other interpretive errors are inadvertently transcribed by the computer software. Please disregard these errors. Please excuse any errors that have escaped final proofreading. Thank you.

## 2022-03-12 NOTE — PROGRESS NOTES
1250: TRANSFER - IN REPORT:    Verbal report received from Whitley Sullivan RN(name) on Prakash Mariano  being received from ED(unit) for routine progression of care      Report consisted of patients Situation, Background, Assessment and   Recommendations(SBAR). Information from the following report(s) SBAR, Kardex, Intake/Output, MAR and Recent Results was reviewed with the receiving nurse. Opportunity for questions and clarification was provided. Assessment completed upon patients arrival to unit and care assumed. 1313: Patient arrived on floor, A&OX4, BP elevated, but stable. Rate verified on Cardene drip, see MAR. Call bell in reach, bed alarm on, will monitor. 1315: Primary Nurse Dwaine Purdy and Devang Rowley RN performed a dual skin assessment on this patient No impairment noted  Brock score is 21    1345: Dr. Michael Navarrete at bedside, patient will get dialysis now, plan to wean off Cardene and give scheduled BP meds after dialysis. 1528: Dialysis being started at bedside. 1601: SBP <180, Hedy stopped at this time. 1900: End of Shift Note    Bedside shift change report given to oncoming RN (oncoming nurse) by Dwaine Purdy (offgoing nurse). Report included the following information SBAR, Kardex, Intake/Output, MAR and Recent Results    Shift worked:  7825-0430     Shift summary and any significant changes:     HTN crisis, cardene on hold, dialysis     Concerns for physician to address:  none     Zone phone for oncoming shift:   XXX       Activity:  Activity Level:  Up with Assistance  Number times ambulated in hallways past shift: 0  Number of times OOB to chair past shift: 0    Cardiac:   Cardiac Monitoring: Yes      Cardiac Rhythm: Sinus Rhythm    Access:   Current line(s): PIV and HD access     Genitourinary:   Urinary status: voiding    Respiratory:   O2 Device: None (Room air)  Chronic home O2 use?: NO  Incentive spirometer at bedside: N/A       GI:  Last Bowel Movement Date: 03/12/22  Current diet:  ADULT DIET Regular; Low Fat/Low Chol/High Fiber/CAPRI; No Salt Added (3-4 gm); Low Potassium (Less than 3000 mg/day)  Passing flatus: YES  Tolerating current diet: YES       Pain Management:   Patient states pain is manageable on current regimen: YES    Skin:  Brock Score: 21  Interventions: speciality bed, float heels and PT/OT consult    Patient Safety:  Fall Score:  Total Score: 2  Interventions: bed/chair alarm, gripper socks, pt to call before getting OOB and stay with me (per policy)       Length of Stay:  Expected LOS: - - -  Actual LOS: [place] [address]

## 2022-03-12 NOTE — PROGRESS NOTES
[address] consulted to check the suboxone dose      Recent Labs     22  1003   CREA 12.10*   BUN 59*   HGB 10.0*   *   ALB 3.2*   ALT 15   TBILI 0.4     Temp (24hrs), Av.7 °F (36.5 °C), Min:97.3 °F (36.3 °C), Max:97.9 °F (36.6 °C)    Impression/Plan:   Per Rx Query, the patient was on Suboxone 8 mg - 2mg SL film BID. It was filled on  for 28 days supply    Patient gets it from:  [address] [place] [address], [address]   [address] Floor, [place]le [place] [address] [address]   Phone: [phone]   Fax: [phone]     Pharmacy will follow daily and adjust as appropriate.     Thanks for the consult,  Brenda Winter, PHARMD

## 2022-03-12 NOTE — ED NOTES
Pt arrives via EMS from [facility] d/t htn. Pt only taking clonidine and hydralazine at home before dialysis, upon arriving at dialysis /120's. Pt given clonidine at 0716 and 0755. BP for /120. Pt Tuesday/Thursday/Saturday dialysis patient, last session off schedule on Wednesday while admitted at [facility] for hypertension.      [address] [facility],[address] at bedside

## 2022-03-12 NOTE — CONSULTS
Nephrology Progress Note  Dominga Bland  Date of Admission : 3/12/2022    CC:  Follow up for ESRD       Assessment and Plan     ESRD on HD TTS Carolyn Elvira  Hypertensive urgency  Anemia of CKD  Hep C      Plan:  HD ordered for today  UF 3kg  Resume home BP meds  Wean off cardene drip once dialysis initiated       Interval History:  65 yo AAM w/ ESRD on HD TTS, missed HD today. Sent in from the unit with elevated BPs. Uses  [facility] for his access. He had a neg CXR. Started on cardene drip and admitted to the step down unit. No cp, sob, n/v/d reported at this time. Current Medications: all current  Medications have been eviewed in EPIC  Review of Systems: Pertinent items are noted in HPI. Objective:  Vitals:    Vitals:    03/12/22 1232 03/12/22 1238 03/12/22 1252 03/12/22 1313   BP: (!) 158/85 (!) 162/88 (!) 162/88 (!) 177/85   Pulse: 67 67 65 67   Resp: 15 15  20   Temp:    97.3 °F (36.3 °C)   SpO2: 96% 96%  97%   Weight:       Height:         Intake and Output:  No intake/output data recorded. No intake/output data recorded. Physical Examination:    General: NAD,Conversant   Neck:  Supple, no mass  Resp:  Lungs CTA B/L, no wheezing , normal respiratory effort  CV:  RRR,  no murmur or rub, no LE edema  GI:  Soft, NT, + Bowel sounds, no hepatosplenomegaly  Neurologic:  Non focal  Psych:             AAO x 3 appropriate affect   Skin:  No Rash  Access:           L IJ [facility]    []    High complexity decision making was performed  []    Patient is at high-risk of decompensation with multiple organ involvement    Lab Data Personally Reviewed: I have reviewed all the pertinent labs, microbiology data and radiology studies during assessment.     Recent Labs     03/12/22  1003   *   K 5.0      CO2 22   GLU 91   BUN 59*   CREA 12.10*   CA 9.2   ALB 3.2*   ALT 15     Recent Labs     03/12/22  1003   WBC 9.8   HGB 10.0*   HCT 30.6*   *     No results found for: SDES  No results found for: CULT  Recent Results (from the past 24 hour(s))   EKG, 12 LEAD, INITIAL    Collection Time: 03/12/22  9:57 AM   Result Value Ref Range    Ventricular Rate 65 BPM    Atrial Rate 65 BPM    P-R Interval 218 ms    QRS Duration 96 ms    Q-T Interval 490 ms    QTC Calculation (Bezet) 509 ms    Calculated P Axis 66 degrees    Calculated R Axis 61 degrees    Calculated T Axis 93 degrees    Diagnosis       ** Poor data quality, interpretation may be adversely affected  Sinus rhythm with 1st degree AV block  Possible Left atrial enlargement  Left ventricular hypertrophy  Anterior infarct , age undetermined  Prolonged QT  When compared with ECG of 15-DIXIE-2022 09:26,  NV interval has increased  Anterior infarct is now present  T wave inversion less evident in Anterior leads     CBC WITH AUTOMATED DIFF    Collection Time: 03/12/22 10:03 AM   Result Value Ref Range    WBC 9.8 4.1 - 11.1 K/uL    RBC 3.50 (L) 4.10 - 5.70 M/uL    HGB 10.0 (L) 12.1 - 17.0 g/dL    HCT 30.6 (L) 36.6 - 50.3 %    MCV 87.4 80.0 - 99.0 FL    MCH 28.6 26.0 - 34.0 PG    MCHC 32.7 30.0 - 36.5 g/dL    RDW 12.9 11.5 - 14.5 %    PLATELET 491 (L) 757 - 400 K/uL    MPV 11.5 8.9 - 12.9 FL    NRBC 0.0 0  WBC    ABSOLUTE NRBC 0.00 0.00 - 0.01 K/uL    NEUTROPHILS 53 32 - 75 %    LYMPHOCYTES 24 12 - 49 %    MONOCYTES 11 5 - 13 %    EOSINOPHILS 11 (H) 0 - 7 %    BASOPHILS 1 0 - 1 %    IMMATURE GRANULOCYTES 0 0.0 - 0.5 %    ABS. NEUTROPHILS 5.1 1.8 - 8.0 K/UL    ABS. LYMPHOCYTES 2.4 0.8 - 3.5 K/UL    ABS. MONOCYTES 1.1 (H) 0.0 - 1.0 K/UL    ABS. EOSINOPHILS 1.1 (H) 0.0 - 0.4 K/UL    ABS. BASOPHILS 0.1 0.0 - 0.1 K/UL    ABS. IMM.  GRANS. 0.0 0.00 - 0.04 K/UL    DF SMEAR SCANNED      RBC COMMENTS NORMOCYTIC, NORMOCHROMIC     METABOLIC PANEL, COMPREHENSIVE    Collection Time: 03/12/22 10:03 AM   Result Value Ref Range    Sodium 132 (L) 136 - 145 mmol/L    Potassium 5.0 3.5 - 5.1 mmol/L    Chloride 100 97 - 108 mmol/L    CO2 22 21 - 32 mmol/L    Anion gap 10 5 - 15 mmol/L    Glucose 91 65 - 100 mg/dL    BUN 59 (H) 6 - 20 MG/DL    Creatinine 12.10 (H) 0.70 - 1.30 MG/DL    BUN/Creatinine ratio 5 (L) 12 - 20      GFR est AA 5 (L) >60 ml/min/1.73m2    GFR est non-AA 4 (L) >60 ml/min/1.73m2    Calcium 9.2 8.5 - 10.1 MG/DL    Bilirubin, total 0.4 0.2 - 1.0 MG/DL    ALT (SGPT) 15 12 - 78 U/L    AST (SGOT) 12 (L) 15 - 37 U/L    Alk. phosphatase 62 45 - 117 U/L    Protein, total 8.0 6.4 - 8.2 g/dL    Albumin 3.2 (L) 3.5 - 5.0 g/dL    Globulin 4.8 (H) 2.0 - 4.0 g/dL    A-G Ratio 0.7 (L) 1.1 - 2.2                     Francia Samayoa MD  [address]  Phone - [phone]   Fax - [phone]  [email or web address]

## 2022-03-12 NOTE — PROGRESS NOTES
Problem: Falls - Risk of  Goal: *Absence of Falls  Description: Document Shade Pickett Fall Risk and appropriate interventions in the flowsheet.   Outcome: Progressing Towards Goal  Note: Fall Risk Interventions:            Medication Interventions: Bed/chair exit alarm,Patient to call before getting OOB,Teach patient to arise slowly    Elimination Interventions: Call light in reach,Patient to call for help with toileting needs,Toilet paper/wipes in reach,Toileting schedule/hourly rounds

## 2022-03-12 NOTE — PROCEDURES
Hemodialysis / [phone]    Vitals Pre Post Assessment Pre Post   BP BP: (!) 165/83 (03/12/22 1533) 150/80 LOC A&Ox4, cooperative, follows commands A&Ox4, cooperative, follows commands   HR Pulse (Heart Rate): 64 (03/12/22 1553) 68 Lungs CTA bilaterally, denies shortness of breath CTA bilaterally, denies shortness of breath   Resp Resp Rate: 17 (03/12/22 1533) 14 Cardiac Regular, S1, S2 Regular, S1, S2   Temp Temp: 97.9 °F (36.6 °C) (03/12/22 1533) 98 Skin L IJ permacath L IJ permacath   Weight    Edema generalized trace Generalized trace   Tele status NSR NSR Pain Pain Intensity 1: 0 (03/12/22 1500) 0     Orders   Duration: Start: 8375 End: 1904 Total: 3.5 hours   Dialyzer: Dialyzer/Set Up Inspection: Revaclear (03/12/22 1533)   K Bath: Dialysate K (mEq/L): 2 (03/12/22 1533)   Ca Bath: Dialysate CA (mEq/L): 2.5 (03/12/22 1533)   Na: Dialysate NA (mEq/L): 138 (03/12/22 1533)   Bicarb: Dialysate HCO3 (mEq/L): 35 (03/12/22 1533)   Target Fluid Removal: Goal/Amount of Fluid to Remove (mL): 3000 mL (03/12/22 1533)     Access   Type & Location: L IJ CVC    Comments:        [facility] CVC: Dressing CDI. No s/s of infection. New sterile dressing applied today, 03/12/22. Both lumens aspirate & flush well. Running well at .                                      Labs   HBsAg (Antigen) / date: 02/17/22 Negative                                          HBsAb (Antibody) / date: 09/23/21 < 3 Susceptible    Source:    Obtained/Reviewed  Critical Results Called HGB   Date Value Ref Range Status   03/12/2022 10.0 (L) 12.1 - 17.0 g/dL Final     Potassium   Date Value Ref Range Status   03/12/2022 5.0 3.5 - 5.1 mmol/L Final     Calcium   Date Value Ref Range Status   03/12/2022 9.2 8.5 - 10.1 MG/DL Final     BUN   Date Value Ref Range Status   03/12/2022 59 (H) 6 - 20 MG/DL Final     Creatinine   Date Value Ref Range Status   03/12/2022 12.10 (H) 0.70 - 1.30 MG/DL Final        Meds Given   Name Dose Route   Heparin 1:1000 2000 Units arterial  2100 Units venous  IV hep lock                Adequacy / Fluid    Total Liters Process: 78.5 L   Net Fluid Removed: 2500 mL (pt would not allow further UF)      Comments   Time Out Done:   (Time) @ 1530   Admitting Diagnosis: Hypertensive uregency    Consent obtained/signed: Informed Consent Verified: Yes (03/12/22 1533)   Machine / RO # Machine Number: [id number] (03/12/22 8837)   Primary Nurse Rpt Pre: Eduard Echavarria RN   Primary Nurse Rpt Post: Eduard Echavarria RN   Pt Education: Ordered Dialysis Treatment    Care Plan: CKD  . Pts outpatient clinic: [facility]     Tx Summary   Comments:        SBAR received from Primary RN. Pt A&Ox4, reviewed ordered treatment with patient, pt in agreement. Consent reviewed, signed & on file. 1533: Each catheter limb disinfected per p&p, caps removed, hubs disinfected per p&p. Each lumen aspirated for blood return and flushed with Normal Saline per policy. VSS. Dialysis Tx initiated. 1630: VSS, lines patent and intact, pt resting quietly. 1730: VSS, lines patent and intact, pt resting quietly. 1830: VSS, lines patent and intact, pt resting quietly. 1904: Tx ended. VSS. Each dialysis catheter limb disinfected per p&p, all possible blood returned per p&p, and each dialysis hub disinfected per p&p. Each lumen flushed, post dialysis catheter Heparin dwell instilled per order, and caps applied. Bed locked and in the lowest position, call bell and belongings in reach. SBAR given to Primary, RN. Patient is stable at time of my departure. All Dialysis related medications have been reviewed.

## 2022-03-13 LAB
AMPHET UR QL SCN: NEGATIVE
ANION GAP SERPL CALC-SCNC: 7 MMOL/L (ref 5–15)
ATRIAL RATE: 65 BPM
BARBITURATES UR QL SCN: NEGATIVE
BENZODIAZ UR QL: NEGATIVE
BUN SERPL-MCNC: 28 MG/DL (ref 6–20)
BUN/CREAT SERPL: 4 (ref 12–20)
CALCIUM SERPL-MCNC: 8.6 MG/DL (ref 8.5–10.1)
CALCULATED P AXIS, ECG09: 66 DEGREES
CALCULATED R AXIS, ECG10: 61 DEGREES
CALCULATED T AXIS, ECG11: 93 DEGREES
CANNABINOIDS UR QL SCN: NEGATIVE
CHLORIDE SERPL-SCNC: 98 MMOL/L (ref 97–108)
CO2 SERPL-SCNC: 29 MMOL/L (ref 21–32)
COCAINE UR QL SCN: NEGATIVE
CREAT SERPL-MCNC: 7.17 MG/DL (ref 0.7–1.3)
DIAGNOSIS, 93000: NORMAL
DRUG SCRN COMMENT,DRGCM: NORMAL
ERYTHROCYTE [DISTWIDTH] IN BLOOD BY AUTOMATED COUNT: 12.7 % (ref 11.5–14.5)
GLUCOSE SERPL-MCNC: 84 MG/DL (ref 65–100)
HBV SURFACE AB SER QL: NONREACTIVE
HBV SURFACE AB SER-ACNC: <3.1 MIU/ML
HBV SURFACE AG SER QL: <0.1 INDEX
HBV SURFACE AG SER QL: NEGATIVE
HCT VFR BLD AUTO: 29.5 % (ref 36.6–50.3)
HGB BLD-MCNC: 10 G/DL (ref 12.1–17)
MCH RBC QN AUTO: 28.3 PG (ref 26–34)
MCHC RBC AUTO-ENTMCNC: 33.9 G/DL (ref 30–36.5)
MCV RBC AUTO: 83.6 FL (ref 80–99)
METHADONE UR QL: NEGATIVE
NRBC # BLD: 0 K/UL (ref 0–0.01)
NRBC BLD-RTO: 0 PER 100 WBC
OPIATES UR QL: NEGATIVE
P-R INTERVAL, ECG05: 218 MS
PCP UR QL: NEGATIVE
PLATELET # BLD AUTO: 123 K/UL (ref 150–400)
PMV BLD AUTO: 11.4 FL (ref 8.9–12.9)
POTASSIUM SERPL-SCNC: 4.3 MMOL/L (ref 3.5–5.1)
Q-T INTERVAL, ECG07: 490 MS
QRS DURATION, ECG06: 96 MS
QTC CALCULATION (BEZET), ECG08: 509 MS
RBC # BLD AUTO: 3.53 M/UL (ref 4.1–5.7)
SODIUM SERPL-SCNC: 134 MMOL/L (ref 136–145)
VENTRICULAR RATE, ECG03: 65 BPM
WBC # BLD AUTO: 7.3 K/UL (ref 4.1–11.1)

## 2022-03-13 PROCEDURE — 51798 US URINE CAPACITY MEASURE: CPT

## 2022-03-13 PROCEDURE — 87340 HEPATITIS B SURFACE AG IA: CPT

## 2022-03-13 PROCEDURE — 74011250637 HC RX REV CODE- 250/637: Performed by: INTERNAL MEDICINE

## 2022-03-13 PROCEDURE — 65660000000 HC RM CCU STEPDOWN

## 2022-03-13 PROCEDURE — 74011636637 HC RX REV CODE- 636/637: Performed by: INTERNAL MEDICINE

## 2022-03-13 PROCEDURE — 80307 DRUG TEST PRSMV CHEM ANLYZR: CPT

## 2022-03-13 PROCEDURE — 74011000250 HC RX REV CODE- 250: Performed by: INTERNAL MEDICINE

## 2022-03-13 PROCEDURE — 74011250636 HC RX REV CODE- 250/636: Performed by: NURSE PRACTITIONER

## 2022-03-13 PROCEDURE — 86706 HEP B SURFACE ANTIBODY: CPT

## 2022-03-13 PROCEDURE — 80048 BASIC METABOLIC PNL TOTAL CA: CPT

## 2022-03-13 PROCEDURE — 85027 COMPLETE CBC AUTOMATED: CPT

## 2022-03-13 RX ORDER — MELATONIN
1000 DAILY
Status: DISCONTINUED | OUTPATIENT
Start: 2022-03-13 | End: 2022-03-14 | Stop reason: HOSPADM

## 2022-03-13 RX ORDER — LABETALOL HYDROCHLORIDE 5 MG/ML
10 INJECTION, SOLUTION INTRAVENOUS
Status: DISCONTINUED | OUTPATIENT
Start: 2022-03-13 | End: 2022-03-14 | Stop reason: HOSPADM

## 2022-03-13 RX ORDER — HYDRALAZINE HYDROCHLORIDE 20 MG/ML
10 INJECTION INTRAMUSCULAR; INTRAVENOUS
Status: DISCONTINUED | OUTPATIENT
Start: 2022-03-13 | End: 2022-03-14 | Stop reason: HOSPADM

## 2022-03-13 RX ORDER — HYDRALAZINE HYDROCHLORIDE 20 MG/ML
20 INJECTION INTRAMUSCULAR; INTRAVENOUS ONCE
Status: COMPLETED | OUTPATIENT
Start: 2022-03-13 | End: 2022-03-13

## 2022-03-13 RX ADMIN — BUMETANIDE 2 MG: 1 TABLET ORAL at 08:07

## 2022-03-13 RX ADMIN — NIFEDIPINE 60 MG: 30 TABLET, FILM COATED, EXTENDED RELEASE ORAL at 17:40

## 2022-03-13 RX ADMIN — HYDRALAZINE HYDROCHLORIDE 100 MG: 50 TABLET, FILM COATED ORAL at 16:25

## 2022-03-13 RX ADMIN — HYDRALAZINE HYDROCHLORIDE 100 MG: 50 TABLET, FILM COATED ORAL at 08:07

## 2022-03-13 RX ADMIN — CARVEDILOL 25 MG: 12.5 TABLET, FILM COATED ORAL at 16:26

## 2022-03-13 RX ADMIN — CLONIDINE HYDROCHLORIDE 0.2 MG: 0.1 TABLET ORAL at 16:26

## 2022-03-13 RX ADMIN — CARVEDILOL 25 MG: 12.5 TABLET, FILM COATED ORAL at 08:07

## 2022-03-13 RX ADMIN — NIFEDIPINE 60 MG: 30 TABLET, FILM COATED, EXTENDED RELEASE ORAL at 08:07

## 2022-03-13 RX ADMIN — HYDRALAZINE HYDROCHLORIDE 100 MG: 50 TABLET, FILM COATED ORAL at 21:48

## 2022-03-13 RX ADMIN — SODIUM CHLORIDE, PRESERVATIVE FREE 10 ML: 5 INJECTION INTRAVENOUS at 13:04

## 2022-03-13 RX ADMIN — CLONIDINE HYDROCHLORIDE 0.2 MG: 0.1 TABLET ORAL at 21:48

## 2022-03-13 RX ADMIN — SODIUM CHLORIDE, PRESERVATIVE FREE 10 ML: 5 INJECTION INTRAVENOUS at 06:42

## 2022-03-13 RX ADMIN — BUPRENORPHINE AND NALOXONE 1 FILM: 8; 2 FILM BUCCAL; SUBLINGUAL at 08:07

## 2022-03-13 RX ADMIN — CLONIDINE HYDROCHLORIDE 0.2 MG: 0.1 TABLET ORAL at 08:07

## 2022-03-13 RX ADMIN — CHOLECALCIFEROL TAB 25 MCG (1000 UNIT) 1000 UNITS: 25 TAB at 08:30

## 2022-03-13 RX ADMIN — DOXAZOSIN 4 MG: 2 TABLET ORAL at 08:07

## 2022-03-13 RX ADMIN — SODIUM CHLORIDE, PRESERVATIVE FREE 10 ML: 5 INJECTION INTRAVENOUS at 21:48

## 2022-03-13 RX ADMIN — OXYCODONE HYDROCHLORIDE AND ACETAMINOPHEN 500 MG: 500 TABLET ORAL at 08:07

## 2022-03-13 RX ADMIN — HYDRALAZINE HYDROCHLORIDE 20 MG: 20 INJECTION, SOLUTION INTRAMUSCULAR; INTRAVENOUS at 00:41

## 2022-03-13 NOTE — PROGRESS NOTES
Transition of Care Plan:    RUR: 15%  Disposition: Home with Sister   *Resume OP HD [facility] @ 6:00AM  Follow up appointments: PCP & Specialists as indicated  DME needed: None   Transportation at Discharge: Sister Nayeli Mckeon or means to access home: Yes   Medicare Letter: N/A  Is patient a BCPI-A Bundle: Letter to be provided if indicated       If yes, was Bundle Letter given?:    Is patient a [place] and connected with the [place]? No              If yes, was [place] transfer form completed and VA notified? No  Caregiver Contact: Areli Urias,  ([phone])  Discharge Caregiver contacted prior to discharge? CM to contact at d/c  Care Conference needed?: No                 Reason for Admission: ESRD, Hypertensive Urgency, Non-Compliance                  RUR Score: 15%                 PCP: First and Last name:   Other, MD Serafin Torres @ [facility]     Name of Practice:    Are you a current patient: Yes/No: Yes   Approximate date of last visit: 1/13/22   Can you participate in a virtual visit if needed: Yes    Do you (patient/family) have any concerns for transition/discharge? Pt denied any significant questions/concerns at this time. Confirmed that he has no copayments for his medications and fills them at [facility] ([address]). If agreeable, may benefit with some more medication management education. Plan for utilizing home health: Denies [place] needs at this time, may benefit on further medication management to help with non-compliance. Current Advanced Directive/Advance Care Plan:  Full Code - No ACP on file at this time. Education provided. Pt declined to complete ACP at this time. Advised pt that his daughter technically has legal NOK ([id number], [phone]) unless he specifies in ACP.      Healthcare Decision Maker:   Click here to complete [place] including selection of the Healthcare Decision Maker Relationship (ie \"Primary\") Primary Decision Maker: Candy Martinez -  - [phone]    Transition of Care Plan:    - Follow-up Care Appointments  - Possible HH SN for medication management  - Resume OP HD [facility] @ 6:00AM  - Sister to transport home at d/c    65 YO  Male admitted on 3/12/22 for ESRD, Hypertensive Urgency, Non-Compliance. Lives in sister's 2-story house (0 DEV) in [facility], [address] [place] [address]. Lives on 1st floor. Reports independent with mobility, ADLs/IADLs. Drives \"sometimes\" otherwise uses [employer] transport. Denies hx of HH, SNF, IPR. No DME at home. Preferred Rx is [facility] ([address] [facility]). Pt denied any issues with filling medications at pharmacy or copayments, however noted to have difficulty with medication compliance. Per pharmacy note, reportedly had Suboxone filled on 2/23/22 @ [facility] for 28 days. Has [employer] CM met with pt at bedside to verify demographic info, complete assessment. No PT/OT consults at this time. Pt confirmed current plan is to return home to 's home with outpatient f/u appointments and resume OP HD. Updates sent to [facility] via [employer]. Pt may benefit from additional medication management education to address non-compliance issues. CM will continue to remain available to assist with d/c planning     Care Management Interventions  PCP Verified by CM: Yes (Daryl Fontanez @ [facility])  Palliative Care Criteria Met (RRAT>21 & CHF Dx)?: No  Mode of Transport at Discharge:  Other (see comment) ()  Transition of Care Consult (CM Consult): Discharge Planning  Discharge Durable Medical Equipment: No  Health Maintenance Reviewed: Yes  Physical Therapy Consult: No  Occupational Therapy Consult: No  Speech Therapy Consult: No  Support Systems: Child(harish),Other Family Member(s)  Confirm Follow Up Transport: [place], also drives self)  The Plan for Transition of Care is Related to the Following Treatment Goals : Resume OP HD, Follow-up Appointments  The Patient and/or Patient Representative was Provided with a Choice of Provider and Agrees with the Discharge Plan?: Yes  Name of the Patient Representative Who was Provided with a Choice of Provider and Agrees with the Discharge Plan: Lyndsey West (pt)  Discharge Location  Patient Expects to be Discharged to[de-identified] Home with outpatient services    Jasmin Marshall [place] [address] Manager  [phone]

## 2022-03-13 NOTE — PROGRESS NOTES
0700: Bedside shift change report given to Ronen Randolph RN (oncoming nurse) by Rosario Tesfaye RN (offgoing nurse). Report included the following information SBAR, Kardex, Intake/Output, MAR and Recent Results. 0830: Dr. Kashmir Rosario at bedside assessing patient. Cardene d/c, BP managed with PO meds and dialysis. Most likely D/C tomorrow. 1320: Patient has not voided today, bladder scan done at this time- it only showed 162mL. 1500: Bedside shift change report given to Luis Duron RN (oncoming nurse) by Ronen Randolph RN (offgoing nurse). Report included the following information SBAR, Kardex, Intake/Output, MAR and Recent Results.

## 2022-03-13 NOTE — PROGRESS NOTES
1500: Bedside, Verbal and Written shift change report given to Gisell Sal (oncoming nurse) by Chapo Ellis RN (offgoing nurse). Report included the following information SBAR, Kardex, Intake/Output, MAR, Recent Results and Cardiac Rhythm NSR. End of Shift Note    Bedside shift change report given to Izaiah Xiong (oncoming nurse) by Martínez Turner RN (offgoing nurse). Report included the following information SBAR, Kardex, Intake/Output, MAR, Recent Results and Cardiac Rhythm NSR    Shift worked:  3p-7p     Shift summary and any significant changes:    BP better controlled throughout shift. Voided 175ml. Dc tomorrow? Concerns for physician to address:       Zone phone for oncoming shift:          Activity:  Activity Level: Up with Assistance,Up ad chelsey  Number times ambulated in hallways past shift: 0  Number of times OOB to chair past shift: 0    Cardiac:   Cardiac Monitoring: Yes      Cardiac Rhythm: Sinus Rhythm    Access:   Current line(s): PIV and HD access     Genitourinary:   Urinary status: voiding    Respiratory:   O2 Device: None (Room air)  Chronic home O2 use?: NO  Incentive spirometer at bedside: NO       GI:  Last Bowel Movement Date: 03/12/22  Current diet:  ADULT DIET Regular; Low Fat/Low Chol/High Fiber/CAPRI; No Salt Added (3-4 gm); Low Potassium (Less than 3000 mg/day)  Passing flatus: YES  Tolerating current diet: NO       Pain Management:   Patient states pain is manageable on current regimen: YES    Skin:  Brock Score: 21  Interventions: increase time out of bed    Patient Safety:  Fall Score:  Total Score: 2  Interventions: bed/chair alarm and pt to call before getting OOB       Length of Stay:  Expected LOS: - - -  Actual LOS: 1      Martínez Turner RN

## 2022-03-13 NOTE — PROGRESS NOTES
1900 Bedside and Verbal shift change report given to [address] Chris De (oncoming nurse) by Alcon Andersen (offgoing nurse). Report included the following information SBAR, Kardex, ED Summary, MAR, Recent Results and Cardiac Rhythm NSR. Patient denies any pain, is resting comfortably on room air.     2251 Restarted on Cardene drip at 5 mg/hr, patient bp 186/89.     2343 Stopped Cardene drip, patient bp 148/83.     0041 Hydralazine admin for bp 172/88. NP aware of bp level. 0200 System downtime was enacted for one hour to accomodate the ending of Daylight Saving Time at 2:00 a.m. Clinical documentation has been captured on paper to be scanned into the system. Medications administered during this time have been entered when the system became available. 0600 Patient refused Heparin injection, patient education provided - patient still declines. End of Shift Note    0730 Bedside shift change report given to Alcon Adnersen (oncoming nurse) by Yuvonne Burkitt, RN (offgoing nurse). Report included the following information SBAR, Kardex, ED Summary, Recent Results and Cardiac Rhythm NSR    Shift worked:  [id number]     Shift summary and any significant changes:     Cardene gtt restarted at 5 mg/hr and titrated off d/t sys < 180, admin Hydralazine IV for bp, dialysis completed at beginning of shift     Concerns for physician to address:  Blood pressure level     [facility]   [phone]       Activity:  Activity Level:  Up with Assistance  Number times ambulated in hallways past shift: 0  Number of times OOB to chair past shift: 0    Cardiac:   Cardiac Monitoring: Yes      Cardiac Rhythm: Sinus Rhythm    Access:   Current line(s): PIV and HD access     Genitourinary:   Urinary status: oliguric    Respiratory:   O2 Device: None (Room air)  Chronic home O2 use?: NO  Incentive spirometer at bedside: YES       GI:  Last Bowel Movement Date: 03/12/22  Current diet:  ADULT DIET Regular; Low Fat/Low Chol/High Fiber/CAPRI; No Salt Added (3-4 gm); Low Potassium (Less than 3000 mg/day)  Passing flatus: YES  Tolerating current diet: YES       Pain Management:   Patient states pain is manageable on current regimen: YES    Skin:  Brock Score: 21  Interventions: speciality bed and nutritional support     Patient Safety:  Fall Score:  Total Score: 2  Interventions: bed/chair alarm and gripper socks       Length of Stay:  Expected LOS: - - -  Actual LOS: 1      Indiana Victor RN

## 2022-03-13 NOTE — PROGRESS NOTES
Problem: Falls - Risk of  Goal: *Absence of Falls  Description: Document [place] Fall Risk and appropriate interventions in the flowsheet.   Outcome: Progressing Towards Goal  Note: Fall Risk Interventions:            Medication Interventions: Bed/chair exit alarm,Patient to call before getting OOB,Teach patient to arise slowly    Elimination Interventions: Call light in reach,Patient to call for help with toileting needs,Urinal in reach

## 2022-03-13 NOTE — PROGRESS NOTES
Received message from patient's nurse stating:    Patient was recently stopped on cardene drip, bp has since increased to 172/88         Discussion / orders:    Ordered hydralazine 20 mg IV x 1         Please note that this note was dictated using Dragon computer voice recognition software. Quite often unanticipated grammatical, syntax, homophones, and other interpretive errors are inadvertently transcribed by the computer software. Please disregard these errors. Please excuse any errors that have escaped final proofreading.      Signed by:  Davis Murray DNP, ACNP-BC

## 2022-03-14 VITALS
OXYGEN SATURATION: 98 % | DIASTOLIC BLOOD PRESSURE: 88 MMHG | SYSTOLIC BLOOD PRESSURE: 144 MMHG | RESPIRATION RATE: 20 BRPM | WEIGHT: 171.3 LBS | HEART RATE: 66 BPM | BODY MASS INDEX: 25.37 KG/M2 | HEIGHT: 69 IN | TEMPERATURE: 97.1 F

## 2022-03-14 PROCEDURE — 74011250636 HC RX REV CODE- 250/636: Performed by: INTERNAL MEDICINE

## 2022-03-14 PROCEDURE — 74011636637 HC RX REV CODE- 636/637: Performed by: INTERNAL MEDICINE

## 2022-03-14 PROCEDURE — 74011250637 HC RX REV CODE- 250/637: Performed by: INTERNAL MEDICINE

## 2022-03-14 PROCEDURE — 74011000250 HC RX REV CODE- 250: Performed by: INTERNAL MEDICINE

## 2022-03-14 PROCEDURE — 90935 HEMODIALYSIS ONE EVALUATION: CPT

## 2022-03-14 RX ADMIN — CLONIDINE HYDROCHLORIDE 0.2 MG: 0.1 TABLET ORAL at 17:33

## 2022-03-14 RX ADMIN — CLONIDINE HYDROCHLORIDE 0.2 MG: 0.1 TABLET ORAL at 08:06

## 2022-03-14 RX ADMIN — CARVEDILOL 25 MG: 12.5 TABLET, FILM COATED ORAL at 17:33

## 2022-03-14 RX ADMIN — CARVEDILOL 25 MG: 12.5 TABLET, FILM COATED ORAL at 08:06

## 2022-03-14 RX ADMIN — HEPARIN SODIUM 2100 UNITS: 1000 INJECTION INTRAVENOUS; SUBCUTANEOUS at 16:29

## 2022-03-14 RX ADMIN — BUPRENORPHINE AND NALOXONE 1 FILM: 8; 2 FILM BUCCAL; SUBLINGUAL at 17:33

## 2022-03-14 RX ADMIN — SODIUM CHLORIDE, PRESERVATIVE FREE 10 ML: 5 INJECTION INTRAVENOUS at 08:07

## 2022-03-14 RX ADMIN — CHOLECALCIFEROL TAB 25 MCG (1000 UNIT) 1000 UNITS: 25 TAB at 08:06

## 2022-03-14 RX ADMIN — NIFEDIPINE 60 MG: 30 TABLET, FILM COATED, EXTENDED RELEASE ORAL at 17:33

## 2022-03-14 RX ADMIN — BUMETANIDE 2 MG: 1 TABLET ORAL at 08:06

## 2022-03-14 RX ADMIN — NIFEDIPINE 60 MG: 30 TABLET, FILM COATED, EXTENDED RELEASE ORAL at 08:06

## 2022-03-14 RX ADMIN — OXYCODONE HYDROCHLORIDE AND ACETAMINOPHEN 500 MG: 500 TABLET ORAL at 08:07

## 2022-03-14 RX ADMIN — HEPARIN SODIUM 2000 UNITS: 1000 INJECTION INTRAVENOUS; SUBCUTANEOUS at 16:28

## 2022-03-14 RX ADMIN — DOXAZOSIN 4 MG: 2 TABLET ORAL at 08:07

## 2022-03-14 RX ADMIN — HYDRALAZINE HYDROCHLORIDE 100 MG: 50 TABLET, FILM COATED ORAL at 08:06

## 2022-03-14 RX ADMIN — HYDRALAZINE HYDROCHLORIDE 100 MG: 50 TABLET, FILM COATED ORAL at 17:33

## 2022-03-14 NOTE — PROGRESS NOTES
Pharmacist Discharge Medication Reconciliation    Significant PMH:   Past Medical History:   Diagnosis Date    Chronic kidney disease     Hypertension     Liver disease      Encounter Diagnoses:   Encounter Diagnoses   Name Primary? Hypertensive urgency Yes    End stage renal disease (HCC)      Allergies: Baclofen    Discharge Medications:   Current Discharge Medication List        CONTINUE these medications which have NOT CHANGED    Details   cloNIDine HCL (CATAPRES) 0.2 mg tablet Take 0.2 mg by mouth three (3) times daily. hydrALAZINE (APRESOLINE) 100 mg tablet Take 100 mg by mouth three (3) times daily. ascorbic acid, vitamin C, (Vitamin C) 500 mg tablet Take 500 mg by mouth daily. cholecalciferol (VITAMIN D3) 25 mcg (1,000 unit) cap Take 1,000 Units by mouth daily. potassium chloride SR (K-TAB) 20 mEq tablet Take 20 mEq by mouth daily. buprenorphine-naloxone (Suboxone) 8-2 mg film sublingaul film 1 Film by SubLINGual route two (2) times a day. Comments: .      carvediloL (COREG) 25 mg tablet Take 1 Tablet by mouth two (2) times daily (with meals) for 90 days. Qty: 60 Tablet, Refills: 2      bumetanide (BUMEX) 2 mg tablet Take 1 Tablet by mouth daily for 90 days. Qty: 30 Tablet, Refills: 2      doxazosin (CARDURA) 4 mg tablet Take 1 Tablet by mouth daily for 90 days. Qty: 30 Tablet, Refills: 2      NIFEdipine ER (PROCARDIA XL) 60 mg ER tablet Take 1 Tablet by mouth two (2) times a day for 90 days. Qty: 60 Tablet, Refills: 2      naloxone (Narcan) 4 mg/actuation nasal spray 1 [place] by IntraNASal route once as needed for Overdose. Use 1 spray intranasally, then discard. Repeat with new spray every 2 min as needed for opioid overdose symptoms, alternating nostrils.            STOP taking these medications       glecaprevir-pibrentasvir (Mavyret) 100-40 mg tab Comments:   Reason for Stopping:               The patient's chart, MAR and AVS were reviewed by Kelsea Mcqueen [facility].    Discharging Provider: Arianna Serrano MD    Thank you,     Lorna Lee, [facility]

## 2022-03-14 NOTE — PROGRESS NOTES
Attempted to schedule hospital follow up PCP appointment. Unable to reach anyone, unable to leave voicemail. PCP office is closed on Mondays. Pending patient discharge.  Yan Mcclure, Care Management Specialist

## 2022-03-14 NOTE — PROCEDURES
Hemodialysis / [phone]    Vitals Pre Post Assessment Pre Post   BP BP: (!) 184/99 (03/14/22 1438) 151/89 LOC A&Ox4 same   HR Pulse (Heart Rate): 75 (03/14/22 1438) 63 Lungs Dim bases same   Resp Resp Rate: 18 (03/14/22 1438) 18 Cardiac RRR same   Temp Temp: 97.8 °F (36.6 °C) (03/14/22 1438) 97.4 Skin CDI same   Weight    Edema Generalized same   Tele status   Pain Pain Intensity 1: 0 (03/14/22 0720)      Orders   Duration: Start: 7906 End: 8103 Total: 2hrs   Dialyzer: Dialyzer/Set Up Inspection: Revaclear (03/14/22 1438)   K Bath: Dialysate K (mEq/L):  (ultrafiltration) (03/14/22 1438)   Ca Bath: Dialysate CA (mEq/L):  (ultrafiltration) (03/14/22 1438)   Na: Dialysate NA (mEq/L):  (ultrafiltration) (03/14/22 1438)   Bicarb: Dialysate HCO3 (mEq/L):  (ultrafiltration) (03/14/22 1438)   Target Fluid Removal: Goal/Amount of Fluid to Remove (mL): 2500 mL (03/14/22 1438)     Access   Type & Location: [facility] CVC: Dressing CDI. No s/s of infection. Both lumens aspirate & flush well. Running well at . SBAR received from Primary RN. Pt arrived to HD suite A&Ox4. Consent signed & on file. Each catheter limb disinfected per p&p, caps removed, hubs disinfected per p&p. Each lumen aspirated for blood return and flushed with Normal Saline per policy. VSS. Dialysis Tx initiated.       Comments:   No s/s of infection noted                                     Labs   HBsAg (Antigen) / date: Neg 3/13/22                                              HBsAb (Antibody) / date: Susc 3/13/22   Source: EPIC   Obtained/Reviewed  Critical Results Called HGB   Date Value Ref Range Status   03/13/2022 10.0 (L) 12.1 - 17.0 g/dL Final     Potassium   Date Value Ref Range Status   03/13/2022 4.3 3.5 - 5.1 mmol/L Final     Calcium   Date Value Ref Range Status   03/13/2022 8.6 8.5 - 10.1 MG/DL Final     BUN   Date Value Ref Range Status   03/13/2022 28 (H) 6 - 20 MG/DL Final     Comment:     INVESTIGATED PER DELTA CHECK PROTOCOL Creatinine   Date Value Ref Range Status   03/13/2022 7.17 (H) 0.70 - 1.30 MG/DL Final     Comment:     INVESTIGATED PER DELTA CHECK PROTOCOL        Meds Given   Name Dose Route   Heparin 1:1000 2.0 Victor [email or web address]@[email or web address]   Heparin 1:1000 2.1 Victor [email or web address]@[facility]          Adequacy / Fluid    Total Liters Process:    Net Fluid Removed: 2500mL      Comments   Time Out Done:   (Time) 1433   Admitting Diagnosis: Hypertension   Consent obtained/signed: Informed Consent Verified: Yes (03/14/22 1438)   Machine / RO # Machine Number: Kathy Barber (03/14/22 1438)   Primary Nurse Rpt Pre: Marcos Meyer RN   Primary Nurse Rpt Post: Marcos Meyer RN   Pt Education: procedural   Care Plan: On going   Pts outpatient clinic:      Tx Summary  [phone]:  [facility] CVC: Dressing CDI. No s/s of infection. Both lumens aspirate & flush well. Running well at . SBAR received from Primary RN. Pt arrived to HD suite A&Ox4. Consent signed & on file. Each catheter limb disinfected per p&p, caps removed, hubs disinfected per p&p. Each lumen aspirated for blood return and flushed with Normal Saline per policy. VSS. Dialysis Tx initiated. 1445:  Pt resting  1500:  Pt resting  1515:  Pt resting  1530:  Pt resting  1545:  Pt resting  1600:  Pt resting  1615:  Pt resting  1630:  Pt resting  1639: Tx ended. VSS. Each dialysis catheter limb disinfected per p&p, all possible blood returned per p&p, and each dialysis hub disinfected per p&p. Each lumen flushed, post dialysis catheter Heparin dwell instilled per order, and caps applied. Bed locked and in the lowest position, call bell and belongings in reach. SBAR given to Primary, RN. Patient is stable at time of their/ my departure. All Dialysis related medications have been reviewed.        Comments:

## 2022-03-14 NOTE — PROGRESS NOTES
1015: Spoke to Dr. Andriy Flowres who reports patient ready for discharge today after ultrafiltration/dialysis. 1230: Report called to dialysis RN Aldo Millan. Plan for pt to go to dialysis suite around 1300. Notified about elevated BPs, will hold PRN meds for now prior to dialysis. 1425: Transport here to take patient to dialysis suite. 1505: Pt back from dialysis. 1800: discharge paperwork reviewed and written copy given to pt. Pt sister out front to give patient a ride home. All questions answered and pt verbalizes understanding importance of getting medications and follow up appointments. All belongings dc with patient.

## 2022-03-14 NOTE — PROGRESS NOTES
Hospitalist Progress Note    NAME: Hellen Reid   :  1965   MRN:  [id number]       Assessment / Plan:  Hypertensive emergency   -off cardene drip now  -cont coreg, clonidine, increase hydralazine to 100 mg tid, procardia 60 mg daily  -on prn hydralazine and labetalol    End-stage renal disease on hemodialysis  and Saturday  -Cont hd per renal. Renal following. Recent heart block status post pacemaker placement 3 days ago   Monitor pacemaker site     History of substance abuse  Patient is on Suboxone, restart once verified by pharmacy  History hepatitis C  Continue with Mayyret          Code Status: Full code  Surrogate Decision Maker:     DVT Prophylaxis: Heparin  GI Prophylaxis: not indicated     Baseline: Ambulatory      Body mass index is 26.01 kg/m². Subjective:     Chief Complaint / Reason for Physician Visit  Reports feeling better now. Blood pressure is better. No chest pain or sob. Review of Systems:  Symptom Y/N Comments  Symptom Y/N Comments   Fever/Chills    Chest Pain     Poor Appetite    Edema     Cough    Abdominal Pain     Sputum    Joint Pain     SOB/KING    Pruritis/Rash     Nausea/vomit    Tolerating PT/OT     Diarrhea    Tolerating Diet     Constipation    Other       Could NOT obtain due to:      Objective:     VITALS:   Last 24hrs VS reviewed since prior progress note.  Most recent are:  Patient Vitals for the past 24 hrs:   Temp Pulse Resp BP SpO2   22 98.3 °F (36.8 °C) 64 29 137/73 99 %   22 1800 -- 65 10 127/70 100 %   22 1740 -- 67 -- 128/73 --   22 1600 -- 74 24 137/81 100 %   22 1448 98.1 °F (36.7 °C) 73 20 (!) 147/80 100 %   22 1150 -- 76 -- -- --   22 1043 98 °F (36.7 °C) 76 18 (!) 141/78 98 %   22 0747 -- 72 -- -- --   22 0730 97.7 °F (36.5 °C) 72 21 (!) 155/88 98 %   22 0630 -- 72 16 (!) 159/88 96 %   22 0600 -- 73 16 (!) 154/84 97 %   22 0530 -- 71 15 (!) 152/83 98 %   03/13/22 0500 -- 70 14 (!) 149/81 98 %   03/13/22 0430 -- 71 16 (!) 147/78 97 %   03/13/22 0400 -- 72 17 (!) 146/79 99 %   03/13/22 0345 97.5 °F (36.4 °C) 74 22 (!) 142/78 98 %   03/13/22 0330 -- 75 17 136/73 98 %   03/13/22 0315 -- 74 19 128/72 98 %   03/13/22 0145 -- 75 17 134/78 98 %   03/13/22 0130 -- 75 18 (!) 144/66 99 %   03/13/22 0115 -- 73 25 (!) 144/83 99 %   03/13/22 0100 -- 78 19 136/78 97 %   03/13/22 0045 -- 70 18 (!) 172/88 99 %   03/13/22 0030 -- 70 28 (!) 172/89 98 %   03/13/22 0015 -- 70 23 (!) 172/88 98 %   03/13/22 0000 -- 72 -- -- --   03/12/22 2343 -- 69 -- (!) 148/83 --   03/12/22 2321 98 °F (36.7 °C) 69 18 (!) 159/79 97 %   03/12/22 2251 -- 71 20 (!) 182/87 100 %   03/12/22 2248 -- 66 16 (!) 186/89 98 %   03/12/22 2230 -- 67 18 (!) 172/90 100 %   03/12/22 2207 -- 67 16 (!) 163/92 99 %   03/12/22 2204 -- 67 -- (!) 163/92 --   03/12/22 2130 -- 69 15 (!) 161/85 98 %   03/12/22 2100 -- 71 19 (!) 152/84 99 %       Intake/Output Summary (Last 24 hours) at 3/13/2022 2058  Last data filed at 3/13/2022 1742  Gross per 24 hour   Intake 860 ml   Output 375 ml   Net 485 ml        PHYSICAL EXAM:  General: WD, WN. Alert, cooperative, no acute distress    EENT:  EOMI. Anicteric sclerae. MMM  Resp:  CTA bilaterally, no wheezing or rales. No accessory muscle use  CV:  Regular  rhythm,  No edema  GI:  Soft, Non distended, Non tender.  +Bowel sounds  Neurologic:  Alert and oriented X 3, normal speech,   Psych:   Good insight. Not anxious nor agitated  Skin:  No rashes.   No jaundice    Reviewed most current lab test results and cultures  YES  Reviewed most current radiology test results   YES  Review and summation of old records today    NO  Reviewed patient's current orders and MAR    YES  PMH/SH reviewed - no change compared to H&P          Current Facility-Administered Medications:     cholecalciferol (VITAMIN D3) (1000 Units /25 mcg) tablet 1,000 Units, 1,000 Units, Oral, DAILY, Pawel Muller MD, 1,000 Units at 03/13/22 0830    hydrALAZINE (APRESOLINE) 20 mg/mL injection 10 mg, 10 mg, IntraVENous, Q6H PRN, Pablito Trinh MD    labetaloL (NORMODYNE;TRANDATE) injection 10 mg, 10 mg, IntraVENous, Q4H PRN, Pablito Trinh MD    bumetanide (BUMEX) tablet 2 mg, 2 mg, Oral, DAILY, Arcadio Heredia MD, 2 mg at 03/13/22 0807    carvediloL (COREG) tablet 25 mg, 25 mg, Oral, BID WITH MEALS, Arcadio Heredia MD, 25 mg at 03/13/22 1626    ascorbic acid (vitamin C) (VITAMIN C) tablet 500 mg, 500 mg, Oral, DAILY, Arcadio Heredia MD, 500 mg at 03/13/22 0807    cloNIDine HCL (CATAPRES) tablet 0.2 mg, 0.2 mg, Oral, TID, Arcadio Heredia MD, 0.2 mg at 03/13/22 1626    doxazosin (CARDURA) tablet 4 mg, 4 mg, Oral, DAILY, Arcadio Heredia MD, 4 mg at 03/13/22 0807    hydrALAZINE (APRESOLINE) tablet 100 mg, 100 mg, Oral, TID, Arcadio Heredia MD, 100 mg at 03/13/22 1625    NIFEdipine ER (PROCARDIA XL) tablet 60 mg, 60 mg, Oral, BID, Arcadio Heredia MD, 60 mg at 03/13/22 1740    sodium chloride (NS) flush 5-40 mL, 5-40 mL, IntraVENous, Q8H, Arcadio Heredia MD, 10 mL at 03/13/22 1304    sodium chloride (NS) flush 5-40 mL, 5-40 mL, IntraVENous, PRN, Arcadio Heredia MD    acetaminophen (TYLENOL) tablet 650 mg, 650 mg, Oral, Q6H PRN **OR** acetaminophen (TYLENOL) suppository 650 mg, 650 mg, Rectal, Q6H PRN, Arcadio Heredia MD    polyethylene glycol (MIRALAX) packet 17 g, 17 g, Oral, DAILY PRN, Arcadio Heredia MD    ondansetron (ZOFRAN ODT) tablet 4 mg, 4 mg, Oral, Q8H PRN **OR** ondansetron (ZOFRAN) injection 4 mg, 4 mg, IntraVENous, Q6H PRN, Arcadio Heredia MD    heparin (porcine) injection 5,000 Units, 5,000 Units, SubCUTAneous, Q8H, Arcadio Heredia MD, 5,000 Units at 03/12/22 2205    heparin (porcine) 1,000 unit/mL injection 2,100 Units, 2,100 Units, InterCATHeter, DIALYSIS PRN, 2,100 Units at 03/12/22 1851 **AND** heparin (porcine) 1,000 unit/mL injection 2,000 Units, 2,000 Units, InterCATHeter, DIALYSIS PRN, Yudy Caicedo MD, 2,000 Units at 03/12/22 1850    buprenorphine-naloxone (SUBOXONE) 8mg-2mg SL film, 1 Film, SubLINGual, BID, Arabella Gray MD, 1 Film at 03/13/22 1003  ________________________________________________________________________  Care Plan discussed with:    Comments   Patient y    Family      RN y    Care Manager     Consultant                        Multidiciplinary team rounds were held today with , nursing, pharmacist and clinical coordinator. Patient's plan of care was discussed; medications were reviewed and discharge planning was addressed. ________________________________________________________________________  Total NON critical care TIME:  35    Minutes    Total CRITICAL CARE TIME Spent:   Minutes non procedure based      Comments   >50% of visit spent in counseling and coordination of care     ________________________________________________________________________  Luan Otero MD     Procedures: see electronic medical records for all procedures/Xrays and details which were not copied into this note but were reviewed prior to creation of Plan. LABS:  I reviewed today's most current labs and imaging studies.   Pertinent labs include:  Recent Labs     03/13/22  0042 03/12/22  1003   WBC 7.3 9.8   HGB 10.0* 10.0*   HCT 29.5* 30.6*   * 119*     Recent Labs     03/13/22  0042 03/12/22  1003   * 132*   K 4.3 5.0   CL 98 100   CO2 29 22   GLU 84 91   BUN 28* 59*   CREA 7.17* 12.10*   CA 8.6 9.2   ALB  --  3.2*   TBILI  --  0.4   ALT  --  15       Signed: Luan Otero MD

## 2022-03-14 NOTE — PROGRESS NOTES
1900: Bedside shift change report given to Izaiah Xiong (oncoming nurse) by Zahra Castanon (offgoing nurse). Report included the following information SBAR, Kardex, ED Summary, Intake/Output, MAR, Recent Results and Cardiac Rhythm NSR. End of Shift Note    Bedside shift change report given to Eyal (oncoming nurse) by Rick Pringle RN (offgoing nurse). Report included the following information SBAR, Kardex, ED Summary, Intake/Output, MAR, Recent Results and Cardiac Rhythm NSR    Shift worked:  8933-0340     Shift summary and any significant changes:     none at this time     Concerns for physician to address:  none at this time      Zone phone for oncoming shift:          Activity:  Activity Level: Up with Assistance,Up ad chelsey  Number times ambulated in hallways past shift: 0  Number of times OOB to chair past shift: 0    Cardiac:   Cardiac Monitoring: Yes      Cardiac Rhythm: Sinus Rhythm    Access:   Current line(s): PIV     Genitourinary:   Urinary status: oliguric    Respiratory:   O2 Device: None (Room air)  Chronic home O2 use?: NO  Incentive spirometer at bedside: NO       GI:  Last Bowel Movement Date: 03/12/22  Current diet:  ADULT DIET Regular; Low Fat/Low Chol/High Fiber/CAPRI; No Salt Added (3-4 gm); Low Potassium (Less than 3000 mg/day)  Passing flatus: YES  Tolerating current diet: YES       Pain Management:   Patient states pain is manageable on current regimen: YES    Skin:  Brock Score: 21  Interventions: float heels, increase time out of bed and PT/OT consult    Patient Safety:  Fall Score:  Total Score: 1  Interventions: bed/chair alarm, assistive device (walker, cane, etc), gripper socks, pt to call before getting OOB and stay with me (per policy)       Length of Stay:  Expected LOS: - - -  Actual LOS: [address] RN

## 2022-03-14 NOTE — PROGRESS NOTES
Transition of Care Plan:     RUR: 15%  Disposition: Home with Outpatient services. *Resume OP  [facility] TTS @ 6:00AM  Follow up appointments: PCP & Specialists as indicated  DME needed: None   Transportation at Discharge: Pt's sister will transport at d/c.   [address] or means to access home: Yes  IM Medicare Letter: N/A  Is patient a BCPI-A Bundle: N/A              If yes, was Bundle Letter given?:    Is patient a [place] and connected with the [place]? No              If yes, was [employer] transfer form completed and VA notified? No  Caregiver Contact: Stacy Estrada, sister ([phone])  Discharge Caregiver contacted prior to discharge? Yes  Care Conference needed?: No        11:24am-No further CM needs identified. CM notified pt's nurse of d/c.    11:21am- CM met with pt at bedside to discuss d/c plan. CM inquired with pt about any needs. No new needs at this time. Pt stated that his sister will transport at d/c. Care Management Interventions  PCP Verified by CM: Yes  Palliative Care Criteria Met (RRAT>21 & CHF Dx)?: No  Mode of Transport at Discharge: Other (see comment) (Pt's sister will transport at d/c. )  Transition of Care Consult (CM Consult):  Other (Home with outpatient services)  Discharge Durable Medical Equipment: No  Health Maintenance Reviewed: Yes  Physical Therapy Consult: No  Occupational Therapy Consult: No  Speech Therapy Consult: No  Support Systems: Other Family Member(s)  Confirm Follow Up Transport: Other (see comment)  The Plan for Transition of Care is Related to the Following Treatment Goals : Resume OP HD, Follow-up Appointments  The Patient and/or Patient Representative was Provided with a Choice of Provider and Agrees with the Discharge Plan?: Yes  Name of the Patient Representative Who was Provided with a Choice of Provider and Agrees with the Discharge Plan: Louie Coughlin (pt)  Alondra Provided?: No  Discharge Location  Patient Expects to be Discharged to[de-identified] Home with outpatient services (Home with Outpatient services)    Kateryna Abdalla [address] [place] [address]  [phone]

## 2022-03-14 NOTE — PROGRESS NOTES
Nephrology Progress Note  Luc Lane     [email or web address]  Phone - [phone]   Patient: Dieter Schulz    YOB: 1965        Date- 3/14/2022   Admit Date: 3/12/2022  CC: Follow up for esrd      IMPRESSION & PLAN:     ESRD - hd TTS - purvi nunez   HYPONATREMIA   Hypertensive urgency due to non compliance to meds   Anemia of ckd   Hep c   H/o heroin abuse    PLAN-   Ultrafiltration today   Start epogen   He is refusing to see vascular surgery for avf placement   Continue coreg, clonidine, cardura, hydralazine, nifedipine   Continue hd TTS     Subjective: Interval History:   -  bp improved  Off cardene gtt  No c/o sob,  No c/o chest pain,   No c/o nausea or vomiting, No c/o  fever. I HAVE D/W PATIENT FOR VASCULAR SURGERY EVAL FOR AVF PLACEMENT  HE IS REFUSING TO HAVE AVF PLACED. HE DOESN'T WANT TO GET NEEDLES PLACED FOR HD EVERY OTHER DAY. I HAVE D/W PATIENT REGARDING COMPLICATION OF PERMACATH       Objective:   Vitals:    03/14/22 0720 03/14/22 0800 03/14/22 0812 03/14/22 0900   BP: (!) 163/93  (!) 155/86 (!) 167/82   Pulse: 69 72 71 75   Resp: 15   22   Temp: 97.6 °F (36.4 °C)      SpO2: 99%      Weight:       Height:          03/13 0701 - 03/14 0700  In: 1080 [P.O.:1080]  Out: 175 [Urine:175]  Last 3 Recorded Weights in this Encounter    03/12/22 0952 03/14/22 0439   Weight: 79.9 kg (176 lb 2.4 oz) 77.7 kg (171 lb 4.8 oz)      Physical exam:    GEN: NAD  NECK- Supple, no mass  RESP: No wheezing, Clear b/l  CVS: S1,S2  RRR  NEURO: Normal speech, Non focal  EXT: No Edema   PSYCH: Normal Mood  PERMACATH +    Chart reviewed. Pertinent Notes reviewed.      Data Review :  Recent Labs     03/13/22  0042 03/12/22  1003   * 132*   K 4.3 5.0   CL 98 100   CO2 29 22   BUN 28* 59*   CREA 7.17* 12.10*   GLU 84 91   CA 8.6 9.2     Recent Labs     03/13/22  0042 03/12/22  1003   WBC 7.3 9.8   HGB 10.0* 10.0*   HCT 29.5* 30.6*   * 119* No results for input(s): FE, TIBC, PSAT, FERR in the last 72 hours.    Medication list  reviewed  Current Facility-Administered Medications   Medication Dose Route Frequency    cholecalciferol (VITAMIN D3) (1000 Units /25 mcg) tablet 1,000 Units  1,000 Units Oral DAILY    hydrALAZINE (APRESOLINE) 20 mg/mL injection 10 mg  10 mg IntraVENous Q6H PRN    labetaloL (NORMODYNE;TRANDATE) injection 10 mg  10 mg IntraVENous Q4H PRN    bumetanide (BUMEX) tablet 2 mg  2 mg Oral DAILY    carvediloL (COREG) tablet 25 mg  25 mg Oral BID WITH MEALS    ascorbic acid (vitamin C) (VITAMIN C) tablet 500 mg  500 mg Oral DAILY    cloNIDine HCL (CATAPRES) tablet 0.2 mg  0.2 mg Oral TID    doxazosin (CARDURA) tablet 4 mg  4 mg Oral DAILY    hydrALAZINE (APRESOLINE) tablet 100 mg  100 mg Oral TID    NIFEdipine ER (PROCARDIA XL) tablet 60 mg  60 mg Oral BID    sodium chloride (NS) flush 5-40 mL  5-40 mL IntraVENous Q8H    sodium chloride (NS) flush 5-40 mL  5-40 mL IntraVENous PRN    acetaminophen (TYLENOL) tablet 650 mg  650 mg Oral Q6H PRN    Or    acetaminophen (TYLENOL) suppository 650 mg  650 mg Rectal Q6H PRN    polyethylene glycol (MIRALAX) packet 17 g  17 g Oral DAILY PRN    ondansetron (ZOFRAN ODT) tablet 4 mg  4 mg Oral Q8H PRN    Or    ondansetron (ZOFRAN) injection 4 mg  4 mg IntraVENous Q6H PRN    heparin (porcine) injection 5,000 Units  5,000 Units SubCUTAneous Q8H    heparin (porcine) 1,000 unit/mL injection 2,100 Units  2,100 Units InterCATHeter DIALYSIS PRN    And    heparin (porcine) 1,000 unit/mL injection 2,000 Units  2,000 Units InterCATHeter DIALYSIS PRN    buprenorphine-naloxone (SUBOXONE) 8mg-2mg SL film  1 Film SubLINGual BID          Marialuisa Weeks MD              [facility]  [facility] [address]  [place], [address]  Phone - [phone]               Fax - [phone]

## 2022-03-18 PROBLEM — Z99.2 ESRD (END STAGE RENAL DISEASE) ON DIALYSIS (HCC): Status: ACTIVE | Noted: 2022-03-12

## 2022-03-18 PROBLEM — N18.6 ESRD (END STAGE RENAL DISEASE) ON DIALYSIS (HCC): Status: ACTIVE | Noted: 2022-03-12

## 2022-03-18 PROBLEM — B18.2 CHRONIC HEPATITIS C (HCC): Status: ACTIVE | Noted: 2021-01-15

## 2022-03-18 PROBLEM — Z91.199 NON-COMPLIANCE: Status: ACTIVE | Noted: 2022-03-12

## 2022-03-18 PROBLEM — I16.1 HYPERTENSIVE EMERGENCY: Status: ACTIVE | Noted: 2022-01-15

## 2022-03-19 PROBLEM — R07.9 CHEST PAIN: Status: ACTIVE | Noted: 2019-05-10

## 2022-03-19 PROBLEM — D50.9 IRON DEFICIENCY ANEMIA: Status: ACTIVE | Noted: 2021-02-15

## 2022-03-19 PROBLEM — I16.0 HYPERTENSIVE URGENCY: Status: ACTIVE | Noted: 2022-03-12

## 2022-03-19 PROBLEM — I10 HYPERTENSION: Status: ACTIVE | Noted: 2019-05-10

## 2022-03-20 PROBLEM — N18.9 CKD (CHRONIC KIDNEY DISEASE): Status: ACTIVE | Noted: 2020-06-13

## 2022-03-22 ENCOUNTER — APPOINTMENT (OUTPATIENT)
Dept: GENERAL RADIOLOGY | Age: 57
DRG: 391 | End: 2022-03-22
Attending: EMERGENCY MEDICINE
Payer: MEDICARE

## 2022-03-22 ENCOUNTER — HOSPITAL ENCOUNTER (INPATIENT)
Age: 57
LOS: 1 days | Discharge: HOME | DRG: 391 | End: 2022-03-25
Attending: EMERGENCY MEDICINE | Admitting: STUDENT IN AN ORGANIZED HEALTH CARE EDUCATION/TRAINING PROGRAM
Payer: MEDICARE

## 2022-03-22 ENCOUNTER — APPOINTMENT (OUTPATIENT)
Dept: ULTRASOUND IMAGING | Age: 57
DRG: 391 | End: 2022-03-22
Attending: PHYSICIAN ASSISTANT
Payer: MEDICARE

## 2022-03-22 ENCOUNTER — APPOINTMENT (OUTPATIENT)
Dept: CT IMAGING | Age: 57
DRG: 391 | End: 2022-03-22
Attending: EMERGENCY MEDICINE
Payer: MEDICARE

## 2022-03-22 DIAGNOSIS — R10.9 INTRACTABLE ABDOMINAL PAIN: Primary | ICD-10-CM

## 2022-03-22 DIAGNOSIS — N18.6 ESRD (END STAGE RENAL DISEASE) ON DIALYSIS (HCC): ICD-10-CM

## 2022-03-22 DIAGNOSIS — Z99.2 ESRD (END STAGE RENAL DISEASE) ON DIALYSIS (HCC): ICD-10-CM

## 2022-03-22 DIAGNOSIS — R11.2 NAUSEA AND VOMITING, INTRACTABILITY OF VOMITING NOT SPECIFIED, UNSPECIFIED VOMITING TYPE: ICD-10-CM

## 2022-03-22 DIAGNOSIS — K29.90 GASTRITIS AND DUODENITIS: ICD-10-CM

## 2022-03-22 PROBLEM — K27.9 PEPTIC ULCER DISEASE: Status: ACTIVE | Noted: 2022-03-22

## 2022-03-22 LAB
ALBUMIN SERPL-MCNC: 2.9 G/DL (ref 3.5–5)
ALBUMIN/GLOB SERPL: 0.7 {RATIO} (ref 1.1–2.2)
ALP SERPL-CCNC: 42 U/L (ref 45–117)
ALT SERPL-CCNC: 12 U/L (ref 12–78)
ANION GAP SERPL CALC-SCNC: 10 MMOL/L (ref 5–15)
AST SERPL-CCNC: 14 U/L (ref 15–37)
ATRIAL RATE: 86 BPM
BASOPHILS # BLD: 0.1 K/UL (ref 0–0.1)
BASOPHILS NFR BLD: 1 % (ref 0–1)
BILIRUB SERPL-MCNC: 0.3 MG/DL (ref 0.2–1)
BUN SERPL-MCNC: 74 MG/DL (ref 6–20)
BUN/CREAT SERPL: 6 (ref 12–20)
CALCIUM SERPL-MCNC: 8.6 MG/DL (ref 8.5–10.1)
CALCULATED P AXIS, ECG09: 79 DEGREES
CALCULATED R AXIS, ECG10: 50 DEGREES
CALCULATED T AXIS, ECG11: 97 DEGREES
CHLORIDE SERPL-SCNC: 104 MMOL/L (ref 97–108)
CO2 SERPL-SCNC: 24 MMOL/L (ref 21–32)
CREAT SERPL-MCNC: 12 MG/DL (ref 0.7–1.3)
DIAGNOSIS, 93000: NORMAL
DIFFERENTIAL METHOD BLD: ABNORMAL
EOSINOPHIL # BLD: 0.3 K/UL (ref 0–0.4)
EOSINOPHIL NFR BLD: 2 % (ref 0–7)
ERYTHROCYTE [DISTWIDTH] IN BLOOD BY AUTOMATED COUNT: 13.2 % (ref 11.5–14.5)
GLOBULIN SER CALC-MCNC: 4.2 G/DL (ref 2–4)
GLUCOSE SERPL-MCNC: 127 MG/DL (ref 65–100)
HCT VFR BLD AUTO: 32 % (ref 36.6–50.3)
HGB BLD-MCNC: 10.7 G/DL (ref 12.1–17)
IMM GRANULOCYTES # BLD AUTO: 0.2 K/UL (ref 0–0.04)
IMM GRANULOCYTES NFR BLD AUTO: 1 % (ref 0–0.5)
LIPASE SERPL-CCNC: 362 U/L (ref 73–393)
LYMPHOCYTES # BLD: 2.6 K/UL (ref 0.8–3.5)
LYMPHOCYTES NFR BLD: 15 % (ref 12–49)
MAGNESIUM SERPL-MCNC: 2.3 MG/DL (ref 1.6–2.4)
MCH RBC QN AUTO: 29.1 PG (ref 26–34)
MCHC RBC AUTO-ENTMCNC: 33.4 G/DL (ref 30–36.5)
MCV RBC AUTO: 87 FL (ref 80–99)
MONOCYTES # BLD: 1.8 K/UL (ref 0–1)
MONOCYTES NFR BLD: 11 % (ref 5–13)
NEUTS SEG # BLD: 11.7 K/UL (ref 1.8–8)
NEUTS SEG NFR BLD: 70 % (ref 32–75)
NRBC # BLD: 0 K/UL (ref 0–0.01)
NRBC BLD-RTO: 0 PER 100 WBC
P-R INTERVAL, ECG05: 150 MS
PHOSPHATE SERPL-MCNC: 5 MG/DL (ref 2.6–4.7)
PLATELET # BLD AUTO: 274 K/UL (ref 150–400)
PMV BLD AUTO: 11 FL (ref 8.9–12.9)
POTASSIUM SERPL-SCNC: 4.9 MMOL/L (ref 3.5–5.1)
PROT SERPL-MCNC: 7.1 G/DL (ref 6.4–8.2)
Q-T INTERVAL, ECG07: 408 MS
QRS DURATION, ECG06: 88 MS
QTC CALCULATION (BEZET), ECG08: 488 MS
RBC # BLD AUTO: 3.68 M/UL (ref 4.1–5.7)
SODIUM SERPL-SCNC: 138 MMOL/L (ref 136–145)
TROPONIN-HIGH SENSITIVITY: 58 NG/L (ref 0–76)
VENTRICULAR RATE, ECG03: 86 BPM
WBC # BLD AUTO: 16.8 K/UL (ref 4.1–11.1)

## 2022-03-22 PROCEDURE — 74011000258 HC RX REV CODE- 258: Performed by: EMERGENCY MEDICINE

## 2022-03-22 PROCEDURE — 80053 COMPREHEN METABOLIC PANEL: CPT

## 2022-03-22 PROCEDURE — 74011000250 HC RX REV CODE- 250: Performed by: STUDENT IN AN ORGANIZED HEALTH CARE EDUCATION/TRAINING PROGRAM

## 2022-03-22 PROCEDURE — 84100 ASSAY OF PHOSPHORUS: CPT

## 2022-03-22 PROCEDURE — 96365 THER/PROPH/DIAG IV INF INIT: CPT

## 2022-03-22 PROCEDURE — 36415 COLL VENOUS BLD VENIPUNCTURE: CPT

## 2022-03-22 PROCEDURE — 74011250636 HC RX REV CODE- 250/636: Performed by: EMERGENCY MEDICINE

## 2022-03-22 PROCEDURE — 74011250636 HC RX REV CODE- 250/636: Performed by: INTERNAL MEDICINE

## 2022-03-22 PROCEDURE — 96372 THER/PROPH/DIAG INJ SC/IM: CPT

## 2022-03-22 PROCEDURE — 76700 US EXAM ABDOM COMPLETE: CPT

## 2022-03-22 PROCEDURE — 83690 ASSAY OF LIPASE: CPT

## 2022-03-22 PROCEDURE — 96375 TX/PRO/DX INJ NEW DRUG ADDON: CPT

## 2022-03-22 PROCEDURE — 96361 HYDRATE IV INFUSION ADD-ON: CPT

## 2022-03-22 PROCEDURE — 81001 URINALYSIS AUTO W/SCOPE: CPT

## 2022-03-22 PROCEDURE — 74011250636 HC RX REV CODE- 250/636: Performed by: STUDENT IN AN ORGANIZED HEALTH CARE EDUCATION/TRAINING PROGRAM

## 2022-03-22 PROCEDURE — 74011250637 HC RX REV CODE- 250/637: Performed by: STUDENT IN AN ORGANIZED HEALTH CARE EDUCATION/TRAINING PROGRAM

## 2022-03-22 PROCEDURE — 74011636637 HC RX REV CODE- 636/637: Performed by: STUDENT IN AN ORGANIZED HEALTH CARE EDUCATION/TRAINING PROGRAM

## 2022-03-22 PROCEDURE — 71045 X-RAY EXAM CHEST 1 VIEW: CPT

## 2022-03-22 PROCEDURE — 99285 EMERGENCY DEPT VISIT HI MDM: CPT

## 2022-03-22 PROCEDURE — C9113 INJ PANTOPRAZOLE SODIUM, VIA: HCPCS | Performed by: EMERGENCY MEDICINE

## 2022-03-22 PROCEDURE — G0378 HOSPITAL OBSERVATION PER HR: HCPCS

## 2022-03-22 PROCEDURE — 85025 COMPLETE CBC W/AUTO DIFF WBC: CPT

## 2022-03-22 PROCEDURE — 83735 ASSAY OF MAGNESIUM: CPT

## 2022-03-22 PROCEDURE — 84484 ASSAY OF TROPONIN QUANT: CPT

## 2022-03-22 PROCEDURE — C9113 INJ PANTOPRAZOLE SODIUM, VIA: HCPCS | Performed by: STUDENT IN AN ORGANIZED HEALTH CARE EDUCATION/TRAINING PROGRAM

## 2022-03-22 PROCEDURE — 87086 URINE CULTURE/COLONY COUNT: CPT

## 2022-03-22 PROCEDURE — 74176 CT ABD & PELVIS W/O CONTRAST: CPT

## 2022-03-22 PROCEDURE — 90935 HEMODIALYSIS ONE EVALUATION: CPT

## 2022-03-22 PROCEDURE — 93005 ELECTROCARDIOGRAM TRACING: CPT

## 2022-03-22 PROCEDURE — 5A1D70Z PERFORMANCE OF URINARY FILTRATION, INTERMITTENT, LESS THAN 6 HOURS PER DAY: ICD-10-PCS | Performed by: INTERNAL MEDICINE

## 2022-03-22 RX ORDER — HEPARIN SODIUM 5000 [USP'U]/ML
5000 INJECTION, SOLUTION INTRAVENOUS; SUBCUTANEOUS EVERY 8 HOURS
Status: DISCONTINUED | OUTPATIENT
Start: 2022-03-22 | End: 2022-03-25 | Stop reason: HOSPADM

## 2022-03-22 RX ORDER — CARVEDILOL 12.5 MG/1
25 TABLET ORAL 2 TIMES DAILY WITH MEALS
Status: DISCONTINUED | OUTPATIENT
Start: 2022-03-22 | End: 2022-03-25 | Stop reason: HOSPADM

## 2022-03-22 RX ORDER — ACETAMINOPHEN 325 MG/1
650 TABLET ORAL
Status: DISCONTINUED | OUTPATIENT
Start: 2022-03-22 | End: 2022-03-25 | Stop reason: HOSPADM

## 2022-03-22 RX ORDER — NIFEDIPINE 60 MG/1
60 TABLET, EXTENDED RELEASE ORAL 2 TIMES DAILY
Status: DISCONTINUED | OUTPATIENT
Start: 2022-03-22 | End: 2022-03-25 | Stop reason: HOSPADM

## 2022-03-22 RX ORDER — ONDANSETRON 4 MG/1
4 TABLET, ORALLY DISINTEGRATING ORAL
Status: DISCONTINUED | OUTPATIENT
Start: 2022-03-22 | End: 2022-03-25 | Stop reason: HOSPADM

## 2022-03-22 RX ORDER — SODIUM CHLORIDE 0.9 % (FLUSH) 0.9 %
5-40 SYRINGE (ML) INJECTION EVERY 8 HOURS
Status: DISCONTINUED | OUTPATIENT
Start: 2022-03-22 | End: 2022-03-25 | Stop reason: HOSPADM

## 2022-03-22 RX ORDER — POLYETHYLENE GLYCOL 3350 17 G/17G
17 POWDER, FOR SOLUTION ORAL DAILY PRN
Status: DISCONTINUED | OUTPATIENT
Start: 2022-03-22 | End: 2022-03-25 | Stop reason: HOSPADM

## 2022-03-22 RX ORDER — DOXAZOSIN 2 MG/1
4 TABLET ORAL DAILY
Status: DISCONTINUED | OUTPATIENT
Start: 2022-03-23 | End: 2022-03-25 | Stop reason: HOSPADM

## 2022-03-22 RX ORDER — OXYCODONE HYDROCHLORIDE 5 MG/1
5 TABLET ORAL
Status: DISCONTINUED | OUTPATIENT
Start: 2022-03-22 | End: 2022-03-25 | Stop reason: HOSPADM

## 2022-03-22 RX ORDER — FENTANYL CITRATE 50 UG/ML
50 INJECTION, SOLUTION INTRAMUSCULAR; INTRAVENOUS
Status: COMPLETED | OUTPATIENT
Start: 2022-03-22 | End: 2022-03-22

## 2022-03-22 RX ORDER — PANTOPRAZOLE SODIUM 40 MG/10ML
40 INJECTION, POWDER, LYOPHILIZED, FOR SOLUTION INTRAVENOUS
Status: COMPLETED | OUTPATIENT
Start: 2022-03-22 | End: 2022-03-22

## 2022-03-22 RX ORDER — SODIUM CHLORIDE 0.9 % (FLUSH) 0.9 %
5-40 SYRINGE (ML) INJECTION AS NEEDED
Status: DISCONTINUED | OUTPATIENT
Start: 2022-03-22 | End: 2022-03-25 | Stop reason: HOSPADM

## 2022-03-22 RX ORDER — CLONIDINE HYDROCHLORIDE 0.1 MG/1
0.2 TABLET ORAL 3 TIMES DAILY
Status: DISCONTINUED | OUTPATIENT
Start: 2022-03-22 | End: 2022-03-25 | Stop reason: HOSPADM

## 2022-03-22 RX ORDER — ONDANSETRON 2 MG/ML
4 INJECTION INTRAMUSCULAR; INTRAVENOUS
Status: DISCONTINUED | OUTPATIENT
Start: 2022-03-22 | End: 2022-03-25 | Stop reason: HOSPADM

## 2022-03-22 RX ORDER — ACETAMINOPHEN 650 MG/1
650 SUPPOSITORY RECTAL
Status: DISCONTINUED | OUTPATIENT
Start: 2022-03-22 | End: 2022-03-25 | Stop reason: HOSPADM

## 2022-03-22 RX ORDER — ACETAMINOPHEN 325 MG/1
650 TABLET ORAL
Status: DISCONTINUED | OUTPATIENT
Start: 2022-03-22 | End: 2022-03-25 | Stop reason: SDUPTHER

## 2022-03-22 RX ORDER — DROPERIDOL 2.5 MG/ML
0.62 INJECTION, SOLUTION INTRAMUSCULAR; INTRAVENOUS ONCE
Status: COMPLETED | OUTPATIENT
Start: 2022-03-22 | End: 2022-03-22

## 2022-03-22 RX ORDER — HYDRALAZINE HYDROCHLORIDE 50 MG/1
100 TABLET, FILM COATED ORAL 3 TIMES DAILY
Status: DISCONTINUED | OUTPATIENT
Start: 2022-03-22 | End: 2022-03-25 | Stop reason: HOSPADM

## 2022-03-22 RX ORDER — BUPRENORPHINE AND NALOXONE 8; 2 MG/1; MG/1
1 FILM, SOLUBLE BUCCAL; SUBLINGUAL 2 TIMES DAILY
Status: DISCONTINUED | OUTPATIENT
Start: 2022-03-22 | End: 2022-03-25 | Stop reason: HOSPADM

## 2022-03-22 RX ORDER — BUMETANIDE 1 MG/1
2 TABLET ORAL DAILY
Status: DISCONTINUED | OUTPATIENT
Start: 2022-03-23 | End: 2022-03-25 | Stop reason: HOSPADM

## 2022-03-22 RX ADMIN — FENTANYL CITRATE 50 MCG: 0.05 INJECTION, SOLUTION INTRAMUSCULAR; INTRAVENOUS at 09:15

## 2022-03-22 RX ADMIN — HEPARIN SODIUM 5000 UNITS: 5000 INJECTION INTRAVENOUS; SUBCUTANEOUS at 21:51

## 2022-03-22 RX ADMIN — CARVEDILOL 25 MG: 12.5 TABLET, FILM COATED ORAL at 18:35

## 2022-03-22 RX ADMIN — HEPARIN SODIUM 2100 UNITS: 1000 INJECTION INTRAVENOUS; SUBCUTANEOUS at 17:45

## 2022-03-22 RX ADMIN — SODIUM CHLORIDE, PRESERVATIVE FREE 10 ML: 5 INJECTION INTRAVENOUS at 21:47

## 2022-03-22 RX ADMIN — SODIUM CHLORIDE 40 MG: 9 INJECTION INTRAMUSCULAR; INTRAVENOUS; SUBCUTANEOUS at 21:45

## 2022-03-22 RX ADMIN — LIDOCAINE HYDROCHLORIDE 40 ML: 20 SOLUTION ORAL; TOPICAL at 11:43

## 2022-03-22 RX ADMIN — HEPARIN SODIUM 2000 UNITS: 1000 INJECTION INTRAVENOUS; SUBCUTANEOUS at 17:45

## 2022-03-22 RX ADMIN — HYDRALAZINE HYDROCHLORIDE 100 MG: 50 TABLET, FILM COATED ORAL at 18:35

## 2022-03-22 RX ADMIN — OXYCODONE HYDROCHLORIDE 5 MG: 5 TABLET ORAL at 19:42

## 2022-03-22 RX ADMIN — NIFEDIPINE 60 MG: 60 TABLET, EXTENDED RELEASE ORAL at 13:16

## 2022-03-22 RX ADMIN — SODIUM CHLORIDE 500 ML: 9 INJECTION, SOLUTION INTRAVENOUS at 09:11

## 2022-03-22 RX ADMIN — CLONIDINE HYDROCHLORIDE 0.2 MG: 0.1 TABLET ORAL at 18:35

## 2022-03-22 RX ADMIN — PANTOPRAZOLE SODIUM 40 MG: 40 INJECTION, POWDER, FOR SOLUTION INTRAVENOUS at 10:13

## 2022-03-22 RX ADMIN — NIFEDIPINE 60 MG: 60 TABLET, EXTENDED RELEASE ORAL at 18:35

## 2022-03-22 RX ADMIN — PIPERACILLIN AND TAZOBACTAM 3.38 G: 3; .375 INJECTION, POWDER, LYOPHILIZED, FOR SOLUTION INTRAVENOUS at 10:13

## 2022-03-22 RX ADMIN — HEPARIN SODIUM 5000 UNITS: 5000 INJECTION INTRAVENOUS; SUBCUTANEOUS at 13:17

## 2022-03-22 RX ADMIN — DROPERIDOL 0.62 MG: 2.5 INJECTION, SOLUTION INTRAMUSCULAR; INTRAVENOUS at 09:13

## 2022-03-22 RX ADMIN — OXYCODONE HYDROCHLORIDE 5 MG: 5 TABLET ORAL at 13:15

## 2022-03-22 NOTE — PROGRESS NOTES
Nephrology Progress Note  Luc Lane     www. [email or web address]  Phone - [phone]   Patient: Karolyn Baxter    YOB: 1965        Date- 3/22/2022   Admit Date: 3/22/2022  CC: Follow up for esrd          IMPRESSION & PLAN:     ESRD - TTS- purvi vargas   Hypertensive urgency   Epigastric pain, nausea ,vomiting   Non compliance to meds and diet   Anemia of ckd   Sec. hyperpara    PLAN-   Hd today   Resume home bp meds   D/w patient     Subjective: Interval History:   -  Patient was sent to er from hd unit for vomiting and uncontrolled HTN  He denies missing any hd last week  He reports that he is taking his meds at home  His sbp 235 in ER. No c/o sob    Objective:   Vitals:    03/22/22 0724 03/22/22 0754 03/22/22 0759   BP: (!) 235/115  (!) 208/95   Pulse: (!) 107  92   Resp: 22 18   Temp: 99.1 °F (37.3 °C)     SpO2: 99% 96% 98%   Weight: 77.6 kg (171 lb)     Height: 5' 5\" (1.651 m)        No intake/output data recorded. Last 3 Recorded Weights in this Encounter    03/22/22 0724   Weight: 77.6 kg (171 lb)      Physical exam:    GEN: NAD  NECK- Supple, no mass  RESP: No wheezing, Clear b/l  CVS: S1,S2  RRR  NEURO: Normal speech, Non focal  EXT: No Edema   PSYCH: Normal Mood  permacath +  Chart reviewed. Pertinent Notes reviewed. Data Review :  Recent Labs     03/22/22  0833      K 4.9      CO2 24   BUN 74*   CREA 12.00*   *   CA 8.6   MG 2.3   PHOS 5.0*     Recent Labs     03/22/22  0833   WBC 16.8*   HGB 10.7*   HCT 32.0*        No results for input(s): FE, TIBC, PSAT, FERR in the last 72 hours.    Medication list  reviewed  Current Facility-Administered Medications   Medication Dose Route Frequency    sodium chloride 0.9 % bolus infusion 500 mL  500 mL IntraVENous ONCE    pantoprazole (PROTONIX) injection 40 mg  40 mg IntraVENous NOW    piperacillin-tazobactam (ZOSYN) 3.375 g in 0.9% sodium chloride (MBP/ADV) 100 mL MBP 3.375 g IntraVENous NOW     Current Outpatient Medications   Medication Sig    carvediloL (COREG) 25 mg tablet Take 1 Tablet by mouth two (2) times daily (with meals) for 90 days.  bumetanide (BUMEX) 2 mg tablet Take 1 Tablet by mouth daily for 90 days.  doxazosin (CARDURA) 4 mg tablet Take 1 Tablet by mouth daily for 90 days.  NIFEdipine ER (PROCARDIA XL) 60 mg ER tablet Take 1 Tablet by mouth two (2) times a day for 90 days.  cloNIDine HCL (CATAPRES) 0.2 mg tablet Take 0.2 mg by mouth three (3) times daily.  hydrALAZINE (APRESOLINE) 100 mg tablet Take 100 mg by mouth three (3) times daily.  ascorbic acid, vitamin C, (Vitamin C) 500 mg tablet Take 500 mg by mouth daily.  cholecalciferol (VITAMIN D3) 25 mcg (1,000 unit) cap Take 1,000 Units by mouth daily.  naloxone (Narcan) 4 mg/actuation nasal spray 1 [place] by IntraNASal route once as needed for Overdose. Use 1 spray intranasally, then discard. Repeat with new spray every 2 min as needed for opioid overdose symptoms, alternating nostrils.  potassium chloride SR (K-TAB) 20 mEq tablet Take 20 mEq by mouth daily.  buprenorphine-naloxone (Suboxone) 8-2 mg film sublingaul film 1 Film by SubLINGual route two (2) times a day.           Tello Robertson, [address] [facility]  [facility]  [address]  [address] [facility] [address], [address]  Phone - [phone]               Fax - [phone]

## 2022-03-22 NOTE — CONSULTS
Luc Lane         NAME:Cullen Mahoney  YOY:[id number]   :1965     Patient is on dialysis at [address] schedule  He was sent to er for vomiting and uncontrolled htn    He is non compliant to his bp meds  He has h/o drug abuse    Visit Vitals  BP (!) 208/95   Pulse 92   Temp 99.1 °F (37.3 °C)   Resp 18   Ht 5' 5\" (1.651 m)   Wt 77.6 kg (171 lb)   SpO2 98%   BMI 28.46 kg/m²                             Angie Mir MD  [facility]  [facility]  [facility], [address] [place], [address]  Phone - [phone]         Fax - [phone] [facility] Office  [address] [place] [address]  Phone - [phone]        Fax - [phone]     www. [facility]

## 2022-03-22 NOTE — PROGRESS NOTES
1830 Received pt. From dialysis. Admission assessment completed, pt. Oriented to room. BP elevated. Regular PO medications for HTN given as they had been held during dialysis. Notified MD, will cont. To monitor. End of shift report given to Todd Harman RN, updated on HTN and will follow up with MD if necessary.

## 2022-03-22 NOTE — PROGRESS NOTES
CM attempted to meet with pt at bedside to complete CM readmission assessment, pt asked CM to come back at another time as he was resting. CM noted patient with previous hospitalizations this year:  1/15-1/19/22 - hypertensive emergency  3/12-3/14/22 - Non-compliance     Pt admitted today under observation status for hypertensive urgency. Pt will need state observation notice, unable to provide and review with pt at this time. Pt known to attend OP HD at [facility] 6:00 AM schedule. Chart review notes that pt had previous medication compliance issues. Would benefit from medication reconciliation during hospitalization in attempts to address any issues with compliance.      Caesar Murphy, Pia Rhodes [facility], [address]

## 2022-03-22 NOTE — ED NOTES
Patient is being transferred to [address], Room # 2116. Report given to [facility] on Iliana Broderick for routine progression of care. Report consisted of the following information SBAR, ED Summary, Intake/Output, MAR and Recent Results. Patient transferred to receiving unit by: transport (RN or tech name). Outstanding consults needed: No     Next labs due: No     The following personal items will be sent with the patient during transfer to the floor: All valuables:    Cardiac monitoring ordered: Yes    The following CURRENT information was reported to the receiving RN:    Code status: Full Code at time of transfer    Last set of vital signs:  Vital Signs  Temp: 99.1 °F (37.3 °C) (03/22/22 0724)  Temp Source: Oral (03/22/22 0724)  Pulse (Heart Rate): (!) 101 (03/22/22 1327)  Heart Rate Source: Monitor (03/22/22 0724)  Cardiac Rhythm: Sinus Tachy (03/22/22 0724)  Resp Rate: 21 (03/22/22 1327)  BP: (!) 178/65 (03/22/22 1327)  MAP (Monitor): 91 (03/22/22 1327)  MAP (Calculated): 103 (03/22/22 1327)  BP 1 Location: Right arm (03/22/22 0724)  BP 1 Method: Automatic (03/22/22 0724)  BP Patient Position: At rest (03/22/22 0724)         Oxygen Therapy  O2 Sat (%): 100 % (03/22/22 1327)  Pulse via Oximetry: 99 beats per minute (03/22/22 1327)  O2 Device: None (Room air) (03/22/22 0754)      Last pain assessment:  Pain 1  Patient Stated Pain Goal: 10 (lower abdominal pain)      Wounds: No     Urinary catheter: voiding  Is there a brown order: No     LDAs:       Peripheral IV 03/22/22 Left Arm (Active)         Opportunity for questions and clarification was provided.     Jono John RN

## 2022-03-22 NOTE — PROCEDURES
Hemodialysis / [phone]    Vitals Pre Post Assessment Pre Post   BP BP: (!) 195/104 (03/22/22 1358) 200/102 (meds due) LOC A&Ox4 A&Ox4   HR Pulse (Heart Rate): 89 (03/22/22 1358) 104 Lungs Dim bases clear   Resp Resp Rate: 20 (03/22/22 1358) 18 Cardiac NSR -ST NSR -ST   Temp Temp: 98.5 °F (36.9 °C) (03/22/22 1358) 99.0 ,oral Skin CDI CDI   Weight  n/a n/a Edema trace trace   Tele status remote remote Pain  0 0     Orders   Duration: Start: 9190 End: 1848 Total: 3.75hr   Dialyzer: Dialyzer/Set Up Inspection: Revaclear (03/22/22 1358)   K Bath: Dialysate K (mEq/L): 2 (03/22/22 1358)   Ca Bath: Dialysate CA (mEq/L): 2.5 (03/22/22 1358)   Na: Dialysate NA (mEq/L): 140 (03/22/22 1358)   Bicarb: Dialysate HCO3 (mEq/L): 40 (03/22/22 1358)   Target Fluid Removal: Goal/Amount of Fluid to Remove (mL): 1000 mL (03/22/22 1358)     Access   Type & Location: [facility]   Comments:  Dressing CDI from clinic, dressing changed and dated for today. No s/s of infection. Both lumens aspirate & flush well. Running well at .                                     Labs   HBsAg (Antigen) / date: Neg Ag 3/13/22                                              HBsAb (Antibody) / date: <10 Ab 3/13/22   Source: EPIC   Obtained/Reviewed  Critical Results Called HGB   Date Value Ref Range Status   03/22/2022 10.7 (L) 12.1 - 17.0 g/dL Final     Potassium   Date Value Ref Range Status   03/22/2022 4.9 3.5 - 5.1 mmol/L Final     Calcium   Date Value Ref Range Status   03/22/2022 8.6 8.5 - 10.1 MG/DL Final     BUN   Date Value Ref Range Status   03/22/2022 74 (H) 6 - 20 MG/DL Final     Creatinine   Date Value Ref Range Status   03/22/2022 12.00 (H) 0.70 - 1.30 MG/DL Final        Meds Given   Name Dose Route   None ordered                 Adequacy / Fluid    Total Liters Process: 82.8L   Net Fluid Removed: 1000ml      Comments   Time Out Done:   (Time) 1356   Admitting Diagnosis: Peptic Ulcer; HTN urgency   Consent obtained/signed: Informed Consent Verified: Yes (03/22/22 1358)   Machine / RO # Machine Number: Shelbie Ort (03/22/22 1358)   Primary Nurse Rpt Pre: Jessica Torres RN (ED)   Primary Nurse Rpt Post: Angel Kumari RN (MT)   Pt Education: Consents/ procedural   Care Plan: Ongoing- Admission   Pts outpatient clinic: [facility]     Tx Summary   Comments:      SBAR received from Primary RN. Pt arrived to HD suite A&Ox4. Consent signed & on file. 1358: Each catheter limb disinfected per p&p, caps removed, hubs disinfected per p&p. Each lumen aspirated for blood return and flushed with Normal Saline per policy. VSS. Dialysis Tx initiated. * remains hypertensive, to get several BP meds upon return to floor. No other issues during HD*    1745: Tx ended. VSS. Each dialysis catheter limb disinfected per p&p, all possible blood returned per p&p, and each dialysis hub disinfected per p&p. Each lumen flushed, post dialysis catheter Heparin dwell instilled per order, and caps applied. Bed locked and in the lowest position, call bell and belongings in reach. SBAR given to Primary, RN. Patient is stable at time of their departure. All Dialysis related medications have been reviewed.

## 2022-03-22 NOTE — CONSULTS
Gastroenterology Consultation Note  [place]s, [place]   for Dr. Yumiko Castrejon    NAME: Antwon Gonzales : 1965 MRN: [id number]   ATTG: Dr. Fausto Clark PCP: Dilcia Renteria NP  Date/Time:  3/22/2022 12:40 PM  Subjective:   REASON FOR CONSULT:      Yovani Gaytan is a 64 y.o.  male who I was asked to see for epigastric pain. He reports yesterday he started with nausea and vomiting, yellow emetsis. No hematemesis. He also admits to epigastric pain and right midabdominal pain. He admits to yellow diarrhea with this. Nephew lives with him and has fever and diarrhea. He reports LBM was yesterday. No hematochezia or melena. He denies any chronic issues with N/V. Hx of kidney disease and on dialysis Tuesday, Thursday and Saturday. 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 Naga Mosquera. Denies any NSAID use or AC use. CT was done in the ER: IMPRESSION     1. Infectious, inflammatory, or ulcer related duodenitis is favored over  pancreatitis. Correlate with lipase. No perforation or pneumoperitoneum. 2. Cholelithiasis. 3. New small pericardial effusion. 4. L5 spondylolysis. Increased L5-S1 degenerative disc disease. WBC elevated at 16.8, hgb low at 10.7, plt stable, LFTs WNL    EGD by Dr. Naga Mosquera on 3/25/21 nonrevealing    Denies any tobacco, EtOH use or illicit substance use. Past Medical History:   Diagnosis Date    Chronic kidney disease     Hypertension     Liver disease       No past surgical history on file. Social History     Tobacco Use    Smoking status: Current Some Day Smoker     Packs/day: 1.00     Last attempt to quit: 2020     Years since quittin.6    Smokeless tobacco: Never Used    Tobacco comment: started back smoking in last couple months   Substance Use Topics    Alcohol use: Not Currently      No family history on file.    Allergies   Allergen Reactions    Baclofen Rash     All over      Home Medications:  Prior to Admission Medications   Prescriptions Last Dose Informant Patient Reported? Taking? NIFEdipine ER (PROCARDIA XL) 60 mg ER tablet   No No   Sig: Take 1 Tablet by mouth two (2) times a day for 90 days. ascorbic acid, vitamin C, (Vitamin C) 500 mg tablet   Yes No   Sig: Take 500 mg by mouth daily. bumetanide (BUMEX) 2 mg tablet   No No   Sig: Take 1 Tablet by mouth daily for 90 days. buprenorphine-naloxone (Suboxone) 8-2 mg film sublingaul film   Yes No   Si [place] by SubLINGual route two (2) times a day. carvediloL (COREG) 25 mg tablet   No No   Sig: Take 1 Tablet by mouth two (2) times daily (with meals) for 90 days. cholecalciferol (VITAMIN D3) 25 mcg (1,000 unit) cap   Yes No   Sig: Take 1,000 Units by mouth daily. cloNIDine HCL (CATAPRES) 0.2 mg tablet   Yes No   Sig: Take 0.2 mg by mouth three (3) times daily. doxazosin (CARDURA) 4 mg tablet   No No   Sig: Take 1 Tablet by mouth daily for 90 days. hydrALAZINE (APRESOLINE) 100 mg tablet   Yes No   Sig: Take 100 mg by mouth three (3) times daily. naloxone (Narcan) 4 mg/actuation nasal spray   Yes No   [place] by IntraNASal route once as needed for Overdose. Use 1 spray intranasally, then discard. Repeat with new spray every 2 min as needed for opioid overdose symptoms, alternating nostrils. potassium chloride SR (K-TAB) 20 mEq tablet   Yes No   Sig: Take 20 mEq by mouth daily.       Facility-Administered Medications: None     Hospital medications:  Current Facility-Administered Medications   Medication Dose Route Frequency    acetaminophen (TYLENOL) tablet 650 mg  650 mg Oral Q6H PRN    [START ON 3/23/2022] bumetanide (BUMEX) tablet 2 mg  2 mg Oral DAILY    buprenorphine-naloxone (SUBOXONE) 8mg-2mg SL film  1 Film SubLINGual BID    carvediloL (COREG) tablet 25 mg  25 mg Oral BID WITH MEALS    cloNIDine HCL (CATAPRES) tablet 0.2 mg  0.2 mg Oral TID    [START ON 3/23/2022] doxazosin (CARDURA) tablet 4 mg  4 mg Oral DAILY    hydrALAZINE (APRESOLINE) tablet 100 mg  100 mg Oral TID    NIFEdipine ER (PROCARDIA XL) tablet 60 mg  60 mg Oral BID    sodium chloride (NS) flush 5-40 mL  5-40 mL IntraVENous Q8H    sodium chloride (NS) flush 5-40 mL  5-40 mL IntraVENous PRN    acetaminophen (TYLENOL) tablet 650 mg  650 mg Oral Q6H PRN    Or    acetaminophen (TYLENOL) suppository 650 mg  650 mg Rectal Q6H PRN    polyethylene glycol (MIRALAX) packet 17 g  17 g Oral DAILY PRN    ondansetron (ZOFRAN ODT) tablet 4 mg  4 mg Oral Q8H PRN    Or    ondansetron (ZOFRAN) injection 4 mg  4 mg IntraVENous Q6H PRN    heparin (porcine) injection 5,000 Units  5,000 Units SubCUTAneous Q8H    oxyCODONE IR (ROXICODONE) tablet 5 mg  5 mg Oral Q4H PRN    pantoprazole (PROTONIX) 40 mg in 0.9% sodium chloride 10 mL injection  40 mg IntraVENous Q12H     Current Outpatient Medications   Medication Sig    carvediloL (COREG) 25 mg tablet Take 1 Tablet by mouth two (2) times daily (with meals) for 90 days.  bumetanide (BUMEX) 2 mg tablet Take 1 Tablet by mouth daily for 90 days.  doxazosin (CARDURA) 4 mg tablet Take 1 Tablet by mouth daily for 90 days.  NIFEdipine ER (PROCARDIA XL) 60 mg ER tablet Take 1 Tablet by mouth two (2) times a day for 90 days.  cloNIDine HCL (CATAPRES) 0.2 mg tablet Take 0.2 mg by mouth three (3) times daily.  hydrALAZINE (APRESOLINE) 100 mg tablet Take 100 mg by mouth three (3) times daily.  ascorbic acid, vitamin C, (Vitamin C) 500 mg tablet Take 500 mg by mouth daily.  cholecalciferol (VITAMIN D3) 25 mcg (1,000 unit) cap Take 1,000 Units by mouth daily.  naloxone (Narcan) 4 mg/actuation nasal spray 1 [place] by IntraNASal route once as needed for Overdose. Use 1 spray intranasally, then discard. Repeat with new spray every 2 min as needed for opioid overdose symptoms, alternating nostrils.  potassium chloride SR (K-TAB) 20 mEq tablet Take 20 mEq by mouth daily.     buprenorphine-naloxone (Suboxone) 8-2 mg film sublingaul film 1 Film by SubLINGual route two (2) times a day. REVIEW OF SYSTEMS:     []     Unable to obtain  ROS due to  []    mental status change  []    sedated   []    intubated  Review of Systems -   History obtained from the patient  General ROS: negative for - weight loss  Psychological ROS: negative for - anxiety or depression  Hematological and Lymphatic ROS: negative for - bleeding problems  Respiratory ROS: no cough, shortness of breath, or wheezing  Cardiovascular ROS: no chest pain or dyspnea on exertion  Gastrointestinal ROS: See HPi  Genito-Urinary ROS: no dysuria, trouble voiding, or hematuria  Musculoskeletal ROS: negative for - joint pain  Neurological ROS: no TIA or stroke symptoms  Dermatological ROS: negative for - rash    Objective:   VITALS:    Visit Vitals  BP (!) 164/82   Pulse 91   Temp 99.1 °F (37.3 °C)   Resp 15   Ht 5' 5\" (1.651 m)   Wt 77.6 kg (171 lb)   SpO2 97%   BMI 28.46 kg/m²     Temp (24hrs), Av.1 °F (37.3 °C), Min:99.1 °F (37.3 °C), Max:99.1 °F (37.3 °C)    PHYSICAL EXAM:   General:    Alert, cooperative BM, no distress, appears stated age. Head:   Normocephalic, without obvious abnormality, atraumatic. Eyes:   Conjunctivae clear, anicteric sclerae. Pupils are equal  Nose:  Nares normal. No drainage or sinus tenderness. Throat:    Lips, mucosa, and tongue normal.  No Thrush  Neck:  Supple, symmetrical,  no adenopathy, thyroid: non tender  Lungs:   CTA bilaterally. No wheezing/rhonchi/rales. Heart:   Regular rate and rhythm,  no murmur, rub or gallop. Abdomen:   Soft, epigastric tenderness without guarding or rebound. Not distended. Bowel sounds normal. No masses. No hepatosplenomegaly. Rectal:  Deferred  Extremities: No cyanosis. No edema. No clubbing  Skin:     Texture, turgor normal. No rashes/lesions/jaundice  Lymph: Cervical, supraclavicular normal.  Psych:  Good insight. Not depressed. Not anxious or agitated. Neurologic: EOMs intact.  No facial asymmetry. No aphasia or slurred speech normal strength, A/O X 3. LAB DATA REVIEWED:    Lab Results   Component Value Date/Time    WBC 16.8 (H) 03/22/2022 08:33 AM    HGB 10.7 (L) 03/22/2022 08:33 AM    HCT 32.0 (L) 03/22/2022 08:33 AM    PLATELET 686 91/85/6140 08:33 AM    MCV 87.0 03/22/2022 08:33 AM     Lab Results   Component Value Date/Time    ALT (SGPT) 12 03/22/2022 08:33 AM    Alk. phosphatase 42 (L) 03/22/2022 08:33 AM    Bilirubin, direct 0.11 04/29/2021 10:00 AM    Bilirubin, total 0.3 03/22/2022 08:33 AM     Lab Results   Component Value Date/Time    Sodium 138 03/22/2022 08:33 AM    Potassium 4.9 03/22/2022 08:33 AM    Chloride 104 03/22/2022 08:33 AM    CO2 24 03/22/2022 08:33 AM    Anion gap 10 03/22/2022 08:33 AM    Glucose 127 (H) 03/22/2022 08:33 AM    BUN 74 (H) 03/22/2022 08:33 AM    Creatinine 12.00 (H) 03/22/2022 08:33 AM    BUN/Creatinine ratio 6 (L) 03/22/2022 08:33 AM    GFR est AA 5 (L) 03/22/2022 08:33 AM    GFR est non-AA 4 (L) 03/22/2022 08:33 AM    Calcium 8.6 03/22/2022 08:33 AM     Lab Results   Component Value Date/Time    Lipase 362 03/22/2022 08:33 AM     Lab Results   Component Value Date/Time    INR 1.1 01/15/2021 11:25 AM    INR 1.0 07/06/2020 04:21 AM    Prothrombin time 11.7 (H) 01/15/2021 11:25 AM    Prothrombin time 10.5 07/06/2020 04:21 AM     CT Results (most recent):  Results from Hospital Encounter encounter on 03/22/22    CT ABD PELV WO CONT    Narrative  EXAM: CT ABD PELV WO CONT    INDICATION: abdominal pain, n/v . No laboratory values at this time. Chronic  kidney disease. Chronic hepatitis C.    COMPARISON: CT abdomen/pelvis without contrast on 6/12/2020. IV CONTRAST: None. ORAL CONTRAST: None    TECHNIQUE:  Thin axial images were obtained through the abdomen and pelvis. Coronal and  sagittal reformats were generated.  CT dose reduction was achieved through use of  a standardized protocol tailored for this examination and automatic exposure  control for dose modulation. The absence of intravenous contrast material reduces the sensitivity for  evaluation of the vasculature and solid organs. FINDINGS:  LOWER THORAX: Incidentally imaged. Small pericardial effusion is new. No basilar  pneumonia. Dialysis catheter terminates in the right atrium. LIVER: No mass. BILIARY TREE: Gallstones are unchanged. No evidence of cholecystitis. CBD is not  dilated. SPLEEN: within normal limits. PANCREAS: Mild surrounding inflammation. ADRENALS: Unremarkable. KIDNEYS/URETERS: Right renal benign cysts are grossly unchanged. No  nephrolithiasis or hydronephrosis. STOMACH: Unremarkable. SMALL BOWEL: Mural thickening of the third portion of the duodenum. Moderate  surrounding edema in the fat. No gas. COLON: No dilatation or wall thickening. APPENDIX: Normal.  PERITONEUM: Trace ascites. No pneumoperitoneum. RETROPERITONEUM: Aortic atherosclerosis without aneurysm. Unchanged physiologic  periaortic lymph nodes. REPRODUCTIVE ORGANS: Prostate gland contains calcifications. URINARY BLADDER: No mass or calculus. BONES: L5-S1 degenerative disc disease, erosions, and L5 spondylolysis. Findings  are increased since 2020. ABDOMINAL WALL: No mass or hernia. ADDITIONAL COMMENTS: N/A    Impression  1. Infectious, inflammatory, or ulcer related duodenitis is favored over  pancreatitis. Correlate with lipase. No perforation or pneumoperitoneum. 2. Cholelithiasis. 3. New small pericardial effusion. 4. L5 spondylolysis. Increased L5-S1 degenerative disc disease. Impression:  Active Problems:    Hypertensive urgency (3/12/2022)      Peptic ulcer disease (3/22/2022)      Intractable abdominal pain (3/22/2022)      ESRD (end stage renal disease) ([facility] [address] (3/22/2022)       Plan:  Patient with epigastric pain with N/V.  CT was done showing duodenitis rather than pancreatitis. He does admit to sick contact- Nephew with similar sx who lives with him. Could be a viral gastroenteritis. Continue with supportive treatment and BID PPI. Can attempt to check stool for infection causes, viral panel is limited as an inpatient.   If sx fail to improve and BP is better controlled can consider repeat EGD.          ___________________________________________________  Care Plan discussed with:    [x]    Patient   []    Family   []    Nursing   []    Attending  Total Time :  30 minutes   ___________________________________________________  GI: AYLEEN Lucero

## 2022-03-22 NOTE — H&P
Hospitalist Admission Note    NAME: Amairani Franco   :  1965   MRN:  [id number]     Date/Time:  3/22/2022 11:16 AM    Patient PCP: Gary Cantu, NP  ______________________________________________________________________  Given the patient's current clinical presentation, I have a high level of concern for decompensation if discharged from the emergency department. Complex decision making was performed, which includes reviewing the patient's available past medical records, laboratory results, and x-ray films. My assessment of this patient's clinical condition and my plan of care is as follows. Assessment / Plan:  Epigastric pain  Likely due to Duodenitis    Admit to observation  CT abdomen  1. Infectious, inflammatory, or ulcer related duodenitis is favored over  pancreatitis. Correlate with lipase. No perforation or pneumoperitoneum. 2. Cholelithiasis. 3. New small pericardial effusion. 4. L5 spondylolysis. Increased L5-S1 degenerative disc disease. Lipase 362, ALT AST normal  GI consult, may need endoscopy  Protonix 40 every 12, will give GI cocktail x1    Hypertensive urgency  ESRD on HD  Has recurrent admissions for uncontrolled hypertension, with history of noncompliance  Mentions he is taking his medications regularly  Continue with home medication  Hemodialysis as per nephrology    Leukocytosis:  Hb 16, likely reactive from pain  CXR clear  UA if able to collect    Code Status: Full  Surrogate Decision Maker: His sister    DVT Prophylaxis: Heparin  GI Prophylaxis: Protonix    Baseline: Ambulatory      Subjective:   CHIEF COMPLAINT:     HISTORY OF PRESENT ILLNESS:     Yoan Gallardo is a 64 y.o. male with past medical history of ESRD on HD, hypertension presented with epigastric pain and vomiting. He mentions having constant epigastric pain since yesterday, associated with vomiting.   He denies any chest pain, fever, shortness of breath, change in bladder or bowel habits, lower extremity edema or tenderness. He has recurrent admissions to our hospital mostly for uncontrolled hypertension likely due to noncompliance to his medications. We were asked to admit for work up and evaluation of the above problems. Past Medical History:   Diagnosis Date    Chronic kidney disease     Hypertension     Liver disease         No past surgical history on file. Social History     Tobacco Use    Smoking status: Current Some Day Smoker     Packs/day: 1.00     Last attempt to quit: 2020     Years since quittin.6    Smokeless tobacco: Never Used    Tobacco comment: started back smoking in last couple months   Substance Use Topics    Alcohol use: Not Currently        No family history on file. Allergies   Allergen Reactions    Baclofen Rash     All over        Prior to Admission medications    Medication Sig Start Date End Date Taking? Authorizing Provider   carvediloL (COREG) 25 mg tablet Take 1 Tablet by mouth two (2) times daily (with meals) for 90 days. 22  Luan Guajardo MD   bumetanide (BUMEX) 2 mg tablet Take 1 Tablet by mouth daily for 90 days. 22  Luan Guajardo MD   doxazosin (CARDURA) 4 mg tablet Take 1 Tablet by mouth daily for 90 days. 22  Luan Guajardo MD   NIFEdipine ER (PROCARDIA XL) 60 mg ER tablet Take 1 Tablet by mouth two (2) times a day for 90 days. 22  Luan Guajardo MD   cloNIDine HCL (CATAPRES) 0.2 mg tablet Take 0.2 mg by mouth three (3) times daily. Provider, Historical   hydrALAZINE (APRESOLINE) 100 mg tablet Take 100 mg by mouth three (3) times daily. Provider, Historical   ascorbic acid, vitamin C, (Vitamin C) 500 mg tablet Take 500 mg by mouth daily. Provider, Historical   cholecalciferol (VITAMIN D3) 25 mcg (1,000 unit) cap Take 1,000 Units by mouth daily.     Provider, Historical   naloxone (Narcan) 4 mg/actuation nasal spray 1 [place] by IntraNASal route once as needed for Overdose. Use 1 spray intranasally, then discard. Repeat with new spray every 2 min as needed for opioid overdose symptoms, alternating nostrils. Provider, Historical   potassium chloride SR (K-TAB) 20 mEq tablet Take 20 mEq by mouth daily. Provider, Historical   buprenorphine-naloxone (Suboxone) 8-2 mg film sublingaul film 1 Film by SubLINGual route two (2) times a day. Provider, Historical       REVIEW OF SYSTEMS:       Total of 12 systems reviewed as follows:       POSITIVE= underlined text  Negative = text not underlined  General:  fever, chills, sweats, generalized weakness, weight loss/gain,      loss of appetite   Eyes:    blurred vision, eye pain, loss of vision, double vision  ENT:    rhinorrhea, pharyngitis   Respiratory:   cough, sputum production, SOB, KING, wheezing, pleuritic pain   Cardiology:   chest pain, palpitations, orthopnea, PND, edema, syncope   Gastrointestinal:  abdominal pain , N/V, diarrhea, dysphagia, constipation, bleeding   Genitourinary:  frequency, urgency, dysuria, hematuria, incontinence   Muskuloskeletal :  arthralgia, myalgia, back pain  Hematology:  easy bruising, nose or gum bleeding, lymphadenopathy   Dermatological: rash, ulceration, pruritis, color change / jaundice  Endocrine:   hot flashes or polydipsia   Neurological:  headache, dizziness, confusion, focal weakness, paresthesia,     Speech difficulties, memory loss, gait difficulty  Psychological: Feelings of anxiety, depression, agitation    Objective:   VITALS:    Visit Vitals  BP (!) 208/95   Pulse 92   Temp 99.1 °F (37.3 °C)   Resp 18   Ht 5' 5\" (1.651 m)   Wt 77.6 kg (171 lb)   SpO2 98%   BMI 28.46 kg/m²       PHYSICAL EXAM:    General:    Alert, cooperative, no distress, appears stated age. HEENT: Atraumatic, anicteric sclerae, pink conjunctivae     No oral ulcers, mucosa moist, throat clear, dentition fair  Neck:  Supple, symmetrical,  thyroid: non tender  Lungs:   Clear to auscultation bilaterally. No Wheezing or Rhonchi. No rales. Chest wall:  No tenderness  No Accessory muscle use. Heart:   Regular  rhythm,  No  murmur   No edema  Abdomen:   Epigastric tenderness present  Extremities: No cyanosis. No clubbing,      Skin turgor normal, Capillary refill normal, Radial dial pulse 2+  Skin:     Not pale. Not Jaundiced  No rashes   Psych:  Good insight. Not depressed. Not anxious or agitated. Neurologic: EOMs intact. No facial asymmetry. No aphasia or slurred speech. Symmetrical strength, Sensation grossly intact. Alert and oriented X 4.     _______________________________________________________________________  Care Plan discussed with:    Comments   Patient y    Family      RN y    Care Manager                    Consultant:      _______________________________________________________________________  Expected  Disposition:   Home with Family y   HH/PT/OT/RN    SNF/LTC    ILIR    ________________________________________________________________________  TOTAL TIME:  40 Minutes    Critical Care Provided     Minutes non procedure based      Comments     Reviewed previous records   >50% of visit spent in counseling and coordination of care  Discussion with patient and/or family and questions answered       ________________________________________________________________________  Signed: Lashell Jacob MD    Procedures: see electronic medical records for all procedures/Xrays and details which were not copied into this note but were reviewed prior to creation of Plan.     LAB DATA REVIEWED:    Recent Results (from the past 24 hour(s))   CBC WITH AUTOMATED DIFF    Collection Time: 03/22/22  8:33 AM   Result Value Ref Range    WBC 16.8 (H) 4.1 - 11.1 K/uL    RBC 3.68 (L) 4.10 - 5.70 M/uL    HGB 10.7 (L) 12.1 - 17.0 g/dL    HCT 32.0 (L) 36.6 - 50.3 %    MCV 87.0 80.0 - 99.0 FL    MCH 29.1 26.0 - 34.0 PG    MCHC 33.4 30.0 - 36.5 g/dL    RDW 13.2 11.5 - 14.5 %    PLATELET 613 347 - 929 K/uL    MPV 11.0 8.9 - 12.9 FL    NRBC 0.0 0  WBC    ABSOLUTE NRBC 0.00 0.00 - 0.01 K/uL    NEUTROPHILS 70 32 - 75 %    LYMPHOCYTES 15 12 - 49 %    MONOCYTES 11 5 - 13 %    EOSINOPHILS 2 0 - 7 %    BASOPHILS 1 0 - 1 %    IMMATURE GRANULOCYTES 1 (H) 0.0 - 0.5 %    ABS. NEUTROPHILS 11.7 (H) 1.8 - 8.0 K/UL    ABS. LYMPHOCYTES 2.6 0.8 - 3.5 K/UL    ABS. MONOCYTES 1.8 (H) 0.0 - 1.0 K/UL    ABS. EOSINOPHILS 0.3 0.0 - 0.4 K/UL    ABS. BASOPHILS 0.1 0.0 - 0.1 K/UL    ABS. IMM. GRANS. 0.2 (H) 0.00 - 0.04 K/UL    DF AUTOMATED     METABOLIC PANEL, COMPREHENSIVE    Collection Time: 03/22/22  8:33 AM   Result Value Ref Range    Sodium 138 136 - 145 mmol/L    Potassium 4.9 3.5 - 5.1 mmol/L    Chloride 104 97 - 108 mmol/L    CO2 24 21 - 32 mmol/L    Anion gap 10 5 - 15 mmol/L    Glucose 127 (H) 65 - 100 mg/dL    BUN 74 (H) 6 - 20 MG/DL    Creatinine 12.00 (H) 0.70 - 1.30 MG/DL    BUN/Creatinine ratio 6 (L) 12 - 20      GFR est AA 5 (L) >60 ml/min/1.73m2    GFR est non-AA 4 (L) >60 ml/min/1.73m2    Calcium 8.6 8.5 - 10.1 MG/DL    Bilirubin, total 0.3 0.2 - 1.0 MG/DL    ALT (SGPT) 12 12 - 78 U/L    AST (SGOT) 14 (L) 15 - 37 U/L    Alk.  phosphatase 42 (L) 45 - 117 U/L    Protein, total 7.1 6.4 - 8.2 g/dL    Albumin 2.9 (L) 3.5 - 5.0 g/dL    Globulin 4.2 (H) 2.0 - 4.0 g/dL    A-G Ratio 0.7 (L) 1.1 - 2.2     MAGNESIUM    Collection Time: 03/22/22  8:33 AM   Result Value Ref Range    Magnesium 2.3 1.6 - 2.4 mg/dL   PHOSPHORUS    Collection Time: 03/22/22  8:33 AM   Result Value Ref Range    Phosphorus 5.0 (H) 2.6 - 4.7 MG/DL   TROPONIN-HIGH SENSITIVITY    Collection Time: 03/22/22  8:33 AM   Result Value Ref Range    Troponin-High Sensitivity 58 0 - 76 ng/L   LIPASE    Collection Time: 03/22/22  8:33 AM   Result Value Ref Range    Lipase 362 73 - 393 U/L   EKG, 12 LEAD, INITIAL    Collection Time: 03/22/22 10:31 AM   Result Value Ref Range    Ventricular Rate 86 BPM    Atrial Rate 86 BPM    P-R Interval 150 ms    QRS Duration 88 ms    Q-T Interval 408 ms    QTC Calculation (Bezet) 488 ms    Calculated P Axis 79 degrees    Calculated R Axis 50 degrees    Calculated T Axis 97 degrees    Diagnosis       Normal sinus rhythm  Moderate voltage criteria for LVH, may be normal variant  T wave abnormality, consider lateral ischemia  Prolonged QT  When compared with ECG of 12-MAR-2022 09:57,  NE interval has decreased  Criteria for Anterior infarct are no longer present  T wave inversion more evident in Anterolateral leads

## 2022-03-22 NOTE — ED PROVIDER NOTES
EMERGENCY DEPARTMENT HISTORY AND PHYSICAL EXAM      Date: 3/22/2022  Patient Name: Demarcus Robin    History of Presenting Illness     Chief Complaint   Patient presents with    Abdominal Pain     Pt arrived via [address] [facility] for complaints of N/V abd pain 10/10 cramping since last PM. Last vomiting episode 0300. Was picked up in dialysis waiting room and did not receive his tx. Pt is also HTN.  Nausea    Vomiting       History Provided By: Patient and EMS    HPI: Demarcus Robin, 64 y.o. male presents to the ED with cc of abdominal pain, nausea vomiting. Patient states he had gone to dialysis this morning and began having epigastric abdominal pain nausea vomiting EMS was called. Patient states that he has had nausea vomiting diarrhea over the last 3 days. He states severe abdominal pain within the epigastric region with no alleviating factors. Pain is worsened with p.o. intake. He denies any recent melena, hematochezia or bright red blood per rectum. He denies any chest pain or shortness of breath. He denies any recent fever or chills. No recent sick contacts that he is aware of. He denies any current headache loss of sensation or loss of strength. Overall does feel generalized weakness and fatigued    There are no other complaints, changes, or physical findings at this time. PCP: Jessica Leo NP    No current facility-administered medications on file prior to encounter. Current Outpatient Medications on File Prior to Encounter   Medication Sig Dispense Refill    carvediloL (COREG) 25 mg tablet Take 1 Tablet by mouth two (2) times daily (with meals) for 90 days. 60 Tablet 2    bumetanide (BUMEX) 2 mg tablet Take 1 Tablet by mouth daily for 90 days. 30 Tablet 2    doxazosin (CARDURA) 4 mg tablet Take 1 Tablet by mouth daily for 90 days. 30 Tablet 2    NIFEdipine ER (PROCARDIA XL) 60 mg ER tablet Take 1 Tablet by mouth two (2) times a day for 90 days.  60 Tablet 2    cloNIDine HCL (CATAPRES) 0.2 mg tablet Take 0.2 mg by mouth three (3) times daily.  hydrALAZINE (APRESOLINE) 100 mg tablet Take 100 mg by mouth three (3) times daily.  ascorbic acid, vitamin C, (Vitamin C) 500 mg tablet Take 500 mg by mouth daily.  cholecalciferol (VITAMIN D3) 25 mcg (1,000 unit) cap Take 1,000 Units by mouth daily.  naloxone (Narcan) 4 mg/actuation nasal spray 1 [place] by IntraNASal route once as needed for Overdose. Use 1 spray intranasally, then discard. Repeat with new spray every 2 min as needed for opioid overdose symptoms, alternating nostrils.  potassium chloride SR (K-TAB) 20 mEq tablet Take 20 mEq by mouth daily.  buprenorphine-naloxone (Suboxone) 8-2 mg film sublingaul film 1 Film by SubLINGual route two (2) times a day. Past History     Past Medical History:  Past Medical History:   Diagnosis Date    Chronic kidney disease     Hypertension     Liver disease        Past Surgical History:  No past surgical history on file. Family History:  No family history on file. Social History:  Social History     Tobacco Use    Smoking status: Current Some Day Smoker     Packs/day: 1.00     Last attempt to quit: 2020     Years since quittin.6    Smokeless tobacco: Never Used    Tobacco comment: started back smoking in last couple months   Vaping Use    Vaping Use: Never used   Substance Use Topics    Alcohol use: Not Currently    Drug use: Yes     Types: Marijuana, Cocaine, Heroin     Comment: last used 1 week ago       Allergies: Allergies   Allergen Reactions    Baclofen Rash     All over         Review of Systems   Review of Systems   Constitutional: Negative. Negative for appetite change, chills, fatigue and fever. HENT: Negative. Negative for congestion, rhinorrhea, sinus pressure and sore throat. Eyes: Negative. Respiratory: Negative. Negative for cough, choking, chest tightness, shortness of breath and wheezing.     Cardiovascular: Negative. Negative for chest pain, palpitations and leg swelling. Gastrointestinal: Positive for abdominal pain, diarrhea, nausea and vomiting. Negative for constipation. Endocrine: Negative. Genitourinary: Negative. Negative for difficulty urinating, dysuria, flank pain and urgency. Musculoskeletal: Negative. Skin: Negative. Neurological: Negative. Negative for dizziness, speech difficulty, weakness, light-headedness, numbness and headaches. Psychiatric/Behavioral: Negative. All other systems reviewed and are negative. Physical Exam   Physical Exam  Vitals and nursing note reviewed. Constitutional:       General: He is not in acute distress. Appearance: He is well-developed. He is not diaphoretic. Comments: Thin male appears uncomfortable    HENT:      Head: Normocephalic and atraumatic. Mouth/Throat:      Mouth: Mucous membranes are dry. Pharynx: No oropharyngeal exudate. Eyes:      Conjunctiva/sclera: Conjunctivae normal.      Pupils: Pupils are equal, round, and reactive to light. Neck:      Vascular: No JVD. Trachea: No tracheal deviation. Cardiovascular:      Rate and Rhythm: Normal rate and regular rhythm. Heart sounds: Normal heart sounds. No murmur heard. Pulmonary:      Effort: Pulmonary effort is normal. No respiratory distress. Breath sounds: Normal breath sounds. No stridor. No wheezing or rales. Comments: Dialysis port left upper chest     Abdominal:      General: There is no distension. Palpations: Abdomen is soft. Tenderness: There is abdominal tenderness (epigastric, neg Ascencio's, neg McBurney's). There is no guarding or rebound. Musculoskeletal:         General: No tenderness. Normal range of motion. Cervical back: Normal range of motion and neck supple. Skin:     General: Skin is warm and dry. Neurological:      Mental Status: He is alert and oriented to person, place, and time.       Cranial Nerves: No cranial nerve deficit. Comments: No gross motor or sensory deficits    Psychiatric:         Behavior: Behavior normal.         Diagnostic Study Results     Labs -     Recent Results (from the past 12 hour(s))   CBC WITH AUTOMATED DIFF    Collection Time: 03/22/22  8:33 AM   Result Value Ref Range    WBC 16.8 (H) 4.1 - 11.1 K/uL    RBC 3.68 (L) 4.10 - 5.70 M/uL    HGB 10.7 (L) 12.1 - 17.0 g/dL    HCT 32.0 (L) 36.6 - 50.3 %    MCV 87.0 80.0 - 99.0 FL    MCH 29.1 26.0 - 34.0 PG    MCHC 33.4 30.0 - 36.5 g/dL    RDW 13.2 11.5 - 14.5 %    PLATELET 446 614 - 779 K/uL    MPV 11.0 8.9 - 12.9 FL    NRBC 0.0 0  WBC    ABSOLUTE NRBC 0.00 0.00 - 0.01 K/uL    NEUTROPHILS 70 32 - 75 %    LYMPHOCYTES 15 12 - 49 %    MONOCYTES 11 5 - 13 %    EOSINOPHILS 2 0 - 7 %    BASOPHILS 1 0 - 1 %    IMMATURE GRANULOCYTES 1 (H) 0.0 - 0.5 %    ABS. NEUTROPHILS 11.7 (H) 1.8 - 8.0 K/UL    ABS. LYMPHOCYTES 2.6 0.8 - 3.5 K/UL    ABS. MONOCYTES 1.8 (H) 0.0 - 1.0 K/UL    ABS. EOSINOPHILS 0.3 0.0 - 0.4 K/UL    ABS. BASOPHILS 0.1 0.0 - 0.1 K/UL    ABS. IMM. GRANS. 0.2 (H) 0.00 - 0.04 K/UL    DF AUTOMATED     METABOLIC PANEL, COMPREHENSIVE    Collection Time: 03/22/22  8:33 AM   Result Value Ref Range    Sodium 138 136 - 145 mmol/L    Potassium 4.9 3.5 - 5.1 mmol/L    Chloride 104 97 - 108 mmol/L    CO2 24 21 - 32 mmol/L    Anion gap 10 5 - 15 mmol/L    Glucose 127 (H) 65 - 100 mg/dL    BUN 74 (H) 6 - 20 MG/DL    Creatinine 12.00 (H) 0.70 - 1.30 MG/DL    BUN/Creatinine ratio 6 (L) 12 - 20      GFR est AA 5 (L) >60 ml/min/1.73m2    GFR est non-AA 4 (L) >60 ml/min/1.73m2    Calcium 8.6 8.5 - 10.1 MG/DL    Bilirubin, total 0.3 0.2 - 1.0 MG/DL    ALT (SGPT) 12 12 - 78 U/L    AST (SGOT) 14 (L) 15 - 37 U/L    Alk.  phosphatase 42 (L) 45 - 117 U/L    Protein, total 7.1 6.4 - 8.2 g/dL    Albumin 2.9 (L) 3.5 - 5.0 g/dL    Globulin 4.2 (H) 2.0 - 4.0 g/dL    A-G Ratio 0.7 (L) 1.1 - 2.2     MAGNESIUM    Collection Time: 03/22/22  8:33 AM Result Value Ref Range    Magnesium 2.3 1.6 - 2.4 mg/dL   PHOSPHORUS    Collection Time: 03/22/22  8:33 AM   Result Value Ref Range    Phosphorus 5.0 (H) 2.6 - 4.7 MG/DL   TROPONIN-HIGH SENSITIVITY    Collection Time: 03/22/22  8:33 AM   Result Value Ref Range    Troponin-High Sensitivity 58 0 - 76 ng/L   LIPASE    Collection Time: 03/22/22  8:33 AM   Result Value Ref Range    Lipase 362 73 - 393 U/L   EKG, 12 LEAD, INITIAL    Collection Time: 03/22/22 10:31 AM   Result Value Ref Range    Ventricular Rate 86 BPM    Atrial Rate 86 BPM    P-R Interval 150 ms    QRS Duration 88 ms    Q-T Interval 408 ms    QTC Calculation (Bezet) 488 ms    Calculated P Axis 79 degrees    Calculated R Axis 50 degrees    Calculated T Axis 97 degrees    Diagnosis       Normal sinus rhythm  Moderate voltage criteria for LVH, may be normal variant  T wave abnormality, consider lateral ischemia  Prolonged QT  When compared with ECG of 12-MAR-2022 09:57,  IN interval has decreased  Criteria for Anterior infarct are no longer present  T wave inversion more evident in Anterolateral leads         Radiologic Studies -   US ABD COMP   Final Result   1. Gallstones and gallbladder wall thickening but no ductal dilatation   2. Trace free fluid   3. Coarsened echotexture of the liver   4. Echogenic kidneys compatible with medical renal disease         XR CHEST PORT   Final Result   1. No pneumonia or pulmonary edema      CT ABD PELV WO CONT   Final Result      1. Infectious, inflammatory, or ulcer related duodenitis is favored over   pancreatitis. Correlate with lipase. No perforation or pneumoperitoneum. 2. Cholelithiasis. 3. New small pericardial effusion. 4. L5 spondylolysis. Increased L5-S1 degenerative disc disease. CT Results  (Last 48 hours)               03/22/22 0813  CT ABD PELV WO CONT Final result    Impression:      1. Infectious, inflammatory, or ulcer related duodenitis is favored over   pancreatitis.  Correlate with lipase. No perforation or pneumoperitoneum. 2. Cholelithiasis. 3. New small pericardial effusion. 4. L5 spondylolysis. Increased L5-S1 degenerative disc disease. Narrative:  EXAM: CT ABD PELV WO CONT       INDICATION: abdominal pain, n/v . No laboratory values at this time. Chronic   kidney disease. Chronic hepatitis C.       COMPARISON: CT abdomen/pelvis without contrast on 6/12/2020. IV CONTRAST: None. ORAL CONTRAST: None       TECHNIQUE:    Thin axial images were obtained through the abdomen and pelvis. Coronal and   sagittal reformats were generated. CT dose reduction was achieved through use of   a standardized protocol tailored for this examination and automatic exposure   control for dose modulation. The absence of intravenous contrast material reduces the sensitivity for   evaluation of the vasculature and solid organs. FINDINGS:    LOWER THORAX: Incidentally imaged. Small pericardial effusion is new. No basilar   pneumonia. Dialysis catheter terminates in the right atrium. LIVER: No mass. BILIARY TREE: Gallstones are unchanged. No evidence of cholecystitis. CBD is not   dilated. SPLEEN: within normal limits. PANCREAS: Mild surrounding inflammation. ADRENALS: Unremarkable. KIDNEYS/URETERS: Right renal benign cysts are grossly unchanged. No   nephrolithiasis or hydronephrosis. STOMACH: Unremarkable. SMALL BOWEL: Mural thickening of the third portion of the duodenum. Moderate   surrounding edema in the fat. No gas. COLON: No dilatation or wall thickening. APPENDIX: Normal.   PERITONEUM: Trace ascites. No pneumoperitoneum. RETROPERITONEUM: Aortic atherosclerosis without aneurysm. Unchanged physiologic   periaortic lymph nodes. REPRODUCTIVE ORGANS: Prostate gland contains calcifications. URINARY BLADDER: No mass or calculus. BONES: L5-S1 degenerative disc disease, erosions, and L5 spondylolysis. Findings   are increased since 2020.    ABDOMINAL WALL: No mass or hernia. ADDITIONAL COMMENTS: N/A               CXR Results  (Last 48 hours)               03/22/22 0830  XR CHEST PORT Final result    Impression:  1. No pneumonia or pulmonary edema       Narrative:  EXAM: XR CHEST PORT       INDICATION: ESRD on hemodialysis, vomiting       COMPARISON: 3/12/2022       FINDINGS: A portable AP radiograph of the chest was obtained at 0815 hours. The   patient is on a cardiac monitor. The dialysis catheter overlies the right   atrium. Heart size is normal. Lungs demonstrate low lung volumes with   hypoaerated bases. Mild discoid atelectasis is seen at the right lung base. No   pneumonia. No pulmonary edema. Medical Decision Making   I am the first provider for this patient. I reviewed the vital signs, available nursing notes, past medical history, past surgical history, family history and social history. Vital Signs-Reviewed the patient's vital signs.   Patient Vitals for the past 12 hrs:   Temp Pulse Resp BP SpO2   03/22/22 1630 -- 99 18 (!) 199/103 --   03/22/22 1615 -- 98 18 (!) 204/104 --   03/22/22 1600 -- 98 18 (!) 189/105 --   03/22/22 1545 -- 94 18 (!) 203/99 --   03/22/22 1530 -- 93 18 (!) 193/107 --   03/22/22 1515 -- 90 18 (!) 195/98 --   03/22/22 1500 -- 89 18 (!) 181/105 --   03/22/22 1445 -- 85 18 (!) 172/100 --   03/22/22 1430 -- 92 18 (!) 180/108 --   03/22/22 1415 -- 93 18 (!) 200/115 --   03/22/22 1358 98.5 °F (36.9 °C) 89 20 (!) 195/104 --   03/22/22 1327 -- (!) 101 21 (!) 178/65 100 %   03/22/22 1316 -- (!) 104 -- (!) 187/90 --   03/22/22 1312 -- (!) 102 20 (!) 187/90 99 %   03/22/22 1257 -- 96 16 (!) 173/85 98 %   03/22/22 1242 -- 95 14 (!) 187/90 98 %   03/22/22 1227 -- 92 15 (!) 186/86 98 %   03/22/22 1212 -- (!) 102 17 (!) 175/86 98 %   03/22/22 1157 -- 98 14 (!) 170/76 97 %   03/22/22 1142 -- 96 15 (!) 176/87 95 %   03/22/22 1127 -- 95 17 (!) 176/85 98 %   03/22/22 1112 -- 94 16 (!) 162/77 98 %   03/22/22 1052 -- -- -- (!) 164/82 --   03/22/22 1042 -- 89 15 (!) 154/75 97 %   03/22/22 1022 -- 91 15 (!) 154/78 97 %   03/22/22 1012 -- 98 20 (!) 161/82 98 %   03/22/22 0952 -- 90 13 (!) 182/79 97 %   03/22/22 0942 -- 90 8 (!) 176/85 95 %   03/22/22 0922 -- 87 16 (!) 162/83 99 %   03/22/22 0759 -- 92 18 (!) 208/95 98 %   03/22/22 0754 -- -- -- -- 96 %   03/22/22 0724 99.1 °F (37.3 °C) (!) 107 22 (!) 235/115 99 %       EKG interpretation: (Preliminary)  Sinus, rate 86, LVH, NSST    Records Reviewed: Nursing Notes, Old Medical Records, Previous electrocardiograms, Ambulance Run Sheet, Previous Radiology Studies and Previous Laboratory Studies    Provider Notes (Medical Decision Making):   DDx- Gastritis, viral gastroenteritis, dehydration, electrolyte abnormality, pancreattis    ED Course:   Initial assessment performed. The patients presenting problems have been discussed, and they are in agreement with the care plan formulated and outlined with them. I have encouraged them to ask questions as they arise throughout their visit. Pt did have improvement with meds here in the ED. Discussed with Nephrology who will arrange for dialysis. Given pain, n/v and CT findings will admit for further evaluation. Pt had been given 500ml fluid bolus. Pt given dose of Protonix and IV Ab given duodenal inflammation and elevated WBC count     Consult:  Case discussed with Dr. Stewart Patel, will see and admit. Disposition:  Admit    Diagnosis     Clinical Impression:   1. Intractable abdominal pain    2. Nausea and vomiting, intractability of vomiting not specified, unspecified vomiting type    3. Gastritis and duodenitis    4. ESRD (end stage renal disease) on dialysis [facility])        Attestations:    Yony Byrnes, DO    Please note that this dictation was completed with [employer], the computer voice recognition software.   Quite often unanticipated grammatical, syntax, homophones, and other interpretive errors are inadvertently transcribed by the computer software. Please disregard these errors. Please excuse any errors that have escaped final proofreading. Thank you.

## 2022-03-22 NOTE — Clinical Note
Status[de-identified] INPATIENT [101]   Type of Bed: Telemetry [19]   Cardiac Monitoring Required?: Yes   Inpatient Hospitalization Certified Necessary for the Following Reasons: 3.  Patient receiving treatment that can only be provided in an inpatient setting (further clarification in H&P documentation)   Admitting Diagnosis: Intractable abdominal pain [6847176]   Admitting Diagnosis: Peptic ulcer disease [803739]   Admitting Diagnosis: Hypertensive urgency [9348243]   Admitting Physician: Maribel Velazquez [[id number]]   Attending Physician: Maribel Velazquez [[id number]]   Estimated Length of Stay: 2 Midnights   Discharge Plan[de-identified] Home with Office Follow-up

## 2022-03-23 ENCOUNTER — ANESTHESIA (OUTPATIENT)
Dept: ENDOSCOPY | Age: 57
DRG: 391 | End: 2022-03-23
Payer: MEDICARE

## 2022-03-23 ENCOUNTER — ANESTHESIA EVENT (OUTPATIENT)
Dept: ENDOSCOPY | Age: 57
DRG: 391 | End: 2022-03-23
Payer: MEDICARE

## 2022-03-23 LAB
ALBUMIN SERPL-MCNC: 2.4 G/DL (ref 3.5–5)
ALBUMIN/GLOB SERPL: 0.7 {RATIO} (ref 1.1–2.2)
ALP SERPL-CCNC: 32 U/L (ref 45–117)
ALT SERPL-CCNC: 11 U/L (ref 12–78)
ANION GAP SERPL CALC-SCNC: 5 MMOL/L (ref 5–15)
APPEARANCE UR: ABNORMAL
AST SERPL-CCNC: 10 U/L (ref 15–37)
BACTERIA URNS QL MICRO: NEGATIVE /HPF
BILIRUB SERPL-MCNC: 0.4 MG/DL (ref 0.2–1)
BILIRUB UR QL: NEGATIVE
BUN SERPL-MCNC: 32 MG/DL (ref 6–20)
BUN/CREAT SERPL: 5 (ref 12–20)
CALCIUM SERPL-MCNC: 7.9 MG/DL (ref 8.5–10.1)
CHLORIDE SERPL-SCNC: 96 MMOL/L (ref 97–108)
CO2 SERPL-SCNC: 34 MMOL/L (ref 21–32)
COLOR UR: ABNORMAL
COVID-19 RAPID TEST, COVR: NOT DETECTED
CREAT SERPL-MCNC: 6.88 MG/DL (ref 0.7–1.3)
EPITH CASTS URNS QL MICRO: ABNORMAL /LPF
ERYTHROCYTE [DISTWIDTH] IN BLOOD BY AUTOMATED COUNT: 13.2 % (ref 11.5–14.5)
GLOBULIN SER CALC-MCNC: 3.4 G/DL (ref 2–4)
GLUCOSE SERPL-MCNC: 100 MG/DL (ref 65–100)
GLUCOSE UR STRIP.AUTO-MCNC: 100 MG/DL
HCT VFR BLD AUTO: 27.1 % (ref 36.6–50.3)
HGB BLD-MCNC: 8.7 G/DL (ref 12.1–17)
HGB UR QL STRIP: NEGATIVE
INR PPP: 1.2 (ref 0.9–1.1)
KETONES UR QL STRIP.AUTO: NEGATIVE MG/DL
LEUKOCYTE ESTERASE UR QL STRIP.AUTO: ABNORMAL
MCH RBC QN AUTO: 28.7 PG (ref 26–34)
MCHC RBC AUTO-ENTMCNC: 32.1 G/DL (ref 30–36.5)
MCV RBC AUTO: 89.4 FL (ref 80–99)
NITRITE UR QL STRIP.AUTO: NEGATIVE
NRBC # BLD: 0 K/UL (ref 0–0.01)
NRBC BLD-RTO: 0 PER 100 WBC
PH UR STRIP: 7.5 [PH] (ref 5–8)
PLATELET # BLD AUTO: 208 K/UL (ref 150–400)
PMV BLD AUTO: 10.6 FL (ref 8.9–12.9)
POTASSIUM SERPL-SCNC: 3.8 MMOL/L (ref 3.5–5.1)
PROT SERPL-MCNC: 5.8 G/DL (ref 6.4–8.2)
PROT UR STRIP-MCNC: >300 MG/DL
PROTHROMBIN TIME: 12.4 SEC (ref 9–11.1)
PSA SERPL-MCNC: 0.6 NG/ML (ref 0.01–4)
RBC # BLD AUTO: 3.03 M/UL (ref 4.1–5.7)
RBC #/AREA URNS HPF: ABNORMAL /HPF (ref 0–5)
SODIUM SERPL-SCNC: 135 MMOL/L (ref 136–145)
SOURCE, COVRS: NORMAL
SP GR UR REFRACTOMETRY: 1.02 (ref 1–1.03)
UA: UC IF INDICATED,UAUC: ABNORMAL
UROBILINOGEN UR QL STRIP.AUTO: 0.2 EU/DL (ref 0.2–1)
WBC # BLD AUTO: 11.1 K/UL (ref 4.1–11.1)
WBC URNS QL MICRO: ABNORMAL /HPF (ref 0–4)

## 2022-03-23 PROCEDURE — 74011250636 HC RX REV CODE- 250/636: Performed by: STUDENT IN AN ORGANIZED HEALTH CARE EDUCATION/TRAINING PROGRAM

## 2022-03-23 PROCEDURE — G0378 HOSPITAL OBSERVATION PER HR: HCPCS

## 2022-03-23 PROCEDURE — C9113 INJ PANTOPRAZOLE SODIUM, VIA: HCPCS | Performed by: STUDENT IN AN ORGANIZED HEALTH CARE EDUCATION/TRAINING PROGRAM

## 2022-03-23 PROCEDURE — 85610 PROTHROMBIN TIME: CPT

## 2022-03-23 PROCEDURE — 74011000250 HC RX REV CODE- 250: Performed by: ANESTHESIOLOGY

## 2022-03-23 PROCEDURE — 74011250636 HC RX REV CODE- 250/636: Performed by: SPECIALIST

## 2022-03-23 PROCEDURE — 84153 ASSAY OF PSA TOTAL: CPT

## 2022-03-23 PROCEDURE — 87635 SARS-COV-2 COVID-19 AMP PRB: CPT

## 2022-03-23 PROCEDURE — 76060000031 HC ANESTHESIA FIRST 0.5 HR: Performed by: SPECIALIST

## 2022-03-23 PROCEDURE — 96372 THER/PROPH/DIAG INJ SC/IM: CPT

## 2022-03-23 PROCEDURE — 74011250637 HC RX REV CODE- 250/637: Performed by: PHYSICIAN ASSISTANT

## 2022-03-23 PROCEDURE — 36415 COLL VENOUS BLD VENIPUNCTURE: CPT

## 2022-03-23 PROCEDURE — 74011000250 HC RX REV CODE- 250: Performed by: SPECIALIST

## 2022-03-23 PROCEDURE — 74011000250 HC RX REV CODE- 250: Performed by: STUDENT IN AN ORGANIZED HEALTH CARE EDUCATION/TRAINING PROGRAM

## 2022-03-23 PROCEDURE — 76040000019: Performed by: SPECIALIST

## 2022-03-23 PROCEDURE — 77030019988 HC FCPS ENDOSC DISP BSC -B: Performed by: SPECIALIST

## 2022-03-23 PROCEDURE — 74011636637 HC RX REV CODE- 636/637: Performed by: STUDENT IN AN ORGANIZED HEALTH CARE EDUCATION/TRAINING PROGRAM

## 2022-03-23 PROCEDURE — 85027 COMPLETE CBC AUTOMATED: CPT

## 2022-03-23 PROCEDURE — 74011250637 HC RX REV CODE- 250/637: Performed by: STUDENT IN AN ORGANIZED HEALTH CARE EDUCATION/TRAINING PROGRAM

## 2022-03-23 PROCEDURE — 2709999900 HC NON-CHARGEABLE SUPPLY: Performed by: SPECIALIST

## 2022-03-23 PROCEDURE — 96376 TX/PRO/DX INJ SAME DRUG ADON: CPT

## 2022-03-23 PROCEDURE — 74011250636 HC RX REV CODE- 250/636: Performed by: ANESTHESIOLOGY

## 2022-03-23 PROCEDURE — 0DB98ZX EXCISION OF DUODENUM, VIA NATURAL OR ARTIFICIAL OPENING ENDOSCOPIC, DIAGNOSTIC: ICD-10-PCS | Performed by: SPECIALIST

## 2022-03-23 PROCEDURE — 88305 TISSUE EXAM BY PATHOLOGIST: CPT

## 2022-03-23 PROCEDURE — 80053 COMPREHEN METABOLIC PANEL: CPT

## 2022-03-23 RX ORDER — PROPOFOL 10 MG/ML
INJECTION, EMULSION INTRAVENOUS AS NEEDED
Status: DISCONTINUED | OUTPATIENT
Start: 2022-03-23 | End: 2022-03-23 | Stop reason: HOSPADM

## 2022-03-23 RX ORDER — LIDOCAINE HYDROCHLORIDE 20 MG/ML
INJECTION, SOLUTION EPIDURAL; INFILTRATION; INTRACAUDAL; PERINEURAL AS NEEDED
Status: DISCONTINUED | OUTPATIENT
Start: 2022-03-23 | End: 2022-03-23 | Stop reason: HOSPADM

## 2022-03-23 RX ORDER — SODIUM CHLORIDE 9 MG/ML
50 INJECTION, SOLUTION INTRAVENOUS CONTINUOUS
Status: DISCONTINUED | OUTPATIENT
Start: 2022-03-23 | End: 2022-03-23 | Stop reason: HOSPADM

## 2022-03-23 RX ORDER — SODIUM CHLORIDE 0.9 % (FLUSH) 0.9 %
5-40 SYRINGE (ML) INJECTION EVERY 8 HOURS
Status: DISCONTINUED | OUTPATIENT
Start: 2022-03-23 | End: 2022-03-23 | Stop reason: HOSPADM

## 2022-03-23 RX ORDER — SODIUM CHLORIDE 0.9 % (FLUSH) 0.9 %
5-40 SYRINGE (ML) INJECTION AS NEEDED
Status: DISCONTINUED | OUTPATIENT
Start: 2022-03-23 | End: 2022-03-23 | Stop reason: HOSPADM

## 2022-03-23 RX ORDER — SUCRALFATE 1 G/1
1 TABLET ORAL
Status: DISCONTINUED | OUTPATIENT
Start: 2022-03-23 | End: 2022-03-25 | Stop reason: HOSPADM

## 2022-03-23 RX ORDER — DEXTROMETHORPHAN/PSEUDOEPHED 2.5-7.5/.8
1.2 DROPS ORAL
Status: DISCONTINUED | OUTPATIENT
Start: 2022-03-23 | End: 2022-03-23 | Stop reason: HOSPADM

## 2022-03-23 RX ORDER — HYDRALAZINE HYDROCHLORIDE 20 MG/ML
15 INJECTION INTRAMUSCULAR; INTRAVENOUS
Status: DISCONTINUED | OUTPATIENT
Start: 2022-03-23 | End: 2022-03-25 | Stop reason: HOSPADM

## 2022-03-23 RX ADMIN — CLONIDINE HYDROCHLORIDE 0.2 MG: 0.1 TABLET ORAL at 21:36

## 2022-03-23 RX ADMIN — LIDOCAINE HYDROCHLORIDE 100 MG: 20 INJECTION, SOLUTION EPIDURAL; INFILTRATION; INTRACAUDAL; PERINEURAL at 13:55

## 2022-03-23 RX ADMIN — HYDRALAZINE HYDROCHLORIDE 100 MG: 50 TABLET, FILM COATED ORAL at 08:11

## 2022-03-23 RX ADMIN — SUCRALFATE 1 G: 1 TABLET ORAL at 21:36

## 2022-03-23 RX ADMIN — CLONIDINE HYDROCHLORIDE 0.2 MG: 0.1 TABLET ORAL at 08:11

## 2022-03-23 RX ADMIN — CARVEDILOL 25 MG: 12.5 TABLET, FILM COATED ORAL at 08:11

## 2022-03-23 RX ADMIN — OXYCODONE HYDROCHLORIDE 5 MG: 5 TABLET ORAL at 21:36

## 2022-03-23 RX ADMIN — SODIUM CHLORIDE, PRESERVATIVE FREE 10 ML: 5 INJECTION INTRAVENOUS at 05:13

## 2022-03-23 RX ADMIN — BUMETANIDE 2 MG: 1 TABLET ORAL at 08:11

## 2022-03-23 RX ADMIN — OXYCODONE HYDROCHLORIDE 5 MG: 5 TABLET ORAL at 02:33

## 2022-03-23 RX ADMIN — CLONIDINE HYDROCHLORIDE 0.2 MG: 0.1 TABLET ORAL at 17:28

## 2022-03-23 RX ADMIN — PROPOFOL 120 MG: 10 INJECTION, EMULSION INTRAVENOUS at 14:07

## 2022-03-23 RX ADMIN — NIFEDIPINE 60 MG: 60 TABLET, EXTENDED RELEASE ORAL at 17:28

## 2022-03-23 RX ADMIN — CLONIDINE HYDROCHLORIDE 0.2 MG: 0.1 TABLET ORAL at 02:30

## 2022-03-23 RX ADMIN — SODIUM CHLORIDE, PRESERVATIVE FREE 10 ML: 5 INJECTION INTRAVENOUS at 21:40

## 2022-03-23 RX ADMIN — HEPARIN SODIUM 5000 UNITS: 5000 INJECTION INTRAVENOUS; SUBCUTANEOUS at 21:36

## 2022-03-23 RX ADMIN — SODIUM CHLORIDE, PRESERVATIVE FREE 10 ML: 5 INJECTION INTRAVENOUS at 14:43

## 2022-03-23 RX ADMIN — SODIUM CHLORIDE 40 MG: 9 INJECTION INTRAMUSCULAR; INTRAVENOUS; SUBCUTANEOUS at 08:11

## 2022-03-23 RX ADMIN — DOXAZOSIN 4 MG: 2 TABLET ORAL at 08:11

## 2022-03-23 RX ADMIN — SODIUM CHLORIDE 40 MG: 9 INJECTION INTRAMUSCULAR; INTRAVENOUS; SUBCUTANEOUS at 21:36

## 2022-03-23 RX ADMIN — HYDRALAZINE HYDROCHLORIDE 100 MG: 50 TABLET, FILM COATED ORAL at 17:28

## 2022-03-23 RX ADMIN — SODIUM CHLORIDE 50 ML/HR: 9 INJECTION, SOLUTION INTRAVENOUS at 13:46

## 2022-03-23 RX ADMIN — CARVEDILOL 25 MG: 12.5 TABLET, FILM COATED ORAL at 17:28

## 2022-03-23 RX ADMIN — NIFEDIPINE 60 MG: 60 TABLET, EXTENDED RELEASE ORAL at 08:11

## 2022-03-23 RX ADMIN — HYDRALAZINE HYDROCHLORIDE 100 MG: 50 TABLET, FILM COATED ORAL at 02:30

## 2022-03-23 RX ADMIN — HYDRALAZINE HYDROCHLORIDE 100 MG: 50 TABLET, FILM COATED ORAL at 21:36

## 2022-03-23 RX ADMIN — HEPARIN SODIUM 5000 UNITS: 5000 INJECTION INTRAVENOUS; SUBCUTANEOUS at 06:15

## 2022-03-23 RX ADMIN — OXYCODONE HYDROCHLORIDE 5 MG: 5 TABLET ORAL at 08:26

## 2022-03-23 NOTE — PROGRESS NOTES
Nephrology Progress Note  [address]     [facility]. [facility]  Phone - [phone]   Patient: Melissa Cam    YOB: 1965        Date- 3/23/2022   Admit Date: 3/22/2022  CC: Follow up for  ESRD         IMPRESSION & PLAN:    esrd - TTS- purvi vargas   Hypertensive urgency   Epigastric pain, nausea ,vomiting   Non compliance to meds and diet   Anemia of ckd   Sec. hyperpara   Abnormal duodenum on CT    PLAN-   No dialysis today   Plan FOR EGD noted   epogen for anemia   Continue coreg, clonidine, hydralazine, cardura, procardia   D/w patient     Subjective: Interval History:   -  Patient was sent to er from hd unit for vomiting and uncontrolled HTN  He denies missing any hd last week  He reports that he is taking his meds at home  His sbp 235 in ER. No c/o sob    Objective:   Vitals:    03/23/22 0000 03/23/22 0230 03/23/22 0455 03/23/22 0809   BP: (!) 155/93 (!) 141/91 130/79 (!) 141/82   Pulse: 88 98 88 91   Resp: 18  16 18   Temp: 98.1 °F (36.7 °C)  99.2 °F (37.3 °C) 98.2 °F (36.8 °C)   TempSrc:       SpO2: 96%  96% 94%   Weight:       Height:          03/22 0701 - 03/23 0700  In: 1748.3 [P.O.:240; I.V.:1508.3]  Out: 1000   Last 3 Recorded Weights in this Encounter    03/22/22 0724 03/22/22 1832   Weight: 77.6 kg (171 lb) 77 kg (169 lb 11.2 oz)      Physical exam:    GEN: NAD  NECK- Supple, no mass  RESP: No wheezing,clear  b/l  CVS: S1,S2  RRR  NEURO: Normal speech, non focal  EXT: No Edema   PSYCH: Normal Mood  permacath +  Chart reviewed. Pertinent Notes reviewed.      Data Review :  Recent Labs     03/23/22  0456 03/22/22  0833   * 138   K 3.8 4.9   CL 96* 104   CO2 34* 24   BUN 32* 74*   CREA 6.88* 12.00*    127*   CA 7.9* 8.6   MG  --  2.3   PHOS  --  5.0*     Recent Labs     03/23/22  0456 03/22/22  0833   WBC 11.1 16.8*   HGB 8.7* 10.7*   HCT 27.1* 32.0*    274     No results for input(s): FE, TIBC, PSAT, FERR in the last 72 hours.    Medication list  reviewed  Current Facility-Administered Medications   Medication Dose Route Frequency    acetaminophen (TYLENOL) tablet 650 mg  650 mg Oral Q6H PRN    bumetanide (BUMEX) tablet 2 mg  2 mg Oral DAILY    buprenorphine-naloxone (SUBOXONE) 8mg-2mg SL film  1 Film SubLINGual BID    carvediloL (COREG) tablet 25 mg  25 mg Oral BID WITH MEALS    cloNIDine HCL (CATAPRES) tablet 0.2 mg  0.2 mg Oral TID    doxazosin (CARDURA) tablet 4 mg  4 mg Oral DAILY    hydrALAZINE (APRESOLINE) tablet 100 mg  100 mg Oral TID    NIFEdipine ER (PROCARDIA XL) tablet 60 mg  60 mg Oral BID    sodium chloride (NS) flush 5-40 mL  5-40 mL IntraVENous Q8H    sodium chloride (NS) flush 5-40 mL  5-40 mL IntraVENous PRN    acetaminophen (TYLENOL) tablet 650 mg  650 mg Oral Q6H PRN    Or    acetaminophen (TYLENOL) suppository 650 mg  650 mg Rectal Q6H PRN    polyethylene glycol (MIRALAX) packet 17 g  17 g Oral DAILY PRN    ondansetron (ZOFRAN ODT) tablet 4 mg  4 mg Oral Q8H PRN    Or    ondansetron (ZOFRAN) injection 4 mg  4 mg IntraVENous Q6H PRN    [Held by provider] heparin (porcine) injection 5,000 Units  5,000 Units SubCUTAneous Q8H    oxyCODONE IR (ROXICODONE) tablet 5 mg  5 mg Oral Q4H PRN    pantoprazole (PROTONIX) 40 mg in 0.9% sodium chloride 10 mL injection  40 mg IntraVENous Q12H    [Held by provider] heparin (porcine) 1,000 unit/mL injection 2,000 Units  2,000 Units InterCATHeter DIALYSIS PRN    And    heparin (porcine) 1,000 unit/mL injection 2,100 Units  2,100 Units InterCATHeter DIALYSIS PRN          Aurelio Sanchez MD              [facility]  [facility] / [facility]  [address] , [place], [address] [place]  Phone - [phone]               Fax - [phone]

## 2022-03-23 NOTE — PROGRESS NOTES
Problem: Falls - Risk of  Goal: *Absence of Falls  Description: Document José Miguel Florian Fall Risk and appropriate interventions in the flowsheet.   Outcome: Progressing Towards Goal  Note: Fall Risk Interventions:  Mobility Interventions: Communicate number of staff needed for ambulation/transfer,Patient to call before getting OOB         Medication Interventions: Patient to call before getting OOB    Elimination Interventions: Patient to call for help with toileting needs              Problem: Patient Education: Go to Patient Education Activity  Goal: Patient/Family Education  Outcome: Progressing Towards Goal

## 2022-03-23 NOTE — ROUTINE PROCESS
Harman Blankdrickson  1965  [id number]    Situation:  Verbal report received from: Gita  Procedure: Procedure(s):  ESOPHAGOGASTRODUODENOSCOPY (EGD)  ESOPHAGOGASTRODUODENAL (EGD) BIOPSY    Background:    Preoperative diagnosis: tarry stools  Postoperative diagnosis: Duodenitis    :  Dr. Robin Krause  Assistant(s): Endoscopy Technician-1: Darion Pineda  Endoscopy RN-1: Roel Mae RN    Specimens:   ID Type Source Tests Collected by Time Destination   1 : Bx Preservative Duodenum  Berry Serrano MD 3/23/2022 1401 Pathology     H. Pylori  no    Assessment:  Intra-procedure medications     Anesthesia gave intra-procedure sedation and medications, see anesthesia flow sheet yes    Intravenous fluids: NS@ KVO     Vital signs stable     Abdominal assessment: round and soft     Recommendation:  Discharge patient per MD order.   Return to floor  Family or Friend   Permission to share finding with family or friend yes

## 2022-03-23 NOTE — PROGRESS NOTES
Problem: Falls - Risk of  Goal: *Absence of Falls  Description: Document Morene Hole Fall Risk and appropriate interventions in the flowsheet.   Outcome: Progressing Towards Goal  Note: Fall Risk Interventions:  Mobility Interventions: Communicate number of staff needed for ambulation/transfer,Patient to call before getting OOB         Medication Interventions: Teach patient to arise slowly    Elimination Interventions: Urinal in reach

## 2022-03-23 NOTE — PROGRESS NOTES
Gastroenterology Daily Progress Note (Dr. Maribeth Swift)   [facility]    Admit Date: 3/22/2022       Subjective:       Had a black stool. No vomiting. Pain persists in the epigastrim.     Current Facility-Administered Medications   Medication Dose Route Frequency    acetaminophen (TYLENOL) tablet 650 mg  650 mg Oral Q6H PRN    bumetanide (BUMEX) tablet 2 mg  2 mg Oral DAILY    buprenorphine-naloxone (SUBOXONE) 8mg-2mg SL film  1 Film SubLINGual BID    carvediloL (COREG) tablet 25 mg  25 mg Oral BID WITH MEALS    cloNIDine HCL (CATAPRES) tablet 0.2 mg  0.2 mg Oral TID    doxazosin (CARDURA) tablet 4 mg  4 mg Oral DAILY    hydrALAZINE (APRESOLINE) tablet 100 mg  100 mg Oral TID    NIFEdipine ER (PROCARDIA XL) tablet 60 mg  60 mg Oral BID    sodium chloride (NS) flush 5-40 mL  5-40 mL IntraVENous Q8H    sodium chloride (NS) flush 5-40 mL  5-40 mL IntraVENous PRN    acetaminophen (TYLENOL) tablet 650 mg  650 mg Oral Q6H PRN    Or    acetaminophen (TYLENOL) suppository 650 mg  650 mg Rectal Q6H PRN    polyethylene glycol (MIRALAX) packet 17 g  17 g Oral DAILY PRN    ondansetron (ZOFRAN ODT) tablet 4 mg  4 mg Oral Q8H PRN    Or    ondansetron (ZOFRAN) injection 4 mg  4 mg IntraVENous Q6H PRN    heparin (porcine) injection 5,000 Units  5,000 Units SubCUTAneous Q8H    oxyCODONE IR (ROXICODONE) tablet 5 mg  5 mg Oral Q4H PRN    pantoprazole (PROTONIX) 40 mg in 0.9% sodium chloride 10 mL injection  40 mg IntraVENous Q12H    heparin (porcine) 1,000 unit/mL injection 2,000 Units  2,000 Units InterCATHeter DIALYSIS PRN    And    heparin (porcine) 1,000 unit/mL injection 2,100 Units  2,100 Units InterCATHeter DIALYSIS PRN        Objective:     Visit Vitals  /79 (BP 1 Location: Right upper arm, BP Patient Position: At rest;Lying)   Pulse 88   Temp 99.2 °F (37.3 °C)   Resp 16   Ht 5' 9\" (1.753 m)   Wt 77 kg (169 lb 11.2 oz)   SpO2 96%   BMI 25.06 kg/m²   Blood pressure 130/79, pulse 88, temperature 99.2 °F (37.3 °C), resp. rate 16, height 5' 9\" (1.753 m), weight 77 kg (169 lb 11.2 oz), SpO2 96 %. No intake/output data recorded. 03/21 1901 - 03/23 0700  In: 1748.3 [P.O.:240; I.V.:1508.3]  Out: 1000       Intake/Output Summary (Last 24 hours) at 3/23/2022 0754  Last data filed at 3/23/2022 0000  Gross per 24 hour   Intake 1748.33 ml   Output 1000 ml   Net 748.33 ml       Physical Exam:     General:Nad  Chest:  CTA, No rhonchi, rales or rubs. Heart: S1, S2, RRR  GI: Soft, tender in epigastrim, ND  BS +  CNS: CN II-XII normal.      Labs:       Recent Results (from the past 24 hour(s))   CBC WITH AUTOMATED DIFF    Collection Time: 03/22/22  8:33 AM   Result Value Ref Range    WBC 16.8 (H) 4.1 - 11.1 K/uL    RBC 3.68 (L) 4.10 - 5.70 M/uL    HGB 10.7 (L) 12.1 - 17.0 g/dL    HCT 32.0 (L) 36.6 - 50.3 %    MCV 87.0 80.0 - 99.0 FL    MCH 29.1 26.0 - 34.0 PG    MCHC 33.4 30.0 - 36.5 g/dL    RDW 13.2 11.5 - 14.5 %    PLATELET 096 980 - 996 K/uL    MPV 11.0 8.9 - 12.9 FL    NRBC 0.0 0  WBC    ABSOLUTE NRBC 0.00 0.00 - 0.01 K/uL    NEUTROPHILS 70 32 - 75 %    LYMPHOCYTES 15 12 - 49 %    MONOCYTES 11 5 - 13 %    EOSINOPHILS 2 0 - 7 %    BASOPHILS 1 0 - 1 %    IMMATURE GRANULOCYTES 1 (H) 0.0 - 0.5 %    ABS. NEUTROPHILS 11.7 (H) 1.8 - 8.0 K/UL    ABS. LYMPHOCYTES 2.6 0.8 - 3.5 K/UL    ABS. MONOCYTES 1.8 (H) 0.0 - 1.0 K/UL    ABS. EOSINOPHILS 0.3 0.0 - 0.4 K/UL    ABS. BASOPHILS 0.1 0.0 - 0.1 K/UL    ABS. IMM.  GRANS. 0.2 (H) 0.00 - 0.04 K/UL    DF AUTOMATED     METABOLIC PANEL, COMPREHENSIVE    Collection Time: 03/22/22  8:33 AM   Result Value Ref Range    Sodium 138 136 - 145 mmol/L    Potassium 4.9 3.5 - 5.1 mmol/L    Chloride 104 97 - 108 mmol/L    CO2 24 21 - 32 mmol/L    Anion gap 10 5 - 15 mmol/L    Glucose 127 (H) 65 - 100 mg/dL    BUN 74 (H) 6 - 20 MG/DL    Creatinine 12.00 (H) 0.70 - 1.30 MG/DL    BUN/Creatinine ratio 6 (L) 12 - 20      GFR est AA 5 (L) >60 ml/min/1.73m2    GFR est non-AA 4 (L) >60 ml/min/1.73m2    Calcium 8.6 8.5 - 10.1 MG/DL    Bilirubin, total 0.3 0.2 - 1.0 MG/DL    ALT (SGPT) 12 12 - 78 U/L    AST (SGOT) 14 (L) 15 - 37 U/L    Alk.  phosphatase 42 (L) 45 - 117 U/L    Protein, total 7.1 6.4 - 8.2 g/dL    Albumin 2.9 (L) 3.5 - 5.0 g/dL    Globulin 4.2 (H) 2.0 - 4.0 g/dL    A-G Ratio 0.7 (L) 1.1 - 2.2     MAGNESIUM    Collection Time: 03/22/22  8:33 AM   Result Value Ref Range    Magnesium 2.3 1.6 - 2.4 mg/dL   PHOSPHORUS    Collection Time: 03/22/22  8:33 AM   Result Value Ref Range    Phosphorus 5.0 (H) 2.6 - 4.7 MG/DL   TROPONIN-HIGH SENSITIVITY    Collection Time: 03/22/22  8:33 AM   Result Value Ref Range    Troponin-High Sensitivity 58 0 - 76 ng/L   LIPASE    Collection Time: 03/22/22  8:33 AM   Result Value Ref Range    Lipase 362 73 - 393 U/L   EKG, 12 LEAD, INITIAL    Collection Time: 03/22/22 10:31 AM   Result Value Ref Range    Ventricular Rate 86 BPM    Atrial Rate 86 BPM    P-R Interval 150 ms    QRS Duration 88 ms    Q-T Interval 408 ms    QTC Calculation (Bezet) 488 ms    Calculated P Axis 79 degrees    Calculated R Axis 50 degrees    Calculated T Axis 97 degrees    Diagnosis       Normal sinus rhythm  Moderate voltage criteria for LVH, may be normal variant  Non-specific ST & T wave changes  Confirmed by Martin Keene ([id number]) on 3/22/2022 9:48:30 PM     PROTHROMBIN TIME + INR    Collection Time: 03/23/22  4:56 AM   Result Value Ref Range    INR 1.2 (H) 0.9 - 1.1      Prothrombin time 12.4 (H) 9.0 - 55.6 sec   METABOLIC PANEL, COMPREHENSIVE    Collection Time: 03/23/22  4:56 AM   Result Value Ref Range    Sodium 135 (L) 136 - 145 mmol/L    Potassium 3.8 3.5 - 5.1 mmol/L    Chloride 96 (L) 97 - 108 mmol/L    CO2 34 (H) 21 - 32 mmol/L    Anion gap 5 5 - 15 mmol/L    Glucose 100 65 - 100 mg/dL    BUN 32 (H) 6 - 20 MG/DL    Creatinine 6.88 (H) 0.70 - 1.30 MG/DL    BUN/Creatinine ratio 5 (L) 12 - 20      GFR est AA 10 (L) >60 ml/min/1.73m2    GFR est non-AA 8 (L) >60 ml/min/1.73m2    Calcium 7.9 (L) 8.5 - 10.1 MG/DL    Bilirubin, total 0.4 0.2 - 1.0 MG/DL    ALT (SGPT) 11 (L) 12 - 78 U/L    AST (SGOT) 10 (L) 15 - 37 U/L    Alk. phosphatase 32 (L) 45 - 117 U/L    Protein, total 5.8 (L) 6.4 - 8.2 g/dL    Albumin 2.4 (L) 3.5 - 5.0 g/dL    Globulin 3.4 2.0 - 4.0 g/dL    A-G Ratio 0.7 (L) 1.1 - 2.2     CBC W/O DIFF    Collection Time: 03/23/22  4:56 AM   Result Value Ref Range    WBC 11.1 4.1 - 11.1 K/uL    RBC 3.03 (L) 4.10 - 5.70 M/uL    HGB 8.7 (L) 12.1 - 17.0 g/dL    HCT 27.1 (L) 36.6 - 50.3 %    MCV 89.4 80.0 - 99.0 FL    MCH 28.7 26.0 - 34.0 PG    MCHC 32.1 30.0 - 36.5 g/dL    RDW 13.2 11.5 - 14.5 %    PLATELET 271 835 - 889 K/uL    MPV 10.6 8.9 - 12.9 FL    NRBC 0.0 0  WBC    ABSOLUTE NRBC 0.00 0.00 - 0.01 K/uL   LABRCNT(wbc:2,hgb:2,hct:2,plt:2,)  Recent Labs     03/23/22  0456 03/22/22  0833   * 138   K 3.8 4.9   CL 96* 104   CO2 34* 24   BUN 32* 74*   CREA 6.88* 12.00*    127*   CA 7.9* 8.6   MG  --  2.3   PHOS  --  5.0*   LABRCNT(sgot:3,gpt:3,ap:3,tbiL:3,TP:3,ALB:3,GLOB:3,ggt:3,aml:3,amyp:3,lpse:3,hlpse:3)  Recent Labs     03/23/22  0456   INR 1.2*   PTP 12.4*     Recent Labs     03/23/22  0456 03/22/22  0833   AP 32* 42*   TP 5.8* 7.1   ALB 2.4* 2.9*   GLOB 3.4 4.2*   LPSE  --  362       3/13/2022 00:42 3/22/2022 08:33 3/23/2022 04:56   HGB  10.0 (L) 10.7 (L) 8.7 (L)   HCT  29.5 (L) 32.0 (L) 27.1 (L)     Impression:  Hypertensive urgency   Intractable abdominal pain   ESRD (end stage renal disease)  Melena  Abnormal duodenum on CT     Plan:  Patient now reports he had a black stool and Hgb down to 8.7. Given findings of duodenal thickening needs EGD for further evaluation to r/o DU. Pt to remain NPO today for EGD later this afternoon. He agrees w plan.        Richie Marcelo MD    3/23/2022  3500  [address] [place] [address], [address] [place] [address].[place] [phone]  [address] [place] [address] [place]: [phone]

## 2022-03-23 NOTE — ANESTHESIA PREPROCEDURE EVALUATION
Relevant Problems   CARDIOVASCULAR   (+) Hypertension   (+) Hypertensive emergency   (+) Hypertensive urgency      GASTROINTESTINAL   (+) Chronic hepatitis C (HCC)   (+) Peptic ulcer disease      RENAL FAILURE   (+) CKD (chronic kidney disease)   (+) ESRD (end stage renal disease) (HCC)   (+) ESRD (end stage renal disease) on dialysis (HCC)      HEMATOLOGY   (+) Iron deficiency anemia       Anesthetic History   No history of anesthetic complications            Review of Systems / Medical History  Patient summary reviewed, nursing notes reviewed and pertinent labs reviewed    Pulmonary  Within defined limits                 Neuro/Psych   Within defined limits           Cardiovascular  Within defined limits  Hypertension                   GI/Hepatic/Renal         Renal disease  PUD and liver disease     Endo/Other  Within defined limits           Other Findings              Physical Exam    Airway  Mallampati: II  TM Distance: > 6 cm  Neck ROM: normal range of motion   Mouth opening: Normal     Cardiovascular  Regular rate and rhythm,  S1 and S2 normal,  no murmur, click, rub, or gallop             Dental  No notable dental hx       Pulmonary  Breath sounds clear to auscultation               Abdominal  GI exam deferred       Other Findings            Anesthetic Plan    ASA: 3  Anesthesia type: general and total IV anesthesia            Anesthetic plan and risks discussed with: Patient

## 2022-03-23 NOTE — PROGRESS NOTES
Hospitalist Progress Note    NAME: Tanya Adams   :  1965   MRN:  [id number]       Assessment / Plan:  Epigastric pain  Likely due to Duodenitis     Admit to observation  CT abdomen  1. Infectious, inflammatory, or ulcer related duodenitis is favored over  pancreatitis. Correlate with lipase. No perforation or pneumoperitoneum. 2. Cholelithiasis. 3. New small pericardial effusion. 4. L5 spondylolysis. Increased L5-S1 degenerative disc disease. Lipase 362, ALT AST normal  C/w protonix  Also complains of having dark BM last night, going for endoscopy today     Hypertensive urgency  ESRD on HD  Has recurrent admissions for uncontrolled hypertension, with history of noncompliance  Mentions he is taking his medications regularly  Continue with home medication  Hemodialysis as per nephrology     Leukocytosis:  Hb 16, likely reactive from pain  CXR clear  WBC improving today     Code Status: Full  Surrogate Decision Maker: His sister     DVT Prophylaxis: Heparin  GI Prophylaxis: Protonix     Baseline: Ambulatory  ERMA: 3/24  Barriers: EGD today, pain control        Subjective:     Chief Complaint / Reason for Physician Visit  Follow up for epigastric pain  He continues to have mild epigastric pain. He had a dark bm last night. Review of Systems:  Symptom Y/N Comments  Symptom Y/N Comments   Fever/Chills n   Chest Pain     Poor Appetite n   Edema     Cough n   Abdominal Pain y    Sputum n   Joint Pain     SOB/KING n   Pruritis/Rash     Nausea/vomit    Tolerating PT/OT     Diarrhea    Tolerating Diet     Constipation    Other       Could NOT obtain due to:      Objective:     VITALS:   Last 24hrs VS reviewed since prior progress note.  Most recent are:  Patient Vitals for the past 24 hrs:   Temp Pulse Resp BP SpO2   22 1123 98.1 °F (36.7 °C) 81 18 121/74 96 %   22 0809 98.2 °F (36.8 °C) 91 18 (!) 141/82 94 %   22 0455 99.2 °F (37.3 °C) 88 16 130/79 96 %   22 0230 -- 98 -- (!) 141/91 --   03/23/22 0000 98.1 °F (36.7 °C) 88 18 (!) 155/93 96 %   03/22/22 2158 -- 95 18 (!) 153/85 --   03/22/22 1945 98.4 °F (36.9 °C) (!) 108 18 (!) 205/107 94 %   03/22/22 1832 98.1 °F (36.7 °C) (!) 108 16 (!) 215/111 99 %   03/22/22 1745 99 °F (37.2 °C) (!) 104 18 (!) 200/102 --   03/22/22 1730 -- 100 18 (!) 198/107 --   03/22/22 1715 -- 98 18 (!) 201/104 --   03/22/22 1700 -- (!) 102 18 (!) 203/103 --   03/22/22 1645 -- 96 18 (!) 196/98 --   03/22/22 1630 -- 99 18 (!) 199/103 --   03/22/22 1615 -- 98 18 (!) 204/104 --   03/22/22 1600 -- 98 18 (!) 189/105 --   03/22/22 1545 -- 94 18 (!) 203/99 --   03/22/22 1530 -- 93 18 (!) 193/107 --   03/22/22 1515 -- 90 18 (!) 195/98 --   03/22/22 1500 -- 89 18 (!) 181/105 --   03/22/22 1445 -- 85 18 (!) 172/100 --   03/22/22 1430 -- 92 18 (!) 180/108 --   03/22/22 1415 -- 93 18 (!) 200/115 --   03/22/22 1358 98.5 °F (36.9 °C) 89 20 (!) 195/104 --   03/22/22 1327 -- (!) 101 21 (!) 178/65 100 %   03/22/22 1316 -- (!) 104 -- (!) 187/90 --   03/22/22 1312 -- (!) 102 20 (!) 187/90 99 %       Intake/Output Summary (Last 24 hours) at 3/23/2022 1309  Last data filed at 3/23/2022 0810  Gross per 24 hour   Intake 360 ml   Output 1000 ml   Net -640 ml        I had a face to face encounter and independently examined this patient on 3/23/2022, as outlined below:  PHYSICAL EXAM:  General: WD, WN. Alert, cooperative, no acute distress    EENT:  EOMI. Anicteric sclerae. MMM  Resp:  CTA bilaterally, no wheezing or rales. No accessory muscle use  CV:  Regular  rhythm,  No edema  GI:  Soft, Non distended, Non tender. +Bowel sounds  Neurologic:  Alert and oriented X 3, normal speech,   Psych:   Good insight. Not anxious nor agitated  Skin:  No rashes.   No jaundice    Reviewed most current lab test results and cultures  YES  Reviewed most current radiology test results   YES  Review and summation of old records today    NO  Reviewed patient's current orders and MAR    YES  PMH/SH reviewed - no change compared to H&P  ________________________________________________________________________  Care Plan discussed with:    Comments   Patient y    Family      RN y    Care Manager     Consultant                        Multidiciplinary team rounds were held today with , nursing, pharmacist and clinical coordinator. Patient's plan of care was discussed; medications were reviewed and discharge planning was addressed. ________________________________________________________________________  Total NON critical care TIME: 36   Minutes    Total CRITICAL CARE TIME Spent:   Minutes non procedure based      Comments   >50% of visit spent in counseling and coordination of care     ________________________________________________________________________  Gerald Rosenthal MD     Procedures: see electronic medical records for all procedures/Xrays and details which were not copied into this note but were reviewed prior to creation of Plan. LABS:  I reviewed today's most current labs and imaging studies.   Pertinent labs include:  Recent Labs     03/23/22 0456 03/22/22  0833   WBC 11.1 16.8*   HGB 8.7* 10.7*   HCT 27.1* 32.0*    274     Recent Labs     03/23/22 0456 03/22/22  0833   * 138   K 3.8 4.9   CL 96* 104   CO2 34* 24    127*   BUN 32* 74*   CREA 6.88* 12.00*   CA 7.9* 8.6   MG  --  2.3   PHOS  --  5.0*   ALB 2.4* 2.9*   TBILI 0.4 0.3   ALT 11* 12   INR 1.2*  --        Signed: Gerald Rosenthal MD

## 2022-03-23 NOTE — PROCEDURES
Esophagogastroduodenoscopy Procedure Note      Aylin Castanon  1965  [id number]    Indication:  Epigastric pain, melena, abnormal CT scan of duodenum     Endoscopist: Kobe Miller MD    Referring Provider:  Jass Carlton NP    Sedation:  MAC anesthesia Propofol    Procedure Details:  After infomed consent was obtained for the procedure, with all risks and benefits of procedure explained the patient was taken to the endoscopy suite and placed in the left lateral decubitus position. Following sequential administration of sedation as per above, the endoscope was inserted into the mouth and advanced under direct vision to second portion of the duodenum. A careful inspection was made as the gastroscope was withdrawn, including a retroflexed view of the proximal stomach; findings and interventions are described below. Findings:     Esophagus:   - Normal mucosa throughout the esophagus. Z line normal located at 40 cm from incisors. Stomach:   - Normal mucosa throughout the stomach. Pylorus patent. No ulcer or erosion seen. No blood in stomach. Duodenum:   ++ The third portion of duodenum showed patchy erythema that was along folds with some surface erosion and friability ? Inflammatory vs. Infectious vs. Ischemic findings. Unusual finding. We performed multiple cold forcep biopsies. Therapies:  As above    Specimen: Specimens were collected as described and send to the laboratory. Complications:   None were encountered during the procedure. EBL:  < 10 ml. Recommendations:   -PPI BID  -Sucralfate TID  -advance diet  -clear liquid diet and advance as tolerated.   -follow up path when available      Kobe Miller MD  3/23/2022  2:05 PM

## 2022-03-23 NOTE — PROGRESS NOTES
Initial message via perfect serve to YANDEL Murguia NP @ [phone] regarding elevated b/p and need for prn b/p medication, message remained unread, spoke with RN supervisor and this RN was informed to contact Dr Karly Leo as YANDEL Murguia NP not available, message to Dr Karly Leo @ [phone] regarding elevated b/p with message read @ [phone], no new orders received, received call from RN supervisor @ [phone] to contact Sacha Philip NP and message via perfect serve sent to Sacha Philip NP @ [phone] with message read @ [phone] stating \"I am okay with 153/85. We can hold off on prn for now. \"

## 2022-03-23 NOTE — PROGRESS NOTES
1400 Endoscope was pre-cleaned at the bedside immediately following procedure by Sharon Boswell    1408 TRANSFER - OUT REPORT:    Verbal report given to Yazmin Brandt Rn (name) on Amairani Franco  being transferred to 2116(unit) for routine post - op       Report consisted of patients Situation, Background, Assessment and   Recommendations(SBAR). Information from the following report(s) Procedure Summary was reviewed with the receiving nurse. Lines:   Peripheral IV 03/22/22 Left Arm (Active)   Site Assessment Clean, dry, & intact 03/23/22 0810   Phlebitis Assessment 0 03/23/22 0810   Infiltration Assessment 0 03/23/22 0810   Dressing Status Clean, dry, & intact 03/23/22 0810   Dressing Type Transparent 03/23/22 0810   Hub Color/Line Status Pink;Capped 03/22/22 1945   Action Taken Open ports on tubing capped 03/22/22 1945   Alcohol Cap Used Yes 03/22/22 1945        Opportunity for questions and clarification was provided.

## 2022-03-23 NOTE — ANESTHESIA POSTPROCEDURE EVALUATION
Procedure(s):  ESOPHAGOGASTRODUODENOSCOPY (EGD)  ESOPHAGOGASTRODUODENAL (EGD) BIOPSY. total IV anesthesia    Anesthesia Post Evaluation        Patient location during evaluation: PACU  Note status: Adequate. Level of consciousness: responsive to verbal stimuli and sleepy but conscious  Pain management: satisfactory to patient  Airway patency: patent  Anesthetic complications: no  Cardiovascular status: acceptable  Respiratory status: acceptable  Hydration status: acceptable  Comments: +Post-Anesthesia Evaluation and Assessment    Patient: Winsome Valero MRN: [id number]  SSN: [id number]   YOB: 1965  Age: 64 y.o. Sex: male      Cardiovascular Function/Vital Signs    BP (!) 145/78   Pulse 79   Temp 36.7 °C (98 °F)   Resp 18   Ht 5' 9\" (1.753 m)   Wt 77 kg (169 lb 11.2 oz)   SpO2 98%   BMI 25.06 kg/m²     Patient is status post Procedure(s):  ESOPHAGOGASTRODUODENOSCOPY (EGD)  ESOPHAGOGASTRODUODENAL (EGD) BIOPSY. Nausea/Vomiting: Controlled. Postoperative hydration reviewed and adequate. Pain:  Pain Scale 1: Numeric (0 - 10) (03/23/22 1451)  Pain Intensity 1: 0 (03/23/22 1451)   Managed. Neurological Status: At baseline. Mental Status and Level of Consciousness: Arousable. Pulmonary Status:   O2 Device: None (Room air) (03/23/22 1424)   Adequate oxygenation and airway patent. Complications related to anesthesia: None    Post-anesthesia assessment completed. No concerns.     Signed By: Charlene Gant DO    3/23/2022  Post anesthesia nausea and vomiting:  controlled      INITIAL Post-op Vital signs:   Vitals Value Taken Time   /78 03/23/22 1442   Temp 36.7 °C (98 °F) 03/23/22 1442   Pulse 79 03/23/22 1442   Resp 18 03/23/22 1442   SpO2 98 % 03/23/22 1442

## 2022-03-23 NOTE — PROGRESS NOTES
Bedside shift change report given to Rupal Castro LPN(oncoming nurse) by Wilfred Villatoro (offgoing nurse). Report included the following information SBAR, Kardex, Intake/Output, MAR and Cardiac Rhythm NSR.

## 2022-03-24 PROBLEM — R10.9 INTRACTABLE ABDOMINAL PAIN: Status: ACTIVE | Noted: 2022-03-22

## 2022-03-24 PROBLEM — K27.9 PEPTIC ULCER DISEASE: Status: ACTIVE | Noted: 2022-03-22

## 2022-03-24 PROBLEM — N18.6 ESRD (END STAGE RENAL DISEASE) (HCC): Status: ACTIVE | Noted: 2022-03-22

## 2022-03-24 LAB
ANION GAP SERPL CALC-SCNC: 8 MMOL/L (ref 5–15)
BACTERIA SPEC CULT: NORMAL
BUN SERPL-MCNC: 40 MG/DL (ref 6–20)
BUN/CREAT SERPL: 4 (ref 12–20)
CALCIUM SERPL-MCNC: 8.4 MG/DL (ref 8.5–10.1)
CHLORIDE SERPL-SCNC: 94 MMOL/L (ref 97–108)
CO2 SERPL-SCNC: 30 MMOL/L (ref 21–32)
CREAT SERPL-MCNC: 9.21 MG/DL (ref 0.7–1.3)
ERYTHROCYTE [DISTWIDTH] IN BLOOD BY AUTOMATED COUNT: 13.5 % (ref 11.5–14.5)
GLUCOSE SERPL-MCNC: 71 MG/DL (ref 65–100)
HCT VFR BLD AUTO: 23.7 % (ref 36.6–50.3)
HGB BLD-MCNC: 7.8 G/DL (ref 12.1–17)
MCH RBC QN AUTO: 28.6 PG (ref 26–34)
MCHC RBC AUTO-ENTMCNC: 32.9 G/DL (ref 30–36.5)
MCV RBC AUTO: 86.8 FL (ref 80–99)
NRBC # BLD: 0 K/UL (ref 0–0.01)
NRBC BLD-RTO: 0 PER 100 WBC
PLATELET # BLD AUTO: 191 K/UL (ref 150–400)
PMV BLD AUTO: 10.5 FL (ref 8.9–12.9)
POTASSIUM SERPL-SCNC: 3.8 MMOL/L (ref 3.5–5.1)
RBC # BLD AUTO: 2.73 M/UL (ref 4.1–5.7)
SERVICE CMNT-IMP: NORMAL
SODIUM SERPL-SCNC: 132 MMOL/L (ref 136–145)
WBC # BLD AUTO: 9.9 K/UL (ref 4.1–11.1)

## 2022-03-24 PROCEDURE — 74011000250 HC RX REV CODE- 250: Performed by: STUDENT IN AN ORGANIZED HEALTH CARE EDUCATION/TRAINING PROGRAM

## 2022-03-24 PROCEDURE — 96376 TX/PRO/DX INJ SAME DRUG ADON: CPT

## 2022-03-24 PROCEDURE — 36415 COLL VENOUS BLD VENIPUNCTURE: CPT

## 2022-03-24 PROCEDURE — 74011250636 HC RX REV CODE- 250/636: Performed by: STUDENT IN AN ORGANIZED HEALTH CARE EDUCATION/TRAINING PROGRAM

## 2022-03-24 PROCEDURE — 96372 THER/PROPH/DIAG INJ SC/IM: CPT

## 2022-03-24 PROCEDURE — 80048 BASIC METABOLIC PNL TOTAL CA: CPT

## 2022-03-24 PROCEDURE — G0378 HOSPITAL OBSERVATION PER HR: HCPCS

## 2022-03-24 PROCEDURE — 74011250637 HC RX REV CODE- 250/637: Performed by: STUDENT IN AN ORGANIZED HEALTH CARE EDUCATION/TRAINING PROGRAM

## 2022-03-24 PROCEDURE — 74011250637 HC RX REV CODE- 250/637: Performed by: PHYSICIAN ASSISTANT

## 2022-03-24 PROCEDURE — 85027 COMPLETE CBC AUTOMATED: CPT

## 2022-03-24 PROCEDURE — C9113 INJ PANTOPRAZOLE SODIUM, VIA: HCPCS | Performed by: STUDENT IN AN ORGANIZED HEALTH CARE EDUCATION/TRAINING PROGRAM

## 2022-03-24 PROCEDURE — 96375 TX/PRO/DX INJ NEW DRUG ADDON: CPT

## 2022-03-24 PROCEDURE — 74011250637 HC RX REV CODE- 250/637: Performed by: EMERGENCY MEDICINE

## 2022-03-24 PROCEDURE — 90935 HEMODIALYSIS ONE EVALUATION: CPT

## 2022-03-24 PROCEDURE — 74011250636 HC RX REV CODE- 250/636: Performed by: INTERNAL MEDICINE

## 2022-03-24 RX ADMIN — SODIUM CHLORIDE 40 MG: 9 INJECTION INTRAMUSCULAR; INTRAVENOUS; SUBCUTANEOUS at 09:16

## 2022-03-24 RX ADMIN — CLONIDINE HYDROCHLORIDE 0.2 MG: 0.1 TABLET ORAL at 22:32

## 2022-03-24 RX ADMIN — SUCRALFATE 1 G: 1 TABLET ORAL at 22:24

## 2022-03-24 RX ADMIN — DOXAZOSIN 4 MG: 2 TABLET ORAL at 09:17

## 2022-03-24 RX ADMIN — SUCRALFATE 1 G: 1 TABLET ORAL at 11:39

## 2022-03-24 RX ADMIN — SUCRALFATE 1 G: 1 TABLET ORAL at 09:17

## 2022-03-24 RX ADMIN — HEPARIN SODIUM 5000 UNITS: 5000 INJECTION INTRAVENOUS; SUBCUTANEOUS at 05:42

## 2022-03-24 RX ADMIN — SODIUM CHLORIDE, PRESERVATIVE FREE 10 ML: 5 INJECTION INTRAVENOUS at 05:35

## 2022-03-24 RX ADMIN — NIFEDIPINE 60 MG: 60 TABLET, EXTENDED RELEASE ORAL at 09:17

## 2022-03-24 RX ADMIN — CARVEDILOL 25 MG: 12.5 TABLET, FILM COATED ORAL at 09:16

## 2022-03-24 RX ADMIN — OXYCODONE HYDROCHLORIDE 5 MG: 5 TABLET ORAL at 02:59

## 2022-03-24 RX ADMIN — EPOETIN ALFA-EPBX 20000 UNITS: 20000 INJECTION, SOLUTION INTRAVENOUS; SUBCUTANEOUS at 22:27

## 2022-03-24 RX ADMIN — HEPARIN SODIUM 5000 UNITS: 5000 INJECTION INTRAVENOUS; SUBCUTANEOUS at 15:04

## 2022-03-24 RX ADMIN — ACETAMINOPHEN 325MG 650 MG: 325 TABLET ORAL at 02:59

## 2022-03-24 RX ADMIN — SODIUM CHLORIDE, PRESERVATIVE FREE 10 ML: 5 INJECTION INTRAVENOUS at 15:05

## 2022-03-24 RX ADMIN — ONDANSETRON 4 MG: 4 TABLET, ORALLY DISINTEGRATING ORAL at 15:04

## 2022-03-24 RX ADMIN — BUMETANIDE 2 MG: 1 TABLET ORAL at 09:17

## 2022-03-24 RX ADMIN — CLONIDINE HYDROCHLORIDE 0.2 MG: 0.1 TABLET ORAL at 09:17

## 2022-03-24 RX ADMIN — SODIUM CHLORIDE, PRESERVATIVE FREE 10 ML: 5 INJECTION INTRAVENOUS at 22:24

## 2022-03-24 RX ADMIN — HYDRALAZINE HYDROCHLORIDE 100 MG: 50 TABLET, FILM COATED ORAL at 22:33

## 2022-03-24 RX ADMIN — SODIUM CHLORIDE 40 MG: 9 INJECTION INTRAMUSCULAR; INTRAVENOUS; SUBCUTANEOUS at 22:23

## 2022-03-24 RX ADMIN — HYDRALAZINE HYDROCHLORIDE 100 MG: 50 TABLET, FILM COATED ORAL at 09:17

## 2022-03-24 RX ADMIN — IRON SUCROSE 200 MG: 20 INJECTION, SOLUTION INTRAVENOUS at 09:16

## 2022-03-24 RX ADMIN — HEPARIN SODIUM 5000 UNITS: 5000 INJECTION INTRAVENOUS; SUBCUTANEOUS at 22:24

## 2022-03-24 NOTE — PROGRESS NOTES
Gastroenterology Progress Note    3/24/2022    Admit Date: 3/22/2022    Subjective: Follow up for: Duodenitis/abnormal CT abdomen pelvis     He feels better but still complaining of 6-7 out of 10 pain. S/p EGD performed yesterday which revealed duodenitis in the third portion of the duodenum. Multiple biopsies were taken. Patient was seen in rounds by me today. At this time, the patient is resting comfortably.        Current Facility-Administered Medications   Medication Dose Route Frequency    epoetin shawn-epbx (RETACRIT) injection 20,000 Units  20,000 Units SubCUTAneous Q TUE, THU & SAT    iron sucrose (VENOFER) injection 200 mg  200 mg IntraVENous ONCE    hydrALAZINE (APRESOLINE) 20 mg/mL injection 15 mg  15 mg IntraVENous Q6H PRN    sucralfate (CARAFATE) tablet 1 g  1 g Oral AC&HS    acetaminophen (TYLENOL) tablet 650 mg  650 mg Oral Q6H PRN    bumetanide (BUMEX) tablet 2 mg  2 mg Oral DAILY    buprenorphine-naloxone (SUBOXONE) 8mg-2mg SL film  1 Film SubLINGual BID    carvediloL (COREG) tablet 25 mg  25 mg Oral BID WITH MEALS    cloNIDine HCL (CATAPRES) tablet 0.2 mg  0.2 mg Oral TID    doxazosin (CARDURA) tablet 4 mg  4 mg Oral DAILY    hydrALAZINE (APRESOLINE) tablet 100 mg  100 mg Oral TID    NIFEdipine ER (PROCARDIA XL) tablet 60 mg  60 mg Oral BID    sodium chloride (NS) flush 5-40 mL  5-40 mL IntraVENous Q8H    sodium chloride (NS) flush 5-40 mL  5-40 mL IntraVENous PRN    acetaminophen (TYLENOL) tablet 650 mg  650 mg Oral Q6H PRN    Or    acetaminophen (TYLENOL) suppository 650 mg  650 mg Rectal Q6H PRN    polyethylene glycol (MIRALAX) packet 17 g  17 g Oral DAILY PRN    ondansetron (ZOFRAN ODT) tablet 4 mg  4 mg Oral Q8H PRN    Or    ondansetron (ZOFRAN) injection 4 mg  4 mg IntraVENous Q6H PRN    heparin (porcine) injection 5,000 Units  5,000 Units SubCUTAneous Q8H    oxyCODONE IR (ROXICODONE) tablet 5 mg  5 mg Oral Q4H PRN    pantoprazole (PROTONIX) 40 mg in 0.9% sodium chloride 10 mL injection  40 mg IntraVENous Q12H    [Held by provider] heparin (porcine) 1,000 unit/mL injection 2,000 Units  2,000 Units InterCATHeter DIALYSIS PRN    And    heparin (porcine) 1,000 unit/mL injection 2,100 Units  2,100 Units InterCATHeter DIALYSIS PRN        Objective:     Blood pressure (!) 168/81, pulse 84, temperature 98 °F (36.7 °C), resp. rate 18, height 5' 9\" (1.753 m), weight 79 kg (174 lb 3.2 oz), SpO2 98 %. No intake/output data recorded. 03/22 1901 - 03/24 0700  In: 1155 [P.O.:1080; I.V.:75]  Out: 100 [Urine:100]        Physical Examination:       General:AAO x 3,   HEENT:  EOMI,   Chest:  CTA, No rhonchi, rales or rubs.   Heart: S1, S2, RRR  GI: Soft, mild pain in right flank area + bowel sounds  Extremities: No edema or cyanosis  CNS: CNs grossly normal.    Data Review    Recent Results (from the past 24 hour(s))   COVID-19 RAPID TEST    Collection Time: 03/23/22  9:26 AM   Result Value Ref Range    Specimen source Nasopharyngeal      COVID-19 rapid test Not detected NOTD     CBC W/O DIFF    Collection Time: 03/24/22  3:03 AM   Result Value Ref Range    WBC 9.9 4.1 - 11.1 K/uL    RBC 2.73 (L) 4.10 - 5.70 M/uL    HGB 7.8 (L) 12.1 - 17.0 g/dL    HCT 23.7 (L) 36.6 - 50.3 %    MCV 86.8 80.0 - 99.0 FL    MCH 28.6 26.0 - 34.0 PG    MCHC 32.9 30.0 - 36.5 g/dL    RDW 13.5 11.5 - 14.5 %    PLATELET 331 621 - 682 K/uL    MPV 10.5 8.9 - 12.9 FL    NRBC 0.0 0  WBC    ABSOLUTE NRBC 0.00 0.00 - 4.35 K/uL   METABOLIC PANEL, BASIC    Collection Time: 03/24/22  3:03 AM   Result Value Ref Range    Sodium 132 (L) 136 - 145 mmol/L    Potassium 3.8 3.5 - 5.1 mmol/L    Chloride 94 (L) 97 - 108 mmol/L    CO2 30 21 - 32 mmol/L    Anion gap 8 5 - 15 mmol/L    Glucose 71 65 - 100 mg/dL    BUN 40 (H) 6 - 20 MG/DL    Creatinine 9.21 (H) 0.70 - 1.30 MG/DL    BUN/Creatinine ratio 4 (L) 12 - 20      GFR est AA 7 (L) >60 ml/min/1.73m2    GFR est non-AA 6 (L) >60 ml/min/1.73m2    Calcium 8.4 (L) 8.5 - 10.1 MG/DL     Recent Labs     03/24/22  0303 03/23/22  0456   WBC 9.9 11.1   HGB 7.8* 8.7*   HCT 23.7* 27.1*    208     Recent Labs     03/24/22  0303 03/23/22 0456 03/22/22  0833   * 135* 138   K 3.8 3.8 4.9   CL 94* 96* 104   CO2 30 34* 24   BUN 40* 32* 74*   CREA 9.21* 6.88* 12.00*   GLU 71 100 127*   CA 8.4* 7.9* 8.6   MG  --   --  2.3   PHOS  --   --  5.0*     Recent Labs     03/23/22 0456 03/22/22  0833   AP 32* 42*   TP 5.8* 7.1   ALB 2.4* 2.9*   GLOB 3.4 4.2*   LPSE  --  362     Recent Labs     03/23/22 0456   INR 1.2*   PTP 12.4*      No results for input(s): FE, TIBC, PSAT, FERR in the last 72 hours. No results found for: FOL, RBCF   No results for input(s): PH, PCO2, PO2 in the last 72 hours. No results for input(s): CPK, CKNDX, TROIQ in the last 72 hours. No lab exists for component: CPKMB  Lab Results   Component Value Date/Time    Cholesterol, total 170 06/29/2020 03:46 AM    HDL Cholesterol 49 06/29/2020 03:46 AM    LDL, calculated 73.4 06/29/2020 03:46 AM    Triglyceride 238 (H) 06/29/2020 03:46 AM    CHOL/HDL Ratio 3.5 06/29/2020 03:46 AM     No components found for: [place]  Lab Results   Component Value Date/Time    Color YELLOW/STRAW 03/22/2022 04:11 PM    Appearance TURBID (A) 03/22/2022 04:11 PM    Specific gravity 1.021 03/22/2022 04:11 PM    pH (UA) 7.5 03/22/2022 04:11 PM    Protein >300 (A) 03/22/2022 04:11 PM    Glucose 100 (A) 03/22/2022 04:11 PM    Ketone Negative 03/22/2022 04:11 PM    Bilirubin Negative 03/22/2022 04:11 PM    Urobilinogen 0.2 03/22/2022 04:11 PM    Nitrites Negative 03/22/2022 04:11 PM    Leukocyte Esterase MODERATE (A) 03/22/2022 04:11 PM    Epithelial cells MODERATE (A) 03/22/2022 04:11 PM    Bacteria Negative 03/22/2022 04:11 PM    WBC 10-20 03/22/2022 04:11 PM    RBC 0-5 03/22/2022 04:11 PM        ROS: -CP, SOB, Dysuria, palpitations, cough. Findings:      Esophagus:   -           Normal mucosa throughout the esophagus.   Z line normal located at 40 cm from incisors.     Stomach:   -           Normal mucosa throughout the stomach. Pylorus patent. No ulcer or erosion seen. No blood in stomach.     Duodenum:   ++        The third portion of duodenum showed patchy erythema that was along folds with some surface erosion and friability ? Inflammatory vs. Infectious vs. Ischemic findings. Unusual finding. We performed multiple cold forcep biopsies.       Assessment:  Duodenitis noted on EGD    Active Problems:    Hypertensive urgency (3/12/2022)      Peptic ulcer disease (3/22/2022)      Intractable abdominal pain (3/22/2022)      [facility] (3/22/2022)             Plan/Discussion: We await biopsies taken from the third portion of the duodenum which revealed surface erosions and friability question inflammatory, infectious versus ischemic. It is an unusual finding but multiple biopsies were obtained. Final diagnosis depends on microscopic analysis which we should hopefully have soon. In the interim we suggest continuing supportive therapy and continuing the patient on PPI. Advance diet as tolerated. Signed By: Joanne Martínez.  Henrry Judd MD    3/24/2022  8:07 AM

## 2022-03-24 NOTE — PROGRESS NOTES
Hospitalist Progress Note    NAME: Demarcus Robin   :  1965   MRN:  [id number]       Assessment / Plan:  Epigastric pain  Likely due to Duodenitis     CT abdomen  1. Infectious, inflammatory, or ulcer related duodenitis is favored over  pancreatitis. Correlate with lipase. No perforation or pneumoperitoneum. 2. Cholelithiasis. 3. New small pericardial effusion. 4. L5 spondylolysis. Increased L5-S1 degenerative disc disease. Lipase 362, ALT AST normal  C/w protonix  Also complained of 1 episode of Dark bm, monitor HB  Endoscopy 3/23 revealed duodenitis of 3rd part of duodenum, multiple biopsies taken, multiple biopsy sent  Advance to full liquid diet today  Pain better today.     Hypertensive urgency  ESRD on HD  Has recurrent admissions for uncontrolled hypertension, with history of noncompliance  Mentions he is taking his medications regularly  Continue with home medication  Hemodialysis as per nephrology     Leukocytosis:  Hb 16, likely reactive from pain  CXR clear  WBC normal now     Code Status: Full  Surrogate Decision Maker: His sister     DVT Prophylaxis: Heparin  GI Prophylaxis: Protonix     Baseline: Ambulatory  ERMA: 3/25  Barriers: Advancing diet, pain control, GI clearance        Subjective:     Chief Complaint / Reason for Physician Visit  Follow up for epigastric pain  \"Pain is easing off\"    Review of Systems:  Symptom Y/N Comments  Symptom Y/N Comments   Fever/Chills n   Chest Pain     Poor Appetite n   Edema     Cough n   Abdominal Pain y    Sputum n   Joint Pain     SOB/KING n   Pruritis/Rash     Nausea/vomit    Tolerating PT/OT     Diarrhea    Tolerating Diet     Constipation    Other       Could NOT obtain due to:      Objective:     VITALS:   Last 24hrs VS reviewed since prior progress note.  Most recent are:  Patient Vitals for the past 24 hrs:   Temp Pulse Resp BP SpO2   22 1109 98.9 °F (37.2 °C) 80 18 (!) 141/74 97 %   22 0923 -- -- -- -- 98 %   22 0701 98 °F (36.7 °C) 84 18 (!) 168/81 98 %   03/24/22 0328 97.5 °F (36.4 °C) 82 18 136/69 99 %   03/24/22 0035 97.9 °F (36.6 °C) 84 16 131/67 98 %   03/23/22 1947 98.1 °F (36.7 °C) 80 18 (!) 146/72 97 %   03/23/22 1727 -- 88 -- (!) 143/80 --   03/23/22 1442 98 °F (36.7 °C) 79 18 (!) 145/78 98 %   03/23/22 1424 -- 83 15 (!) 104/50 97 %   03/23/22 1421 -- 88 16 (!) 96/46 96 %   03/23/22 1417 97.7 °F (36.5 °C) 84 14 (!) 99/46 97 %   03/23/22 1409 -- 87 16 (!) 111/55 100 %   03/23/22 1333 -- 83 14 137/74 97 %       Intake/Output Summary (Last 24 hours) at 3/24/2022 1138  Last data filed at 3/23/2022 1727  Gross per 24 hour   Intake 795 ml   Output 100 ml   Net 695 ml        I had a face to face encounter and independently examined this patient on 3/24/2022, as outlined below:  PHYSICAL EXAM:  General: WD, WN. Alert, cooperative, no acute distress    EENT:  EOMI. Anicteric sclerae. MMM  Resp:  CTA bilaterally, no wheezing or rales. No accessory muscle use  CV:  Regular  rhythm,  No edema  GI:  Soft, Non distended, Non tender. +Bowel sounds  Neurologic:  Alert and oriented X 3, normal speech,   Psych:   Good insight. Not anxious nor agitated  Skin:  No rashes. No jaundice    Reviewed most current lab test results and cultures  YES  Reviewed most current radiology test results   YES  Review and summation of old records today    NO  Reviewed patient's current orders and MAR    YES  PMH/SH reviewed - no change compared to H&P  ________________________________________________________________________  Care Plan discussed with:    Comments   Patient y    Family      RN y    Care Manager     Consultant                        Multidiciplinary team rounds were held today with , nursing, pharmacist and clinical coordinator. Patient's plan of care was discussed; medications were reviewed and discharge planning was addressed.      ________________________________________________________________________  Total NON critical care TIME: 36   Minutes    Total CRITICAL CARE TIME Spent:   Minutes non procedure based      Comments   >50% of visit spent in counseling and coordination of care     ________________________________________________________________________  Trino Scanlon MD     Procedures: see electronic medical records for all procedures/Xrays and details which were not copied into this note but were reviewed prior to creation of Plan. LABS:  I reviewed today's most current labs and imaging studies.   Pertinent labs include:  Recent Labs     03/24/22 0303 03/23/22 0456 03/22/22  0833   WBC 9.9 11.1 16.8*   HGB 7.8* 8.7* 10.7*   HCT 23.7* 27.1* 32.0*    208 274     Recent Labs     03/24/22 0303 03/23/22 0456 03/22/22  0833   * 135* 138   K 3.8 3.8 4.9   CL 94* 96* 104   CO2 30 34* 24   GLU 71 100 127*   BUN 40* 32* 74*   CREA 9.21* 6.88* 12.00*   CA 8.4* 7.9* 8.6   MG  --   --  2.3   PHOS  --   --  5.0*   ALB  --  2.4* 2.9*   TBILI  --  0.4 0.3   ALT  --  11* 12   INR  --  1.2*  --        Signed: Trino Scanlon MD

## 2022-03-24 NOTE — PROGRESS NOTES
Nephrology Progress Note  Luc aLne     [email or web address]  Phone - [phone]   Patient: Anderson Allred    YOB: 1965        Date- 3/24/2022   Admit Date: 3/22/2022  CC: Follow up for  ESRD         IMPRESSION & PLAN:    esrd - TTS- purvi vargas   Hypertensive urgency   Epigastric pain, nausea ,vomiting   Non compliance to meds and diet   Anemia of ckd   Sec. hyperpara   Abnormal duodenum on CT    PLAN-   Plan for HD today.  Status post EGD yesterday, showed possible duodenitis, multiple biopsies taken.  epogen for anemia   Continue coreg, clonidine, hydralazine, cardura, procardia   D/w patient     Subjective: Interval History:   Seen and examined today. Lethargic this morning. Status post EGD yesterday    Objective:   Vitals:    03/24/22 0328 03/24/22 0701 03/24/22 0923 03/24/22 1109   BP: 136/69 (!) 168/81  (!) 141/74   Pulse: 82 84  80   Resp: 18 18  18   Temp: 97.5 °F (36.4 °C) 98 °F (36.7 °C)  98.9 °F (37.2 °C)   TempSrc:       SpO2: 99% 98% 98% 97%   Weight:       Height:          03/23 0701 - 03/24 0700  In: 246 [P.O.:840; I.V.:75]  Out: 100 [Urine:100]  Last 3 Recorded Weights in this Encounter    03/22/22 0724 03/22/22 1832 03/23/22 1947   Weight: 77.6 kg (171 lb) 77 kg (169 lb 11.2 oz) 79 kg (174 lb 3.2 oz)      Physical exam:    GEN: NAD  NECK- Supple, no mass  RESP: No wheezing,clear  b/l  CVS: S1,S2  RRR  NEURO: Normal speech, non focal  EXT: No Edema   PSYCH: Normal Mood  permacath +  Chart reviewed. Pertinent Notes reviewed.      Data Review :  Recent Labs     03/24/22  0303 03/23/22  0456 03/22/22  0833   * 135* 138   K 3.8 3.8 4.9   CL 94* 96* 104   CO2 30 34* 24   BUN 40* 32* 74*   CREA 9.21* 6.88* 12.00*   GLU 71 100 127*   CA 8.4* 7.9* 8.6   MG  --   --  2.3   PHOS  --   --  5.0*     Recent Labs     03/24/22  0303 03/23/22  0456 03/22/22  0833   WBC 9.9 11.1 16.8*   HGB 7.8* 8.7* 10.7*   HCT 23.7* 27.1* 32.0*   PLT 191 208 274     No results for input(s): FE, TIBC, PSAT, FERR in the last 72 hours.    Medication list  reviewed  Current Facility-Administered Medications   Medication Dose Route Frequency    epoetin shawn-epbx (RETACRIT) injection 20,000 Units  20,000 Units SubCUTAneous Q TUE, THU & SAT    hydrALAZINE (APRESOLINE) 20 mg/mL injection 15 mg  15 mg IntraVENous Q6H PRN    sucralfate (CARAFATE) tablet 1 g  1 g Oral AC&HS    acetaminophen (TYLENOL) tablet 650 mg  650 mg Oral Q6H PRN    bumetanide (BUMEX) tablet 2 mg  2 mg Oral DAILY    buprenorphine-naloxone (SUBOXONE) 8mg-2mg SL film  1 Film SubLINGual BID    carvediloL (COREG) tablet 25 mg  25 mg Oral BID WITH MEALS    cloNIDine HCL (CATAPRES) tablet 0.2 mg  0.2 mg Oral TID    doxazosin (CARDURA) tablet 4 mg  4 mg Oral DAILY    hydrALAZINE (APRESOLINE) tablet 100 mg  100 mg Oral TID    NIFEdipine ER (PROCARDIA XL) tablet 60 mg  60 mg Oral BID    sodium chloride (NS) flush 5-40 mL  5-40 mL IntraVENous Q8H    sodium chloride (NS) flush 5-40 mL  5-40 mL IntraVENous PRN    acetaminophen (TYLENOL) tablet 650 mg  650 mg Oral Q6H PRN    Or    acetaminophen (TYLENOL) suppository 650 mg  650 mg Rectal Q6H PRN    polyethylene glycol (MIRALAX) packet 17 g  17 g Oral DAILY PRN    ondansetron (ZOFRAN ODT) tablet 4 mg  4 mg Oral Q8H PRN    Or    ondansetron (ZOFRAN) injection 4 mg  4 mg IntraVENous Q6H PRN    heparin (porcine) injection 5,000 Units  5,000 Units SubCUTAneous Q8H    oxyCODONE IR (ROXICODONE) tablet 5 mg  5 mg Oral Q4H PRN    pantoprazole (PROTONIX) 40 mg in 0.9% sodium chloride 10 mL injection  40 mg IntraVENous Q12H    [Held by provider] heparin (porcine) 1,000 unit/mL injection 2,000 Units  2,000 Units InterCATHeter DIALYSIS PRN    And    heparin (porcine) 1,000 unit/mL injection 2,100 Units  2,100 Units InterCATHeter DIALYSIS PRN          Jenny Eduardo MD              [facility]  [facility] / [facility]  [address]  [place], [address]  Phone - [phone]               Fax - [phone]

## 2022-03-24 NOTE — PROCEDURES
Hemodialysis / [phone]    Vitals Pre Post Assessment Pre Post   BP BP: 132/72 (03/24/22 1520) 154/81 LOC A&OX4 A&OX4   HR Pulse (Heart Rate): 80 (03/24/22 1520) 79 Lungs diminished diminished   Resp Resp Rate: 18 (03/24/22 1520) 18 Cardiac HRR HRR   Temp Temp: 98.3 °F (36.8 °C) (03/24/22 1520) 99.5 Skin Warm and dry Warm and dry   Weight    Edema none none   Tele status   Pain Pain Intensity 1: 0 (03/23/22 1451) 1:0     Orders   Duration: Start: 1520 End: 1820 Total: 3 hours   Dialyzer: Dialyzer/Set Up Inspection: Revaclear (03/24/22 1520)   K Bath: Dialysate K (mEq/L): 3 (03/24/22 1520)   Ca Bath: Dialysate CA (mEq/L): 2.5 (03/24/22 1520)   Na: Dialysate NA (mEq/L): 140 (03/24/22 1520)   Bicarb: Dialysate HCO3 (mEq/L): 40 (03/24/22 1520)   Target Fluid Removal: Goal/Amount of Fluid to Remove (mL): 1500 mL (03/24/22 1520)     Access   Type & Location: [facility] permcath. Comments:  Each catheter limb disinfected per p&p, caps removed, hubs disinfected per p&p. Each dialysis catheter limb disinfected per p&p, blood returned per p&p, each dialysis hub disinfected per p&p, post dialysis catheter dwell instilled per order, and caps applied.                                       Labs   HBsAg (Antigen) / date: Negative 3-13-22                                              HBsAb (Antibody) / date: unknown   Source: Epic   Obtained/Reviewed  Critical Results Called HGB   Date Value Ref Range Status   03/24/2022 7.8 (L) 12.1 - 17.0 g/dL Final     Potassium   Date Value Ref Range Status   03/24/2022 3.8 3.5 - 5.1 mmol/L Final     Calcium   Date Value Ref Range Status   03/24/2022 8.4 (L) 8.5 - 10.1 MG/DL Final     BUN   Date Value Ref Range Status   03/24/2022 40 (H) 6 - 20 MG/DL Final     Creatinine   Date Value Ref Range Status   03/24/2022 9.21 (H) 0.70 - 1.30 MG/DL Final     Comment:     INVESTIGATED PER DELTA CHECK PROTOCOL        Meds Given   Name Dose Route   [id number] 4100 unit intracath               Adequacy / Fluid    Total Liters Process: 64.3   Net Fluid Removed: 1500 ml      Comments   Time Out Done:   (Time) Yes, 1320   Admitting Diagnosis: PUD   Consent obtained/signed: Informed Consent Verified: Yes (03/24/22 1520)   Machine / RO # Machine Number: Israel Teresa (03/24/22 1520)   Primary Nurse Rpt Pre: Karolyn Atkins RN   Primary Nurse Rpt Post: Karolyn Atkins RN   Pt Education: procedural   Care Plan: Routine HD   Pts outpatient clinic: [facility] Summary   Comments:  Patient would only run 3 hours. No other problems. Report called to Michael Rudolph.

## 2022-03-24 NOTE — PROGRESS NOTES
Bedside shift change report given to Anjelica Alfaro (oncoming nurse) by Jl Hayes LPN (offgoing nurse). Report included the following information SBAR, Kardex and MAR.

## 2022-03-25 VITALS
TEMPERATURE: 97.8 F | HEIGHT: 69 IN | DIASTOLIC BLOOD PRESSURE: 76 MMHG | WEIGHT: 174.2 LBS | RESPIRATION RATE: 18 BRPM | OXYGEN SATURATION: 100 % | BODY MASS INDEX: 25.8 KG/M2 | HEART RATE: 74 BPM | SYSTOLIC BLOOD PRESSURE: 176 MMHG

## 2022-03-25 PROBLEM — K29.80 DUODENITIS: Status: ACTIVE | Noted: 2022-03-25

## 2022-03-25 LAB
ERYTHROCYTE [DISTWIDTH] IN BLOOD BY AUTOMATED COUNT: 13.4 % (ref 11.5–14.5)
HCT VFR BLD AUTO: 22.4 % (ref 36.6–50.3)
HGB BLD-MCNC: 7.4 G/DL (ref 12.1–17)
MCH RBC QN AUTO: 29.1 PG (ref 26–34)
MCHC RBC AUTO-ENTMCNC: 33 G/DL (ref 30–36.5)
MCV RBC AUTO: 88.2 FL (ref 80–99)
NRBC # BLD: 0 K/UL (ref 0–0.01)
NRBC BLD-RTO: 0 PER 100 WBC
PLATELET # BLD AUTO: 203 K/UL (ref 150–400)
PMV BLD AUTO: 11.3 FL (ref 8.9–12.9)
RBC # BLD AUTO: 2.54 M/UL (ref 4.1–5.7)
WBC # BLD AUTO: 7.6 K/UL (ref 4.1–11.1)

## 2022-03-25 PROCEDURE — 85027 COMPLETE CBC AUTOMATED: CPT

## 2022-03-25 PROCEDURE — 74011250636 HC RX REV CODE- 250/636: Performed by: STUDENT IN AN ORGANIZED HEALTH CARE EDUCATION/TRAINING PROGRAM

## 2022-03-25 PROCEDURE — 65270000029 HC RM PRIVATE

## 2022-03-25 PROCEDURE — 74011250637 HC RX REV CODE- 250/637: Performed by: PHYSICIAN ASSISTANT

## 2022-03-25 PROCEDURE — 96376 TX/PRO/DX INJ SAME DRUG ADON: CPT

## 2022-03-25 PROCEDURE — 36415 COLL VENOUS BLD VENIPUNCTURE: CPT

## 2022-03-25 PROCEDURE — 74011000250 HC RX REV CODE- 250: Performed by: STUDENT IN AN ORGANIZED HEALTH CARE EDUCATION/TRAINING PROGRAM

## 2022-03-25 PROCEDURE — 74011250637 HC RX REV CODE- 250/637: Performed by: STUDENT IN AN ORGANIZED HEALTH CARE EDUCATION/TRAINING PROGRAM

## 2022-03-25 PROCEDURE — G0378 HOSPITAL OBSERVATION PER HR: HCPCS

## 2022-03-25 PROCEDURE — 74011250637 HC RX REV CODE- 250/637

## 2022-03-25 PROCEDURE — C9113 INJ PANTOPRAZOLE SODIUM, VIA: HCPCS | Performed by: STUDENT IN AN ORGANIZED HEALTH CARE EDUCATION/TRAINING PROGRAM

## 2022-03-25 RX ORDER — SUCRALFATE 1 G/1
1 TABLET ORAL
Qty: 28 TABLET | Refills: 0 | Status: SHIPPED | OUTPATIENT
Start: 2022-03-25 | End: 2022-04-01

## 2022-03-25 RX ORDER — PANTOPRAZOLE SODIUM 40 MG/1
40 TABLET, DELAYED RELEASE ORAL 2 TIMES DAILY
Qty: 60 TABLET | Refills: 0 | Status: SHIPPED | OUTPATIENT
Start: 2022-03-25

## 2022-03-25 RX ORDER — FLUTICASONE PROPIONATE 50 MCG
2 SPRAY, SUSPENSION (ML) NASAL DAILY
Status: DISCONTINUED | OUTPATIENT
Start: 2022-03-25 | End: 2022-03-25 | Stop reason: HOSPADM

## 2022-03-25 RX ADMIN — CARVEDILOL 25 MG: 12.5 TABLET, FILM COATED ORAL at 08:19

## 2022-03-25 RX ADMIN — CLONIDINE HYDROCHLORIDE 0.2 MG: 0.1 TABLET ORAL at 08:19

## 2022-03-25 RX ADMIN — SODIUM CHLORIDE, PRESERVATIVE FREE 10 ML: 5 INJECTION INTRAVENOUS at 06:48

## 2022-03-25 RX ADMIN — NIFEDIPINE 60 MG: 60 TABLET, EXTENDED RELEASE ORAL at 08:19

## 2022-03-25 RX ADMIN — SUCRALFATE 1 G: 1 TABLET ORAL at 06:47

## 2022-03-25 RX ADMIN — HYDRALAZINE HYDROCHLORIDE 100 MG: 50 TABLET, FILM COATED ORAL at 08:19

## 2022-03-25 RX ADMIN — DOXAZOSIN 4 MG: 2 TABLET ORAL at 08:19

## 2022-03-25 RX ADMIN — FLUTICASONE PROPIONATE 2 SPRAY: 50 SPRAY, METERED NASAL at 06:49

## 2022-03-25 RX ADMIN — BUMETANIDE 2 MG: 1 TABLET ORAL at 08:19

## 2022-03-25 RX ADMIN — OXYCODONE HYDROCHLORIDE 5 MG: 5 TABLET ORAL at 03:19

## 2022-03-25 RX ADMIN — SODIUM CHLORIDE 40 MG: 9 INJECTION INTRAMUSCULAR; INTRAVENOUS; SUBCUTANEOUS at 08:19

## 2022-03-25 RX ADMIN — SUCRALFATE 1 G: 1 TABLET ORAL at 11:32

## 2022-03-25 NOTE — DISCHARGE INSTRUCTIONS
HOSPITALIST DISCHARGE INSTRUCTIONS    NAME: Obi Damon   :  1965   MRN:  [id number]     Date/Time:  3/25/2022 9:26 AM    ADMIT DATE: 3/22/2022     DISCHARGE DATE: 3/25/2022     DISCHARGE DIAGNOSIS:  Epigastric pain  Likely due to Duodenitis POA- cont carafate x 1 week, Cont Protonix BID x 1 month, OP GI followup in 1 month, Avoid NSAIDs, Alcohol, Smoking  Hypertensive urgency POA- resolved  ESRD on HD- cont HD TTS  Leukocytosis- POA- reactive, resolved now  Full code      Active Problems:    Hypertensive urgency (3/12/2022)      Peptic ulcer disease (3/22/2022)      Intractable abdominal pain (3/22/2022)      [facility] (3/22/2022)      Duodenitis (3/25/2022)         MEDICATIONS:  As per medication reconciliation  list  · It is important that you take the medication exactly as they are prescribed. · Keep your medication in the bottles provided by the pharmacist and keep a list of the medication names, dosages, and times to be taken in your wallet. · Do not take other medications without consulting your doctor. Pain Management: per above medications    What to do at Home    Recommended diet:  Cardiac Diet and Low fat, Low cholesterol    Recommended activity: Activity as tolerated    If you have questions regarding the hospital related prescriptions or hospital related issues please call [facility] . If you experience any of the following symptoms then please call your primary care physician or return to the emergency room if you cannot get hold of your doctor:  Fever, chills, nausea, vomiting, diarrhea, change in mentation, falling, bleeding, shortness of breath,     Follow Up:  Dr. Juan Mark, NP  you are to call and set up an appointment to see them in 7-10 days.   Dr Jacinto/Dr Rosario Mcdaniel (GI) in 1 month for followup    Information obtained by :  I understand that if any problems occur once I am at home I am to contact my physician. I understand and acknowledge receipt of the instructions indicated above.                                                                                                                                            Physician's or R.N.'s Signature                                                                  Date/Time                                                                                                                                              Patient or Representative Signature                                                          Date/Time

## 2022-03-25 NOTE — PROGRESS NOTES
Transition of Care Plan:     RUR: 18%  Disposition: Home with Sister              *Resume OP HD [facility] TTS @ 6:00AM  Follow up appointments: PCP & Specialists as indicated  DME needed: None   Transportation at Discharge: Sister  Rodrigue Acosta or means to access home: Yes   Medicare Letter: N/A  Is patient a BCPI-A Bundle: Letter to be provided if indicated                  If yes, was Bundle Letter given?:    Is patient a Pet[place] and connected with the [place]? No              If yes, was [employer] transfer form completed and VA notified? No  Caregiver Contact: Gio Faith, sister ([phone])  Discharge Caregiver contacted prior to discharge? CM to contact at d/c  Care Conference needed?: No           Reason for Readmission:     Epigastric pain         RUR Score/Risk Level:   18 Low      PCP: First and Last name:  Rene Gomez    Name of Practice:    Are you a current patient: Yes/No: Yes   Approximate date of last visit: Last week   Can you participate in a virtual visit with your PCP: Yes    Is a Care Conference indicated: IDR will follow up      Did you attend your follow up appointment (s): If not, why not:  Yes PCP appointment        Resources/supports as identified by patient/family:   Lives in sister's 2-story house (0 DEV) in [place], [place]. Lives on 1st floor. Reports independent with mobility, ADLs/IADLs. Top Challenges facing patient (as identified by patient/family and CM): Finances/Medication cost?   Not and issue    Transportation      Sister will transport   Support system or lack thereof? Sister is supportive   Living arrangements? Lives in sister's 2-story house (0 DEV) in [place], [place]. Lives on 1st floor. Self-care/ADLs/Cognition? Reports independent with mobility, ADLs/IADLs.           Current Advanced Directive/Advance Care Plan:  Full code           Plan for utilizing home health:   Yes if necessary             Transition of Care Plan:    Based on readmission, the patient's previous [address] with OP HD    has been evaluated and/or modified. The current Transition of Care Plan is: Home with OP HD. Care Management Interventions  PCP Verified by CM:  Yes  Mode of Transport at Discharge: [address] (CM Consult): Discharge Planning (OP-HD T,TH and SAT )  [place] Signup:  (Independent)  Support Systems: Other Family Member(s)  Confirm Follow Up Transport: Family  Discharge Location  Patient Expects to be Discharged to[de-identified] Home with outpatient services    Readmission Assessment  Number of days since last admission?: 8-30 days  Previous disposition: Home with Family  Who is being interviewed?: Patient  What was the patient's/caregiver's perception as to why they think they needed to return back to the hospital?: Did not realize care needs would be so extensive  Did you visit your Primary Care Physician after you left the hospital, before you returned this time?: Yes  Did you see a specialist, such as Cardiac, Pulmonary, Orthopedic Physician, etc. after you left the hospital?: No  Who advised the patient to return to the hospital?: Self-referral  In our efforts to provide the best possible care to you and others like you, can you think of anything that we could have done to help you after you left the hospital the first time, so that you might not have needed to return so soon?: Arrange for more help when leaving the hospital,Additional Community resources available for illness support    Barbara De León (ACP) Conversation      Date of Conversation: 3/22/2022  Conducted with: Patient with Decision Making Capacity    Healthcare Decision Maker:     Primary Decision Maker: Roxie Velásquez - [facility] - [phone]  Click here to [address] including selection of the Healthcare Decision Maker Relationship (ie \"Primary\")      Content/Action Overview:   DECLINED ACP conversation - will revisit periodically   Reviewed DNR/DNI and patient elects Full Code (Attempt Resuscitation)    Length of Voluntary ACP Conversation in minutes:  16 minutes    Julisa Aguilar spoke to pt and completed readmission assessment. Lives in sister's 2-story house (0 DEV) in [place], [place]. Lives on 1st floor. Reports independent with mobility, ADLs/IADLs. Drives \"sometimes\" otherwise uses [employer] transport. Denies hx of HH, SNF, IPR. No DME at home. Preferred Rx is [facility] ([facility]). Pt confirmed current plan is to return home to sister's home with outpatient f/u appointments and resume OP HD. Pt may benefit from additional medication management education to address non-compliance issues. Floor Cm will follow up.     Lowell Dowling MSW  ED Case Manager   Ext [phone]

## 2022-03-25 NOTE — DISCHARGE INSTRUCTIONS
Patient Discharge Instructions    Li Burton / [id number] : 1965    Admitted 3/12/2022 Discharged: 3/14/2022         DISCHARGE DIAGNOSIS:   Hypertensive emergency   End-stage renal disease on hemodialysis  and Saturday  Recent heart block status post pacemaker placement 3 days ago   History of substance abuse  History hepatitis C       Take Home Medications     {Medication reconciliation information is now added to the patient's AVS automatically when it is printed. There is no need to use this SmartLink in discharge instructions. Highlight this text and delete it to clear this message}      General drug facts      If you have a very bad allergy, wear an allergy ID at all times.  It is important that you take the medication exactly as they are prescribed.  Keep your medication in the bottles provided by the pharmacist.  Martina Lm a list of all your drugs (prescription, natural products, vitamins, OTC) with you. Give this list to your doctor.  Do not take other medications without consulting your doctor.   Do not share your drugs with others and do not take anyone else's drugs.  Keep all drugs out of the reach of children and pets.   Most drugs may be thrown away in household trash after mixing with coffee grounds or meek litter and sealing in a plastic bag.   Keep a list Call your doctor for help with any side effects. If in the [place] you may also call the FDA at 5-986-TKR-9472     Talk with the doctor before starting any new drug, including OTC, natural products, or vitamins. What to do at Home    1. Recommended diet: cardiac    2. Recommended activity: Activity as tolerated    3. If you experience any of the following symptoms then please call your primary care physician or return to the emergency room if you cannot get hold of your doctor:    4. Wound Care: none     5. Lab work: cbc and bmp in 1 week     6.  Bring these papers with you to your follow up appointments. The papers will help your doctors be sure to continue the care plan from the hospital.      If you have questions regarding the hospital related prescriptions or hospital related issues please call [facility] at [phone]. You can always direct your questions to your primary care doctor if you are unable to reach your hospital physician; your PCP works as an extension of your hospital doctor just like your hospital doctor is an extension of your PCP for your time at the hospital [facility])      Follow-up with:   PCP: Brian Damon MD  Follow-up Information     Follow up With Specialties Details Why Contact Info    PCP in 1 week        Serge Kline MD Nephrology Schedule an appointment as soon as possible for a visit in 1 week  [facility]  [phone]             Please call for your own appointment        Information obtained by :  I understand that if any problems occur once I am at home I am to contact my physician. I understand and acknowledge receipt of the instructions indicated above.                                                                                                                                            Physician's or R.N.'s Signature                                                                  Date/Time                                                                                                                                              Patient or Representative Signature                                                          Date/Time

## 2022-03-25 NOTE — PROGRESS NOTES
Nephrology Progress Note  [address]     [email or web address]  Phone - [phone]   Patient: Shawanda Palencia    YOB: 1965        Date- 3/25/2022   Admit Date: 3/22/2022  CC: Follow up for  ESRD         IMPRESSION & PLAN:    ESRD - TTS- torricharla vargas   Hypertensive urgency   Epigastric pain, nausea ,vomiting   Non compliance to meds and diet   Anemia of ckd   Sec. hyperpara   Abnormal duodenum on CT    PLAN-   NO DIALYSIS today.  CONTINUE HD TTS schedule   Continue coreg, clonidine, hydralazine, cardura, procardia   D/w patient     Subjective: Interval History:   S/p hd yesterday  bp on high side today  No sob      Objective:   Vitals:    03/24/22 2232 03/25/22 0015 03/25/22 0339 03/25/22 0744   BP: (!) 169/86 (!) 147/68 (!) 169/94 (!) 176/76   Pulse: 84 78 73 74   Resp:  17 18 18   Temp:  98 °F (36.7 °C) 98.3 °F (36.8 °C) 97.8 °F (36.6 °C)   TempSrc:       SpO2:  100% 95% 100%   Weight:       Height:          03/24 0701 - 03/25 0700  In: 440 [P.O.:440]  Out: 1700 [Urine:200]  Last 3 Recorded Weights in this Encounter    03/22/22 0724 03/22/22 1832 03/23/22 1947   Weight: 77.6 kg (171 lb) 77 kg (169 lb 11.2 oz) 79 kg (174 lb 3.2 oz)      Physical exam:    GEN: NAD  NECK- Supple, no mass  RESP: No wheezing,  Clear  b/l  CVS: S1,S2  RRR  NEURO: Normal speech, non focal  EXT: no edema   PSYCH: Normal Mood  permacath +  Chart reviewed. Pertinent Notes reviewed. Data Review :  Recent Labs     03/24/22 0303 03/23/22  0456   * 135*   K 3.8 3.8   CL 94* 96*   CO2 30 34*   BUN 40* 32*   CREA 9.21* 6.88*   GLU 71 100   CA 8.4* 7.9*     Recent Labs     03/25/22  0333 03/24/22  0303 03/23/22  0456   WBC 7.6 9.9 11.1   HGB 7.4* 7.8* 8.7*   HCT 22.4* 23.7* 27.1*    191 208     No results for input(s): FE, TIBC, PSAT, FERR in the last 72 hours.    Medication list  reviewed  Current Facility-Administered Medications   Medication Dose Route Frequency  fluticasone propionate (FLONASE) 50 mcg/actuation nasal spray 2 Spray  2 Spray Both Nostrils DAILY    epoetin shawn-epbx (RETACRIT) injection 20,000 Units  20,000 Units SubCUTAneous Q TUE, THU & SAT    hydrALAZINE (APRESOLINE) 20 mg/mL injection 15 mg  15 mg IntraVENous Q6H PRN    sucralfate (CARAFATE) tablet 1 g  1 g Oral AC&HS    bumetanide (BUMEX) tablet 2 mg  2 mg Oral DAILY    buprenorphine-naloxone (SUBOXONE) 8mg-2mg SL film  1 Film SubLINGual BID    carvediloL (COREG) tablet 25 mg  25 mg Oral BID WITH MEALS    cloNIDine HCL (CATAPRES) tablet 0.2 mg  0.2 mg Oral TID    doxazosin (CARDURA) tablet 4 mg  4 mg Oral DAILY    hydrALAZINE (APRESOLINE) tablet 100 mg  100 mg Oral TID    NIFEdipine ER (PROCARDIA XL) tablet 60 mg  60 mg Oral BID    sodium chloride (NS) flush 5-40 mL  5-40 mL IntraVENous Q8H    sodium chloride (NS) flush 5-40 mL  5-40 mL IntraVENous PRN    acetaminophen (TYLENOL) tablet 650 mg  650 mg Oral Q6H PRN    Or    acetaminophen (TYLENOL) suppository 650 mg  650 mg Rectal Q6H PRN    polyethylene glycol (MIRALAX) packet 17 g  17 g Oral DAILY PRN    ondansetron (ZOFRAN ODT) tablet 4 mg  4 mg Oral Q8H PRN    Or    ondansetron (ZOFRAN) injection 4 mg  4 mg IntraVENous Q6H PRN    heparin (porcine) injection 5,000 Units  5,000 Units SubCUTAneous Q8H    oxyCODONE IR (ROXICODONE) tablet 5 mg  5 mg Oral Q4H PRN    pantoprazole (PROTONIX) 40 mg in 0.9% sodium chloride 10 mL injection  40 mg IntraVENous Q12H    [Held by provider] heparin (porcine) 1,000 unit/mL injection 2,000 Units  2,000 Units InterCATHeter DIALYSIS PRN    And    heparin (porcine) 1,000 unit/mL injection 2,100 Units  2,100 Units InterCATHeter DIALYSIS PRN          Angeles Miller MD              [facility]  [facility] / [facility]  [place], [address]  Phone - [phone]               Fax - [phone]

## 2022-03-25 NOTE — DISCHARGE SUMMARY
Hospitalist Discharge Summary     Patient ID:  Harman Moreira  [id number]  51 y.o.  1965  3/22/2022    PCP on record: Yandel Ty NP    Admit date: 3/22/2022  Discharge date and time: 3/25/2022    DISCHARGE DIAGNOSIS:    Epigastric pain  Likely due to Duodenitis POA- cont carafate x 1 week, Cont Protonix BID x 1 month, OP GI followup in 1 month, Avoid NSAIDs, Alcohol, Smoking  Hypertensive urgency POA- resolved  ESRD on HD- cont HD TTS  Leukocytosis- POA- reactive, resolved now  Full code      CONSULTATIONS:  IP CONSULT TO HOSPITALIST  IP CONSULT TO GASTROENTEROLOGY    Excerpted HPI from H&P of Wenceslao Baxter MD:  Rolinda Aschoff ILLNESS:     Colletta Lusty is a 64 y.o. male with past medical history of ESRD on HD, hypertension presented with epigastric pain and vomiting. He mentions having constant epigastric pain since yesterday, associated with vomiting. He denies any chest pain, fever, shortness of breath, change in bladder or bowel habits, lower extremity edema or tenderness. He has recurrent admissions to our hospital mostly for uncontrolled hypertension likely due to noncompliance to his medications.     We were asked to admit for work up and evaluation of the above problems\"    ______________________________________________________________________  DISCHARGE SUMMARY/HOSPITAL COURSE:  for full details see H&P, daily progress notes, labs, consult notes. Epigastric pain  Likely due to Duodenitis     CT abdomen  1. Infectious, inflammatory, or ulcer related duodenitis is favored over  pancreatitis. Correlate with lipase. No perforation or pneumoperitoneum. 2. Cholelithiasis. 3. New small pericardial effusion. 4. L5 spondylolysis. Increased L5-S1 degenerative disc disease.   Lipase 362, ALT AST normal  C/w protonix  Also complained of 1 episode of Dark bm, monitor HB  Endoscopy 3/23 revealed duodenitis of 3rd part of duodenum, multiple biopsies taken, multiple biopsy sent  Advance to full liquid diet today  Pain better today.     Hypertensive urgency  ESRD on HD  Has recurrent admissions for uncontrolled hypertension, with history of noncompliance  Mentions he is taking his medications regularly  Continue with home medication  Hemodialysis as per nephrology     Leukocytosis:  Hb 16, likely reactive from pain  CXR clear  WBC normal now     Code Status: Full  Surrogate Decision Maker: His sister        _______________________________________________________________________  Patient seen and examined by me on discharge day. Pertinent Findings:  Gen:    Not in distress  Chest: Clear lungs  CVS:   Regular rhythm. No edema  Abd:  Soft, not distended, not tender  Neuro:  Alert, oriented x 3  _______________________________________________________________________  DISCHARGE MEDICATIONS:   Current Discharge Medication List      START taking these medications    Details   pantoprazole (Protonix) 40 mg tablet Take 1 Tablet by mouth two (2) times a day. Indications: an ulcer of the duodenum  Qty: 60 Tablet, Refills: 0  Start date: 3/25/2022      sucralfate (CARAFATE) 1 gram tablet Take 1 Tablet by mouth Before breakfast, lunch, dinner and at bedtime for 7 days. Qty: 28 Tablet, Refills: 0  Start date: 3/25/2022, End date: 4/1/2022         CONTINUE these medications which have NOT CHANGED    Details   carvediloL (COREG) 25 mg tablet Take 1 Tablet by mouth two (2) times daily (with meals) for 90 days. Qty: 60 Tablet, Refills: 2      bumetanide (BUMEX) 2 mg tablet Take 1 Tablet by mouth daily for 90 days. Qty: 30 Tablet, Refills: 2      doxazosin (CARDURA) 4 mg tablet Take 1 Tablet by mouth daily for 90 days. Qty: 30 Tablet, Refills: 2      NIFEdipine ER (PROCARDIA XL) 60 mg ER tablet Take 1 Tablet by mouth two (2) times a day for 90 days. Qty: 60 Tablet, Refills: 2      cloNIDine HCL (CATAPRES) 0.2 mg tablet Take 0.2 mg by mouth three (3) times daily.       hydrALAZINE (APRESOLINE) 100 mg tablet Take 100 mg by mouth three (3) times daily. ascorbic acid, vitamin C, (Vitamin C) 500 mg tablet Take 500 mg by mouth daily. cholecalciferol (VITAMIN D3) 25 mcg (1,000 unit) cap Take 1,000 Units by mouth daily. naloxone (Narcan) 4 mg/actuation nasal spray 1 [place] by IntraNASal route once as needed for Overdose. Use 1 spray intranasally, then discard. Repeat with new spray every 2 min as needed for opioid overdose symptoms, alternating nostrils. buprenorphine-naloxone (Suboxone) 8-2 mg film sublingaul film 1 Film by SubLINGual route two (2) times a day. Comments: . STOP taking these medications       potassium chloride SR (K-TAB) 20 mEq tablet Comments:   Reason for Stopping:                 Patient Follow Up Instructions:    Activity: Activity as tolerated  Diet: GI bland diet for next few days, then Renal Cardiac diet      Follow-up with OP HD tomorrow, GI Dr Dieudonne Mosquera in 1 month      Follow-up Information     Follow up With Specialties Details Why Contact Info Severa Juniper, NP Nurse Practitioner   [phone]      Dmitry Hernandez MD Gastroenterology Schedule an appointment as soon as possible for a visit in 4 weeks  [address]  [phone]          ________________________________________________________________    Risk of deterioration: Low    Condition at Discharge:  Stable  __________________________________________________________________    Disposition  Home with family, no needs    ____________________________________________________________________    Code Status: Full Code  ___________________________________________________________________      Total time in minutes spent coordinating this discharge (includes going over instructions, follow-up, prescriptions, and preparing report for sign off to her PCP) :  >30 minutes    Signed:  Velia Garces MD

## 2022-03-25 NOTE — PROGRESS NOTES
Problem: Falls - Risk of  Goal: *Absence of Falls  Description: Document [employer] Fall Risk and appropriate interventions in the flowsheet.   Outcome: Progressing Towards Goal  Note: Fall Risk Interventions:  Mobility Interventions: Bed/chair exit alarm         Medication Interventions: Patient to call before getting OOB    Elimination Interventions: Call light in reach              Problem: Patient Education: Go to Patient Education Activity  Goal: Patient/Family Education  Outcome: Progressing Towards Goal

## 2022-03-25 NOTE — PROGRESS NOTES
Ready from CM standpoint. CM has completed SMAART tool & placed on pt's door for RN to complete. Transition of Care Plan:     RUR: 18% \"mod risk\"  Disposition: Home with follow-up appts  >Resume OP HD: [facility] TTS @ 6:00 AM  Follow up appointments: PCP, GI- details in AVS  DME needed: None   Transportation at [address]; eta 5 PM  [place] or means to access home: Yes  [address], sister ([phone])  Discharge Caregiver contacted prior to discharge? CM acknowledged discharge. Reviewed pt's chart. Met with pt at bedside to review [facility] plan for discharge re: home with follow-up appts. Pt verbalized understanding & is agreeable to discharge. Reports no questions or concerns for discharge. Sister to transport pt home at discharge: eta 4 PM.  No further needs. Care Management Interventions  PCP Verified by CM:  Yes  Mode of Transport at Discharge: [address] (CM Consult): Discharge Planning (OP-HD T,TH and SAT )  MyChart Signup:  (Independent)  Support Systems: Other Family Member(s)  Confirm Follow Up Transport: Family  Discharge Location  Patient Expects to be Discharged to[de-identified] Home with outpatient services    VANI Andersen  Care Management

## 2022-03-25 NOTE — PROGRESS NOTES
Pt noted with empty saltine cracker packets and empty peanut butter cups at bedside, pt instructed on full liquid diet as pt requesting additional packets of crackers. Pt offered applesauce and accepted.

## 2022-03-25 NOTE — PROGRESS NOTES
Gastroenterology Progress Note  AYLEEN Lipscomb   for Dr. Precious Escoto    3/25/2022    Admit Date: 3/22/2022    Subjective: Follow up for: Duodenitis/abnormal CT abdomen pelvis     He feels better, no pain at all. Wanting more food. No N/V. No further BM. S/p EGD  On 3/23 revealed duodenitis in the third portion of the duodenum. Multiple biopsies were taken. Path still pending   Patient was seen in rounds by me today.        Current Facility-Administered Medications   Medication Dose Route Frequency    fluticasone propionate (FLONASE) 50 mcg/actuation nasal spray 2 Spray  2 Spray Both Nostrils DAILY    epoetin shawn-epbx (RETACRIT) injection 20,000 Units  20,000 Units SubCUTAneous Q TUE, THU & SAT    hydrALAZINE (APRESOLINE) 20 mg/mL injection 15 mg  15 mg IntraVENous Q6H PRN    sucralfate (CARAFATE) tablet 1 g  1 g Oral AC&HS    acetaminophen (TYLENOL) tablet 650 mg  650 mg Oral Q6H PRN    bumetanide (BUMEX) tablet 2 mg  2 mg Oral DAILY    buprenorphine-naloxone (SUBOXONE) 8mg-2mg SL film  1 Film SubLINGual BID    carvediloL (COREG) tablet 25 mg  25 mg Oral BID WITH MEALS    cloNIDine HCL (CATAPRES) tablet 0.2 mg  0.2 mg Oral TID    doxazosin (CARDURA) tablet 4 mg  4 mg Oral DAILY    hydrALAZINE (APRESOLINE) tablet 100 mg  100 mg Oral TID    NIFEdipine ER (PROCARDIA XL) tablet 60 mg  60 mg Oral BID    sodium chloride (NS) flush 5-40 mL  5-40 mL IntraVENous Q8H    sodium chloride (NS) flush 5-40 mL  5-40 mL IntraVENous PRN    acetaminophen (TYLENOL) tablet 650 mg  650 mg Oral Q6H PRN    Or    acetaminophen (TYLENOL) suppository 650 mg  650 mg Rectal Q6H PRN    polyethylene glycol (MIRALAX) packet 17 g  17 g Oral DAILY PRN    ondansetron (ZOFRAN ODT) tablet 4 mg  4 mg Oral Q8H PRN    Or    ondansetron (ZOFRAN) injection 4 mg  4 mg IntraVENous Q6H PRN    heparin (porcine) injection 5,000 Units  5,000 Units SubCUTAneous Q8H    oxyCODONE IR (ROXICODONE) tablet 5 mg  5 mg Oral Q4H PRN  pantoprazole (PROTONIX) 40 mg in 0.9% sodium chloride 10 mL injection  40 mg IntraVENous Q12H    [Held by provider] heparin (porcine) 1,000 unit/mL injection 2,000 Units  2,000 Units [facility] PRN    And    heparin (porcine) 1,000 unit/mL injection 2,100 Units  2,100 Units [facility] PRN        Objective:     Blood pressure (!) 176/76, pulse 74, temperature 97.8 °F (36.6 °C), resp. rate 18, height 5' 9\" (1.753 m), weight 79 kg (174 lb 3.2 oz), SpO2 100 %. No intake/output data recorded. 03/23 1901 - 03/25 0700  In: 440 [P.O.:440]  Out: 1700 [Urine:200]        Physical Examination:       General:AAO x 3,   HEENT:  EOMI,   Chest:  CTA, No rhonchi, rales or rubs. Heart: S1, S2, RRR  GI: Soft, non tender, non distended, normal BS  Extremities: No edema or cyanosis  CNS: CNs grossly normal.    Data Review    Recent Results (from the past 24 hour(s))   CBC W/O DIFF    Collection Time: 03/25/22  3:33 AM   Result Value Ref Range    WBC 7.6 4.1 - 11.1 K/uL    RBC 2.54 (L) 4.10 - 5.70 M/uL    HGB 7.4 (L) 12.1 - 17.0 g/dL    HCT 22.4 (L) 36.6 - 50.3 %    MCV 88.2 80.0 - 99.0 FL    MCH 29.1 26.0 - 34.0 PG    MCHC 33.0 30.0 - 36.5 g/dL    RDW 13.4 11.5 - 14.5 %    PLATELET 561 018 - 064 K/uL    MPV 11.3 8.9 - 12.9 FL    NRBC 0.0 0  WBC    ABSOLUTE NRBC 0.00 0.00 - 0.01 K/uL     Recent Labs     03/25/22  0333 03/24/22  0303   WBC 7.6 9.9   HGB 7.4* 7.8*   HCT 22.4* 23.7*    191     Recent Labs     03/24/22  0303 03/23/22 0456 03/22/22 0833   * 135* 138   K 3.8 3.8 4.9   CL 94* 96* 104   CO2 30 34* 24   BUN 40* 32* 74*   CREA 9.21* 6.88* 12.00*   GLU 71 100 127*   CA 8.4* 7.9* 8.6   MG  --   --  2.3   PHOS  --   --  5.0*     Recent Labs     03/23/22 0456 03/22/22 0833   AP 32* 42*   TP 5.8* 7.1   ALB 2.4* 2.9*   GLOB 3.4 4.2*   LPSE  --  362     Recent Labs     03/23/22 0456   INR 1.2*   PTP 12.4*      No results for input(s): FE, TIBC, PSAT, FERR in the last 72 hours. No results found for: FOL, RBCF   No results for input(s): PH, PCO2, PO2 in the last 72 hours. No results for input(s): CPK, CKNDX, TROIQ in the last 72 hours. No lab exists for component: CPKMB  Lab Results   Component Value Date/Time    Cholesterol, total 170 06/29/2020 03:46 AM    HDL Cholesterol 49 06/29/2020 03:46 AM    LDL, calculated 73.4 06/29/2020 03:46 AM    Triglyceride 238 (H) 06/29/2020 03:46 AM    CHOL/HDL Ratio 3.5 06/29/2020 03:46 AM     No components found for: [place]  Lab Results   Component Value Date/Time    Color YELLOW/STRAW 03/22/2022 04:11 PM    Appearance TURBID (A) 03/22/2022 04:11 PM    Specific gravity 1.021 03/22/2022 04:11 PM    pH (UA) 7.5 03/22/2022 04:11 PM    Protein >300 (A) 03/22/2022 04:11 PM    Glucose 100 (A) 03/22/2022 04:11 PM    Ketone Negative 03/22/2022 04:11 PM    Bilirubin Negative 03/22/2022 04:11 PM    Urobilinogen 0.2 03/22/2022 04:11 PM    Nitrites Negative 03/22/2022 04:11 PM    Leukocyte Esterase MODERATE (A) 03/22/2022 04:11 PM    Epithelial cells MODERATE (A) 03/22/2022 04:11 PM    Bacteria Negative 03/22/2022 04:11 PM    WBC 10-20 03/22/2022 04:11 PM    RBC 0-5 03/22/2022 04:11 PM     CT Results (most recent):  Results from Hospital Encounter encounter on 03/22/22    CT ABD PELV WO CONT    Narrative  EXAM: CT ABD PELV WO CONT    INDICATION: abdominal pain, n/v . No laboratory values at this time. Chronic  kidney disease. Chronic hepatitis C.    COMPARISON: CT abdomen/pelvis without contrast on 6/12/2020. IV CONTRAST: None. ORAL CONTRAST: None    TECHNIQUE:  Thin axial images were obtained through the abdomen and pelvis. Coronal and  sagittal reformats were generated. CT dose reduction was achieved through use of  a standardized protocol tailored for this examination and automatic exposure  control for dose modulation.     The absence of intravenous contrast material reduces the sensitivity for  evaluation of the vasculature and solid organs. FINDINGS:  LOWER THORAX: Incidentally imaged. Small pericardial effusion is new. No basilar  pneumonia. Dialysis catheter terminates in the right atrium. LIVER: No mass. BILIARY TREE: Gallstones are unchanged. No evidence of cholecystitis. CBD is not  dilated. SPLEEN: within normal limits. PANCREAS: Mild surrounding inflammation. ADRENALS: Unremarkable. KIDNEYS/URETERS: Right renal benign cysts are grossly unchanged. No  nephrolithiasis or hydronephrosis. STOMACH: Unremarkable. SMALL BOWEL: Mural thickening of the third portion of the duodenum. Moderate  surrounding edema in the fat. No gas. COLON: No dilatation or wall thickening. APPENDIX: Normal.  PERITONEUM: Trace ascites. No pneumoperitoneum. RETROPERITONEUM: Aortic atherosclerosis without aneurysm. Unchanged physiologic  periaortic lymph nodes. REPRODUCTIVE ORGANS: Prostate gland contains calcifications. URINARY BLADDER: No mass or calculus. BONES: L5-S1 degenerative disc disease, erosions, and L5 spondylolysis. Findings  are increased since 2020. ABDOMINAL WALL: No mass or hernia. ADDITIONAL COMMENTS: N/A    Impression  1. Infectious, inflammatory, or ulcer related duodenitis is favored over  pancreatitis. Correlate with lipase. No perforation or pneumoperitoneum. 2. Cholelithiasis. 3. New small pericardial effusion. 4. L5 spondylolysis. Increased L5-S1 degenerative disc disease. ROS: -CP, SOB, Dysuria, palpitations, cough. Findings:      Esophagus:   -           Normal mucosa throughout the esophagus. Z line normal located at 40 cm from incisors.     Stomach:   -           Normal mucosa throughout the stomach. Pylorus patent. No ulcer or erosion seen. No blood in stomach.     Duodenum:   ++        The third portion of duodenum showed patchy erythema that was along folds with some surface erosion and friability ? Inflammatory vs. Infectious vs. Ischemic findings. Unusual finding.   We performed multiple cold forcep biopsies.       Assessment:  Duodenitis noted on EGD    Active Problems:    Hypertensive urgency (3/12/2022)      Peptic ulcer disease (3/22/2022)      Intractable abdominal pain (3/22/2022)      [facility] (3/22/2022)             Plan/Discussion: We await biopsies taken from the third portion of the duodenum which revealed surface erosions and friability question inflammatory, infectious versus ischemic. It is an unusual finding but multiple biopsies were obtained. Final diagnosis depends on microscopic analysis. Given symptomatic improvement can follow path as an outpatient. Reviewed low acid diet and avoiding NSAIDs, tobacco and EtOH as he is  Recommend d/c on BID PPI  Advance diet to GI lite  D/c plans per primary care team  F/u in 3-4 weeks in office        Signed By: Jacky Krabbe, PA      3/25/2022  8:07 AM      The patient was seen and examined independently by me. I have discussed the case with the mid-level provider in detail. I have reviewed the patient's chart and the note. I personally performed all components of the history, physical, and medical decision making and agree with the assessment and plan, with minor modifications as noted. Please see APPs note for full details. Physical exam:  General:AAO x 3,   HEENT:  EOMI,   Chest:  CTA,Heart: S1, S2, RRR  GI: Soft, NT, ND + bowel sounds  Extremities: No edema    Data Review:  reviewed     Plan and discussion:  He feels better and wants to go home. Also wants regular food. Bx are not back yet and will need OP follow up,if d/c today. Will need PPI BID on D/c. Office f.u as planned in a months' time. We will sign off the case. Signed By: Flakita Jay.  Stephenie Lassiter MD    3/25/2022  8:32 AM

## 2022-03-25 NOTE — PROGRESS NOTES
Called to bedside, pt c/o R ear pain 10/10 and described as sharp, pt medicated for c/o pain, pt requesting ear drop, contacted [facility] NP via perfect serve to make aware and new order entered per NP.

## 2022-03-25 NOTE — DISCHARGE SUMMARY
Hospitalist Discharge Summary     Patient ID:  Hellen Reid  [id number]  89 y.o.  1965  3/12/2022    PCP on record: Ulysses Skelton, NP    Admit date: 3/12/2022  Discharge date and time: 3/14/2022    DISCHARGE DIAGNOSIS:    Hypertensive emergency   End-stage renal disease on hemodialysis Tuesday Thursday and Saturday  Recent heart block status post pacemaker placement 3 days ago   History of substance abuse  History hepatitis C     CONSULTATIONS:  IP CONSULT TO NEPHROLOGY    Excerpted HPI from H&P of Ghislaine Olvera MD:  Sal Anguiano is a 64 y.o.  male with history of end-stage renal disease on hemodialysis Tuesday Thursday and Saturday, uncontrolled hypertension, hepatitis C, history of heroine abuse, recent heart block status post pacemaker who presents with his dialysis center for elevated blood pressure. Patient was recently discharged from her  [facility] doctor on Thursday after being treated for heart block, had a pacemaker placed. He reported that during his hospital stay his blood pressure was difficult to control but when he was discharged his systolic blood pressure was in the 160s. He reported that some of his blood pressure medication was not refilled therefore has not been taking them since his discharge. His last session of hemodialysis was in the hospital last Wednesday. He went for dialysis today, his blood pressure was found to be Bp 264/120, patient did not get dialysis and was sent to the ED. Patient denies any headaches, nausea vomiting or focal deficit  In the ED, upon presentation his blood pressure was 258/123, patient received his home dose BP meds including clonidine hydralazine losartan nifedipine, but because of persistent systolic blood pressure in the 240s despite oral BP meds and IV hydralazine, patient was started on nicardipine drip.   Labs showed hyponatremia, elevated creatinine  His chest x-ray is clear    ______________________________________________________________________  DISCHARGE SUMMARY/HOSPITAL COURSE:  for full details see H&P, daily progress notes, labs, consult notes. Hospital course problem wise:    Hypertensive emergency   -Patient was admitted to hospital and underwent management as follows. Patient was started on Cardene drip for blood pressure control. Blood pressure improved and later Cardene drip was turned off and home medications were resumed.  -cont coreg, clonidine, increase hydralazine to 100 mg tid, procardia 60 mg daily  -on prn hydralazine and labetalol  -Blood pressure improved and is being released on the general condition for outpatient follow-up with medications as below. Patient was also advised to check blood pressure on a daily basis as well.     End-stage renal disease on hemodialysis Tuesday Thursday and Saturday  -Cont hd per renal. Renal following. Will need outpatient follow-up for continuation of hemodialysis.      Recent heart block status post pacemaker placement 3 days ago   Monitor pacemaker site. Continue established outpatient follow-up with cardiology     History of substance abuse  Patient is on Suboxone, restart once verified by pharmacy  History hepatitis C    Patient seen and examined today, vitals are stable, lab work is at baseline was cleared by nephrology to be discharged for outpatient follow-up.         _______________________________________________________________________  Patient seen and examined by me on discharge day. Pertinent Findings:  Gen:    Not in distress  Chest: Clear lungs  CVS:   Regular rhythm.   No edema  Abd:  Soft, not distended, not tender  Neuro:  Alert, awake  _______________________________________________________________________  DISCHARGE MEDICATIONS:   Discharge Medication List as of 3/14/2022  5:50 PM      CONTINUE these medications which have NOT CHANGED    Details   carvediloL (COREG) 25 mg tablet Take 1 Tablet by mouth two (2) times daily (with meals) for 90 days. , Normal, Disp-60 Tablet, R-2      bumetanide (BUMEX) 2 mg tablet Take 1 Tablet by mouth daily for 90 days. , Normal, Disp-30 Tablet, R-2      doxazosin (CARDURA) 4 mg tablet Take 1 Tablet by mouth daily for 90 days. , Normal, Disp-30 Tablet, R-2      NIFEdipine ER (PROCARDIA XL) 60 mg ER tablet Take 1 Tablet by mouth two (2) times a day for 90 days. , Normal, Disp-60 Tablet, R-2      cloNIDine HCL (CATAPRES) 0.2 mg tablet Take 0.2 mg by mouth three (3) times daily. , Historical Med      hydrALAZINE (APRESOLINE) 100 mg tablet Take 100 mg by mouth three (3) times daily. , Historical Med      ascorbic acid, vitamin C, (Vitamin C) 500 mg tablet Take 500 mg by mouth daily. , Historical Med      cholecalciferol (VITAMIN D3) 25 mcg (1,000 unit) cap Take 1,000 Units by mouth daily. , Historical Med      naloxone (Narcan) 4 mg/actuation nasal spray 1 [place] by IntraNASal route once as needed for Overdose. Use 1 spray intranasally, then discard. Repeat with new spray every 2 min as needed for opioid overdose symptoms, alternating nostrils. , Historical Med      buprenorphine-naloxone (Suboxone) 8-2 mg film sublingaul film 1 Film by SubLINGual route two (2) times a day., Historical Med.      potassium chloride SR (K-TAB) 20 mEq tablet Take 20 mEq by mouth daily. , Historical Med         STOP taking these medications       glecaprevir-pibrentasvir (Mavyret) 100-40 mg tab Comments:   Reason for Stopping:                 Patient Follow Up Instructions:     1. Recommended diet: cardiac    2. Recommended activity: Activity as tolerated    3. If you experience any of the following symptoms then please call your primary care physician or return to the emergency room if you cannot get hold of your doctor:    4. Wound Care: none     5. Lab work: cbc and bmp in 1 week     6. Bring these papers with you to your follow up appointments.  The papers will help your doctors be sure to continue the care plan from the hospital.        Follow-up Information     Follow up With Specialties Details Why Ryan Todd MD Nephrology Schedule an appointment as soon as possible for a visit in 1 week Dr. Rhoda Kinsey will see you at the dialysis clinic [address] [place], [place] [address]cruz Hernandez MD  Schedule an appointment as soon as possible for a visit in 5 days To schedule your PCP hospital follow up appointment. Dr. Samy Johnson office closed on Mondays.  [facility]  [phone]        ________________________________________________________________    Risk of deterioration: High    Condition at Discharge:  Stable  __________________________________________________________________    Disposition  Home with family and home health services    ____________________________________________________________________    Code Status: Full Code  ___________________________________________________________________      Total time in minutes spent coordinating this discharge (includes going over instructions, follow-up, prescriptions, and preparing report for sign off to her PCP) :  35  minutes    Signed:  Elizabeth Sweeney MD

## 2022-03-29 ENCOUNTER — HOSPITAL ENCOUNTER (OUTPATIENT)
Age: 57
Setting detail: OBSERVATION
Discharge: HOME | End: 2022-03-30
Attending: EMERGENCY MEDICINE | Admitting: INTERNAL MEDICINE
Payer: MEDICARE

## 2022-03-29 ENCOUNTER — APPOINTMENT (OUTPATIENT)
Dept: GENERAL RADIOLOGY | Age: 57
End: 2022-03-29
Attending: EMERGENCY MEDICINE
Payer: MEDICARE

## 2022-03-29 DIAGNOSIS — I16.1 HYPERTENSIVE EMERGENCY: Primary | ICD-10-CM

## 2022-03-29 DIAGNOSIS — J81.0 ACUTE PULMONARY EDEMA (HCC): ICD-10-CM

## 2022-03-29 DIAGNOSIS — N18.6 END STAGE RENAL DISEASE (HCC): ICD-10-CM

## 2022-03-29 PROBLEM — Z99.2 ESRD NEEDING DIALYSIS (HCC): Status: ACTIVE | Noted: 2022-03-29

## 2022-03-29 LAB
ALBUMIN SERPL-MCNC: 3.3 G/DL (ref 3.5–5)
ALBUMIN/GLOB SERPL: 0.7 {RATIO} (ref 1.1–2.2)
ALP SERPL-CCNC: 57 U/L (ref 45–117)
ALT SERPL-CCNC: 11 U/L (ref 12–78)
AMPHET UR QL SCN: NEGATIVE
ANION GAP BLD CALC-SCNC: 9 MMOL/L (ref 10–20)
ANION GAP SERPL CALC-SCNC: 11 MMOL/L (ref 5–15)
AST SERPL-CCNC: 13 U/L (ref 15–37)
ATRIAL RATE: 92 BPM
BARBITURATES UR QL SCN: NEGATIVE
BASOPHILS # BLD: 0.1 K/UL (ref 0–0.1)
BASOPHILS NFR BLD: 1 % (ref 0–1)
BENZODIAZ UR QL: NEGATIVE
BILIRUB SERPL-MCNC: 0.4 MG/DL (ref 0.2–1)
BUN SERPL-MCNC: 72 MG/DL (ref 6–20)
BUN/CREAT SERPL: 5 (ref 12–20)
CA-I BLD-MCNC: 1.12 MMOL/L (ref 1.12–1.32)
CALCIUM SERPL-MCNC: 8.8 MG/DL (ref 8.5–10.1)
CALCULATED P AXIS, ECG09: 65 DEGREES
CALCULATED R AXIS, ECG10: 15 DEGREES
CALCULATED T AXIS, ECG11: 100 DEGREES
CANNABINOIDS UR QL SCN: NEGATIVE
CHLORIDE BLD-SCNC: 99 MMOL/L (ref 98–107)
CHLORIDE SERPL-SCNC: 96 MMOL/L (ref 97–108)
CO2 BLD-SCNC: 27.4 MMOL/L (ref 21–32)
CO2 SERPL-SCNC: 26 MMOL/L (ref 21–32)
COCAINE UR QL SCN: NEGATIVE
CREAT BLD-MCNC: >15 MG/DL (ref 0.6–1.3)
CREAT SERPL-MCNC: 15.1 MG/DL (ref 0.7–1.3)
DIAGNOSIS, 93000: NORMAL
DIFFERENTIAL METHOD BLD: ABNORMAL
DRUG SCRN COMMENT,DRGCM: NORMAL
EOSINOPHIL # BLD: 0.6 K/UL (ref 0–0.4)
EOSINOPHIL NFR BLD: 5 % (ref 0–7)
ERYTHROCYTE [DISTWIDTH] IN BLOOD BY AUTOMATED COUNT: 13.9 % (ref 11.5–14.5)
GLOBULIN SER CALC-MCNC: 4.6 G/DL (ref 2–4)
GLUCOSE BLD-MCNC: 101 MG/DL (ref 65–100)
GLUCOSE SERPL-MCNC: 109 MG/DL (ref 65–100)
HCT VFR BLD AUTO: 26.4 % (ref 36.6–50.3)
HGB BLD-MCNC: 8.3 G/DL (ref 12.1–17)
IMM GRANULOCYTES # BLD AUTO: 0.1 K/UL (ref 0–0.04)
IMM GRANULOCYTES NFR BLD AUTO: 1 % (ref 0–0.5)
LYMPHOCYTES # BLD: 2.5 K/UL (ref 0.8–3.5)
LYMPHOCYTES NFR BLD: 19 % (ref 12–49)
MCH RBC QN AUTO: 29.1 PG (ref 26–34)
MCHC RBC AUTO-ENTMCNC: 31.4 G/DL (ref 30–36.5)
MCV RBC AUTO: 92.6 FL (ref 80–99)
METHADONE UR QL: NEGATIVE
MONOCYTES # BLD: 1.1 K/UL (ref 0–1)
MONOCYTES NFR BLD: 9 % (ref 5–13)
NEUTS SEG # BLD: 8.5 K/UL (ref 1.8–8)
NEUTS SEG NFR BLD: 66 % (ref 32–75)
NRBC # BLD: 0 K/UL (ref 0–0.01)
NRBC BLD-RTO: 0 PER 100 WBC
OPIATES UR QL: NEGATIVE
P-R INTERVAL, ECG05: 156 MS
PCP UR QL: NEGATIVE
PLATELET # BLD AUTO: 259 K/UL (ref 150–400)
PMV BLD AUTO: 10.8 FL (ref 8.9–12.9)
POTASSIUM BLD-SCNC: 4.7 MMOL/L (ref 3.5–5.1)
POTASSIUM SERPL-SCNC: 4.4 MMOL/L (ref 3.5–5.1)
PROT SERPL-MCNC: 7.9 G/DL (ref 6.4–8.2)
Q-T INTERVAL, ECG07: 380 MS
QRS DURATION, ECG06: 96 MS
QTC CALCULATION (BEZET), ECG08: 469 MS
RBC # BLD AUTO: 2.85 M/UL (ref 4.1–5.7)
SERVICE CMNT-IMP: ABNORMAL
SODIUM BLD-SCNC: 134 MMOL/L (ref 136–145)
SODIUM SERPL-SCNC: 133 MMOL/L (ref 136–145)
VENTRICULAR RATE, ECG03: 92 BPM
WBC # BLD AUTO: 13 K/UL (ref 4.1–11.1)

## 2022-03-29 PROCEDURE — 93005 ELECTROCARDIOGRAM TRACING: CPT

## 2022-03-29 PROCEDURE — 90935 HEMODIALYSIS ONE EVALUATION: CPT

## 2022-03-29 PROCEDURE — 96372 THER/PROPH/DIAG INJ SC/IM: CPT

## 2022-03-29 PROCEDURE — 85025 COMPLETE CBC W/AUTO DIFF WBC: CPT

## 2022-03-29 PROCEDURE — 74011250636 HC RX REV CODE- 250/636: Performed by: INTERNAL MEDICINE

## 2022-03-29 PROCEDURE — 80053 COMPREHEN METABOLIC PANEL: CPT

## 2022-03-29 PROCEDURE — 74011250636 HC RX REV CODE- 250/636: Performed by: NURSE PRACTITIONER

## 2022-03-29 PROCEDURE — 80307 DRUG TEST PRSMV CHEM ANLYZR: CPT

## 2022-03-29 PROCEDURE — 36415 COLL VENOUS BLD VENIPUNCTURE: CPT

## 2022-03-29 PROCEDURE — 65660000000 HC RM CCU STEPDOWN

## 2022-03-29 PROCEDURE — 74011250637 HC RX REV CODE- 250/637: Performed by: INTERNAL MEDICINE

## 2022-03-29 PROCEDURE — 74011000250 HC RX REV CODE- 250: Performed by: EMERGENCY MEDICINE

## 2022-03-29 PROCEDURE — 74011636637 HC RX REV CODE- 636/637: Performed by: INTERNAL MEDICINE

## 2022-03-29 PROCEDURE — G0378 HOSPITAL OBSERVATION PER HR: HCPCS

## 2022-03-29 PROCEDURE — 94762 N-INVAS EAR/PLS OXIMTRY CONT: CPT

## 2022-03-29 PROCEDURE — 74011250637 HC RX REV CODE- 250/637: Performed by: EMERGENCY MEDICINE

## 2022-03-29 PROCEDURE — 74011000250 HC RX REV CODE- 250: Performed by: INTERNAL MEDICINE

## 2022-03-29 PROCEDURE — 99285 EMERGENCY DEPT VISIT HI MDM: CPT

## 2022-03-29 PROCEDURE — 96374 THER/PROPH/DIAG INJ IV PUSH: CPT

## 2022-03-29 PROCEDURE — 80047 BASIC METABLC PNL IONIZED CA: CPT

## 2022-03-29 PROCEDURE — 71045 X-RAY EXAM CHEST 1 VIEW: CPT

## 2022-03-29 PROCEDURE — G0257 UNSCHED DIALYSIS ESRD PT HOS: HCPCS

## 2022-03-29 RX ORDER — ONDANSETRON 2 MG/ML
4 INJECTION INTRAMUSCULAR; INTRAVENOUS
Status: DISCONTINUED | OUTPATIENT
Start: 2022-03-29 | End: 2022-03-30 | Stop reason: HOSPADM

## 2022-03-29 RX ORDER — DOXAZOSIN 2 MG/1
4 TABLET ORAL DAILY
Status: DISCONTINUED | OUTPATIENT
Start: 2022-03-30 | End: 2022-03-30 | Stop reason: HOSPADM

## 2022-03-29 RX ORDER — CLONIDINE HYDROCHLORIDE 0.1 MG/1
0.2 TABLET ORAL 3 TIMES DAILY
Status: DISCONTINUED | OUTPATIENT
Start: 2022-03-29 | End: 2022-03-30 | Stop reason: HOSPADM

## 2022-03-29 RX ORDER — SUCRALFATE 1 G/1
1 TABLET ORAL
Status: DISCONTINUED | OUTPATIENT
Start: 2022-03-29 | End: 2022-03-30 | Stop reason: HOSPADM

## 2022-03-29 RX ORDER — NIFEDIPINE 60 MG/1
60 TABLET, EXTENDED RELEASE ORAL 2 TIMES DAILY
Status: DISCONTINUED | OUTPATIENT
Start: 2022-03-29 | End: 2022-03-30 | Stop reason: HOSPADM

## 2022-03-29 RX ORDER — SODIUM CHLORIDE 0.9 % (FLUSH) 0.9 %
5-40 SYRINGE (ML) INJECTION EVERY 8 HOURS
Status: DISCONTINUED | OUTPATIENT
Start: 2022-03-29 | End: 2022-03-30 | Stop reason: HOSPADM

## 2022-03-29 RX ORDER — BUMETANIDE 0.25 MG/ML
2 INJECTION INTRAMUSCULAR; INTRAVENOUS
Status: COMPLETED | OUTPATIENT
Start: 2022-03-29 | End: 2022-03-29

## 2022-03-29 RX ORDER — BUPRENORPHINE AND NALOXONE 8; 2 MG/1; MG/1
1 FILM, SOLUBLE BUCCAL; SUBLINGUAL 2 TIMES DAILY
Status: DISCONTINUED | OUTPATIENT
Start: 2022-03-29 | End: 2022-03-30 | Stop reason: HOSPADM

## 2022-03-29 RX ORDER — ONDANSETRON 2 MG/ML
4 INJECTION INTRAMUSCULAR; INTRAVENOUS
Status: DISCONTINUED | OUTPATIENT
Start: 2022-03-29 | End: 2022-03-29 | Stop reason: SDUPTHER

## 2022-03-29 RX ORDER — ONDANSETRON 4 MG/1
4 TABLET, ORALLY DISINTEGRATING ORAL
Status: DISCONTINUED | OUTPATIENT
Start: 2022-03-29 | End: 2022-03-30 | Stop reason: HOSPADM

## 2022-03-29 RX ORDER — CARVEDILOL 12.5 MG/1
25 TABLET ORAL 2 TIMES DAILY WITH MEALS
Status: DISCONTINUED | OUTPATIENT
Start: 2022-03-29 | End: 2022-03-30 | Stop reason: HOSPADM

## 2022-03-29 RX ORDER — MELATONIN
1000 DAILY
Status: DISCONTINUED | OUTPATIENT
Start: 2022-03-30 | End: 2022-03-30 | Stop reason: HOSPADM

## 2022-03-29 RX ORDER — FLUTICASONE PROPIONATE 50 MCG
2 SPRAY, SUSPENSION (ML) NASAL DAILY
Status: DISCONTINUED | OUTPATIENT
Start: 2022-03-30 | End: 2022-03-30 | Stop reason: HOSPADM

## 2022-03-29 RX ORDER — ASCORBIC ACID 500 MG
500 TABLET ORAL DAILY
Status: DISCONTINUED | OUTPATIENT
Start: 2022-03-30 | End: 2022-03-30 | Stop reason: HOSPADM

## 2022-03-29 RX ORDER — SODIUM CHLORIDE 0.9 % (FLUSH) 0.9 %
5-40 SYRINGE (ML) INJECTION AS NEEDED
Status: DISCONTINUED | OUTPATIENT
Start: 2022-03-29 | End: 2022-03-30 | Stop reason: HOSPADM

## 2022-03-29 RX ORDER — LOSARTAN POTASSIUM 100 MG/1
100 TABLET ORAL DAILY
Status: DISCONTINUED | OUTPATIENT
Start: 2022-03-30 | End: 2022-03-30 | Stop reason: HOSPADM

## 2022-03-29 RX ORDER — ACETAMINOPHEN 325 MG/1
650 TABLET ORAL
Status: DISCONTINUED | OUTPATIENT
Start: 2022-03-29 | End: 2022-03-30 | Stop reason: HOSPADM

## 2022-03-29 RX ORDER — POLYETHYLENE GLYCOL 3350 17 G/17G
17 POWDER, FOR SOLUTION ORAL DAILY PRN
Status: DISCONTINUED | OUTPATIENT
Start: 2022-03-29 | End: 2022-03-30 | Stop reason: HOSPADM

## 2022-03-29 RX ORDER — LOSARTAN POTASSIUM 100 MG/1
100 TABLET ORAL DAILY
Status: ON HOLD | COMMUNITY
End: 2022-05-17

## 2022-03-29 RX ORDER — HYDRALAZINE HYDROCHLORIDE 50 MG/1
100 TABLET, FILM COATED ORAL 3 TIMES DAILY
Status: DISCONTINUED | OUTPATIENT
Start: 2022-03-29 | End: 2022-03-30 | Stop reason: HOSPADM

## 2022-03-29 RX ORDER — BUMETANIDE 1 MG/1
2 TABLET ORAL DAILY
Status: DISCONTINUED | OUTPATIENT
Start: 2022-03-30 | End: 2022-03-30 | Stop reason: HOSPADM

## 2022-03-29 RX ORDER — HEPARIN SODIUM 5000 [USP'U]/ML
5000 INJECTION, SOLUTION INTRAVENOUS; SUBCUTANEOUS EVERY 8 HOURS
Status: DISCONTINUED | OUTPATIENT
Start: 2022-03-29 | End: 2022-03-30 | Stop reason: HOSPADM

## 2022-03-29 RX ORDER — FLUTICASONE PROPIONATE 50 MCG
2 SPRAY, SUSPENSION (ML) NASAL
COMMUNITY
End: 2022-10-03

## 2022-03-29 RX ORDER — PANTOPRAZOLE SODIUM 40 MG/1
40 TABLET, DELAYED RELEASE ORAL 2 TIMES DAILY
Status: DISCONTINUED | OUTPATIENT
Start: 2022-03-29 | End: 2022-03-30 | Stop reason: HOSPADM

## 2022-03-29 RX ADMIN — SUCRALFATE 1 G: 1 TABLET ORAL at 22:36

## 2022-03-29 RX ADMIN — SODIUM CHLORIDE, PRESERVATIVE FREE 10 ML: 5 INJECTION INTRAVENOUS at 22:36

## 2022-03-29 RX ADMIN — NITROGLYCERIN 1 INCH: 20 OINTMENT TOPICAL at 11:31

## 2022-03-29 RX ADMIN — CLONIDINE HYDROCHLORIDE 0.2 MG: 0.1 TABLET ORAL at 22:36

## 2022-03-29 RX ADMIN — HYDRALAZINE HYDROCHLORIDE 100 MG: 50 TABLET, FILM COATED ORAL at 17:49

## 2022-03-29 RX ADMIN — SUCRALFATE 1 G: 1 TABLET ORAL at 17:48

## 2022-03-29 RX ADMIN — HEPARIN SODIUM 2000 UNITS: 1000 INJECTION INTRAVENOUS; SUBCUTANEOUS at 21:49

## 2022-03-29 RX ADMIN — HYDRALAZINE HYDROCHLORIDE 100 MG: 50 TABLET, FILM COATED ORAL at 22:36

## 2022-03-29 RX ADMIN — NIFEDIPINE 60 MG: 60 TABLET, EXTENDED RELEASE ORAL at 17:49

## 2022-03-29 RX ADMIN — HEPARIN SODIUM 2100 UNITS: 1000 INJECTION INTRAVENOUS; SUBCUTANEOUS at 21:49

## 2022-03-29 RX ADMIN — CLONIDINE HYDROCHLORIDE 0.2 MG: 0.1 TABLET ORAL at 17:48

## 2022-03-29 RX ADMIN — SODIUM CHLORIDE, PRESERVATIVE FREE 10 ML: 5 INJECTION INTRAVENOUS at 17:50

## 2022-03-29 RX ADMIN — HEPARIN SODIUM 5000 UNITS: 5000 INJECTION, SOLUTION INTRAVENOUS; SUBCUTANEOUS at 14:21

## 2022-03-29 RX ADMIN — PANTOPRAZOLE SODIUM 40 MG: 40 TABLET, DELAYED RELEASE ORAL at 17:48

## 2022-03-29 RX ADMIN — BUPRENORPHINE AND NALOXONE 1 FILM: 8; 2 FILM BUCCAL; SUBLINGUAL at 17:48

## 2022-03-29 RX ADMIN — CARVEDILOL 25 MG: 12.5 TABLET, FILM COATED ORAL at 17:48

## 2022-03-29 RX ADMIN — BUMETANIDE 2 MG: 0.25 INJECTION INTRAMUSCULAR; INTRAVENOUS at 11:32

## 2022-03-29 NOTE — H&P
Hospitalist Admission Note    NAME: Amairani Franco   :  1965   MRN:  [id number]     Date/Time:  3/29/2022 1:37 PM    Patient PCP: Gary Cantu, NP  ______________________________________________________________________  Given the patient's current clinical presentation, I have a high level of concern for decompensation if discharged from the emergency department. Complex decision making was performed, which includes reviewing the patient's available past medical records, laboratory results, and x-ray films. My assessment of this patient's clinical condition and my plan of care is as follows. Assessment / Plan:  Hypertensive urgency POA  ESRD on TTS HD needing urgent HD/UF - pt missed Saturday dialysis session due to transportation issues  Has recurrent admissions for uncontrolled hypertension, with history of Medical Noncompliance  Mentions he is taking his medications regularly    Admit to telemetry bed  Resume with all home BP medsmedication  Hemodialysis as per nephrology- plan for HD today and tomorrow noted  IP CM consult for - ? Dialysis transport issues pt is blaming on for missing OP dialysis     Leukocytosis POA- likely reactive  Hb 16,  CXR clear  UA if able to collect    H/o Heroine abuse- use at 2am per pt  Urine Tox screen negative? ? Recent Duodenitis  Cont PPI BID  Cont carafate     Code Status: Full  Surrogate Decision Maker: His sister     DVT Prophylaxis: Heparin  GI Prophylaxis: Protonix         Subjective:   CHIEF COMPLAINT: High BP at Dialysis unit, SOB since missing last HD on Saturday    HISTORY OF PRESENT ILLNESS:     Yoan Gallardo is a 64 y.o.  male who presents with above complains from HD unit. Pt presented with CC Of having SOB since last night. H/o missing Sat dialysis  Claims he did heroine at 2 AM but for some reason his urine drug screen in ER was neg again.   Pt has h/o not getting to his OP dialysis units and has been admitted for similar picture of Pulm edema and HTN urgency multiple times in past- most recently 1 week ago. Pt as found have improved BP in ED when seen by me , was just started on cardene drip for a short period in ER and plan was to dialyse pt today and tomorrow per nephrology. We were asked to admit for work up and evaluation of the above problems. Past Medical History:   Diagnosis Date    Chronic kidney disease     Hypertension     Liver disease         No past surgical history on file. Social History     Tobacco Use    Smoking status: Current Some Day Smoker     Packs/day: 1.00     Last attempt to quit: 2020     Years since quittin.6    Smokeless tobacco: Never Used    Tobacco comment: started back smoking in last couple months   Substance Use Topics    Alcohol use: Not Currently        Family history  Denies any history of DM, Heart disease or Cancer in family    Allergies   Allergen Reactions    Baclofen Rash     All over        Prior to Admission medications    Medication Sig Start Date End Date Taking? Authorizing Provider   fluticasone propionate (FLONASE) 50 mcg/actuation nasal spray 2 Sprays by Both Nostrils route daily. Yes Marisol, MD Brian   losartan (COZAAR) 100 mg tablet Take 100 mg by mouth daily. Yes Brian Damon MD   pantoprazole (Protonix) 40 mg tablet Take 1 Tablet by mouth two (2) times a day. Indications: an ulcer of the duodenum 3/25/22  Yes Steffanie ALDANA MD   carvediloL (COREG) 25 mg tablet Take 1 Tablet by mouth two (2) times daily (with meals) for 90 days. 22 Yes Alexandra Medeiros MD   bumetanide (BUMEX) 2 mg tablet Take 1 Tablet by mouth daily for 90 days. 22 Yes Alexandra Medeiros MD   doxazosin (CARDURA) 4 mg tablet Take 1 Tablet by mouth daily for 90 days. 22 Yes Alexandra Medeiros MD   NIFEdipine ER (PROCARDIA XL) 60 mg ER tablet Take 1 Tablet by mouth two (2) times a day for 90 days.  22 Yes Barak Bang, Arlet HORAN MD   hydrALAZINE (APRESOLINE) 100 mg tablet Take 100 mg by mouth three (3) times daily. Yes Provider, Historical   sucralfate (CARAFATE) 1 gram tablet Take 1 Tablet by mouth Before breakfast, lunch, dinner and at bedtime for 7 days. 3/25/22 4/1/22  Gypsy Jose MD   cloNIDine HCL (CATAPRES) 0.2 mg tablet Take 0.2 mg by mouth three (3) times daily. Provider, Historical   ascorbic acid, vitamin C, (Vitamin C) 500 mg tablet Take 500 mg by mouth daily. Provider, Historical   cholecalciferol (VITAMIN D3) 25 mcg (1,000 unit) cap Take 1,000 Units by mouth daily. Provider, Historical   naloxone (Narcan) 4 mg/actuation nasal spray 1 [place] by IntraNASal route once as needed for Overdose. Use 1 spray intranasally, then discard. Repeat with new spray every 2 min as needed for opioid overdose symptoms, alternating nostrils. Provider, Historical   buprenorphine-naloxone (Suboxone) 8-2 mg film sublingaul film 1 Film by SubLINGual route two (2) times a day.     Provider, Historical       REVIEW OF SYSTEMS:         Total of 12 systems reviewed as follows:       POSITIVE= underlined text  Negative = text not underlined  General:  fever, chills, sweats, generalized weakness, weight loss/gain,      loss of appetite   Eyes:    blurred vision, eye pain, loss of vision, double vision  ENT:    rhinorrhea, pharyngitis   Respiratory:   cough, sputum production, SOB, KING, wheezing, pleuritic pain   Cardiology:   chest pain, palpitations, orthopnea, PND, edema, syncope   Gastrointestinal:  abdominal pain , N/V, diarrhea, dysphagia, constipation, bleeding   Genitourinary:  frequency, urgency, dysuria, hematuria, incontinence   Muskuloskeletal :  arthralgia, myalgia, back pain  Hematology:  easy bruising, nose or gum bleeding, lymphadenopathy   Dermatological: rash, ulceration, pruritis, color change / jaundice  Endocrine:   hot flashes or polydipsia   Neurological:  headache, dizziness, confusion, focal weakness, paresthesia,     Speech difficulties, memory loss, gait difficulty  Psychological: Feelings of anxiety, depression, agitation    Objective:   VITALS:    Visit Vitals  BP (!) 150/87 (BP 1 Location: Right arm, BP Patient Position: At rest)   Pulse 81   Temp 97.5 °F (36.4 °C)   Resp 11   Ht 5' 9\" (1.753 m)   Wt 79.4 kg (175 lb)   SpO2 97%   BMI 25.84 kg/m²       PHYSICAL EXAM:    General:    Alert, cooperative, no distress, appears stated age. HEENT: Atraumatic, anicteric sclerae, pink conjunctivae     No oral ulcers, mucosa moist, throat clear, dentition fair  Neck:  Supple, symmetrical,  thyroid: non tender  Lungs:   Clear to auscultation bilaterally. No Wheezing or Rhonchi. No rales. Chest wall:  No tenderness  No Accessory muscle use. Heart:   Regular  rhythm,  No  murmur   No edema  Abdomen:   Soft, non-tender. Not distended. Bowel sounds normal  Extremities: No cyanosis. No clubbing,      Skin turgor normal, Capillary refill normal, Radial dial pulse 2+  Skin:     Not pale. Not Jaundiced  No rashes   Psych:  Good insight. Not depressed. Not anxious or agitated. Neurologic: EOMs intact. No facial asymmetry. No aphasia or slurred speech. Symmetrical strength, Sensation grossly intact.  Alert and oriented X 4.     _______________________________________________________________________  Care Plan discussed with:    Comments   Patient x    Family  x Brother in law at bedside in ER   RN x    Care Manager                    Consultant:      _______________________________________________________________________  Expected  Disposition:   Home with Family x   HH/PT/OT/RN    SNF/LTC    [facility]    ________________________________________________________________________  TOTAL TIME:  46 Minutes    Critical Care Provided     Minutes non procedure based      Comments    x Reviewed previous records   >50% of visit spent in counseling and coordination of care x Discussion with patient and family and questions answered ________________________________________________________________________  Signed: Maria Elena Arguelles MD    Procedures: see electronic medical records for all procedures/Xrays and details which were not copied into this note but were reviewed prior to creation of Plan. LAB DATA REVIEWED:    Recent Results (from the past 24 hour(s))   CBC WITH AUTOMATED DIFF    Collection Time: 03/29/22 11:22 AM   Result Value Ref Range    WBC 13.0 (H) 4.1 - 11.1 K/uL    RBC 2.85 (L) 4.10 - 5.70 M/uL    HGB 8.3 (L) 12.1 - 17.0 g/dL    HCT 26.4 (L) 36.6 - 50.3 %    MCV 92.6 80.0 - 99.0 FL    MCH 29.1 26.0 - 34.0 PG    MCHC 31.4 30.0 - 36.5 g/dL    RDW 13.9 11.5 - 14.5 %    PLATELET 677 852 - 290 K/uL    MPV 10.8 8.9 - 12.9 FL    NRBC 0.0 0  WBC    ABSOLUTE NRBC 0.00 0.00 - 0.01 K/uL    NEUTROPHILS 66 32 - 75 %    LYMPHOCYTES 19 12 - 49 %    MONOCYTES 9 5 - 13 %    EOSINOPHILS 5 0 - 7 %    BASOPHILS 1 0 - 1 %    IMMATURE GRANULOCYTES 1 (H) 0.0 - 0.5 %    ABS. NEUTROPHILS 8.5 (H) 1.8 - 8.0 K/UL    ABS. LYMPHOCYTES 2.5 0.8 - 3.5 K/UL    ABS. MONOCYTES 1.1 (H) 0.0 - 1.0 K/UL    ABS. EOSINOPHILS 0.6 (H) 0.0 - 0.4 K/UL    ABS. BASOPHILS 0.1 0.0 - 0.1 K/UL    ABS. IMM. GRANS. 0.1 (H) 0.00 - 0.04 K/UL    DF AUTOMATED     METABOLIC PANEL, COMPREHENSIVE    Collection Time: 03/29/22 11:22 AM   Result Value Ref Range    Sodium 133 (L) 136 - 145 mmol/L    Potassium 4.4 3.5 - 5.1 mmol/L    Chloride 96 (L) 97 - 108 mmol/L    CO2 26 21 - 32 mmol/L    Anion gap 11 5 - 15 mmol/L    Glucose 109 (H) 65 - 100 mg/dL    BUN 72 (H) 6 - 20 MG/DL    Creatinine 15.10 (H) 0.70 - 1.30 MG/DL    BUN/Creatinine ratio 5 (L) 12 - 20      GFR est AA 4 (L) >60 ml/min/1.73m2    GFR est non-AA 3 (L) >60 ml/min/1.73m2    Calcium 8.8 8.5 - 10.1 MG/DL    Bilirubin, total 0.4 0.2 - 1.0 MG/DL    ALT (SGPT) 11 (L) 12 - 78 U/L    AST (SGOT) 13 (L) 15 - 37 U/L    Alk.  phosphatase 57 45 - 117 U/L    Protein, total 7.9 6.4 - 8.2 g/dL    Albumin 3.3 (L) 3.5 - 5.0 g/dL Globulin 4.6 (H) 2.0 - 4.0 g/dL    A-G Ratio 0.7 (L) 1.1 - 2.2     POC CHEM8    Collection Time: 03/29/22 11:23 AM   Result Value Ref Range    Calcium, ionized (POC) 1.12 1.12 - 1.32 mmol/L    Sodium (POC) 134 (L) 136 - 145 mmol/L    Potassium (POC) 4.7 3.5 - 5.1 mmol/L    Chloride (POC) 99 98 - 107 mmol/L    CO2 (POC) 27.4 21 - 32 mmol/L    Anion gap (POC) 9 (L) 10 - 20 mmol/L    Glucose (POC) 101 (H) 65 - 100 mg/dL    Creatinine (POC) >15.00 (H) 0.6 - 1.3 mg/dL    GFRAA, POC Cannot be calculated >60 ml/min/1.73m2    GFRNA, POC Cannot be calculated >60 ml/min/1.73m2    Comment Comment Not Indicated.      EKG, 12 LEAD, INITIAL    Collection Time: 03/29/22 11:30 AM   Result Value Ref Range    Ventricular Rate 92 BPM    Atrial Rate 92 BPM    P-R Interval 156 ms    QRS Duration 96 ms    Q-T Interval 380 ms    QTC Calculation (Bezet) 469 ms    Calculated P Axis 65 degrees    Calculated R Axis 15 degrees    Calculated T Axis 100 degrees    Diagnosis       Normal sinus rhythm  Moderate voltage criteria for LVH, may be normal variant  Nonspecific T wave abnormality  Prolonged QT  When compared with ECG of 22-MAR-2022 10:31,  No significant change was found

## 2022-03-29 NOTE — PROCEDURES
Hemodialysis / [phone]    Vitals Pre Post Assessment Pre Post   BP BP: 127/73 (03/29/22 1903) 132/63 LOC A/Ox4 No change   HR Pulse (Heart Rate): 70 (03/29/22 1903) 71 Lungs Diminished No change   Resp Resp Rate: 16 (03/29/22 1903) 16 Cardiac Steady RR No change   Temp Temp: 97.5 °F (36.4 °C) (03/29/22 1903) 97.8 Skin Warm/Dry Warm/Dry   Weight    Edema Trace Trace   Tele status N/A N/A Pain Pain Intensity 1: 0 (03/29/22 1745) 0     Orders   Duration: Start: 1903 End: 2207 Total: 3hrs   Dialyzer: Dialyzer/Set Up Inspection: Josie Plan (03/29/22 1903)   K Bath: Dialysate K (mEq/L): 3 (03/29/22 1903)   Ca Bath: Dialysate CA (mEq/L): 2.5 (03/29/22 1903)   Na: Dialysate NA (mEq/L): 138 (03/29/22 1903)   Bicarb: Dialysate HCO3 (mEq/L): 35 (03/29/22 1903)   Target Fluid Removal: Goal/Amount of Fluid to Remove (mL): 3500 mL (03/29/22 1903)     Access   Type & Location: [facility] CVC: Dressing CDI. No s/s of infection. Both lumens aspirate & flush well. Running well at .     Comments:                                        Labs   HBsAg (Antigen) / date: Negative 3/13/2022                                           HBsAb (Antibody) / date: Susceptible 3/13/2022   Source: Epic   Obtained/Reviewed  Critical Results Called HGB   Date Value Ref Range Status   03/29/2022 8.3 (L) 12.1 - 17.0 g/dL Final     Potassium   Date Value Ref Range Status   03/29/2022 4.4 3.5 - 5.1 mmol/L Final     Calcium   Date Value Ref Range Status   03/29/2022 8.8 8.5 - 10.1 MG/DL Final     BUN   Date Value Ref Range Status   03/29/2022 72 (H) 6 - 20 MG/DL Final     Creatinine   Date Value Ref Range Status   03/29/2022 15.10 (H) 0.70 - 1.30 MG/DL Final     Comment:     INVESTIGATED PER DELTA CHECK PROTOCOL        Meds Given   Name Dose Route   Heparin 1:1000                 Adequacy / Fluid    Total Liters Process: 68L   Net Fluid Removed: 2400ml      Comments   Time Out Done:   (Time) 1900   Admitting Diagnosis: Hypertensive Urgency   Consent obtained/signed: Informed Consent Verified: Yes (03/29/22 1903)   Bernarda Rodriguez / Manuelito Jett # Machine Number: [id number] (03/29/22 1903)   Primary Nurse Rpt Pre: Michaelle Morris RN   Primary Nurse Rpt Post: Marylou Zamora RN   Pt Education: Procedural/Consent   Care Plan: Ongoing   Pts outpatient clinic: [facility]     Tx Summary   SBAR received from Primary RN. Pt arrived to HD suite A&Ox4. Consent signed & on file. 1903: Each catheter limb disinfected per p&p, caps removed, hubs disinfected per p&p. Each lumen aspirated for blood return and flushed with Normal Saline per policy. VSS. Dialysis Tx initiated. 2207: Pt insisting on only running 3 hours. Nephrology aware. Tx ended. VSS. Each dialysis catheter limb disinfected per p&p, all possible blood returned per p&p, and each dialysis hub disinfected per p&p. Each lumen flushed, post dialysis catheter Heparin dwell instilled per order, and caps applied. Bed locked and in the lowest position, call bell and belongings in reach. SBAR given to Primary, RN. Patient is stable at time of their departure. All Dialysis related medications have been reviewed.

## 2022-03-29 NOTE — ED PROVIDER NOTES
EMERGENCY DEPARTMENT HISTORY AND PHYSICAL EXAM      Date: 3/29/2022  Patient Name: Antwon Gonzales    History of Presenting Illness     Chief Complaint   Patient presents with    Hypertension       History Provided By: Patient    HPI: Antwon Gonzales, 64 y.o. male with a past medical history significant for Chronic kidney disease on dialysis, hypertension, polysubstance abuse, medical problems as stated below presents to the ED with cc of severe shortness of breath and elevated blood pressure since waking up this morning. Patient reports he has dialysis on Tuesday, Thursday, and Saturday. He reports he could not sleep last night and used heroin around 2 AM.  He slept through his ride this morning was unable to get dialysis. He then became acutely short of breath even though he did take his blood pressure medications as prescribed. He reports he is having nausea vomiting with his symptoms as well. No discrete chest pain, headache, or focal neurologic complaints. No other associated symptoms. No other exacerbating milling factors. Patient's nephrologist is Dr. Jorge Navas. There are no other complaints, changes, or physical findings at this time. PCP: Dilcia Renteria NP    No current facility-administered medications on file prior to encounter. Current Outpatient Medications on File Prior to Encounter   Medication Sig Dispense Refill    fluticasone propionate (FLONASE) 50 mcg/actuation nasal spray 2 Sprays by Both Nostrils route daily.  losartan (COZAAR) 100 mg tablet Take 100 mg by mouth daily.  pantoprazole (Protonix) 40 mg tablet Take 1 Tablet by mouth two (2) times a day. Indications: an ulcer of the duodenum 60 Tablet 0    carvediloL (COREG) 25 mg tablet Take 1 Tablet by mouth two (2) times daily (with meals) for 90 days. 60 Tablet 2    bumetanide (BUMEX) 2 mg tablet Take 1 Tablet by mouth daily for 90 days.  30 Tablet 2    doxazosin (CARDURA) 4 mg tablet Take 1 Tablet by mouth daily for 90 days. 30 Tablet 2    NIFEdipine ER (PROCARDIA XL) 60 mg ER tablet Take 1 Tablet by mouth two (2) times a day for 90 days. 60 Tablet 2    hydrALAZINE (APRESOLINE) 100 mg tablet Take 100 mg by mouth three (3) times daily.  sucralfate (CARAFATE) 1 gram tablet Take 1 Tablet by mouth Before breakfast, lunch, dinner and at bedtime for 7 days. 28 Tablet 0    cloNIDine HCL (CATAPRES) 0.2 mg tablet Take 0.2 mg by mouth three (3) times daily.  ascorbic acid, vitamin C, (Vitamin C) 500 mg tablet Take 500 mg by mouth daily.  cholecalciferol (VITAMIN D3) 25 mcg (1,000 unit) cap Take 1,000 Units by mouth daily.  naloxone (Narcan) 4 mg/actuation nasal spray 1 [place] by IntraNASal route once as needed for Overdose. Use 1 spray intranasally, then discard. Repeat with new spray every 2 min as needed for opioid overdose symptoms, alternating nostrils.  buprenorphine-naloxone (Suboxone) 8-2 mg film sublingaul film 1 Film by SubLINGual route two (2) times a day. Past History     Past Medical History:  Past Medical History:   Diagnosis Date    Chronic kidney disease     Hypertension     Liver disease        Past Surgical History:  No past surgical history on file. Family History:  History reviewed. No pertinent family history. Social History:  Social History     Tobacco Use    Smoking status: Current Some Day Smoker     Packs/day: 1.00     Last attempt to quit: 2020     Years since quittin.6    Smokeless tobacco: Never Used    Tobacco comment: started back smoking in last couple months   Vaping Use    Vaping Use: Never used   Substance Use Topics    Alcohol use: Not Currently    Drug use: Yes     Types: Marijuana, Cocaine, Heroin     Comment: Herion & Cocaine 10 years ago as of 3/23/22, Marijuana last used 1 week ago       Allergies:   Allergies   Allergen Reactions    Baclofen Rash     All over         Review of Systems   Review of Systems   Constitutional: Negative for chills, diaphoresis, fatigue and fever. HENT: Negative for ear pain and sore throat. Eyes: Negative for pain and redness. Respiratory: Positive for shortness of breath. Negative for cough. Cardiovascular: Negative for chest pain and leg swelling. Gastrointestinal: Positive for nausea and vomiting. Negative for abdominal pain and diarrhea. Endocrine: Negative for cold intolerance and heat intolerance. Genitourinary: Negative for flank pain and hematuria. Musculoskeletal: Negative for back pain and neck stiffness. Skin: Negative for rash and wound. Neurological: Negative for dizziness, syncope and headaches. All other systems reviewed and are negative. Physical Exam   Physical Exam  Vitals and nursing note reviewed. Constitutional:       General: He is in acute distress. Appearance: He is well-developed. He is not ill-appearing. Comments: Patient is tachypnea, appears in distress due to nausea. Patient appears acutely short of breath. HENT:      Head: Normocephalic and atraumatic. Mouth/Throat:      Pharynx: No oropharyngeal exudate. Eyes:      Conjunctiva/sclera: Conjunctivae normal.      Pupils: Pupils are equal, round, and reactive to light. Cardiovascular:      Rate and Rhythm: Regular rhythm. Tachycardia present. Heart sounds: No murmur heard. Pulmonary:      Effort: Pulmonary effort is normal. Tachypnea present. No accessory muscle usage or respiratory distress. Breath sounds: Normal breath sounds. No decreased breath sounds, wheezing, rhonchi or rales. Abdominal:      General: Bowel sounds are normal. There is no distension. Palpations: Abdomen is soft. Tenderness: There is no abdominal tenderness. Musculoskeletal:         General: No deformity. Normal range of motion. Cervical back: Normal range of motion. Skin:     General: Skin is warm and dry. Findings: No rash.    Neurological:      Mental Status: He is alert and oriented to person, place, and time. Coordination: Coordination normal.   Psychiatric:         Behavior: Behavior normal.         Diagnostic Study Results     Labs -     Recent Results (from the past 24 hour(s))   CBC WITH AUTOMATED DIFF    Collection Time: 03/29/22 11:22 AM   Result Value Ref Range    WBC 13.0 (H) 4.1 - 11.1 K/uL    RBC 2.85 (L) 4.10 - 5.70 M/uL    HGB 8.3 (L) 12.1 - 17.0 g/dL    HCT 26.4 (L) 36.6 - 50.3 %    MCV 92.6 80.0 - 99.0 FL    MCH 29.1 26.0 - 34.0 PG    MCHC 31.4 30.0 - 36.5 g/dL    RDW 13.9 11.5 - 14.5 %    PLATELET 224 093 - 832 K/uL    MPV 10.8 8.9 - 12.9 FL    NRBC 0.0 0  WBC    ABSOLUTE NRBC 0.00 0.00 - 0.01 K/uL    NEUTROPHILS 66 32 - 75 %    LYMPHOCYTES 19 12 - 49 %    MONOCYTES 9 5 - 13 %    EOSINOPHILS 5 0 - 7 %    BASOPHILS 1 0 - 1 %    IMMATURE GRANULOCYTES 1 (H) 0.0 - 0.5 %    ABS. NEUTROPHILS 8.5 (H) 1.8 - 8.0 K/UL    ABS. LYMPHOCYTES 2.5 0.8 - 3.5 K/UL    ABS. MONOCYTES 1.1 (H) 0.0 - 1.0 K/UL    ABS. EOSINOPHILS 0.6 (H) 0.0 - 0.4 K/UL    ABS. BASOPHILS 0.1 0.0 - 0.1 K/UL    ABS. IMM. GRANS. 0.1 (H) 0.00 - 0.04 K/UL    DF AUTOMATED     METABOLIC PANEL, COMPREHENSIVE    Collection Time: 03/29/22 11:22 AM   Result Value Ref Range    Sodium 133 (L) 136 - 145 mmol/L    Potassium 4.4 3.5 - 5.1 mmol/L    Chloride 96 (L) 97 - 108 mmol/L    CO2 26 21 - 32 mmol/L    Anion gap 11 5 - 15 mmol/L    Glucose 109 (H) 65 - 100 mg/dL    BUN 72 (H) 6 - 20 MG/DL    Creatinine 15.10 (H) 0.70 - 1.30 MG/DL    BUN/Creatinine ratio 5 (L) 12 - 20      GFR est AA 4 (L) >60 ml/min/1.73m2    GFR est non-AA 3 (L) >60 ml/min/1.73m2    Calcium 8.8 8.5 - 10.1 MG/DL    Bilirubin, total 0.4 0.2 - 1.0 MG/DL    ALT (SGPT) 11 (L) 12 - 78 U/L    AST (SGOT) 13 (L) 15 - 37 U/L    Alk.  phosphatase 57 45 - 117 U/L    Protein, total 7.9 6.4 - 8.2 g/dL    Albumin 3.3 (L) 3.5 - 5.0 g/dL    Globulin 4.6 (H) 2.0 - 4.0 g/dL    A-G Ratio 0.7 (L) 1.1 - 2.2     POC CHEM8    Collection Time: 03/29/22 11:23 AM Result Value Ref Range    Calcium, ionized (POC) 1.12 1.12 - 1.32 mmol/L    Sodium (POC) 134 (L) 136 - 145 mmol/L    Potassium (POC) 4.7 3.5 - 5.1 mmol/L    Chloride (POC) 99 98 - 107 mmol/L    CO2 (POC) 27.4 21 - 32 mmol/L    Anion gap (POC) 9 (L) 10 - 20 mmol/L    Glucose (POC) 101 (H) 65 - 100 mg/dL    Creatinine (POC) >15.00 (H) 0.6 - 1.3 mg/dL    GFRAA, POC Cannot be calculated >60 ml/min/1.73m2    GFRNA, POC Cannot be calculated >60 ml/min/1.73m2    Comment Comment Not Indicated. EKG, 12 LEAD, INITIAL    Collection Time: 03/29/22 11:30 AM   Result Value Ref Range    Ventricular Rate 92 BPM    Atrial Rate 92 BPM    P-R Interval 156 ms    QRS Duration 96 ms    Q-T Interval 380 ms    QTC Calculation (Bezet) 469 ms    Calculated P Axis 65 degrees    Calculated R Axis 15 degrees    Calculated T Axis 100 degrees    Diagnosis       Normal sinus rhythm  Moderate voltage criteria for LVH, may be normal variant  Nonspecific T wave abnormality  Prolonged QT  When compared with ECG of 22-MAR-2022 10:31,  No significant change was found         Radiologic Studies -   XR CHEST PORT   Final Result   Minimal pulmonary edema. CT Results  (Last 48 hours)    None        CXR Results  (Last 48 hours)               03/29/22 1150  XR CHEST PORT Final result    Impression:  Minimal pulmonary edema. Narrative: Indication: Missed dialysis, hypertensive emergency       Comparison: 3/22/2022       Portable exam of the chest obtained at 1150 demonstrates normal heart size. Minimal pulmonary edema is noted. The osseous structures are unremarkable. Central venous catheter is unchanged compared to the prior exam.                   Medical Decision Making   I am the first provider for this patient. I reviewed the vital signs, available nursing notes, past medical history, past surgical history, family history and social history. Vital Signs-Reviewed the patient's vital signs.   Patient Vitals for the past 12 hrs:   Temp Pulse Resp BP SpO2   03/29/22 1230 -- 79 10 (!) 157/95 96 %   03/29/22 1200 -- 88 20 (!) 189/89 97 %   03/29/22 1130 -- 95 15 (!) 215/93 96 %   03/29/22 1046 97.5 °F (36.4 °C) 95 18 (!) 245/100 98 %       Records Reviewed: Nursing records and medical records reviewed    MDM:  Patient presents with acute dyspnea. DDx: asthma, copd, pna, pulmonary edema, acute bronchitis, ACS, ptx, pna. Will obtain EKG, labs, CXR, provide O2 as needed for hypoxia, treat symptomatically and reassess. Will continue to monitor closely in ED. Provider Notes (Medical Decision Making):   Patient is a 41-year-old male presenting with missed dialysis, hypertensive emergency, also with nausea vomiting. No evidence of acute hyperkalemia but significant elevated blood pressures with pulmonary edema on chest x-ray. Responding well to IV Bumex and Nitropaste. Cardene ordered per nephrology, however, no indication for it at this time. At this time we feel observation is reasonable for this patient pending dialysis. Unclear of time that dialysis will occur so we will admit for observation. ED Course:   Initial assessment performed. The patients presenting problems have been discussed, and they are in agreement with the care plan formulated and outlined with them. I have encouraged them to ask questions as they arise throughout their visit. ED Course as of 03/29/22 1249   Tue Mar 29, 2022   1212 Seen by Dr. Vinay Bales from nephrology, his recommendation to start the patient on a nicardipine drip and admit for observation. He is placing dialysis order but is unsure the timing of when he will be able to be dialyzed.  [CC]      ED Course User Index  [CC] Roxanne Moyer MD       Medications Administered     bumetanide (BUMEX) injection 2 mg     Admin Date  03/29/2022 Action  Given Dose  2 mg Route  IntraVENous Administered By  Kiko Dailey RN          nitroglycerin (NITROBID) 2 % ointment 1 Inch     Admin Date  03/29/2022 Action  Given Dose  1 Inch Route  Topical Administered By  Herson Cid RN                    Critical Care:  I have spent 35 minutes of critical care time in evaluating and treating this patient. This includes time spent at bedside, time with family and decision makers, documentation, review of labs and imaging, and/or consultation with specialists. It does not include time spent on separately billed procedures. This patient presents with a critical illness or injury that acutely impairs one or more vital organ systems such that there is a high probability of imminent or life threatening deterioration in the patient's condition. This case involved decision making of high complexity to assess, manipulate, and support vital organ system failure and/or to prevent further life threatening deterioration of the patient's condition. Failure to initiate these interventions on an urgent basis would likely result in sudden, clinically significant or life threatening deterioration in the patient's condition. Abnormal findings supporting critical care: Hypertensive emergency  Interventions to support critical care: IV diuresis, IV Cardene, nitroglycerin  Failure to intervene may result in: Cardiogenic shock, respiratory arrest        Disposition:  Admit Note:  12:48 PM  Pt is being admitted by hospitalist. The results of their tests and reason(s) for their admission have been discussed with pt and/or available family. They convey agreement and understanding for the need to be admitted and for admission diagnosis. Diagnosis     Clinical Impression:   1. Hypertensive emergency    2. Acute pulmonary edema (HCC)    3. End stage renal disease ([facility] [address]        Attestations:    Kylah Yadav MD    Please note that this dictation was completed with [employer], the computer voice recognition software.   Quite often unanticipated grammatical, syntax, homophones, and other interpretive errors are inadvertently transcribed by the computer software. Please disregard these errors. Please excuse any errors that have escaped final proofreading. Thank you.

## 2022-03-29 NOTE — PROGRESS NOTES
Nephrology Progress Note  Luc [address]     [facility]. [facility]  Phone - [phone]   Patient: Ana Burton    YOB: 1965        Date- 3/29/2022   Admit Date: 3/29/2022  CC: Follow up for  ESRD         IMPRESSION & PLAN:   · ESRD - TTS- [facility]  · Hypertensive urgency  · Nausea ,vomiting  · Non compliance to meds and diet  · Heroin use  · Anemia of ckd  · Sec. hyperpara    PLAN-   Plan for urgent hemodialysis today with 3.5 L ultrafiltration.  Will need hemodialysis again tomorrow for volume removal.   Patient should benefit for at least 23-hour observation to get better blood pressure control .  Start on IV Cardene gtt.  Notified Chayo for dialysis today and tomorrow.  Thank you for the consult     Subjective: Interval History:   -Patient was seen and examined in the ED. He presented to [facility] for dialysis today and was found to be hypotensive so sent to the ED for further evaluation. In the ED his blood pressures on 245 x 100 mmHg. He accepts of using heroin last night. Has not used any medications for blood pressure this morning. He is complaining of shortness of breath. Renal consult was requested for evaluation for urgent hemodialysis. Objective:   Vitals:    03/29/22 1046   BP: (!) 245/100   Pulse: 95   Resp: 18   Temp: 97.5 °F (36.4 °C)   SpO2: 98%   Weight: 79.4 kg (175 lb)   Height: 5' 9\" (1.753 m)      No intake/output data recorded. Last 3 Recorded Weights in this Encounter    03/29/22 1046   Weight: 79.4 kg (175 lb)      Physical exam:    GEN: NAD  NECK- Supple, no mass  RESP: Bilateral basilar rales  CVS: Tachycardic  NEURO: Normal speech, Non focal  EXT: No Edema       Chart reviewed. Pertinent Notes reviewed. Data Review :  No results for input(s): NA, K, CL, CO2, BUN, CREA, GLU, CA, MG, PHOS, URICA in the last 72 hours.   Recent Labs     03/29/22  1122   WBC 13.0*   HGB 8.3*   HCT 26.4*    No results for input(s): FE, TIBC, PSAT, FERR in the last 72 hours. Medication list  reviewed  No current facility-administered medications for this encounter. Current Outpatient Medications   Medication Sig    fluticasone propionate (FLONASE) 50 mcg/actuation nasal spray 2 Sprays by Both Nostrils route daily.  losartan (COZAAR) 100 mg tablet Take 100 mg by mouth daily.  pantoprazole (Protonix) 40 mg tablet Take 1 Tablet by mouth two (2) times a day. Indications: an ulcer of the duodenum    carvediloL (COREG) 25 mg tablet Take 1 Tablet by mouth two (2) times daily (with meals) for 90 days.  bumetanide (BUMEX) 2 mg tablet Take 1 Tablet by mouth daily for 90 days.  doxazosin (CARDURA) 4 mg tablet Take 1 Tablet by mouth daily for 90 days.  NIFEdipine ER (PROCARDIA XL) 60 mg ER tablet Take 1 Tablet by mouth two (2) times a day for 90 days.  hydrALAZINE (APRESOLINE) 100 mg tablet Take 100 mg by mouth three (3) times daily.  sucralfate (CARAFATE) 1 gram tablet Take 1 Tablet by mouth Before breakfast, lunch, dinner and at bedtime for 7 days.  cloNIDine HCL (CATAPRES) 0.2 mg tablet Take 0.2 mg by mouth three (3) times daily.  ascorbic acid, vitamin C, (Vitamin C) 500 mg tablet Take 500 mg by mouth daily.  cholecalciferol (VITAMIN D3) 25 mcg (1,000 unit) cap Take 1,000 Units by mouth daily.  naloxone (Narcan) 4 mg/actuation nasal spray 1 [place] by IntraNASal route once as needed for Overdose. Use 1 spray intranasally, then discard. Repeat with new spray every 2 min as needed for opioid overdose symptoms, alternating nostrils.  buprenorphine-naloxone (Suboxone) 8-2 mg film sublingaul film 1 Film by SubLINGual route two (2) times a day.           Matt Arreguin, [address] [facility]  [facility], [address]  [place], [address]  Phone - [phone]               Fax - [phone]
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
## 2022-03-29 NOTE — PROGRESS NOTES
1445 Patient arrives to ER 40 on stretcher. Accompanied by family member with meal tray. 1710 Report given to dialysis RN, Kolby Valdes for night time dialysis. 1720 Report given to receiving RN, [facility]. Patient transported by transporter to 2112.

## 2022-03-29 NOTE — PROGRESS NOTES
Transition of Care Plan:    RUR: 25% high risk for readmission. Pt is a readmit. Disposition: Home with sister, resumption of OP HD  Follow up appointments: PCP, Nephrology  [facility] T[facility]Sat 6 AM chair time - will need to fax nephrology notes and flowsheets to clinic prior to d/c  DME needed: None anticipated  Transportation at Discharge: Pt anticipates his sister will transport him home  [place] or means to access home: Pt has access       IM Medicare Letter: N/A ; Medicaid coverage  Is patient a BCPI-A Bundle:   N/A        If yes, was Bundle Letter given?:    Is patient a [place] and connected with the [place]? N/A             If yes, was [employer] transfer form completed and VA notified? Caregiver Contact: Pt's sister, Kieran Maldonado) [phone]  Discharge Caregiver contacted prior to discharge? Care Conference needed?:                   Reason for Readmission:  Hypertensive emergency            RUR Score/Risk Level:  25% high risk for readmission. Pt is a readmit. PCP: First and Last name:  Deysi Mckinney NP   Name of Practice:    Are you a current patient: Yes/No: Yes   Approximate date of last visit: Uncertain; states he missed his last appt due to being hospitalized   Can you participate in a virtual visit with your PCP: No    Is a Care Conference indicated:       Did you attend your follow up appointment (s): If not, why not:  Pt returned to hospital        Resources/supports as identified by patient/family: [employer] facing patient (as identified by patient/family and CM): Finances/Medication cost?  Pt denies concerns, prefers [employer] on [employer]. Pt noted with previous hospitalizations related to hypertension but denies concerns with administering medications. Transportation    Sister vs Medicaid transport     Support system or lack thereof? Denies concerns with support  Living arrangements?    Resides with sister, Charu English Self-care/ADLs/Cognition? Pt is independent at baseline          Current Advanced Directive/Advance Care Plan:  Advance Care Planning   Healthcare Decision Maker: No ACP documents on file. Morales Christianson would healthcare decision maker - Ruthy Alvares    Today we discussed Healthcare Decision Makers. The patient is considering options. Plan for utilizing home health:   No home health needs identified. Transition of Care Plan:    Based on readmission, the patient's previous Plan of Care   has been evaluated and/or modified. The current Transition of Care Plan is:  Home with sister, outpatient follow up, resumption of OP HD     CM reviewed chart. CM completed assessment with pt at bedside. CM introduced self, role of CM, verified demographics, and discussed transition of care planning. Pt is a readmit, noted three previous hospitalizations this year:   1/15-1/19/22 for hypertensive emergency  3/12-3/14/22 for non-compliance  3/22-3/25/22 for hypertensive emergency. Pt presented to HD today and was sent to ED due to hypertension. Pt continues to reside with his younger sister, Becca Sifuentes. He plans to return to her home at d/c. He states that his sister will transport him home. Attends OP HD at [facility] Tues/Thurs/Sat 6 AM chair time, utilizes Medicaid transportation to attend and denies attendance issues. Followed by nephrologist Dr. Andrew Xavier. Denies concerns with transition of care plans. Unit care management will continue to follow for transition of care planning needs. Care Management Interventions  PCP Verified by CM: Yes  Palliative Care Criteria Met (RRAT>21 & CHF Dx)?: No  Mode of Transport at Discharge:  Other (see comment) (Sister vs Medicaid transport)  Transition of Care Consult (CM Consult): Discharge Planning  Discharge Durable Medical Equipment: No  Physical Therapy Consult: No  Occupational Therapy Consult: No  Speech Therapy Consult: No  Support Systems: Other Family Member(s) (Sisters)  Confirm Follow Up Transport: Other (see comment) (Medicaid transportation)  Discharge Location  Patient Expects to be Discharged to[de-identified] Home with outpatient services (Home with sister, outpatient follow up, resumption of OP HD)    Readmission Assessment  Number of days since last admission?: 1-7 days  Previous disposition: Home with Family  Who is being interviewed?: Patient  What was the patient's/caregiver's perception as to why they think they needed to return back to the hospital?: Other (Comment) (Sent from HD today due to hypertensive emergency)  Did you visit your Primary Care Physician after you left the hospital, before you returned this time?: No  Why weren't you able to visit your PCP?: Other (Comment) (Reports he was hospitalized and missed his last appt,did not get to reschedule before returning to hospital)  Did you see a specialist, such as Cardiac, Pulmonary, Orthopedic Physician, etc. after you left the hospital?: Yes (Nephrologist, Dr. Keven Campbell)  Does the patient report anything that got in the way of taking their medications?: No (Pt denies concerns with taking medications, however has had three hospitalizations in 2022 related to hypertensive emergency)  In our efforts to provide the best possible care to you and others like you, can you think of anything that we could have done to help you after you left the hospital the first time, so that you might not have needed to return so soon?: Other (Comment) (Denies, presented to hospital from OP HD due to hypertension)    Shelbi Duarte, [facility] [address], [address]

## 2022-03-30 VITALS
DIASTOLIC BLOOD PRESSURE: 66 MMHG | BODY MASS INDEX: 25.92 KG/M2 | WEIGHT: 175 LBS | TEMPERATURE: 97.5 F | HEART RATE: 68 BPM | RESPIRATION RATE: 18 BRPM | OXYGEN SATURATION: 98 % | HEIGHT: 69 IN | SYSTOLIC BLOOD PRESSURE: 134 MMHG

## 2022-03-30 LAB
ANION GAP SERPL CALC-SCNC: 3 MMOL/L (ref 5–15)
BUN SERPL-MCNC: 33 MG/DL (ref 6–20)
BUN/CREAT SERPL: 4 (ref 12–20)
CALCIUM SERPL-MCNC: 8 MG/DL (ref 8.5–10.1)
CHLORIDE SERPL-SCNC: 102 MMOL/L (ref 97–108)
CO2 SERPL-SCNC: 30 MMOL/L (ref 21–32)
CREAT SERPL-MCNC: 9.07 MG/DL (ref 0.7–1.3)
ERYTHROCYTE [DISTWIDTH] IN BLOOD BY AUTOMATED COUNT: 14.3 % (ref 11.5–14.5)
GLUCOSE SERPL-MCNC: 76 MG/DL (ref 65–100)
HCT VFR BLD AUTO: 24.4 % (ref 36.6–50.3)
HGB BLD-MCNC: 7.6 G/DL (ref 12.1–17)
MCH RBC QN AUTO: 29.2 PG (ref 26–34)
MCHC RBC AUTO-ENTMCNC: 31.1 G/DL (ref 30–36.5)
MCV RBC AUTO: 93.8 FL (ref 80–99)
NRBC # BLD: 0 K/UL (ref 0–0.01)
NRBC BLD-RTO: 0 PER 100 WBC
PLATELET # BLD AUTO: 256 K/UL (ref 150–400)
PMV BLD AUTO: 10.8 FL (ref 8.9–12.9)
POTASSIUM SERPL-SCNC: 4.6 MMOL/L (ref 3.5–5.1)
RBC # BLD AUTO: 2.6 M/UL (ref 4.1–5.7)
SODIUM SERPL-SCNC: 135 MMOL/L (ref 136–145)
WBC # BLD AUTO: 7.8 K/UL (ref 4.1–11.1)

## 2022-03-30 PROCEDURE — 65660000000 HC RM CCU STEPDOWN

## 2022-03-30 PROCEDURE — 74011000250 HC RX REV CODE- 250: Performed by: INTERNAL MEDICINE

## 2022-03-30 PROCEDURE — G0257 UNSCHED DIALYSIS ESRD PT HOS: HCPCS

## 2022-03-30 PROCEDURE — 85027 COMPLETE CBC AUTOMATED: CPT

## 2022-03-30 PROCEDURE — 80048 BASIC METABOLIC PNL TOTAL CA: CPT

## 2022-03-30 PROCEDURE — G0378 HOSPITAL OBSERVATION PER HR: HCPCS

## 2022-03-30 PROCEDURE — 36415 COLL VENOUS BLD VENIPUNCTURE: CPT

## 2022-03-30 PROCEDURE — 74011250636 HC RX REV CODE- 250/636: Performed by: NURSE PRACTITIONER

## 2022-03-30 PROCEDURE — 74011250637 HC RX REV CODE- 250/637: Performed by: INTERNAL MEDICINE

## 2022-03-30 PROCEDURE — 90935 HEMODIALYSIS ONE EVALUATION: CPT

## 2022-03-30 RX ADMIN — HEPARIN SODIUM 2100 UNITS: 1000 INJECTION INTRAVENOUS; SUBCUTANEOUS at 14:40

## 2022-03-30 RX ADMIN — SUCRALFATE 1 G: 1 TABLET ORAL at 06:37

## 2022-03-30 RX ADMIN — PANTOPRAZOLE SODIUM 40 MG: 40 TABLET, DELAYED RELEASE ORAL at 08:29

## 2022-03-30 RX ADMIN — FLUTICASONE PROPIONATE 2 SPRAY: 50 SPRAY, METERED NASAL at 09:48

## 2022-03-30 RX ADMIN — CLONIDINE HYDROCHLORIDE 0.2 MG: 0.1 TABLET ORAL at 08:29

## 2022-03-30 RX ADMIN — Medication 1000 UNITS: at 08:29

## 2022-03-30 RX ADMIN — OXYCODONE HYDROCHLORIDE AND ACETAMINOPHEN 500 MG: 500 TABLET ORAL at 08:29

## 2022-03-30 RX ADMIN — SODIUM CHLORIDE, PRESERVATIVE FREE 10 ML: 5 INJECTION INTRAVENOUS at 06:38

## 2022-03-30 RX ADMIN — DOXAZOSIN 4 MG: 2 TABLET ORAL at 08:29

## 2022-03-30 RX ADMIN — HYDRALAZINE HYDROCHLORIDE 100 MG: 50 TABLET, FILM COATED ORAL at 08:29

## 2022-03-30 RX ADMIN — NIFEDIPINE 60 MG: 60 TABLET, EXTENDED RELEASE ORAL at 08:29

## 2022-03-30 RX ADMIN — HEPARIN SODIUM 2000 UNITS: 1000 INJECTION INTRAVENOUS; SUBCUTANEOUS at 14:40

## 2022-03-30 RX ADMIN — BUMETANIDE 2 MG: 1 TABLET ORAL at 08:29

## 2022-03-30 RX ADMIN — CARVEDILOL 25 MG: 12.5 TABLET, FILM COATED ORAL at 08:29

## 2022-03-30 RX ADMIN — LOSARTAN POTASSIUM 100 MG: 100 TABLET, FILM COATED ORAL at 08:29

## 2022-03-30 NOTE — PROCEDURES
Hemodialysis / [phone]    Vitals Pre Post Assessment Pre Post   BP BP: (!) 162/79 (03/30/22 1133) 134/66 LOC A&Ox4 same   HR Pulse (Heart Rate): 78 (03/30/22 1133) 68 Lungs Dim bases same   Resp Resp Rate: 18 (03/30/22 1133) 18 Cardiac RRR same   Temp Temp: 98.3 °F (36.8 °C) (03/30/22 1133) 97.5 Skin CDI same   Weight    Edema 1+ BLE same   Tele status   Pain Pain Intensity 1: 0 (03/30/22 0735)      Orders   Duration: Start: 1133 End: 5648 Total: 3hrs   Dialyzer: Dialyzer/Set Up Inspection: Revaclear (03/30/22 1133)   K Bath: Dialysate K (mEq/L): 2 (03/30/22 1133)   Ca Bath: Dialysate CA (mEq/L): 2.5 (03/30/22 1133)   Na: Dialysate NA (mEq/L): 138 (03/30/22 1133)   Bicarb: Dialysate HCO3 (mEq/L): 35 (03/30/22 1133)   Target Fluid Removal: Goal/Amount of Fluid to Remove (mL): 2500 mL (03/30/22 1133)     Access   Type & Location: RIJ CVC: Dressing dated 3/29/22 CDI. No s/s of infection. Both lumens aspirate & flush well. Running well at . SBAR received from Primary RN. Pt arrived to HD suite A&Ox4. Consent signed & on file. Each catheter limb disinfected per p&p, caps removed, hubs disinfected per p&p. Each lumen aspirated for blood return and flushed with Normal Saline per policy. VSS. Dialysis Tx initiated. Comments:  Dressing dated 3/29/22 CDI.   No s/s of infection noted                                      Labs   HBsAg (Antigen) / date: Neg 3/13/22                                              HBsAb (Antibody) / date: Susc 3/13/22   Source: EPIC   Obtained/Reviewed  Critical Results Called HGB   Date Value Ref Range Status   03/30/2022 7.6 (L) 12.1 - 17.0 g/dL Final     Potassium   Date Value Ref Range Status   03/30/2022 4.6 3.5 - 5.1 mmol/L Final     Calcium   Date Value Ref Range Status   03/30/2022 8.0 (L) 8.5 - 10.1 MG/DL Final     BUN   Date Value Ref Range Status   03/30/2022 33 (H) 6 - 20 MG/DL Final     Comment:     INVESTIGATED PER DELTA CHECK PROTOCOL     Creatinine   Date Value Ref Range Status   03/30/2022 9.07 (H) 0.70 - 1.30 MG/DL Final     Comment:     INVESTIGATED PER DELTA CHECK PROTOCOL        Meds Given   Name Dose Route   Heparin 1:1000 2.0 [email or web address]   Heparin 1:1000 2.1 [email or web address]          Adequacy / Fluid    Total Liters Process: 66.7   Net Fluid Removed: 2500mL      Comments   Time Out Done:   (Time) 1128   Admitting Diagnosis: Hypertensive emergency   Consent obtained/signed: Informed Consent Verified: Yes (03/30/22 1133)   Machine / RO # Machine Number: Michael Jenkins (03/30/22 1133)   Primary Nurse Rpt Pre: Emmy Simpson RN   Primary Nurse Rpt Post: Ollie Cervantes RN   Pt Education: Procedural   Care Plan: On going   Pts outpatient clinic: [facility]     Tx Summary  1133:  RIJ CVC: Dressing dated 3/29/22 CDI. No s/s of infection. Both lumens aspirate & flush well. Running well at . SBAR received from Primary RN. Pt arrived to HD suite A&Ox4. Consent signed & on file. Each catheter limb disinfected per p&p, caps removed, hubs disinfected per p&p. Each lumen aspirated for blood return and flushed with Normal Saline per policy. VSS. Dialysis Tx initiated. 1145:  Pt resting  1200:  Pt resting  1215:  Pt resting  1230:  Pt resting  1245:  Pt resting  1300:  Pt resting  1315:  Pt resting  1330:  Pt resting  1345:  Pt resting  1400:  Pt resting  1415:  Pt resting  1430:  Pt resting  1437: Tx ended. VSS. Each dialysis catheter limb disinfected per p&p, all possible blood returned per p&p, and each dialysis hub disinfected per p&p. Each lumen flushed, post dialysis catheter Heparin dwell instilled per order, and caps applied. Bed locked and in the lowest position, call bell and belongings in reach. SBAR given to Primary, RN. Patient is stable at time of their/ my departure. All Dialysis related medications have been reviewed. Comments:  Assessment performed by RN.   Procedure and documentation observed and reviewed by Radha York RN

## 2022-03-30 NOTE — PROGRESS NOTES
Problem: Falls - Risk of  Goal: *Absence of Falls  Description: Document Hart Fall Risk and appropriate interventions in the flowsheet.   3/30/2022 1527 by Torri Villavicencio RN  Outcome: Resolved/Not Met  Note: Fall Risk Interventions:            Medication Interventions: Teach patient to arise slowly                3/30/2022 1044 by Torri Villavicencio RN  Outcome: Progressing Towards Goal  Note: Fall Risk Interventions:            Medication Interventions: Teach patient to arise slowly                   Problem: Patient Education: Go to Patient Education Activity  Goal: Patient/Family Education  3/30/2022 1527 by Torri Villavicencio RN  Outcome: Resolved/Not Met  3/30/2022 1044 by Torri Villavicencio RN  Outcome: Progressing Towards Goal     Problem: Hypertension  Goal: *Blood pressure within specified parameters  3/30/2022 1527 by Torri Villavicencio RN  Outcome: Resolved/Not Met  3/30/2022 1044 by Torri Villavicencio RN  Outcome: Progressing Towards Goal  Goal: *Fluid volume balance  3/30/2022 1527 by Torri Villavicencio RN  Outcome: Resolved/Not Met  3/30/2022 1044 by Torri Villavicencio RN  Outcome: Progressing Towards Goal  Goal: *Labs within defined limits  3/30/2022 1527 by Torri Villavicencio RN  Outcome: Resolved/Not Met  3/30/2022 1044 by Torri Villavicencio RN  Outcome: Progressing Towards Goal     Problem: Patient Education: Go to Patient Education Activity  Goal: Patient/Family Education  3/30/2022 1527 by Torri Villavicencio RN  Outcome: Resolved/Not Met  3/30/2022 1044 by Torri Villavicencio RN  Outcome: Progressing Towards Goal

## 2022-03-30 NOTE — PROGRESS NOTES
Problem: Falls - Risk of  Goal: *Absence of Falls  Description: Document Cindia Neigh Fall Risk and appropriate interventions in the flowsheet.   Outcome: Progressing Towards Goal  Note: Fall Risk Interventions:            Medication Interventions: Teach patient to arise slowly                   Problem: Patient Education: Go to Patient Education Activity  Goal: Patient/Family Education  Outcome: Progressing Towards Goal     Problem: Hypertension  Goal: *Blood pressure within specified parameters  Outcome: Progressing Towards Goal  Goal: *Fluid volume balance  Outcome: Progressing Towards Goal  Goal: *Labs within defined limits  Outcome: Progressing Towards Goal     Problem: Patient Education: Go to Patient Education Activity  Goal: Patient/Family Education  Outcome: Progressing Towards Goal

## 2022-03-30 NOTE — PROGRESS NOTES
Nephrology Progress Note  [address]     [email or web address]  Phone - [phone]   Patient: Ruth Ann Cody    YOB: 1965        Date- 3/30/2022   Admit Date: 3/29/2022  CC: Follow up for  ESRD         IMPRESSION & PLAN:   · ESRD - [facility]TS- [facility]  · Hypertensive urgency  · Nausea ,vomiting  · Non compliance to meds and diet  · Heroin use  · Anemia of ckd  · Sec. hyperpara    PLAN-   Received hemodialysis today.  Plan for hemodialysis again today.  Blood pressure much better controlled, off Cardene   Can be discharged from renal standpoint for dialysis today.  Discussed with Dr. Ting Bonilla     Subjective: Interval History:   -Patient was seen and examined on hemodialysis today    Objective:   Vitals:    03/30/22 1215 03/30/22 1230 03/30/22 1245 03/30/22 1300   BP: 138/70 (!) 151/83 (!) 153/83 (!) 141/77   Pulse: 76 75 81 73   Resp: 18 18 18 18   Temp:       TempSrc:       SpO2:       Weight:       Height:          03/29 0701 - 03/30 0700  In: 480 [P.O.:480]  Out: 2400   Last 3 Recorded Weights in this Encounter    03/29/22 1046   Weight: 79.4 kg (175 lb)      Physical exam:    GEN: NAD  NECK- Supple, no mass  RESP: Bilateral basilar rales  CVS: Tachycardic  NEURO: Normal speech, Non focal  EXT: No Edema       Chart reviewed. Pertinent Notes reviewed. Data Review :  Recent Labs     03/30/22  0619 03/29/22  1122   * 133*   K 4.6 4.4    96*   CO2 30 26   BUN 33* 72*   CREA 9.07* 15.10*   GLU 76 109*   CA 8.0* 8.8     Recent Labs     03/30/22  0619 03/29/22  1122   WBC 7.8 13.0*   HGB 7.6* 8.3*   HCT 24.4* 26.4*    259     No results for input(s): FE, TIBC, PSAT, FERR in the last 72 hours.    Medication list  reviewed  Current Facility-Administered Medications   Medication Dose Route Frequency    niCARdipine (CARDENE) 25 mg in 0.9% sodium chloride 250 mL infusion  0-15 mg/hr IntraVENous TITRATE    acetaminophen (TYLENOL) tablet 650 mg 650 mg Oral Q6H PRN    ascorbic acid (vitamin C) (VITAMIN C) tablet 500 mg  500 mg Oral DAILY    bumetanide (BUMEX) tablet 2 mg  2 mg Oral DAILY    buprenorphine-naloxone (SUBOXONE) 8mg-2mg SL film  1 Film SubLINGual BID    carvediloL (COREG) tablet 25 mg  25 mg Oral BID WITH MEALS    cholecalciferol (VITAMIN D3) (1000 Units /25 mcg) tablet 1,000 Units  1,000 Units Oral DAILY    cloNIDine HCL (CATAPRES) tablet 0.2 mg  0.2 mg Oral TID    doxazosin (CARDURA) tablet 4 mg  4 mg Oral DAILY    fluticasone propionate (FLONASE) 50 mcg/actuation nasal spray 2 Spray  2 Spray Both Nostrils DAILY    hydrALAZINE (APRESOLINE) tablet 100 mg  100 mg Oral TID    losartan (COZAAR) tablet 100 mg  100 mg Oral DAILY    NIFEdipine ER (PROCARDIA XL) tablet 60 mg  60 mg Oral BID    sucralfate (CARAFATE) tablet 1 g  1 g Oral AC&HS    pantoprazole (PROTONIX) tablet 40 mg  40 mg Oral BID    sodium chloride (NS) flush 5-40 mL  5-40 mL IntraVENous Q8H    sodium chloride (NS) flush 5-40 mL  5-40 mL IntraVENous PRN    polyethylene glycol (MIRALAX) packet 17 g  17 g Oral DAILY PRN    ondansetron (ZOFRAN ODT) tablet 4 mg  4 mg Oral Q8H PRN    Or    ondansetron (ZOFRAN) injection 4 mg  4 mg IntraVENous Q6H PRN    heparin (porcine) injection 5,000 Units  5,000 Units SubCUTAneous Q8H    heparin (porcine) 1,000 unit/mL injection 2,100 Units  2,100 Units InterCATHeter DIALYSIS PRN    And    heparin (porcine) 1,000 unit/mL injection 2,000 Units  2,000 Units InterCATHeter DIALYSIS PRN          MD [facility]  [facility] / [facility]  [address] [address]  [place], [address]  Phone - [phone]               Fax - [phone]

## 2022-03-30 NOTE — DISCHARGE INSTRUCTIONS
HOSPITALIST DISCHARGE INSTRUCTIONS    NAME: Marifer Mahoney   :  1965   MRN:  [id number]     Date/Time:  3/30/2022 1:18 PM    ADMIT DATE: 3/29/2022     DISCHARGE DATE: 3/30/2022     DISCHARGE DIAGNOSIS:  Hypertensive urgency POA  ESRD on TTS HD needing urgent HD/UF   Suspected Non compliance with Medical treatment  Leukocytosis POA- likely reactive  H/o Heroine abuse- use yesterday easrly AM as per pt but Urine Drug screen negative? ? Recent Duodenitis  Full code    Active Problems:    Hypertensive urgency (3/12/2022)      ESRD needing dialysis ([facility] [address] (3/29/2022)         MEDICATIONS:  As per medication reconciliation  list  · It is important that you take the medication exactly as they are prescribed. · Keep your medication in the bottles provided by the pharmacist and keep a list of the medication names, dosages, and times to be taken in your wallet. · Do not take other medications without consulting your doctor. Pain Management: per above medications    What to do at Home    Recommended diet:  Cardiac Diet, Low fat, Low cholesterol and Renal Diet    Recommended activity: Activity as tolerated    If you have questions regarding the hospital related prescriptions or hospital related issues please call [facility] arturo Barroso at . If you experience any of the following symptoms then please call your primary care physician or return to the emergency room if you cannot get hold of your doctor: Follow Up:  Dr. Rosas Page, NP  you are to call and set up an appointment to see them in 7-10 days. OP Hemodialysis Tues/Thursday/ as arranged-- DO NOT MISS them      Information obtained by :  I understand that if any problems occur once I am at home I am to contact my physician. I understand and acknowledge receipt of the instructions indicated above. Physician's or R.N.'s Signature                                                                  Date/Time                                                                                                                                              Patient or Representative Signature                                                          Date/Time

## 2022-03-30 NOTE — PROGRESS NOTES
1522 Pt discharge paperwork reviewed with patient at this time. IV taken out and patient signed discharge paperwork. Pt has no questions at this time.   Sister to  patient

## 2022-03-30 NOTE — DISCHARGE SUMMARY
Hospitalist Discharge Summary     Patient ID:  Savana Gaxiola  [id number]  35 y.o.  1965  3/29/2022    PCP on record: Johan Ta NP    Admit date: 3/29/2022  Discharge date and time: 3/30/2022    DISCHARGE DIAGNOSIS:    Hypertensive urgency POA  ESRD on TTS HD needing urgent HD/UF   Suspected Non compliance with Medical treatment  Leukocytosis POA- likely reactive  H/o Heroine abuse- use yesterday easrly AM as per pt but Urine Drug screen negative? ? Recent Duodenitis  Full code    CONSULTATIONS:  IP CONSULT TO NEPHROLOGY    Excerpted HPI from H&P of [facility] MD:  Evelin.[age] y.o.  male who presents with above complains from HD unit. Pt presented with CC Of having SOB since last night. H/o missing Sat dialysis  Claims he did heroine at 2 AM but for some reason his urine drug screen in ER was neg again. Pt has h/o not getting to his OP dialysis units and has been admitted for similar picture of Pulm edema and HTN urgency multiple times in past- most recently 1 week ago. Pt as found have improved BP in ED when seen by me , was just started on cardene drip for a short period in ER and plan was to dialyse pt today and tomorrow per nephrology.     We were asked to admit for work up and evaluation of the above problems\"    ______________________________________________________________________  DISCHARGE SUMMARY/HOSPITAL COURSE:  for full details see H&P, daily progress notes, labs, consult notes. Hypertensive urgency POA  ESRD on TTS HD needing urgent HD/UF - pt missed Saturday dialysis session due to transportation issues  Has recurrent admissions for uncontrolled hypertension, with history of Medical Noncompliance  Mentions he is taking his medications regularly     Admit to telemetry bed  Resume with all home BP medsmedication  Hemodialysis as per nephrology- plan for HD today and tomorrow noted  IP CM consult for - ?  Dialysis transport issues pt is blaming on for missing OP dialysis     Leukocytosis POA- likely reactive  Hb 16,  CXR clear  UA if able to collect     H/o Heroine abuse- use at 2am per pt  Urine Tox screen negative? ?        Recent Duodenitis  Cont PPI BID  Cont carafate     Code Status: Full      _______________________________________________________________________  Patient seen and examined by me on discharge day. Pertinent Findings:  Gen:    Not in distress  Chest: Clear lungs  CVS:   Regular rhythm. No edema  Abd:  Soft, not distended, not tender  Neuro:  Alert, oriented x 3  _______________________________________________________________________  DISCHARGE MEDICATIONS:   Current Discharge Medication List      CONTINUE these medications which have NOT CHANGED    Details   fluticasone propionate (FLONASE) 50 mcg/actuation nasal spray 2 Sprays by Both Nostrils route daily. losartan (COZAAR) 100 mg tablet Take 100 mg by mouth daily. pantoprazole (Protonix) 40 mg tablet Take 1 Tablet by mouth two (2) times a day. Indications: an ulcer of the duodenum  Qty: 60 Tablet, Refills: 0      sucralfate (CARAFATE) 1 gram tablet Take 1 Tablet by mouth Before breakfast, lunch, dinner and at bedtime for 7 days. Qty: 28 Tablet, Refills: 0      carvediloL (COREG) 25 mg tablet Take 1 Tablet by mouth two (2) times daily (with meals) for 90 days. Qty: 60 Tablet, Refills: 2      bumetanide (BUMEX) 2 mg tablet Take 1 Tablet by mouth daily for 90 days. Qty: 30 Tablet, Refills: 2      doxazosin (CARDURA) 4 mg tablet Take 1 Tablet by mouth daily for 90 days. Qty: 30 Tablet, Refills: 2      NIFEdipine ER (PROCARDIA XL) 60 mg ER tablet Take 1 Tablet by mouth two (2) times a day for 90 days. Qty: 60 Tablet, Refills: 2      cloNIDine HCL (CATAPRES) 0.2 mg tablet Take 0.2 mg by mouth three (3) times daily. hydrALAZINE (APRESOLINE) 100 mg tablet Take 100 mg by mouth three (3) times daily.       ascorbic acid, vitamin C, (Vitamin C) 500 mg tablet Take 500 mg by mouth daily. cholecalciferol (VITAMIN D3) 25 mcg (1,000 unit) cap Take 1,000 Units by mouth daily. buprenorphine-naloxone (Suboxone) 8-2 mg film sublingaul film 1 Film by SubLINGual route two (2) times a day. Comments: .      naloxone (Narcan) 4 mg/actuation nasal spray 1 [place] by IntraNASal route once as needed for Overdose. Use 1 spray intranasally, then discard. Repeat with new spray every 2 min as needed for opioid overdose symptoms, alternating nostrils. Patient Follow Up Instructions:    Activity: Activity as tolerated  Diet: Cardiac Diet, Low fat, Low cholesterol and Renal Diet      Follow-up with Outpatient [facility]       Follow-up Information     Follow up With Specialties Details Why Contact Arthur Mariscal NP Nurse Practitioner   [phone]          ________________________________________________________________    Risk of deterioration: Low    Condition at Discharge:  Stable  __________________________________________________________________    Disposition  Home with family, no needs    ____________________________________________________________________    Code Status: Full Code  ___________________________________________________________________      Total time in minutes spent coordinating this discharge (includes going over instructions, follow-up, prescriptions, and preparing report for sign off to her PCP) :  >30 minutes    Signed:  Gale Mery MD

## 2022-03-30 NOTE — PROGRESS NOTES
Problem: Patient Education: Go to Patient Education Activity  Goal: Patient/Family Education  Outcome: Progressing Towards Goal     Problem: Hypertension  Goal: *Blood pressure within specified parameters  Outcome: Progressing Towards Goal  Goal: *Labs within defined limits  Outcome: Progressing Towards Goal     Problem: Patient Education: Go to Patient Education Activity  Goal: Patient/Family Education  Outcome: Progressing Towards Goal

## 2022-03-30 NOTE — PROGRESS NOTES
Transition of Care Plan:     RUR: 25% high risk for readmission. Pt is a readmit. Disposition: Home with sister, resumption of OP HD  Follow up appointments: PCP, Nephrology  OP HD: [facility] T/Th/Sat 6 AM chair time - will need to fax nephrology notes and flowsheets to clinic prior to d/c  DME needed: None anticipated  Transportation at Discharge: Pt anticipates his sister will transport him home  [place] or means to access home: Pt has access       IM Medicare Letter: N/A ; Medicaid coverage  Is patient a BCPI-A Bundle:   N/A                   If yes, was Bundle Letter given?:    Is patient a  and connected with the [facility]? N/A             If yes, was [place] transfer form completed and [facility] notified? Caregiver Contact: Pt's sister, Travis Maillard) [phone]  Discharge Caregiver contacted prior to discharge? Care Conference needed?:                   CM acknowledged case management consult. Per CM assessment note, pt uses Medicaid transport to get to and from OP HD. Pt also denied any issues with transportation and attendance issues. CM will continue to follow for discharge planning while patient is admitted on current unit. Please contact this CM with questions or issues related to discharge.      VANI Carter  Care Manager [facility]  [phone]

## 2022-03-31 ENCOUNTER — HOSPITAL ENCOUNTER (EMERGENCY)
Age: 57
Discharge: NURSING, REHAB OR LONG-TERM CARE | End: 2022-03-31
Attending: EMERGENCY MEDICINE
Payer: MEDICARE

## 2022-03-31 ENCOUNTER — APPOINTMENT (OUTPATIENT)
Dept: GENERAL RADIOLOGY | Age: 57
End: 2022-03-31
Attending: STUDENT IN AN ORGANIZED HEALTH CARE EDUCATION/TRAINING PROGRAM
Payer: MEDICARE

## 2022-03-31 VITALS
HEIGHT: 69 IN | OXYGEN SATURATION: 99 % | BODY MASS INDEX: 25.92 KG/M2 | SYSTOLIC BLOOD PRESSURE: 130 MMHG | TEMPERATURE: 98.8 F | DIASTOLIC BLOOD PRESSURE: 73 MMHG | HEART RATE: 86 BPM | WEIGHT: 175 LBS | RESPIRATION RATE: 13 BRPM

## 2022-03-31 DIAGNOSIS — F41.1 ANXIETY STATE: ICD-10-CM

## 2022-03-31 DIAGNOSIS — R06.02 SOB (SHORTNESS OF BREATH): Primary | ICD-10-CM

## 2022-03-31 DIAGNOSIS — R09.81 NASAL CONGESTION: ICD-10-CM

## 2022-03-31 LAB
ALBUMIN SERPL-MCNC: 3.1 G/DL (ref 3.5–5)
ALBUMIN/GLOB SERPL: 0.6 {RATIO} (ref 1.1–2.2)
ALP SERPL-CCNC: 64 U/L (ref 45–117)
ALT SERPL-CCNC: 12 U/L (ref 12–78)
ANION GAP SERPL CALC-SCNC: 9 MMOL/L (ref 5–15)
AST SERPL-CCNC: 13 U/L (ref 15–37)
ATRIAL RATE: 103 BPM
BASOPHILS # BLD: 0.1 K/UL (ref 0–0.1)
BASOPHILS NFR BLD: 1 % (ref 0–1)
BILIRUB SERPL-MCNC: 0.4 MG/DL (ref 0.2–1)
BNP SERPL-MCNC: 4728 PG/ML
BUN SERPL-MCNC: 22 MG/DL (ref 6–20)
BUN/CREAT SERPL: 4 (ref 12–20)
CALCIUM SERPL-MCNC: 9 MG/DL (ref 8.5–10.1)
CALCULATED P AXIS, ECG09: 65 DEGREES
CALCULATED R AXIS, ECG10: 44 DEGREES
CALCULATED T AXIS, ECG11: 146 DEGREES
CHLORIDE SERPL-SCNC: 100 MMOL/L (ref 97–108)
CO2 SERPL-SCNC: 25 MMOL/L (ref 21–32)
CREAT SERPL-MCNC: 6.21 MG/DL (ref 0.7–1.3)
DIAGNOSIS, 93000: NORMAL
DIFFERENTIAL METHOD BLD: ABNORMAL
EOSINOPHIL # BLD: 1 K/UL (ref 0–0.4)
EOSINOPHIL NFR BLD: 9 % (ref 0–7)
ERYTHROCYTE [DISTWIDTH] IN BLOOD BY AUTOMATED COUNT: 14 % (ref 11.5–14.5)
GLOBULIN SER CALC-MCNC: 4.8 G/DL (ref 2–4)
GLUCOSE SERPL-MCNC: 108 MG/DL (ref 65–100)
HCT VFR BLD AUTO: 27.3 % (ref 36.6–50.3)
HGB BLD-MCNC: 8.8 G/DL (ref 12.1–17)
IMM GRANULOCYTES # BLD AUTO: 0 K/UL (ref 0–0.04)
IMM GRANULOCYTES NFR BLD AUTO: 0 % (ref 0–0.5)
LYMPHOCYTES # BLD: 4.3 K/UL (ref 0.8–3.5)
LYMPHOCYTES NFR BLD: 38 % (ref 12–49)
MAGNESIUM SERPL-MCNC: 2.2 MG/DL (ref 1.6–2.4)
MCH RBC QN AUTO: 29 PG (ref 26–34)
MCHC RBC AUTO-ENTMCNC: 32.2 G/DL (ref 30–36.5)
MCV RBC AUTO: 90.1 FL (ref 80–99)
MONOCYTES # BLD: 1.4 K/UL (ref 0–1)
MONOCYTES NFR BLD: 13 % (ref 5–13)
NEUTS SEG # BLD: 4.4 K/UL (ref 1.8–8)
NEUTS SEG NFR BLD: 39 % (ref 32–75)
NRBC # BLD: 0 K/UL (ref 0–0.01)
NRBC BLD-RTO: 0 PER 100 WBC
P-R INTERVAL, ECG05: 150 MS
PLATELET # BLD AUTO: 315 K/UL (ref 150–400)
PMV BLD AUTO: 10.4 FL (ref 8.9–12.9)
POTASSIUM SERPL-SCNC: 3.7 MMOL/L (ref 3.5–5.1)
PROT SERPL-MCNC: 7.9 G/DL (ref 6.4–8.2)
Q-T INTERVAL, ECG07: 372 MS
QRS DURATION, ECG06: 96 MS
QTC CALCULATION (BEZET), ECG08: 487 MS
RBC # BLD AUTO: 3.03 M/UL (ref 4.1–5.7)
SODIUM SERPL-SCNC: 134 MMOL/L (ref 136–145)
TROPONIN-HIGH SENSITIVITY: 19 NG/L (ref 0–76)
VENTRICULAR RATE, ECG03: 103 BPM
WBC # BLD AUTO: 11.1 K/UL (ref 4.1–11.1)

## 2022-03-31 PROCEDURE — 80053 COMPREHEN METABOLIC PANEL: CPT

## 2022-03-31 PROCEDURE — 93005 ELECTROCARDIOGRAM TRACING: CPT

## 2022-03-31 PROCEDURE — 83880 ASSAY OF NATRIURETIC PEPTIDE: CPT

## 2022-03-31 PROCEDURE — 71045 X-RAY EXAM CHEST 1 VIEW: CPT

## 2022-03-31 PROCEDURE — 85025 COMPLETE CBC W/AUTO DIFF WBC: CPT

## 2022-03-31 PROCEDURE — 74011250637 HC RX REV CODE- 250/637: Performed by: EMERGENCY MEDICINE

## 2022-03-31 PROCEDURE — 96374 THER/PROPH/DIAG INJ IV PUSH: CPT

## 2022-03-31 PROCEDURE — 99285 EMERGENCY DEPT VISIT HI MDM: CPT

## 2022-03-31 PROCEDURE — 84484 ASSAY OF TROPONIN QUANT: CPT

## 2022-03-31 PROCEDURE — 36415 COLL VENOUS BLD VENIPUNCTURE: CPT

## 2022-03-31 PROCEDURE — 74011250636 HC RX REV CODE- 250/636: Performed by: STUDENT IN AN ORGANIZED HEALTH CARE EDUCATION/TRAINING PROGRAM

## 2022-03-31 PROCEDURE — 83735 ASSAY OF MAGNESIUM: CPT

## 2022-03-31 RX ORDER — LORAZEPAM 2 MG/ML
1 INJECTION INTRAMUSCULAR
Status: COMPLETED | OUTPATIENT
Start: 2022-03-31 | End: 2022-03-31

## 2022-03-31 RX ORDER — OXYMETAZOLINE HCL 0.05 %
2 SPRAY, NON-AEROSOL (ML) NASAL
Status: DISCONTINUED | OUTPATIENT
Start: 2022-03-31 | End: 2022-03-31 | Stop reason: HOSPADM

## 2022-03-31 RX ORDER — ACETAMINOPHEN AND CHLORPHENIRAMINE MALEATE 325; 2 MG/1; MG/1
1 TABLET, FILM COATED ORAL 2 TIMES DAILY
Qty: 12 TABLET | Refills: 0 | Status: ON HOLD
Start: 2022-03-31 | End: 2022-05-17

## 2022-03-31 RX ADMIN — OXYMETAZOLINE HCL 2 SPRAY: 0.05 SPRAY NASAL at 08:25

## 2022-03-31 RX ADMIN — LORAZEPAM 1 MG: 2 INJECTION INTRAMUSCULAR; INTRAVENOUS at 08:21

## 2022-03-31 NOTE — DISCHARGE INSTRUCTIONS
For your nasal congestion, please take the Afrin twice a day just for the next 3 days. Do not take it any more than that. Please start on Coricidin for high blood pressure to also help with the nasal congestion. Follow-up today with dialysis to get you back on your Tuesday Thursday Saturday schedule. Follow-up with a primary care doctor as needed.

## 2022-03-31 NOTE — ED PROVIDER NOTES
[facility]  EMERGENCY DEPARTMENT HISTORY AND PHYSICAL EXAM       Date of Service: 3/31/2022   Patient Name: Britt Cerda   YOB: 1965  Medical Record Number: [id number]    History of Presenting Illness     Chief Complaint   Patient presents with    Shortness of Breath     Pt reports SOB that began today. Pt denies any pain of any kind. History Provided By:  patient    Additional History:   Britt Cerda is a 64 y.o. male with PMhx significant for ESRD (TTS HD), heroin use disorder who presents via EMS to the ED with cc of shortness of breath that began this morning. He presented to dialysis and due to the shortness of breath, they did not dialyze him today. Patient per chart review has been recently admitted for hypertensive urgency with pulmonary edema and dialysis last session yesterday per EMS and patient report. Patient denies session being cut short or any abnormalities. Patient otherwise denying chest pain, nausea, vomiting, numbness, tingling, abdominal pain, diarrhea, leg swelling, palpitations. No known exacerbating or alleviating factors. Patient saturating 100% per EMS transport. Social Hx: Patient denies tobacco, denies EtOH, denies illicit Drugs    There are no other complaints, changes or physical findings at this time. Primary Care Provider: Brunilda Person NP     Past History     Past Medical History:   Past Medical History:   Diagnosis Date    Chronic kidney disease     Hypertension     Liver disease         Past Surgical History:   No past surgical history on file. Family History:   No family history on file.      Social History:   Social History     Tobacco Use    Smoking status: Current Some Day Smoker     Packs/day: 1.00     Last attempt to quit: 2020     Years since quittin.6    Smokeless tobacco: Never Used    Tobacco comment: started back smoking in last couple months   Vaping Use    Vaping Use: Never used   Substance Use Topics    Alcohol use: Not Currently    Drug use: Yes     Types: Marijuana, Cocaine, Heroin     Comment: Herion & Cocaine 10 years ago as of 3/23/22, Marijuana last used 1 week ago        Allergies: Allergies   Allergen Reactions    Baclofen Rash     All over         Review of Systems   Review of Systems   Constitutional: Negative for chills and fever. HENT: Negative for congestion. Eyes: Negative for visual disturbance. Respiratory: Positive for shortness of breath. Negative for cough. Cardiovascular: Negative for chest pain, palpitations and leg swelling. Gastrointestinal: Negative for abdominal pain. Genitourinary:        Endorses continues to produce urine   Musculoskeletal: Negative for back pain. Skin: Negative for rash. Neurological: Negative for numbness and headaches. Physical Exam  Physical Exam  Constitutional:       General: He is in acute distress (mild, secondary to shortness of breath). Appearance: He is not ill-appearing. HENT:      Head: Normocephalic. Mouth/Throat:      Mouth: Mucous membranes are moist.   Cardiovascular:      Rate and Rhythm: Regular rhythm. Tachycardia present. Heart sounds: No murmur heard. No friction rub. Pulmonary:      Effort: Pulmonary effort is normal. No tachypnea, accessory muscle usage or respiratory distress. Breath sounds: Normal breath sounds. No decreased breath sounds, wheezing, rhonchi or rales. Abdominal:      Palpations: Abdomen is soft. There is no mass. Tenderness: There is no abdominal tenderness. There is no guarding or rebound. Musculoskeletal:      Cervical back: Neck supple. Right lower leg: Edema (1+) present. Left lower leg: Edema (1+) present. Skin:     General: Skin is warm and dry. Neurological:      General: No focal deficit present. Mental Status: He is alert. Medical Decision Making   I am the first provider for this patient.      I reviewed the vital signs, available nursing notes, past medical history, past surgical history, family history and social history. Old Medical Records: Old medical records. Provider Notes:   DDX:  AMI, dysrhythmia, electrolyte abnormality, pneumonia, pulmonary embolism, pulmonary edema, volume overload    Plan:  Obtaining EKG, troponin, proBNP, CMP, CBC, VBG and chest x-ray. Patient with ejection fraction of 60 to 65% on last echocardiogram on 1/16/2022 otherwise significant for LVH with normal diastolic function. Per patient report patient with complete session of dialysis 1 day ago following recent presentation on 329 for hypertensive emergency with acute pulmonary edema. On exam today, patient clear to auscultation bilaterally with mild tachycardia but no other abnormalities. Patient speaking in complete sentences but appears in mild acute distress secondary to shortness of breath. No crackles auscultated on examination and patient saturating 100% on room air. Disposition pending clinical evaluation. initial heart score of 3 for age, lateral T wave inversions to V5 and V6 and risk factors. Patient with tachycardia concerning for pulmonary embolism however is in mild distress. Does appear anxious. Will dose Ativan and reassess for vital sign abnormality suggestive of embolism with plan to further assess pending no significant change in sinus tachycardia. Impression:  Nasal congestion    ED Course:  7:38 AM   I reviewed our electronic medical record system for any past medical records that were available that may contribute to the patients current condition, the nursing notes and and vital signs from today's visit    Nursing notes will be reviewed as they become available in realtime while the pt has been in the ED. I personally reviewed pt's imaging. Official read by radiology listed below.       PROGRESS NOTE:    ED Course as of 03/31/22 1714   Thu Mar 31, 2022   0813 Residents patient seen by me. Patient presenting for shortness of breath in the setting of history of dialysis. No leg swelling, chest pain, cough. Patient here is pacing the room and feeling short of breath but his saturations remain 96% or higher. Was placed on oxygen more for comfort by EMS. His chest x-ray has already come back negative and clear initial lung exam was also clear. Plan will be to see what his labs look like, get EKG. Have a high suspicion that he might be having a panic attack. Does have tachycardia on the monitor as well as on exam.  Sinus tachycardia. [JS]   [id number] Following Ativan dosing, patient's heart rate improved to 84 and patient is resting comfortably and hemodynamically stable at this time. Saturating 99% on room air. [CB]   0100 Spoke with Dr. Leticia Jeter, nephrology who will check with the dialysis center to see if patient can come in today to get him back on a Tuesday, Thursday and Saturday schedule. Patient is feeling much better and his heart rate is back to normal at 84, relaxed. [JS]   [id number] Patient will follow up with dialysis after discharge today. He will go home with Afrin as well as Coricidin for high blood pressure [JS]      ED Course User Index  [CB] Gabriel Cid MD  [JS] Devonte Morley MD       Progress note:  Pt noted to be feeling better, ready for discharge. Discussed lab and imaging findings with pt. Pt will follow up as instructed. All questions have been answered, pt voiced understanding and agreement with plan. Arranged with his nephrologist for HD plan directly after discharge with EMS transport. Specific return precautions provided in addition to instructions for pt to return to the ED immediately should sx worsen at any time.       Diagnostic Study Results     Labs -      Recent Results (from the past 12 hour(s))   EKG, 12 LEAD, INITIAL    Collection Time: 03/31/22  7:27 AM   Result Value Ref Range    Ventricular Rate 103 BPM    Atrial Rate 103 BPM    P-R Interval 150 ms    QRS Duration 96 ms    Q-T Interval 372 ms    QTC Calculation (Bezet) 487 ms    Calculated P Axis 65 degrees    Calculated R Axis 44 degrees    Calculated T Axis 146 degrees    Diagnosis       Sinus tachycardia  Possible Left atrial enlargement  Left ventricular hypertrophy  T wave abnormality, consider inferolateral ischemia  When compared with ECG of 29-MAR-2022 11:30,  T wave inversion now evident in Inferior leads  T wave inversion more evident in Lateral leads         Radiologic Studies -  The following have been ordered and reviewed:  XR CHEST PORT    (Results Pending)     CT Results  (Last 48 hours)    None        CXR Results  (Last 48 hours)               03/29/22 1150  XR CHEST PORT Final result    Impression:  Minimal pulmonary edema. Narrative: Indication: Missed dialysis, hypertensive emergency       Comparison: 3/22/2022       Portable exam of the chest obtained at 1150 demonstrates normal heart size. Minimal pulmonary edema is noted. The osseous structures are unremarkable. Central venous catheter is unchanged compared to the prior exam.                 Vital Signs-Reviewed the patient's vital signs. Patient Vitals for the past 12 hrs:   Temp Pulse Resp BP SpO2   03/31/22 0731 -- -- -- -- 99 %   03/31/22 0726 98.8 °F (37.1 °C) (!) 107 28 (!) 144/70 99 %       Medications Given in the ED:  Medications - No data to display      Diagnosis   Clinical Impression:   No diagnosis found.      Plan:  1: shortness of breath, nasal congestion  Follow-up Information    None       2: Afrin, Coricidin  Current Discharge Medication List        Return to ED if Worse    Disposition Note:  Discharge    Lynnwood Severs, MD  PGY-2 Emergency Medicine

## 2022-04-02 ENCOUNTER — HOSPITAL ENCOUNTER (EMERGENCY)
Age: 57
Discharge: HOME | End: 2022-04-02
Attending: EMERGENCY MEDICINE
Payer: MEDICARE

## 2022-04-02 VITALS
HEIGHT: 69 IN | BODY MASS INDEX: 25.92 KG/M2 | RESPIRATION RATE: 18 BRPM | HEART RATE: 90 BPM | SYSTOLIC BLOOD PRESSURE: 162 MMHG | DIASTOLIC BLOOD PRESSURE: 91 MMHG | TEMPERATURE: 98 F | OXYGEN SATURATION: 100 % | WEIGHT: 175 LBS

## 2022-04-02 DIAGNOSIS — R06.00 DYSPNEA, UNSPECIFIED TYPE: Primary | ICD-10-CM

## 2022-04-02 PROCEDURE — 99283 EMERGENCY DEPT VISIT LOW MDM: CPT

## 2022-04-02 NOTE — ED PROVIDER NOTES
EMERGENCY DEPARTMENT HISTORY AND PHYSICAL EXAM      Date: 2022  Patient Name: Homero Chavez    History of Presenting Illness     No chief complaint on file. HPI: Homero Chavez, 64 y.o. male with a history of end-stage renal disease on dialysis, reports last dialysis was Thursday, presenting from dialysis center with shortness of breath. Per EMS, patient was screaming at staff at the dialysis center and was extremely rude to that staff and EMS. For me, patient states he has been short of breath for 3 weeks. He repeats over and over again I cannot breathe but will not answer my questions. He is awake and alert with no signs of distress, breathing very comfortably, has 100% oxygen on room air. There are no other complaints, changes, or physical findings at this time. PCP: Panda Livingston NP    No current facility-administered medications on file prior to encounter. Current Outpatient Medications on File Prior to Encounter   Medication Sig Dispense Refill    chlorpheniramine-acetaminophen (Coricidin HBP Cold and Flu) 2-325 mg tab Take 1 Tablet by mouth two (2) times a day. 12 Tablet 0    fluticasone propionate (FLONASE) 50 mcg/actuation nasal spray 2 Sprays by Both Nostrils route daily.  losartan (COZAAR) 100 mg tablet Take 100 mg by mouth daily.  pantoprazole (Protonix) 40 mg tablet Take 1 Tablet by mouth two (2) times a day. Indications: an ulcer of the duodenum 60 Tablet 0    [] sucralfate (CARAFATE) 1 gram tablet Take 1 Tablet by mouth Before breakfast, lunch, dinner and at bedtime for 7 days. 28 Tablet 0    carvediloL (COREG) 25 mg tablet Take 1 Tablet by mouth two (2) times daily (with meals) for 90 days. 60 Tablet 2    bumetanide (BUMEX) 2 mg tablet Take 1 Tablet by mouth daily for 90 days. 30 Tablet 2    doxazosin (CARDURA) 4 mg tablet Take 1 Tablet by mouth daily for 90 days.  30 Tablet 2    NIFEdipine ER (PROCARDIA XL) 60 mg ER tablet Take 1 Tablet by mouth two (2) times a day for 90 days. 60 Tablet 2    cloNIDine HCL (CATAPRES) 0.2 mg tablet Take 0.2 mg by mouth three (3) times daily.  hydrALAZINE (APRESOLINE) 100 mg tablet Take 100 mg by mouth three (3) times daily.  ascorbic acid, vitamin C, (Vitamin C) 500 mg tablet Take 500 mg by mouth daily.  cholecalciferol (VITAMIN D3) 25 mcg (1,000 unit) cap Take 1,000 Units by mouth daily.  naloxone (Narcan) 4 mg/actuation nasal spray 1 [place] by IntraNASal route once as needed for Overdose. Use 1 spray intranasally, then discard. Repeat with new spray every 2 min as needed for opioid overdose symptoms, alternating nostrils. (Patient not taking: Reported on 3/29/2022)      buprenorphine-naloxone (Suboxone) 8-2 mg film sublingaul film 1 Film by SubLINGual route two (2) times a day. Past History     Past Medical History:  Past Medical History:   Diagnosis Date    Chronic kidney disease     Hypertension     Liver disease        Past Surgical History:  No past surgical history on file. Family History:  No family history on file. Social History:  Social History     Tobacco Use    Smoking status: Current Some Day Smoker     Packs/day: 1.00     Last attempt to quit: 2020     Years since quittin.6    Smokeless tobacco: Never Used    Tobacco comment: started back smoking in last couple months   Vaping Use    Vaping Use: Never used   Substance Use Topics    Alcohol use: Not Currently    Drug use: Yes     Types: Marijuana, Cocaine, Heroin     Comment: Herion & Cocaine 10 years ago as of 3/23/22, Marijuana last used 1 week ago       Allergies: Allergies   Allergen Reactions    Baclofen Rash     All over         Review of Systems   Review of Systems   Unable to perform ROS: Other       Physical Exam   Physical Exam  Vitals and nursing note reviewed. Constitutional:       Appearance: Normal appearance. HENT:      Head: Normocephalic and atraumatic.       Mouth/Throat: Mouth: Mucous membranes are moist.   Cardiovascular:      Rate and Rhythm: Normal rate and regular rhythm. Pulmonary:      Effort: Pulmonary effort is normal.      Breath sounds: Normal breath sounds. Comments: Good air movement with completely clear lungs  Abdominal:      Palpations: Abdomen is soft. Tenderness: There is no abdominal tenderness. Musculoskeletal:         General: No deformity. Cervical back: Neck supple. Skin:     General: Skin is warm and dry. Neurological:      General: No focal deficit present. Mental Status: He is alert. Psychiatric:         Mood and Affect: Mood normal.         Behavior: Behavior normal.         Diagnostic Study Results     Labs -   No results found for this or any previous visit (from the past 24 hour(s)). Radiologic Studies -   No orders to display     CT Results  (Last 48 hours)    None        CXR Results  (Last 48 hours)               03/31/22 0756  XR CHEST PORT Final result    Impression:  No change no acute findings. Narrative:  Clinical indication: Shortness of breath. Portable AP upright view of the chest obtained, comparison March 29, 2022. The   tip of the double lumen catheter is in the right atrium. The heart size is   normal. No acute infiltrate. Medical Decision Making   I am the first provider for this patient. I reviewed the vital signs, available nursing notes, past medical history, past surgical history, family history and social history. Vital Signs-Reviewed the patient's vital signs. No data found. Provider Notes (Medical Decision Making):   Patient refused to answer my questions on initial evaluation, stating only \"I can't breathe\". He would answer no clarifying questions re: HPI in setting of \"3 weeks\" of shortness of breath. He was rude to staff and EMS personnel throughout.  On my initial evaluation, pt was breathing very comfortably w/ good air movement throughout his lungs and 100% O2 saturation on RA. I advised him that he needed to answer my questions so I could fully understand what brought him to ED but he still refused. I informed him that I would not do further workup unless he could tell me what brought him to ED but he again refused. I was called back to patient's room after charge nurse spoke with him. He informed her that he would now answer my questions. When I returned, he clarified that he has nasal congestion and that is why he can't breathe. He requests \"a shot like they gave me last time\" because \"it cleared it right up\". Old records reviewed and indicate pt was given ativan. He was discharged with afrin for nasal congestion but has not tried it. I informed him that would likely help him with his nasal congestion. Ultimately, patient was evasive and obstructive throughout my evaluation of him. He is noted to be mildly hypertensive but he has no signs of fluid overload, normal oxygen saturation, comfortable respirations with clear lungs, and no distress at all. I reviewed his labs from recent visit and they were essentially within normal limits. Patient did not provide any information suggesting that he needs further work-up at this time despite multiple attempts by me and other staff members to get a clear history of his presenting illness. He was informed that he should follow-up with his nephrologist for dialysis and his primary care physician for further evaluation. He comfortably ambulated out of the emergency department at discharge. ED Course:     Initial assessment performed. The patients presenting problems have been discussed, and they are in agreement with the care plan formulated and outlined with them. I have encouraged them to ask questions as they arise throughout their visit. Disposition:  dc    PLAN:  1. Current Discharge Medication List        2.    Follow-up Information    None       Return to ED if worse     Diagnosis     Clinical Impression: nasal congestion

## 2022-04-02 NOTE — DISCHARGE INSTRUCTIONS
Follow up with your dialysis center for dialysis. Follow up with your physician for further evaluation in 1-2 days.     Return to ED with worsening of condition or new concerning symptoms

## 2022-04-02 NOTE — ED NOTES
Dr. Lanette Motta at bedside to review DC plan. DC papers reviewed and in hand, pt verbalized understanding. Ambulatory out of ER without assist, no acute distress noted.

## 2022-04-02 NOTE — ED TRIAGE NOTES
Pt arrives by EMS with cc of inability to breathe. Pt ambulatory to stretcher without assist, steady gait. Repeatedly stating \"I can't breathe\", refusing to answer questions regarding history, chief complaint. Endorses seasonal allergies. Verbally aggressive towards staff with repeated questioning, yelling \"What kind of bullshit is this, I can't breathe and y'all aren't doing anything for me. I want that shot from last time. \"    Lungs clear to auscultation, vitals stable, normoxic on room air. Dr. Dieter Simms at bedside to assess. Pt continues to refuse to answer questions. Refusing registration.

## 2022-05-13 NOTE — PROGRESS NOTES
Hospitalist Progress Note    NAME: Mindy Opitz   :  1965   MRN:  [id number]       Assessment / Plan:  ASSESSMENT AND PLAN:  The patient is a 59-year-old admitted with hypertensive urgency. 1.  Hypertensive urgency. BP stable ECHO pending, cardiology following   2. Cocaine abuse. The patient counseled regarding the complication and side effects of cocaine including a stroke, heart attack, and congestive heart failure. 3.  Deep venous thrombosis prophylaxis, heparin. 4.  Code status, full code. 5.  Baseline, the patient independent. Subjective:     Chief Complaint / Reason for Physician Visit  \" feeling better today \". Discussed with RN events overnight. Review of Systems:  Symptom Y/N Comments  Symptom Y/N Comments   Fever/Chills    Chest Pain     Poor Appetite    Edema     Cough    Abdominal Pain     Sputum    Joint Pain     SOB/KING    Pruritis/Rash     Nausea/vomit    Tolerating PT/OT     Diarrhea    Tolerating Diet     Constipation    Other       Could NOT obtain due to:      Objective:     VITALS:   Last 24hrs VS reviewed since prior progress note.  Most recent are:  Patient Vitals for the past 24 hrs:   Temp Pulse Resp BP SpO2   19 1137 97.9 °F (36.6 °C) 64 18 128/66 99 %   19 0817 97.8 °F (36.6 °C) 73 18 148/83 93 %   19 0331 97.9 °F (36.6 °C) 84 16 149/82 98 %   19 0035 -- 89 -- 164/80 --   19 0020 98.1 °F (36.7 °C) 84 18 (!) 181/97 99 %   05/10/19 2011 98.6 °F (37 °C) 81 20 (!) 194/98 99 %   05/10/19 1800 -- 96 23 180/79 97 %   05/10/19 1744 -- 71 20 179/87 100 %   05/10/19 1740 -- 68 18 179/87 --   05/10/19 1736 -- 71 18 191/89 99 %   05/10/19 1732 -- 75 -- 191/89 98 %   05/10/19 1700 -- 78 16 (!) 179/94 100 %   05/10/19 1655 -- 83 21 -- 100 %   05/10/19 1601 -- -- -- 200/89 --   05/10/19 1600 -- -- -- 200/88 --   05/10/19 1530 -- 72 17 (!) 189/101 98 %   05/10/19 1500 -- 75 20 172/87 98 %   05/10/19 1449 -- 77 21 176/89 98 %   05/10/19 1345 -- 77 21 (!) 190/98 98 %   05/10/19 1315 -- 72 22 (!) 151/96 98 %   05/10/19 1300 -- 75 17 (!) 194/98 97 %       Intake/Output Summary (Last 24 hours) at 5/11/2019 1249  Last data filed at 5/11/2019 0656  Gross per 24 hour   Intake 360 ml   Output 750 ml   Net -390 ml        PHYSICAL EXAM:  General: WD, WN. Alert, cooperative, no acute distress    EENT:  EOMI. Anicteric sclerae. MMM  Resp:  CTA bilaterally, no wheezing or rales. No accessory muscle use  CV:  Regular  rhythm,  No edema  GI:  Soft, Non distended, Non tender.  +Bowel sounds  Neurologic:  Alert and oriented X 3, normal speech,   Psych:   Good insight. Not anxious nor agitated  Skin:  No rashes. No jaundice    Reviewed most current lab test results and cultures  YES  Reviewed most current radiology test results   YES  Review and summation of old records today    NO  Reviewed patient's current orders and MAR    YES  PMH/SH reviewed - no change compared to H&P  ________________________________________________________________________  Care Plan discussed with:    Comments   Patient     Family      RN     Care Manager     Consultant                        Multidiciplinary team rounds were held today with , nursing, pharmacist and clinical coordinator. Patient's plan of care was discussed; medications were reviewed and discharge planning was addressed. ________________________________________________________________________  Total NON critical care TIME:  35   Minutes    Total CRITICAL CARE TIME Spent:   Minutes non procedure based      Comments   >50% of visit spent in counseling and coordination of care     ________________________________________________________________________  Elizabeth Darling MD     Procedures: see electronic medical records for all procedures/Xrays and details which were not copied into this note but were reviewed prior to creation of Plan.       LABS:  I reviewed today's most current labs and imaging studies.   Pertinent labs include:  Recent Labs     05/10/19  1045   WBC 7.3   HGB 14.6   HCT 45.7   *     Recent Labs     05/11/19  0340 05/10/19  1045    141   K 3.5 3.9    106   CO2 25 28   GLU 98 98   BUN 18 14   CREA 1.24 1.30   CA 8.2* 8.4*   ALB 1.9* 2.2*   TBILI 0.4 0.3   SGOT 83* 105*   ALT 88* 104*       Signed: Marco Sanders MD no

## 2022-05-17 ENCOUNTER — APPOINTMENT (OUTPATIENT)
Dept: CT IMAGING | Age: 57
DRG: 391 | End: 2022-05-17
Attending: PHYSICIAN ASSISTANT
Payer: MEDICARE

## 2022-05-17 ENCOUNTER — APPOINTMENT (OUTPATIENT)
Dept: GENERAL RADIOLOGY | Age: 57
DRG: 391 | End: 2022-05-17
Attending: EMERGENCY MEDICINE
Payer: MEDICARE

## 2022-05-17 ENCOUNTER — APPOINTMENT (OUTPATIENT)
Dept: CT IMAGING | Age: 57
DRG: 391 | End: 2022-05-17
Attending: EMERGENCY MEDICINE
Payer: MEDICARE

## 2022-05-17 ENCOUNTER — HOSPITAL ENCOUNTER (OUTPATIENT)
Age: 57
Setting detail: OBSERVATION
Discharge: HOME | DRG: 391 | End: 2022-05-18
Attending: EMERGENCY MEDICINE | Admitting: HOSPITALIST
Payer: MEDICARE

## 2022-05-17 DIAGNOSIS — M89.9 BONE LESION: ICD-10-CM

## 2022-05-17 DIAGNOSIS — I16.1 HYPERTENSIVE EMERGENCY: ICD-10-CM

## 2022-05-17 DIAGNOSIS — K52.9 ENTERITIS: Primary | ICD-10-CM

## 2022-05-17 DIAGNOSIS — C79.51 BONE METASTASES (HCC): ICD-10-CM

## 2022-05-17 DIAGNOSIS — N18.6 END STAGE RENAL DISEASE ON DIALYSIS (HCC): ICD-10-CM

## 2022-05-17 DIAGNOSIS — Z99.2 END STAGE RENAL DISEASE ON DIALYSIS (HCC): ICD-10-CM

## 2022-05-17 PROBLEM — I10 MALIGNANT HYPERTENSION: Status: ACTIVE | Noted: 2022-05-17

## 2022-05-17 LAB
ALBUMIN SERPL-MCNC: 2.7 G/DL (ref 3.5–5)
ALBUMIN/GLOB SERPL: 0.7 {RATIO} (ref 1.1–2.2)
ALP SERPL-CCNC: 50 U/L (ref 45–117)
ALT SERPL-CCNC: 9 U/L (ref 12–78)
ANION GAP SERPL CALC-SCNC: 10 MMOL/L (ref 5–15)
AST SERPL-CCNC: 12 U/L (ref 15–37)
ATRIAL RATE: 78 BPM
BASOPHILS # BLD: 0.1 K/UL (ref 0–0.1)
BASOPHILS NFR BLD: 1 % (ref 0–1)
BILIRUB SERPL-MCNC: 0.5 MG/DL (ref 0.2–1)
BUN SERPL-MCNC: 67 MG/DL (ref 6–20)
BUN/CREAT SERPL: 6 (ref 12–20)
CALCIUM SERPL-MCNC: 9.6 MG/DL (ref 8.5–10.1)
CALCULATED P AXIS, ECG09: 70 DEGREES
CALCULATED R AXIS, ECG10: 52 DEGREES
CALCULATED T AXIS, ECG11: 88 DEGREES
CHLORIDE SERPL-SCNC: 98 MMOL/L (ref 97–108)
CO2 SERPL-SCNC: 26 MMOL/L (ref 21–32)
COVID-19 RAPID TEST, COVR: NOT DETECTED
CREAT SERPL-MCNC: 11.9 MG/DL (ref 0.7–1.3)
DIAGNOSIS, 93000: NORMAL
DIFFERENTIAL METHOD BLD: ABNORMAL
EOSINOPHIL # BLD: 0.9 K/UL (ref 0–0.4)
EOSINOPHIL NFR BLD: 8 % (ref 0–7)
ERYTHROCYTE [DISTWIDTH] IN BLOOD BY AUTOMATED COUNT: 17.4 % (ref 11.5–14.5)
GLOBULIN SER CALC-MCNC: 3.8 G/DL (ref 2–4)
GLUCOSE SERPL-MCNC: 92 MG/DL (ref 65–100)
HCT VFR BLD AUTO: 31.7 % (ref 36.6–50.3)
HGB BLD-MCNC: 9.5 G/DL (ref 12.1–17)
IMM GRANULOCYTES # BLD AUTO: 0.1 K/UL (ref 0–0.04)
IMM GRANULOCYTES NFR BLD AUTO: 1 % (ref 0–0.5)
LIPASE SERPL-CCNC: 271 U/L (ref 73–393)
LYMPHOCYTES # BLD: 2.2 K/UL (ref 0.8–3.5)
LYMPHOCYTES NFR BLD: 17 % (ref 12–49)
MCH RBC QN AUTO: 24.5 PG (ref 26–34)
MCHC RBC AUTO-ENTMCNC: 30 G/DL (ref 30–36.5)
MCV RBC AUTO: 81.7 FL (ref 80–99)
MONOCYTES # BLD: 1.1 K/UL (ref 0–1)
MONOCYTES NFR BLD: 9 % (ref 5–13)
NEUTS SEG # BLD: 7.9 K/UL (ref 1.8–8)
NEUTS SEG NFR BLD: 64 % (ref 32–75)
NRBC # BLD: 0.03 K/UL (ref 0–0.01)
NRBC BLD-RTO: 0.2 PER 100 WBC
P-R INTERVAL, ECG05: 162 MS
PLATELET # BLD AUTO: 281 K/UL (ref 150–400)
PMV BLD AUTO: 10.5 FL (ref 8.9–12.9)
POTASSIUM SERPL-SCNC: 5.3 MMOL/L (ref 3.5–5.1)
PROT SERPL-MCNC: 6.5 G/DL (ref 6.4–8.2)
Q-T INTERVAL, ECG07: 398 MS
QRS DURATION, ECG06: 90 MS
QTC CALCULATION (BEZET), ECG08: 453 MS
RBC # BLD AUTO: 3.88 M/UL (ref 4.1–5.7)
SODIUM SERPL-SCNC: 134 MMOL/L (ref 136–145)
SOURCE, COVRS: NORMAL
VENTRICULAR RATE, ECG03: 78 BPM
WBC # BLD AUTO: 12.3 K/UL (ref 4.1–11.1)

## 2022-05-17 PROCEDURE — G0378 HOSPITAL OBSERVATION PER HR: HCPCS

## 2022-05-17 PROCEDURE — 94762 N-INVAS EAR/PLS OXIMTRY CONT: CPT

## 2022-05-17 PROCEDURE — 65270000046 HC RM TELEMETRY

## 2022-05-17 PROCEDURE — 74174 CTA ABD&PLVS W/CONTRAST: CPT

## 2022-05-17 PROCEDURE — 74011250636 HC RX REV CODE- 250/636: Performed by: INTERNAL MEDICINE

## 2022-05-17 PROCEDURE — 71045 X-RAY EXAM CHEST 1 VIEW: CPT

## 2022-05-17 PROCEDURE — 74011000636 HC RX REV CODE- 636: Performed by: HOSPITALIST

## 2022-05-17 PROCEDURE — 83690 ASSAY OF LIPASE: CPT

## 2022-05-17 PROCEDURE — 90935 HEMODIALYSIS ONE EVALUATION: CPT

## 2022-05-17 PROCEDURE — G0257 UNSCHED DIALYSIS ESRD PT HOS: HCPCS

## 2022-05-17 PROCEDURE — 74011636637 HC RX REV CODE- 636/637: Performed by: HOSPITALIST

## 2022-05-17 PROCEDURE — 74176 CT ABD & PELVIS W/O CONTRAST: CPT

## 2022-05-17 PROCEDURE — 99285 EMERGENCY DEPT VISIT HI MDM: CPT

## 2022-05-17 PROCEDURE — 96375 TX/PRO/DX INJ NEW DRUG ADDON: CPT

## 2022-05-17 PROCEDURE — 80053 COMPREHEN METABOLIC PANEL: CPT

## 2022-05-17 PROCEDURE — 74011250637 HC RX REV CODE- 250/637: Performed by: EMERGENCY MEDICINE

## 2022-05-17 PROCEDURE — 74011000250 HC RX REV CODE- 250: Performed by: EMERGENCY MEDICINE

## 2022-05-17 PROCEDURE — 96374 THER/PROPH/DIAG INJ IV PUSH: CPT

## 2022-05-17 PROCEDURE — 74011250637 HC RX REV CODE- 250/637: Performed by: HOSPITALIST

## 2022-05-17 PROCEDURE — 85025 COMPLETE CBC W/AUTO DIFF WBC: CPT

## 2022-05-17 PROCEDURE — 87635 SARS-COV-2 COVID-19 AMP PRB: CPT

## 2022-05-17 PROCEDURE — 93005 ELECTROCARDIOGRAM TRACING: CPT

## 2022-05-17 PROCEDURE — 36415 COLL VENOUS BLD VENIPUNCTURE: CPT

## 2022-05-17 PROCEDURE — 74011250636 HC RX REV CODE- 250/636: Performed by: EMERGENCY MEDICINE

## 2022-05-17 RX ORDER — ACETAMINOPHEN 325 MG/1
650 TABLET ORAL
Status: DISCONTINUED | OUTPATIENT
Start: 2022-05-17 | End: 2022-05-18 | Stop reason: HOSPADM

## 2022-05-17 RX ORDER — DROPERIDOL 2.5 MG/ML
0.62 INJECTION, SOLUTION INTRAMUSCULAR; INTRAVENOUS ONCE
Status: COMPLETED | OUTPATIENT
Start: 2022-05-17 | End: 2022-05-17

## 2022-05-17 RX ORDER — BUMETANIDE 2 MG/1
2 TABLET ORAL 2 TIMES DAILY
COMMUNITY
End: 2022-10-03

## 2022-05-17 RX ORDER — NIFEDIPINE 60 MG/1
60 TABLET, EXTENDED RELEASE ORAL 2 TIMES DAILY
Status: DISCONTINUED | OUTPATIENT
Start: 2022-05-17 | End: 2022-05-18 | Stop reason: HOSPADM

## 2022-05-17 RX ORDER — ONDANSETRON 2 MG/ML
4 INJECTION INTRAMUSCULAR; INTRAVENOUS
Status: DISCONTINUED | OUTPATIENT
Start: 2022-05-17 | End: 2022-05-17

## 2022-05-17 RX ORDER — PANTOPRAZOLE SODIUM 40 MG/1
40 TABLET, DELAYED RELEASE ORAL 2 TIMES DAILY
Status: DISCONTINUED | OUTPATIENT
Start: 2022-05-17 | End: 2022-05-18 | Stop reason: HOSPADM

## 2022-05-17 RX ORDER — CARVEDILOL 25 MG/1
25 TABLET ORAL 2 TIMES DAILY WITH MEALS
Status: ON HOLD | COMMUNITY
End: 2022-09-28 | Stop reason: SDUPTHER

## 2022-05-17 RX ORDER — POLYETHYLENE GLYCOL 3350 17 G/17G
17 POWDER, FOR SOLUTION ORAL DAILY PRN
Status: DISCONTINUED | OUTPATIENT
Start: 2022-05-17 | End: 2022-05-18 | Stop reason: HOSPADM

## 2022-05-17 RX ORDER — ONDANSETRON 4 MG/1
4 TABLET, ORALLY DISINTEGRATING ORAL
Status: DISCONTINUED | OUTPATIENT
Start: 2022-05-17 | End: 2022-05-18 | Stop reason: HOSPADM

## 2022-05-17 RX ORDER — BUPRENORPHINE AND NALOXONE 8; 2 MG/1; MG/1
1 FILM, SOLUBLE BUCCAL; SUBLINGUAL 2 TIMES DAILY
Status: DISCONTINUED | OUTPATIENT
Start: 2022-05-17 | End: 2022-05-18 | Stop reason: HOSPADM

## 2022-05-17 RX ORDER — HYDRALAZINE HYDROCHLORIDE 50 MG/1
100 TABLET, FILM COATED ORAL 3 TIMES DAILY
Status: DISCONTINUED | OUTPATIENT
Start: 2022-05-17 | End: 2022-05-18 | Stop reason: HOSPADM

## 2022-05-17 RX ORDER — SODIUM CHLORIDE 0.9 % (FLUSH) 0.9 %
5-40 SYRINGE (ML) INJECTION EVERY 8 HOURS
Status: DISCONTINUED | OUTPATIENT
Start: 2022-05-18 | End: 2022-05-18 | Stop reason: HOSPADM

## 2022-05-17 RX ORDER — NIFEDIPINE 30 MG/1
60 TABLET, FILM COATED, EXTENDED RELEASE ORAL 2 TIMES DAILY
Status: DISCONTINUED | OUTPATIENT
Start: 2022-05-17 | End: 2022-05-17 | Stop reason: SDUPTHER

## 2022-05-17 RX ORDER — SODIUM CHLORIDE 0.9 % (FLUSH) 0.9 %
5-40 SYRINGE (ML) INJECTION AS NEEDED
Status: DISCONTINUED | OUTPATIENT
Start: 2022-05-17 | End: 2022-05-18 | Stop reason: HOSPADM

## 2022-05-17 RX ORDER — BUMETANIDE 1 MG/1
2 TABLET ORAL DAILY
Status: DISCONTINUED | OUTPATIENT
Start: 2022-05-17 | End: 2022-05-18 | Stop reason: HOSPADM

## 2022-05-17 RX ORDER — CARVEDILOL 12.5 MG/1
25 TABLET ORAL 2 TIMES DAILY WITH MEALS
Status: DISCONTINUED | OUTPATIENT
Start: 2022-05-18 | End: 2022-05-18 | Stop reason: HOSPADM

## 2022-05-17 RX ORDER — CALCIUM CARBONATE 200(500)MG
600 TABLET,CHEWABLE ORAL
Status: DISCONTINUED | OUTPATIENT
Start: 2022-05-18 | End: 2022-05-18 | Stop reason: HOSPADM

## 2022-05-17 RX ORDER — ACETAMINOPHEN 325 MG/1
650 TABLET ORAL
Status: DISCONTINUED | OUTPATIENT
Start: 2022-05-17 | End: 2022-05-17

## 2022-05-17 RX ORDER — ONDANSETRON 2 MG/ML
4 INJECTION INTRAMUSCULAR; INTRAVENOUS
Status: DISCONTINUED | OUTPATIENT
Start: 2022-05-17 | End: 2022-05-18 | Stop reason: HOSPADM

## 2022-05-17 RX ORDER — LABETALOL HYDROCHLORIDE 5 MG/ML
20 INJECTION, SOLUTION INTRAVENOUS ONCE
Status: COMPLETED | OUTPATIENT
Start: 2022-05-17 | End: 2022-05-17

## 2022-05-17 RX ORDER — HEPARIN SODIUM 5000 [USP'U]/ML
5000 INJECTION, SOLUTION INTRAVENOUS; SUBCUTANEOUS EVERY 8 HOURS
Status: DISCONTINUED | OUTPATIENT
Start: 2022-05-18 | End: 2022-05-18 | Stop reason: HOSPADM

## 2022-05-17 RX ORDER — ACETAMINOPHEN 650 MG/1
650 SUPPOSITORY RECTAL
Status: DISCONTINUED | OUTPATIENT
Start: 2022-05-17 | End: 2022-05-18 | Stop reason: HOSPADM

## 2022-05-17 RX ORDER — NIFEDIPINE 60 MG/1
60 TABLET, EXTENDED RELEASE ORAL 2 TIMES DAILY
COMMUNITY
End: 2022-10-03

## 2022-05-17 RX ORDER — CALCIUM CARBONATE 200(500)MG
3 TABLET,CHEWABLE ORAL
COMMUNITY

## 2022-05-17 RX ADMIN — HEPARIN SODIUM 2000 UNITS: 1000 INJECTION INTRAVENOUS; SUBCUTANEOUS at 15:45

## 2022-05-17 RX ADMIN — HEPARIN SODIUM 2100 UNITS: 1000 INJECTION INTRAVENOUS; SUBCUTANEOUS at 15:45

## 2022-05-17 RX ADMIN — HYDRALAZINE HYDROCHLORIDE 100 MG: 50 TABLET, FILM COATED ORAL at 20:13

## 2022-05-17 RX ADMIN — PANTOPRAZOLE SODIUM 40 MG: 40 TABLET, DELAYED RELEASE ORAL at 20:13

## 2022-05-17 RX ADMIN — NIFEDIPINE 60 MG: 60 TABLET, EXTENDED RELEASE ORAL at 20:13

## 2022-05-17 RX ADMIN — DROPERIDOL 0.62 MG: 2.5 INJECTION, SOLUTION INTRAMUSCULAR; INTRAVENOUS at 09:31

## 2022-05-17 RX ADMIN — LABETALOL HYDROCHLORIDE 20 MG: 5 INJECTION INTRAVENOUS at 09:31

## 2022-05-17 RX ADMIN — IOPAMIDOL 100 ML: 755 INJECTION, SOLUTION INTRAVENOUS at 17:54

## 2022-05-17 RX ADMIN — ALUMINUM HYDROXIDE AND MAGNESIUM HYDROXIDE 40 ML: 200; 200 SUSPENSION ORAL at 09:21

## 2022-05-17 RX ADMIN — BUMETANIDE 2 MG: 1 TABLET ORAL at 20:13

## 2022-05-17 RX ADMIN — FAMOTIDINE 20 MG: 10 INJECTION INTRAVENOUS at 09:27

## 2022-05-17 NOTE — PROCEDURES
Hemodialysis / [phone]    Vitals Pre Post Assessment Pre Post   BP BP: (!) 156/92 (05/17/22 1211) 150/92   LOC A&Ox4 A&Ox4   HR Pulse (Heart Rate): 76 (05/17/22 1211) 88 Lungs Diminished Dim bases   Resp Resp Rate: 15 (05/17/22 1211) 16 Cardiac NSR NSR   Temp Temp: 97.6 °F (36.4 °C) (05/17/22 1211) 97.5 Skin CDI CDI   Weight  n/a n/a Edema Generalized; moderate facial edema; abd distention; 1+ BLE Generalized; mild facial edema' trace BLE   Tele status none none Pain 0 0     Orders   Duration: Start: 4372 End: 1541 Total: 3.5hr   Dialyzer: Dialyzer/Set Up Inspection: Adrienne Mehta (05/17/22 1211)   K Bath: Dialysate K (mEq/L): 2 (05/17/22 1211)   Ca Bath: Dialysate CA (mEq/L): 2.5 (05/17/22 1211)   Na: Dialysate NA (mEq/L): 138 (05/17/22 1211)   Bicarb: Dialysate HCO3 (mEq/L): 35 (05/17/22 1211)   Target Fluid Removal: Goal/Amount of Fluid to Remove (mL): 3500 mL (05/17/22 1211)     Access   Type & Location: [facility]   Comments:  Dressing CDI, changed by clinic earlier today. No s/s of infection. Both lumens aspirate & flush well. Running well at .                                      Labs   HBsAg (Antigen) / date: Neg Ag 5/12/22                                          HBsAb (Antibody) / date: <10 Ab 9/2021   Source: Physician's Portal   Obtained/Reviewed  Critical Results Called HGB   Date Value Ref Range Status   05/17/2022 9.5 (L) 12.1 - 17.0 g/dL Final     Potassium   Date Value Ref Range Status   05/17/2022 5.3 (H) 3.5 - 5.1 mmol/L Final     Calcium   Date Value Ref Range Status   05/17/2022 9.6 8.5 - 10.1 MG/DL Final     BUN   Date Value Ref Range Status   05/17/2022 67 (H) 6 - 20 MG/DL Final     Creatinine   Date Value Ref Range Status   05/17/2022 11.90 (H) 0.70 - 1.30 MG/DL Final        Meds Given   Name Dose Route   Heparin 1:1000 2.0ml & 2.1ml Dwell in CVC after HD               Adequacy / Fluid    Total Liters Process: 79.2L   Net Fluid Removed: 3750ml      Comments   Time Out Done:   (Time) 1209 Admitting Diagnosis: HTN emergency/ Fluid overload   Consent obtained/signed: Informed Consent Verified: Yes (05/17/22 1211)   Machine / RO # Machine Number: [id number] [id number] (05/17/22 [id number])   Primary Nurse Rpt Pre: Carmen Canales RN   Primary Nurse Rpt Post: Juliane Adams RN   Pt Education: Fluid restriction/ procedural   Care Plan: Admit after HD   Pts outpatient clinic: [facility] [address]     Tx Summary   Comments:         SBAR received from Primary RN. Pt arrived to HD suite A&Ox4. Consent signed & on file. 1211: Each catheter limb disinfected per p&p, caps removed, hubs disinfected per p&p. Each lumen aspirated for blood return and flushed with Normal Saline per policy. Labs drawn per request/ order. VSS. Dialysis Tx initiated. * no issues, admitted to [facility] after HD*  1541: Tx ended. VSS. Each dialysis catheter limb disinfected per p&p, all possible blood returned per p&p, and each dialysis hub disinfected per p&p. Each lumen flushed, post dialysis catheter Heparin dwell instilled per order, and caps applied. Bed locked and in the lowest position, call bell and belongings in reach. SBAR given to Primary, RN. Patient is stable at time of their departure. All Dialysis related medications have been reviewed.

## 2022-05-17 NOTE — ED PROVIDER NOTES
EMERGENCY DEPARTMENT HISTORY AND PHYSICAL EXAM      Date: 5/17/2022  Patient Name: Antwon Gonzales    History of Presenting Illness     Chief Complaint   Patient presents with    Hypertension     pt was at his HD appt this AM, and he was found to be hypertensive. 230/120. dialysis gave 0.2 of clonidine at 0630, with no change. Pt did not receive any HD, normally gets tues/thurs/sat.  Abdominal Pain     pt complaining of 10/10 mid/upper abdominal pain that began yesterday.  Blurred Vision     pt reports blurry vision began this morning, and states it is \"off and on\". EMS reports pt was Cinncinati stroke scale negative       History Provided By: Patient    HPI: Antwon Gonzales, 64 y.o. male with a past medical history significant for End-stage renal disease on dialysis, hypertension, medical problems as stated below presents to the ED with cc of moderate severe abdominal pain, loose stools with yellow diarrhea over the last 24 hours. Pain is constant in the epigastric area. He reports also having some blurred vision and known severely elevated blood pressure. He took some clonidine at home with no improvement. He was post get dialysis today as he typically gets it on Tuesday, Thursday, and Saturday. He denies any chest pain or trouble breathing, no fevers or chills, no cough, no new leg swelling. No other associated symptoms. No other exacerbating or mounting factors. Patient does report taking his antihypertensive medications as prescribed. There are no other complaints, changes, or physical findings at this time. PCP: Dilcia Renteria NP    No current facility-administered medications on file prior to encounter. Current Outpatient Medications on File Prior to Encounter   Medication Sig Dispense Refill    chlorpheniramine-acetaminophen (Coricidin HBP Cold and Flu) 2-325 mg tab Take 1 Tablet by mouth two (2) times a day.  12 Tablet 0    fluticasone propionate (FLONASE) 50 mcg/actuation nasal spray 2 Sprays by Both Nostrils route daily.  losartan (COZAAR) 100 mg tablet Take 100 mg by mouth daily.  pantoprazole (Protonix) 40 mg tablet Take 1 Tablet by mouth two (2) times a day. Indications: an ulcer of the duodenum 60 Tablet 0    cloNIDine HCL (CATAPRES) 0.2 mg tablet Take 0.2 mg by mouth three (3) times daily.  hydrALAZINE (APRESOLINE) 100 mg tablet Take 100 mg by mouth three (3) times daily.  ascorbic acid, vitamin C, (Vitamin C) 500 mg tablet Take 500 mg by mouth daily.  cholecalciferol (VITAMIN D3) 25 mcg (1,000 unit) cap Take 1,000 Units by mouth daily.  naloxone (Narcan) 4 mg/actuation nasal spray 1 [place] by IntraNASal route once as needed for Overdose. Use 1 spray intranasally, then discard. Repeat with new spray every 2 min as needed for opioid overdose symptoms, alternating nostrils. (Patient not taking: Reported on 3/29/2022)      buprenorphine-naloxone (Suboxone) 8-2 mg film sublingaul film 1 Film by SubLINGual route two (2) times a day. Past History     Past Medical History:  Past Medical History:   Diagnosis Date    Chronic kidney disease     Hypertension     Liver disease        Past Surgical History:  No past surgical history on file. Family History:  No family history on file. Social History:  Social History     Tobacco Use    Smoking status: Current Some Day Smoker     Packs/day: 1.00     Last attempt to quit: 2020     Years since quittin.7    Smokeless tobacco: Never Used    Tobacco comment: started back smoking in last couple months   Vaping Use    Vaping Use: Never used   Substance Use Topics    Alcohol use: Not Currently    Drug use: Yes     Types: Marijuana, Cocaine, Heroin     Comment: Herion & Cocaine 10 years ago as of 3/23/22, Marijuana last used 1 week ago       Allergies:   Allergies   Allergen Reactions    Baclofen Rash     All over         Review of Systems   Review of Systems   Constitutional: Negative for chills, diaphoresis, fatigue and fever. HENT: Negative for ear pain and sore throat. Eyes: Negative for pain and redness. Respiratory: Negative for cough and shortness of breath. Cardiovascular: Negative for chest pain and leg swelling. Gastrointestinal: Positive for abdominal pain and diarrhea. Negative for nausea and vomiting. Endocrine: Negative for cold intolerance and heat intolerance. Genitourinary: Negative for flank pain and hematuria. Musculoskeletal: Negative for back pain and neck stiffness. Skin: Negative for rash and wound. Neurological: Negative for dizziness, syncope and headaches. All other systems reviewed and are negative. Physical Exam   Physical Exam  Vitals and nursing note reviewed. Constitutional:       General: He is in acute distress. Appearance: He is well-developed. He is not ill-appearing. HENT:      Head: Normocephalic and atraumatic. Mouth/Throat:      Pharynx: No oropharyngeal exudate. Eyes:      Conjunctiva/sclera: Conjunctivae normal.      Pupils: Pupils are equal, round, and reactive to light. Cardiovascular:      Rate and Rhythm: Normal rate and regular rhythm. Heart sounds: No murmur heard. Pulmonary:      Effort: Pulmonary effort is normal. No respiratory distress. Breath sounds: Normal breath sounds. No wheezing. Abdominal:      General: Bowel sounds are normal. There is no distension. Palpations: Abdomen is soft. Tenderness: There is abdominal tenderness in the epigastric area. There is guarding. There is no right CVA tenderness, left CVA tenderness or rebound. Musculoskeletal:         General: No deformity. Normal range of motion. Cervical back: Normal range of motion. Skin:     General: Skin is warm and dry. Findings: No rash. Neurological:      Mental Status: He is alert and oriented to person, place, and time. Cranial Nerves: No dysarthria or facial asymmetry.       Sensory: Sensation is intact. Motor: No weakness. Coordination: Coordination normal.   Psychiatric:         Behavior: Behavior normal.         Diagnostic Study Results     Labs -     Recent Results (from the past 24 hour(s))   EKG, 12 LEAD, INITIAL    Collection Time: 05/17/22  7:50 AM   Result Value Ref Range    Ventricular Rate 78 BPM    Atrial Rate 78 BPM    P-R Interval 162 ms    QRS Duration 90 ms    Q-T Interval 398 ms    QTC Calculation (Bezet) 453 ms    Calculated P Axis 70 degrees    Calculated R Axis 52 degrees    Calculated T Axis 88 degrees    Diagnosis       Normal sinus rhythm  Moderate voltage criteria for LVH, may be normal variant  Cannot rule out Septal infarct , age undetermined  When compared with ECG of 31-MAR-2022 07:27,  T wave inversion no longer evident in Inferior leads  T wave inversion no longer evident in Lateral leads     METABOLIC PANEL, COMPREHENSIVE    Collection Time: 05/17/22  7:57 AM   Result Value Ref Range    Sodium 134 (L) 136 - 145 mmol/L    Potassium 5.3 (H) 3.5 - 5.1 mmol/L    Chloride 98 97 - 108 mmol/L    CO2 26 21 - 32 mmol/L    Anion gap 10 5 - 15 mmol/L    Glucose 92 65 - 100 mg/dL    BUN 67 (H) 6 - 20 MG/DL    Creatinine 11.90 (H) 0.70 - 1.30 MG/DL    BUN/Creatinine ratio 6 (L) 12 - 20      GFR est AA 5 (L) >60 ml/min/1.73m2    GFR est non-AA 4 (L) >60 ml/min/1.73m2    Calcium 9.6 8.5 - 10.1 MG/DL    Bilirubin, total 0.5 0.2 - 1.0 MG/DL    ALT (SGPT) 9 (L) 12 - 78 U/L    AST (SGOT) 12 (L) 15 - 37 U/L    Alk.  phosphatase 50 45 - 117 U/L    Protein, total 6.5 6.4 - 8.2 g/dL    Albumin 2.7 (L) 3.5 - 5.0 g/dL    Globulin 3.8 2.0 - 4.0 g/dL    A-G Ratio 0.7 (L) 1.1 - 2.2     CBC WITH AUTOMATED DIFF    Collection Time: 05/17/22  7:57 AM   Result Value Ref Range    WBC 12.3 (H) 4.1 - 11.1 K/uL    RBC 3.88 (L) 4.10 - 5.70 M/uL    HGB 9.5 (L) 12.1 - 17.0 g/dL    HCT 31.7 (L) 36.6 - 50.3 %    MCV 81.7 80.0 - 99.0 FL    MCH 24.5 (L) 26.0 - 34.0 PG    MCHC 30.0 30.0 - 36.5 g/dL RDW 17.4 (H) 11.5 - 14.5 %    PLATELET 135 272 - 888 K/uL    MPV 10.5 8.9 - 12.9 FL    NRBC 0.2 (H) 0  WBC    ABSOLUTE NRBC 0.03 (H) 0.00 - 0.01 K/uL    NEUTROPHILS 64 32 - 75 %    LYMPHOCYTES 17 12 - 49 %    MONOCYTES 9 5 - 13 %    EOSINOPHILS 8 (H) 0 - 7 %    BASOPHILS 1 0 - 1 %    IMMATURE GRANULOCYTES 1 (H) 0.0 - 0.5 %    ABS. NEUTROPHILS 7.9 1.8 - 8.0 K/UL    ABS. LYMPHOCYTES 2.2 0.8 - 3.5 K/UL    ABS. MONOCYTES 1.1 (H) 0.0 - 1.0 K/UL    ABS. EOSINOPHILS 0.9 (H) 0.0 - 0.4 K/UL    ABS. BASOPHILS 0.1 0.0 - 0.1 K/UL    ABS. IMM. GRANS. 0.1 (H) 0.00 - 0.04 K/UL    DF AUTOMATED     LIPASE    Collection Time: 05/17/22  7:57 AM   Result Value Ref Range    Lipase 271 73 - 393 U/L   COVID-19 RAPID TEST    Collection Time: 05/17/22  9:41 AM   Result Value Ref Range    Specimen source Nasopharyngeal      COVID-19 rapid test Not detected NOTD         Radiologic Studies -   XR CHEST PORT   Final Result   No acute process. CT ABD PELV WO CONT   Final Result   1. Enteritis, similar but worse than 3/22/2022. Small volume of ascites   associated. 2. Sclerotic osseous metastases. 3. Renal osteodystrophy. 4. Equivocal cirrhosis. No splenomegaly. The findings were called to Dr. Ean Mcmullen on 5/17/2022 at 1118 hours   by Dr. Tabby Brown. [phone]        CT Results  (Last 48 hours)               05/17/22 0957  CT ABD PELV WO CONT Final result    Impression:  1. Enteritis, similar but worse than 3/22/2022. Small volume of ascites   associated. 2. Sclerotic osseous metastases. 3. Renal osteodystrophy. 4. Equivocal cirrhosis. No splenomegaly. The findings were called to Dr. Ean Mcmullen on 5/17/2022 at 1118 hours   by Dr. Tabby Brown. [phone]       Narrative:  CT ABDOMEN AND PELVIS WITHOUT CONTRAST. 5/17/2022 9:57 AM        INDICATION: Severe upper abdominal pain, loose stools, missed dialysis. COMPARISON: 3/22/2022.        TECHNIQUE: CT of the abdomen and pelvis was performed without contrast. CT dose   reduction was achieved through use of a standardized protocol tailored for this   examination and automatic exposure control for dose modulation. FINDINGS:   Inferior chest: The lung bases are clear. The heart is mildly enlarged. There is   a trace pericardial effusion. A hemodialysis catheter is partially imaged, and   terminates in appropriate position in the right atrium. Incidental note is made   of bilateral hemidiaphragm eventration. Abdomen: Subtle fissural widening, questionable undersurface nodularity, and   subtle hypertrophy of the left lateral segment are equivocal for cirrhosis. No   splenomegaly. The kidneys are atrophic. Incidental note is made of a gallstone. The unenhanced distal esophagus, stomach, duodenum, pancreas, spleen, and   adrenals are normal.       Pelvis: Scatter artifact from electrodes on the abdomen and pelvis limits   evaluation. There are several thickened loops of ileum (602-58) and distal   jejunum (620-29). Interloop ascites, mesenteric edema, and a small volume of   diffuse ascites are associated. On 3/22/2022, there was a similar, though   somewhat less severe enteritis. The colon is fluid-filled. The unenhanced   bladder is normal. No free air. Bones: Multiple sclerotic lesions scattered throughout the spine and probably   the sternum are concerning for metastases. See annotated key images. Thinned   cortices and smudged trabeculae likely represents secondary hyperparathyroidism   in the setting of chronic kidney disease (atrophic kidneys and hemodialysis   catheter in place). The sclerotic lesions in the spine could represent increased   osteoblastic activity in the setting of renal osteodystrophy. However,   metastases are favored. Severe degenerative disc disease is associated with   bilateral L5 pars interarticularis defects.                CXR Results  (Last 48 hours)               05/17/22 1024  XR CHEST PORT Final result    Impression:  No acute process. Narrative:  PORTABLE CHEST RADIOGRAPH/S: 5/17/2022 10:24 AM       INDICATION: Hypertension. COMPARISON: 3/31/2022, 2/29/2022. TECHNIQUE: Portable frontal upright radiograph/s of the chest.       FINDINGS:    The lungs are hypoinflated, but clear. The central airways are patent. No   pneumothorax and no large pleural effusion. A left IJ hemodialysis catheter   terminates in appropriate position in the right atrium. Medical Decision Making   I am the first provider for this patient. I reviewed the vital signs, available nursing notes, past medical history, past surgical history, family history and social history. Vital Signs-Reviewed the patient's vital signs. Patient Vitals for the past 12 hrs:   Temp Pulse Resp BP SpO2   05/17/22 0825 -- -- -- (!) 207/107 97 %   05/17/22 0810 -- -- -- (!) 216/95 97 %   05/17/22 0755 -- -- -- (!) 206/92 97 %   05/17/22 0746 97.7 °F (36.5 °C) 83 18 (!) 218/101 98 %       Records Reviewed: Nursing records and medical records reviewed    MDM:  Pt presents with acute abdominal pain; vital signs stable with currently a non-peritoneal exam; DDx includes: Gastroenteritis, hepatitis, pancreatitis, obstruction, appendicitis, viral illness, IBD, diverticulitis, mesenteric ischemia, AAA or descending dissection, ACS, kidney stone. Will get labs, treat symptomatically and obtain serial abdominal exams to determine if a CT is warranted. Will reassess and monitor closely. Provider Notes (Medical Decision Making):   Patient is a 27-year-old male presenting with a second recent event of abdominal pain with diffuse watery stools. Patient initially with severe hypertension, responded to IV labetalol, as well as symptomatic care of his abdominal presentation. He is found to have signs of enteritis without signs of obstruction or abscess.   This is similar appearance on CAT scan when compared to prior from 2 months ago. Patient has a mild leukocytosis but no evidence of new endorgan damage. Patient was seen by nephrology, who recommended dialysis but unclear on timing today as they are backed up after we spoke to the dialysis team.  He possibly has some new bony metastases on CT scan that have not been seen on prior imaging. This will likely require an oncologic work-up as well. Given patient's significant symptoms, will bring into the hospital for monitor his blood pressure, treatment of his enteritis, and oncologic evaluation. ED Course:   Initial assessment performed. The patients presenting problems have been discussed, and they are in agreement with the care plan formulated and outlined with them. I have encouraged them to ask questions as they arise throughout their visit. ED Course as of 05/17/22 1130   Tue May 17, 2022   5489 Per records, patient with recent admission for hypertensive emergency and pulmonary edema. Dr. Gerald Pittman from nephrology saw him in consultation. [CC]      ED Course User Index  [CC] Matty Chavez MD               Critical Care:  None      Disposition:  Admit Note:  11:30 AM  Pt is being admitted by Dr. Lisset Dick. The results of their tests and reason(s) for their admission have been discussed with pt and/or available family. They convey agreement and understanding for the need to be admitted and for admission diagnosis. Diagnosis     Clinical Impression:   1. Enteritis    2. Bone metastases ([address]    3. Hypertensive emergency    4. End stage renal disease on dialysis [facility])        Attestations:    Amie Adair MD    Please note that this dictation was completed with [employer]ak[employer], the computer voice recognition software. Quite often unanticipated grammatical, syntax, homophones, and other interpretive errors are inadvertently transcribed by the computer software. Please disregard these errors. Please excuse any errors that have escaped final proofreading. Thank you.

## 2022-05-17 NOTE — ED NOTES
TRANSITION OF CARE - SBAR OUT    Patient is being transferred to [facility], Room# 2115. Report GIVEN TO Julián Coronel RN on Tierney Weeks for routine progression of care. Report is consisted of the following information SBAR, ED Summary, MAR and Recent Results. Patient transferred to receiving unit by: pt transport from dialysis (RN or [facility] Name)     Called outstanding consults: Yes   Collected routine labs: Yes     All current orders reviewed with accepting nurse: Yes    The following personal items will be sent with the patient during transfer to the floor:   All valuables:                          CARDIAC MONITORING ORDERED: Yes     The following CURRENT information were reported to the receiving RN:    CODE STATUS: Full Code    NIH SCORE:    CRYSTAL SCREENING:      NEURO ASSESSMENT:        RESTRAINTS IN USE: Yes     IS DOCUMENTATION COMPLETE: Yes      Vital Signs  Level of Consciousness: Alert (0) (05/17/22 0746)  Temp: 97.6 °F (36.4 °C) (05/17/22 1211)  Temp Source: Oral (05/17/22 0746)  Pulse (Heart Rate): 90 (05/17/22 1530)  Cardiac Rhythm: Sinus Rhythm (05/17/22 0745)  Resp Rate: 15 (05/17/22 1530)  BP: 139/82 (05/17/22 1530)  MAP (Monitor): 110 (05/17/22 1130)  MAP (Calculated): 101 (05/17/22 1530)  MEWS Score: 3 (05/17/22 0746)  Pain 1  Pain Scale 1: Numeric (0 - 10) (05/17/22 0746)  Pain Intensity 1: 10 (05/17/22 0746)  Patient Stated Pain Goal: 0 (05/17/22 0746)  Pain Location 1: Abdomen (05/17/22 0746)  Pain Orientation 1: Medial,Upper (05/17/22 0746)      OXYGEN:      MIKAL FALL RISK:        WOUNDS: No      URINARY CATHETER: voiding    LINE ACCESS:   Peripheral IV 05/17/22 Right Antecubital (Active)   Site Assessment Clean, dry, & intact 05/17/22 0801   Phlebitis Assessment 0 05/17/22 0801   Infiltration Assessment 0 05/17/22 0801   Dressing Status Clean, dry, & intact 05/17/22 0801   Dressing Type Transparent 05/17/22 0801   Hub Color/Line Status Pink;Flushed 05/17/22 0801   Action Taken Blood drawn 05/17/22 0801        Opportunity for questions and clarification were provided.   Wilfred Villanueva RN

## 2022-05-17 NOTE — H&P
Hospitalist Admission Note    NAME: Anderson Allred   :  1965   MRN:  [id number]     Date/Time:  2022 6:53 PM    Patient PCP: Luis Bartholomew NP    Please note that this dictation was completed with [employer], the computer voice recognition software. Quite often unanticipated grammatical, syntax, homophones, and other interpretive errors are inadvertently transcribed by the computer software. Please disregard these errors. Please excuse any errors that have escaped final proofreading  ______________________________________________________________________   Assessment & Plan:  Epigastric and periumbilical abdominal pain, POA due to  Worsened enteritis compared to 3/2022, POA  Diarrhea x 6 yesterday, none today  Duodenitis on EGD 3/23, d/c on protonix bid, no longer taking  --pain improved with GI cocktail and pepcid in ER. Will put back on PPI  --appreciate GI consult. CTA abdomen does not indicate ischemia     Sclerotic osseous changes in sternum and spine concerning for metastases of unknown malignancy vs secondary to renal osteodystrophy  --consult hematology. ?PET scan. CTA abdomen report says lesions probably too small for bone scan    Leukocytosis WBC 12K  Loud heart murmur, POA  Echo  showed no valvular abnormality  --will check blood cultures. Malignant HTN, resolved after HD today  --resume coreg, hydralazine, nifedipine.  --add clonidine 0.1mg bid. Was also on cardura, losartan, clonidine at discharge 3/22 but does not seem to be taking any longer (pill bottles he brought in didn't have these meds; he said they didn't work and stopped taking)    ESRD on HD TTS  Volume overload despite dialysis earlier with bibasilar crackles and lower leg edema  Echo  EF 60-65%. --cont bumex  --cont tums  --check phosphorous    Hx Cocaine use, last month  --counseled patient to stop as it cause uncontrolled HTN, stroke, MI.   He reports has only used twice and agrees to stop.    Chronic suboxone use  Hx prior heroin use  --continue home dose    There is no height or weight on file to calculate BMI. Code: full  DVT prophylaxis: heparin  Surrogate decision maker:  sister          Subjective:   CHIEF COMPLAINT:  Abdominal pain    HISTORY OF PRESENT ILLNESS:     Amairani Franco is a 64 y.o. male with   past medical history significant for End-stage renal disease on dialysis, hypertension, medical problems as stated below presents to the ED with cc of moderate severe abdominal pain, loose stools with yellow diarrhea x 6 over the last 24 hours. Pain is constant in the epigastric area. Pain has now resolved and he was able to eat dinner. He was given pepcid and GI cocktail in ER. He had some blurred vision and known severely elevated blood pressure. But this has resolved. Was at dialysis appointment with /120, given clonidine 0.2mg without improvement so sent to ER. [facility] stroke score negative for EMS per ER. Was admitted 3/22 with enteritis. EGD by Dr. Conchita Rodgers with duodenitis (inflammatory vs infectious vs ischemic changes). We were asked to admit for work up and evaluation of the above problems. Past Medical History:   Diagnosis Date    Chronic kidney disease     Hypertension     Liver disease       No past surgical history on file. Social History     Tobacco Use    Smoking status: Current Some Day Smoker     Packs/day: 1.00     Last attempt to quit: 2020     Years since quittin.7    Smokeless tobacco: Never Used    Tobacco comment: started back smoking in last couple months   Substance Use Topics    Alcohol use: Not Currently      Drug use:  Cocaine twice, last in April  Live with sister, unemployed,     Family history: Mother -- kidney failure on dialysis, HTN    Allergies   Allergen Reactions    Baclofen Rash     All over        Prior to Admission medications    Medication Sig Start Date End Date Taking?  Authorizing Provider chlorpheniramine-acetaminophen (Coricidin HBP Cold and Flu) 2-325 mg tab Take 1 Tablet by mouth two (2) times a day. 3/31/22   Kellie Rosenbaum MD   fluticasone propionate (FLONASE) 50 mcg/actuation nasal spray 2 Sprays by Both Nostrils route daily. Brian Damon MD   losartan (COZAAR) 100 mg tablet Take 100 mg by mouth daily. Brian Damon MD   pantoprazole (Protonix) 40 mg tablet Take 1 Tablet by mouth two (2) times a day. Indications: an ulcer of the duodenum 3/25/22   Santiago ALDANA MD   cloNIDine HCL (CATAPRES) 0.2 mg tablet Take 0.2 mg by mouth three (3) times daily. Provider, Historical   hydrALAZINE (APRESOLINE) 100 mg tablet Take 100 mg by mouth three (3) times daily. Provider, Historical   ascorbic acid, vitamin C, (Vitamin C) 500 mg tablet Take 500 mg by mouth daily. Provider, Historical   cholecalciferol (VITAMIN D3) 25 mcg (1,000 unit) cap Take 1,000 Units by mouth daily. Provider, Historical   naloxone (Narcan) 4 mg/actuation nasal spray 1 [place] by IntraNASal route once as needed for Overdose. Use 1 spray intranasally, then discard. Repeat with new spray every 2 min as needed for opioid overdose symptoms, alternating nostrils. Patient not taking: Reported on 3/29/2022    Provider, Historical   buprenorphine-naloxone (Suboxone) 8-2 mg film sublingaul film 1 Film by SubLINGual route two (2) times a day. Provider, Historical     REVIEW OF SYSTEMS:  POSITIVE= Bold.   Negative = normal text  General:  fever, chills, sweats, generalized weakness, weight loss 46 # since 10/21 after wife's death, /gain, loss of appetite  Eyes:  blurred vision, eye pain, loss of vision, diplopia  Ear Nose and Throat:  rhinorrhea, pharyngitis  Respiratory:   cough, sputum production, SOB, wheezing, KING, pleuritic pain  Cardiology:  chest pain, palpitations, orthopnea, PND, edema, syncope   Gastrointestinal:  abdominal pain, N/V, dysphagia, diarrhea x 6, constipation, bleeding  Genitourinary:  frequency, urgency, dysuria, hematuria, incontinence  Muskuloskeletal :  arthralgia, myalgia  Hematology:  easy bruising, bleeding, lymphadenopathy  Dermatological:  rash, ulceration, pruritis  Endocrine:  hot flashes or polydipsia  Neurological:  headache, dizziness, confusion, focal weakness, paresthesia, memory loss, gait disturbance  Psychological: anxiety, depression, agitation      Objective:   VITALS:    Visit Vitals  BP (!) 150/92   Pulse 88   Temp 97.5 °F (36.4 °C) (Oral)   Resp 16   SpO2 96%     Temp (24hrs), Av.6 °F (36.4 °C), Min:97.5 °F (36.4 °C), Max:97.7 °F (36.5 °C)    Wt Readings from Last 25 Encounters:   22 79.4 kg (175 lb)   22 79.4 kg (175 lb)   22 79.4 kg (175 lb)   22 79 kg (174 lb 3.2 oz)   22 77.7 kg (171 lb 4.8 oz)   22 82.3 kg (181 lb 8 oz)   21 81.6 kg (180 lb)   21 80.3 kg (177 lb)   21 84.4 kg (186 lb)   02/15/21 83.4 kg (183 lb 12.8 oz)   01/15/21 86.2 kg (190 lb)       There is no height or weight on file to calculate BMI. PHYSICAL EXAM: seen in floor bed after dialysis    General:    Alert, cooperative, no distress, appears stated age. HEENT: Atraumatic, anicteric sclerae, pink conjunctivae     No oral ulcers, mucosa moist, throat clear. Hearing intact. Neck:  Supple, symmetrical,  thyroid: non tender  Lungs:   Bibasilar crackles otherwise clear. No Wheezing or Rhonchi. Chest wall:  No tenderness  No Accessory muscle use. Heart:   Regular  rhythm,  3/6 systolic  murmur   No gallop. 1+ pitting edema lower legs  Abdomen:   Soft, non-tender. Not distended. Bowel sounds normal. No masses  Extremities: No cyanosis. No clubbing  Skin:     Not pale Not Jaundiced  No rashes   Psych:  Good insight. Not depressed. Not anxious or agitated. Neurologic: EOMs intact. No facial asymmetry. No aphasia or slurred speech. Symmetrical strength, Alert and oriented X 3.      IMAGING RESULTS:   []       I have personally reviewed the actual   [] CXR  []     CT scan  CXR:  CT abd/pelvis without contrast:  FINDINGS:  Inferior chest: The lung bases are clear. The heart is mildly enlarged. There is  a trace pericardial effusion. A hemodialysis catheter is partially imaged, and  terminates in appropriate position in the right atrium. Incidental note is made  of bilateral hemidiaphragm eventration.     Abdomen: Subtle fissural widening, questionable undersurface nodularity, and  subtle hypertrophy of the left lateral segment are equivocal for cirrhosis. No  splenomegaly. The kidneys are atrophic. Incidental note is made of a gallstone. The unenhanced distal esophagus, stomach, duodenum, pancreas, spleen, and  adrenals are normal.     Pelvis: Scatter artifact from electrodes on the abdomen and pelvis limits  evaluation. There are several thickened loops of ileum (602-58) and distal  jejunum (174-43). Interloop ascites, mesenteric edema, and a small volume of  diffuse ascites are associated. On 3/22/2022, there was a similar, though  somewhat less severe enteritis. The colon is fluid-filled. The unenhanced  bladder is normal. No free air.     Bones: Multiple sclerotic lesions scattered throughout the spine and probably  the sternum are concerning for metastases. See annotated key images. Thinned  cortices and smudged trabeculae likely represents secondary hyperparathyroidism  in the setting of chronic kidney disease (atrophic kidneys and hemodialysis  catheter in place). The sclerotic lesions in the spine could represent increased  osteoblastic activity in the setting of renal osteodystrophy. However,  metastases are favored. Severe degenerative disc disease is associated with  bilateral L5 pars interarticularis defects.     IMPRESSION  1. Enteritis, similar but worse than 3/22/2022. Small volume of ascites  associated. 2. Sclerotic osseous metastases. 3. Renal osteodystrophy. 4. Equivocal cirrhosis.  No splenomegaly. EKG:   ________________________________________________________________________  Care Plan discussed with:    Comments   Patient y    [facility]:      ________________________________________________________________________  Prophylaxis:  GI PPI   DVT heparin   ________________________________________________________________________  Recommended Disposition:   Home with Family y   HH/PT/OT/RN    SNF/LTC    ILIR    ________________________________________________________________________  Code Status:  Full Code    DNR/DNI    ________________________________________________________________________  TOTAL TIME: 60 minutes      Comments    y Reviewed previous records   >50% of visit spent in counseling and coordination of care  Discussion with patient and/or family and questions answered         ______________________________________________________________________  Rosaline Guardado MD      Procedures: see electronic medical records for all procedures/Xrays and details which were not copied into this note but were reviewed prior to creation of Plan.     LAB DATA REVIEWED:    Recent Results (from the past 24 hour(s))   EKG, 12 LEAD, INITIAL    Collection Time: 05/17/22  7:50 AM   Result Value Ref Range    Ventricular Rate 78 BPM    Atrial Rate 78 BPM    P-R Interval 162 ms    QRS Duration 90 ms    Q-T Interval 398 ms    QTC Calculation (Bezet) 453 ms    Calculated P Axis 70 degrees    Calculated R Axis 52 degrees    Calculated T Axis 88 degrees    Diagnosis       Normal sinus rhythm  Moderate voltage criteria for LVH, may be normal variant  Cannot rule out Septal infarct , age undetermined  When compared with ECG of 31-MAR-2022 07:27,  T wave inversion no longer evident in Inferior leads  T wave inversion no longer evident in Lateral leads     METABOLIC PANEL, COMPREHENSIVE    Collection Time: 05/17/22  7:57 AM   Result Value Ref Range    Sodium 134 (L) 136 - 145 mmol/L    Potassium 5.3 (H) 3.5 - 5.1 mmol/L    Chloride 98 97 - 108 mmol/L    CO2 26 21 - 32 mmol/L    Anion gap 10 5 - 15 mmol/L    Glucose 92 65 - 100 mg/dL    BUN 67 (H) 6 - 20 MG/DL    Creatinine 11.90 (H) 0.70 - 1.30 MG/DL    BUN/Creatinine ratio 6 (L) 12 - 20      GFR est AA 5 (L) >60 ml/min/1.73m2    GFR est non-AA 4 (L) >60 ml/min/1.73m2    Calcium 9.6 8.5 - 10.1 MG/DL    Bilirubin, total 0.5 0.2 - 1.0 MG/DL    ALT (SGPT) 9 (L) 12 - 78 U/L    AST (SGOT) 12 (L) 15 - 37 U/L    Alk. phosphatase 50 45 - 117 U/L    Protein, total 6.5 6.4 - 8.2 g/dL    Albumin 2.7 (L) 3.5 - 5.0 g/dL    Globulin 3.8 2.0 - 4.0 g/dL    A-G Ratio 0.7 (L) 1.1 - 2.2     CBC WITH AUTOMATED DIFF    Collection Time: 05/17/22  7:57 AM   Result Value Ref Range    WBC 12.3 (H) 4.1 - 11.1 K/uL    RBC 3.88 (L) 4.10 - 5.70 M/uL    HGB 9.5 (L) 12.1 - 17.0 g/dL    HCT 31.7 (L) 36.6 - 50.3 %    MCV 81.7 80.0 - 99.0 FL    MCH 24.5 (L) 26.0 - 34.0 PG    MCHC 30.0 30.0 - 36.5 g/dL    RDW 17.4 (H) 11.5 - 14.5 %    PLATELET 439 795 - 998 K/uL    MPV 10.5 8.9 - 12.9 FL    NRBC 0.2 (H) 0  WBC    ABSOLUTE NRBC 0.03 (H) 0.00 - 0.01 K/uL    NEUTROPHILS 64 32 - 75 %    LYMPHOCYTES 17 12 - 49 %    MONOCYTES 9 5 - 13 %    EOSINOPHILS 8 (H) 0 - 7 %    BASOPHILS 1 0 - 1 %    IMMATURE GRANULOCYTES 1 (H) 0.0 - 0.5 %    ABS. NEUTROPHILS 7.9 1.8 - 8.0 K/UL    ABS. LYMPHOCYTES 2.2 0.8 - 3.5 K/UL    ABS. MONOCYTES 1.1 (H) 0.0 - 1.0 K/UL    ABS. EOSINOPHILS 0.9 (H) 0.0 - 0.4 K/UL    ABS. BASOPHILS 0.1 0.0 - 0.1 K/UL    ABS. IMM.  GRANS. 0.1 (H) 0.00 - 0.04 K/UL    DF AUTOMATED     LIPASE    Collection Time: 05/17/22  7:57 AM   Result Value Ref Range    Lipase 271 73 - 393 U/L   COVID-19 RAPID TEST    Collection Time: 05/17/22  9:41 AM   Result Value Ref Range    Specimen source Nasopharyngeal      COVID-19 rapid test Not detected NOTD

## 2022-05-17 NOTE — CONSULTS
Gastroenterology Consultation Note  AYLEEN Burton   for Dr. Britt Espinoza    NAME: Iliana Jones : 1965 MRN: [id number]   ATTG: Dr. Yasmany Lafleur PCP: Jessenia Houston NP  Date/Time:  2022 4:04 PM  Subjective:   REASON FOR CONSULT:      Iliana Jones is a 64 y.o.  male who I was asked to see for worsening enteritis, bone mets? She presented form dialysis with worsening hypertension. He reported abdominal pain rated 10/10 in his epigastrium as well as diarrhea. He reports pain started Monday, resolved between previous admission and Monday. He admits to some nausea as well. Reports being compliant with taking Pantoprazole since d/c. He had 3-4 episodes of diarrhea since pain onset, no blood in stool or melena. He does admit to cocaine use in April. Hx of kidney disease and on dialysis Tuesday, Thursday and Saturday. Also with PMHx of cirrhosis due to Hep C s/p treatment by Dr. Frank Estrada. CT WO contrast  IMPRESSION  1. Enteritis, similar but worse than 3/22/2022. Small volume of ascites  associated. 2. Sclerotic osseous metastases. 3. Renal osteodystrophy. 4. Equivocal cirrhosis. No splenomegaly. WBC 12.3, hgb 9.5, plt 281, Bun 67, Cr 11.9, K 5.3, Na 134    EGD 3/23/22 by Dr. Nola Aragon:  Findings:      Esophagus:   -           Normal mucosa throughout the esophagus. Z line normal located at 40 cm from incisors.     Stomach:   -           Normal mucosa throughout the stomach. Pylorus patent. No ulcer or erosion seen. No blood in stomach.     Duodenum:   ++        The third portion of duodenum showed patchy erythema that was along folds with some surface erosion and friability ? Inflammatory vs. Infectious vs. Ischemic findings. Unusual finding. We performed multiple cold forcep biopsies.     Path unremarkable    Past Medical History:   Diagnosis Date    Chronic kidney disease     Hypertension     Liver disease       No past surgical history on file.  Social History     Tobacco Use    Smoking status: Current Some Day Smoker     Packs/day: 1.00     Last attempt to quit: 2020     Years since quittin.7    Smokeless tobacco: Never Used    Tobacco comment: started back smoking in last couple months   Substance Use Topics    Alcohol use: Not Currently      No family history on file. Allergies   Allergen Reactions    Baclofen Rash     All over      Home Medications:  Prior to Admission Medications   Prescriptions Last Dose Informant Patient Reported? Taking?   ascorbic acid, vitamin C, (Vitamin C) 500 mg tablet   Yes No   Sig: Take 500 mg by mouth daily. buprenorphine-naloxone (Suboxone) 8-2 mg film sublingaul film   Yes No   Si Film by SubLINGual route two (2) times a day. chlorpheniramine-acetaminophen (Coricidin HBP Cold and Flu) 2-325 mg tab   No No   Sig: Take 1 Tablet by mouth two (2) times a day. cholecalciferol (VITAMIN D3) 25 mcg (1,000 unit) cap   Yes No   Sig: Take 1,000 Units by mouth daily. cloNIDine HCL (CATAPRES) 0.2 mg tablet   Yes No   Sig: Take 0.2 mg by mouth three (3) times daily. fluticasone propionate (FLONASE) 50 mcg/actuation nasal spray   Yes No   Si Sprays by Both Nostrils route daily. hydrALAZINE (APRESOLINE) 100 mg tablet   Yes No   Sig: Take 100 mg by mouth three (3) times daily. losartan (COZAAR) 100 mg tablet   Yes No   Sig: Take 100 mg by mouth daily. naloxone (Narcan) 4 mg/actuation nasal spray   Yes No   Si [place] by IntraNASal route once as needed for Overdose. Use 1 spray intranasally, then discard. Repeat with new spray every 2 min as needed for opioid overdose symptoms, alternating nostrils. Patient not taking: Reported on 3/29/2022   pantoprazole (Protonix) 40 mg tablet   No No   Sig: Take 1 Tablet by mouth two (2) times a day.  Indications: an ulcer of the duodenum      Facility-Administered Medications: None     Hospital medications:  Current Facility-Administered Medications Medication Dose Route Frequency    acetaminophen (TYLENOL) tablet 650 mg  650 mg Oral Q6H PRN    ondansetron (ZOFRAN) injection 4 mg  4 mg IntraVENous Q4H PRN    heparin (porcine) 1,000 unit/mL injection 2,000 Units  2,000 Units InterCATHeter DIALYSIS PRN    And    heparin (porcine) 1,000 unit/mL injection 2,100 Units  2,100 Units InterCATHeter DIALYSIS PRN     Current Outpatient Medications   Medication Sig    chlorpheniramine-acetaminophen (Coricidin HBP Cold and Flu) 2-325 mg tab Take 1 Tablet by mouth two (2) times a day.  fluticasone propionate (FLONASE) 50 mcg/actuation nasal spray 2 Sprays by Both Nostrils route daily.  losartan (COZAAR) 100 mg tablet Take 100 mg by mouth daily.  pantoprazole (Protonix) 40 mg tablet Take 1 Tablet by mouth two (2) times a day. Indications: an ulcer of the duodenum    cloNIDine HCL (CATAPRES) 0.2 mg tablet Take 0.2 mg by mouth three (3) times daily.  hydrALAZINE (APRESOLINE) 100 mg tablet Take 100 mg by mouth three (3) times daily.  ascorbic acid, vitamin C, (Vitamin C) 500 mg tablet Take 500 mg by mouth daily.  cholecalciferol (VITAMIN D3) 25 mcg (1,000 unit) cap Take 1,000 Units by mouth daily.  naloxone (Narcan) 4 mg/actuation nasal spray 1 [place] by IntraNASal route once as needed for Overdose. Use 1 spray intranasally, then discard. Repeat with new spray every 2 min as needed for opioid overdose symptoms, alternating nostrils. (Patient not taking: Reported on 3/29/2022)    buprenorphine-naloxone (Suboxone) 8-2 mg film sublingaul film 1 Film by SubLINGual route two (2) times a day.      REVIEW OF SYSTEMS:     []     Unable to obtain  ROS due to  []    mental status change  []    sedated   []    intubated  Review of Systems -   History obtained from the patient  General ROS: negative for - chills or fever  Psychological ROS: negative for - anxiety or depression  Hematological and Lymphatic ROS: negative for - bleeding problems  Respiratory ROS: no cough, shortness of breath, or wheezing  Cardiovascular ROS: no chest pain or dyspnea on exertion  Gastrointestinal ROS: See HPi  Genito-Urinary ROS: no dysuria, trouble voiding, or hematuria  Musculoskeletal ROS: negative for - gait disturbance  Neurological ROS: no TIA or stroke symptoms  Dermatological ROS: negative for - rash    Objective:   VITALS:    Visit Vitals  /82   Pulse 90   Temp 97.6 °F (36.4 °C) (Oral)   Resp 15   SpO2 96%     Temp (24hrs), Av.7 °F (36.5 °C), Min:97.6 °F (36.4 °C), Max:97.7 °F (36.5 °C)    PHYSICAL EXAM:   General:    Alert, cooperative, thin BM, chronically ill appearing, no distress, appears stated age. Head:   Normocephalic, without obvious abnormality, atraumatic. Eyes:   Conjunctivae clear, anicteric sclerae. Pupils are equal  Nose:  Nares normal. No drainage or sinus tenderness. Throat:    Lips, mucosa, and tongue normal.  No Thrush  Neck:  Supple, symmetrical,  no adenopathy, thyroid: non tender  Lungs:   CTA bilaterally. No wheezing/rhonchi/rales. Heart:   Regular rate and rhythm,  no murmur, rub or gallop. Abdomen:   Soft, non-tender. Not distended. Bowel sounds normal. No masses. No hepatosplenomegaly. Rectal:  Deferred  Extremities: No cyanosis. No edema. No clubbing  Skin:     Texture, turgor normal. No rashes/lesions/jaundice  Lymph: Cervical, supraclavicular normal.  Psych:  Poor insight. Not depressed. Not anxious or agitated. Neurologic: EOMs intact. No facial asymmetry. No aphasia or slurred speech normal strength, A/O X 3. LAB DATA REVIEWED:    Lab Results   Component Value Date/Time    WBC 12.3 (H) 2022 07:57 AM    HGB 9.5 (L) 2022 07:57 AM    HCT 31.7 (L) 2022 07:57 AM    PLATELET 985  07:57 AM    MCV 81.7 2022 07:57 AM     Lab Results   Component Value Date/Time    ALT (SGPT) 9 (L) 2022 07:57 AM    Alk.  phosphatase 50 2022 07:57 AM    Bilirubin, direct 0.11 2021 10:00 AM Bilirubin, total 0.5 05/17/2022 07:57 AM     Lab Results   Component Value Date/Time    Sodium 134 (L) 05/17/2022 07:57 AM    Potassium 5.3 (H) 05/17/2022 07:57 AM    Chloride 98 05/17/2022 07:57 AM    CO2 26 05/17/2022 07:57 AM    Anion gap 10 05/17/2022 07:57 AM    Glucose 92 05/17/2022 07:57 AM    BUN 67 (H) 05/17/2022 07:57 AM    Creatinine 11.90 (H) 05/17/2022 07:57 AM    BUN/Creatinine ratio 6 (L) 05/17/2022 07:57 AM    GFR est AA 5 (L) 05/17/2022 07:57 AM    GFR est non-AA 4 (L) 05/17/2022 07:57 AM    Calcium 9.6 05/17/2022 07:57 AM     Lab Results   Component Value Date/Time    Lipase 271 05/17/2022 07:57 AM     Lab Results   Component Value Date/Time    INR 1.2 (H) 03/23/2022 04:56 AM    INR 1.1 01/15/2021 11:25 AM    INR 1.0 07/06/2020 04:21 AM    Prothrombin time 12.4 (H) 03/23/2022 04:56 AM    Prothrombin time 11.7 (H) 01/15/2021 11:25 AM    Prothrombin time 10.5 07/06/2020 04:21 AM       CT Results (most recent):  Results from Hospital Encounter encounter on 05/17/22    CT ABD PELV WO CONT    Narrative  CT ABDOMEN AND PELVIS WITHOUT CONTRAST. 5/17/2022 9:57 AM    INDICATION: Severe upper abdominal pain, loose stools, missed dialysis. COMPARISON: 3/22/2022. TECHNIQUE: CT of the abdomen and pelvis was performed without contrast. CT dose  reduction was achieved through use of a standardized protocol tailored for this  examination and automatic exposure control for dose modulation. FINDINGS:  Inferior chest: The lung bases are clear. The heart is mildly enlarged. There is  a trace pericardial effusion. A hemodialysis catheter is partially imaged, and  terminates in appropriate position in the right atrium. Incidental note is made  of bilateral hemidiaphragm eventration. Abdomen: Subtle fissural widening, questionable undersurface nodularity, and  subtle hypertrophy of the left lateral segment are equivocal for cirrhosis. No  splenomegaly. The kidneys are atrophic.  Incidental note is made of a gallstone. The unenhanced distal esophagus, stomach, duodenum, pancreas, spleen, and  adrenals are normal.    Pelvis: Scatter artifact from electrodes on the abdomen and pelvis limits  evaluation. There are several thickened loops of ileum (602-58) and distal  jejunum (917-25). Interloop ascites, mesenteric edema, and a small volume of  diffuse ascites are associated. On 3/22/2022, there was a similar, though  somewhat less severe enteritis. The colon is fluid-filled. The unenhanced  bladder is normal. No free air. Bones: Multiple sclerotic lesions scattered throughout the spine and probably  the sternum are concerning for metastases. See annotated key images. Thinned  cortices and smudged trabeculae likely represents secondary hyperparathyroidism  in the setting of chronic kidney disease (atrophic kidneys and hemodialysis  catheter in place). The sclerotic lesions in the spine could represent increased  osteoblastic activity in the setting of renal osteodystrophy. However,  metastases are favored. Severe degenerative disc disease is associated with  bilateral L5 pars interarticularis defects. Impression  1. Enteritis, similar but worse than 3/22/2022. Small volume of ascites  associated. 2. Sclerotic osseous metastases. 3. Renal osteodystrophy. 4. Equivocal cirrhosis. No splenomegaly. The findings were called to Dr. Delroy Ellis on 5/17/2022 at 1118 hours  by Dr. Etienne Higginbotham. [phone]      Impression:  Active Problems:    ESRD (end stage renal disease) on dialysis ([address] (3/12/2022)      Malignant hypertension (5/17/2022)      Enteritis (5/17/2022)      Bone metastasis ([address] (5/17/2022)       Plan:  Patient with epigastric pain and diarrhea starting Monday. He was sent due to hypertension during dialysis as well as pain. He does admit to some cocaine use in April. CT was done showing enteritis and appearing similar to last CT in March.   Will check enteric pathogen panel as well as IBD panel, CRP and fecal nasim.  Question if there could be a component of ischemia given hx, proceed with CTA tonight to further evaluate. In addition will check C1 esterase to look for deficiency as well as LUCIA, question vasculitis causing enteritis. Will await results to determine further eval.    Possible osseous mets is a new finding on review of imaging with radiology.   Can consider lumbar MRI vs bone scan to further evaluate, defer to primary care team.  Consider oncology eval.       ___________________________________________________  Care Plan discussed with:    [x]    Patient   []    Family   [x]    Nursing   []    Attending  Total Time :  30  minutes   ___________________________________________________  GI: AYLEEN Mckeon

## 2022-05-17 NOTE — PROGRESS NOTES
Nephrology Progress Note  Luc Lane     [facility]. [email or web address]  Phone - [phone]   Patient: Ja Drake    YOB: 1965        Date- 5/17/2022   Admit Date: 5/17/2022  CC: Follow up for ESRD          IMPRESSION & PLAN:   · ESRD - TTS- purvi vargas  · Hypertensive urgency  · Nausea ,vomiting  · Non compliance to meds and diet  · Heroin use in past  · Anemia of ckd   Sec. hyperpara    PLAN-   Spoke to ED physician   Plan for HD today   Notified [address]   Hgb stable,hold off for epo in view of HTNsive urgency      Subjective: Interval History:   -  Seen and examined. Well known to me from recurrent previous admits. He was in HD centre this am and was found to be hypertensive so was sent here without getting dialyzed. Renal consult was requested for ESRD. Objective:   Vitals:    05/17/22 0746 05/17/22 0755 05/17/22 0810 05/17/22 0825   BP: (!) 218/101 (!) 206/92 (!) 216/95 (!) 207/107   Pulse: 83      Resp: 18      Temp: 97.7 °F (36.5 °C)      SpO2: 98% 97% 97% 97%      No intake/output data recorded. There were no vitals filed for this visit. Physical exam:    GEN: NAD  NECK- Supple, no mass  RESP: No wheezing, Clear b/l  CVS: S1,S2  RRR  NEURO: Normal speech, Non focal  EXT: No Edema   HD access: Left IJ PC    Chart reviewed. Pertinent Notes reviewed. Data Review :  Recent Labs     05/17/22  0757   *   K 5.3*   CL 98   CO2 26   BUN 67*   CREA 11.90*   GLU 92   CA 9.6     Recent Labs     05/17/22  0757   WBC 12.3*   HGB 9.5*   HCT 31.7*        No results for input(s): FE, TIBC, PSAT, FERR in the last 72 hours. Medication list  reviewed  No current facility-administered medications for this encounter. Current Outpatient Medications   Medication Sig    chlorpheniramine-acetaminophen (Coricidin HBP Cold and Flu) 2-325 mg tab Take 1 Tablet by mouth two (2) times a day.     fluticasone propionate (FLONASE) 50 mcg/actuation nasal spray 2 Sprays by Both Nostrils route daily.  losartan (COZAAR) 100 mg tablet Take 100 mg by mouth daily.  pantoprazole (Protonix) 40 mg tablet Take 1 Tablet by mouth two (2) times a day. Indications: an ulcer of the duodenum    cloNIDine HCL (CATAPRES) 0.2 mg tablet Take 0.2 mg by mouth three (3) times daily.  hydrALAZINE (APRESOLINE) 100 mg tablet Take 100 mg by mouth three (3) times daily.  ascorbic acid, vitamin C, (Vitamin C) 500 mg tablet Take 500 mg by mouth daily.  cholecalciferol (VITAMIN D3) 25 mcg (1,000 unit) cap Take 1,000 Units by mouth daily.  naloxone (Narcan) 4 mg/actuation nasal spray 1 [place] by IntraNASal route once as needed for Overdose. Use 1 spray intranasally, then discard. Repeat with new spray every 2 min as needed for opioid overdose symptoms, alternating nostrils. (Patient not taking: Reported on 3/29/2022)    buprenorphine-naloxone (Suboxone) 8-2 mg film sublingaul film 1 Film by SubLINGual route two (2) times a day.           George Myers, [address] [facility]  [facility] / [facility] [address], [address]  [place], [address]  Phone - [phone]               Fax - [phone]

## 2022-05-17 NOTE — ED TRIAGE NOTES
Pt arrives via EMS from dialysis with a complaint of hypertension, upper abdominal pain, and blurry vision. Pt reports his BP was normal recently, and has not been high before today. Pt says his upper abdominal pain began yesterday, and rates it 10/10. Pt reports blurred vision began this morning, and says it \"comes and goes\". EMS reports pt was stroke scale negative, and dialysis gave the pt 0.2 of clonidine in an attempt to lower BP, with no success. Pt did not receive HD today; usually receives tues/thurs/sat. Pt is A&Ox4, on monitor x3.

## 2022-05-18 ENCOUNTER — APPOINTMENT (OUTPATIENT)
Dept: NUCLEAR MEDICINE | Age: 57
DRG: 391 | End: 2022-05-18
Attending: NURSE PRACTITIONER
Payer: MEDICARE

## 2022-05-18 ENCOUNTER — APPOINTMENT (OUTPATIENT)
Dept: CT IMAGING | Age: 57
DRG: 391 | End: 2022-05-18
Attending: NURSE PRACTITIONER
Payer: MEDICARE

## 2022-05-18 VITALS
RESPIRATION RATE: 16 BRPM | OXYGEN SATURATION: 98 % | HEART RATE: 75 BPM | SYSTOLIC BLOOD PRESSURE: 150 MMHG | TEMPERATURE: 97.7 F | DIASTOLIC BLOOD PRESSURE: 74 MMHG

## 2022-05-18 LAB
AMPHET UR QL SCN: NEGATIVE
ANION GAP SERPL CALC-SCNC: 8 MMOL/L (ref 5–15)
BARBITURATES UR QL SCN: NEGATIVE
BENZODIAZ UR QL: NEGATIVE
BUN SERPL-MCNC: 40 MG/DL (ref 6–20)
BUN/CREAT SERPL: 5 (ref 12–20)
CALCIUM SERPL-MCNC: 8.7 MG/DL (ref 8.5–10.1)
CANNABINOIDS UR QL SCN: NEGATIVE
CEA SERPL-MCNC: 1 NG/ML
CHLORIDE SERPL-SCNC: 98 MMOL/L (ref 97–108)
CO2 SERPL-SCNC: 28 MMOL/L (ref 21–32)
COCAINE UR QL SCN: NEGATIVE
CREAT SERPL-MCNC: 8.18 MG/DL (ref 0.7–1.3)
CRP SERPL-MCNC: 0.76 MG/DL (ref 0–0.6)
DRUG SCRN COMMENT,DRGCM: NORMAL
ERYTHROCYTE [DISTWIDTH] IN BLOOD BY AUTOMATED COUNT: 17.4 % (ref 11.5–14.5)
FERRITIN SERPL-MCNC: 41 NG/ML (ref 26–388)
FOLATE SERPL-MCNC: 7.9 NG/ML (ref 5–21)
GLUCOSE SERPL-MCNC: 74 MG/DL (ref 65–100)
HCT VFR BLD AUTO: 27.8 % (ref 36.6–50.3)
HGB BLD-MCNC: 8.5 G/DL (ref 12.1–17)
HIV 1+2 AB+HIV1 P24 AG SERPL QL IA: NONREACTIVE
HIV12 RESULT COMMENT, HHIVC: NORMAL
IRON SATN MFR SERPL: 6 % (ref 20–50)
IRON SERPL-MCNC: 19 UG/DL (ref 35–150)
MCH RBC QN AUTO: 25 PG (ref 26–34)
MCHC RBC AUTO-ENTMCNC: 30.6 G/DL (ref 30–36.5)
MCV RBC AUTO: 81.8 FL (ref 80–99)
METHADONE UR QL: NEGATIVE
NRBC # BLD: 0 K/UL (ref 0–0.01)
NRBC BLD-RTO: 0 PER 100 WBC
OPIATES UR QL: NEGATIVE
PCP UR QL: NEGATIVE
PERIPHERAL SMEAR,PSM: NORMAL
PHOSPHATE SERPL-MCNC: 4.6 MG/DL (ref 2.6–4.7)
PLATELET # BLD AUTO: 264 K/UL (ref 150–400)
PMV BLD AUTO: 11.2 FL (ref 8.9–12.9)
POTASSIUM SERPL-SCNC: 4.4 MMOL/L (ref 3.5–5.1)
RBC # BLD AUTO: 3.4 M/UL (ref 4.1–5.7)
SODIUM SERPL-SCNC: 134 MMOL/L (ref 136–145)
TIBC SERPL-MCNC: 313 UG/DL (ref 250–450)
VIT B12 SERPL-MCNC: 232 PG/ML (ref 193–986)
WBC # BLD AUTO: 9 K/UL (ref 4.1–11.1)

## 2022-05-18 PROCEDURE — A9503 TC99M MEDRONATE: HCPCS

## 2022-05-18 PROCEDURE — 80048 BASIC METABOLIC PNL TOTAL CA: CPT

## 2022-05-18 PROCEDURE — 82607 VITAMIN B-12: CPT

## 2022-05-18 PROCEDURE — 86037 ANCA TITER EACH ANTIBODY: CPT

## 2022-05-18 PROCEDURE — 87522 HEPATITIS C REVRS TRNSCRPJ: CPT

## 2022-05-18 PROCEDURE — 74011636637 HC RX REV CODE- 636/637: Performed by: HOSPITALIST

## 2022-05-18 PROCEDURE — 87389 HIV-1 AG W/HIV-1&-2 AB AG IA: CPT

## 2022-05-18 PROCEDURE — 82784 ASSAY IGA/IGD/IGG/IGM EACH: CPT

## 2022-05-18 PROCEDURE — 86140 C-REACTIVE PROTEIN: CPT

## 2022-05-18 PROCEDURE — 86803 HEPATITIS C AB TEST: CPT

## 2022-05-18 PROCEDURE — 86038 ANTINUCLEAR ANTIBODIES: CPT

## 2022-05-18 PROCEDURE — 82728 ASSAY OF FERRITIN: CPT

## 2022-05-18 PROCEDURE — 85027 COMPLETE CBC AUTOMATED: CPT

## 2022-05-18 PROCEDURE — 74011000250 HC RX REV CODE- 250: Performed by: HOSPITALIST

## 2022-05-18 PROCEDURE — 83540 ASSAY OF IRON: CPT

## 2022-05-18 PROCEDURE — 74011250637 HC RX REV CODE- 250/637: Performed by: HOSPITALIST

## 2022-05-18 PROCEDURE — 86161 COMPLEMENT/FUNCTION ACTIVITY: CPT

## 2022-05-18 PROCEDURE — 36415 COLL VENOUS BLD VENIPUNCTURE: CPT

## 2022-05-18 PROCEDURE — G0378 HOSPITAL OBSERVATION PER HR: HCPCS

## 2022-05-18 PROCEDURE — 84100 ASSAY OF PHOSPHORUS: CPT

## 2022-05-18 PROCEDURE — 82746 ASSAY OF FOLIC ACID SERUM: CPT

## 2022-05-18 PROCEDURE — 74011000636 HC RX REV CODE- 636: Performed by: INTERNAL MEDICINE

## 2022-05-18 PROCEDURE — 84154 ASSAY OF PSA FREE: CPT

## 2022-05-18 PROCEDURE — 99223 1ST HOSP IP/OBS HIGH 75: CPT | Performed by: STUDENT IN AN ORGANIZED HEALTH CARE EDUCATION/TRAINING PROGRAM

## 2022-05-18 PROCEDURE — 87040 BLOOD CULTURE FOR BACTERIA: CPT

## 2022-05-18 PROCEDURE — 80307 DRUG TEST PRSMV CHEM ANLYZR: CPT

## 2022-05-18 PROCEDURE — 82378 CARCINOEMBRYONIC ANTIGEN: CPT

## 2022-05-18 PROCEDURE — 71260 CT THORAX DX C+: CPT

## 2022-05-18 PROCEDURE — 86671 FUNGUS NES ANTIBODY: CPT

## 2022-05-18 RX ORDER — CLONIDINE HYDROCHLORIDE 0.1 MG/1
0.1 TABLET ORAL 2 TIMES DAILY
Qty: 60 TABLET | Refills: 0 | Status: SHIPPED
Start: 2022-05-18 | End: 2022-10-03

## 2022-05-18 RX ORDER — CLONIDINE HYDROCHLORIDE 0.1 MG/1
0.1 TABLET ORAL 2 TIMES DAILY
Status: DISCONTINUED | OUTPATIENT
Start: 2022-05-18 | End: 2022-05-18 | Stop reason: HOSPADM

## 2022-05-18 RX ORDER — LOSARTAN POTASSIUM 100 MG/1
100 TABLET ORAL DAILY
Qty: 30 TABLET | Refills: 0 | Status: SHIPPED
Start: 2022-05-18 | End: 2022-07-01

## 2022-05-18 RX ADMIN — SODIUM CHLORIDE, PRESERVATIVE FREE 10 ML: 5 INJECTION INTRAVENOUS at 06:00

## 2022-05-18 RX ADMIN — PANTOPRAZOLE SODIUM 40 MG: 40 TABLET, DELAYED RELEASE ORAL at 09:18

## 2022-05-18 RX ADMIN — CALCIUM CARBONATE (ANTACID) CHEW TAB 500 MG 600 MG: 500 CHEW TAB at 09:13

## 2022-05-18 RX ADMIN — CARVEDILOL 25 MG: 12.5 TABLET, FILM COATED ORAL at 17:09

## 2022-05-18 RX ADMIN — SODIUM CHLORIDE, PRESERVATIVE FREE 10 ML: 5 INJECTION INTRAVENOUS at 05:25

## 2022-05-18 RX ADMIN — NIFEDIPINE 60 MG: 60 TABLET, EXTENDED RELEASE ORAL at 09:14

## 2022-05-18 RX ADMIN — PANTOPRAZOLE SODIUM 40 MG: 40 TABLET, DELAYED RELEASE ORAL at 17:09

## 2022-05-18 RX ADMIN — HYDRALAZINE HYDROCHLORIDE 100 MG: 50 TABLET, FILM COATED ORAL at 09:14

## 2022-05-18 RX ADMIN — NIFEDIPINE 60 MG: 60 TABLET, EXTENDED RELEASE ORAL at 17:09

## 2022-05-18 RX ADMIN — CALCIUM CARBONATE (ANTACID) CHEW TAB 500 MG 600 MG: 500 CHEW TAB at 18:00

## 2022-05-18 RX ADMIN — IOPAMIDOL 80 ML: 755 INJECTION, SOLUTION INTRAVENOUS at 13:44

## 2022-05-18 RX ADMIN — CLONIDINE HYDROCHLORIDE 0.1 MG: 0.1 TABLET ORAL at 09:14

## 2022-05-18 RX ADMIN — CLONIDINE HYDROCHLORIDE 0.1 MG: 0.1 TABLET ORAL at 17:09

## 2022-05-18 RX ADMIN — CARVEDILOL 25 MG: 12.5 TABLET, FILM COATED ORAL at 09:13

## 2022-05-18 RX ADMIN — HYDRALAZINE HYDROCHLORIDE 100 MG: 50 TABLET, FILM COATED ORAL at 17:09

## 2022-05-18 RX ADMIN — BUPRENORPHINE AND NALOXONE 1 FILM: 8; 2 FILM BUCCAL; SUBLINGUAL at 17:09

## 2022-05-18 RX ADMIN — SODIUM CHLORIDE, PRESERVATIVE FREE 10 ML: 5 INJECTION INTRAVENOUS at 14:27

## 2022-05-18 NOTE — DISCHARGE SUMMARY
Hospitalist Discharge Summary     Patient ID:  Asia Soto  [id number]  21 y.o.  1965    PCP on record: Everton Moura NP    Admit date: 5/17/2022  Discharge date and time: 5/18/2022      DISCHARGE DIAGNOSIS:          CONSULTATIONS:  IP CONSULT TO NEPHROLOGY  IP CONSULT TO GASTROENTEROLOGY  IP CONSULT TO HEMATOLOGY  IP CONSULT TO HOSPITALIST    Excerpted HPI from H&P of Jackelyn Medeiros MD:    Asia Soto is a 64 y.o. male with   past medical history significant for End-stage renal disease on dialysis, hypertension, medical problems as stated below presents to the ED with cc of moderate severe abdominal pain, loose stools with yellow diarrhea x 6 over the last 24 hours.  Pain is constant in the epigastric area. Elissa Galea has now resolved and he was able to eat dinner. He was given pepcid and GI cocktail in ER.     He had some blurred vision and known severely elevated blood pressure.  But this has resolved. Was at dialysis appointment with /120, given clonidine 0.2mg without improvement so sent to ER. [place] stroke score negative for EMS per ER.     Was admitted 3/22 with enteritis. EGD by Dr. Elizabeth Taylor with duodenitis (inflammatory vs infectious vs ischemic changes). ______________________________________________________________________  DISCHARGE SUMMARY/HOSPITAL COURSE:  for full details see H&P, daily progress notes, labs, consult notes. Epigastric and periumbilical abdominal pain, POA due to  Worsened enteritis compared to 3/2022, POA  Diarrhea x 6 yesterday, none today  Duodenitis on EGD 3/23, d/c on protonix bid, no longer taking  --pain improved with GI cocktail and pepcid in ER. Will put back on PPI  --appreciate GI consult.   CTA abdomen does not indicate ischemia      Sclerotic osseous changes in sternum and spine concerning for metastases of unknown malignancy vs secondary to renal osteodystrophy  --Seen by hematology who is recommending outpatient follow up for possible PET scan and or CT chest.  Discussed with patient     Leukocytosis WBC resolved  Loud heart murmur, POA  Echo 1/22 showed no valvular abnormality       Malignant HTN, resolved after HD today  --resume coreg, hydralazine, nifedipine. --Resume home medications of cardura, losartan, clonidine at discharge 3/22     ESRD on HD TTS  -Seen by nephrology and sal for outpatient follow-up  -Underwent hemodialysis yesterday     Hx Cocaine use, last month  --counseled patient to stop as it cause uncontrolled HTN, stroke, MI. He reports has only used twice and agrees to stop.     Chronic suboxone use  Hx prior heroin use  --continue home dose        _______________________________________________________________________  Patient seen and examined by me on discharge day. Pertinent Findings:  Gen:    Not in distress  Chest: Clear lungs  CVS:   Regular rhythm. No edema  Abd:  Soft, not distended, not tender  Neuro:  Alert, Oriented x 4, grossly non focal exam  _______________________________________________________________________  DISCHARGE MEDICATIONS:   Current Discharge Medication List      START taking these medications    Details   cloNIDine HCL (CATAPRES) 0.1 mg tablet Take 1 Tablet by mouth two (2) times a day. Qty: 60 Tablet, Refills: 0  Start date: 5/18/2022      losartan (COZAAR) 100 mg tablet Take 1 Tablet by mouth daily. Qty: 30 Tablet, Refills: 0  Start date: 5/18/2022         CONTINUE these medications which have NOT CHANGED    Details   carvediloL (Coreg) 25 mg tablet Take 25 mg by mouth two (2) times daily (with meals). NIFEdipine ER (ADALAT CC) 60 mg ER tablet Take 60 mg by mouth two (2) times a day. bumetanide (BUMEX) 2 mg tablet Take 2 mg by mouth daily. calcium carbonate (TUMS) 200 mg calcium (500 mg) chew Take 3 Tablets by mouth three (3) times daily (after meals). hydrALAZINE (APRESOLINE) 100 mg tablet Take 100 mg by mouth three (3) times daily.       ascorbic acid, vitamin C, (Vitamin C) 500 mg tablet Take 500 mg by mouth daily. cholecalciferol (VITAMIN D3) 25 mcg (1,000 unit) cap Take 1,000 Units by mouth daily. buprenorphine-naloxone (Suboxone) 8-2 mg film sublingaul film 1 Film by SubLINGual route two (2) times a day. Comments: .      fluticasone propionate (FLONASE) 50 mcg/actuation nasal spray 2 Sprays by Both Nostrils route daily as needed. pantoprazole (Protonix) 40 mg tablet Take 1 Tablet by mouth two (2) times a day. Indications: an ulcer of the duodenum  Qty: 60 Tablet, Refills: 0             My Recommended Diet, Activity, Wound Care, and follow-up labs are listed in the patient's Discharge Insturctions which I have personally completed and reviewed.   Risk of deterioration: Moderate    Condition at Discharge:  Stable  _____________________________________________________________________    Disposition  Home with family, no needs  ____________________________________________________________________    Care Plan discussed with:   Patient, Family, RN, Care Manager, Consultant    ____________________________________________________________________    Code Status: Full Code  ____________________________________________________________________      Condition at Discharge:  Stable  _____________________________________________________________________  Follow up with:   PCP : Yandel Ty NP  Follow-up Information     Follow up With Specialties Details Why Contact Info    Yandel Ty NP Nurse Practitioner   [facility] [phone]  [phone]      Giana Mcduffie MD Hematology and Oncology, Internal Medicine Physician, Hematology Physician, Oncology In 1 week  [id number] [address]  [address] [phone]      Berry Serrano MD Gastroenterology In 3 weeks  [address]  [address] [phone]                Total time in minutes spent coordinating this discharge (includes going over instructions, follow-up, prescriptions, and preparing report for sign off to her PCP) :  35 minutes    Signed:  Chante Justice MD

## 2022-05-18 NOTE — PROGRESS NOTES
Problem: Falls - Risk of  Goal: *Absence of Falls  Description: Document [place] Fall Risk and appropriate interventions in the flowsheet.   Outcome: Progressing Towards Goal  Note: Fall Risk Interventions:  Mobility Interventions: Bed/chair exit alarm         Medication Interventions: Teach patient to arise slowly

## 2022-05-18 NOTE — CONSULTS
[facility]  [address], [address] [facility], [facility], [address]  [phone]      Hematology/ Oncology Consult Note      Reason for consult:     Jason Kimble is a 64 y.o. male who we have been asked to see by Dr. Ya Cao for sclerotic osseous lesions concerning for bone metastases. Subjective:     Jason Kimble is a 64year old male admitted to [facility] with worsening enteritis and diarrhea. He has a PMH of malignant hypertension, ESRD on HD, chronic hepatitis C, liver cirrhosis, non-compliance with HD, PUD, duodenitis, polysubstance abuse (heroin and cocaine) and active tobacco abuse. He was seen OP at [facility] on 2022 for evaluation for renal transplant, per chart review he was a poor candidate d/t non compliance with HD and recent cocaine use over Easter holiday weekend. Patient seen at bedside, discussed CT results. Has no PMH of malignancy. Denies night sweats or fatigue. Does report unintentional weight loss over the last year he attributes to the death of his wife and depression. Reports he was told he would be discharged today. Review of Systems:  Pertinent items are noted in the History of Present Illness. Past Medical History:   Diagnosis Date    Chronic kidney disease     Hypertension     Liver disease      No past surgical history on file. No family history on file.   Social History     Tobacco Use    Smoking status: Current Some Day Smoker     Packs/day: 1.00     Last attempt to quit: 2020     Years since quittin.7    Smokeless tobacco: Never Used    Tobacco comment: started back smoking in last couple months   Substance Use Topics    Alcohol use: Not Currently      Current Facility-Administered Medications   Medication Dose Route Frequency Provider Last Rate Last Admin    cloNIDine HCL (CATAPRES) tablet 0.1 mg  0.1 mg Oral BID Nasir Orantes MD   0.1 mg at 22 0914    heparin (porcine) 1,000 unit/mL injection 2,000 Units  2,000 Units [facility] PRN Tony Olivo MD   2,000 Units at 05/17/22 1545    And    heparin (porcine) 1,000 unit/mL injection 2,100 Units  2,100 Units [facility] DIALYSIS PRN Tony Olivo MD   2,100 Units at 05/17/22 1545    bumetanide (BUMEX) tablet 2 mg  2 mg Oral DAILY Arnoldo Do MD   2 mg at 05/17/22 2013    calcium carbonate (TUMS) chewable tablet 600 mg [elemental]  600 mg Oral TIDPC Arnoldo Do MD   600 mg at 05/18/22 0913    carvediloL (COREG) tablet 25 mg  25 mg Oral BID WITH MEALS Arnoldo Do MD   25 mg at 05/18/22 0913    hydrALAZINE (APRESOLINE) tablet 100 mg  100 mg Oral TID Arnoldo Do MD   100 mg at 05/18/22 0914    pantoprazole (PROTONIX) tablet 40 mg  40 mg Oral BID Arnoldo Do MD   40 mg at 05/18/22 5472    buprenorphine-naloxone (SUBOXONE) 8mg-2mg SL film  1 Film SubLINGual BID Arnoldo Do MD        NIFEdipine ER (PROCARDIA XL) tablet 60 mg  60 mg Oral BID Arnoldo Do MD   60 mg at 05/18/22 0914    sodium chloride (NS) flush 5-40 mL  5-40 mL IntraVENous Q8H Arnoldo Do MD   10 mL at 05/18/22 0600    sodium chloride (NS) flush 5-40 mL  5-40 mL IntraVENous PRN Arnoldo Do MD        acetaminophen (TYLENOL) tablet 650 mg  650 mg Oral Q6H PRN Arnoldo Do MD        Or    acetaminophen (TYLENOL) suppository 650 mg  650 mg Rectal Q6H PRN Aronldo Do MD        polyethylene glycol (MIRALAX) packet 17 g  17 g Oral DAILY PRN Arnoldo Do MD        ondansetron (ZOFRAN ODT) tablet 4 mg  4 mg Oral Q8H PRN Arnoldo Do MD        Or    ondansetron [facility]) injection 4 mg  4 mg IntraVENous Q6H PRN Arnoldo Do MD        heparin (porcine) injection 5,000 Units  5,000 Units SubCUTAneous Q8H Arnoldo Do MD            Allergies   Allergen Reactions    Baclofen Rash     All over          Objective:     Patient Vitals for the past 8 hrs:   BP Temp Pulse Resp SpO2 05/18/22 0904 (!) 164/94 98.2 °F (36.8 °C) 87 18 98 %   05/18/22 0800 -- -- 89 -- --       Lab Results   Component Value Date/Time    WBC 9.0 05/18/2022 03:29 AM    HGB 8.5 (L) 05/18/2022 03:29 AM    HCT 27.8 (L) 05/18/2022 03:29 AM    PLATELET 501 68/14/8150 03:29 AM    MCV 81.8 05/18/2022 03:29 AM       Physical Exam:   Visit Vitals  BP (!) 164/94   Pulse 87   Temp 98.2 °F (36.8 °C)   Resp 18   SpO2 98%     General appearance: alert, cooperative, no distress, appears stated age  Head: Normocephalic, without obvious abnormality, atraumatic  Heart: regular rate and rhythm, S1, S2 normal, no murmur, click, rub or gallop  Extremities: extremities normal, atraumatic, no cyanosis or edema  Skin: Skin color, texture, turgor normal. No rashes or lesions  Neurologic: Grossly normal    Assessment:     ESRD on HD   Managed by Nephrology   Noncompliant with HD at times    Concern for Malignancy with Sclerotic Osseous Lesions seen on CT   No history of malignancy, may be osseous changes associated with renal disease   CT abd/pelvis   IMPRESSION  Cholelithiasis. Moderate ascites and mesenteric edema. Mild small bowel wall  thickening predominantly involving bowel loops in the left abdomen. No evidence  of vascular compromise. Unchanged small sclerotic osseous lesions, potentially  too small for characterization by bone scan.     Plan for bone scan while IP, if he is discharged will plan for PET scan  Send tumor markers - PSA, CEA, Gammopathy panel  Obtain peripheral smear     Anemia   Hgb 8.5, recently was 10  Likely r/t ESRD  Send iron profile, B12 and folate     Plan:     > Obtain tumor markers and peripheral smear for malignancy evaluation   > Obtain iron profile, ferritin, B12 and folate to assess causes of anemia    > NM Bone Scan if not being DC today, if he is DC will plan for OP PET scan   > Obtain imaging of chest to evaluate for malignancy   > So far no primary malignancy and no history of malignancy, changes may be related to ESRD. GI also testing for HIV given history of IV drug abuse and autoimmune causes for enteritis. Discussed with Dr. Merline Peacemaker. Thank you for allowing us to participate in the care of Mr. Armando Mcneill, continue work up while IP, arrange further testing OP if needed and close OP follow up.        Tiny Roman NP

## 2022-05-18 NOTE — PROGRESS NOTES
-Transition of Care Plan:    RUR: 26% - high   Disposition: Home   Follow up appointments: PCP and specialist as indicated   DME needed: None   Transportation at Discharge: Brother-in-law to transport  Keys or means to access home: Yes     IM Medicare Letter: Provided  Is patient a BCPI-A Bundle: No       If yes, was Bundle Letter given?:    Is patient a  and connected with the [place]? No      If yes, was [employer] transfer form completed and [facility] notified? -   Caregiver Contact: Sister Serafin Blanco - [phone]  Discharge Caregiver contacted prior to discharge? Pt to contact  Care Conference needed?: No    Initial Note: Chart reviewed. CM aware of discharge order. CM met with pt at bedside to review dc plan. 2nd IM provided, signed and placed on chart. Pt's brother to transport - pt not sure what time. Pt is agreeable to dc plan and expressed no additional questions or concerns. No further CM needs identified. Reason for Admission:  Enteritis                   RUR Score: 26% - high           PCP: First and Last name:  Severa Juniper, NP     Name of Practice:   Are you a current patient: Yes/No: Yes   Approximate date of last visit:    Can you do a virtual visit with your PCP:              Resources/supports as identified by patient/family:  Pt's family is primary source of social support. Top Challenges facing patient (as identified by patient/family and CM): Finances/Medication cost?  No concerns reported                  Transportation? Brother-in-law to transport              Support system or lack thereof? Pt's family is primary source of social support. Living arrangements? Pt lives with family. Self-care/ADLs/Cognition? Pt is alert and oriented and is independent at baseline.             Current Advanced Directive/Advance Care Plan:  Full Code      Healthcare Decision Maker:   Click here to complete [address] including selection of the Healthcare Decision Maker Relationship (ie \"Primary\")      Payor Source Payor: Felix Aus / Plan: [address] / Product Type: Managed Care Medicare /                           Plan for utilizing home health:  None at this time                   Transition of Care Plan:  Home with family                Care Management Interventions  PCP Verified by CM: Yes  Palliative Care Criteria Met (RRAT>21 & CHF Dx)?: No  Mode of Transport at Discharge:  Other (see comment) (Brother-in-law to transport)  Transition of Care Consult (CM Consult): Discharge Planning  Discharge Durable Medical Equipment: No  Physical Therapy Consult: No  Occupational Therapy Consult: No  Speech Therapy Consult: No  Support Systems: Other Family Member(s)  Confirm Follow Up Transport: Family  The Patient and/or Patient Representative was Provided with a Choice of Provider and Agrees with the Discharge Plan?: Yes  Freedom of Choice List was Provided with Basic Dialogue that Supports the Patient's Individualized Plan of Care/Goals, Treatment Preferences and Shares the Quality Data Associated with the Providers?: No  [place] Resource Information Provided?: No  Discharge Location  Patient Expects to be Discharged to[de-identified] Home    VANI Hagen  Care Manager, [id number] [address] include 234 beds at the [facility]  [phone]

## 2022-05-18 NOTE — DISCHARGE INSTRUCTIONS
You must follow-up with a hematologist/oncologist in the next 1 to 2 weeks to undergo further testing for the spots seen on your spine. You are also follow-up with a gastroenterologist as well as her primary care physician and nephrologist..

## 2022-05-18 NOTE — PROGRESS NOTES
Hospital follow-up PCP transitional care appointment has been scheduled with EDIS Damon on 5/25/22 at 0830. This is a previously scheduled appt. Pending patient discharge.   Dakota Cerda, Care Management Assistant

## 2022-05-18 NOTE — PROGRESS NOTES
Problem: Falls - Risk of  Goal: *Absence of Falls  Description: Document Marybeth Miller Fall Risk and appropriate interventions in the flowsheet.   5/18/2022 1512 by Harmeet August RN  Outcome: Resolved/Met  Note: Fall Risk Interventions:  Mobility Interventions: Bed/chair exit alarm         Medication Interventions: Teach patient to arise slowly                5/18/2022 0932 by Harmeet August RN  Outcome: Progressing Towards Goal  Note: Fall Risk Interventions:  Mobility Interventions: Bed/chair exit alarm         Medication Interventions: Teach patient to arise slowly

## 2022-05-18 NOTE — PROGRESS NOTES
Nephrology Progress Note  [address]     [email or web address]  Phone - [phone]   Patient: Tanya Adams    YOB: 1965        Date- 5/18/2022   Admit Date: 5/17/2022  CC: Follow up for ESRD          IMPRESSION & PLAN:   · ESRD - TTS- [facility]  · Hypertensive urgency  · Nausea ,vomiting  · Non compliance to meds and diet  · Heroin use in past  · Anemia of ckd   Sec. hyperpara    PLAN-   Patient tolerated HD well yesterday.  Blood pressure stable.  No plan for hemodialysis today , next HD on Friday.  Can be discharged from renal standpoint. Subjective: Interval History:   -  Seen and examined today.  -No issues overnight. Objective:   Vitals:    05/17/22 2007 05/17/22 2313 05/18/22 0800 05/18/22 0904   BP: (!) 179/89 (!) 168/92  (!) 164/94   Pulse:  89 89 87   Resp:  18  18   Temp:  98.1 °F (36.7 °C)  98.2 °F (36.8 °C)   TempSrc:       SpO2:  97%  98%      05/17 0701 - 05/18 0700  In: 240 [P.O.:240]  Out: 3750   There were no vitals filed for this visit. Physical exam:    GEN: NAD  NECK- Supple, no mass  RESP: No wheezing, Clear b/l  CVS: S1,S2  RRR  NEURO: Normal speech, Non focal  EXT: No Edema   HD access: Left IJ PC    Chart reviewed. Pertinent Notes reviewed. Data Review :  Recent Labs     05/18/22 0329 05/17/22  0757   * 134*   K 4.4 5.3*   CL 98 98   CO2 28 26   BUN 40* 67*   CREA 8.18* 11.90*   GLU 74 92   CA 8.7 9.6   PHOS 4.6  --      Recent Labs     05/18/22 0329 05/17/22  0757   WBC 9.0 12.3*   HGB 8.5* 9.5*   HCT 27.8* 31.7*    281     No results for input(s): FE, TIBC, PSAT, FERR in the last 72 hours.    Medication list  reviewed  Current Facility-Administered Medications   Medication Dose Route Frequency    cloNIDine HCL (CATAPRES) tablet 0.1 mg  0.1 mg Oral BID    heparin (porcine) 1,000 unit/mL injection 2,000 Units  2,000 Units [facility] DIALYSIS PRN    And    heparin (porcine) 1,000 unit/mL injection 2,100 Units  2,100 Units [facility] DIALYSIS PRN    bumetanide (BUMEX) tablet 2 mg  2 mg Oral DAILY    calcium carbonate (TUMS) chewable tablet 600 mg [elemental]  600 mg Oral TIDPC    carvediloL (COREG) tablet 25 mg  25 mg Oral BID WITH MEALS    hydrALAZINE (APRESOLINE) tablet 100 mg  100 mg Oral TID    pantoprazole (PROTONIX) tablet 40 mg  40 mg Oral BID    buprenorphine-naloxone (SUBOXONE) 8mg-2mg SL film  1 Film SubLINGual BID    NIFEdipine ER (PROCARDIA XL) tablet 60 mg  60 mg Oral BID    sodium chloride (NS) flush 5-40 mL  5-40 mL IntraVENous Q8H    sodium chloride (NS) flush 5-40 mL  5-40 mL IntraVENous PRN    acetaminophen (TYLENOL) tablet 650 mg  650 mg Oral Q6H PRN    Or    acetaminophen (TYLENOL) suppository 650 mg  650 mg Rectal Q6H PRN    polyethylene glycol (MIRALAX) packet 17 g  17 g Oral DAILY PRN    ondansetron (ZOFRAN ODT) tablet 4 mg  4 mg Oral Q8H PRN    Or    ondansetron (ZOFRAN) injection 4 mg  4 mg IntraVENous Q6H PRN    heparin (porcine) injection 5,000 Units  5,000 Units SubCUTAneous Q8H          Jasvir Mendez MD              [facility]  [facility] [address], [address]  Phone - [phone]               Fax - [phone]

## 2022-05-18 NOTE — PROGRESS NOTES
Gastroenterology Daily Progress Note   (Jeffrey Jones PA-C for Dr. Roselyn Villalpando)   [facility]    Admit Date: 5/17/2022       Subjective:     Patient seen during morning rounds. He reports feeling much better. No Abdominal pain or N/V. Tolerating diet, no BM as of yet. CTA was non revealing for any vascular abnormalities.      CRP 0.76, awaiting C1 esterase, IBD panel, HIV, Hep C viral load, LUCIA and pANCA    Current Facility-Administered Medications   Medication Dose Route Frequency    cloNIDine HCL (CATAPRES) tablet 0.1 mg  0.1 mg Oral BID    heparin (porcine) 1,000 unit/mL injection 2,000 Units  2,000 Units [facility] DIALYSIS PRN    And    heparin (porcine) 1,000 unit/mL injection 2,100 Units  2,100 Units [facility] DIALYSIS PRN    bumetanide (BUMEX) tablet 2 mg  2 mg Oral DAILY    calcium carbonate (TUMS) chewable tablet 600 mg [elemental]  600 mg Oral TIDPC    carvediloL (COREG) tablet 25 mg  25 mg Oral BID WITH MEALS    hydrALAZINE (APRESOLINE) tablet 100 mg  100 mg Oral TID    pantoprazole (PROTONIX) tablet 40 mg  40 mg Oral BID    buprenorphine-naloxone (SUBOXONE) 8mg-2mg SL film  1 Film SubLINGual BID    NIFEdipine ER (PROCARDIA XL) tablet 60 mg  60 mg Oral BID    sodium chloride (NS) flush 5-40 mL  5-40 mL IntraVENous Q8H    sodium chloride (NS) flush 5-40 mL  5-40 mL IntraVENous PRN    acetaminophen (TYLENOL) tablet 650 mg  650 mg Oral Q6H PRN    Or    acetaminophen (TYLENOL) suppository 650 mg  650 mg Rectal Q6H PRN    polyethylene glycol (MIRALAX) packet 17 g  17 g Oral DAILY PRN    ondansetron (ZOFRAN ODT) tablet 4 mg  4 mg Oral Q8H PRN    Or    ondansetron (ZOFRAN) injection 4 mg  4 mg IntraVENous Q6H PRN    heparin (porcine) injection 5,000 Units  5,000 Units SubCUTAneous Q8H        Objective:     Visit Vitals  BP (!) 168/92 (BP 1 Location: Right upper arm)   Pulse 89   Temp 98.1 °F (36.7 °C)   Resp 18   SpO2 97%   Blood pressure (!) 168/92, pulse 89, temperature 98.1 °F (36.7 °C), resp. rate 18, SpO2 97 %. No intake/output data recorded. 05/16 1901 - 05/18 0700  In: 240 [P.O.:240]  Out: 3750       Intake/Output Summary (Last 24 hours) at 5/18/2022 0906  Last data filed at 5/17/2022 2313  Gross per 24 hour   Intake 240 ml   Output 3750 ml   Net -3510 ml         Physical Exam:       General: Chronically ill appearing BM  Chest:  CTA, No rhonchi, rales or rubs.   Heart: S1, S2, RRR  GI: Soft, NT, ND + bowel sounds  Extremities: No edema  CNS: CN II-XII normal.      Labs:       Recent Results (from the past 24 hour(s))   COVID-19 RAPID TEST    Collection Time: 05/17/22  9:41 AM   Result Value Ref Range    Specimen source Nasopharyngeal      COVID-19 rapid test Not detected NOTD     C REACTIVE PROTEIN, QT    Collection Time: 05/18/22  3:29 AM   Result Value Ref Range    C-Reactive protein 0.76 (H) 0.00 - 2.54 mg/dL   METABOLIC PANEL, BASIC    Collection Time: 05/18/22  3:29 AM   Result Value Ref Range    Sodium 134 (L) 136 - 145 mmol/L    Potassium 4.4 3.5 - 5.1 mmol/L    Chloride 98 97 - 108 mmol/L    CO2 28 21 - 32 mmol/L    Anion gap 8 5 - 15 mmol/L    Glucose 74 65 - 100 mg/dL    BUN 40 (H) 6 - 20 MG/DL    Creatinine 8.18 (H) 0.70 - 1.30 MG/DL    BUN/Creatinine ratio 5 (L) 12 - 20      GFR est AA 8 (L) >60 ml/min/1.73m2    GFR est non-AA 7 (L) >60 ml/min/1.73m2    Calcium 8.7 8.5 - 10.1 MG/DL   PHOSPHORUS    Collection Time: 05/18/22  3:29 AM   Result Value Ref Range    Phosphorus 4.6 2.6 - 4.7 MG/DL   CBC W/O DIFF    Collection Time: 05/18/22  3:29 AM   Result Value Ref Range    WBC 9.0 4.1 - 11.1 K/uL    RBC 3.40 (L) 4.10 - 5.70 M/uL    HGB 8.5 (L) 12.1 - 17.0 g/dL    HCT 27.8 (L) 36.6 - 50.3 %    MCV 81.8 80.0 - 99.0 FL    MCH 25.0 (L) 26.0 - 34.0 PG    MCHC 30.6 30.0 - 36.5 g/dL    RDW 17.4 (H) 11.5 - 14.5 %    PLATELET 074 822 - 848 K/uL    MPV 11.2 8.9 - 12.9 FL    NRBC 0.0 0  WBC    ABSOLUTE NRBC 0.00 0.00 - 0.01 K/uL   CULTURE, BLOOD, PAIRED    Collection Time: 05/18/22  6:06 AM    Specimen: Blood   Result Value Ref Range    Special Requests: NO SPECIAL REQUESTS      Culture result: NO GROWTH AFTER 1 HOUR     LABRCNT(wbc:2,hgb:2,hct:2,plt:2,)  Recent Labs     05/18/22  0329 05/17/22  0757   * 134*   K 4.4 5.3*   CL 98 98   CO2 28 26   BUN 40* 67*   CREA 8.18* 11.90*   GLU 74 92   CA 8.7 9.6   PHOS 4.6  --    LABRCNT(sgot:3,gpt:3,ap:3,tbiL:3,TP:3,ALB:3,GLOB:3,ggt:3,aml:3,amyp:3,lpse:3,hlpse:3)No results for input(s): INR, PTP, APTT, INREXT in the last 72 hours. Recent Labs     05/17/22  0757   AP 50   TP 6.5   ALB 2.7*   GLOB 3.8   LPSE 271   BRIEFLAB(B12,FOL,FOLAT,RBCF)No results found for: FOL, RBCFLABRCNT(CPK:3,CpKMB:3,ckndx:3,troiq:3)No components found for: GLPOCBRIEFLAB(CHOL,CHOLX,CHOLP,CHLST,CHOLV,HDL,HDLC,HDLP,LDL,DLDL,LDLC,DLDLP,TGL,TGLX,TRIGL,TRIGP,CHHD,CHHDX)No results for input(s): PH, PCO2, PO2 in the last 72 hours. LABRCNT(CPK:3,CpKMB:3,ckndx:3,troiq:3)AYLEEN Sanchez  No results for input(s): CPK, CKNDX, TROIQ in the last 72 hours. No lab exists for component: AYLEEN Jama    CT Results (most recent):  Results from Hospital Encounter encounter on 05/17/22    CTA ABDOMEN PELV W CONT    Narrative  Indication: Enteritis, uncontrolled hypertension    Comparison to noncontrast the CT performed earlier the same day    2.5 mm axial thin sections were obtained from the dome of the diaphragm to the  pubic symphysis following the rapid bolus administration of 100 cc Isovue-370.  3D post processing was performed including MIP and volume rendered imaging. CT  dose reduction was achieved through use of a standardized protocol tailored for  this examination and automatic exposure control for dose modulation. FINDINGS:    LUNG BASES: Clear. INCIDENTALLY IMAGED HEART AND MEDIASTINUM: Unremarkable. LIVER: Slight nodularity is again demonstrated without hepatic mass.   GALLBLADDER: Contracted, small gallstone. SPLEEN: No mass. PANCREAS: No mass or ductal dilatation. ADRENALS: Unremarkable. KIDNEYS: No renal mass or hydronephrosis. Bilateral renal cysts, the largest of  which measures 16 mm in the lower pole right kidney. STOMACH: Unremarkable. SMALL BOWEL: Mild small bowel wall thickening predominantly involving bowel  loops in the left upper abdomen. COLON: No dilatation or wall thickening. APPENDIX: Within normal limits. PERITONEUM: Moderate ascites and mesenteric edema. RETROPERITONEUM: The abdominal aorta is unremarkable. Origins of the celiac  axis, as MA, single renal arteries bilaterally, and SAMMIE are widely patent. Iliac  vasculature is nonaneurysmal.  REPRODUCTIVE ORGANS: There is no pelvic mass or lymphadenopathy. URINARY BLADDER: Decompressed. BONES: Degenerative changes are seen in the lumbar spine. Pars defects are seen  bilaterally at L5-S1. Small sclerotic lesions are noted throughout the lumbar  spine, unchanged. ADDITIONAL COMMENTS: N/A    Impression  Cholelithiasis. Moderate ascites and mesenteric edema. Mild small bowel wall  thickening predominantly involving bowel loops in the left abdomen. No evidence  of vascular compromise. Unchanged small sclerotic osseous lesions, potentially  too small for characterization by bone scan. Problem List:     Active Problems:    ESRD (end stage renal disease) on dialysis ([address] (3/12/2022)      Malignant hypertension (5/17/2022)      Enteritis (5/17/2022)      Bone metastasis ([address] (5/17/2022)        Impression:  Enteritis  Epigastric pain   Diarrhea  ESRD  Hx of Cirrhosis due to Hep C  Cocaine use last month, prior hx of heroin use per notes  Sclerotic osseous lesions           Plan:  Patient with significantly improved sx. He is tolerating diet and has yet to have a BM. Awaiting labs: C1 esterase, IBD panel, HIV, Hep C viral load, LUCIA and pANCA to further look for C1 esterase deficiency, vasculitis, and less likely IBD.   If an has a BM please send stool studies to exclude infectious etiology. Should not keep patient here, can follow up on results as an outpatient. Given improvement in sx hold off on capsule study.   D/c plans per primary care team.          AYLEEN Addison    5/18/2022  12:00 PM    [address] [facility], [address] [place] [address]  [address] [place] [address]: [phone]

## 2022-05-19 LAB
ANA SER QL: NEGATIVE
BAKER'S YEAST IGG QN IA: 18 UNITS (ref 0–50)
C-ANCA TITR SER IF: NORMAL TITER
CHITOBIOSIDE IGA SERPL IA-ACNC: 62 UNITS (ref 0–90)
COMMENTS: NORMAL
LAMINARIBIOSIDE IGG SERPL IA-ACNC: 15 UNITS (ref 0–60)
MANNOBIOSIDE IGG SERPL IA-ACNC: 54 UNITS (ref 0–100)
P-ANCA ATYPICAL SER QL IF: NEGATIVE
P-ANCA ATYPICAL TITR SER IF: NORMAL TITER
P-ANCA TITR SER IF: NORMAL TITER
PSA FREE MFR SERPL: 26.8 %
PSA FREE SERPL-MCNC: 0.59 NG/ML
PSA SERPL-MCNC: 2.2 NG/ML (ref 0–4)

## 2022-05-20 LAB — C1INH FUNCTIONAL/C1INH TOTAL MFR SERPL: 94 %MEAN NORMAL

## 2022-05-21 LAB
HCV AB S/CO SERPL IA: >11 S/CO RATIO (ref 0–0.9)
HCV RNA SERPL NAA+PROBE-ACNC: NORMAL IU/ML
INTERPRETATION, 550396: NORMAL
REF LAB TEST REF RANGE: NORMAL

## 2022-05-23 LAB
ALBUMIN SERPL ELPH-MCNC: 3.2 G/DL (ref 2.9–4.4)
ALBUMIN/GLOB SERPL: 1.2 {RATIO} (ref 0.7–1.7)
ALPHA1 GLOB SERPL ELPH-MCNC: 0.3 G/DL (ref 0–0.4)
ALPHA2 GLOB SERPL ELPH-MCNC: 0.6 G/DL (ref 0.4–1)
B-GLOBULIN SERPL ELPH-MCNC: 0.9 G/DL (ref 0.7–1.3)
BACTERIA SPEC CULT: NORMAL
GAMMA GLOB SERPL ELPH-MCNC: 1 G/DL (ref 0.4–1.8)
GLOBULIN SER-MCNC: 2.7 G/DL (ref 2.2–3.9)
IGA SERPL-MCNC: 276 MG/DL (ref 90–386)
IGG SERPL-MCNC: 1143 MG/DL (ref 603–1613)
IGM SERPL-MCNC: 30 MG/DL (ref 20–172)
INTERPRETATION SERPL IEP-IMP: ABNORMAL
KAPPA LC FREE SER-MCNC: 297.5 MG/L (ref 3.3–19.4)
KAPPA LC FREE/LAMBDA FREE SER: 1.33 {RATIO} (ref 0.26–1.65)
LAMBDA LC FREE SERPL-MCNC: 224.3 MG/L (ref 5.7–26.3)
M PROTEIN SERPL ELPH-MCNC: ABNORMAL G/DL
PROT SERPL-MCNC: 5.9 G/DL (ref 6–8.5)
SERVICE CMNT-IMP: NORMAL

## 2022-05-30 ENCOUNTER — APPOINTMENT (OUTPATIENT)
Dept: CT IMAGING | Age: 57
DRG: 100 | End: 2022-05-30
Attending: EMERGENCY MEDICINE
Payer: MEDICARE

## 2022-05-30 ENCOUNTER — HOSPITAL ENCOUNTER (INPATIENT)
Age: 57
LOS: 2 days | Discharge: LEFT BEFORE DISCHARGE | DRG: 100 | End: 2022-06-01
Attending: EMERGENCY MEDICINE | Admitting: INTERNAL MEDICINE
Payer: MEDICARE

## 2022-05-30 ENCOUNTER — APPOINTMENT (OUTPATIENT)
Dept: GENERAL RADIOLOGY | Age: 57
DRG: 100 | End: 2022-05-30
Attending: EMERGENCY MEDICINE
Payer: MEDICARE

## 2022-05-30 DIAGNOSIS — R29.898 LEFT HAND WEAKNESS: ICD-10-CM

## 2022-05-30 DIAGNOSIS — E87.5 ACUTE HYPERKALEMIA: ICD-10-CM

## 2022-05-30 DIAGNOSIS — R77.8 ELEVATED TROPONIN: ICD-10-CM

## 2022-05-30 DIAGNOSIS — N18.6 ESRD ON HEMODIALYSIS (HCC): ICD-10-CM

## 2022-05-30 DIAGNOSIS — R56.9 SEIZURE (HCC): Primary | ICD-10-CM

## 2022-05-30 DIAGNOSIS — I16.1 HYPERTENSIVE EMERGENCY: ICD-10-CM

## 2022-05-30 DIAGNOSIS — Z99.2 ESRD ON HEMODIALYSIS (HCC): ICD-10-CM

## 2022-05-30 LAB
ALBUMIN SERPL-MCNC: 2.7 G/DL (ref 3.5–5)
ALBUMIN/GLOB SERPL: 0.7 {RATIO} (ref 1.1–2.2)
ALP SERPL-CCNC: 48 U/L (ref 45–117)
ALT SERPL-CCNC: 9 U/L (ref 12–78)
ANION GAP SERPL CALC-SCNC: 13 MMOL/L (ref 5–15)
ARTERIAL PATENCY WRIST A: POSITIVE
AST SERPL-CCNC: 16 U/L (ref 15–37)
BASE DEFICIT BLD-SCNC: 7.3 MMOL/L
BASOPHILS # BLD: 0.1 K/UL (ref 0–0.1)
BASOPHILS NFR BLD: 1 % (ref 0–1)
BDY SITE: ABNORMAL
BILIRUB SERPL-MCNC: 0.3 MG/DL (ref 0.2–1)
BUN SERPL-MCNC: 53 MG/DL (ref 6–20)
BUN/CREAT SERPL: 5 (ref 12–20)
CALCIUM SERPL-MCNC: 9.3 MG/DL (ref 8.5–10.1)
CHLORIDE SERPL-SCNC: 103 MMOL/L (ref 97–108)
CK SERPL-CCNC: 82 U/L (ref 39–308)
CO2 SERPL-SCNC: 22 MMOL/L (ref 21–32)
CREAT SERPL-MCNC: 11.4 MG/DL (ref 0.7–1.3)
DIFFERENTIAL METHOD BLD: ABNORMAL
EOSINOPHIL # BLD: 0.5 K/UL (ref 0–0.4)
EOSINOPHIL NFR BLD: 6 % (ref 0–7)
ERYTHROCYTE [DISTWIDTH] IN BLOOD BY AUTOMATED COUNT: 17.5 % (ref 11.5–14.5)
ETHANOL SERPL-MCNC: <10 MG/DL
GAS FLOW.O2 O2 DELIVERY SYS: ABNORMAL L/MIN
GLOBULIN SER CALC-MCNC: 3.7 G/DL (ref 2–4)
GLUCOSE BLD STRIP.AUTO-MCNC: 78 MG/DL (ref 65–117)
GLUCOSE BLD STRIP.AUTO-MCNC: 86 MG/DL (ref 65–117)
GLUCOSE SERPL-MCNC: 78 MG/DL (ref 65–100)
HCO3 BLD-SCNC: 18.1 MMOL/L (ref 22–26)
HCT VFR BLD AUTO: 33 % (ref 36.6–50.3)
HGB BLD-MCNC: 9.7 G/DL (ref 12.1–17)
IMM GRANULOCYTES # BLD AUTO: 0.1 K/UL (ref 0–0.04)
IMM GRANULOCYTES NFR BLD AUTO: 1 % (ref 0–0.5)
INR PPP: 1.1 (ref 0.9–1.1)
LYMPHOCYTES # BLD: 1.8 K/UL (ref 0.8–3.5)
LYMPHOCYTES NFR BLD: 20 % (ref 12–49)
MAGNESIUM SERPL-MCNC: 2.4 MG/DL (ref 1.6–2.4)
MCH RBC QN AUTO: 24.5 PG (ref 26–34)
MCHC RBC AUTO-ENTMCNC: 29.4 G/DL (ref 30–36.5)
MCV RBC AUTO: 83.3 FL (ref 80–99)
MONOCYTES # BLD: 1.2 K/UL (ref 0–1)
MONOCYTES NFR BLD: 13 % (ref 5–13)
NEUTS SEG # BLD: 5.3 K/UL (ref 1.8–8)
NEUTS SEG NFR BLD: 59 % (ref 32–75)
NRBC # BLD: 0 K/UL (ref 0–0.01)
NRBC BLD-RTO: 0 PER 100 WBC
O2/TOTAL GAS SETTING VFR VENT: 21 %
PCO2 BLD: 35.4 MMHG (ref 35–45)
PH BLD: 7.32 [PH] (ref 7.35–7.45)
PHOSPHATE SERPL-MCNC: 5 MG/DL (ref 2.6–4.7)
PLATELET # BLD AUTO: 287 K/UL (ref 150–400)
PMV BLD AUTO: 9.6 FL (ref 8.9–12.9)
PO2 BLD: 70 MMHG (ref 80–100)
POTASSIUM SERPL-SCNC: 5.6 MMOL/L (ref 3.5–5.1)
PROT SERPL-MCNC: 6.4 G/DL (ref 6.4–8.2)
PROTHROMBIN TIME: 11.8 SEC (ref 9–11.1)
RBC # BLD AUTO: 3.96 M/UL (ref 4.1–5.7)
SAO2 % BLD: 92.5 % (ref 92–97)
SERVICE CMNT-IMP: NORMAL
SERVICE CMNT-IMP: NORMAL
SODIUM SERPL-SCNC: 138 MMOL/L (ref 136–145)
SPECIMEN TYPE: ABNORMAL
TROPONIN-HIGH SENSITIVITY: 145 NG/L (ref 0–76)
TROPONIN-HIGH SENSITIVITY: 310 NG/L (ref 0–76)
VIT B12 SERPL-MCNC: 325 PG/ML (ref 193–986)
WBC # BLD AUTO: 9 K/UL (ref 4.1–11.1)

## 2022-05-30 PROCEDURE — 84100 ASSAY OF PHOSPHORUS: CPT

## 2022-05-30 PROCEDURE — 85610 PROTHROMBIN TIME: CPT

## 2022-05-30 PROCEDURE — 80053 COMPREHEN METABOLIC PANEL: CPT

## 2022-05-30 PROCEDURE — 74011000636 HC RX REV CODE- 636: Performed by: RADIOLOGY

## 2022-05-30 PROCEDURE — 74011000250 HC RX REV CODE- 250: Performed by: NURSE PRACTITIONER

## 2022-05-30 PROCEDURE — 93005 ELECTROCARDIOGRAM TRACING: CPT

## 2022-05-30 PROCEDURE — 95706 EEG WO VID 2-12HR INTMT MNTR: CPT | Performed by: EMERGENCY MEDICINE

## 2022-05-30 PROCEDURE — 99285 EMERGENCY DEPT VISIT HI MDM: CPT

## 2022-05-30 PROCEDURE — 74011250636 HC RX REV CODE- 250/636: Performed by: EMERGENCY MEDICINE

## 2022-05-30 PROCEDURE — 74011000258 HC RX REV CODE- 258: Performed by: EMERGENCY MEDICINE

## 2022-05-30 PROCEDURE — 96365 THER/PROPH/DIAG IV INF INIT: CPT

## 2022-05-30 PROCEDURE — 96375 TX/PRO/DX INJ NEW DRUG ADDON: CPT

## 2022-05-30 PROCEDURE — 71045 X-RAY EXAM CHEST 1 VIEW: CPT

## 2022-05-30 PROCEDURE — 0042T CT CODE NEURO PERF W CBF: CPT

## 2022-05-30 PROCEDURE — 74011000250 HC RX REV CODE- 250: Performed by: EMERGENCY MEDICINE

## 2022-05-30 PROCEDURE — 82962 GLUCOSE BLOOD TEST: CPT

## 2022-05-30 PROCEDURE — 85025 COMPLETE CBC W/AUTO DIFF WBC: CPT

## 2022-05-30 PROCEDURE — 82607 VITAMIN B-12: CPT

## 2022-05-30 PROCEDURE — 96376 TX/PRO/DX INJ SAME DRUG ADON: CPT

## 2022-05-30 PROCEDURE — 84484 ASSAY OF TROPONIN QUANT: CPT

## 2022-05-30 PROCEDURE — 82803 BLOOD GASES ANY COMBINATION: CPT

## 2022-05-30 PROCEDURE — 82077 ASSAY SPEC XCP UR&BREATH IA: CPT

## 2022-05-30 PROCEDURE — 70450 CT HEAD/BRAIN W/O DYE: CPT

## 2022-05-30 PROCEDURE — 36600 WITHDRAWAL OF ARTERIAL BLOOD: CPT

## 2022-05-30 PROCEDURE — 74011250636 HC RX REV CODE- 250/636: Performed by: NURSE PRACTITIONER

## 2022-05-30 PROCEDURE — 83735 ASSAY OF MAGNESIUM: CPT

## 2022-05-30 PROCEDURE — 70496 CT ANGIOGRAPHY HEAD: CPT

## 2022-05-30 PROCEDURE — 36415 COLL VENOUS BLD VENIPUNCTURE: CPT

## 2022-05-30 PROCEDURE — 74011250636 HC RX REV CODE- 250/636

## 2022-05-30 PROCEDURE — 65610000006 HC RM INTENSIVE CARE

## 2022-05-30 PROCEDURE — 82550 ASSAY OF CK (CPK): CPT

## 2022-05-30 RX ORDER — SODIUM CHLORIDE 0.9 % (FLUSH) 0.9 %
5-40 SYRINGE (ML) INJECTION EVERY 8 HOURS
Status: DISCONTINUED | OUTPATIENT
Start: 2022-05-30 | End: 2022-06-01 | Stop reason: HOSPADM

## 2022-05-30 RX ORDER — HEPARIN SODIUM 5000 [USP'U]/ML
5000 INJECTION, SOLUTION INTRAVENOUS; SUBCUTANEOUS EVERY 8 HOURS
Status: DISCONTINUED | OUTPATIENT
Start: 2022-05-30 | End: 2022-06-01 | Stop reason: HOSPADM

## 2022-05-30 RX ORDER — LANOLIN ALCOHOL/MO/W.PET/CERES
1000 CREAM (GRAM) TOPICAL DAILY
Status: DISCONTINUED | OUTPATIENT
Start: 2022-05-31 | End: 2022-06-01 | Stop reason: HOSPADM

## 2022-05-30 RX ORDER — LORAZEPAM 2 MG/ML
2 INJECTION, SOLUTION INTRAMUSCULAR; INTRAVENOUS ONCE
Status: COMPLETED | OUTPATIENT
Start: 2022-05-30 | End: 2022-05-30

## 2022-05-30 RX ORDER — POLYETHYLENE GLYCOL 3350 17 G/17G
17 POWDER, FOR SOLUTION ORAL DAILY PRN
Status: DISCONTINUED | OUTPATIENT
Start: 2022-05-30 | End: 2022-06-01 | Stop reason: HOSPADM

## 2022-05-30 RX ORDER — ACETAMINOPHEN 650 MG/1
650 SUPPOSITORY RECTAL
Status: DISCONTINUED | OUTPATIENT
Start: 2022-05-30 | End: 2022-06-01 | Stop reason: HOSPADM

## 2022-05-30 RX ORDER — SODIUM CHLORIDE 0.9 % (FLUSH) 0.9 %
5-40 SYRINGE (ML) INJECTION AS NEEDED
Status: DISCONTINUED | OUTPATIENT
Start: 2022-05-30 | End: 2022-06-01 | Stop reason: HOSPADM

## 2022-05-30 RX ORDER — LABETALOL HYDROCHLORIDE 5 MG/ML
10 INJECTION, SOLUTION INTRAVENOUS
Status: DISCONTINUED | OUTPATIENT
Start: 2022-05-30 | End: 2022-05-31

## 2022-05-30 RX ORDER — LORAZEPAM 2 MG/ML
INJECTION, SOLUTION INTRAMUSCULAR; INTRAVENOUS
Status: DISCONTINUED
Start: 2022-05-30 | End: 2022-05-30 | Stop reason: WASHOUT

## 2022-05-30 RX ORDER — LORAZEPAM 2 MG/ML
INJECTION, SOLUTION INTRAMUSCULAR; INTRAVENOUS
Status: COMPLETED
Start: 2022-05-30 | End: 2022-05-30

## 2022-05-30 RX ORDER — ACETAMINOPHEN 325 MG/1
650 TABLET ORAL
Status: DISCONTINUED | OUTPATIENT
Start: 2022-05-30 | End: 2022-06-01 | Stop reason: HOSPADM

## 2022-05-30 RX ORDER — ONDANSETRON 2 MG/ML
4 INJECTION INTRAMUSCULAR; INTRAVENOUS
Status: DISCONTINUED | OUTPATIENT
Start: 2022-05-30 | End: 2022-06-01 | Stop reason: HOSPADM

## 2022-05-30 RX ADMIN — LORAZEPAM 2 MG: 2 INJECTION, SOLUTION INTRAMUSCULAR; INTRAVENOUS at 20:14

## 2022-05-30 RX ADMIN — SODIUM CHLORIDE, PRESERVATIVE FREE 10 ML: 5 INJECTION INTRAVENOUS at 21:07

## 2022-05-30 RX ADMIN — IOPAMIDOL 100 ML: 755 INJECTION, SOLUTION INTRAVENOUS at 19:57

## 2022-05-30 RX ADMIN — HEPARIN SODIUM 5000 UNITS: 5000 INJECTION INTRAVENOUS; SUBCUTANEOUS at 22:59

## 2022-05-30 RX ADMIN — SODIUM CHLORIDE 1600 MG PE: 9 INJECTION, SOLUTION INTRAVENOUS at 20:34

## 2022-05-30 RX ADMIN — SODIUM CHLORIDE 5 MG/HR: 900 INJECTION, SOLUTION INTRAVENOUS at 20:38

## 2022-05-30 RX ADMIN — IOPAMIDOL 20 ML: 755 INJECTION, SOLUTION INTRAVENOUS at 19:57

## 2022-05-30 RX ADMIN — NICARDIPINE HYDROCHLORIDE 15 MG/HR: 25 INJECTION, SOLUTION INTRAVENOUS at 22:59

## 2022-05-30 NOTE — ED TRIAGE NOTES
PT arrives via EMS from home after his family found him less responsive in a chair. According to family pt was acting normally before 1900 then at 1900 pt slumped over in his chair after some seizure like activity.    LKW changed to yesterday per neuro NP

## 2022-05-30 NOTE — ED PROVIDER NOTES
24-year-old male presents from home via EMS with altered mental status and left-sided facial droop. Family reported to EMS that at 7 PM they saw the patient seen the collapse and go unresponsive. There was possibly some seizure activity as reported by family. EMS arrived to find the patient pretty much unresponsive but awake. He gradually became more responsive and alert as they were in route to the hospital.  Family reported drooping in the left side of his face that also seems to have mostly resolved. Patient is a past medical history significant for end-stage kidney disease on dialysis, hypertension, liver disease. Past Medical History:   Diagnosis Date    Chronic kidney disease     Hypertension     Liver disease        No past surgical history on file. No family history on file.     Social History     Socioeconomic History    Marital status: SINGLE     Spouse name: Not on file    Number of children: Not on file    Years of education: Not on file    Highest education level: Not on file   Occupational History    Not on file   Tobacco Use    Smoking status: Current Some Day Smoker     Packs/day: 1.00     Last attempt to quit: 2020     Years since quittin.8    Smokeless tobacco: Never Used    Tobacco comment: started back smoking in last couple months   Vaping Use    Vaping Use: Never used   Substance and Sexual Activity    Alcohol use: Not Currently    Drug use: Yes     Types: Marijuana, Cocaine, Heroin     Comment: [employer]ion & Cocaine 10 years ago as of 3/23/22, Marijuana last used 1 week ago    Sexual activity: Not Currently   Other Topics Concern    Not on file   Social History Narrative    Not on file     Social Determinants of Health     Financial Resource Strain:     Difficulty of Paying Living Expenses: Not on file   Food Insecurity:     Worried About Running Out of Food in the Last Year: Not on file    M[place] of Food in the Last Year: Not on [employer] Needs:     Lack of Transportation (Medical): Not on file    Lack of Transportation (Non-Medical): Not on file   Physical Activity:     Days of Exercise per Week: Not on file    Minutes of Exercise per Session: Not on file   Stress:     Feeling of Stress : Not on file   Social Connections:     Frequency of Communication with Friends and Family: Not on file    Frequency of Social Gatherings with Friends and Family: Not on file    Attends Hoahaoism Services: Not on file    Active Member of [address] [place] [address] or Organizations: Not on file    Attends Club or Organization Meetings: Not on file    Marital Status: Not on file   Intimate Partner Violence:     Fear of Current or Ex-Partner: Not on file    Emotionally Abused: Not on file    Physically Abused: Not on file    Sexually Abused: Not on file   Housing Stability:     Unable to Pay for Housing in the Last Year: Not on file    Number of [place] in the Last Year: Not on file    Unstable Housing in the Last Year: Not on file         ALLERGIES: Baclofen    Review of Systems   Unable to perform ROS: Mental status change       Vitals:    05/30/22 2135 05/30/22 2145 05/30/22 2200 05/30/22 2224   BP: (!) 168/75 (!) 161/74 (!) 158/74 (!) 187/68   Pulse: 62 61 64 80   Resp:  17 16 16   Temp:   98.6 °F (37 °C) 97.6 °F (36.4 °C)   SpO2:  96% 95%    Weight:       Height:                Physical Exam  Vitals and nursing note reviewed. Constitutional:       General: He is not in acute distress. Appearance: He is well-developed. HENT:      Head: Normocephalic and atraumatic. Eyes:      Pupils: Pupils are equal, round, and reactive to light. Cardiovascular:      Rate and Rhythm: Normal rate. Pulmonary:      Effort: Pulmonary effort is normal. No respiratory distress. Abdominal:      General: There is no distension. Musculoskeletal:         General: Normal range of motion. Cervical back: Normal range of motion and neck supple.       Comments: Dialysis catheter in L chest wall   Skin:     General: Skin is warm and dry. Neurological:      Mental Status: He is alert and oriented to person, place, and time. [facility]  ED Course as of 05/30/22 2248   Mon May 30, 2022   2017 8:17 PM  I was called into room for generalized seizure acitivty. Teleneuro was on screen. Ativan 2mg IV given. Recommended loading with fosphenytoin. Seizure stopped on its own. []   2024 EKG, 12 LEAD, INITIAL  ED EKG interpretation:  Rhythm: normal sinus rhythm. Rate (approx.): 91. Axis: normal.  ST segment:  No concerning ST elevations or depressions. This EKG was interpreted by Swati Lim MD,ED Provider. []   2034 EKG, 12 LEAD, INITIAL  ED EKG interpretation:  Rhythm: normal sinus rhythm. Rate (approx.): 91. Axis: normal.  ST segment:  No concerning ST elevations or depressions. This EKG was interpreted by Swati Lim MD,ED Provider. []      ED Course User Index  [] Ted Rivera MD     Perfect Serve Consult for Admission  8:22 PM    ED Room Number: 7101/01  Patient Name and age:  Magen Chen 64 y.o.  male  Working Diagnosis:   1. Seizure ([address]    2. Hypertensive emergency    3. ESRD on hemodialysis (HCC)    4. Elevated troponin    5. Acute hyperkalemia        COVID-19 Suspicion:  no  Sepsis present:  no  Reassessment needed: no  Code Status:  Full Code  Readmission: no  Isolation Requirements:  no  Recommended Level of Care:  ICU  Department: [facility] Adult ED - [phone]  Admitting Provider:     Other:  Code stroke for AMS. ESRD missed dialysis x2. bp 240/140 initially. Seized while in department. Given ativan and fosphenytoin. No tpa or LVO. Total critical care time spent exclusive of procedures:  45 minutes.     Procedures

## 2022-05-31 ENCOUNTER — APPOINTMENT (OUTPATIENT)
Dept: MRI IMAGING | Age: 57
DRG: 100 | End: 2022-05-31
Attending: PSYCHIATRY & NEUROLOGY
Payer: MEDICARE

## 2022-05-31 LAB
ALBUMIN SERPL-MCNC: 2.7 G/DL (ref 3.5–5)
ALBUMIN/GLOB SERPL: 0.7 {RATIO} (ref 1.1–2.2)
ALP SERPL-CCNC: 46 U/L (ref 45–117)
ALT SERPL-CCNC: 9 U/L (ref 12–78)
ANION GAP SERPL CALC-SCNC: 11 MMOL/L (ref 5–15)
AST SERPL-CCNC: 18 U/L (ref 15–37)
ATRIAL RATE: 66 BPM
ATRIAL RATE: 91 BPM
BILIRUB SERPL-MCNC: 0.3 MG/DL (ref 0.2–1)
BUN SERPL-MCNC: 61 MG/DL (ref 6–20)
BUN/CREAT SERPL: 5 (ref 12–20)
CALCIUM SERPL-MCNC: 9.2 MG/DL (ref 8.5–10.1)
CALCULATED P AXIS, ECG09: 52 DEGREES
CALCULATED P AXIS, ECG09: 73 DEGREES
CALCULATED R AXIS, ECG10: 53 DEGREES
CALCULATED R AXIS, ECG10: 69 DEGREES
CALCULATED T AXIS, ECG11: 75 DEGREES
CALCULATED T AXIS, ECG11: 84 DEGREES
CHLORIDE SERPL-SCNC: 104 MMOL/L (ref 97–108)
CO2 SERPL-SCNC: 22 MMOL/L (ref 21–32)
CREAT SERPL-MCNC: 11.4 MG/DL (ref 0.7–1.3)
DIAGNOSIS, 93000: NORMAL
DIAGNOSIS, 93000: NORMAL
ERYTHROCYTE [DISTWIDTH] IN BLOOD BY AUTOMATED COUNT: 17.5 % (ref 11.5–14.5)
GLOBULIN SER CALC-MCNC: 4.1 G/DL (ref 2–4)
GLUCOSE BLD STRIP.AUTO-MCNC: 72 MG/DL (ref 65–117)
GLUCOSE BLD STRIP.AUTO-MCNC: 97 MG/DL (ref 65–117)
GLUCOSE SERPL-MCNC: 58 MG/DL (ref 65–100)
HCT VFR BLD AUTO: 29.6 % (ref 36.6–50.3)
HGB BLD-MCNC: 9 G/DL (ref 12.1–17)
MCH RBC QN AUTO: 24.6 PG (ref 26–34)
MCHC RBC AUTO-ENTMCNC: 30.4 G/DL (ref 30–36.5)
MCV RBC AUTO: 80.9 FL (ref 80–99)
NRBC # BLD: 0 K/UL (ref 0–0.01)
NRBC BLD-RTO: 0 PER 100 WBC
P-R INTERVAL, ECG05: 158 MS
P-R INTERVAL, ECG05: 182 MS
PLATELET # BLD AUTO: 288 K/UL (ref 150–400)
PMV BLD AUTO: 10.3 FL (ref 8.9–12.9)
POTASSIUM SERPL-SCNC: 5.5 MMOL/L (ref 3.5–5.1)
PROT SERPL-MCNC: 6.8 G/DL (ref 6.4–8.2)
Q-T INTERVAL, ECG07: 412 MS
Q-T INTERVAL, ECG07: 476 MS
QRS DURATION, ECG06: 92 MS
QRS DURATION, ECG06: 96 MS
QTC CALCULATION (BEZET), ECG08: 499 MS
QTC CALCULATION (BEZET), ECG08: 506 MS
RBC # BLD AUTO: 3.66 M/UL (ref 4.1–5.7)
SERVICE CMNT-IMP: NORMAL
SERVICE CMNT-IMP: NORMAL
SODIUM SERPL-SCNC: 137 MMOL/L (ref 136–145)
TROPONIN-HIGH SENSITIVITY: 250 NG/L (ref 0–76)
TROPONIN-HIGH SENSITIVITY: 334 NG/L (ref 0–76)
TROPONIN-HIGH SENSITIVITY: 404 NG/L (ref 0–76)
VENTRICULAR RATE, ECG03: 66 BPM
VENTRICULAR RATE, ECG03: 91 BPM
WBC # BLD AUTO: 9.8 K/UL (ref 4.1–11.1)

## 2022-05-31 PROCEDURE — 93005 ELECTROCARDIOGRAM TRACING: CPT

## 2022-05-31 PROCEDURE — 5A1D70Z PERFORMANCE OF URINARY FILTRATION, INTERMITTENT, LESS THAN 6 HOURS PER DAY: ICD-10-PCS | Performed by: INTERNAL MEDICINE

## 2022-05-31 PROCEDURE — 82962 GLUCOSE BLOOD TEST: CPT

## 2022-05-31 PROCEDURE — 74011250636 HC RX REV CODE- 250/636: Performed by: NURSE PRACTITIONER

## 2022-05-31 PROCEDURE — 74011000250 HC RX REV CODE- 250: Performed by: NURSE PRACTITIONER

## 2022-05-31 PROCEDURE — 95813 EEG EXTND MNTR 61-119 MIN: CPT | Performed by: PSYCHIATRY & NEUROLOGY

## 2022-05-31 PROCEDURE — 80053 COMPREHEN METABOLIC PANEL: CPT

## 2022-05-31 PROCEDURE — 65610000006 HC RM INTENSIVE CARE

## 2022-05-31 PROCEDURE — 74011250636 HC RX REV CODE- 250/636: Performed by: INTERNAL MEDICINE

## 2022-05-31 PROCEDURE — 85027 COMPLETE CBC AUTOMATED: CPT

## 2022-05-31 PROCEDURE — 99223 1ST HOSP IP/OBS HIGH 75: CPT | Performed by: PSYCHIATRY & NEUROLOGY

## 2022-05-31 PROCEDURE — 84484 ASSAY OF TROPONIN QUANT: CPT

## 2022-05-31 PROCEDURE — 4A03X5D MEASUREMENT OF ARTERIAL FLOW, INTRACRANIAL, EXTERNAL APPROACH: ICD-10-PCS | Performed by: STUDENT IN AN ORGANIZED HEALTH CARE EDUCATION/TRAINING PROGRAM

## 2022-05-31 PROCEDURE — 74011250637 HC RX REV CODE- 250/637: Performed by: NURSE PRACTITIONER

## 2022-05-31 PROCEDURE — 90935 HEMODIALYSIS ONE EVALUATION: CPT

## 2022-05-31 PROCEDURE — 36415 COLL VENOUS BLD VENIPUNCTURE: CPT

## 2022-05-31 PROCEDURE — 95819 EEG AWAKE AND ASLEEP: CPT | Performed by: PSYCHIATRY & NEUROLOGY

## 2022-05-31 PROCEDURE — 95816 EEG AWAKE AND DROWSY: CPT | Performed by: NURSE PRACTITIONER

## 2022-05-31 RX ORDER — MAGNESIUM SULFATE 100 %
3 CRYSTALS MISCELLANEOUS AS NEEDED
Status: DISCONTINUED | OUTPATIENT
Start: 2022-05-31 | End: 2022-06-01 | Stop reason: HOSPADM

## 2022-05-31 RX ORDER — CARVEDILOL 12.5 MG/1
25 TABLET ORAL 2 TIMES DAILY WITH MEALS
Status: DISCONTINUED | OUTPATIENT
Start: 2022-05-31 | End: 2022-06-01 | Stop reason: HOSPADM

## 2022-05-31 RX ORDER — GUAIFENESIN 100 MG/5ML
81 LIQUID (ML) ORAL EVERY EVENING
Status: DISCONTINUED | OUTPATIENT
Start: 2022-05-31 | End: 2022-06-01 | Stop reason: HOSPADM

## 2022-05-31 RX ORDER — HEPARIN SODIUM 1000 [USP'U]/ML
4100 INJECTION, SOLUTION INTRAVENOUS; SUBCUTANEOUS AS NEEDED
Status: DISCONTINUED | OUTPATIENT
Start: 2022-05-31 | End: 2022-05-31

## 2022-05-31 RX ORDER — LABETALOL HYDROCHLORIDE 5 MG/ML
20 INJECTION, SOLUTION INTRAVENOUS
Status: DISCONTINUED | OUTPATIENT
Start: 2022-05-31 | End: 2022-06-01 | Stop reason: HOSPADM

## 2022-05-31 RX ORDER — LOSARTAN POTASSIUM 50 MG/1
100 TABLET ORAL DAILY
Status: DISCONTINUED | OUTPATIENT
Start: 2022-05-31 | End: 2022-06-01 | Stop reason: HOSPADM

## 2022-05-31 RX ORDER — CLONIDINE HYDROCHLORIDE 0.1 MG/1
0.1 TABLET ORAL 2 TIMES DAILY
Status: DISCONTINUED | OUTPATIENT
Start: 2022-05-31 | End: 2022-06-01

## 2022-05-31 RX ORDER — PANTOPRAZOLE SODIUM 40 MG/1
40 TABLET, DELAYED RELEASE ORAL
Status: DISCONTINUED | OUTPATIENT
Start: 2022-05-31 | End: 2022-06-01 | Stop reason: HOSPADM

## 2022-05-31 RX ORDER — HYDRALAZINE HYDROCHLORIDE 20 MG/ML
20 INJECTION INTRAMUSCULAR; INTRAVENOUS
Status: DISCONTINUED | OUTPATIENT
Start: 2022-05-31 | End: 2022-06-01 | Stop reason: HOSPADM

## 2022-05-31 RX ORDER — BUPRENORPHINE HYDROCHLORIDE AND NALOXONE HYDROCHLORIDE DIHYDRATE 8; 2 MG/1; MG/1
1 TABLET SUBLINGUAL 2 TIMES DAILY
Status: DISCONTINUED | OUTPATIENT
Start: 2022-05-31 | End: 2022-05-31

## 2022-05-31 RX ADMIN — CLONIDINE HYDROCHLORIDE 0.1 MG: 0.1 TABLET ORAL at 18:25

## 2022-05-31 RX ADMIN — HEPARIN SODIUM 5000 UNITS: 5000 INJECTION INTRAVENOUS; SUBCUTANEOUS at 14:03

## 2022-05-31 RX ADMIN — NICARDIPINE HYDROCHLORIDE 15 MG/HR: 25 INJECTION, SOLUTION INTRAVENOUS at 09:13

## 2022-05-31 RX ADMIN — SODIUM CHLORIDE, PRESERVATIVE FREE 10 ML: 5 INJECTION INTRAVENOUS at 14:03

## 2022-05-31 RX ADMIN — NICARDIPINE HYDROCHLORIDE 10 MG/HR: 25 INJECTION, SOLUTION INTRAVENOUS at 11:10

## 2022-05-31 RX ADMIN — HEPARIN SODIUM 5000 UNITS: 5000 INJECTION INTRAVENOUS; SUBCUTANEOUS at 05:47

## 2022-05-31 RX ADMIN — HEPARIN SODIUM 5000 UNITS: 5000 INJECTION INTRAVENOUS; SUBCUTANEOUS at 21:27

## 2022-05-31 RX ADMIN — NICARDIPINE HYDROCHLORIDE 7.5 MG/HR: 25 INJECTION, SOLUTION INTRAVENOUS at 04:08

## 2022-05-31 RX ADMIN — HYDRALAZINE HYDROCHLORIDE 20 MG: 20 INJECTION, SOLUTION INTRAMUSCULAR; INTRAVENOUS at 23:05

## 2022-05-31 RX ADMIN — CLONIDINE HYDROCHLORIDE 0.1 MG: 0.1 TABLET ORAL at 12:06

## 2022-05-31 RX ADMIN — NICARDIPINE HYDROCHLORIDE 10 MG/HR: 25 INJECTION, SOLUTION INTRAVENOUS at 01:01

## 2022-05-31 RX ADMIN — PANTOPRAZOLE SODIUM 40 MG: 40 TABLET, DELAYED RELEASE ORAL at 16:45

## 2022-05-31 RX ADMIN — CARVEDILOL 25 MG: 12.5 TABLET, FILM COATED ORAL at 12:07

## 2022-05-31 RX ADMIN — HEPARIN SODIUM 4100 UNITS: 1000 INJECTION, SOLUTION INTRAVENOUS; SUBCUTANEOUS at 09:36

## 2022-05-31 RX ADMIN — SODIUM CHLORIDE 100 MG PE: 9 INJECTION, SOLUTION INTRAMUSCULAR; INTRAVENOUS; SUBCUTANEOUS at 12:09

## 2022-05-31 RX ADMIN — CYANOCOBALAMIN TAB 500 MCG 1000 MCG: 500 TAB at 12:21

## 2022-05-31 RX ADMIN — LABETALOL HYDROCHLORIDE 20 MG: 5 INJECTION INTRAVENOUS at 19:48

## 2022-05-31 RX ADMIN — NICARDIPINE HYDROCHLORIDE 15 MG/HR: 25 INJECTION, SOLUTION INTRAVENOUS at 14:00

## 2022-05-31 RX ADMIN — LOSARTAN POTASSIUM 100 MG: 50 TABLET, FILM COATED ORAL at 12:06

## 2022-05-31 RX ADMIN — SODIUM CHLORIDE 100 MG PE: 9 INJECTION, SOLUTION INTRAMUSCULAR; INTRAVENOUS; SUBCUTANEOUS at 04:08

## 2022-05-31 RX ADMIN — NICARDIPINE HYDROCHLORIDE 15 MG/HR: 25 INJECTION, SOLUTION INTRAVENOUS at 07:16

## 2022-05-31 RX ADMIN — LABETALOL HYDROCHLORIDE 20 MG: 5 INJECTION INTRAVENOUS at 14:21

## 2022-05-31 RX ADMIN — SODIUM CHLORIDE, PRESERVATIVE FREE 10 ML: 5 INJECTION INTRAVENOUS at 05:47

## 2022-05-31 RX ADMIN — SODIUM CHLORIDE 100 MG PE: 9 INJECTION, SOLUTION INTRAMUSCULAR; INTRAVENOUS; SUBCUTANEOUS at 19:48

## 2022-05-31 RX ADMIN — ASPIRIN 81 MG 81 MG: 81 TABLET ORAL at 18:25

## 2022-05-31 RX ADMIN — SODIUM CHLORIDE, PRESERVATIVE FREE 10 ML: 5 INJECTION INTRAVENOUS at 21:28

## 2022-05-31 RX ADMIN — CARVEDILOL 25 MG: 12.5 TABLET, FILM COATED ORAL at 16:45

## 2022-05-31 NOTE — ROUTINE PROCESS
TRANSFER - OUT REPORT:    Verbal report given to NAVEED Corbett(name) on Graciela Edmondson  being transferred to ICU(unit) for routine progression of care       Report consisted of patients Situation, Background, Assessment and   Recommendations(SBAR). Information from the following report(s) SBAR, Kardex, ED Summary, [facility] and Recent Results was reviewed with the receiving nurse. Lines:   Peripheral IV 05/30/22 Right; Anterior; Upper Arm (Active)   Site Assessment Clean, dry, & intact 05/30/22 1951   Phlebitis Assessment 0 05/30/22 1951   Infiltration Assessment 0 05/30/22 1951   Dressing Status Clean, dry, & intact 05/30/22 1951   Dressing Type Transparent;Tape 05/30/22 1951   Hub Color/Line Status Green;Capped;Flushed;Patent 05/30/22 1951   Action Taken Blood drawn 05/30/22 1951       Peripheral IV 05/30/22 Left Antecubital (Active)   Site Assessment Clean, dry, & intact 05/30/22 2038   Phlebitis Assessment 0 05/30/22 2038   Infiltration Assessment 0 05/30/22 2038   Dressing Status Clean, dry, & intact 05/30/22 2038   Dressing Type Tape;Transparent 05/30/22 2038   Hub Color/Line Status Pink;Flushed;Patent; Infusing 05/30/22 2038   Alcohol Cap Used No 05/30/22 2038        Opportunity for questions and clarification was provided.       Patient transported with:   Monitor  Registered Nurse

## 2022-05-31 NOTE — PROCEDURES
ELECTROENCEPHALOGRAM REPORT     Patient Name: Saurabh Valencia  : 1965  Age: 64 y.o. Date of EEG: May 30th, 2022  Interpreting physician: Jose Negro MD    PROCEDURE: > 1 hour Ceribell Rapid Electroencephalogram      CLINICAL INDICATION: The patient is a 64 y.o. male who is being evaluated for subclinical seizure as etiology to ongoing encephalopathy. DESCRIPTION OF THE RECORD:   Throughout this epoch, background consist of incompletely organized theta in the posterior head region with intermittently preserved anterior-posterior frequency/amplitude gradient. At times this transitions to more of diffuse slowing (transition to drowsiness?). No further state changes are noted. Prominent ECG/cardio ballistic artifact is noted in the temporal leads, no definitive epileptiform discharges are noted. Reactivity is unable to be commented on. INTERPRETATION:     This is an abnormal Ceribell EEG is characterized by evidence for mild to moderate diffuse cerebral dysfunction, nonspecific in etiology but consistent with encephalopathy. No epileptiform discharges or electrograpihc seizures are noted.     Blanca Murillo MD

## 2022-05-31 NOTE — PROCEDURES
PROCEDURE: ROUTINE INPATIENT EEG  NAME:   Arabella Schmidt NUMBER : [de-identified]  MRN:   [id number]  DATE OF SERVICE: 5/31/2022     HISTORY/INDICATION: Pt is a 62yo male presenting with AMS, witnessed seizure in ED. EEG performed to exclude ongoing seizure activity.     MEDICATIONS:   Current Facility-Administered Medications   Medication Dose Route Frequency Provider Last Rate Last Admin    heparin (porcine) 1,000 unit/mL injection 2,100 Units  2,100 Units InterCATHeter DIALYSIS PRN Eduarda Cables, NP        And    heparin (porcine) 1,000 unit/mL injection 2,000 Units  2,000 Units InterCATHeter DIALYSIS PRN Eduarda Cables, NP        labetaloL (NORMODYNE;TRANDATE) injection 20 mg  20 mg IntraVENous Q4H PRN Eduarda Cables, NP   20 mg at 05/31/22 1421    losartan (COZAAR) tablet 100 mg  100 mg Oral DAILY Eduarda Cables, NP   100 mg at 05/31/22 1206    cloNIDine HCL (CATAPRES) tablet 0.1 mg  0.1 mg Oral BID Eduarda Cables, NP   0.1 mg at 05/31/22 1206    carvediloL (COREG) tablet 25 mg  25 mg Oral BID WITH MEALS Eduarda Cables, NP   25 mg at 05/31/22 1207    hydrALAZINE (APRESOLINE) 20 mg/mL injection 20 mg  20 mg IntraVENous Q4H PRN Eduarda Cables, NP        pantoprazole (PROTONIX) tablet 40 mg  40 mg Oral ACB&D Eduarda Cables, NP        glucagon (GLUCAGEN) injection 1 mg  1 mg IntraMUSCular PRN Eduarda Cables, NP        glucose chewable tablet 12 g  3 Tablet Oral PRN Eduarda Cables, NP        dextrose 10 % infusion 125-250 mL  125-250 mL IntraVENous PRN Eduarda Cables, NP        aspirin chewable tablet 81 mg  81 mg Oral QPM Eduarda Cables, NP        fosphenytoin (CEREBYX) with saline injection 100 mg PE  100 mg PE IntraVENous Q8H Joann Carvajal NP   100 mg PE at 05/31/22 1209    niCARdipine (CARDENE) 25 mg in 0.9% sodium chloride 250 mL ([id number])  5-15 mg/hr IntraVENous TITRATE RAVINDRA Jaquez   Stopped at 05/31/22 1504    sodium chloride (NS) flush 5-40 mL  5-40 mL IntraVENous Q8H Jeremiah Allen, NP-C   10 mL at 05/31/22 1403    sodium chloride (NS) flush 5-40 mL  5-40 mL IntraVENous PRN Guyann Memory, NP-C        acetaminophen (TYLENOL) tablet 650 mg  650 mg Oral Q6H PRN Guyann Memory, NP-C        Or    acetaminophen (TYLENOL) suppository 650 mg  650 mg Rectal Q6H PRN Guyann Memory, NP-C        polyethylene glycol (MIRALAX) packet 17 g  17 g Oral DAILY PRN Guyann Memory, NP-C        heparin (porcine) injection 5,000 Units  5,000 Units SubCUTAneous Q8H Lillia Men R, NP-C   5,000 Units at 05/31/22 1403    ondansetron (ZOFRAN) injection 4 mg  4 mg IntraVENous Q6H PRN Guyann Memory, NP-C        cyanocobalamin (VITAMIN B12) tablet 1,000 mcg  1,000 mcg Oral DAILY Shayy Clarity, NP   1,000 mcg at 05/31/22 1221       CONDITIONS OF RECORDING: This is a routine 21-channel EEG recording performed in accordance with the international 10-20 system with one channel devoted to limited EKG. This study was done during states of wakefulness and sleep. Photic stimulation was performed as an activating procedures. DESCRIPTION:   Upon maximal arousal the posterior dominant rhythm has a frequency of 9Hz with an amplitude of 20uV. This activity is symmetric over the bilateral posterior derivations and attenuates with eye opening. Photic stimulation did not significantly alter the tracing. Normal sleep architecture is seen with stage I sleep recognized by the presence of symmetric vertex waves. There are no focal abnormalities, epileptiform discharges, or electrographic seizures seen. INTERPRETATION: Normal awake and asleep EEG    CLINICAL CORRELATION: A normal EEG does not definitively exclude a diagnosis of epilepsy if clinical suspicion is high consider sleep deprived EEG.      Julia Mejia MD

## 2022-05-31 NOTE — PROGRESS NOTES
Problem: Falls - Risk of  Goal: *Absence of Falls  Description: Document Harman Brandon Fall Risk and appropriate interventions in the flowsheet. Outcome: Progressing Towards Goal  Note: Fall Risk Interventions:       Mentation Interventions: Adequate sleep, hydration, pain control,Bed/chair exit alarm,Door open when patient unattended,Evaluate medications/consider consulting pharmacy,More frequent rounding,Reorient patient,Room close to nurse's station,Toileting rounds,Update white board    Medication Interventions: Assess postural VS orthostatic hypotension,Bed/chair exit alarm,Evaluate medications/consider consulting pharmacy,Patient to call before getting OOB,Teach patient to arise slowly,Utilize gait belt for transfers/ambulation    Elimination Interventions: Call light in reach,Bed/chair exit alarm,Elevated toilet seat,Patient to call for help with toileting needs,Stay With Me (per policy),Toilet paper/wipes in reach,Toileting schedule/hourly rounds              Problem: Patient Education: Go to Patient Education Activity  Goal: Patient/Family Education  Outcome: Progressing Towards Goal     Problem: Pressure Injury - Risk of  Goal: *Prevention of pressure injury  Description: Document Brock Scale and appropriate interventions in the flowsheet. Outcome: Progressing Towards Goal  Note: Pressure Injury Interventions:  Sensory Interventions: Assess changes in LOC,Assess need for specialty bed,Avoid rigorous massage over bony prominences,Check visual cues for pain,Float heels,Keep linens dry and wrinkle-free,Maintain/enhance activity level,Minimize linen layers,Monitor skin under medical devices,Pad between skin to skin,Pressure redistribution bed/mattress (bed type),Suspension boots,Turn and reposition approx.  every two hours (pillows and wedges if needed),Use 30-degree side-lying position    Moisture Interventions: Absorbent underpads,Assess need for specialty bed,Check for incontinence Q2 hours and as needed,Minimize layers,Offer toileting Q_hr    Activity Interventions: Pressure redistribution bed/mattress(bed type),Assess need for specialty bed    Mobility Interventions: Assess need for specialty bed,Chair cushion,Float heels,HOB 30 degrees or less,Pressure redistribution bed/mattress (bed type),Turn and reposition approx.  every two hours(pillow and wedges)    Nutrition Interventions: Document food/fluid/supplement intake    Friction and Shear Interventions: Apply protective barrier, creams and emollients,Feet elevated on foot rest,Foam dressings/transparent film/skin sealants,HOB 30 degrees or less,Lift sheet,Lift team/patient mobility team,Minimize layers,Transferring/repositioning devices                Problem: Patient Education: Go to Patient Education Activity  Goal: Patient/Family Education  Outcome: Progressing Towards Goal     Problem: Seizure Disorder (Adult)  Goal: *STG: Remains free of seizure activity  Outcome: Progressing Towards Goal  Goal: *STG: Maintains lab values within therapeutic range  Outcome: Progressing Towards Goal  Goal: *STG/LTG: Complies with medication therapy  Outcome: Progressing Towards Goal  Goal: *STG: Remains free of injury during seizure activity  Outcome: Progressing Towards Goal  Goal: *STG: Remains safe in hospital  Outcome: Progressing Towards Goal  Goal: Interventions  Outcome: Progressing Towards Goal     Problem: Patient Education: Go to Patient Education Activity  Goal: Patient/Family Education  Outcome: Progressing Towards Goal     Problem: Patient Education: Go to Patient Education Activity  Goal: Patient/Family Education  Outcome: Progressing Towards Goal     Problem: TIA/CVA Stroke: 0-24 hours  Goal: Off Pathway (Use only if patient is Off Pathway)  Outcome: Progressing Towards Goal  Goal: Activity/Safety  Outcome: Progressing Towards Goal  Goal: Consults, if ordered  Outcome: Progressing Towards Goal  Goal: Diagnostic Test/Procedures  Outcome: Progressing Towards Goal  Goal: Nutrition/Diet  Outcome: Progressing Towards Goal  Goal: Discharge Planning  Outcome: Progressing Towards Goal  Goal: Medications  Outcome: Progressing Towards Goal  Goal: Respiratory  Outcome: Progressing Towards Goal  Goal: Treatments/Interventions/Procedures  Outcome: Progressing Towards Goal  Goal: Psychosocial  Outcome: Progressing Towards Goal  Goal: *Hemodynamically stable  Outcome: Progressing Towards Goal  Goal: *Neurologically stable  Description: Absence of additional neurological deficits    Outcome: Progressing Towards Goal  Goal: *Verbalizes anxiety and depression are reduced or absent  Outcome: Progressing Towards Goal  Goal: *Absence of Signs of Aspiration on Current Diet  Outcome: Progressing Towards Goal  Goal: *Absence of deep venous thrombosis signs and symptoms(Stroke Metric)  Outcome: Progressing Towards Goal  Goal: *Ability to perform ADLs and demonstrates progressive mobility and function  Outcome: Progressing Towards Goal  Goal: *Stroke education started(Stroke Metric)  Outcome: Progressing Towards Goal  Goal: *Dysphagia screen performed(Stroke Metric)  Outcome: Progressing Towards Goal     Problem: Ischemic Stroke: Discharge Outcomes  Goal: *Verbalizes anxiety and depression are reduced or absent  Outcome: Progressing Towards Goal  Goal: *Verbalize understanding of risk factor modification(Stroke Metric)  Outcome: Progressing Towards Goal  Goal: *Hemodynamically stable  Outcome: Progressing Towards Goal  Goal: *Absence of aspiration pneumonia  Outcome: Progressing Towards Goal  Goal: *Aware of needed dietary changes  Outcome: Progressing Towards Goal  Goal: *Verbalize understanding of prescribed medications including anti-coagulants, anti-lipid, and/or anti-platelets(Stroke Metric)  Outcome: Progressing Towards Goal  Goal: *Tolerating diet  Outcome: Progressing Towards Goal  Goal: *Aware of follow-up diagnostics related to anticoagulants  Outcome: Progressing Towards Goal  Goal: *Ability to perform ADLs and demonstrates progressive mobility and function  Outcome: Progressing Towards Goal  Goal: *Absence of DVT(Stroke Metric)  Outcome: Progressing Towards Goal  Goal: *Absence of aspiration  Outcome: Progressing Towards Goal  Goal: *Home safety concerns addressed  Outcome: Progressing Towards Goal  Goal: *Describes available resources and support systems  Outcome: Progressing Towards Goal  Goal: *Verbalizes understanding of activation of EMS(911) for stroke symptoms(Stroke Metric)  Outcome: Progressing Towards Goal  Goal: *Understands and describes signs and symptoms to report to providers(Stroke Metric)  Outcome: Progressing Towards Goal  Goal: *Neurolgocially stable (absence of additional neurological deficits)  Outcome: Progressing Towards Goal  Goal: *Verbalizes importance of follow-up with primary care physician(Stroke Metric)  Outcome: Progressing Towards Goal  Goal: *Smoking cessation discussed,if applicable(Stroke Metric)  Outcome: Progressing Towards Goal  Goal: *Depression screening completed(Stroke Metric)  Outcome: Progressing Towards Goal

## 2022-05-31 NOTE — PROGRESS NOTES
*ATTENTION: This note has been created by a nurse practitioner student for educational purposes only. Please do not refer to the content of this note for clinical decision-making, billing, or other purposes. Please see attending providers note to obtain clinical information on this patient. *           Critical Care Progress Note    Name: Ruth Ann Cody   : 1965   MRN: [id number]   Date: 2022      Interval Admission HPI:    Ruth Ann Cody is a 64 y.o. M who presented to ED via EMS with AMX and left sided facial droop. Hx per chart. Family called EMS at 7 PM and reported that they saw the patient collapsed and was unresponsive. There was possibly some seizure activity as well. EMS arrived and brought patient to the ED he was unresponsive but awake. He became more responsive as they were in route to the hospital.  Patient does have a past medical history for end-stage renal disease on dialysis hypertension and liver disease. Family reported patient had missed Dialysis sessions x2. In the ED patient's blood pressure was 240/140. While in the ED patient had a seizure witnessed by staff. He was given a dose of Ativan 2 mg and started on fosphenytoin. Neurology was consulted and saw patient at bedside. Head CT was done that did not show LVO or bleed. Patient also had elevated troponin. EKG was done that did not show elevated ST elevation. Will trend troponin levels. Patient started on Cardene for hypertensive emergency. Hyperkalemic. Will consult nephrology to manage dialysis. Admit patient to ICU    Subjective:   Overnight Events:   2022    Admitted to the ICU Overnight. Initiated on a Nicardipine gtt, which was maxed out. Intermittent hypoglycemia with out any signs or symptoms. Consults placed for Neurology, Cardiology and Nephrology.     POD:  * No surgery found *    S/P:       Active Problem List:     Problem List  Date Reviewed: 1/15/2021          Codes Class    Seizures ([facility] [address] ICD-10-CM: R56.9  ICD-9-CM: 780.39         Malignant hypertension ICD-10-CM: I10  ICD-9-CM: 401.0         Enteritis ICD-10-CM: K52.9  ICD-9-CM: 558.9         Bone metastasis ([place] [address].) ICD-10-CM: C79.51  ICD-9-CM: 198.5         ESRD needing dialysis ([place] [address].) ICD-10-CM: N18.6, Z99.2  ICD-9-CM: 585. 6         Duodenitis ICD-10-CM: K29.80  ICD-9-CM: 535.60         Peptic ulcer disease ICD-10-CM: K27.9  ICD-9-CM: 533.90         Intractable abdominal pain ICD-10-CM: R10.9  ICD-9-CM: 789.00         ESRD (end stage renal disease) ([place] [address].) ICD-10-CM: N18.6  ICD-9-CM: 585.6         Non-compliance ICD-10-CM: Z91.19  ICD-9-CM: V15.81         ESRD (end stage renal disease) on dialysis (HCC) ICD-10-CM: N18.6, Z99.2  ICD-9-CM: 585.6, V45.11         Hypertensive urgency ICD-10-CM: I16.0  ICD-9-CM: 401.9         Hypertensive emergency ICD-10-CM: I16.1  ICD-9-CM: 401.9         Iron deficiency anemia ICD-10-CM: D50.9  ICD-9-CM: 280. 9         Chronic hepatitis C ([place] [address].) ICD-10-CM: B18.2  ICD-9-CM: 070.54         CKD (chronic kidney disease) ICD-10-CM: N18.9  ICD-9-CM: 422. 9         Chest pain ICD-10-CM: R07.9  ICD-9-CM: 786.50         Hypertension ICD-10-CM: I10  ICD-9-CM: 401.9               Past Medical History:    has a past medical history of Chronic kidney disease, Hypertension, and Liver disease. Past Surgical History:    has no past surgical history on file. Home Medications:     Prior to Admission medications    Medication Sig Start Date End Date Taking? Authorizing Provider   cloNIDine HCL (CATAPRES) 0.1 mg tablet Take 1 Tablet by mouth two (2) times a day. 5/18/22   Homer Nicolas MD   losartan (COZAAR) 100 mg tablet Take 1 Tablet by mouth daily. 5/18/22   Homer Nicolas MD   carvediloL (Coreg) 25 mg tablet Take 25 mg by mouth two (2) times daily (with meals). Provider, Historical   NIFEdipine ER (ADALAT CC) 60 mg ER tablet Take 60 mg by mouth two (2) times a day.     Provider, Historical   bumetanide (BUMEX) 2 mg tablet Take 2 mg by mouth daily. Provider, Historical   calcium carbonate (TUMS) 200 mg calcium (500 mg) chew Take 3 Tablets by mouth three (3) times daily (after meals). Provider, Historical   fluticasone propionate (FLONASE) 50 mcg/actuation nasal spray 2 Sprays by Both Nostrils route daily as needed. Other, MD Brian   pantoprazole (Protonix) 40 mg tablet Take 1 Tablet by mouth two (2) times a day. Indications: an ulcer of the duodenum  Patient not taking: Reported on 2022 3/25/22   Kristina Blas MD   hydrALAZINE (APRESOLINE) 100 mg tablet Take 100 mg by mouth three (3) times daily. Provider, Historical   ascorbic acid, vitamin C, (Vitamin C) 500 mg tablet Take 500 mg by mouth daily. Provider, Historical   cholecalciferol (VITAMIN D3) 25 mcg (1,000 unit) cap Take 1,000 Units by mouth daily. Provider, Historical   buprenorphine-naloxone (Suboxone) 8-2 mg film sublingaul film 1 Film by SubLINGual route two (2) times a day. Provider, Historical       Allergies/Social/Family History: Allergies   Allergen Reactions    Baclofen Rash     All over      Social History     Tobacco Use    Smoking status: Current Some Day Smoker     Packs/day: 1.00     Last attempt to quit: 2020     Years since quittin.8    Smokeless tobacco: Never Used    Tobacco comment: started back smoking in last couple months   Substance Use Topics    Alcohol use: Not Currently      No family history on file. Review of Systems:   Pertinent items are noted in HPI. Objective:   General: Normal skin without signs of pallor, cyanosis, jaundice, rashes, bruises, or mottling. +Fatigue. No signs of tremors, paralysis or other involuntary movements. No pain/discomfort with movement. alert, and responsive to the environment. Eye: PERRLA, EOMI. Sclera white, conjunctiva pink. Neurologic:  Drowsy but follows commands, equal strength. Neck: Normal  Lungs: CTAB, Breath sounds are symmetrical bilaterally.  Respiration unlabored without shortness of breath. Normal chest expansion. No signs of central or peripheral cyanosis. No accessory muscle usage. Abdomen: Abdomen is protuberant, soft and non-tender. No palpable masses or hepatosplenomegaly. +BS. Cardiovascular: RRR, S1S2 Present. There are no murmurs or extra sounds. Extremities are warm and without edema. No varicosities or stasis changes. Pulse palpable throughout the upper and lower extremities. No femoral or abdominal bruits. Brachial, radial, popliteal, DP and pt. palpable at +2  Skin: Skin is warm, dry and intact without signs of rashes or lesions. Appropriate color for ethnicity.  Skin is well hydrated and nourished without areas of dryness    Vital Signs:  Visit Vitals  BP (!) 141/71   Pulse 86   Temp 98.8 °F (37.1 °C)   Resp 16   Ht 5' 9\" (1.753 m)   Wt 79.4 kg (175 lb)   SpO2 95%   BMI 25.84 kg/m²    O2 Flow Rate (L/min): 2 l/min O2 Device: None (Room air) Temp (24hrs), Av.3 °F (36.8 °C), Min:97.6 °F (36.4 °C), Max:98.9 °F (37.2 °C)           Intake/Output:     Intake/Output Summary (Last 24 hours) at 2022 1422  Last data filed at 2022 1200  Gross per 24 hour   Intake 1797.41 ml   Output 3000 ml   Net -1202.59 ml       LABS AND  DATA: Personally reviewed  Recent Labs     22   WBC 9.8 9.0   HGB 9.0* 9.7*   HCT 29.6* 33.0*    287     Recent Labs     22    138   K 5.5* 5.6*    103   CO2 22 22   BUN 61* 53*   CREA 11.40* 11.40*   GLU 58* 78   CA 9.2 9.3   MG  --  2.4   PHOS  --  5.0*     Recent Labs     22   AP 46 48   TP 6.8 6.4   ALB 2.7* 2.7*   GLOB 4.1* 3.7     Recent Labs     22   INR 1.1   PTP 11.8*      Recent Labs     22   PHI 7.32*   PCO2I 35.4   PO2I 70*   FIO2I 21     Recent Labs     22   CPK 82       MEDS:   Current Facility-Administered Medications:     heparin (porcine) 1,000 unit/mL injection 2,100 Units, 2,100 Units, InterCATHeter, DIALYSIS PRN **AND** heparin (porcine) 1,000 unit/mL injection 2,000 Units, 2,000 Units, InterCATHeter, DIALYSIS PRN, Sindi Walters NP    labetaloL (NORMODYNE;TRANDATE) injection 20 mg, 20 mg, IntraVENous, Q4H PRN, Sindi Walters NP, 20 mg at 05/31/22 1421    losartan (COZAAR) tablet 100 mg, 100 mg, Oral, DAILY, Sindi Walters NP, 100 mg at 05/31/22 1206    cloNIDine HCL (CATAPRES) tablet 0.1 mg, 0.1 mg, Oral, BID, Sindi Walters NP, 0.1 mg at 05/31/22 1206    carvediloL (COREG) tablet 25 mg, 25 mg, Oral, BID WITH MEALS, Sindi Walters NP, 25 mg at 05/31/22 1207    hydrALAZINE (APRESOLINE) 20 mg/mL injection 20 mg, 20 mg, IntraVENous, Q4H PRN, Sindi Walters NP    pantoprazole (PROTONIX) tablet 40 mg, 40 mg, Oral, ACB&D, Sindi Walters NP    glucagon (GLUCAGEN) injection 1 mg, 1 mg, IntraMUSCular, PRN, Sindi Walters NP    glucose chewable tablet 12 g, 3 Tablet, Oral, PRN, Sindi Walters NP    dextrose 10 % infusion 125-250 mL, 125-250 mL, IntraVENous, PRN, Sindi Walters NP    fosphenytoin (CEREBYX) with saline injection 100 mg PE, 100 mg PE, IntraVENous, Q8H, Crystal Rosas NP, 100 mg PE at 05/31/22 1209    niCARdipine (CARDENE) 25 mg in 0.9% sodium chloride 250 mL ([id number]), 5-15 mg/hr, IntraVENous, TITRATE, Wagner STEVENSON NP-C, Last Rate: 150 mL/hr at 05/31/22 1400, 15 mg/hr at 05/31/22 1400    sodium chloride (NS) flush 5-40 mL, 5-40 mL, IntraVENous, Q8H, Cathy Moyer, NP-C, 10 mL at 05/31/22 1403    sodium chloride (NS) flush 5-40 mL, 5-40 mL, IntraVENous, PRN, RAVINDRA Bangura    acetaminophen (TYLENOL) tablet 650 mg, 650 mg, Oral, Q6H PRN **OR** acetaminophen (TYLENOL) suppository 650 mg, 650 mg, Rectal, Q6H PRN, RAVINDRA Bangura    polyethylene glycol (MIRALAX) packet 17 g, 17 g, Oral, DAILY PRN, RAVINDRA Bangura    heparin (porcine) injection 5,000 Units, 5,000 Units, SubCUTAneous, Q8H, Cathy Moyer NP-C, 5,000 Units at 05/31/22 1403    ondansetron (ZOFRAN) injection 4 mg, 4 mg, IntraVENous, Q6H PRN, Huel Cocks R, NP-C    cyanocobalamin (VITAMIN B12) tablet 1,000 mcg, 1,000 mcg, Oral, DAILY, Ballagh, Di, NP, 1,000 mcg at 05/31/22 1221    Chest X-Ray:  CXR Results  (Last 48 hours)               05/30/22 2101  XR CHEST PORT Final result    Impression:  Increased central pulmonary vascular congestion without overt   pulmonary edema. Narrative:  EXAM:  CR chest portable       INDICATION: Lethargy       COMPARISON: CT 5/18/2022. Radiograph 5/17/2022. TECHNIQUE: Portable AP semiupright chest view at 2050 hours       FINDINGS: The left IJ catheter is stable. The cardiomediastinal contours are   stable. There is increased central pulmonary vascular congestion. There is no   pleural effusion or pneumothorax. The bones and upper abdomen are stable. CT Scans:  CT Results  (Last 48 hours)               05/30/22 2002  CTA CODE NEURO HEAD AND NECK W CONT Final result    Impression:  1. No large vessel occlusion or hemodynamically significant carotid stenosis. 2.  No perfusion abnormality. 3.  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 Narrative:  **PRELIMINARY REPORT**       No large vessel occlusion in the head or neck. No focal abnormal perfusion. Preliminary report was provided by Dr. Radhika Alfonso, the on-call radiologist, on   5/30/2022 at 2015 hours. Final report to follow. **END PRELIMINARY REPORT**       CLINICAL HISTORY: Code stroke. Unresponsive. EXAMINATION:  CT ANGIOGRAPHY HEAD AND NECK, CT PERFUSION       COMPARISON: May 2019       TECHNIQUE:  Following the uneventful administration of iodinated contrast   material, axial CT angiography of the head and neck was performed. Delayed axial   images through the head were also obtained. Coronal and sagittal reconstructions   were obtained. Manual postprocessing of images was performed.  3-D Sagittal   maximal intensity projection images were obtained. 3-D Coronal maximal   intensity projections were obtained. CT brain perfusion was performed with   generation of hemodynamic maps of multiple parameters, including cerebral blood   flow, cerebral blood volume, mean transit time, and TMAX. CT dose reduction was   achieved through use of a standardized protocol tailored for this examination   and automatic exposure control for dose modulation. This study was analyzed by   the [address] ai algorithm. FINDINGS:       Delayed contrast-enhanced head CT:       The ventricles are midline without hydrocephalus. There is no acute intra or   extra-axial hemorrhage. The basal cisterns are clear. The paranasal sinuses are   clear. CTA NECK:       Great vessels: Normal arch anatomy with the origins patent. Right subclavian artery: Patent       Left subclavian artery: Patent        Right common carotid artery: Patent        Left common carotid artery: Patent        Cervical right internal carotid artery: Patent with no significant stenosis by   NASCET criteria. Cervical left internal carotid artery: Patent with no significant stenosis by   NASCET criteria. Right vertebral artery: Patent       Left vertebral artery: Patent       The lung apices are clear. The thyroid is homogeneous. No cervical   lymphadenopathy. Left chest tunneled dialysis catheter terminates in the SVC   below the field-of-view. CTA HEAD:       Right cavernous internal carotid artery: Patent       Left cavernous internal carotid artery: Patent       Anterior cerebral arteries: Patent       Anterior communicating artery: Patent       Right middle cerebral artery: Patent       Left middle cerebral artery: Superiorly projecting 3 mm aneurysm of the M1   segment of the left middle cerebral artery. No large vessel occlusion.        Posterior communicating arteries: Diminutive       Posterior cerebral arteries: Patent Basilar artery: Patent       Distal vertebral arteries: Patent       No evidence for intracranial aneurysm or hemodynamically significant stenosis. CT Perfusion:   Normal CT perfusion. 05/30/22 1956  CT CODE NEURO PERF W CBF Final result    Impression:  1. No large vessel occlusion or hemodynamically significant carotid stenosis. 2.  No perfusion abnormality. 3.  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 Narrative:  **PRELIMINARY REPORT**       No large vessel occlusion in the head or neck. No focal abnormal perfusion. Preliminary report was provided by Dr. Elodia Mcmullen, the on-call radiologist, on   5/30/2022 at 2015 hours. Final report to follow. **END PRELIMINARY REPORT**       CLINICAL HISTORY: Code stroke. Unresponsive. EXAMINATION:  CT ANGIOGRAPHY HEAD AND NECK, CT PERFUSION       COMPARISON: May 2019       TECHNIQUE:  Following the uneventful administration of iodinated contrast   material, axial CT angiography of the head and neck was performed. Delayed axial   images through the head were also obtained. Coronal and sagittal reconstructions   were obtained. Manual postprocessing of images was performed. 3-D  Sagittal   maximal intensity projection images were obtained. 3-D Coronal maximal   intensity projections were obtained. CT brain perfusion was performed with   generation of hemodynamic maps of multiple parameters, including cerebral blood   flow, cerebral blood volume, mean transit time, and TMAX. CT dose reduction was   achieved through use of a standardized protocol tailored for this examination   and automatic exposure control for dose modulation. This study was analyzed by   the [address] ai algorithm. FINDINGS:       Delayed contrast-enhanced head CT:       The ventricles are midline without hydrocephalus. There is no acute intra or   extra-axial hemorrhage. The basal cisterns are clear.   The paranasal sinuses are clear.       CTA NECK:       Great vessels: Normal arch anatomy with the origins patent. Right subclavian artery: Patent       Left subclavian artery: Patent        Right common carotid artery: Patent        Left common carotid artery: Patent        Cervical right internal carotid artery: Patent with no significant stenosis by   NASCET criteria. Cervical left internal carotid artery: Patent with no significant stenosis by   NASCET criteria. Right vertebral artery: Patent       Left vertebral artery: Patent       The lung apices are clear. The thyroid is homogeneous. No cervical   lymphadenopathy. Left chest tunneled dialysis catheter terminates in the SVC   below the field-of-view. CTA HEAD:       Right cavernous internal carotid artery: Patent       Left cavernous internal carotid artery: Patent       Anterior cerebral arteries: Patent       Anterior communicating artery: Patent       Right middle cerebral artery: Patent       Left middle cerebral artery: Superiorly projecting 3 mm aneurysm of the M1   segment of the left middle cerebral artery. No large vessel occlusion. Posterior communicating arteries: Diminutive       Posterior cerebral arteries: Patent       Basilar artery: Patent       Distal vertebral arteries: Patent       No evidence for intracranial aneurysm or hemodynamically significant stenosis. CT Perfusion:   Normal CT perfusion. 05/30/22 1951  CT CODE NEURO HEAD WO CONTRAST Final result    Impression:      No acute intracranial abnormality. Narrative:  EXAM:  CT CODE NEURO HEAD WO CONTRAST       INDICATION: Left-sided weakness       COMPARISON: None       TECHNIQUE: Noncontrast head CT. Coronal and sagittal reformats. CT dose   reduction was achieved through use of a standardized protocol tailored for this   examination and automatic exposure control for dose modulation. Adaptive   statistical iterative reconstruction (ASIR) was utilized. FINDINGS: The ventricles and sulci are age-appropriate without hydrocephalus. There is no mass effect or midline shift. There is no intracranial hemorrhage or   extra-axial fluid collection. There is no abnormal area of decreased density to   suggest infarct. The gray-white matter differentiation is maintained. The basal   cisterns are patent. The osseous structures are intact. The visualized paranasal sinuses and mastoid   air cells are clear. ECHO: 1/16/2022  1. Left Ventricle: Left ventricle size is normal. Moderately increased wall thickness. Findings consistent with moderate concentric hypertrophy. Normal wall motion. Normal left ventricular systolic function with a visually estimated EF of 60 - 65%. Normal diastolic function. 2. Left Atrium: Left atrium is mildly dilated. Assessment:     1. Hypertensive Emergency  2. S/P Seizure   3. ESRD on HD  4. Elevated Troponin's  5. Hyperkalemia    ICU Comprehensive Plan of Care:   Plans for this Shift:     1. Neuro/ Pain/ Sedation   a. Neurology Following. Seizure Precautions in places. Fosphenytoin started overnight.  b. Continue q1hr neuro checks  c. Sedation: None  2. Resp   a. Currently on 4lpm, wean as tolerated. b. COVID Treatment: N/A  c. Respiratory Goals: Head of bed > 30 degrees  d. Incentive spirometry  e. Pulmonary toilet: Incentive Spirometry   f. If there is a concern for desaturating, will repeat ABG. g. SpO2 Goal: > 90%  3. CVS   a. SBP Goal of: >110mmHg and < 160 mmHg  b. MAP Goal of: > 65 mmHg  c. Nicardipine (Cardene) - For above SBP/MAP goals. Continue to attempt to wean after HD Session. d. Restarting home medication. Appreciate Pharmacy recommendations  e. IVFs: None  f. Troponin's have currently peaked, appreciate cardiology recommendations  4. Heme/ Onc  a. Transfusion Trigger (Hgb): <7 g/dL  5. GI  a. Diet initiated  6. Renal/   a. Keep K>4, Mg >2  b. Nephrology consulted, appreciate recommendation.   c. HD Session today, Removed 3L. Plan for HD on 06/01.  7. ID  a. No fevers this admission. No culture data to date  b. Antibiotics:None  8. Endocrine  a. Glycemic Control: SSI PRN, Prevent hypoglycemia  b. Sliding Scale ordered  9. Musc/Integ  a. Encourage ambulation    Discussed Plan of Care/Code Status: Yes / Full Code  Appreciate Consultants Input: Renal, Cardiology, Neurology  Discussed Care Plan with Bedside RN  Documentation of Current Medications    F - Feeding:  Yes   A - Analgesia: None  S - Sedation: N/A  T - DVT Prophylaxis: SCD's or Sequential Compression Device   H - Head of Bed: > 30 Degrees  U - Ulcer Prophylaxis: Protonix (pantoprazole)   G - Glycemic Control: Prevent Hypoglycemia / PRN SSI. Diet Started  S - Spontaneous Breathing Trial: No  B - Bowel Regimen: None needed at this time, will continue to assess. I - Indwelling Catheter:   Tubes: None  Lines: Peripheral IV and Apollo  Drains: None  D - De-escalation of Antibiotics: N/A     Multidisciplinary Rounds Completed: Yes    ABCDEF Bundle/Checklist Completed:  Yes    SPECIAL EQUIPMENT  None    DISPOSITION  Stay in ICU    CRITICAL CARE CONSULTANT NOTE  I had a face to face encounter with the patient, reviewed and interpreted patient data including clinical events, labs, images, vital signs, I/O's, and examined patient. I have discussed the case and the plan and management of the patient's care with the consulting services, the bedside nurses and the respiratory therapist.      NOTE OF PERSONAL INVOLVEMENT IN CARE   This patient has a high probability of imminent, clinically significant deterioration, which requires the highest level of preparedness to intervene urgently. I participated in the decision-making and personally managed or directed the management of the following life and organ supporting interventions that required my frequent assessment to treat or prevent imminent deterioration.     EMR entered and chart reviewed in the course of carrying out educational functions and responsibilities as a Nurse Practitioner student following Dang Harvey NP.     Rolando Diaz, RN, BSN, NPS  Adult-Gerontology Acute Care NP Student  [employer]

## 2022-05-31 NOTE — PROGRESS NOTES
TRANSFER - IN REPORT:    Verbal report received from [address], RN (name) on Ana Burton  being received from ED (unit) for routine progression of care      Report consisted of patients Situation, Background, Assessment and   Recommendations(SBAR). Information from the following report(s) SBAR, Kardex, ED Summary, Intake/Output, MAR, Accordion, Recent Results, Cardiac Rhythm NSR and Alarm Parameters  was reviewed with the receiving nurse. Opportunity for questions and clarification was provided. Assessment completed upon patients arrival to unit and care assumed. Primary Nurse Case Rodriguez RN and NAVEED Plummer performed a dual skin assessment on this patient No impairment noted  Brock score is 15    2245: Elevated troponin. Sushil Gaona NP. Orders received for EKG and repeat troponin. 2345: Troponin remains elevated. Glenroy Lepe NP notified. Orders received to repeat troponin with morning labs. 0045: Nephro consult called. 0730: Bedside and Verbal shift change report given to Liang Dowling (oncoming nurse) by Sallie North RN (offgoing nurse). Report included the following information SBAR, Kardex, ED Summary, Intake/Output, MAR, Accordion, Recent Results, Cardiac Rhythm NSR and Alarm Parameters .

## 2022-05-31 NOTE — PROGRESS NOTES
Reviewed dialysis Non compliance with pt   Stressed the importance of not missing HD, BP control   He has agreed to have AVF placed for long term dialysis   Continue w/ cardene gtt for now and wean post HD  Discussed with purvi about urgent HD this morning   HD again tomorrow and then TTS      [facility]  Office :[phone]  Fax: [phone]

## 2022-05-31 NOTE — H&P
History and Physical     Name: Mary Corrales   : 1965   MRN: [id number]   Date: 2022      Reason for ICU Admission: HTN emergency, Seizures     HISTORY OF PRESENT ILLNESS   Mary Corrales is a 64 y.o. M who presented to ED via EMS with AMX and left sided facial droop. Hx per chart. Family called EMS at 7 PM and reported that they saw the patient collapsed and was unresponsive. There was possibly some seizure activity as well. EMS arrived and brought patient to the ED he was unresponsive but awake. He became more responsive as they were in route to the hospital.  Patient does have a past medical history for end-stage renal disease on dialysis hypertension and liver disease. Family reported patient had missed Dialysis sessions x2. In the ED patient's blood pressure was 240/140. While in the ED patient had a seizure witnessed by staff. He was given a dose of Ativan 2 mg and started on fosphenytoin. Neurology was consulted and saw patient at bedside. Head CT was done that did not show LVO or bleed. Patient also had elevated troponin. EKG was done that did not show elevated ST elevation. Will trend troponin levels. Patient started on Cardene for hypertensive emergency. Hyperkalemic. Will consult nephrology to manage dialysis. Admit patient to ICU    IMPRESSION/PROBLEM LIST   Seizures  Hypertensive Emergency  ESRD- on dialysis   Hyperkalemia  Elevated Troponin    ASSESSMENT & PLAN     NEUROLOGICAL/PAIN/SEDATION:  Analgesia: Acetaminophen  Sedation: None  Neuro check Frequency: q 1 hr   Consult neurology  Rapid EEG tonight  Started on fosphenytoin    PULMONOLOGY:  Respiratory Goals: N/A  Spontaneous Breathing Trial: N/A  Pulmonary toilet: Incentive Spirometry   ABG with metabolic acidosis.   As needed nasal cannula to keep sats greater than 90%    CARDIOVASCULAR:  SBP Goal of: > 110 mmHg and < 160 mmHg  MAP Goal of: > 65 mmHg  Nicardipine (Cardene) - For above SBP/MAP goals  IVF: None  Trend Trops and repeat EKG if elevated. HEMATOLOGY/ONCOLOGY:  Transfusion Trigger (Hgb): <7 g/dL    GASTROINTESTINAL:  Diet/Feeding:  No      RENAL/:  Keep K>4; Mg>2   Consult nephrology for dialysis management. ENDOCRINE/INTEGUMENTARY:  Glycemic Control: SSI PRN, Prevent hypoglycemia    INFECTIOUS DISEASE:  Antibiotics: None  Cultures: None      ICU DAILY CHECKLIST   Indwelling devices:  Tubes: None  Lines: Peripheral IV  Drains: None  DVT Prophylaxis: Heparin   SUP: Not at this time   PT/OT: NA   Consultants: Nephrology, Neurology  Code Status: Full code  Goals of Care Discussion with family: No   Plan of Care/Family Update: Pending   Discussed Care Plan with Bedside RN: [facility]   5/30 - admit to ICU. Subjective:   05/30/22  Patient is drowsy, follows commands. Denies pain at this time. POD:  * No surgery found *    S/P:       Past Medical History:      has a past medical history of Chronic kidney disease, Hypertension, and Liver disease. Past Surgical History:      has no past surgical history on file. Home Medications:     Prior to Admission medications    Medication Sig Start Date End Date Taking? Authorizing Provider   cloNIDine HCL (CATAPRES) 0.1 mg tablet Take 1 Tablet by mouth two (2) times a day. 5/18/22   Wileen Castleman, MD   losartan (COZAAR) 100 mg tablet Take 1 Tablet by mouth daily. 5/18/22   Wileen Castleman, MD   carvediloL (Coreg) 25 mg tablet Take 25 mg by mouth two (2) times daily (with meals). Provider, Historical   NIFEdipine ER (ADALAT CC) 60 mg ER tablet Take 60 mg by mouth two (2) times a day. Provider, Historical   bumetanide (BUMEX) 2 mg tablet Take 2 mg by mouth daily. Provider, Historical   calcium carbonate (TUMS) 200 mg calcium (500 mg) chew Take 3 Tablets by mouth three (3) times daily (after meals).     Provider, Historical   fluticasone propionate (FLONASE) 50 mcg/actuation nasal spray 2 Sprays by Both Nostrils route daily as needed. Other, MD Brian   pantoprazole (Protonix) 40 mg tablet Take 1 Tablet by mouth two (2) times a day. Indications: an ulcer of the duodenum  Patient not taking: Reported on 2022 3/25/22   Houston Ernst MD   hydrALAZINE (APRESOLINE) 100 mg tablet Take 100 mg by mouth three (3) times daily. Provider, Historical   ascorbic acid, vitamin C, (Vitamin C) 500 mg tablet Take 500 mg by mouth daily. Provider, Historical   cholecalciferol (VITAMIN D3) 25 mcg (1,000 unit) cap Take 1,000 Units by mouth daily. Provider, Historical   buprenorphine-naloxone (Suboxone) 8-2 mg film sublingaul film 1 Film by SubLINGual route two (2) times a day. Provider, Historical       Allergies/Social/Family History: Allergies   Allergen Reactions    Baclofen Rash     All over      Social History     Tobacco Use    Smoking status: Current Some Day Smoker     Packs/day: 1.00     Last attempt to quit: 2020     Years since quittin.8    Smokeless tobacco: Never Used    Tobacco comment: started back smoking in last couple months   Substance Use Topics    Alcohol use: Not Currently      No family history on file. Review of Systems:     Review of systems not obtained due to patient factors. Objective:     General:  alert, fatigued, cooperative, no distress, slowed mentation, appears stated age  Eye:  conjunctivae/corneas clear. PERRL, EOM's intact. Neurologic: Drowsy but follows commands, equal strength in upper and lower ext. No facial droop noted. Neck:  normal and no erythema or exudates noted. Lungs:  clear to auscultation bilaterally  Heart:  regular rate and rhythm, S1, S2 normal, no murmur, click, rub or gallop  Abdomen:  soft, non-tender.  Bowel sounds normal. No masses,  no organomegaly  Cardiovascular:  Regular rate and rhythm, S1S2 present, without murmur or extra heart sounds, pedal pulses normal and no edema  Skin:  Normal. and no rash or abnormalities    Vital Signs: Visit Vitals  BP (!) 182/105   Pulse 83   Resp 14   Ht 5' 9\" (1.753 m)   Wt 79.4 kg (175 lb)   SpO2 96%   BMI 25.84 kg/m²        No data recorded. Intake/Output:   No intake or output data in the 24 hours ending 05/30/22 2053    LABS AND  DATA: Personally reviewed 05/30/22    MEDS: Personally Reviewed 05/30/22    IMAGING: New Imaging Reviewed 05/30/22    Chest X-Ray:   CXR Results  (Last 48 hours)    None          CT Scan:   CT Results  (Last 48 hours)               05/30/22 2002  CTA CODE NEURO HEAD AND NECK W CONT Preliminary result    Narrative:  *PRELIMINARY REPORT*       No large vessel occlusion in the head or neck. No focal abnormal perfusion. Preliminary report was provided by Dr. Elodia Mcmullen, the on-call radiologist, on   5/30/2022 at 2015 hours. Final report to follow. *END PRELIMINARY REPORT*        05/30/22 1956  CT CODE NEURO PERF W CBF Preliminary result    Narrative:  *PRELIMINARY REPORT*       No large vessel occlusion in the head or neck. No focal abnormal perfusion. Preliminary report was provided by Dr. Elodia Mcmullen, the on-call radiologist, on   5/30/2022 at 2015 hours. Final report to follow. *END PRELIMINARY REPORT*        05/30/22 1951  CT CODE NEURO HEAD WO CONTRAST Final result    Impression:      No acute intracranial abnormality. Narrative:  EXAM:  CT CODE NEURO HEAD WO CONTRAST       INDICATION: Left-sided weakness       COMPARISON: None       TECHNIQUE: Noncontrast head CT. Coronal and sagittal reformats. CT dose   reduction was achieved through use of a standardized protocol tailored for this   examination and automatic exposure control for dose modulation. Adaptive   statistical iterative reconstruction (ASIR) was utilized. FINDINGS: The ventricles and sulci are age-appropriate without hydrocephalus. There is no mass effect or midline shift. There is no intracranial hemorrhage or   extra-axial fluid collection.  There is no abnormal area of decreased density to   suggest infarct. The gray-white matter differentiation is maintained. The basal   cisterns are patent. The osseous structures are intact. The visualized paranasal sinuses and mastoid   air cells are clear. ECHO:  1/16/22  Interpretation Summary    Result status: Final result       Left Ventricle: Left ventricle size is normal. Moderately increased wall thickness. Findings consistent with moderate concentric hypertrophy. Normal wall motion. Normal left ventricular systolic function with a visually estimated EF of 60 - 65%. Normal diastolic function.   Left Atrium: Left atrium is mildly dilated. Multidisciplinary Rounds Completed:  No    ABCDEF Bundle/Checklist Completed:  Yes    SPECIAL EQUIPMENT  IHD    DISPOSITION  Stay in ICU    CRITICAL CARE DOCUMENTATION  I had a face to face encounter with the patient, reviewed and interpreted patient data including clinical events, labs, images, vital signs, I/O's, and examined patient. I have discussed the case and the plan and management of the patient's care with the consulting services, the bedside nurses and the respiratory therapist.      NOTE OF PERSONAL INVOLVEMENT IN CARE   This patient has a high probability of imminent, clinically significant deterioration, which requires the highest level of preparedness to intervene urgently. I participated in the decision-making and personally managed or directed the management of the following life and organ supporting interventions that required my frequent assessment to treat or prevent imminent deterioration. I personally spent 45 minutes of critical care time. This is time spent at this critically ill patient's bedside actively involved in patient care as well as the coordination of care. This does not include any procedural time which has been billed separately.     RAVINDRA Woods  [address] [place] [address]

## 2022-05-31 NOTE — PROGRESS NOTES
Critical care brief note - Full H&P to follow    Consult for ICU admission. Seizures/ HTN emergency. Accepted patient to ICU. Rapid EEG in process. On fosphenytoin. On Cardene gtt goal less than 160mmhg. UDS pending. Mild metabolic acidosis on ABG. Protecting airway at present. PRN  NC. Consult Nephrology for dialysis management. Access left chest wall Vas cath. Admission orders placed.     Kennedy Pimentel [facility]     [address] [facility]

## 2022-05-31 NOTE — CONSULTS
[address]    Name:  Tae Abdullahi  MR#:  [id number]  :  1965  ACCOUNT #:  [de-identified]  DATE OF SERVICE:  2022    REFERRING PROVIDER:  Radha Caba. HISTORY OF PRESENT ILLNESS:  The patient was admitted to the hospital after collapsing with seizures at home, had another grand mal seizure in the emergency room. We are asked to see him because of his abnormal cardiac enzymes which were moderately elevated. Most recent enzyme as already indicated this is downtrending. The patient is awake and alert, just finished dialysis. On questioning, he denies any chest pain and has no recollection of the event surrounding his admission. He denies any dyspnea, orthopnea, paroxysmal nocturnal dyspnea, diaphoresis or syncope. He does have a history of seizures, but according to him, has not had one for about 20 years. There is some question about his compliance with dialysis. The history from his family indicates that he missed the last two dialysis sessions, the patient denies this. REVIEW OF SYSTEMS:  A 12-point review of systems was otherwise unremarkable. PHYSICAL EXAMINATION:  GENERAL:  This is a well-developed, healthy-appearing middle-aged male. VITAL SIGNS:  Blood pressure is 160/74, heart rate is 102, sats 93% on room air. HEENT:  Unremarkable. NECK:  Supple. No adenopathy. CHEST WALL:  Nontender. LUNGS:  Clear. HEART:  Regular, moderate rhythm. Harsh systolic murmur at the left lower sternal border and in the parasternal second  intercostal spaces. No diastolic murmur. No friction rub. No thrills, lifts, or heaves. ABDOMEN:  Soft, nontender. No bruits. No ascites or palpable masses. NEUROLOGIC:  The patient is awake, alert and appropriate. Normal speech. Normal gait. No focal motor signs. EXTREMITIES:  Mild pretibial edema. Pedal pulses bounding.     DIAGNOSTIC DATA:  His EKG demonstrates an anteroseptal infarction pattern with Q-wave in lead V1. This was reported on an EKG 05/17/2022 at another hospital.  Other than that shows LVH, which has been present for some time. His chest x-ray dated 05/30/2022 demonstrates \"increased central pulmonary vascular congestion without overt pulmonary edema. \"    LABORATORY DATA:  His labs reflect chronic renal failure and his high-sensitivity troponin peaked at 404, now it is 250. IMPRESSION:  1. The patient has an abnormal EKG with moderate increase in troponins which is now downtrending. He has never had any chest pain. 2.  The patient has a seizure disorder and it sounds like it is reactivated since, had not had any seizures in some time until recently. 3.  The patient has an echocardiogram in the system which demonstrates a normal ejection fraction from about 2020. An echocardiogram has been ordered for this admission, not yet done. RECOMMENDATIONS:  Would start the patient on a heparin drip as able, aspirin. I will order an MPI for tomorrow. We will review the echo when it is completed. Further recommendations to follow.     Thank you for this referral.      Tea Hong MD [id number]  D:  05/31/2022 12:51  T:  05/31/2022 15:45  JOB #:  [id number]

## 2022-05-31 NOTE — PROGRESS NOTES
NIHSS Code Stroke Documentation      Person Administering Scale: Almita Shah NP    TIME: 8:42 PM    LKW: Yesterday evening per patient     PMH: Seizure on no AED's, Liver dz, HTN, ESRD on HD Tue/Thur/Sat    SUBJECTIVE: EMS reported patient's family found patient slumped over in chair and went unresponsive around 7pm. Upon arrival to ED, patient was responsive and stated he hasn't being feeling well since morning and the last time he was doing well was yesterday evening. Patient stated whenever, he's not feeling well, it passes on it's own so he thought this will pass. Patient stated the last time he had seizure was 20 years ago and on no AED's. Patient admits smoking cigarettes and marijuana. Patient was noted to have L elbow laceration which he's unaware. When asked if he fell, he stated he has no recollection of sequence of events that happened leading to this ED visit. CTH and CTA was obtained and patient taken to his room for tele-neuro evaluation. During evaluation, patient went into convulsive activities with head turned left and all 4 ext extended lasting for about 30 sec. BP was elevated in the 200's. BS was low 80's. 2mg Ativan was given which seems to break seizure. Patient was post-ictal following seizure episode. Patient was loaded with Fosphenytoin and maintenance dose. Due to renal function, keppra wasn't given. Rapid EEG was ordered and intensivist consulted. Reportedly, patient has missed 2 dialysis visits however, patient stated he hasn't missed any days when asked. CTH and CTA H/N and CTP showed no acute abnormalities. Initial NIHSS was 1  1a  Level of consciousness: 0=alert; keenly responsive   1b. LOC questions:  1=Answers one question correctly   1c. LOC commands: 0=Performs both tasks correctly   2. Best Gaze: 0=normal   3. Visual: 0=No visual loss   4. Facial Palsy: 0=Normal symmetric movement   5a. Motor left arm: 0=No drift, arm holds 90 (or 45) degrees for full 10 seconds   5b. Motor right arm: 0=No drift, arm holds 90 (or 45) degrees for full 10 seconds   6a. Motor left le=No drift; leg holds 30-degree position for full 5 seconds. 6b  Motor right le=No drift; leg holds 30-degree position for full 5 seconds. 7. Limb Ataxia: 0=Absent   8. Sensory: 0=Normal; no sensory loss   9. Best Language:  0=No aphasia, normal   10. Dysarthria: 0=Normal   11. Extinction and Inattention: 0=No abnormality    Total:    1     VAN: NEGATIVE    tPA Candidate: NO    Mechanical Thrombectomy Candidate: NO    ANTIPLT/AC/ANTITHROMB: NO    CT Results (most recent):  Results from Hospital Encounter encounter on 22    CT CODE NEURO PERF W CBF    Narrative  *PRELIMINARY REPORT*    No large vessel occlusion in the head or neck. No focal abnormal perfusion. Preliminary report was provided by Dr. Berry Gould, the on-call radiologist, on  2022 at 2015 hours. Final report to follow. *END PRELIMINARY REPORT*      Discussed with: ED physician Dr. Amita Clark, ED nurse, tele-neuro Dr. Dorien Gaucher, ED nurse and charge nurse, and intensivist NP     Time spent: 35 minutes. Eben Pelayo NP  Neurocritical Care Nurse Practitioner  [phone]    Please note that this dictation was completed with [employer], the [employer] voice recognition software. Quite often unanticipated grammatical, syntax, homophones, and other interpretive errors are inadvertently transcribed by the computer software. Please excuse any errors that have escaped final proofreading.

## 2022-05-31 NOTE — PROGRESS NOTES
0730: Bedside and Verbal shift change report given to Ryan Mendoza RN (oncoming nurse) by Pamela Brady RN (offgoing nurse). Report included the following information SBAR, Kardex, ED Summary, Procedure Summary, Intake/Output, MAR, Accordion, Recent Results, Med Rec Status, Cardiac Rhythm NSR, Alarm Parameters  and Quality Measures. Shift summary: No obvious seizures this shift. EEG complete. Intermittent LUE weakness with slight drift noted, but mostly GCS 15, NIHSS 0 throughout shift. Able to wean Cardene gtt some after given 20mg IV labetalol PRN. No change in BP after getting PO HTN meds. HD complete this AM. Cardiology consult complete. 1530: Bedside and Verbal shift change report given to Chang Crabtree RN (oncoming nurse) by Ryan Mendoza RN (offgoing nurse). Report included the following information SBAR, Kardex, ED Summary, Procedure Summary, Intake/Output, MAR, Accordion, Recent Results, Med Rec Status, Cardiac Rhythm NSR, Alarm Parameters  and Quality Measures.

## 2022-05-31 NOTE — CONSULTS
NEUROLOGY CONSULT  Quan Lozoya NP      Date Time: 05/31/22 12:31 AM  Patient Name:MAKENNA Douglass  Attending Physician: Lito Jordan MD    REASON FOR CONSULTATION:   Seizure     History of Present Illness:   Britt Cerda is a 64 y.o male with past medical history of Seizure on no AED's, Liver dz, HTN, ESRD on HD Tue/Thur/Sat who presented to the emergency department on 05/30/22 via EMS as level 2 Code S alert with altered mental status and reportedly facial droop with possible seizure per EMS. Per family, around 7 pm, the patient went unresponsive shortly after he had slumped over in the chair. Upon arrival to the ED, the patient had started responding and following commands. According to the patient, he hasn't being feeling well since yesterday morning and the last time he was doing well was the evening of 05/29/22. Patient stated he's not being feeling well and has had tremulous movements the morning of 05/30/22 which is usually self limiting from the most part. Patient admits history of seizure, on no AED's, and the last time he had seizure was 20 years ago. Reportedly, patient missed 2 dialysis sessions however, patient denied missing any days. Patient denied EtOH use however, admits smoking cigarettes and marijuana use. Patient was noted to have L elbow laceration which he was unaware. The patient has no recollection of the sequence of events or falling. CTH and CTA/CTP obtained showed no acute abnormalities. The patient was then taken to his room for tele-neuro evaluation after CTH/CTA/CTP. During evaluation, patient had tonic-clonic seizure lasting for about 30 sec. BP was elevated in the 200's and BS was low 80's. 2mg Ativan was given which seems to break seizure activities. Patient was post-ictal following seizure episode. Tele-neurologist recommended loading with Fosphenytoin 20mcg/kg and maintenance dose given patient renal function. Rapid EEG was ordered and intensivist consulted.  Neurology was consulted for seizure management. Past Medical History:     Past Medical History:   Diagnosis Date    Chronic kidney disease     Hypertension     Liver disease        Allergies   Allergen Reactions    Baclofen Rash     All over       Social & Family History:     Social History     Socioeconomic History    Marital status: SINGLE     Spouse name: Not on file    Number of children: Not on file    Years of education: Not on file    Highest education level: Not on file   Occupational History    Not on file   Tobacco Use    Smoking status: Current Some Day Smoker     Packs/day: 1.00     Last attempt to quit: 2020     Years since quittin.8    Smokeless tobacco: Never Used    Tobacco comment: started back smoking in last couple months   Vaping Use    Vaping Use: Never used   Substance and Sexual Activity    Alcohol use: Not Currently    Drug use: Yes     Types: Marijuana, Cocaine, Heroin     Comment: Herion & Cocaine 10 years ago as of 3/23/22, Marijuana last used 1 week ago    Sexual activity: Not Currently   Other Topics Concern    Not on file   Social History Narrative    Not on file     Social Determinants of Health     Financial Resource Strain:     Difficulty of Paying Living Expenses: Not on file   Food Insecurity:     Worried About Running Out of Food in the Last Year: Not on file    Monalisa of Food in the Last Year: Not on file   Transportation Needs:     Lack of Transportation (Medical): Not on file    Lack of Transportation (Non-Medical):  Not on file   Physical Activity:     Days of Exercise per Week: Not on file    Minutes of Exercise per Session: Not on file   Stress:     Feeling of Stress : Not on file   Social Connections:     Frequency of Communication with Friends and Family: Not on file    Frequency of Social Gatherings with Friends and Family: Not on file    Attends Congregation Services: Not on file    Active Member of Clubs or Organizations: Not on file   [facility] Attends Club or Organization Meetings: Not on file    Marital Status: Not on file   Intimate Partner Violence:     Fear of Current or Ex-Partner: Not on file    Emotionally Abused: Not on file    Physically Abused: Not on file    Sexually Abused: Not on file   Housing Stability:     Unable to Pay for Housing in the Last Year: Not on file    Number of [place] in the Last Year: Not on file    Unstable Housing in the Last Year: Not on file     No family history on file. Meds:     Current Facility-Administered Medications   Medication Dose Route Frequency    labetaloL (NORMODYNE;TRANDATE) injection 10 mg  10 mg IntraVENous Q5MIN PRN    fosphenytoin (CEREBYX) with saline injection 100 mg PE  100 mg PE IntraVENous Q8H    niCARdipine (CARDENE) 25 mg in 0.9% sodium chloride 250 mL ([id number])  5-15 mg/hr IntraVENous TITRATE    sodium chloride (NS) flush 5-40 mL  5-40 mL IntraVENous Q8H    sodium chloride (NS) flush 5-40 mL  5-40 mL IntraVENous PRN    acetaminophen (TYLENOL) tablet 650 mg  650 mg Oral Q6H PRN    Or    acetaminophen (TYLENOL) suppository 650 mg  650 mg Rectal Q6H PRN    polyethylene glycol (MIRALAX) packet 17 g  17 g Oral DAILY PRN    heparin (porcine) injection 5,000 Units  5,000 Units SubCUTAneous Q8H    ondansetron (ZOFRAN) injection 4 mg  4 mg IntraVENous Q6H PRN    cyanocobalamin (VITAMIN B12) tablet 1,000 mcg  1,000 mcg Oral DAILY     I personally reviewed all of the medications    Review of Systems:   Patient admits tremulous activities. No headache, eye, ear nose, throat problems; no coughing or wheezing or shortness of breath, No chest pain or orthopnea, no abdominal pain, nausea or vomiting, No pain in the body or extremities, no psychiatric, neurological, endocrine, hematological or cardiac complaints except as noted above. Physical Exam:   Blood pressure 134/71, pulse 75, temperature 98.2 °F (36.8 °C), resp.  rate 17, height 5' 9\" (1.753 m), weight 79.4 kg (175 lb), SpO2 99 %. GEN: NAD, Cooperative, Calm  HEENT: Normocephalic. Non-icter, no congestion  Lungs:  CTA bilaterally Ant  Cardiac: S1,S2, normal rate and rhythm, no carotid bruits, no gallops  Abdomen: Normal bowel sounds, no distention, non-tender  Extremities: no clubbing, cyanosis, or edema  Skin: no rashes or lesions noted      NEURO: Prior to seizure activities   Mental status: A & O x 3. Disoriented to the month. Able to follow commands appropriately  Cranial Nerves:  II-XII intact; EOMI, PERRL, No dysarthria or aphasia. Full facial strength, no asymmetry. Hearing intact bilaterally. Tongue protrudes to midline, palate elevates symmetrically. Shrug Shoulders B/L  Motor:  Normal bulk and tone, 5/5 strength x 4 extremities proximally and distally; No involuntary movements. Coordination:  Intact FTN and HTS testing  Reflexes:  +2 throughout, down going toes bilaterally   Sensation: Intact x 4 extremities to Light Touch  Gait:  Deferred     Labs/images:     Lab Results   Component Value Date/Time    WBC 9.0 05/30/2022 07:52 PM    HGB 9.7 (L) 05/30/2022 07:52 PM    HCT 33.0 (L) 05/30/2022 07:52 PM    PLATELET 649 88/84/2404 07:52 PM    MCV 83.3 05/30/2022 07:52 PM      Lab Results   Component Value Date/Time    Sodium 138 05/30/2022 07:52 PM    Potassium 5.6 (H) 05/30/2022 07:52 PM    Chloride 103 05/30/2022 07:52 PM    CO2 22 05/30/2022 07:52 PM    Anion gap 13 05/30/2022 07:52 PM    Glucose 78 05/30/2022 07:52 PM    BUN 53 (H) 05/30/2022 07:52 PM    Creatinine 11.40 (H) 05/30/2022 07:52 PM    BUN/Creatinine ratio 5 (L) 05/30/2022 07:52 PM    GFR est AA 6 (L) 05/30/2022 07:52 PM    GFR est non-AA 5 (L) 05/30/2022 07:52 PM    Calcium 9.3 05/30/2022 07:52 PM    Bilirubin, total 0.3 05/30/2022 07:52 PM    Alk.  phosphatase 48 05/30/2022 07:52 PM    Protein, total 6.4 05/30/2022 07:52 PM    Albumin 2.7 (L) 05/30/2022 07:52 PM    Globulin 3.7 05/30/2022 07:52 PM    A-G Ratio 0.7 (L) 05/30/2022 07:52 PM    ALT (SGPT) 9 (L) 05/30/2022 07:52 PM    AST (SGOT) 16 05/30/2022 07:52 PM     CT Results (most recent):  Results from Hospital Encounter encounter on 05/30/22    CT CODE NEURO PERF W CBF    Narrative  *PRELIMINARY REPORT*    No large vessel occlusion in the head or neck. No focal abnormal perfusion. Preliminary report was provided by Dr. Chitra Mcdowell, the on-call radiologist, on  5/30/2022 at 2015 hours. Final report to follow. *END PRELIMINARY REPORT*     Chart reviewed    Assessment & Plan:   64 y.o male with past medical history of Seizure on no AED's, Liver dz, HTN, ESRD on HD Tue/Thur/Sat who presented to the emergency department via EMS as level 2 Code S alert with altered mental status and seizure activities. CTH with no acute intracranial abnormality. CTA H/N with no large vessel occlusion in the head or neck. No focal abnormal perfusion. Seizure   · Patient loaded with Fosphenytoin 20mcg/kg and maintenance dose of 100 mg Q8hr  · Rapid EEG with ceribell currently with no seizure burden.   · Will obtain rEEG and convert to cEEG if needed  · Patient is currently able to protect airway and on NC  · Q1hr neuro checks   · Seizure and aspiration precaution  · As needed Ativan for abortive seizure  · No driving until remained seizure free for 6 months  · Please avoid any Flouroquinolones or any 4th generation cephalosporin as these can decrease seizure thresholds  · Elevated troponin; being trended per intensivist  · Nephrology consult for hx of ESRD on HD  · Recommend treating any metabolic derangements and electrolyte imbalance per intensivist   · Patient's Hep C level obtained 05/18/22 is abnormal; notified intensivist   · Low threshold to repeat CT head if any worsening change in neurologic condition        Further neurology recommendations to follow by Dr. Marsha Benavidez    Case discussed with: ED physician Dr. José Miguel Knight, ED nurse, tele-neuro Dr. Ally Sosa, ED nurse and charge nurse, and intensivist NP    >55% time spent in counseling or coordination of care of the above in the assessment and plan     Signed by: Dionne Landin NP      Please note that this dictation was completed with [email or web address], the computer voice recognition software. Quite often unanticipated grammatical, syntax, homophones, and other interpretive errors are inadvertently transcribed by the computer software. Please disregard these errors. Please excuse any errors that have escaped final proofreading.

## 2022-05-31 NOTE — ED NOTES
Rapid EEG Monitoring Nursing Documentation    Headband in place. Time headband placed / removed: placed 2035     Skin check: intact. Highest Seizure [place] in the last hour: Seizure [place] 0-10% - Will continue to monitor and complete 2-hour study.     Fabien Nunez RN

## 2022-05-31 NOTE — CONSULTS
NEPHROLOGY CONSULT NOTE     Patient: Miles Fernandez MRN: [id number]  PCP: Farshad Barriga NP   :     1965  Age:   64 y.o. Sex:  male      Referring physician: Tomasz Ortiz MD  Reason for consultation: 64 y.o. male with Hypertensive urgency [I16.0]  Seizures ([facility] [address] [Y28.8] complicated by ESRD on HD  Admission Date: 2022  7:46 PM  LOS: 1 day      ASSESSMENT and PLAN :   ESRD on HD  -Dialyzes TTS [facility], patient of Dr. Jackelyn Lu  -Missed 2 dialysis sessions, now with hypertensive urgency and volume overload  -We will plan on hemodialysis this morning, 3 kg UF  -HD orders placed, [facility] acute notified  -Access via PC    Hypertensive urgency  -Presented with high blood pressure in emergency department 240/140  -Blood pressure improved on Cardene drip  -Resume home blood pressure medications  -Blood pressure should improve with volume management via HD    Anemia of chronic disease  -Hemoglobin 9.7  -ABI with HD    Care Plan discussed with: Patient, nurse        Thank you for consulting [facility] in the care of your patient. Subjective:   HPI: Miles Fernandez is a 64 y.o.  male with past medical history of ESRD on HD, hypertension, hepatitis C, anemia who has been admitted to the hospital for altered mental status, left-sided facial droop. EMS was called by family this evening after patient collapsed and was unresponsive with reported seizure-like activity. Patient was brought to the emergency department with altered mental status, found to have elevated blood pressure 240/140. While in the emergency department, patient experienced another seizure, received fosphenytoin and Ativan. Neurology has evaluated the patient in the emergency department. Patient admitted to the ICU due to seizure activity and hypertensive emergency.   Nephrology is consulted for management of ESRD on HD  -Patient dialyzes MWF at [facility], patient of Dr. Nick Jones Felicia Borden  -Has missed two dialysis sessions  -Access via permacath    Past Medical History:   Diagnosis Date    Chronic kidney disease     Hypertension     Liver disease         No past surgical history on file. Allergies   Allergen Reactions    Baclofen Rash     All over       Social Hx:    reports that he has been smoking. He has been smoking about 1.00 pack per day. He has never used smokeless tobacco. He reports previous alcohol use. He reports current drug use. Drugs: Marijuana, Cocaine, and Heroin. No family history on file. Review of Systems   Unable to perform ROS: Mental status change   Patient unable to participate in ROS, opens eyes briefly, falls asleep. Per RN, patient has been somnolent since arriving from ED     Objective:      I&O's:  05/30 0701 - 05/31 0700  In: 720.8 [I.V.:720.8]  Out: -     Visit Vitals  /74   Pulse 85   Temp 98.2 °F (36.8 °C)   Resp 19   Ht 5' 9\" (1.753 m)   Wt 79.4 kg (175 lb)   SpO2 97%   BMI 25.84 kg/m²       Physical Exam  Nursing note reviewed. Constitutional:       General: He is not in acute distress. Appearance: Normal appearance. He is well-developed. He is not ill-appearing or diaphoretic. HENT:      Head: Normocephalic. Eyes:      Conjunctiva/sclera: Conjunctivae normal.   Cardiovascular:      Rate and Rhythm: Normal rate and regular rhythm. Heart sounds: Normal heart sounds. No murmur heard. No friction rub. No gallop. Pulmonary:      Effort: Pulmonary effort is normal. No respiratory distress. Breath sounds: Normal breath sounds. No wheezing or rales. Abdominal:      General: Bowel sounds are normal. There is no distension. Palpations: Abdomen is soft. Tenderness: There is no abdominal tenderness. Musculoskeletal:      Right lower leg: No edema. Left lower leg: No edema. Skin:     General: Skin is warm and dry. Capillary Refill: Capillary refill takes less than 2 seconds.       Coloration: Skin is not pale.      Findings: No erythema or rash. Neurological:      Mental Status: He is alert. He is disoriented. Laboratory Results:    Recent Labs     05/30/22 1952      K 5.6*      CO2 22   GLU 78   BUN 53*   CREA 11.40*   CA 9.3   MG 2.4   PHOS 5.0*   ALB 2.7*   ALT 9*   INR 1.1     Recent Labs     05/30/22 1952   WBC 9.0   HGB 9.7*   HCT 33.0*        Lab Results   Component Value Date/Time    Color YELLOW/STRAW 03/22/2022 04:11 PM    Appearance TURBID (A) 03/22/2022 04:11 PM    Specific gravity 1.021 03/22/2022 04:11 PM    pH (UA) 7.5 03/22/2022 04:11 PM    Protein >300 (A) 03/22/2022 04:11 PM    Glucose 100 (A) 03/22/2022 04:11 PM    Ketone Negative 03/22/2022 04:11 PM    Bilirubin Negative 03/22/2022 04:11 PM    Urobilinogen 0.2 03/22/2022 04:11 PM    Nitrites Negative 03/22/2022 04:11 PM    Leukocyte Esterase MODERATE (A) 03/22/2022 04:11 PM    Epithelial cells MODERATE (A) 03/22/2022 04:11 PM    Bacteria Negative 03/22/2022 04:11 PM    WBC 10-20 03/22/2022 04:11 PM    RBC 0-5 03/22/2022 04:11 PM     Recent Results (from the past 24 hour(s))   GLUCOSE, POC    Collection Time: 05/30/22  7:50 PM   Result Value Ref Range    Glucose (POC) 78 65 - 117 mg/dL    Performed by Walt Skinner    CBC WITH AUTOMATED DIFF    Collection Time: 05/30/22  7:52 PM   Result Value Ref Range    WBC 9.0 4.1 - 11.1 K/uL    RBC 3.96 (L) 4.10 - 5.70 M/uL    HGB 9.7 (L) 12.1 - 17.0 g/dL    HCT 33.0 (L) 36.6 - 50.3 %    MCV 83.3 80.0 - 99.0 FL    MCH 24.5 (L) 26.0 - 34.0 PG    MCHC 29.4 (L) 30.0 - 36.5 g/dL    RDW 17.5 (H) 11.5 - 14.5 %    PLATELET 372 666 - 306 K/uL    MPV 9.6 8.9 - 12.9 FL    NRBC 0.0 0  WBC    ABSOLUTE NRBC 0.00 0.00 - 0.01 K/uL    NEUTROPHILS 59 32 - 75 %    LYMPHOCYTES 20 12 - 49 %    MONOCYTES 13 5 - 13 %    EOSINOPHILS 6 0 - 7 %    BASOPHILS 1 0 - 1 %    IMMATURE GRANULOCYTES 1 (H) 0.0 - 0.5 %    ABS. NEUTROPHILS 5.3 1.8 - 8.0 K/UL    ABS. LYMPHOCYTES 1.8 0.8 - 3.5 K/UL    ABS. MONOCYTES 1.2 (H) 0.0 - 1.0 K/UL    ABS. EOSINOPHILS 0.5 (H) 0.0 - 0.4 K/UL    ABS. BASOPHILS 0.1 0.0 - 0.1 K/UL    ABS. IMM. GRANS. 0.1 (H) 0.00 - 0.04 K/UL    DF AUTOMATED     PROTHROMBIN TIME + INR    Collection Time: 05/30/22  7:52 PM   Result Value Ref Range    INR 1.1 0.9 - 1.1      Prothrombin time 11.8 (H) 9.0 - 11.1 sec   TROPONIN-HIGH SENSITIVITY    Collection Time: 05/30/22  7:52 PM   Result Value Ref Range    Troponin-High Sensitivity 145 (HH) 0 - 76 ng/L   MAGNESIUM    Collection Time: 05/30/22  7:52 PM   Result Value Ref Range    Magnesium 2.4 1.6 - 2.4 mg/dL   ETHYL ALCOHOL    Collection Time: 05/30/22  7:52 PM   Result Value Ref Range    ALCOHOL(ETHYL),SERUM <10 <09 MG/DL   METABOLIC PANEL, COMPREHENSIVE    Collection Time: 05/30/22  7:52 PM   Result Value Ref Range    Sodium 138 136 - 145 mmol/L    Potassium 5.6 (H) 3.5 - 5.1 mmol/L    Chloride 103 97 - 108 mmol/L    CO2 22 21 - 32 mmol/L    Anion gap 13 5 - 15 mmol/L    Glucose 78 65 - 100 mg/dL    BUN 53 (H) 6 - 20 MG/DL    Creatinine 11.40 (H) 0.70 - 1.30 MG/DL    BUN/Creatinine ratio 5 (L) 12 - 20      GFR est AA 6 (L) >60 ml/min/1.73m2    GFR est non-AA 5 (L) >60 ml/min/1.73m2    Calcium 9.3 8.5 - 10.1 MG/DL    Bilirubin, total 0.3 0.2 - 1.0 MG/DL    ALT (SGPT) 9 (L) 12 - 78 U/L    AST (SGOT) 16 15 - 37 U/L    Alk.  phosphatase 48 45 - 117 U/L    Protein, total 6.4 6.4 - 8.2 g/dL    Albumin 2.7 (L) 3.5 - 5.0 g/dL    Globulin 3.7 2.0 - 4.0 g/dL    A-G Ratio 0.7 (L) 1.1 - 2.2     PHOSPHORUS    Collection Time: 05/30/22  7:52 PM   Result Value Ref Range    Phosphorus 5.0 (H) 2.6 - 4.7 MG/DL   VITAMIN B12    Collection Time: 05/30/22  7:52 PM   Result Value Ref Range    Vitamin B12 325 193 - 986 pg/mL   CK    Collection Time: 05/30/22  7:52 PM   Result Value Ref Range    CK 82 39 - 308 U/L   EKG, 12 LEAD, INITIAL    Collection Time: 05/30/22  8:11 PM   Result Value Ref Range    Ventricular Rate 91 BPM    Atrial Rate 91 BPM    P-R Interval 182 ms QRS Duration 92 ms    Q-T Interval 412 ms    QTC Calculation (Bezet) 506 ms    Calculated P Axis 73 degrees    Calculated R Axis 69 degrees    Calculated T Axis 84 degrees    Diagnosis       Normal sinus rhythm  Minimal voltage criteria for LVH, may be normal variant ( Sokolow-Cardoso )  Prolonged QT  Abnormal ECG  When compared with ECG of 17-MAY-2022 07:50,  QT has lengthened     GLUCOSE, POC    Collection Time: 05/30/22  8:19 PM   Result Value Ref Range    Glucose (POC) 86 65 - 117 mg/dL    Performed by Naun Murrell    POC G3 - PUL    Collection Time: 05/30/22  8:45 PM   Result Value Ref Range    FIO2 (POC) 21 %    pH (POC) 7.32 (L) 7.35 - 7.45      pCO2 (POC) 35.4 35.0 - 45.0 MMHG    pO2 (POC) 70 (L) 80 - 100 MMHG    HCO3 (POC) 18.1 (L) 22 - 26 MMOL/L    sO2 (POC) 92.5 92 - 97 %    Base deficit (POC) 7.3 mmol/L    Site RIGHT RADIAL      Device: ROOM AIR      Allens test (POC) Positive      Specimen type (POC) ARTERIAL     TROPONIN-HIGH SENSITIVITY    Collection Time: 05/30/22 10:06 PM   Result Value Ref Range    Troponin-High Sensitivity 310 (HH) 0 - 76 ng/L   EKG, 12 LEAD, INITIAL    Collection Time: 05/30/22 10:48 PM   Result Value Ref Range    Ventricular Rate 66 BPM    Atrial Rate 66 BPM    P-R Interval 158 ms    QRS Duration 96 ms    Q-T Interval 476 ms    QTC Calculation (Bezet) 499 ms    Calculated P Axis 52 degrees    Calculated R Axis 53 degrees    Calculated T Axis 75 degrees    Diagnosis       Normal sinus rhythm  Voltage criteria for left ventricular hypertrophy  Septal infarct , age undetermined  When compared with ECG of 30-MAY-2022 20:11,  No significant change was found     TROPONIN-HIGH SENSITIVITY    Collection Time: 05/30/22 11:43 PM   Result Value Ref Range    Troponin-High Sensitivity 334 (HH) 0 - 76 ng/L       I have reviewed the following all pertinent labs, microbiology data, radiology imaging, and home medications for my assessment     We will follow patient.  Please dont hesitate to call with any questions.     Jeni Ruffin NP, PASerafinC  [facility]      [facility]  [address] [address], [facility]  Phone: [phone]     [phone]   [facility]  [address]  [address]  [place], [address]  Phone: [phone]     [phone]   [facility]  [address] [facility], [address] [facility],[address]  [place], [address] [place] [address]  Phone: [phone]     [phone]   [facility]  [address] [facility] [address], [address] [place] [address] Street  Phone: [phone]     [phone]

## 2022-05-31 NOTE — CONSULTS
Cardiology Note dictated # [id number]  Imp:  Elevated troponin levels, now down trending  Abnormal EKG with anteroseptal Q waves which was present on 5/17/22  No CP  Active seizure disorder  Uncontrolled HTN  CRF on HD  Recommendations:  Heparin gtt if able  Aspirin  MPI tomorrow  Echo ordered: will review when available  Further recommendations to follow  Thank you for this referral.  Yovana Cantrell MD  Interventional Cardiology   [facility]

## 2022-05-31 NOTE — PROGRESS NOTES
History and Physical     Name: Aylin Castanon   : 1965   MRN: [id number]   Date: 2022      Reason for ICU Admission: HTN emergency, Seizures     HISTORY OF PRESENT ILLNESS   Aylin Castanon is a 64 y.o. M who presented to ED via EMS with AMX and left sided facial droop. Hx per chart. Family called EMS at 7 PM and reported that they saw the patient collapsed and was unresponsive. There was possibly some seizure activity as well. EMS arrived and brought patient to the ED he was unresponsive but awake. He became more responsive as they were in route to the hospital.  Patient does have a past medical history for end-stage renal disease on dialysis hypertension and liver disease. Family reported patient had missed Dialysis sessions x2. In the ED patient's blood pressure was 240/140. While in the ED patient had a seizure witnessed by staff. He was given a dose of Ativan 2 mg and started on fosphenytoin. Neurology was consulted and saw patient at bedside. Head CT was done that did not show LVO or bleed. Patient also had elevated troponin. EKG was done that did not show elevated ST elevation. Will trend troponin levels. Patient started on Cardene for hypertensive emergency. Hyperkalemic. Will consult nephrology to manage dialysis. Admit patient to ICU    IMPRESSION/PROBLEM LIST   Seizures  Hypertensive Emergency  ESRD- on dialysis   Hyperkalemia  Elevated Troponin    ASSESSMENT & PLAN     NEUROLOGICAL/PAIN/SEDATION:  Analgesia: Acetaminophen  Sedation: None  Neuro check Frequency: q 1 hr   Consult neurology  Rapid EEG with no seizure activity  Continue fosphenytoin      PULMONOLOGY:  Respiratory Goals: N/A  Spontaneous Breathing Trial: N/A  Pulmonary toilet: Incentive Spirometry   ABG with metabolic acidosis.   As needed nasal cannula to keep sats greater than 90%    CARDIOVASCULAR:  SBP Goal of: > 110 mmHg and < 160 mmHg  MAP Goal of: > 65 mmHg  Nicardipine (Cardene) - For above SBP/MAP goals  IVF: None  Trend Trops and repeat EKG if elevated. Trope trending down  Will start ASA  No need for heparin drip at this time since troponin downtrending  Stress test with cards tomorrow  Resumed clonidine and losaartan, carvedilol  PRN labetolol IV  HEMATOLOGY/ONCOLOGY:  Transfusion Trigger (Hgb): <7 g/dL    GASTROINTESTINAL:  Diet/Feeding:  No      RENAL/:  Keep K>4; Mg>2   Consult nephrology for dialysis management  Had HD 5/31 will have again on 6/1 then resume normal schedule  ENDOCRINE/INTEGUMENTARY:  Glycemic Control: SSI PRN, Prevent hypoglycemia    INFECTIOUS DISEASE:  Antibiotics: None  Cultures: None      ICU DAILY CHECKLIST   Indwelling devices:  Tubes: None  Lines: Peripheral IV  Drains: None  DVT Prophylaxis: Heparin   SUP: Not at this time   PT/OT: NA   Consultants: Nephrology, Neurology  Code Status: Full code  Goals of Care Discussion with family: No   Plan of Care/Family Update: Pending   Discussed Care Plan with Bedside RN: [facility]   5/30 - admit to ICU. Subjective:   05/31/22  Patient is drowsy, follows commands. Denies pain at this time. POD:  * No surgery found *    S/P:       Past Medical History:      has a past medical history of Chronic kidney disease, Hypertension, and Liver disease. Past Surgical History:      has no past surgical history on file. Home Medications:     Prior to Admission medications    Medication Sig Start Date End Date Taking? Authorizing Provider   cloNIDine HCL (CATAPRES) 0.1 mg tablet Take 1 Tablet by mouth two (2) times a day. 5/18/22   Valentín Phelps MD   losartan (COZAAR) 100 mg tablet Take 1 Tablet by mouth daily. 5/18/22   Valnetín Phelps MD   carvediloL (Coreg) 25 mg tablet Take 25 mg by mouth two (2) times daily (with meals). Provider, Historical   NIFEdipine ER (ADALAT CC) 60 mg ER tablet Take 60 mg by mouth two (2) times a day.     Provider, Historical   bumetanide (BUMEX) 2 mg tablet Take 2 mg by mouth daily. Provider, Historical   calcium carbonate (TUMS) 200 mg calcium (500 mg) chew Take 3 Tablets by mouth three (3) times daily (after meals). Provider, Historical   fluticasone propionate (FLONASE) 50 mcg/actuation nasal spray 2 Sprays by Both Nostrils route daily as needed. Other, MD Brian   pantoprazole (Protonix) 40 mg tablet Take 1 Tablet by mouth two (2) times a day. Indications: an ulcer of the duodenum  Patient not taking: Reported on 2022 3/25/22   Aimee Avalos MD   hydrALAZINE (APRESOLINE) 100 mg tablet Take 100 mg by mouth three (3) times daily. Provider, Historical   ascorbic acid, vitamin C, (Vitamin C) 500 mg tablet Take 500 mg by mouth daily. Provider, Historical   cholecalciferol (VITAMIN D3) 25 mcg (1,000 unit) cap Take 1,000 Units by mouth daily. Provider, Historical   buprenorphine-naloxone (Suboxone) 8-2 mg film sublingaul film 1 Film by SubLINGual route two (2) times a day. Provider, Historical       Allergies/Social/Family History: Allergies   Allergen Reactions    Baclofen Rash     All over      Social History     Tobacco Use    Smoking status: Current Some Day Smoker     Packs/day: 1.00     Last attempt to quit: 2020     Years since quittin.8    Smokeless tobacco: Never Used    Tobacco comment: started back smoking in last couple months   Substance Use Topics    Alcohol use: Not Currently      No family history on file. Review of Systems:     Review of systems not obtained due to patient factors. Objective:     General:  alert, fatigued, cooperative, no distress, slowed mentation, appears stated age  Eye:  conjunctivae/corneas clear. PERRL, EOM's intact. Neurologic: Drowsy but follows commands, equal strength in upper and lower ext. No facial droop noted. Neck:  normal and no erythema or exudates noted.    Lungs:  clear to auscultation bilaterally  Heart:  regular rate and rhythm, S1, S2 normal, no murmur, click, rub or gallop  Abdomen:  soft, non-tender. Bowel sounds normal. No masses,  no organomegaly  Cardiovascular:  Regular rate and rhythm, S1S2 present, without murmur or extra heart sounds, pedal pulses normal and no edema  Skin:  Normal. and no rash or abnormalities    Vital Signs:  Visit Vitals  /68   Pulse 77   Temp 98.8 °F (37.1 °C)   Resp 17   Ht 5' 9\" (1.753 m)   Wt 79.4 kg (175 lb)   SpO2 94%   BMI 25.84 kg/m²    O2 Flow Rate (L/min): 2 l/min O2 Device: None (Room air) Temp (24hrs), Av.3 °F (36.8 °C), Min:97.6 °F (36.4 °C), Max:98.9 °F (37.2 °C)           Intake/Output:     Intake/Output Summary (Last 24 hours) at 2022 1528  Last data filed at 2022 1504  Gross per 24 hour   Intake 2159.91 ml   Output 3000 ml   Net -840.09 ml       LABS AND  DATA: Personally reviewed 22    MEDS: Personally Reviewed 22    IMAGING: New Imaging Reviewed 22    Chest X-Ray:   CXR Results  (Last 48 hours)               22  XR CHEST PORT Final result    Impression:  Increased central pulmonary vascular congestion without overt   pulmonary edema. Narrative:  EXAM:  CR chest portable       INDICATION: Lethargy       COMPARISON: CT 2022. Radiograph 2022. TECHNIQUE: Portable AP semiupright chest view at 2050 hours       FINDINGS: The left IJ catheter is stable. The cardiomediastinal contours are   stable. There is increased central pulmonary vascular congestion. There is no   pleural effusion or pneumothorax. The bones and upper abdomen are stable. CT Scan:   CT Results  (Last 48 hours)               22  CTA CODE NEURO HEAD AND NECK W CONT Preliminary result    Narrative:  *PRELIMINARY REPORT*       No large vessel occlusion in the head or neck. No focal abnormal perfusion. Preliminary report was provided by Dr. Ewa Keyes, the on-call radiologist, on   2022 at 2015 hours. Final report to follow.        *END PRELIMINARY REPORT* 05/30/22 1956  CT CODE NEURO PERF W CBF Preliminary result    Narrative:  *PRELIMINARY REPORT*       No large vessel occlusion in the head or neck. No focal abnormal perfusion. Preliminary report was provided by Dr. Selene Bernabe, the on-call radiologist, on   5/30/2022 at 2015 hours. Final report to follow. *END PRELIMINARY REPORT*        05/30/22 1951  CT CODE NEURO HEAD WO CONTRAST Final result    Impression:      No acute intracranial abnormality. Narrative:  EXAM:  CT CODE NEURO HEAD WO CONTRAST       INDICATION: Left-sided weakness       COMPARISON: None       TECHNIQUE: Noncontrast head CT. Coronal and sagittal reformats. CT dose   reduction was achieved through use of a standardized protocol tailored for this   examination and automatic exposure control for dose modulation. Adaptive   statistical iterative reconstruction (ASIR) was utilized. FINDINGS: The ventricles and sulci are age-appropriate without hydrocephalus. There is no mass effect or midline shift. There is no intracranial hemorrhage or   extra-axial fluid collection. There is no abnormal area of decreased density to   suggest infarct. The gray-white matter differentiation is maintained. The basal   cisterns are patent. The osseous structures are intact. The visualized paranasal sinuses and mastoid   air cells are clear. ECHO:  1/16/22  Interpretation Summary    Result status: Final result       Left Ventricle: Left ventricle size is normal. Moderately increased wall thickness. Findings consistent with moderate concentric hypertrophy. Normal wall motion. Normal left ventricular systolic function with a visually estimated EF of 60 - 65%. Normal diastolic function.   Left Atrium: Left atrium is mildly dilated.     Multidisciplinary Rounds Completed:  No    ABCDEF Bundle/Checklist Completed:  Yes    SPECIAL EQUIPMENT  IHD    DISPOSITION  Stay in ICU    CRITICAL CARE DOCUMENTATION  I had a face to face encounter with the patient, reviewed and interpreted patient data including clinical events, labs, images, vital signs, I/O's, and examined patient. I have discussed the case and the plan and management of the patient's care with the consulting services, the bedside nurses and the respiratory therapist.      NOTE OF PERSONAL INVOLVEMENT IN CARE   This patient has a high probability of imminent, clinically significant deterioration, which requires the highest level of preparedness to intervene urgently. I participated in the decision-making and personally managed or directed the management of the following life and organ supporting interventions that required my frequent assessment to treat or prevent imminent deterioration. I personally spent 40 minutes of critical care time. This is time spent at this critically ill patient's bedside actively involved in patient care as well as the coordination of care. This does not include any procedural time which has been billed separately.     Latoya Pappas, NP  [facility]

## 2022-05-31 NOTE — PROGRESS NOTES
1930: Bedside and Verbal shift change report given to Elias Bradshaw RN (oncoming nurse) by Arnol Shin RN (offgoing nurse). Report included the following information SBAR, Kardex, ED Summary, Intake/Output, MAR, Accordion, Recent Results, Cardiac Rhythm NSR and Alarm Parameters . 2340: Patient became anxious and agitated down in MRI; stating he could not breathe laying flat on the hard surface. Patient refused to go through with the MRI. 2358: Patient back up to ICU and resting in bed quietly. Patient states he will not go back down to MRI ever again. 0130: PRN labetalol and hydralazine given to patient throughout shift. Patient remains hypertensive. Notified Donna Parada NP and Brunilda Luo NP. Received order for home hydralazine 100 mg three times a day. 0700: Patient reports he wants to leave the hospital. Tracie Mace NP. NP will speak with patient after report. 0730: Bedside and Verbal shift change report given to Olamide Young RN and Annika Patrick RN (oncoming nurse) by Elias Bradhsaw RN (offgoing nurse). Report included the following information SBAR, Kardex, ED Summary, Intake/Output, MAR, Accordion, Recent Results, Cardiac Rhythm NSR and Alarm Parameters .

## 2022-05-31 NOTE — PROGRESS NOTES
750-Bedside and Verbal shift change report given to [facility] (oncoming nurse) by Mone Atkins (offgoing nurse). Report included the following information SBAR, Kardex, ED Summary, Procedure Summary, Intake/Output, MAR, Accordion, Recent Results, Med Rec Status, Cardiac Rhythm SR, Alarm Parameters , Pre Procedure Checklist, Procedure Verification, Quality Measures and Dual Neuro Assessment. 1930-Bedside and Verbal shift change report given to Maria Esther Richard (oncoming nurse) by [facility] (offgoing nurse). Report included the following information SBAR, Kardex, ED Summary, Procedure Summary, Intake/Output, MAR, Accordion, Recent Results, Med Rec Status, Cardiac Rhythm SR, Alarm Parameters , Pre Procedure Checklist, Procedure Verification, Quality Measures and Dual Neuro Assessment.

## 2022-05-31 NOTE — PROGRESS NOTES
Reviewed chart for transitions of care, and discussed in rounds. CM met with patient at bedside to explain role and offer support. Patient sleepy but oriented x4, confirmed demographics. Baseline:   ADLs/IDALS: Independent  Previous Home Health:None  Previous SNF/IPR:None  ER Contact: Harsh Dawson [phone]    Patient lives in a 2 level house with 2 steps to enter. Patient is independent with ADLs/IDAL     Patient has no previous HH or equipment needs. Patient's preferred pharmacy is [facility]  located on [address]. Patient's family  is expected to transport at discharge. Reason for Admission:   Seizures                 RUR Score:  30 %         PCP: First and Last name:  Regine Gilmore NP     Name of Practice:   Are you a current patient: Yes/No:Yes   Approximate date of last visit:    Can you do a virtual visit with your PCP: Yes             Resources/supports as identified by patient/family:  Son antony Fraser and sisters Chan Orantes and Spike Ying                Top Challenges facing patient (as identified by patient/family and CM): Finances/Medication cost?   None voiced               Transportation? Family or transports through [employer] system or lack thereof? Son and sisters                      Living arrangements? Lives with Spike Ying (sister)              Self-care/ADLs/Cognition?  Independent           Current Advanced Directive/Advance Care Plan:  Full Code      Healthcare Decision Maker:   Click here to complete HealthCare Decision Makers including selection of the Healthcare Decision Maker Relationship (ie \"Primary\")        Payor Source Payor: [employer] Sin / Plan: [employer] / Product Type: Managed Care Medicare /                             Plan for utilizing home health:  TBD                   Transition of Care Plan:                  Patient admitted with hypertensive emergency and seizures. Care manager met with patient, he is very sleepy, but is able to answer assessment questions. Patient lives independently with his sister Emma Boyd. Patient is ESRD and recieves dialysis at [address] [place] [address] on a Tues-Thurs- Sat schedule with a chair time of 6 am. Per patient he uses the transportation benefit through [employer]. Care Management will follow for transitions of care. Dinorah Lopez RN,Care Management  Care Management Interventions  PCP Verified by CM:  Yes Sebastian David NP)  MyChart Signup: No  Discharge Durable Medical Equipment: No  Physical Therapy Consult: No  Occupational Therapy Consult: No  Speech Therapy Consult: No  Support Systems: Other Family Member(s) (Son Raysa martinez [phone], Sister Randell Angela [phone], Sister Emma Boyd [phone])  Confirm Follow Up Transport: Family

## 2022-05-31 NOTE — PROCEDURES
Hemodialysis / [phone]    Vitals Pre Post Assessment Pre Post   /74 160/67 LOC Lethargic, approp when awake Lethargic, but approp when awake   HR 83 103 Lungs Clear/ denies SOB/ wearing O2 @2L via NC Clear-not using O2 at this time   Resp 17 18 Cardiac HRR HRR   Temp 98.3 97.8 Skin WDI WDI   Weight 77.5kg 74.5kg Edema gen puffiness None noted   Tele status Remote/ bedside Remote/ bedside Pain denies denies     Orders   Duration: Start: 0730 End: 1130 Total: 4 hrs   Dialyzer: Dialyzer/Set Up Inspection: Eligio Goldberg ([id number]/ [id number]) (05/31/22 0725)   K Bath: Dialysate K (mEq/L): 2 (05/31/22 0725)   Ca Bath: Dialysate CA (mEq/L): 2.5 (05/31/22 0725)   Na: Dialysate NA (mEq/L): 138 (05/31/22 0725)   Bicarb: Dialysate HCO3 (mEq/L): 38 (05/31/22 0725)   Target Fluid Removal: Goal/Amount of Fluid to Remove (mL): 3000 mL (05/31/22 0725)     Access   Type & Location: Left chest cath   Comments: Old gauze removed. Access site cleaned w/ alcohol and CHG. No redness or drainage noted. Ports cleaned w/ CHG and alcohol.  + aspir/ NS flushes.                                    Labs   HBsAg (Antigen) / date:  Neg 5/12/22                                             HBsAb (Antibody) / date: Susc 9/23/21   Source: Portal   Obtained/Reviewed  Critical Results Called HGB   Date Value Ref Range Status   05/31/2022 9.0 (L) 12.1 - 17.0 g/dL Final     Potassium   Date Value Ref Range Status   05/31/2022 5.5 (H) 3.5 - 5.1 mmol/L Final     Calcium   Date Value Ref Range Status   05/31/2022 9.2 8.5 - 10.1 MG/DL Final     BUN   Date Value Ref Range Status   05/31/2022 61 (H) 6 - 20 MG/DL Final     Creatinine   Date Value Ref Range Status   05/31/2022 11.40 (H) 0.70 - 1.30 MG/DL Final        Meds Given   Name Dose Route   heparin 2000units ICD art port   heparin 2100 units ICD anabel port          Adequacy / Fluid    Total Liters Process: 96   Net Fluid Removed: 3000 ml      Comments   Time Out Done:   (Time) 0449 mjb Admitting Diagnosis: AMS/ seizures   Consent obtained/signed:  Chronic ESRD pt   Machine / RO # Machine Number: [id number] (05/31/22 0242)   Primary Nurse Rpt Pre: Donna Acuna RN   Primary Nurse Rpt Post: Le Rodriguez RN   Pt Education: Discussed need to go to EVERY tx. Care Plan: Continue to do HD txs s ordered   Pts outpatient [facility]: [facility]     Tx Summary   Comments: Tolerated tx well. At end, blood in circuit returned w/ 300 ml NS. Ports flushed w/ NS, then dwelled w/ Heparin. Q-Syte/ PureHub caps applied and secured. Clamps secured. Drsg still CDI. Remains in ICU1  Bed in lowest position  All rails up  Call lite in easy reach.

## 2022-06-01 ENCOUNTER — APPOINTMENT (OUTPATIENT)
Dept: NUCLEAR MEDICINE | Age: 57
DRG: 100 | End: 2022-06-01
Attending: INTERNAL MEDICINE
Payer: MEDICARE

## 2022-06-01 ENCOUNTER — APPOINTMENT (OUTPATIENT)
Dept: NON INVASIVE DIAGNOSTICS | Age: 57
DRG: 100 | End: 2022-06-01
Attending: INTERNAL MEDICINE
Payer: MEDICARE

## 2022-06-01 VITALS
HEART RATE: 81 BPM | HEIGHT: 69 IN | TEMPERATURE: 98.5 F | OXYGEN SATURATION: 99 % | RESPIRATION RATE: 11 BRPM | SYSTOLIC BLOOD PRESSURE: 143 MMHG | WEIGHT: 164.9 LBS | DIASTOLIC BLOOD PRESSURE: 73 MMHG | BODY MASS INDEX: 24.42 KG/M2

## 2022-06-01 LAB
AMPHET UR QL SCN: NEGATIVE
ANION GAP SERPL CALC-SCNC: 7 MMOL/L (ref 5–15)
ATRIAL RATE: 85 BPM
BARBITURATES UR QL SCN: NEGATIVE
BENZODIAZ UR QL: NEGATIVE
BUN SERPL-MCNC: 28 MG/DL (ref 6–20)
BUN/CREAT SERPL: 4 (ref 12–20)
CALCIUM SERPL-MCNC: 8.1 MG/DL (ref 8.5–10.1)
CALCULATED P AXIS, ECG09: 66 DEGREES
CALCULATED R AXIS, ECG10: 42 DEGREES
CALCULATED T AXIS, ECG11: 76 DEGREES
CANNABINOIDS UR QL SCN: NEGATIVE
CHLORIDE SERPL-SCNC: 101 MMOL/L (ref 97–108)
CO2 SERPL-SCNC: 29 MMOL/L (ref 21–32)
COCAINE UR QL SCN: POSITIVE
CREAT SERPL-MCNC: 6.91 MG/DL (ref 0.7–1.3)
DIAGNOSIS, 93000: NORMAL
DRUG SCRN COMMENT,DRGCM: ABNORMAL
ERYTHROCYTE [DISTWIDTH] IN BLOOD BY AUTOMATED COUNT: 17.5 % (ref 11.5–14.5)
GLUCOSE SERPL-MCNC: 70 MG/DL (ref 65–100)
HCT VFR BLD AUTO: 30.8 % (ref 36.6–50.3)
HGB BLD-MCNC: 9.3 G/DL (ref 12.1–17)
MCH RBC QN AUTO: 24.6 PG (ref 26–34)
MCHC RBC AUTO-ENTMCNC: 30.2 G/DL (ref 30–36.5)
MCV RBC AUTO: 81.5 FL (ref 80–99)
METHADONE UR QL: NEGATIVE
NRBC # BLD: 0 K/UL (ref 0–0.01)
NRBC BLD-RTO: 0 PER 100 WBC
OPIATES UR QL: NEGATIVE
P-R INTERVAL, ECG05: 158 MS
PCP UR QL: NEGATIVE
PHENYTOIN SERPL-MCNC: 8.1 UG/ML (ref 10–20)
PLATELET # BLD AUTO: 233 K/UL (ref 150–400)
PMV BLD AUTO: 10.6 FL (ref 8.9–12.9)
POTASSIUM SERPL-SCNC: 3.8 MMOL/L (ref 3.5–5.1)
Q-T INTERVAL, ECG07: 450 MS
QRS DURATION, ECG06: 88 MS
QTC CALCULATION (BEZET), ECG08: 535 MS
RBC # BLD AUTO: 3.78 M/UL (ref 4.1–5.7)
SODIUM SERPL-SCNC: 137 MMOL/L (ref 136–145)
VENTRICULAR RATE, ECG03: 85 BPM
WBC # BLD AUTO: 7.5 K/UL (ref 4.1–11.1)

## 2022-06-01 PROCEDURE — 74011250637 HC RX REV CODE- 250/637: Performed by: INTERNAL MEDICINE

## 2022-06-01 PROCEDURE — 74011250637 HC RX REV CODE- 250/637: Performed by: NURSE PRACTITIONER

## 2022-06-01 PROCEDURE — 80185 ASSAY OF PHENYTOIN TOTAL: CPT

## 2022-06-01 PROCEDURE — 36415 COLL VENOUS BLD VENIPUNCTURE: CPT

## 2022-06-01 PROCEDURE — 74011250636 HC RX REV CODE- 250/636: Performed by: NURSE PRACTITIONER

## 2022-06-01 PROCEDURE — 85027 COMPLETE CBC AUTOMATED: CPT

## 2022-06-01 PROCEDURE — 74011000250 HC RX REV CODE- 250: Performed by: NURSE PRACTITIONER

## 2022-06-01 PROCEDURE — 80048 BASIC METABOLIC PNL TOTAL CA: CPT

## 2022-06-01 PROCEDURE — 80307 DRUG TEST PRSMV CHEM ANLYZR: CPT

## 2022-06-01 RX ORDER — NIFEDIPINE 30 MG/1
30 TABLET, EXTENDED RELEASE ORAL ONCE
Status: DISCONTINUED | OUTPATIENT
Start: 2022-06-01 | End: 2022-06-01 | Stop reason: HOSPADM

## 2022-06-01 RX ORDER — HYDRALAZINE HYDROCHLORIDE 50 MG/1
100 TABLET, FILM COATED ORAL 3 TIMES DAILY
Status: DISCONTINUED | OUTPATIENT
Start: 2022-06-01 | End: 2022-06-01

## 2022-06-01 RX ORDER — NIFEDIPINE 60 MG/1
60 TABLET, EXTENDED RELEASE ORAL DAILY
Status: DISCONTINUED | OUTPATIENT
Start: 2022-06-01 | End: 2022-06-01

## 2022-06-01 RX ORDER — CLONIDINE HYDROCHLORIDE 0.2 MG/1
0.2 TABLET ORAL EVERY 8 HOURS
Status: DISCONTINUED | OUTPATIENT
Start: 2022-06-01 | End: 2022-06-01 | Stop reason: HOSPADM

## 2022-06-01 RX ORDER — HYDRALAZINE HYDROCHLORIDE 50 MG/1
100 TABLET, FILM COATED ORAL 3 TIMES DAILY
Status: DISCONTINUED | OUTPATIENT
Start: 2022-06-01 | End: 2022-06-01 | Stop reason: HOSPADM

## 2022-06-01 RX ORDER — PHENYTOIN SODIUM 100 MG/1
100 CAPSULE, EXTENDED RELEASE ORAL EVERY 8 HOURS
Status: DISCONTINUED | OUTPATIENT
Start: 2022-06-01 | End: 2022-06-01 | Stop reason: HOSPADM

## 2022-06-01 RX ADMIN — NICARDIPINE HYDROCHLORIDE 15 MG/HR: 25 INJECTION, SOLUTION INTRAVENOUS at 06:38

## 2022-06-01 RX ADMIN — NICARDIPINE HYDROCHLORIDE 5 MG/HR: 25 INJECTION, SOLUTION INTRAVENOUS at 03:07

## 2022-06-01 RX ADMIN — CARVEDILOL 25 MG: 12.5 TABLET, FILM COATED ORAL at 09:22

## 2022-06-01 RX ADMIN — NICARDIPINE HYDROCHLORIDE 12.5 MG/HR: 25 INJECTION, SOLUTION INTRAVENOUS at 10:18

## 2022-06-01 RX ADMIN — CLONIDINE HYDROCHLORIDE 0.1 MG: 0.1 TABLET ORAL at 09:23

## 2022-06-01 RX ADMIN — NICARDIPINE HYDROCHLORIDE 12.5 MG/HR: 25 INJECTION, SOLUTION INTRAVENOUS at 04:35

## 2022-06-01 RX ADMIN — LOSARTAN POTASSIUM 100 MG: 50 TABLET, FILM COATED ORAL at 09:23

## 2022-06-01 RX ADMIN — PANTOPRAZOLE SODIUM 40 MG: 40 TABLET, DELAYED RELEASE ORAL at 09:23

## 2022-06-01 RX ADMIN — SODIUM CHLORIDE 100 MG PE: 9 INJECTION, SOLUTION INTRAMUSCULAR; INTRAVENOUS; SUBCUTANEOUS at 03:50

## 2022-06-01 RX ADMIN — NIFEDIPINE 60 MG: 60 TABLET, EXTENDED RELEASE ORAL at 09:26

## 2022-06-01 RX ADMIN — HYDRALAZINE HYDROCHLORIDE 100 MG: 50 TABLET, FILM COATED ORAL at 09:22

## 2022-06-01 RX ADMIN — NICARDIPINE HYDROCHLORIDE 15 MG/HR: 25 INJECTION, SOLUTION INTRAVENOUS at 08:31

## 2022-06-01 RX ADMIN — HYDRALAZINE HYDROCHLORIDE 100 MG: 50 TABLET, FILM COATED ORAL at 01:58

## 2022-06-01 RX ADMIN — SODIUM CHLORIDE, PRESERVATIVE FREE 10 ML: 5 INJECTION INTRAVENOUS at 05:55

## 2022-06-01 RX ADMIN — CYANOCOBALAMIN TAB 500 MCG 1000 MCG: 500 TAB at 09:22

## 2022-06-01 NOTE — PROGRESS NOTES
[facility]   [address] [address] [facility], [address] [facility]  Phone: [phone]   [phone]    [email or web address]     Nephrology Progress Note    Patient Name : Miles Fernandez      : 1965     MRN : [id number]  Date of Admission : 2022  Date of Servive : 22    CC:  Follow up for ESRD       Assessment and Plan   ESRD-HD :  - dialyzes TTS at [facility] [address]   - non compliant w/ HD  - refused HD for today   - next HD tomorrow   - daily labs     Hypertensive Urgency   - restarted all home meds   - wean off cardene gtt   - echo and stress test pending , cards following for abnormal EKG    Anemia in CKD   - hold ABI until BP better   - check iron profile     Sec HPTH   - continue Phos binders      Interval History:  Seen and examined. Off cradene gtt for a little while after HD but restarted overnight   Home BP meds restarted but not nifedipine   No new events     Review of Systems: A comprehensive review of systems was negative except for that written in the HPI.     Current Medications:   Current Facility-Administered Medications   Medication Dose Route Frequency    hydrALAZINE (APRESOLINE) tablet 100 mg  100 mg Oral TID    NIFEdipine ER (PROCARDIA XL) tablet 60 mg  60 mg Oral DAILY    heparin (porcine) 1,000 unit/mL injection 2,100 Units  2,100 Units InterCATHeter DIALYSIS PRN    And    heparin (porcine) 1,000 unit/mL injection 2,000 Units  2,000 Units InterCATHeter DIALYSIS PRN    labetaloL (NORMODYNE;TRANDATE) injection 20 mg  20 mg IntraVENous Q4H PRN    losartan (COZAAR) tablet 100 mg  100 mg Oral DAILY    cloNIDine HCL (CATAPRES) tablet 0.1 mg  0.1 mg Oral BID    carvediloL (COREG) tablet 25 mg  25 mg Oral BID WITH MEALS    hydrALAZINE (APRESOLINE) 20 mg/mL injection 20 mg  20 mg IntraVENous Q4H PRN    pantoprazole (PROTONIX) tablet 40 mg  40 mg Oral ACB&D    glucagon (GLUCAGEN) injection 1 mg  1 mg IntraMUSCular PRN    glucose chewable tablet 12 g  3 Tablet Oral PRN    dextrose 10 % infusion 125-250 mL  125-250 mL IntraVENous PRN    aspirin chewable tablet 81 mg  81 mg Oral QPM    fosphenytoin (CEREBYX) with saline injection 100 mg PE  100 mg PE IntraVENous Q8H    niCARdipine (CARDENE) 25 mg in 0.9% sodium chloride 250 mL ([id number])  5-15 mg/hr IntraVENous TITRATE    sodium chloride (NS) flush 5-40 mL  5-40 mL IntraVENous Q8H    sodium chloride (NS) flush 5-40 mL  5-40 mL IntraVENous PRN    acetaminophen (TYLENOL) tablet 650 mg  650 mg Oral Q6H PRN    Or    acetaminophen (TYLENOL) suppository 650 mg  650 mg Rectal Q6H PRN    polyethylene glycol (MIRALAX) packet 17 g  17 g Oral DAILY PRN    heparin (porcine) injection 5,000 Units  5,000 Units SubCUTAneous Q8H    ondansetron (ZOFRAN) injection 4 mg  4 mg IntraVENous Q6H PRN    cyanocobalamin (VITAMIN B12) tablet 1,000 mcg  1,000 mcg Oral DAILY      Allergies   Allergen Reactions    Baclofen Rash     All over       Objective:  Vitals:    Vitals:    06/01/22 0430 06/01/22 0445 06/01/22 0500 06/01/22 0600   BP: (!) 152/84 (!) 153/74 (!) 160/82 (!) 157/76   Pulse: 85 87 88 90   Resp: 11 12 16 20   Temp:       SpO2: 98% 98% 95% 96%   Weight:    74.8 kg (164 lb 14.5 oz)   Height:         Intake and Output:  05/31 1901 - 06/01 0700  In: 347.9 [I.V.:347.9]  Out: 125 [Urine:125]  05/30 0701 - 05/31 1900  In: 2159.9 [I.V.:2159.9]  Out: 3000     Physical Examination:    General: NAD,Conversant   Neck:  [facility] PC   Resp:  Lungs CTA B/L, no wheezing , normal respiratory effort  CV:  RRR,  no murmur or rub, LE edema  GI:  Soft, NT, + BS, no HS megaly  Neurologic:  Non focal  Psych:             AAO x 3 appropriate affect        []    High complexity decision making was performed  []    Patient is at high-risk of decompensation with multiple organ involvement    Lab Data Personally Reviewed: I have reviewed all the pertinent labs, microbiology data and radiology studies during assessment.     Recent Labs 06/01/22 0417 05/31/22 0429 05/30/22 1952    137 138   K 3.8 5.5* 5.6*    104 103   CO2 29 22 22   GLU 70 58* 78   BUN 28* 61* 53*   CREA 6.91* 11.40* 11.40*   CA 8.1* 9.2 9.3   MG  --   --  2.4   PHOS  --   --  5.0*   ALB  --  2.7* 2.7*   ALT  --  9* 9*   INR  --   --  1.1     Recent Labs     06/01/22 0417 05/31/22 0429 05/30/22 1952   WBC 7.5 9.8 9.0   HGB 9.3* 9.0* 9.7*   HCT 30.8* 29.6* 33.0*    288 287     No results found for: [facility]  Lab Results   Component Value Date/Time    Culture result: NO GROWTH 5 DAYS 05/18/2022 06:06 AM    Culture result: No growth (<1,000 CFU/ML) 03/22/2022 04:11 PM     Recent Results (from the past 24 hour(s))   GLUCOSE, POC    Collection Time: 05/31/22  8:34 AM   Result Value Ref Range    Glucose (POC) 72 65 - 117 mg/dL    Performed by Jahaira Acosta    TROPONIN-HIGH SENSITIVITY    Collection Time: 05/31/22 11:18 AM   Result Value Ref Range    Troponin-High Sensitivity 250 (HH) 0 - 76 ng/L   GLUCOSE, POC    Collection Time: 05/31/22 12:23 PM   Result Value Ref Range    Glucose (POC) 97 65 - 117 mg/dL    Performed by Jahaira Acosta    CBC W/O DIFF    Collection Time: 06/01/22  4:17 AM   Result Value Ref Range    WBC 7.5 4.1 - 11.1 K/uL    RBC 3.78 (L) 4.10 - 5.70 M/uL    HGB 9.3 (L) 12.1 - 17.0 g/dL    HCT 30.8 (L) 36.6 - 50.3 %    MCV 81.5 80.0 - 99.0 FL    MCH 24.6 (L) 26.0 - 34.0 PG    MCHC 30.2 30.0 - 36.5 g/dL    RDW 17.5 (H) 11.5 - 14.5 %    PLATELET 286 676 - 877 K/uL    MPV 10.6 8.9 - 12.9 FL    NRBC 0.0 0  WBC    ABSOLUTE NRBC 0.00 0.00 - 6.45 K/uL   METABOLIC PANEL, BASIC    Collection Time: 06/01/22  4:17 AM   Result Value Ref Range    Sodium 137 136 - 145 mmol/L    Potassium 3.8 3.5 - 5.1 mmol/L    Chloride 101 97 - 108 mmol/L    CO2 29 21 - 32 mmol/L    Anion gap 7 5 - 15 mmol/L    Glucose 70 65 - 100 mg/dL    BUN 28 (H) 6 - 20 MG/DL    Creatinine 6.91 (H) 0.70 - 1.30 MG/DL    BUN/Creatinine ratio 4 (L) 12 - 20      GFR est AA 10 (L) >60 ml/min/1.73m2    GFR est non-AA 8 (L) >60 ml/min/1.73m2    Calcium 8.1 (L) 8.5 - 10.1 MG/DL   PHENYTOIN    Collection Time: 06/01/22  4:17 AM   Result Value Ref Range    Phenytoin 8.1 (L) 10.0 - 20.0 ug/mL   DRUG SCREEN, URINE    Collection Time: 06/01/22  4:25 AM   Result Value Ref Range    AMPHETAMINES Negative NEG      BARBITURATES Negative NEG      BENZODIAZEPINES Negative NEG      COCAINE Positive (A) NEG      METHADONE Negative NEG      OPIATES Negative NEG      PCP(PHENCYCLIDINE) Negative NEG      THC (TH-CANNABINOL) Negative NEG      Drug screen comment (NOTE)            I have reviewed the flowsheets. Chart and Pertinent Notes have been reviewed. No change in PMH ,family and social history from Consult note.       Reid Garner [facility]

## 2022-06-01 NOTE — PROGRESS NOTES
History and Physical     Name: Harman Moreira   : 1965   MRN: [id number]   Date: 2022      Reason for ICU Admission: HTN emergency, Seizures     HISTORY OF PRESENT ILLNESS   Harman Moreira is a 64 y.o. M who presented to ED via EMS with AMX and left sided facial droop. Hx per chart. Family called EMS at 7 PM and reported that they saw the patient collapsed and was unresponsive. There was possibly some seizure activity as well. EMS arrived and brought patient to the ED he was unresponsive but awake. He became more responsive as they were in route to the hospital.  Patient does have a past medical history for end-stage renal disease on dialysis hypertension and liver disease. Family reported patient had missed Dialysis sessions x2. In the ED patient's blood pressure was 240/140. While in the ED patient had a seizure witnessed by staff. He was given a dose of Ativan 2 mg and started on fosphenytoin. Neurology was consulted and saw patient at bedside. Head CT was done that did not show LVO or bleed. Patient also had elevated troponin. EKG was done that did not show elevated ST elevation. Will trend troponin levels. Patient started on Cardene for hypertensive emergency. Hyperkalemic. Will consult nephrology to manage dialysis. Admit patient to ICU    IMPRESSION/PROBLEM LIST   Seizures  Hypertensive Emergency  ESRD- on dialysis   Hyperkalemia  Elevated Troponin    ASSESSMENT & PLAN     NEUROLOGICAL/PAIN/SEDATION:  Analgesia: Acetaminophen  Sedation: None  Neuro check Frequency: q 1 hr   Consult neurology  Rapid EEG with no seizure activity  Continue fosphenytoin  Refused MRI overnight, states he cannot lie flat    PULMONOLOGY:  Respiratory Goals: N/A  Spontaneous Breathing Trial: N/A  Pulmonary toilet: Incentive Spirometry   ABG with metabolic acidosis.   As needed nasal cannula to keep sats greater than 90%    CARDIOVASCULAR:  SBP Goal of: > 110 mmHg and < 160 mmHg  MAP Goal of: > 65 mmHg  Nicardipine (Cardene) - For above SBP/MAP goals  IVF: None  Trend Trops and repeat EKG if elevated. Trope trending down  Continue ASA  No need for heparin drip at this time since troponin downtrending  Refused lexiscan stress test, treadmill test not an option per cardiology  Resumed clonidine and losaartan, carvedilol, hydralazine, nifedipine  PRN labetolol IV  HEMATOLOGY/ONCOLOGY:  Transfusion Trigger (Hgb): <7 g/dL    GASTROINTESTINAL:  Diet/Feeding:  No      RENAL/:  Keep K>4; Mg>2   Consult nephrology for dialysis management  Had HD 5/31  Refused HD this morning, plan for HD tomorrow  ENDOCRINE/INTEGUMENTARY:  Glycemic Control: SSI PRN, Prevent hypoglycemia    INFECTIOUS DISEASE:  Antibiotics: None  Cultures: None      ICU DAILY CHECKLIST   Indwelling devices:  Tubes: None  Lines: Peripheral IV  Drains: None  DVT Prophylaxis: Heparin   SUP: Not at this time   PT/OT: NA   Consultants: Nephrology, Neurology  Code Status: Full code  Goals of Care Discussion with family: No   Plan of Care/Family Update: Pending   Discussed Care Plan with Bedside RN: [facility]   5/30 - admit to ICU. Subjective:   06/01/22  Patient is drowsy, follows commands. Denies pain at this time. POD:  * No surgery found *    S/P:       Past Medical History:      has a past medical history of Chronic kidney disease, Hypertension, and Liver disease. Past Surgical History:      has no past surgical history on file. Home Medications:     Prior to Admission medications    Medication Sig Start Date End Date Taking? Authorizing Provider   cloNIDine HCL (CATAPRES) 0.1 mg tablet Take 1 Tablet by mouth two (2) times a day. 5/18/22   Pavan Castillo MD   losartan (COZAAR) 100 mg tablet Take 1 Tablet by mouth daily. 5/18/22   Pavan Castillo MD   carvediloL (Coreg) 25 mg tablet Take 25 mg by mouth two (2) times daily (with meals).     Provider, Historical   NIFEdipine ER (ADALAT CC) 60 mg ER tablet Take 60 mg by mouth two (2) times a day. Provider, Historical   bumetanide (BUMEX) 2 mg tablet Take 2 mg by mouth daily. Provider, Historical   calcium carbonate (TUMS) 200 mg calcium (500 mg) chew Take 3 Tablets by mouth three (3) times daily (after meals). Provider, Historical   fluticasone propionate (FLONASE) 50 mcg/actuation nasal spray 2 Sprays by Both Nostrils route daily as needed. Other, MD Brian   pantoprazole (Protonix) 40 mg tablet Take 1 Tablet by mouth two (2) times a day. Indications: an ulcer of the duodenum  Patient not taking: Reported on 2022 3/25/22   Candace Conley MD   hydrALAZINE (APRESOLINE) 100 mg tablet Take 100 mg by mouth three (3) times daily. Provider, Historical   ascorbic acid, vitamin C, (Vitamin C) 500 mg tablet Take 500 mg by mouth daily. Provider, Historical   cholecalciferol (VITAMIN D3) 25 mcg (1,000 unit) cap Take 1,000 Units by mouth daily. Provider, Historical   buprenorphine-naloxone (Suboxone) 8-2 mg film sublingaul film 1 Film by SubLINGual route two (2) times a day. Provider, Historical       Allergies/Social/Family History: Allergies   Allergen Reactions    Baclofen Rash     All over      Social History     Tobacco Use    Smoking status: Current Some Day Smoker     Packs/day: 1.00     Last attempt to quit: 2020     Years since quittin.8    Smokeless tobacco: Never Used    Tobacco comment: started back smoking in last couple months   Substance Use Topics    Alcohol use: Not Currently      No family history on file. Review of Systems:     Review of systems not obtained due to patient factors. Objective:     General:  alert, fatigued, cooperative, no distress, slowed mentation, appears stated age  Eye:  conjunctivae/corneas clear. PERRL, EOM's intact. Neurologic: Drowsy but follows commands, equal strength in upper and lower ext. No facial droop noted. Neck:  normal and no erythema or exudates noted.    Lungs:  clear to auscultation bilaterally  Heart:  regular rate and rhythm, S1, S2 normal, no murmur, click, rub or gallop  Abdomen:  soft, non-tender. Bowel sounds normal. No masses,  no organomegaly  Cardiovascular:  Regular rate and rhythm, S1S2 present, without murmur or extra heart sounds, pedal pulses normal and no edema  Skin:  Normal. and no rash or abnormalities    Vital Signs:  Visit Vitals  BP (!) 159/54   Pulse 89   Temp 98.1 °F (36.7 °C)   Resp 14   Ht 5' 9\" (1.753 m)   Wt 74.8 kg (164 lb 14.5 oz)   SpO2 97%   BMI 24.35 kg/m²    O2 Flow Rate (L/min): 2 l/min O2 Device: None (Room air) Temp (24hrs), Av.4 °F (36.9 °C), Min:97.8 °F (36.6 °C), Max:98.8 °F (37.1 °C)           Intake/Output:     Intake/Output Summary (Last 24 hours) at 2022 0907  Last data filed at 2022 0700  Gross per 24 hour   Intake 1200.41 ml   Output 3125 ml   Net -1924.59 ml       LABS AND  DATA: Personally reviewed 22    MEDS: Personally Reviewed 22    IMAGING: New Imaging Reviewed 22    Chest X-Ray:   CXR Results  (Last 48 hours)               22  XR CHEST PORT Final result    Impression:  Increased central pulmonary vascular congestion without overt   pulmonary edema. Narrative:  EXAM:  CR chest portable       INDICATION: Lethargy       COMPARISON: CT 2022. Radiograph 2022. TECHNIQUE: Portable AP semiupright chest view at 2050 hours       FINDINGS: The left IJ catheter is stable. The cardiomediastinal contours are   stable. There is increased central pulmonary vascular congestion. There is no   pleural effusion or pneumothorax. The bones and upper abdomen are stable. CT Scan:   CT Results  (Last 48 hours)               22  CTA CODE NEURO HEAD AND NECK W CONT Preliminary result    Narrative:  *PRELIMINARY REPORT*       No large vessel occlusion in the head or neck. No focal abnormal perfusion.        Preliminary report was provided by Dr. Jm Spear, the on-call radiologist, on   5/30/2022 at 2015 hours. Final report to follow. *END PRELIMINARY REPORT*        05/30/22 1956  CT CODE NEURO PERF W CBF Preliminary result    Narrative:  *PRELIMINARY REPORT*       No large vessel occlusion in the head or neck. No focal abnormal perfusion. Preliminary report was provided by Dr. Beth Hansen, the on-call radiologist, on   5/30/2022 at 2015 hours. Final report to follow. *END PRELIMINARY REPORT*        05/30/22 1951  CT CODE NEURO HEAD WO CONTRAST Final result    Impression:      No acute intracranial abnormality. Narrative:  EXAM:  CT CODE NEURO HEAD WO CONTRAST       INDICATION: Left-sided weakness       COMPARISON: None       TECHNIQUE: Noncontrast head CT. Coronal and sagittal reformats. CT dose   reduction was achieved through use of a standardized protocol tailored for this   examination and automatic exposure control for dose modulation. Adaptive   statistical iterative reconstruction (ASIR) was utilized. FINDINGS: The ventricles and sulci are age-appropriate without hydrocephalus. There is no mass effect or midline shift. There is no intracranial hemorrhage or   extra-axial fluid collection. There is no abnormal area of decreased density to   suggest infarct. The gray-white matter differentiation is maintained. The basal   cisterns are patent. The osseous structures are intact. The visualized paranasal sinuses and mastoid   air cells are clear. ECHO:  1/16/22  Interpretation Summary    Result status: Final result       Left Ventricle: Left ventricle size is normal. Moderately increased wall thickness. Findings consistent with moderate concentric hypertrophy. Normal wall motion. Normal left ventricular systolic function with a visually estimated EF of 60 - 65%. Normal diastolic function.   Left Atrium: Left atrium is mildly dilated.     Multidisciplinary Rounds Completed:  No    ABCDEF Bundle/Checklist Completed:  Yes    SPECIAL EQUIPMENT  IHD    DISPOSITION  Stay in ICU    CRITICAL CARE DOCUMENTATION  I had a face to face encounter with the patient, reviewed and interpreted patient data including clinical events, labs, images, vital signs, I/O's, and examined patient. I have discussed the case and the plan and management of the patient's care with the consulting services, the bedside nurses and the respiratory therapist.      NOTE OF PERSONAL INVOLVEMENT IN CARE   This patient has a high probability of imminent, clinically significant deterioration, which requires the highest level of preparedness to intervene urgently. I participated in the decision-making and personally managed or directed the management of the following life and organ supporting interventions that required my frequent assessment to treat or prevent imminent deterioration. I personally spent 40 minutes of critical care time. This is time spent at this critically ill patient's bedside actively involved in patient care as well as the coordination of care. This does not include any procedural time which has been billed separately.     Ernestina Montoya NP  [facility]

## 2022-06-01 NOTE — ROUTINE PROCESS
0730: Bedside shift change report given to NAVEED Saldivar, and Trevor Juan RN by Demetrice Baca RN. Report included the following information - SBAR, Kardex, and Accordian. 0800: Spoke with pt and EDIS Lindo at bedside. Pt refused stress test. Pt reports that he will not lay flat for any diagnostic testing.    0905: Trevor Juan RN, spoke with cardiologist (MD Liseth Hewitt). Told that alternative treadmill stress test is not an option for pt. Received verbal order from EDIS Lindo to go ahead and advance pt's diet and proceed with oral med administration. 1108: Received verbal order from EDIS Contreras to switch pt to Q4h neuro exams    1142: Pt expressed desire to leave AMA. Potential risks of doing so explained to pt. Papers signed and put with chart, IVs removed, cardene discontinued. 1150: Pt escorted to lobby to wait for ride.

## 2022-06-01 NOTE — PROGRESS NOTES
Transition of Care Plan   RUR- High Risks   DISPOSITION: Patient left AMA   Patient did not sign IM letter as he left AMA.   Deb Kong RN,Care Management

## 2022-06-01 NOTE — PROGRESS NOTES
Neurology Progress Note  Tati Mendez NP    Admit Date: 5/30/2022   LOS: 2 days      Daily Progress Note: 6/1/2022    C/C: Seizure    HPI: Gunjan Altman is a 64 y.o. M with a pmh of of Seizure on no AED's, Liver dz, HTN, ESRD on HD Tue/Thur/Sat who presented to the emergency department on 05/30/22 via EMS as level 2 Code S alert with unresposivness and reportedly facial droop with possible seizure per EMS. Upon arrival to the ED, the patient had started responding and following commands. According to the patient, he has being endorsing abdominal issues and not feeling well since the morning of 05/30/22 and the last time he was doing well was the evening of 05/29/22. Patient admits history of seizure, on no AED's, and the last time he had seizure was 20 years ago. Reportedly, patient missed 2 dialysis sessions which patient denied. Patient denied EtOH use however, admits smoking cigarettes and marijuana use. The patient had no recollection of the sequence of events or falling. CTH and CTA/CTP obtained showed no acute abnormalities. During tele-neuro evaluation, patient had tonic-clonic seizure lasting for about 30 sec. BP was noted to be elevated in the 200's and BS was low 80's. 2mg Ativan was given which seems to break seizure activities. Patient was post-ictal following seizure episode. Tele-neurologist recommended loading with Fosphenytoin 20mcg/kg and maintenance dose given patient renal function. Rapid EEG was ordered which showed no epileptiform discharges or electrograpihc seizures. Neurology was consulted for seizure management. Subjective:   No acute events noted overnight. No of consciousness, seizure or convulsive activity noted overnight. The patient admits feeling much better today and denied any other neurological symptoms. Patient still has no recollection of the sequence of events. Discussed with patient about starting AED which patient is not amenable to doing.     Allergies Allergen Reactions    Baclofen Rash     All over       Past Medical History:   Diagnosis Date    Chronic kidney disease     Hypertension     Liver disease      No family history on file. Social History     Tobacco Use    Smoking status: Current Some Day Smoker     Packs/day: 1.00     Last attempt to quit: 2020     Years since quittin.8    Smokeless tobacco: Never Used    Tobacco comment: started back smoking in last couple months   Substance Use Topics    Alcohol use: Not Currently      Prior to Admission Medications   Prescriptions Last Dose Informant Patient Reported? Taking? NIFEdipine ER (ADALAT CC) 60 mg ER tablet   Yes No   Sig: Take 60 mg by mouth two (2) times a day. ascorbic acid, vitamin C, (Vitamin C) 500 mg tablet   Yes No   Sig: Take 500 mg by mouth daily. bumetanide (BUMEX) 2 mg tablet   Yes No   Sig: Take 2 mg by mouth daily. buprenorphine-naloxone (Suboxone) 8-2 mg film sublingaul film   Yes No   Si Film by SubLINGual route two (2) times a day. calcium carbonate (TUMS) 200 mg calcium (500 mg) chew   Yes No   Sig: Take 3 Tablets by mouth three (3) times daily (after meals). carvediloL (Coreg) 25 mg tablet   Yes No   Sig: Take 25 mg by mouth two (2) times daily (with meals). cholecalciferol (VITAMIN D3) 25 mcg (1,000 unit) cap   Yes No   Sig: Take 1,000 Units by mouth daily. cloNIDine HCL (CATAPRES) 0.1 mg tablet   No No   Sig: Take 1 Tablet by mouth two (2) times a day. fluticasone propionate (FLONASE) 50 mcg/actuation nasal spray   Yes No   Si Sprays by Both Nostrils route daily as needed. hydrALAZINE (APRESOLINE) 100 mg tablet   Yes No   Sig: Take 100 mg by mouth three (3) times daily. losartan (COZAAR) 100 mg tablet   No No   Sig: Take 1 Tablet by mouth daily. pantoprazole (Protonix) 40 mg tablet   No No   Sig: Take 1 Tablet by mouth two (2) times a day.  Indications: an ulcer of the duodenum   Patient not taking: Reported on 2022 Facility-Administered Medications: None         Objective:   Vital signs  Temp (24hrs), Av.5 °F (36.9 °C), Min:97.8 °F (36.6 °C), Max:98.9 °F (37.2 °C)   No intake/output data recorded.    0701 -  190  In: 2159.9 [I.V.:2159.9]  Out: 3000   Visit Vitals  BP (!) 179/85 (BP 1 Location: Left upper arm, BP Patient Position: At rest)   Pulse 89   Temp 98.6 °F (37 °C)   Resp 16   Ht 5' 9\" (1.753 m)   Wt 79.4 kg (175 lb)   SpO2 98%   BMI 25.84 kg/m²    O2 Flow Rate (L/min): 2 l/min O2 Device: None (Room air)   Vitals:    22 2100 22 2200 22 2300 22 0000   BP: (!) 164/99 (!) 174/88 (!) 183/101 (!) 179/85   Pulse: 79 84 78 89   Resp: 20 16 11 16   Temp:    98.6 °F (37 °C)   SpO2: 96% 97% 100% 98%   Weight:       Height:            Meds:     Current Facility-Administered Medications   Medication Dose Route Frequency    hydrALAZINE (APRESOLINE) tablet 100 mg  100 mg Oral TID    heparin (porcine) 1,000 unit/mL injection 2,100 Units  2,100 Units InterCATHeter DIALYSIS PRN    And    heparin (porcine) 1,000 unit/mL injection 2,000 Units  2,000 Units InterCATHeter DIALYSIS PRN    labetaloL (NORMODYNE;TRANDATE) injection 20 mg  20 mg IntraVENous Q4H PRN    losartan (COZAAR) tablet 100 mg  100 mg Oral DAILY    cloNIDine HCL (CATAPRES) tablet 0.1 mg  0.1 mg Oral BID    carvediloL (COREG) tablet 25 mg  25 mg Oral BID WITH MEALS    hydrALAZINE (APRESOLINE) 20 mg/mL injection 20 mg  20 mg IntraVENous Q4H PRN    pantoprazole (PROTONIX) tablet 40 mg  40 mg Oral ACB&D    glucagon (GLUCAGEN) injection 1 mg  1 mg IntraMUSCular PRN    glucose chewable tablet 12 g  3 Tablet Oral PRN    dextrose 10 % infusion 125-250 mL  125-250 mL IntraVENous PRN    aspirin chewable tablet 81 mg  81 mg Oral QPM    fosphenytoin (CEREBYX) with saline injection 100 mg PE  100 mg PE IntraVENous Q8H    niCARdipine (CARDENE) 25 mg in 0.9% sodium chloride 250 mL ([id number])  5-15 mg/hr IntraVENous TITRATE    sodium chloride (NS) flush 5-40 mL  5-40 mL IntraVENous Q8H    sodium chloride (NS) flush 5-40 mL  5-40 mL IntraVENous PRN    acetaminophen (TYLENOL) tablet 650 mg  650 mg Oral Q6H PRN    Or    acetaminophen (TYLENOL) suppository 650 mg  650 mg Rectal Q6H PRN    polyethylene glycol (MIRALAX) packet 17 g  17 g Oral DAILY PRN    heparin (porcine) injection 5,000 Units  5,000 Units SubCUTAneous Q8H    ondansetron (ZOFRAN) injection 4 mg  4 mg IntraVENous Q6H PRN    cyanocobalamin (VITAMIN B12) tablet 1,000 mcg  1,000 mcg Oral DAILY     I personally reviewed all of the medications    Review of Systems:   No headache, eye, ear nose, throat problems; no coughing or wheezing or shortness of breath, No chest pain or orthopnea, no abdominal pain, nausea or vomiting, No pain in the body or extremities, no psychiatric, neurological, endocrine, hematological or cardiac complaints except as noted above. Physical Exam:   Blood pressure (!) 179/85, pulse 89, temperature 98.6 °F (37 °C), resp. rate 16, height 5' 9\" (1.753 m), weight 79.4 kg (175 lb), SpO2 98 %. GEN: NAD, cooperative, calm  HEENT: Normocephalic. Non-icter, no congestion  Lungs:  CTA bilaterally   Cardiac: S1,S2, normal rate and rhythm, no carotid bruits, no gallops  Abdomen: Normal bowel sounds, no distention, non-tender  Extremities: no clubbing, cyanosis, or edema  Skin: no rashes or lesions noted      NEURO:  Mental status: A & O x 4. Able to follow commands appropriately  Cranial Nerves:  II-XII intact; PERRL, EOMI, No dysarthria or aphasia. Full facial strength, no asymmetry. Hearing intact bilaterally. Tongue protrudes to midline, palate elevates symmetrically. Shrug Shoulder B/L  Motor:  Normal bulk and tone, 5/5 strength x 4 extremities proximally and distally; No involuntary movements.   Coordination:  Intact FTN and HTS testing  Reflexes:  +2 throughout, down going toes bilaterally   Sensation: Intact x 4 extremities to Light Touch  Gait:  Deferred Labs/images:     Lab Results   Component Value Date/Time    WBC 9.8 05/31/2022 04:29 AM    HGB 9.0 (L) 05/31/2022 04:29 AM    HCT 29.6 (L) 05/31/2022 04:29 AM    PLATELET 348 73/99/5718 04:29 AM    MCV 80.9 05/31/2022 04:29 AM      Lab Results   Component Value Date/Time    Sodium 137 05/31/2022 04:29 AM    Potassium 5.5 (H) 05/31/2022 04:29 AM    Chloride 104 05/31/2022 04:29 AM    CO2 22 05/31/2022 04:29 AM    Anion gap 11 05/31/2022 04:29 AM    Glucose 58 (L) 05/31/2022 04:29 AM    BUN 61 (H) 05/31/2022 04:29 AM    Creatinine 11.40 (H) 05/31/2022 04:29 AM    BUN/Creatinine ratio 5 (L) 05/31/2022 04:29 AM    GFR est AA 6 (L) 05/31/2022 04:29 AM    GFR est non-AA 5 (L) 05/31/2022 04:29 AM    Calcium 9.2 05/31/2022 04:29 AM    Bilirubin, total 0.3 05/31/2022 04:29 AM    Alk. phosphatase 46 05/31/2022 04:29 AM    Protein, total 6.8 05/31/2022 04:29 AM    Albumin 2.7 (L) 05/31/2022 04:29 AM    Globulin 4.1 (H) 05/31/2022 04:29 AM    A-G Ratio 0.7 (L) 05/31/2022 04:29 AM    ALT (SGPT) 9 (L) 05/31/2022 04:29 AM    AST (SGOT) 18 05/31/2022 04:29 AM     CT Results (most recent):  Results from Hospital Encounter encounter on 05/30/22    CT CODE NEURO PERF W CBF    Narrative  *PRELIMINARY REPORT*    No large vessel occlusion in the head or neck. No focal abnormal perfusion. Preliminary report was provided by Dr. Cristobal Gomez, the on-call radiologist, on  5/30/2022 at 2015 hours. Final report to follow. *END PRELIMINARY REPORT*    CLINICAL HISTORY: Code stroke. Unresponsive. EXAMINATION:  CT ANGIOGRAPHY HEAD AND NECK, CT PERFUSION    COMPARISON: May 2019    TECHNIQUE:  Following the uneventful administration of iodinated contrast  material, axial CT angiography of the head and neck was performed. Delayed axial  images through the head were also obtained. Coronal and sagittal reconstructions  were obtained. Manual postprocessing of images was performed.  3-D  Sagittal  maximal intensity projection images were obtained. 3-D Coronal maximal  intensity projections were obtained. CT brain perfusion was performed with  generation of hemodynamic maps of multiple parameters, including cerebral blood  flow, cerebral blood volume, mean transit time, and TMAX. CT dose reduction was  achieved through use of a standardized protocol tailored for this examination  and automatic exposure control for dose modulation. This study was analyzed by  the [address] ai algorithm. FINDINGS:    Delayed contrast-enhanced head CT:    The ventricles are midline without hydrocephalus. There is no acute intra or  extra-axial hemorrhage. The basal cisterns are clear. The paranasal sinuses are  clear. CTA NECK:    Great vessels: Normal arch anatomy with the origins patent. Right subclavian artery: Patent    Left subclavian artery: Patent    Right common carotid artery: Patent    Left common carotid artery: Patent    Cervical right internal carotid artery: Patent with no significant stenosis by  NASCET criteria. Cervical left internal carotid artery: Patent with no significant stenosis by  NASCET criteria. Right vertebral artery: Patent    Left vertebral artery: Patent    The lung apices are clear. The thyroid is homogeneous. No cervical  lymphadenopathy. Left chest tunneled dialysis catheter terminates in the SVC  below the field-of-view. CTA HEAD:    Right cavernous internal carotid artery: Patent    Left cavernous internal carotid artery: Patent    Anterior cerebral arteries: Patent    Anterior communicating artery: Patent    Right middle cerebral artery: Patent    Left middle cerebral artery: Superiorly projecting 3 mm aneurysm of the M1  segment of the left middle cerebral artery. No large vessel occlusion.     Posterior communicating arteries: Diminutive    Posterior cerebral arteries: Patent    Basilar artery: Patent    Distal vertebral arteries: Patent    No evidence for intracranial aneurysm or hemodynamically significant stenosis. CT Perfusion:  Normal CT perfusion. Impression  1. No large vessel occlusion or hemodynamically significant carotid stenosis. 2.  No perfusion abnormality. 3.  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 Assessment:     Active Problems:    Hypertensive urgency (3/12/2022)      Seizures ([address] (5/30/2022)      Plan:     · Pending MRI brain; patient refused when taken down per nurse  · Pending phenytoin level  · In the interim continue with current dose of fosphenytoin 100 mg every 8 hours  · Nephrology on board; and on hemodialysis  · Patient's blood pressure elevated; home medications restarted  · Seizure and aspiration precaution  · No driving until remained seizure-free for 6 months  · Patient education on smoking and marijuana cessation  · PRN Ativan for abortive seizure  · Every 2 neurochecks  · Patient can transition to nstu if remains neurological intact    Further neurology recommendations to follow by Dr. Arnoldo Suazo    Chart reviewed    Case discussed with: intensivist, patient and primary nurse    >35% time spent in counseling or coordination of care of the above in the assessment and plan     Signed By: Darylene Beverage, NP                    June 1, 2022    Please note that this dictation was completed with Kamila Davila, the computer voice recognition software. Quite often unanticipated grammatical, syntax, homophones, and other interpretive errors are inadvertently transcribed by the computer software. Please disregard these errors. Please excuse any errors that have escaped final proofreading.

## 2022-06-02 NOTE — DISCHARGE SUMMARY
.Pateint left against medical advice. He was advised leaving could result in increased seizures, worsening renal failure, and death.        [facility]     Critical Care Medicine  [employer]

## 2022-06-23 ENCOUNTER — NURSE TRIAGE (OUTPATIENT)
Dept: OTHER | Facility: CLINIC | Age: 57
End: 2022-06-23

## 2022-06-23 NOTE — TELEPHONE ENCOUNTER
Received call from [address] at [facility] with Red Flag Complaint. Current Symptoms: Pt reports shortness of breath both at rest and with activity. He denies pulmonary history. Pt states that he has had shortness of breath before. He did end up in the hospital. He is not sure what he was diagnosed with or treated with. Pt is currently receiving dialysis. He has made the nurse aware that he is feeling short of breath. This RN did speak to the dialysis nurse. She states that the pt's shortness of breath is related to his congestion. She does not believe that his symptoms are urgent or life threatening. Onset: A couple of weeks ago    Associated Symptoms: Trouble sleeping    Pain Severity: Denies pain    Temperature: Denies fever    What has been tried: Nasal spray    Pregnant: NA    Recommended disposition: Duplicate call. No triage. Pt does need to have transportation set up. He does not have transportation until next week. Care advice provided, patient verbalizes understanding; denies any other questions or concerns; instructed to call back for any new or worsening symptoms. Patient/Caller agrees with recommended disposition; writer provided warm transfer to [employer] at [facility] for appointment scheduling    Attention Provider: Thank you for allowing me to participate in the care of your patient. The patient was connected to triage in response to information provided to the [facility]. Please do not respond through this encounter as the response is not directed to a shared pool.     Reason for Disposition   Caller has already spoken with another triager and has no further questions    Protocols used: NO CONTACT OR DUPLICATE CONTACT CALL-ADULT-[place]

## 2022-06-30 ENCOUNTER — APPOINTMENT (OUTPATIENT)
Dept: GENERAL RADIOLOGY | Age: 57
End: 2022-06-30
Attending: EMERGENCY MEDICINE
Payer: MEDICARE

## 2022-06-30 ENCOUNTER — HOSPITAL ENCOUNTER (OUTPATIENT)
Age: 57
Setting detail: OBSERVATION
Discharge: HOME | End: 2022-07-01
Attending: EMERGENCY MEDICINE | Admitting: INTERNAL MEDICINE
Payer: MEDICARE

## 2022-06-30 DIAGNOSIS — N18.4 HYPERTENSIVE HEART AND KIDNEY DISEASE WITH ACUTE ON CHRONIC DIASTOLIC CONGESTIVE HEART FAILURE AND STAGE 4 CHRONIC KIDNEY DISEASE (HCC): ICD-10-CM

## 2022-06-30 DIAGNOSIS — R06.02 SOB (SHORTNESS OF BREATH): ICD-10-CM

## 2022-06-30 DIAGNOSIS — I50.33 HYPERTENSIVE HEART AND KIDNEY DISEASE WITH ACUTE ON CHRONIC DIASTOLIC CONGESTIVE HEART FAILURE AND STAGE 4 CHRONIC KIDNEY DISEASE (HCC): ICD-10-CM

## 2022-06-30 DIAGNOSIS — R07.9 CHEST PAIN WITH HIGH RISK FOR CARDIAC ETIOLOGY: ICD-10-CM

## 2022-06-30 DIAGNOSIS — R52 BODY ACHES: Primary | ICD-10-CM

## 2022-06-30 DIAGNOSIS — I13.0 HYPERTENSIVE HEART AND KIDNEY DISEASE WITH ACUTE ON CHRONIC DIASTOLIC CONGESTIVE HEART FAILURE AND STAGE 4 CHRONIC KIDNEY DISEASE (HCC): ICD-10-CM

## 2022-06-30 PROBLEM — I10 HTN (HYPERTENSION), MALIGNANT: Status: ACTIVE | Noted: 2022-06-30

## 2022-06-30 LAB
ALBUMIN SERPL-MCNC: 3.1 G/DL (ref 3.5–5)
ALBUMIN/GLOB SERPL: 0.7 {RATIO} (ref 1.1–2.2)
ALP SERPL-CCNC: 61 U/L (ref 45–117)
ALT SERPL-CCNC: 8 U/L (ref 12–78)
AMPHET UR QL SCN: NEGATIVE
ANION GAP SERPL CALC-SCNC: 8 MMOL/L (ref 5–15)
AST SERPL-CCNC: 12 U/L (ref 15–37)
ATRIAL RATE: 70 BPM
BARBITURATES UR QL SCN: NEGATIVE
BASOPHILS # BLD: 0.1 K/UL (ref 0–0.1)
BASOPHILS NFR BLD: 1 % (ref 0–1)
BENZODIAZ UR QL: NEGATIVE
BILIRUB SERPL-MCNC: 0.2 MG/DL (ref 0.2–1)
BNP SERPL-MCNC: ABNORMAL PG/ML
BUN SERPL-MCNC: 54 MG/DL (ref 6–20)
BUN/CREAT SERPL: 5 (ref 12–20)
CALCIUM SERPL-MCNC: 10 MG/DL (ref 8.5–10.1)
CALCULATED P AXIS, ECG09: 73 DEGREES
CALCULATED R AXIS, ECG10: 50 DEGREES
CALCULATED T AXIS, ECG11: 105 DEGREES
CANNABINOIDS UR QL SCN: NEGATIVE
CHLORIDE SERPL-SCNC: 98 MMOL/L (ref 97–108)
CO2 SERPL-SCNC: 27 MMOL/L (ref 21–32)
COCAINE UR QL SCN: NEGATIVE
COMMENT, HOLDF: NORMAL
COVID-19 RAPID TEST, COVR: NOT DETECTED
CREAT SERPL-MCNC: 10.6 MG/DL (ref 0.7–1.3)
D DIMER PPP FEU-MCNC: 1.25 MG/L FEU (ref 0–0.65)
DIAGNOSIS, 93000: NORMAL
DIFFERENTIAL METHOD BLD: ABNORMAL
DRUG SCRN COMMENT,DRGCM: NORMAL
EOSINOPHIL # BLD: 1.1 K/UL (ref 0–0.4)
EOSINOPHIL NFR BLD: 11 % (ref 0–7)
ERYTHROCYTE [DISTWIDTH] IN BLOOD BY AUTOMATED COUNT: 19.7 % (ref 11.5–14.5)
FLUAV AG NPH QL IA: NEGATIVE
FLUBV AG NOSE QL IA: NEGATIVE
GLOBULIN SER CALC-MCNC: 4.5 G/DL (ref 2–4)
GLUCOSE SERPL-MCNC: 99 MG/DL (ref 65–100)
HCT VFR BLD AUTO: 29.9 % (ref 36.6–50.3)
HGB BLD-MCNC: 9.2 G/DL (ref 12.1–17)
IMM GRANULOCYTES # BLD AUTO: 0 K/UL (ref 0–0.04)
IMM GRANULOCYTES NFR BLD AUTO: 0 % (ref 0–0.5)
LYMPHOCYTES # BLD: 1.8 K/UL (ref 0.8–3.5)
LYMPHOCYTES NFR BLD: 18 % (ref 12–49)
MCH RBC QN AUTO: 25.9 PG (ref 26–34)
MCHC RBC AUTO-ENTMCNC: 30.8 G/DL (ref 30–36.5)
MCV RBC AUTO: 84.2 FL (ref 80–99)
METHADONE UR QL: NEGATIVE
MONOCYTES # BLD: 1.1 K/UL (ref 0–1)
MONOCYTES NFR BLD: 11 % (ref 5–13)
NEUTS SEG # BLD: 6.1 K/UL (ref 1.8–8)
NEUTS SEG NFR BLD: 59 % (ref 32–75)
NRBC # BLD: 0 K/UL (ref 0–0.01)
NRBC BLD-RTO: 0 PER 100 WBC
OPIATES UR QL: NEGATIVE
P-R INTERVAL, ECG05: 186 MS
PCP UR QL: NEGATIVE
PLATELET # BLD AUTO: 212 K/UL (ref 150–400)
PMV BLD AUTO: 10.9 FL (ref 8.9–12.9)
POTASSIUM SERPL-SCNC: 4.5 MMOL/L (ref 3.5–5.1)
PROT SERPL-MCNC: 7.6 G/DL (ref 6.4–8.2)
Q-T INTERVAL, ECG07: 442 MS
QRS DURATION, ECG06: 92 MS
QTC CALCULATION (BEZET), ECG08: 477 MS
RBC # BLD AUTO: 3.55 M/UL (ref 4.1–5.7)
RBC MORPH BLD: ABNORMAL
SAMPLES BEING HELD,HOLD: NORMAL
SODIUM SERPL-SCNC: 133 MMOL/L (ref 136–145)
SOURCE, COVRS: NORMAL
TROPONIN-HIGH SENSITIVITY: 48 NG/L (ref 0–76)
TROPONIN-HIGH SENSITIVITY: 52 NG/L (ref 0–76)
TROPONIN-HIGH SENSITIVITY: 58 NG/L (ref 0–76)
TROPONIN-HIGH SENSITIVITY: 62 NG/L (ref 0–76)
VENTRICULAR RATE, ECG03: 70 BPM
WBC # BLD AUTO: 10.2 K/UL (ref 4.1–11.1)

## 2022-06-30 PROCEDURE — 93005 ELECTROCARDIOGRAM TRACING: CPT

## 2022-06-30 PROCEDURE — G0378 HOSPITAL OBSERVATION PER HR: HCPCS

## 2022-06-30 PROCEDURE — 96374 THER/PROPH/DIAG INJ IV PUSH: CPT

## 2022-06-30 PROCEDURE — 36415 COLL VENOUS BLD VENIPUNCTURE: CPT

## 2022-06-30 PROCEDURE — 87635 SARS-COV-2 COVID-19 AMP PRB: CPT

## 2022-06-30 PROCEDURE — 90935 HEMODIALYSIS ONE EVALUATION: CPT

## 2022-06-30 PROCEDURE — 84484 ASSAY OF TROPONIN QUANT: CPT

## 2022-06-30 PROCEDURE — 74011250637 HC RX REV CODE- 250/637: Performed by: INTERNAL MEDICINE

## 2022-06-30 PROCEDURE — 74011250636 HC RX REV CODE- 250/636: Performed by: INTERNAL MEDICINE

## 2022-06-30 PROCEDURE — 74011250637 HC RX REV CODE- 250/637: Performed by: EMERGENCY MEDICINE

## 2022-06-30 PROCEDURE — 80307 DRUG TEST PRSMV CHEM ANLYZR: CPT

## 2022-06-30 PROCEDURE — 80053 COMPREHEN METABOLIC PANEL: CPT

## 2022-06-30 PROCEDURE — G0257 UNSCHED DIALYSIS ESRD PT HOS: HCPCS

## 2022-06-30 PROCEDURE — 96372 THER/PROPH/DIAG INJ SC/IM: CPT

## 2022-06-30 PROCEDURE — 74011250636 HC RX REV CODE- 250/636: Performed by: EMERGENCY MEDICINE

## 2022-06-30 PROCEDURE — 85379 FIBRIN DEGRADATION QUANT: CPT

## 2022-06-30 PROCEDURE — 85025 COMPLETE CBC W/AUTO DIFF WBC: CPT

## 2022-06-30 PROCEDURE — 87804 INFLUENZA ASSAY W/OPTIC: CPT

## 2022-06-30 PROCEDURE — 99285 EMERGENCY DEPT VISIT HI MDM: CPT

## 2022-06-30 PROCEDURE — 71045 X-RAY EXAM CHEST 1 VIEW: CPT

## 2022-06-30 PROCEDURE — 83880 ASSAY OF NATRIURETIC PEPTIDE: CPT

## 2022-06-30 PROCEDURE — 65270000046 HC RM TELEMETRY

## 2022-06-30 RX ORDER — HYDRALAZINE HYDROCHLORIDE 50 MG/1
100 TABLET, FILM COATED ORAL 3 TIMES DAILY
Status: DISCONTINUED | OUTPATIENT
Start: 2022-06-30 | End: 2022-07-02 | Stop reason: HOSPADM

## 2022-06-30 RX ORDER — CLONIDINE HYDROCHLORIDE 0.1 MG/1
0.2 TABLET ORAL
Status: COMPLETED | OUTPATIENT
Start: 2022-06-30 | End: 2022-06-30

## 2022-06-30 RX ORDER — POLYETHYLENE GLYCOL 3350 17 G/17G
17 POWDER, FOR SOLUTION ORAL DAILY PRN
Status: DISCONTINUED | OUTPATIENT
Start: 2022-06-30 | End: 2022-07-02 | Stop reason: HOSPADM

## 2022-06-30 RX ORDER — HEPARIN SODIUM 5000 [USP'U]/ML
5000 INJECTION, SOLUTION INTRAVENOUS; SUBCUTANEOUS EVERY 8 HOURS
Status: DISCONTINUED | OUTPATIENT
Start: 2022-06-30 | End: 2022-07-02 | Stop reason: HOSPADM

## 2022-06-30 RX ORDER — LABETALOL HYDROCHLORIDE 5 MG/ML
10 INJECTION, SOLUTION INTRAVENOUS
Status: DISCONTINUED | OUTPATIENT
Start: 2022-06-30 | End: 2022-07-02 | Stop reason: HOSPADM

## 2022-06-30 RX ORDER — LOSARTAN POTASSIUM 50 MG/1
100 TABLET ORAL DAILY
Status: DISCONTINUED | OUTPATIENT
Start: 2022-06-30 | End: 2022-07-02 | Stop reason: HOSPADM

## 2022-06-30 RX ORDER — HYDRALAZINE HYDROCHLORIDE 20 MG/ML
20 INJECTION INTRAMUSCULAR; INTRAVENOUS
Status: DISCONTINUED | OUTPATIENT
Start: 2022-06-30 | End: 2022-07-02 | Stop reason: HOSPADM

## 2022-06-30 RX ORDER — ONDANSETRON 2 MG/ML
4 INJECTION INTRAMUSCULAR; INTRAVENOUS
Status: DISCONTINUED | OUTPATIENT
Start: 2022-06-30 | End: 2022-07-02 | Stop reason: HOSPADM

## 2022-06-30 RX ORDER — ONDANSETRON 4 MG/1
4 TABLET, ORALLY DISINTEGRATING ORAL
Status: DISCONTINUED | OUTPATIENT
Start: 2022-06-30 | End: 2022-07-02 | Stop reason: HOSPADM

## 2022-06-30 RX ORDER — CLONIDINE HYDROCHLORIDE 0.1 MG/1
0.1 TABLET ORAL 2 TIMES DAILY
Status: DISCONTINUED | OUTPATIENT
Start: 2022-06-30 | End: 2022-07-02 | Stop reason: HOSPADM

## 2022-06-30 RX ORDER — NIFEDIPINE 60 MG/1
60 TABLET, EXTENDED RELEASE ORAL 2 TIMES DAILY
Status: DISCONTINUED | OUTPATIENT
Start: 2022-06-30 | End: 2022-07-02 | Stop reason: HOSPADM

## 2022-06-30 RX ORDER — SODIUM CHLORIDE 0.9 % (FLUSH) 0.9 %
5-40 SYRINGE (ML) INJECTION EVERY 8 HOURS
Status: DISCONTINUED | OUTPATIENT
Start: 2022-06-30 | End: 2022-07-02 | Stop reason: HOSPADM

## 2022-06-30 RX ORDER — HYDRALAZINE HYDROCHLORIDE 20 MG/ML
20 INJECTION INTRAMUSCULAR; INTRAVENOUS ONCE
Status: COMPLETED | OUTPATIENT
Start: 2022-06-30 | End: 2022-06-30

## 2022-06-30 RX ORDER — ACETAMINOPHEN 325 MG/1
650 TABLET ORAL
Status: DISCONTINUED | OUTPATIENT
Start: 2022-06-30 | End: 2022-07-02 | Stop reason: HOSPADM

## 2022-06-30 RX ORDER — BUPRENORPHINE HYDROCHLORIDE AND NALOXONE HYDROCHLORIDE DIHYDRATE 8; 2 MG/1; MG/1
1 TABLET SUBLINGUAL 2 TIMES DAILY
Status: DISCONTINUED | OUTPATIENT
Start: 2022-06-30 | End: 2022-07-02 | Stop reason: HOSPADM

## 2022-06-30 RX ORDER — ACETAMINOPHEN 650 MG/1
650 SUPPOSITORY RECTAL
Status: DISCONTINUED | OUTPATIENT
Start: 2022-06-30 | End: 2022-07-02 | Stop reason: HOSPADM

## 2022-06-30 RX ORDER — BUMETANIDE 1 MG/1
2 TABLET ORAL DAILY
Status: DISCONTINUED | OUTPATIENT
Start: 2022-06-30 | End: 2022-07-02 | Stop reason: HOSPADM

## 2022-06-30 RX ORDER — CARVEDILOL 12.5 MG/1
25 TABLET ORAL 2 TIMES DAILY WITH MEALS
Status: DISCONTINUED | OUTPATIENT
Start: 2022-06-30 | End: 2022-07-02 | Stop reason: HOSPADM

## 2022-06-30 RX ORDER — ASPIRIN 325 MG
325 TABLET ORAL
Status: COMPLETED | OUTPATIENT
Start: 2022-06-30 | End: 2022-06-30

## 2022-06-30 RX ORDER — ACETAMINOPHEN 500 MG
1000 TABLET ORAL
Status: COMPLETED | OUTPATIENT
Start: 2022-06-30 | End: 2022-06-30

## 2022-06-30 RX ORDER — SODIUM CHLORIDE 0.9 % (FLUSH) 0.9 %
5-40 SYRINGE (ML) INJECTION AS NEEDED
Status: DISCONTINUED | OUTPATIENT
Start: 2022-06-30 | End: 2022-07-02 | Stop reason: HOSPADM

## 2022-06-30 RX ADMIN — CARVEDILOL 25 MG: 12.5 TABLET, FILM COATED ORAL at 17:09

## 2022-06-30 RX ADMIN — ASPIRIN 325 MG ORAL TABLET 325 MG: 325 PILL ORAL at 05:41

## 2022-06-30 RX ADMIN — HYDRALAZINE HYDROCHLORIDE 20 MG: 20 INJECTION, SOLUTION INTRAMUSCULAR; INTRAVENOUS at 06:33

## 2022-06-30 RX ADMIN — NIFEDIPINE 60 MG: 60 TABLET, EXTENDED RELEASE ORAL at 17:11

## 2022-06-30 RX ADMIN — HYDRALAZINE HYDROCHLORIDE 100 MG: 50 TABLET, FILM COATED ORAL at 21:55

## 2022-06-30 RX ADMIN — HYDRALAZINE HYDROCHLORIDE 100 MG: 50 TABLET, FILM COATED ORAL at 17:09

## 2022-06-30 RX ADMIN — CLONIDINE HYDROCHLORIDE 0.1 MG: 0.1 TABLET ORAL at 17:09

## 2022-06-30 RX ADMIN — NITROGLYCERIN 1 INCH: 20 OINTMENT TOPICAL at 05:41

## 2022-06-30 RX ADMIN — HEPARIN SODIUM 5000 UNITS: 5000 INJECTION INTRAVENOUS; SUBCUTANEOUS at 09:50

## 2022-06-30 RX ADMIN — ACETAMINOPHEN 1000 MG: 500 TABLET ORAL at 05:41

## 2022-06-30 RX ADMIN — BUPRENORPHINE HYDROCHLORIDE AND NALOXONE HYDROCHLORIDE DIHYDRATE 1 TABLET: 8; 2 TABLET SUBLINGUAL at 17:09

## 2022-06-30 RX ADMIN — HEPARIN SODIUM 2000 UNITS: 1000 INJECTION INTRAVENOUS; SUBCUTANEOUS at 14:46

## 2022-06-30 RX ADMIN — CLONIDINE HYDROCHLORIDE 0.2 MG: 0.1 TABLET ORAL at 05:41

## 2022-06-30 RX ADMIN — HEPARIN SODIUM 2100 UNITS: 1000 INJECTION INTRAVENOUS; SUBCUTANEOUS at 14:47

## 2022-06-30 NOTE — ED NOTES
Verbal shift change report given to Shoaib Sosa RN (oncoming nurse) by Anastasia Alanis RN (offgoing nurse). Report included the following information SBAR, ED Summary, Intake/Output, MAR and Recent Results.

## 2022-06-30 NOTE — PROCEDURES
[facility]  [phone]    Vitals   Pre   Post   Assessment   Pre   Post     Temp  Temp: 97.9 °F (36.6 °C) (06/30/22 1040)  98.1 LOC  Alert and oriented    HR   Pulse (Heart Rate): (!) 59 (06/30/22 1040) 79 Lungs   Unlabored, even, clear      B/P   BP: (!) 216/103 (06/30/22 1040) 162/97 Cardiac   Regular bedside telemetry     Resp   Resp Rate: 22 (06/30/22 1040) 22 Skin   Warm and dry      Pain level  Pain Intensity 1: 4 (06/30/22 0449) Cramps in lower extremities Edema    Minimal if any at all      Orders:    Duration:   Start:    5505 End:    1438 Total:   4   Dialyzer:   Dialyzer/Set Up Inspection: Revaclear (06/30/22 1040)   K Bath:   Dialysate K (mEq/L): 3 (06/30/22 1040)   Ca Bath:   Dialysate CA (mEq/L): 2.5 (06/30/22 1040)   Na/Bicarb:   Dialysate NA (mEq/L): 138 (06/30/22 1040)   Target Fluid Removal:   Goal/Amount of Fluid to Remove (mL): 4000 mL (06/30/22 1040)   Access     Type & Location:   Right tunneled cvc, no redness or drainage, dressing changed per policy and procedure, Each catheter limb disinfected per p&p, caps removed, hubs disinfected per p&p, aspirated 5 ml each limb flushed easily. Labs     Obtained/Reviewed   Critical Results Called   Date when labs were drawn-  Hgb-    HGB   Date Value Ref Range Status   06/30/2022 9.2 (L) 12.1 - 17.0 g/dL Final     K-    Potassium   Date Value Ref Range Status   06/30/2022 4.5 3.5 - 5.1 mmol/L Final     Ca-   Calcium   Date Value Ref Range Status   06/30/2022 10.0 8.5 - 10.1 MG/DL Final     Bun-   BUN   Date Value Ref Range Status   06/30/2022 54 (H) 6 - 20 MG/DL Final     Creat-   Creatinine   Date Value Ref Range Status   06/30/2022 10.60 (H) 0.70 - 1.30 MG/DL Final        Medications/ Blood Products Given     Name   Dose   Route and Time                     Blood Volume Processed (BVP):     Net Fluid   Removed:     Comments   Time Out Done: 7080  Primary Nurse Rpt Pre: SUNNY Larose RN   Primary Nurse Rpt Post: Pt Education: procedural  Care Plan: continue current HD plan of car e  Tx Summary:  [id number] HD initiated as ordered. Admiting Diagnosis: hypertensive crisis  Pt's previous [facility]- [facility]  Consent signed - Informed Consent Verified:  (continuation of care ) (06/30/22 1040)  Chayo Consent - Chronic consent applies  Hepatitis Status- 06/16/22 antigen negative, 09/23/21 susceptible  Machine #- Machine Number: [id number] (06/30/22 1040)  Telemetry status- bedside  Pre-dialysis wt. -  n/a

## 2022-06-30 NOTE — H&P
HISTORY AND PHYSICAL      PCP: Mickey Mahmood NP  History source: Patient       CC: Chest pain       HPI: A 64year old male patient with PMH of ESRD on HD and HTN presented to ED for evaluation of chest pain. Patient woke up with chest pain this AM, left side, pinching, 10/10 intensity. He was also sob with chest pain. He does have chronic cough. Denied any fever, headache, abd pain, fever, edema, palpitations, urinary or bowel changes. He did not miss HD. He recently hospitalized on 5/30 for elevated troponin  but left AMA on 6/1. In ED, BP elevated - clonidine and hydralazine given. Hospitalist consulted for admission      PMH/PSH:  Past Medical History:   Diagnosis Date    Chronic kidney disease     Hypertension     Liver disease      No past surgical history on file. Home meds:   Prior to Admission medications    Medication Sig Start Date End Date Taking? Authorizing Provider   cloNIDine HCL (CATAPRES) 0.1 mg tablet Take 1 Tablet by mouth two (2) times a day. 5/18/22   Jazmin Carrasco MD   losartan (COZAAR) 100 mg tablet Take 1 Tablet by mouth daily. 5/18/22   Jazmin Carrasco MD   carvediloL (Coreg) 25 mg tablet Take 25 mg by mouth two (2) times daily (with meals). Provider, Historical   NIFEdipine ER (ADALAT CC) 60 mg ER tablet Take 60 mg by mouth two (2) times a day. Provider, Historical   bumetanide (BUMEX) 2 mg tablet Take 2 mg by mouth daily. Provider, Historical   calcium carbonate (TUMS) 200 mg calcium (500 mg) chew Take 3 Tablets by mouth three (3) times daily (after meals). Provider, Historical   fluticasone propionate (FLONASE) 50 mcg/actuation nasal spray 2 Sprays by Both Nostrils route daily as needed. Other, MD Brian   pantoprazole (Protonix) 40 mg tablet Take 1 Tablet by mouth two (2) times a day.  Indications: an ulcer of the duodenum  Patient not taking: Reported on 5/17/2022 3/25/22   Marylene Spice, MD   hydrALAZINE (APRESOLINE) 100 mg tablet Take 100 mg by mouth three (3) times daily. Provider, Historical   ascorbic acid, vitamin C, (Vitamin C) 500 mg tablet Take 500 mg by mouth daily. Provider, Historical   cholecalciferol (VITAMIN D3) 25 mcg (1,000 unit) cap Take 1,000 Units by mouth daily. Provider, Historical   buprenorphine-naloxone (Suboxone) 8-2 mg film sublingaul film 1 Film by SubLINGual route two (2) times a day. Provider, Historical       Allergies: Allergies   Allergen Reactions    Baclofen Rash     All over    Losartan Hives, Shortness of Breath and Rash       FH:  History reviewed. No pertinent family history. no relevant family history     SH:  Social History     Tobacco Use    Smoking status: Current Some Day Smoker     Packs/day: 1.00     Last attempt to quit: 2020     Years since quittin.9    Smokeless tobacco: Never Used    Tobacco comment: started back smoking in last couple months   Substance Use Topics    Alcohol use: Not Currently       ROS: Review of systems not obtained due to patient factors.       PHYSICAL EXAM:  Visit Vitals  BP (!) 162/97   Pulse 79   Temp 98.1 °F (36.7 °C) (Oral)   Resp 30   Ht 5' 9\" (1.753 m)   Wt 74.4 kg (164 lb)   SpO2 95%   BMI 24.22 kg/m²       Gen: NAD  HEENT: anicteric sclerae, normal conjunctiva, oropharynx clear, MM moist  Neck: supple, trachea midline, no adenopathy  Heart: RRR, no MRG, no JVD, no peripheral edema  Lungs: CTA b/l, non-labored respirations  Abd: soft, NT, ND, BS+, no organomegaly  Extr: warm  Skin: dry, no rash  Neuro: CN II-XII grossly intact, normal speech, moves all extremities  Psych: normal mood, appropriate affect    Labs/Imaging:  Recent Results (from the past 24 hour(s))   EKG, 12 LEAD, INITIAL    Collection Time: 22  4:54 AM   Result Value Ref Range    Ventricular Rate 70 BPM    Atrial Rate 70 BPM    P-R Interval 186 ms    QRS Duration 92 ms    Q-T Interval 442 ms    QTC Calculation (Bezet) 477 ms    Calculated P Axis 73 degrees    Calculated R Axis 50 degrees    Calculated T Axis 105 degrees    Diagnosis       Normal sinus rhythm  Minimal voltage criteria for LVH, may be normal variant ( Sokolow-Cardoso )  Cannot rule out Anterior infarct , age undetermined  T wave abnormality, consider lateral ischemia  Abnormal ECG  When compared with ECG of 31-MAY-2022 06:12,  ST elevation now present in Anterior leads  QT has shortened     CBC WITH AUTOMATED DIFF    Collection Time: 06/30/22  5:00 AM   Result Value Ref Range    WBC 10.2 4.1 - 11.1 K/uL    RBC 3.55 (L) 4.10 - 5.70 M/uL    HGB 9.2 (L) 12.1 - 17.0 g/dL    HCT 29.9 (L) 36.6 - 50.3 %    MCV 84.2 80.0 - 99.0 FL    MCH 25.9 (L) 26.0 - 34.0 PG    MCHC 30.8 30.0 - 36.5 g/dL    RDW 19.7 (H) 11.5 - 14.5 %    PLATELET 978 327 - 843 K/uL    MPV 10.9 8.9 - 12.9 FL    NRBC 0.0 0  WBC    ABSOLUTE NRBC 0.00 0.00 - 0.01 K/uL    NEUTROPHILS 59 32 - 75 %    LYMPHOCYTES 18 12 - 49 %    MONOCYTES 11 5 - 13 %    EOSINOPHILS 11 (H) 0 - 7 %    BASOPHILS 1 0 - 1 %    IMMATURE GRANULOCYTES 0 0.0 - 0.5 %    ABS. NEUTROPHILS 6.1 1.8 - 8.0 K/UL    ABS. LYMPHOCYTES 1.8 0.8 - 3.5 K/UL    ABS. MONOCYTES 1.1 (H) 0.0 - 1.0 K/UL    ABS. EOSINOPHILS 1.1 (H) 0.0 - 0.4 K/UL    ABS. BASOPHILS 0.1 0.0 - 0.1 K/UL    ABS. IMM.  GRANS. 0.0 0.00 - 0.04 K/UL    DF SMEAR SCANNED      RBC COMMENTS ANISOCYTOSIS  1+        RBC COMMENTS HYPOCHROMIA  1+        RBC COMMENTS TARGET CELLS  PRESENT        RBC COMMENTS POLYCHROMASIA  PRESENT       METABOLIC PANEL, COMPREHENSIVE    Collection Time: 06/30/22  5:00 AM   Result Value Ref Range    Sodium 133 (L) 136 - 145 mmol/L    Potassium 4.5 3.5 - 5.1 mmol/L    Chloride 98 97 - 108 mmol/L    CO2 27 21 - 32 mmol/L    Anion gap 8 5 - 15 mmol/L    Glucose 99 65 - 100 mg/dL    BUN 54 (H) 6 - 20 MG/DL    Creatinine 10.60 (H) 0.70 - 1.30 MG/DL    BUN/Creatinine ratio 5 (L) 12 - 20      GFR est AA 6 (L) >60 ml/min/1.73m2    GFR est non-AA 5 (L) >60 ml/min/1.73m2    Calcium 10.0 8.5 - 10.1 MG/DL    Bilirubin, total 0.2 0.2 - 1.0 MG/DL    ALT (SGPT) 8 (L) 12 - 78 U/L    AST (SGOT) 12 (L) 15 - 37 U/L    Alk. phosphatase 61 45 - 117 U/L    Protein, total 7.6 6.4 - 8.2 g/dL    Albumin 3.1 (L) 3.5 - 5.0 g/dL    Globulin 4.5 (H) 2.0 - 4.0 g/dL    A-G Ratio 0.7 (L) 1.1 - 2.2     TROPONIN-HIGH SENSITIVITY    Collection Time: 06/30/22  5:00 AM   Result Value Ref Range    Troponin-High Sensitivity 62 0 - 76 ng/L   NT-PRO BNP    Collection Time: 06/30/22  5:00 AM   Result Value Ref Range    NT pro-BNP 31,048 (H) <125 PG/ML   SAMPLES BEING HELD    Collection Time: 06/30/22  5:00 AM   Result Value Ref Range    SAMPLES BEING HELD 1red 1blue     COMMENT        Add-on orders for these samples will be processed based on acceptable specimen integrity and analyte stability, which may vary by analyte.    D DIMER    Collection Time: 06/30/22  5:00 AM   Result Value Ref Range    D-dimer 1.25 (H) 0.00 - 0.65 mg/L FEU   INFLUENZA A+B VIRAL AGS    Collection Time: 06/30/22  5:47 AM   Result Value Ref Range    Influenza A Antigen Negative NEG      Influenza B Antigen Negative NEG     COVID-19 RAPID TEST    Collection Time: 06/30/22  5:47 AM   Result Value Ref Range    Specimen source Nasopharyngeal      COVID-19 rapid test Not detected NOTD     TROPONIN-HIGH SENSITIVITY    Collection Time: 06/30/22  8:15 AM   Result Value Ref Range    Troponin-High Sensitivity 58 0 - 76 ng/L   TROPONIN-HIGH SENSITIVITY    Collection Time: 06/30/22  9:40 AM   Result Value Ref Range    Troponin-High Sensitivity 52 0 - 76 ng/L   TROPONIN-HIGH SENSITIVITY    Collection Time: 06/30/22 10:50 AM   Result Value Ref Range    Troponin-High Sensitivity 48 0 - 76 ng/L       Recent Labs     06/30/22  0500   WBC 10.2   HGB 9.2*   HCT 29.9*        Recent Labs     06/30/22  0500   *   K 4.5   CL 98   CO2 27   BUN 54*   CREA 10.60*   GLU 99   CA 10.0     Recent Labs     06/30/22  0500   ALT 8*   AP 61   TBILI 0.2   TP 7.6   ALB 3.1*   GLOB 4.5*       No results for input(s): CPK, CKNDX, TROIQ in the last 72 hours. No lab exists for component: CPKMB    No results for input(s): INR, PTP, APTT, INREXT in the last 72 hours. No results for input(s): PH, PCO2, PO2 in the last 72 hours. All labs and imaging personally reviewed by me       Assessment & Plan:   Atypical chest pain - resolved  -  multiple risk factors for CAD  - Troponin x 3 normal  - elevated troponin last month  - appreciate cardiology input  - plan for stress test tomorrow. NPO after MN     HTN urgency - improved  - restarted home meds coreg, clonidine, hydralazine, losartan, Nifedipine   - HD helped. cardene was not started     ESRD on HD  - nephrology on board for HD     Hx Cocaine abuse- UDS ordered     Opioid dependence  - on suboxone     DVT ppx: Heparin   Code status: Full. Sister DANIKA   Disposition: TBD.      Signed By: Silvio Pineda MD     June 30, 2022

## 2022-06-30 NOTE — CONSULTS
[facility] Cardiology Consultation    Date of Consult:  06/30/22  Date of Admission: 6/30/2022  Primary Cardiologist: none  Physician Requesting consult: Dr. Jett Hogan    Assessment/Recommendations:  1. Chest pain - atypical but has multiple risk factors for CAD. Possibly related to severely elevated BP. ECG NSR, LVH with lateral TWIs secondary to repol vs ischemia. Hs Tn negative. He was recommended stress test last admission due to mildly elevated troponin but left AMA. Pro Manna MPI tomorrow. NPO at midnight  - follow up echo    2. Hypertensive urgency   - improved with dialysis in ER  - restarted on home meds     3. ESRD on HD   - dialyzed in ER    4. H/o cocaine abuse    Thank you for the opportunity to participate in the care of Shaun Coppola and please do not hesitate to contact us should you have any questions. Chief Complaint / Reason for Consult:   Chest pain. History of Present Illness:  Shaun Coppola is a 64 y.o. male with the above listed medical history who was admitted with hypertensive urgency and chest pain. He woke up this morning with pain all over his body and was feeling short of breath. Also has been having intermittent left sided \"pinching\" chest pain that usually occurs at rest. No exertional chest pain. + orthopnea. No PND or LE edema. + mild cough. No fever. Breathing improved with dialysis. No chest pain currently. He was in hospital end of May and recommended stress test at the time due to mild elevated Hs Tn 404. Results for Nikkie Ayan (MRN [id number]) as of 6/30/2022 14:51   Ref. Range 6/30/2022 05:00 6/30/2022 08:15 6/30/2022 09:40 6/30/2022 10:50   Troponin-High Sensitivity Latest Ref Range: 0 - 76 ng/L 62 58 52 48     Results for Nikkie [place] (MRN [id number]) as of 6/30/2022 14:51   Ref.  Range 5/30/2022 23:43 5/31/2022 04:29 5/31/2022 11:18   Troponin-High Sensitivity Latest Ref Range: 0 - 76 ng/L 334 () 404 () 250 ()     Notes that he has not been using cocaine since his hospitalization in May. Past Medical History:   Diagnosis Date    Chronic kidney disease     Hypertension     Liver disease        Prior to Admission medications    Medication Sig Start Date End Date Taking? Authorizing Provider   cloNIDine HCL (CATAPRES) 0.1 mg tablet Take 1 Tablet by mouth two (2) times a day. 5/18/22   Ayse Vargas MD   losartan (COZAAR) 100 mg tablet Take 1 Tablet by mouth daily. 5/18/22   Ayse Vargas MD   carvediloL (Coreg) 25 mg tablet Take 25 mg by mouth two (2) times daily (with meals). Provider, Historical   NIFEdipine ER (ADALAT CC) 60 mg ER tablet Take 60 mg by mouth two (2) times a day. Provider, Historical   bumetanide (BUMEX) 2 mg tablet Take 2 mg by mouth daily. Provider, Historical   calcium carbonate (TUMS) 200 mg calcium (500 mg) chew Take 3 Tablets by mouth three (3) times daily (after meals). Provider, Historical   fluticasone propionate (FLONASE) 50 mcg/actuation nasal spray 2 Sprays by Both Nostrils route daily as needed. Other, MD Brian   pantoprazole (Protonix) 40 mg tablet Take 1 Tablet by mouth two (2) times a day. Indications: an ulcer of the duodenum  Patient not taking: Reported on 5/17/2022 3/25/22   Palmira Massey MD   hydrALAZINE (APRESOLINE) 100 mg tablet Take 100 mg by mouth three (3) times daily. Provider, Historical   ascorbic acid, vitamin C, (Vitamin C) 500 mg tablet Take 500 mg by mouth daily. Provider, Historical   cholecalciferol (VITAMIN D3) 25 mcg (1,000 unit) cap Take 1,000 Units by mouth daily. Provider, Historical   buprenorphine-naloxone (Suboxone) 8-2 mg film sublingaul film 1 Film by SubLINGual route two (2) times a day.     Provider, Historical       Current Facility-Administered Medications   Medication Dose Route Frequency    sodium chloride (NS) flush 5-40 mL  5-40 mL IntraVENous Q8H    sodium chloride (NS) flush 5-40 mL  5-40 mL IntraVENous PRN    acetaminophen (TYLENOL) tablet 650 mg  650 mg Oral Q6H PRN    Or    acetaminophen (TYLENOL) suppository 650 mg  650 mg Rectal Q6H PRN    polyethylene glycol (MIRALAX) packet 17 g  17 g Oral DAILY PRN    ondansetron (ZOFRAN ODT) tablet 4 mg  4 mg Oral Q8H PRN    Or    ondansetron (ZOFRAN) injection 4 mg  4 mg IntraVENous Q6H PRN    heparin (porcine) injection 5,000 Units  5,000 Units SubCUTAneous Q8H    bumetanide (BUMEX) tablet 2 mg  2 mg Oral DAILY    buprenorphine-naloxone (SUBOXONE) 8-2mg SL tablet  1 Tablet SubLINGual BID    carvediloL (COREG) tablet 25 mg  25 mg Oral BID WITH MEALS    cloNIDine HCL (CATAPRES) tablet 0.1 mg  0.1 mg Oral BID    hydrALAZINE (APRESOLINE) tablet 100 mg  100 mg Oral TID    losartan (COZAAR) tablet 100 mg  100 mg Oral DAILY    NIFEdipine ER (PROCARDIA XL) tablet 60 mg  60 mg Oral BID    niCARdipine (CARDENE) 25 mg in 0.9% sodium chloride 250 mL ([id number])  0-15 mg/hr IntraVENous TITRATE    heparin (porcine) 1,000 unit/mL injection 2,100 Units  2,100 Units [facility] DIALYSIS PRN    And    heparin (porcine) 1,000 unit/mL injection 2,000 Units  2,000 Units InterCATH[facility] DIALYSIS PRN     Current Outpatient Medications   Medication Sig    cloNIDine HCL (CATAPRES) 0.1 mg tablet Take 1 Tablet by mouth two (2) times a day.  losartan (COZAAR) 100 mg tablet Take 1 Tablet by mouth daily.  carvediloL (Coreg) 25 mg tablet Take 25 mg by mouth two (2) times daily (with meals).  NIFEdipine ER (ADALAT CC) 60 mg ER tablet Take 60 mg by mouth two (2) times a day.  bumetanide (BUMEX) 2 mg tablet Take 2 mg by mouth daily.  calcium carbonate (TUMS) 200 mg calcium (500 mg) chew Take 3 Tablets by mouth three (3) times daily (after meals).  fluticasone propionate (FLONASE) 50 mcg/actuation nasal spray 2 Sprays by Both Nostrils route daily as needed.  pantoprazole (Protonix) 40 mg tablet Take 1 Tablet by mouth two (2) times a day.  Indications: an ulcer of the duodenum (Patient not taking: Reported on 2022)    hydrALAZINE (APRESOLINE) 100 mg tablet Take 100 mg by mouth three (3) times daily.  ascorbic acid, vitamin C, (Vitamin C) 500 mg tablet Take 500 mg by mouth daily.  cholecalciferol (VITAMIN D3) 25 mcg (1,000 unit) cap Take 1,000 Units by mouth daily.  buprenorphine-naloxone (Suboxone) 8-2 mg film sublingaul film 1 Film by SubLINGual route two (2) times a day. No family h/o SCD or premature CAD    Social History     Socioeconomic History    Marital status: SINGLE     Spouse name: Not on file    Number of children: Not on file    Years of education: Not on file    Highest education level: Not on file   Occupational History    Not on file   Tobacco Use    Smoking status: Current Some Day Smoker     Packs/day: 1.00     Last attempt to quit: 2020     Years since quittin.9    Smokeless tobacco: Never Used    Tobacco comment: started back smoking in last couple months   Vaping Use    Vaping Use: Never used   Substance and Sexual Activity    Alcohol use: Not Currently    Drug use: Yes     Types: Marijuana, Cocaine, Heroin     Comment: [employer] & Cocaine 10 years ago as of 3/23/22, Marijuana last used 1 week ago    Sexual activity: Not Currently   Other Topics Concern    Not on file   Social History Narrative    Not on file     Social Determinants of Health     Financial Resource Strain:     Difficulty of Paying Living Expenses: Not on file   Food Insecurity:     Worried About [address] in the Last Year: Not on file    M[place] of Food in the Last Year: Not on file   Transportation Needs:     Lack of Transportation (Medical): Not on file    Lack of Transportation (Non-Medical):  Not on file   Physical Activity:     Days of Exercise per Week: Not on file    Minutes of Exercise per Session: Not on file   Stress:     Feeling of Stress : Not on file   Social Connections:     Frequency of Communication with Friends and Family: Not on file    Frequency of Social Gatherings with Friends and Family: Not on file    Attends Restorationism Services: Not on file    Active Member of Clubs or Organizations: Not on file    Attends Club or Organization Meetings: Not on file    Marital Status: Not on file   Intimate Partner Violence:     Fear of Current or Ex-Partner: Not on file    Emotionally Abused: Not on file    Physically Abused: Not on file    Sexually Abused: Not on file   Housing Stability:     Unable to Pay for Housing in the Last Year: Not on file    Number of [place] in the Last Year: Not on file    Unstable Housing in the Last Year: Not on file       ROS  ROS: All other systems reviewed and were negative other than mentioned above.      Visit Vitals  BP (!) 162/97   Pulse 79   Temp 98.1 °F (36.7 °C) (Oral)   Resp 30   Ht 5' 9\" (1.753 m)   Wt 74.4 kg (164 lb)   SpO2 95%   BMI 24.22 kg/m²         Intake/Output Summary (Last 24 hours) at 6/30/2022 1452  Last data filed at 6/30/2022 1438  Gross per 24 hour   Intake --   Output 4000 ml   Net -4000 ml        General: resting comfortably in no distress  HEENT: sclera anicteric, moist mucous membranes  Neck: supple, no JVD or HJR  CV: regular rate and rhythm, normal S1 and S2, no murmurs, rubs or gallops  Lungs: no respiratory distress, lungs clear to auscultation without wheezing or rales  Abdomen: soft, non distended, non tender  MSK: no lower extremity edema, R chest tunneled HD line  Skin: warm to touch  Neuro: alert and oriented, answered questions appropriately  Psych: normal mood and affect     Lab Review:  BMP:   Lab Results   Component Value Date/Time     (L) 06/30/2022 05:00 AM    K 4.5 06/30/2022 05:00 AM    CL 98 06/30/2022 05:00 AM    CO2 27 06/30/2022 05:00 AM    AGAP 8 06/30/2022 05:00 AM    GLU 99 06/30/2022 05:00 AM    BUN 54 (H) 06/30/2022 05:00 AM    CREA 10.60 (H) 06/30/2022 05:00 AM    GFRAA 6 (L) 06/30/2022 05:00 AM    GFRNA 5 (L) 06/30/2022 05:00 AM        CBC:  Lab Results   Component Value Date/Time    WBC 10.2 06/30/2022 05:00 AM    HGB 9.2 (L) 06/30/2022 05:00 AM    HCT 29.9 (L) 06/30/2022 05:00 AM    PLATELET 135 77/33/5075 05:00 AM    MCV 84.2 06/30/2022 05:00 AM       All Cardiac Markers in the last 24 hours:    Lab Results   Component Value Date/Time    BNPNT 31,048 (H) 06/30/2022 05:00 AM       Lab Results   Component Value Date/Time    Cholesterol, total 170 06/29/2020 03:46 AM    HDL Cholesterol 49 06/29/2020 03:46 AM    LDL, calculated 73.4 06/29/2020 03:46 AM    VLDL, calculated 47.6 06/29/2020 03:46 AM    Triglyceride 238 (H) 06/29/2020 03:46 AM    CHOL/HDL Ratio 3.5 06/29/2020 03:46 AM        Data Review:  ECG tracing personally reviewed: NSR, LVH, lateral TWI    Echocardiogram:   01/15/22    ECHO ADULT COMPLETE 01/17/2022 1/17/2022    Interpretation Summary    Left Ventricle: Left ventricle size is normal. Moderately increased wall thickness. Findings consistent with moderate concentric hypertrophy. Normal wall motion. Normal left ventricular systolic function with a visually estimated EF of 60 - 65%. Normal diastolic function.   Left Atrium: Left atrium is mildly dilated. Signed by: Orlando Vera MD on 1/17/2022  8:40 AM        Signed:  Ros Sheikh.  Danilo Vazquez, [address] [facility]  06/30/22

## 2022-06-30 NOTE — ED TRIAGE NOTES
Patient arrives with EMS from home CC body aches \"whenever he woke up\" today. Patient also hypertensive upon arrival and states he takes medication for this but his BP runs this high most of the time.  Endorses mild chest pain w/o SOB    /109    Patient receives dialysis T, TH, Sat

## 2022-06-30 NOTE — ED PROVIDER NOTES
The history is provided by the patient. Hypertension   This is a chronic problem. The problem has not changed since onset. Associated symptoms include chest pain and shortness of breath. Pertinent negatives include no orthopnea. There are no associated agents to hypertension. Risk factors include male gender and hypertension (ESRD on dialysis, due today). Past Medical History:   Diagnosis Date    Chronic kidney disease     Hypertension     Liver disease        No past surgical history on file. History reviewed. No pertinent family history. Social History     Socioeconomic History    Marital status: SINGLE     Spouse name: Not on file    Number of children: Not on file    Years of education: Not on file    Highest education level: Not on file   Occupational History    Not on file   Tobacco Use    Smoking status: Current Some Day Smoker     Packs/day: 1.00     Last attempt to quit: 2020     Years since quittin.9    Smokeless tobacco: Never Used    Tobacco comment: started back smoking in last couple months   Vaping Use    Vaping Use: Never used   Substance and Sexual Activity    Alcohol use: Not Currently    Drug use: Yes     Types: Marijuana, Cocaine, Heroin     Comment: Herion & Cocaine 10 years ago as of 3/23/22, Marijuana last used 1 week ago    Sexual activity: Not Currently   Other Topics Concern    Not on file   Social History Narrative    Not on file     Social Determinants of Health     Financial Resource Strain:     Difficulty of Paying Living Expenses: Not on file   Food Insecurity:     Worried About Running Out of Food in the Last Year: Not on file    [place] of Food in the Last Year: Not on file   Transportation Needs:     Lack of Transportation (Medical): Not on file    Lack of Transportation (Non-Medical):  Not on file   Physical Activity:     Days of Exercise per Week: Not on file    Minutes of Exercise per Session: Not on file   Stress:     Feeling of Stress : Not on file   Social Connections:     Frequency of Communication with Friends and Family: Not on file    Frequency of Social Gatherings with Friends and Family: Not on file    Attends Cheondoism Services: Not on file    Active Member of Clubs or Organizations: Not on file    Attends Club or Organization Meetings: Not on file    Marital Status: Not on file   Intimate Partner Violence:     Fear of Current or Ex-Partner: Not on file    Emotionally Abused: Not on file    Physically Abused: Not on file    Sexually Abused: Not on file   Housing Stability:     Unable to Pay for Housing in the Last Year: Not on file    Number of [place] in the Last Year: Not on file    Unstable Housing in the Last Year: Not on file         ALLERGIES: Baclofen    Review of Systems   Respiratory: Positive for shortness of breath. Cardiovascular: Positive for chest pain. Negative for orthopnea. All other systems reviewed and are negative. Vitals:    06/30/22 0519 06/30/22 0534 06/30/22 0549 06/30/22 0604   BP: (!) 232/104 (!) 227/106 (!) 230/104 (!) 234/99   Pulse: 77 67 66 65   Resp: 14 14 23 13   Temp:       SpO2: 99%  97%    Weight:       Height:                Physical Exam  Vitals and nursing note reviewed. Constitutional:       General: He is not in acute distress. Appearance: He is well-developed. HENT:      Head: Normocephalic and atraumatic. Eyes:      Conjunctiva/sclera: Conjunctivae normal.   Neck:      Trachea: No tracheal deviation. Cardiovascular:      Rate and Rhythm: Normal rate and regular rhythm. Heart sounds: Normal heart sounds. Pulmonary:      Effort: Pulmonary effort is normal. No respiratory distress. Breath sounds: Normal breath sounds. Abdominal:      General: There is no distension. Palpations: Abdomen is soft. Tenderness: There is no abdominal tenderness. There is no guarding. Musculoskeletal:         General: No deformity.  Normal range of motion. Cervical back: Neck supple. Skin:     General: Skin is warm and dry. Neurological:      Mental Status: He is alert. Cranial Nerves: No cranial nerve deficit. Psychiatric:         Behavior: Behavior normal.          [facility]  ED Course as of 06/30/22 0617   Thu Jun 30, 2022   0508 EKG 0454: Rate 70, NSR, minimal voltage criteria for LVH. Lateral TWA, slightly more negative in lead I since previous. Otherwise no significant change. [DK]      ED Course User Index  [DK] Jett Dean MD     59-year-old male presents with body aches starting overnight with chest pain and shortness of breath occurring this morning. He is due for dialysis today. He is profoundly hypertensive which is similar to previous presentation. He left AMA from the hospital early in the month after he was scheduled for myocardial perfusion imaging when his troponin was elevated. Multiple agents orally, IV and topically initiated to attempt BP lowering without much response. Hospitalist was consulted for admission and will see the patient in the emergency department. Procedures    Critical Care Time: 36 minutes  I personally performed critical care time separate of billable procedures which may include ordering and interpretation of testing, ordering of medications, direct patient assessment and reassessment, discussion with consultants or family, and documentation. Perfect Serve Consult for Admission  6:18 AM    ED Room Number: ER16/16  Patient Name and age:  Jasvir Damon 64 y.o.  male  Working Diagnosis:   1. Body aches    2. Chest pain with high risk for cardiac etiology    3. SOB (shortness of breath)    4.  Hypertensive heart and kidney disease with acute on chronic diastolic congestive heart failure and stage 4 chronic kidney disease ([facility] [address]        COVID-19 Suspicion:  no  Sepsis present:  no  Reassessment needed: no  Code Status:  Full Code  Readmission: no  Isolation Requirements:  no  Recommended Level of Care:  step down  Department:[facility] Adult ED - [phone]

## 2022-06-30 NOTE — PROGRESS NOTES
[facility]   [address], [address] [facility]  Phone: [phone]   [phone]    [email or web address]     Nephrology Progress Note    Patient Name : Krishna Barragan      : 1965     MRN : [id number]  Date of Admission : 2022  Date of Servive : 22    CC:  Follow up for ESRD       Assessment and Plan   ESRD-HD :  - dialyzes TTS at [facility] [address]   - No missed treatments in the past month  - For emergent HD now  -May need additional HD/UF tomorrow  - daily labs      Hypertensive Urgency   - restarted all home meds ? Missing meds   -Prior history of cocaine use: Check UDS  -Prior echo showed severe LVH in 2022  -Consider repeat echo  -Start Cardene drip while awaiting dialysis     Anemia in CKD   - hold ABI until BP better      Sec HPTH   - continue Phos binders     Chest pain       Interval History:  Mr. Abdias Valadez is a 63-year-old -American male with history of ESRD presented to ER with complaints of chest pain. On arrival his blood pressure was 230/108 mmHg. His arm is dialyzes  at [facility] unit. He denies any missed dialysis treatments. His outpatient records were reviewed and noted compliance to his dialysis treatment. His last dialysis was on Tuesday and he had 3.5 kg UF. He denies any cocaine use prior to this visit. He was hospitalized earlier this month when his UDS was positive for cocaine. He reports being compliant with his home antihypertensive medications    Review of Systems: A comprehensive review of systems was negative except for that written in the HPI.     Current Medications:   Current Facility-Administered Medications   Medication Dose Route Frequency    sodium chloride (NS) flush 5-40 mL  5-40 mL IntraVENous Q8H    sodium chloride (NS) flush 5-40 mL  5-40 mL IntraVENous PRN    acetaminophen (TYLENOL) tablet 650 mg  650 mg Oral Q6H PRN    Or    acetaminophen (TYLENOL) suppository 650 mg  650 mg Rectal Q6H PRN    polyethylene glycol (MIRALAX) packet 17 g  17 g Oral DAILY PRN    ondansetron (ZOFRAN ODT) tablet 4 mg  4 mg Oral Q8H PRN    Or    ondansetron (ZOFRAN) injection 4 mg  4 mg IntraVENous Q6H PRN    heparin (porcine) injection 5,000 Units  5,000 Units SubCUTAneous Q8H    bumetanide (BUMEX) tablet 2 mg  2 mg Oral DAILY    buprenorphine-naloxone (SUBOXONE) 8-2mg SL tablet  1 Tablet SubLINGual BID    carvediloL (COREG) tablet 25 mg  25 mg Oral BID WITH MEALS    cloNIDine HCL (CATAPRES) tablet 0.1 mg  0.1 mg Oral BID    hydrALAZINE (APRESOLINE) tablet 100 mg  100 mg Oral TID    losartan (COZAAR) tablet 100 mg  100 mg Oral DAILY    NIFEdipine ER (PROCARDIA XL) tablet 60 mg  60 mg Oral BID    niCARdipine (CARDENE) 25 mg in 0.9% sodium chloride 250 mL ([id number])  0-15 mg/hr IntraVENous TITRATE    heparin (porcine) 1,000 unit/mL injection 2,100 Units  2,100 Units InterCATHeter DIALYSIS PRN    And    heparin (porcine) 1,000 unit/mL injection 2,000 Units  2,000 Units InterCATHeter DIALYSIS PRN     Current Outpatient Medications   Medication Sig    cloNIDine HCL (CATAPRES) 0.1 mg tablet Take 1 Tablet by mouth two (2) times a day.  losartan (COZAAR) 100 mg tablet Take 1 Tablet by mouth daily.  carvediloL (Coreg) 25 mg tablet Take 25 mg by mouth two (2) times daily (with meals).  NIFEdipine ER (ADALAT CC) 60 mg ER tablet Take 60 mg by mouth two (2) times a day.  bumetanide (BUMEX) 2 mg tablet Take 2 mg by mouth daily.  calcium carbonate (TUMS) 200 mg calcium (500 mg) chew Take 3 Tablets by mouth three (3) times daily (after meals).  fluticasone propionate (FLONASE) 50 mcg/actuation nasal spray 2 Sprays by Both Nostrils route daily as needed.  pantoprazole (Protonix) 40 mg tablet Take 1 Tablet by mouth two (2) times a day.  Indications: an ulcer of the duodenum (Patient not taking: Reported on 5/17/2022)    hydrALAZINE (APRESOLINE) 100 mg tablet Take 100 mg by mouth three (3) times daily.  ascorbic acid, vitamin C, (Vitamin C) 500 mg tablet Take 500 mg by mouth daily.  cholecalciferol (VITAMIN D3) 25 mcg (1,000 unit) cap Take 1,000 Units by mouth daily.  buprenorphine-naloxone (Suboxone) 8-2 mg film sublingaul film 1 Film by SubLINGual route two (2) times a day. Allergies   Allergen Reactions    Baclofen Rash     All over    Losartan Hives, Shortness of Breath and Rash       Objective:  Vitals:    Vitals:    06/30/22 1200 06/30/22 1215 06/30/22 1230 06/30/22 1245   BP: (!) 207/100 (!) 211/102 (!) 191/98    Pulse: 62 67 64 63   Resp: 17 21 21 21   Temp:       TempSrc:       SpO2: 100% 99% 98% 99%   Weight:       Height:         Intake and Output:  No intake/output data recorded. No intake/output data recorded. Physical Examination:        General: NAD,Conversant   Neck:  Supple, no mass  Resp:  Lungs CTA B/L, no wheezing , normal respiratory effort  CV:  RRR,  no murmur or rub, LE edema  GI:  Soft, NT, + BS, no HS megaly  Neurologic:  Non focal  Psych:             AAO x 3 appropriate affect   Dialysis Access : Left IJ permacath    []    High complexity decision making was performed  []    Patient is at high-risk of decompensation with multiple organ involvement    Lab Data Personally Reviewed: I have reviewed all the pertinent labs, microbiology data and radiology studies during assessment.     Recent Labs     06/30/22  0500   *   K 4.5   CL 98   CO2 27   GLU 99   BUN 54*   CREA 10.60*   CA 10.0   ALB 3.1*   ALT 8*     Recent Labs     06/30/22  0500   WBC 10.2   HGB 9.2*   HCT 29.9*        No results found for: SDES  Lab Results   Component Value Date/Time    Culture result: NO GROWTH 5 DAYS 05/18/2022 06:06 AM    Culture result: No growth (<1,000 CFU/ML) 03/22/2022 04:11 PM     Recent Results (from the past 24 hour(s))   EKG, 12 LEAD, INITIAL    Collection Time: 06/30/22  4:54 AM   Result Value Ref Range    Ventricular Rate 70 BPM    Atrial Rate 70 BPM    P-R Interval 186 ms    QRS Duration 92 ms    Q-T Interval 442 ms    QTC Calculation (Bezet) 477 ms    Calculated P Axis 73 degrees    Calculated R Axis 50 degrees    Calculated T Axis 105 degrees    Diagnosis       Normal sinus rhythm  Minimal voltage criteria for LVH, may be normal variant ( Sokolow-Cardoso )  Cannot rule out Anterior infarct , age undetermined  T wave abnormality, consider lateral ischemia  Abnormal ECG  When compared with ECG of 31-MAY-2022 06:12,  ST elevation now present in Anterior leads  QT has shortened     CBC WITH AUTOMATED DIFF    Collection Time: 06/30/22  5:00 AM   Result Value Ref Range    WBC 10.2 4.1 - 11.1 K/uL    RBC 3.55 (L) 4.10 - 5.70 M/uL    HGB 9.2 (L) 12.1 - 17.0 g/dL    HCT 29.9 (L) 36.6 - 50.3 %    MCV 84.2 80.0 - 99.0 FL    MCH 25.9 (L) 26.0 - 34.0 PG    MCHC 30.8 30.0 - 36.5 g/dL    RDW 19.7 (H) 11.5 - 14.5 %    PLATELET 321 847 - 716 K/uL    MPV 10.9 8.9 - 12.9 FL    NRBC 0.0 0  WBC    ABSOLUTE NRBC 0.00 0.00 - 0.01 K/uL    NEUTROPHILS 59 32 - 75 %    LYMPHOCYTES 18 12 - 49 %    MONOCYTES 11 5 - 13 %    EOSINOPHILS 11 (H) 0 - 7 %    BASOPHILS 1 0 - 1 %    IMMATURE GRANULOCYTES 0 0.0 - 0.5 %    ABS. NEUTROPHILS 6.1 1.8 - 8.0 K/UL    ABS. LYMPHOCYTES 1.8 0.8 - 3.5 K/UL    ABS. MONOCYTES 1.1 (H) 0.0 - 1.0 K/UL    ABS. EOSINOPHILS 1.1 (H) 0.0 - 0.4 K/UL    ABS. BASOPHILS 0.1 0.0 - 0.1 K/UL    ABS. IMM.  GRANS. 0.0 0.00 - 0.04 K/UL    DF SMEAR SCANNED      RBC COMMENTS ANISOCYTOSIS  1+        RBC COMMENTS HYPOCHROMIA  1+        RBC COMMENTS TARGET CELLS  PRESENT        RBC COMMENTS POLYCHROMASIA  PRESENT       METABOLIC PANEL, COMPREHENSIVE    Collection Time: 06/30/22  5:00 AM   Result Value Ref Range    Sodium 133 (L) 136 - 145 mmol/L    Potassium 4.5 3.5 - 5.1 mmol/L    Chloride 98 97 - 108 mmol/L    CO2 27 21 - 32 mmol/L    Anion gap 8 5 - 15 mmol/L    Glucose 99 65 - 100 mg/dL    BUN 54 (H) 6 - 20 MG/DL    Creatinine 10.60 (H) 0.70 - 1.30 MG/DL    BUN/Creatinine ratio 5 (L) 12 - 20      GFR est AA 6 (L) >60 ml/min/1.73m2    GFR est non-AA 5 (L) >60 ml/min/1.73m2    Calcium 10.0 8.5 - 10.1 MG/DL    Bilirubin, total 0.2 0.2 - 1.0 MG/DL    ALT (SGPT) 8 (L) 12 - 78 U/L    AST (SGOT) 12 (L) 15 - 37 U/L    Alk. phosphatase 61 45 - 117 U/L    Protein, total 7.6 6.4 - 8.2 g/dL    Albumin 3.1 (L) 3.5 - 5.0 g/dL    Globulin 4.5 (H) 2.0 - 4.0 g/dL    A-G Ratio 0.7 (L) 1.1 - 2.2     TROPONIN-HIGH SENSITIVITY    Collection Time: 06/30/22  5:00 AM   Result Value Ref Range    Troponin-High Sensitivity 62 0 - 76 ng/L   NT-PRO BNP    Collection Time: 06/30/22  5:00 AM   Result Value Ref Range    NT pro-BNP 31,048 (H) <125 PG/ML   SAMPLES BEING HELD    Collection Time: 06/30/22  5:00 AM   Result Value Ref Range    SAMPLES BEING HELD 1red 1blue     COMMENT        Add-on orders for these samples will be processed based on acceptable specimen integrity and analyte stability, which may vary by analyte. D DIMER    Collection Time: 06/30/22  5:00 AM   Result Value Ref Range    D-dimer 1.25 (H) 0.00 - 0.65 mg/L FEU   INFLUENZA A+B VIRAL AGS    Collection Time: 06/30/22  5:47 AM   Result Value Ref Range    Influenza A Antigen Negative NEG      Influenza B Antigen Negative NEG     COVID-19 RAPID TEST    Collection Time: 06/30/22  5:47 AM   Result Value Ref Range    Specimen source Nasopharyngeal      COVID-19 rapid test Not detected NOTD     TROPONIN-HIGH SENSITIVITY    Collection Time: 06/30/22  8:15 AM   Result Value Ref Range    Troponin-High Sensitivity 58 0 - 76 ng/L   TROPONIN-HIGH SENSITIVITY    Collection Time: 06/30/22  9:40 AM   Result Value Ref Range    Troponin-High Sensitivity 52 0 - 76 ng/L   TROPONIN-HIGH SENSITIVITY    Collection Time: 06/30/22 10:50 AM   Result Value Ref Range    Troponin-High Sensitivity 48 0 - 76 ng/L           I have reviewed the flowsheets. Chart and Pertinent Notes have been reviewed.    No change in PMH ,family and social history from Consult note.       Maria A Garsia, [facility]

## 2022-07-01 ENCOUNTER — APPOINTMENT (OUTPATIENT)
Dept: NON INVASIVE DIAGNOSTICS | Age: 57
End: 2022-07-01
Attending: INTERNAL MEDICINE
Payer: MEDICARE

## 2022-07-01 ENCOUNTER — APPOINTMENT (OUTPATIENT)
Dept: NON INVASIVE DIAGNOSTICS | Age: 57
End: 2022-07-01
Attending: STUDENT IN AN ORGANIZED HEALTH CARE EDUCATION/TRAINING PROGRAM
Payer: MEDICARE

## 2022-07-01 ENCOUNTER — APPOINTMENT (OUTPATIENT)
Dept: NUCLEAR MEDICINE | Age: 57
End: 2022-07-01
Attending: STUDENT IN AN ORGANIZED HEALTH CARE EDUCATION/TRAINING PROGRAM
Payer: MEDICARE

## 2022-07-01 VITALS
DIASTOLIC BLOOD PRESSURE: 72 MMHG | BODY MASS INDEX: 24.26 KG/M2 | HEIGHT: 69 IN | RESPIRATION RATE: 20 BRPM | HEART RATE: 75 BPM | OXYGEN SATURATION: 93 % | WEIGHT: 163.8 LBS | TEMPERATURE: 97.7 F | SYSTOLIC BLOOD PRESSURE: 171 MMHG

## 2022-07-01 LAB
ANION GAP SERPL CALC-SCNC: 9 MMOL/L (ref 5–15)
BUN SERPL-MCNC: 32 MG/DL (ref 6–20)
BUN/CREAT SERPL: 5 (ref 12–20)
CALCIUM SERPL-MCNC: 8.7 MG/DL (ref 8.5–10.1)
CHLORIDE SERPL-SCNC: 101 MMOL/L (ref 97–108)
CO2 SERPL-SCNC: 27 MMOL/L (ref 21–32)
CREAT SERPL-MCNC: 6.66 MG/DL (ref 0.7–1.3)
ECHO AV AREA PEAK VELOCITY: 2.5 CM2
ECHO AV AREA PEAK VELOCITY: 2.9 CM2
ECHO AV PEAK GRADIENT: 9 MMHG
ECHO AV PEAK VELOCITY: 1.5 M/S
ECHO LA DIAMETER INDEX: 2.37 CM/M2
ECHO LA DIAMETER: 4.5 CM
ECHO LA VOL 2C: 76 ML (ref 18–58)
ECHO LA VOL 4C: 100 ML (ref 18–58)
ECHO LA VOLUME AREA LENGTH: 93 ML
ECHO LA VOLUME INDEX A2C: 40 ML/M2 (ref 16–34)
ECHO LA VOLUME INDEX A4C: 53 ML/M2 (ref 16–34)
ECHO LA VOLUME INDEX AREA LENGTH: 49 ML/M2 (ref 16–34)
ECHO LV E' LATERAL VELOCITY: 6 CM/S
ECHO LV E' SEPTAL VELOCITY: 4 CM/S
ECHO LV FRACTIONAL SHORTENING: 43 % (ref 28–44)
ECHO LV INTERNAL DIMENSION DIASTOLE INDEX: 2.47 CM/M2
ECHO LV INTERNAL DIMENSION DIASTOLIC: 4.7 CM (ref 4.2–5.9)
ECHO LV INTERNAL DIMENSION SYSTOLIC INDEX: 1.42 CM/M2
ECHO LV INTERNAL DIMENSION SYSTOLIC: 2.7 CM
ECHO LV IVSD: 1.5 CM (ref 0.6–1)
ECHO LV MASS 2D: 279.4 G (ref 88–224)
ECHO LV MASS INDEX 2D: 147.1 G/M2 (ref 49–115)
ECHO LV POSTERIOR WALL DIASTOLIC: 1.4 CM (ref 0.6–1)
ECHO LV RELATIVE WALL THICKNESS RATIO: 0.6
ECHO LVOT AREA: 3.1 CM2
ECHO LVOT DIAM: 2 CM
ECHO LVOT MEAN GRADIENT: 3 MMHG
ECHO LVOT PEAK GRADIENT: 6 MMHG
ECHO LVOT PEAK GRADIENT: 8 MMHG
ECHO LVOT PEAK VELOCITY: 1.3 M/S
ECHO LVOT PEAK VELOCITY: 1.4 M/S
ECHO LVOT STROKE VOLUME INDEX: 43.3 ML/M2
ECHO LVOT SV: 82.3 ML
ECHO LVOT VTI: 26.2 CM
ECHO MV A VELOCITY: 0.94 M/S
ECHO MV AREA PHT: 4.3 CM2
ECHO MV E DECELERATION TIME (DT): 177.4 MS
ECHO MV E VELOCITY: 1.07 M/S
ECHO MV E/A RATIO: 1.14
ECHO MV E/E' LATERAL: 17.83
ECHO MV E/E' RATIO (AVERAGED): 22.29
ECHO MV E/E' SEPTAL: 26.75
ECHO MV PRESSURE HALF TIME (PHT): 51.5 MS
ECHO MV REGURGITANT PEAK GRADIENT: 38 MMHG
ECHO MV REGURGITANT PEAK VELOCITY: 3.1 M/S
ECHO PULMONARY ARTERY END DIASTOLIC PRESSURE: 30 MMHG
ECHO PV MAX VELOCITY: 1.1 M/S
ECHO PV PEAK GRADIENT: 5 MMHG
ECHO RV FREE WALL PEAK S': 131 CM/S
ECHO RV INTERNAL DIMENSION: 2.7 CM
ECHO RV TAPSE: 2.5 CM (ref 1.7–?)
ECHO TV REGURGITANT MAX VELOCITY: 2.86 M/S
ECHO TV REGURGITANT PEAK GRADIENT: 33 MMHG
ERYTHROCYTE [DISTWIDTH] IN BLOOD BY AUTOMATED COUNT: 19.9 % (ref 11.5–14.5)
GLUCOSE SERPL-MCNC: 83 MG/DL (ref 65–100)
HCT VFR BLD AUTO: 29.7 % (ref 36.6–50.3)
HGB BLD-MCNC: 9.3 G/DL (ref 12.1–17)
MCH RBC QN AUTO: 26.3 PG (ref 26–34)
MCHC RBC AUTO-ENTMCNC: 31.3 G/DL (ref 30–36.5)
MCV RBC AUTO: 83.9 FL (ref 80–99)
NRBC # BLD: 0 K/UL (ref 0–0.01)
NRBC BLD-RTO: 0 PER 100 WBC
PLATELET # BLD AUTO: 194 K/UL (ref 150–400)
PMV BLD AUTO: 10.8 FL (ref 8.9–12.9)
POTASSIUM SERPL-SCNC: 4 MMOL/L (ref 3.5–5.1)
RBC # BLD AUTO: 3.54 M/UL (ref 4.1–5.7)
SODIUM SERPL-SCNC: 137 MMOL/L (ref 136–145)
STRESS BASELINE DIAS BP: 80 MMHG
STRESS BASELINE HR: 67 BPM
STRESS BASELINE SYS BP: 156 MMHG
STRESS ESTIMATED WORKLOAD: 1 METS
STRESS EXERCISE DUR MIN: 3 MIN
STRESS EXERCISE DUR SEC: 0 SEC
STRESS PEAK DIAS BP: 80 MMHG
STRESS PEAK SYS BP: 156 MMHG
STRESS PERCENT HR ACHIEVED: 58 %
STRESS POST PEAK HR: 95 BPM
STRESS RATE PRESSURE PRODUCT: NORMAL BPM*MMHG
STRESS STAGE 1 BP: NORMAL MMHG
STRESS STAGE 1 HR: 93 BPM
STRESS STAGE RECOVERY 1 BP: NORMAL MMHG
STRESS STAGE RECOVERY 1 HR: 82 BPM
STRESS TARGET HR: 164 BPM
WBC # BLD AUTO: 7.5 K/UL (ref 4.1–11.1)

## 2022-07-01 PROCEDURE — 93306 TTE W/DOPPLER COMPLETE: CPT

## 2022-07-01 PROCEDURE — 97116 GAIT TRAINING THERAPY: CPT

## 2022-07-01 PROCEDURE — A9500 TC99M SESTAMIBI: HCPCS

## 2022-07-01 PROCEDURE — 74011250637 HC RX REV CODE- 250/637: Performed by: INTERNAL MEDICINE

## 2022-07-01 PROCEDURE — 74011000250 HC RX REV CODE- 250: Performed by: STUDENT IN AN ORGANIZED HEALTH CARE EDUCATION/TRAINING PROGRAM

## 2022-07-01 PROCEDURE — 97161 PT EVAL LOW COMPLEX 20 MIN: CPT

## 2022-07-01 PROCEDURE — 74011250636 HC RX REV CODE- 250/636: Performed by: STUDENT IN AN ORGANIZED HEALTH CARE EDUCATION/TRAINING PROGRAM

## 2022-07-01 PROCEDURE — 78452 HT MUSCLE IMAGE SPECT MULT: CPT

## 2022-07-01 PROCEDURE — 36415 COLL VENOUS BLD VENIPUNCTURE: CPT

## 2022-07-01 PROCEDURE — 97535 SELF CARE MNGMENT TRAINING: CPT

## 2022-07-01 PROCEDURE — 85027 COMPLETE CBC AUTOMATED: CPT

## 2022-07-01 PROCEDURE — G0378 HOSPITAL OBSERVATION PER HR: HCPCS

## 2022-07-01 PROCEDURE — 74011250636 HC RX REV CODE- 250/636: Performed by: INTERNAL MEDICINE

## 2022-07-01 PROCEDURE — 74011000250 HC RX REV CODE- 250: Performed by: INTERNAL MEDICINE

## 2022-07-01 PROCEDURE — 80048 BASIC METABOLIC PNL TOTAL CA: CPT

## 2022-07-01 PROCEDURE — 97165 OT EVAL LOW COMPLEX 30 MIN: CPT

## 2022-07-01 RX ORDER — CAFFEINE CITRATE 20 MG/ML
60 SOLUTION INTRAVENOUS
Status: COMPLETED | OUTPATIENT
Start: 2022-07-01 | End: 2022-07-01

## 2022-07-01 RX ORDER — TETRAKIS(2-METHOXYISOBUTYLISOCYANIDE)COPPER(I) TETRAFLUOROBORATE 1 MG/ML
10.2 INJECTION, POWDER, LYOPHILIZED, FOR SOLUTION INTRAVENOUS
Status: COMPLETED | OUTPATIENT
Start: 2022-07-01 | End: 2022-07-01

## 2022-07-01 RX ORDER — TETRAKIS(2-METHOXYISOBUTYLISOCYANIDE)COPPER(I) TETRAFLUOROBORATE 1 MG/ML
32.9 INJECTION, POWDER, LYOPHILIZED, FOR SOLUTION INTRAVENOUS
Status: COMPLETED | OUTPATIENT
Start: 2022-07-01 | End: 2022-07-01

## 2022-07-01 RX ORDER — SODIUM CHLORIDE 0.9 % (FLUSH) 0.9 %
20 SYRINGE (ML) INJECTION
Status: COMPLETED | OUTPATIENT
Start: 2022-07-01 | End: 2022-07-01

## 2022-07-01 RX ADMIN — SODIUM CHLORIDE, PRESERVATIVE FREE 20 ML: 5 INJECTION INTRAVENOUS at 12:04

## 2022-07-01 RX ADMIN — CLONIDINE HYDROCHLORIDE 0.1 MG: 0.1 TABLET ORAL at 10:07

## 2022-07-01 RX ADMIN — BUMETANIDE 2 MG: 1 TABLET ORAL at 14:47

## 2022-07-01 RX ADMIN — HYDRALAZINE HYDROCHLORIDE 100 MG: 50 TABLET, FILM COATED ORAL at 10:07

## 2022-07-01 RX ADMIN — CAFFEINE CITRATE 60 MG: 20 INJECTION, SOLUTION INTRAVENOUS at 12:07

## 2022-07-01 RX ADMIN — NIFEDIPINE 60 MG: 60 TABLET, EXTENDED RELEASE ORAL at 10:07

## 2022-07-01 RX ADMIN — TETRAKIS(2-METHOXYISOBUTYLISOCYANIDE)COPPER(I) TETRAFLUOROBORATE 32.9 MILLICURIE: 1 INJECTION, POWDER, LYOPHILIZED, FOR SOLUTION INTRAVENOUS at 12:05

## 2022-07-01 RX ADMIN — SODIUM CHLORIDE, PRESERVATIVE FREE 10 ML: 5 INJECTION INTRAVENOUS at 14:49

## 2022-07-01 RX ADMIN — LOSARTAN POTASSIUM 100 MG: 50 TABLET, FILM COATED ORAL at 10:07

## 2022-07-01 RX ADMIN — TETRAKIS(2-METHOXYISOBUTYLISOCYANIDE)COPPER(I) TETRAFLUOROBORATE 10.2 MILLICURIE: 1 INJECTION, POWDER, LYOPHILIZED, FOR SOLUTION INTRAVENOUS at 09:00

## 2022-07-01 RX ADMIN — CARVEDILOL 25 MG: 12.5 TABLET, FILM COATED ORAL at 10:07

## 2022-07-01 RX ADMIN — REGADENOSON 0.4 MG: 0.08 INJECTION, SOLUTION INTRAVENOUS at 12:04

## 2022-07-01 NOTE — ROUTINE PROCESS
TRANSFER - OUT REPORT:    Verbal report given to Jamie Moya RN (name) on Camden Martel  being transferred to (unit) for routine progression of care       Report consisted of patients Situation, Background, Assessment and   Recommendations(SBAR). Information from the following report(s) SBAR, ED Summary, Intake/Output, MAR and Recent Results was reviewed with the receiving nurse. Lines:   Peripheral IV 06/30/22 Right Antecubital (Active)   Site Assessment Clean, dry, & intact 06/30/22 0511   Phlebitis Assessment 0 06/30/22 0511   Infiltration Assessment 0 06/30/22 0511   Dressing Status Clean, dry, & intact 06/30/22 0511   Dressing Type 4 X 4 06/30/22 0511        Opportunity for questions and clarification was provided.       Patient transported with:   [employer]

## 2022-07-01 NOTE — DISCHARGE SUMMARY
Discharge Summary     Patient:  Ansley Uribe       MRN: [id number]       YOB: 1965       Age: 64 y.o. Date of admission:  6/30/2022    Date of discharge:  7/1/2022    Primary care provider: Dr. Tristian Anna, NP    Admitting provider:  Josey West MD    Discharging provider:  Jason Dyson 91.: [phone]. If unavailable, call [phone] and ask the  to page the triage hospitalist.    Consultations  · IP CONSULT TO HOSPITALIST  · IP CONSULT TO NEPHROLOGY  · IP CONSULT TO CARDIOLOGY    Procedures  · * No surgery found *    Discharge destination: Home. The patient is stable for discharge. Admission diagnosis  · HTN (hypertension), malignant [I10]    Current Discharge Medication List      CONTINUE these medications which have NOT CHANGED    Details   cloNIDine HCL (CATAPRES) 0.1 mg tablet Take 1 Tablet by mouth two (2) times a day. Qty: 60 Tablet, Refills: 0      carvediloL (Coreg) 25 mg tablet Take 25 mg by mouth two (2) times daily (with meals). NIFEdipine ER (ADALAT CC) 60 mg ER tablet Take 60 mg by mouth two (2) times a day. bumetanide (BUMEX) 2 mg tablet Take 2 mg by mouth daily. calcium carbonate (TUMS) 200 mg calcium (500 mg) chew Take 3 Tablets by mouth three (3) times daily (after meals). fluticasone propionate (FLONASE) 50 mcg/actuation nasal spray 2 Sprays by Both Nostrils route daily as needed. pantoprazole (Protonix) 40 mg tablet Take 1 Tablet by mouth two (2) times a day. Indications: an ulcer of the duodenum  Qty: 60 Tablet, Refills: 0      hydrALAZINE (APRESOLINE) 100 mg tablet Take 100 mg by mouth three (3) times daily. ascorbic acid, vitamin C, (Vitamin C) 500 mg tablet Take 500 mg by mouth daily. cholecalciferol (VITAMIN D3) 25 mcg (1,000 unit) cap Take 1,000 Units by mouth daily.       buprenorphine-naloxone (Suboxone) 8-2 mg film sublingaul film 1 Film by SubLINGual route two (2) times a day. Comments: . STOP taking these medications       losartan (COZAAR) 100 mg tablet Comments:   Reason for Stopping: Follow-up Information     Follow up With Specialties Details Why Contact Info    Ira Martinez NP Nurse Practitioner In 1 week  [address] [address] [place] [address]  [phone]            Final discharge diagnoses and brief hospital course  A 64year old male patient with PMH of ESRD on HD and HTN presented to ED for evaluation of chest pain. Patient woke up with chest pain this AM, left side, pinching, 10/10 intensity. He was also sob with chest pain. He does have chronic cough. Denied any fever, headache, abd pain, fever, edema, palpitations, urinary or bowel changes. He did not miss HD. He recently hospitalized on 5/30 for elevated troponin  but left AMA on 6/1. In ED, BP elevated - clonidine and hydralazine given. Hospitalist consulted for admission    Atypical chest pain - resolved  -  multiple risk factors for CAD  - Troponin x 3 normal  - elevated troponin last month  - appreciate cardiology input  - stress test negative for ischemia      HTN urgency - BP improved  - c/w home meds coreg, clonidine, hydralazine, Nifedipine   - HD helped.      ESRD on HD  - nephrology on board for HD      Hx Cocaine abuse- UDS negative      Opioid dependence  - on suboxone     Discharge recommendations:  PCP f/u in 1 week  Nephrology as needed  Monitor BP  Stress importance on medication compliance     Discharge plan discussed in detail to patient.  He understood and agreed        Physical examination at discharge  Visit Vitals  BP (!) 171/72 (BP 1 Location: Left lower arm, BP Patient Position: At rest;Lying)   Pulse 75   Temp 97.7 °F (36.5 °C)   Resp 20   Ht 5' 9\" (1.753 m)   Wt 74.3 kg (163 lb 12.8 oz)   SpO2 93%   BMI 24.19 kg/m²     Gen: NAD  HEENT: anicteric sclerae, normal conjunctiva, oropharynx clear, MM moist  Neck: supple, trachea midline, no adenopathy  Heart: RRR, no MRG, no JVD, no peripheral edema  Lungs: CTA b/l, non-labored respirations  Abd: soft, NT, ND, BS+, no organomegaly  Extr: warm  Skin: dry, no rash  Neuro: CN II-XII grossly intact, normal speech, moves all extremities  Psych: normal mood, appropriate affect    Pertinent imaging studies:    Per EMR     Recent Labs     07/01/22  0413 06/30/22  0500   WBC 7.5 10.2   HGB 9.3* 9.2*   HCT 29.7* 29.9*    212     Recent Labs     07/01/22  0413 06/30/22  0500    133*   K 4.0 4.5    98   CO2 27 27   BUN 32* 54*   CREA 6.66* 10.60*   GLU 83 99   CA 8.7 10.0     Recent Labs     06/30/22  0500   AP 61   TP 7.6   ALB 3.1*   GLOB 4.5*     No results for input(s): INR, PTP, APTT, INREXT in the last 72 hours. No results for input(s): FE, TIBC, PSAT, FERR in the last 72 hours. No results for input(s): PH, PCO2, PO2 in the last 72 hours. No results for input(s): CPK, CKMB in the last 72 hours.     No lab exists for component: TROPONINI  No components found for: [place]    Chronic Diagnoses:    Problem List as of 7/1/2022 Date Reviewed: 1/15/2021          Codes Class Noted - Resolved    HTN (hypertension), malignant ICD-10-CM: I10  ICD-9-CM: 401.0  6/30/2022 - Present        Seizures ([facility] ICD-10-CM: R56.9  ICD-9-CM: 780.39  5/30/2022 - Present        Malignant hypertension ICD-10-CM: I10  ICD-9-CM: 401.0  5/17/2022 - Present        Enteritis ICD-10-CM: K52.9  ICD-9-CM: 558.9  5/17/2022 - Present        Bone metastasis ([facility] ICD-10-CM: C79.51  ICD-9-CM: 198.5  5/17/2022 - Present        ESRD needing dialysis ([facility] ICD-10-CM: N18.6, Z99.2  ICD-9-CM: 585.6  3/29/2022 - Present        Duodenitis ICD-10-CM: K29.80  ICD-9-CM: 535.60  3/25/2022 - Present        Peptic ulcer disease ICD-10-CM: K27.9  ICD-9-CM: 533.90  3/22/2022 - Present        Intractable abdominal pain ICD-10-CM: R10.9  ICD-9-CM: 789.00  3/22/2022 - Present ESRD (end stage renal disease) ([facility] ICD-10-CM: N18.6  ICD-9-CM: 585.6  3/22/2022 - Present        Non-compliance ICD-10-CM: Z91.19  ICD-9-CM: V15.81  3/12/2022 - Present        ESRD (end stage renal disease) on dialysis [facility]) ICD-10-CM: N18.6, Z99.2  ICD-9-CM: 585.6, V45.11  3/12/2022 - Present        Hypertensive urgency ICD-10-CM: I16.0  ICD-9-CM: 401.9  3/12/2022 - Present        Hypertensive emergency ICD-10-CM: I16.1  ICD-9-CM: 401.9  1/15/2022 - Present        Iron deficiency anemia ICD-10-CM: D50.9  ICD-9-CM: 280.9  2/15/2021 - Present        Chronic hepatitis C ([facility] ICD-10-CM: B18.2  ICD-9-CM: 070.54  1/15/2021 - Present        CKD (chronic kidney disease) ICD-10-CM: N18.9  ICD-9-CM: 585.9  6/13/2020 - Present        Chest pain ICD-10-CM: R07.9  ICD-9-CM: 786.50  5/10/2019 - Present        Hypertension ICD-10-CM: I10  ICD-9-CM: 401.9  5/10/2019 - Present        RESOLVED: Acute renal failure (ARF) ([facility] ICD-10-CM: N17.9  ICD-9-CM: 584.9  6/28/2020 - 1/15/2021        RESOLVED: Elevated troponin ICD-10-CM: R77.8  ICD-9-CM: 790.6  6/13/2020 - 1/15/2021        RESOLVED: Cocaine abuse ([facility] ICD-10-CM: F14.10  ICD-9-CM: 305.60  5/10/2019 - 5/24/2019              Time spent on discharge related activities today greater than 30 minutes.       Signed:  Allen Proctor MD                 Hospitalist, Internal Medicine      Cc: Carmelina Weir NP

## 2022-07-01 NOTE — DISCHARGE INSTRUCTIONS
Please bring this form with you to show your primary care provider at your follow-up appointment. Primary care provider:  Dr. Kelli Lu NP    Discharging provider:  Anabela Benedict MD    You have been admitted to the hospital with the following diagnoses:  · HTN (hypertension), malignant [I10]    FOLLOW-UP CARE RECOMMENDATIONS:    APPOINTMENTS:  · Follow-up with primary care provider, Dr. Kelli Lu NP  -  Please call [phone] shortly after discharge to set up an appointment to be seen in 1 week   · Nephrology as needed     FOLLOW-UP TESTS recommended: none    PENDING TEST RESULTS:  At the time of your discharge the following test results are still pending: none  Please make sure you review these results with your outpatient follow-up provider(s). SYMPTOMS to watch for: chest pain, shortness of breath, fever, chills, nausea, vomiting, diarrhea, change in mentation, falling, weakness, bleeding. DIET/what to eat:  Renal Diet    ACTIVITY:  Activity as tolerated    What to do if new or unexpected symptoms occur? If you experience any of the above symptoms (or should other concerns or questions arise after discharge) please call your primary care physician. Return to the emergency room if you cannot get hold of your doctor. · It is very important that you keep your follow-up appointment(s). · Please bring discharge papers, medication list (and/or medication bottles) to your follow-up appointments for review by your outpatient provider(s). · Please check the list of medications and be sure it includes every medication (even non-prescription medications) that your provider wants you to take. · It is important that you take the medication exactly as they are prescribed. · Keep your medication in the bottles provided by the pharmacist and keep a list of the medication names, dosages, and times to be taken in your wallet. · Do not take other medications without consulting your doctor. · If you have any questions about your medications or other instructions, please talk to your nurse or care provider before you leave the hospital.    I understand that if any problems occur once I am at home I am to contact my physician. These instructions were explained to me and I had the opportunity to ask questions.         Learning About High Blood Pressure  What is high blood pressure? Blood pressure is a measure of how hard the blood pushes against the walls of your arteries. It's normal for blood pressure to go up and down throughout the day. But if it stays up, you have high blood pressure. Another name for high blood pressure is hypertension. Two numbers tell you your blood pressure. The first number is the systolic pressure (top number). It shows how hard the blood pushes when your heart is pumping. The second number is the diastolic pressure (bottom number). It shows how hard the blood pushes between heartbeats, when your heart is relaxed and filling with blood. Your doctor will give you a goal for your blood pressure based on your health and your age. High blood pressure (hypertension) means that the top number stays high, or the bottom number stays high, or both. High blood pressure increases the risk of stroke, heart attack, and other problems. What happens when you have high blood pressure? · Blood flows through your arteries with too much force. Over time, this can damage the heart and the walls of your arteries. But you can't feel it. High blood pressure usually doesn't cause symptoms. · High blood pressure makes your heart work harder. And that can lead to heart failure, which means your heart doesn't pump as much blood as your body needs. · Fat and calcium start to build up in your arteries. This buildup is called hardening of the arteries. It can cause many problems including a heart attack and stroke.   · Arteries also carry blood and oxygen to organs like your eyes, kidneys, and brain. If high blood pressure damages those arteries, it can lead to vision loss, kidney disease, stroke, and a higher risk of dementia. How can you prevent high blood pressure? · Stay at a healthy weight. · Try to limit how much sodium you eat to less than 2,300 milligrams (mg) a day. If you limit your sodium to 1,500 mg a day, you can lower your blood pressure even more. ? Buy foods that are labeled \"unsalted,\" \"sodium-free,\" or \"low-sodium. \" Foods labeled \"reduced-sodium\" and \"light sodium\" may still have too much sodium. ? Flavor your food with garlic, lemon juice, onion, vinegar, herbs, and spices instead of salt. Do not use soy sauce, steak sauce, onion salt, garlic salt, mustard, or ketchup on your food. ? Use less salt (or none) when recipes call for it. You can often use half the salt a recipe calls for without losing flavor. · Be physically active. Get at least 30 minutes of exercise on most days of the week. Walking is a good choice. You also may want to do other activities, such as running, swimming, cycling, or playing tennis or team sports. · Limit alcohol to 2 drinks a day for men and 1 drink a day for women. · Eat plenty of fruits, vegetables, and low-fat dairy products. Eat less saturated and total fats. How is high blood pressure treated? · Your doctor will suggest making lifestyle changes to help your heart. For example, your doctor may ask you to eat healthy foods, quit smoking, lose extra weight, and be more active. · If lifestyle changes don't help enough, your doctor may recommend that you take medicine. · When blood pressure is very high, medicines are needed to lower it. Follow-up care is a key part of your treatment and safety. Be sure to make and go to all appointments, and call your doctor if you are having problems. It's also a good idea to know your test results and keep a list of the medicines you take. Where can you learn more?   Go to http://www.gray.com/  Enter P501 in the search box to learn more about \"Learning About High Blood Pressure. \"  Current as of: January 10, 2022               Content Version: 13.2  © 9558-1156 [employer], [employer]. Care instructions adapted under license by [employer] (which disclaims liability or warranty for this information).  If you have questions about a medical condition or this instruction, always ask your healthcare professional. [place] [address] any warranty or liability for your use of this information.

## 2022-07-01 NOTE — PROGRESS NOTES
Cardiology Progress Note  2022    Admit Date: 2022  Admit Diagnosis: HTN (hypertension), malignant [I10]  CC:  Chest pain    Assessment/Recommendations:  1. Chest pain - atypical. Lexiscan low risk without clear evidence of ischemia or infarct. Likely related to severely elevated BP    2. Hypertensive urgency   - better controlled after HD     3. ESRD on HD      4. H/o cocaine abuse    Thank you for this consult. We will sign off. Please contact us for any further questions or concerns. Hospital problem list     Active Problems:    HTN (hypertension), malignant (2022)                                                          Subjective:     Denies chest pain. Dyspnea resolved. NO other complaints. Lexiscan MPI  1. Study slightly compromised by bowel activity obscuring a portion of the inferior wall. There is no definite evidence of ischemia or infarction. 2. LVEF equals 58%. Echo     Left Ventricle: Normal left ventricular systolic function with a visually estimated EF of 65 - 70%. Left ventricle size is normal. Moderately increased wall thickness. Findings consistent with moderate concentric hypertrophy. Normal wall motion. Diastolic dysfunction present with increased LAP with normal LV EF.   Mitral Valve: Mild regurgitation.   Left Atrium: Left atrium is mildly dilated.   Pericardium: Small (<1 cm) pericardial effusion present. No indication of cardiac tamponade. ROS: All other systems reviewed and were negative other than mentioned above. No change in family and social history from H&P/Consult note.     Objective:    Physical Exam:  24 hr VS reviewed, overall VSSAF  Temp (24hrs), Av.1 °F (36.7 °C), Min:97.7 °F (36.5 °C), Max:98.4 °F (36.9 °C)    Patient Vitals for the past 8 hrs:   Pulse   22 1504 75   22 1000 73   22 0911 73   22 0904 73    Patient Vitals for the past 8 hrs:   Resp 07/01/22 1504 20   07/01/22 0904 16    Patient Vitals for the past 8 hrs:   BP   07/01/22 1504 (!) 171/72   07/01/22 1209 (!) 156/105   07/01/22 0911 (!) 156/105   07/01/22 0904 (!) 156/86        No intake or output data in the 24 hours ending 07/01/22 1657    General: resting comfortably in no distress  HEENT: sclera anicteric, moist mucous membranes  Neck: supple, no JVD or HJR  CV: regular rate and rhythm, normal S1 and S2, no murmurs, rubs or gallops  Lungs: no respiratory distress, lungs clear to auscultation without wheezing or rales  Abdomen: soft, non distended, non tender  MSK: no lower extremity edema, R chest tunneled HD line  Skin: warm to touch  Neuro: alert and oriented, answered questions appropriately  Psych: normal mood and affect     Data Review:  Lab results reviewed as noted below. Current medications reviewed as noted below. Telemetry independently reviewed : [x]  sinus    []  chronic afib     []  par afib    []  NSVT    ECG independently reviewed: []  NSR    []  no significant changes   []  no new ECG provided for review    No results for input(s): PH, PCO2, PO2 in the last 72 hours. No results for input(s): CPK, CKMB in the last 72 hours. No lab exists for component: TROPONINI  Recent Labs     07/01/22  0413 06/30/22  0500    133*   K 4.0 4.5    98   CO2 27 27   BUN 32* 54*   CREA 6.66* 10.60*   GLU 83 99   CA 8.7 10.0   ALB  --  3.1*   WBC 7.5 10.2   HGB 9.3* 9.2*   HCT 29.7* 29.9*    212     Recent Labs     06/30/22  0500   AP 61   TBILI 0.2   TP 7.6   ALB 3.1*   GLOB 4.5*     No results for input(s): INR, PTP, APTT, INREXT in the last 72 hours. No results for input(s): FE, TIBC, PSAT, FERR in the last 72 hours.    Lab Results   Component Value Date/Time    Glucose (POC) 97 05/31/2022 12:23 PM    Glucose (POC) 72 05/31/2022 08:34 AM    Glucose (POC) 86 05/30/2022 08:19 PM    Glucose (POC) 78 05/30/2022 07:50 PM    Glucose (POC) 101 (H) 03/29/2022 11:23 AM       Current Facility-Administered Medications   Medication Dose Route Frequency    sodium chloride (NS) flush 5-40 mL  5-40 mL IntraVENous Q8H    sodium chloride (NS) flush 5-40 mL  5-40 mL IntraVENous PRN    acetaminophen (TYLENOL) tablet 650 mg  650 mg Oral Q6H PRN    Or    acetaminophen (TYLENOL) suppository 650 mg  650 mg Rectal Q6H PRN    polyethylene glycol (MIRALAX) packet 17 g  17 g Oral DAILY PRN    ondansetron (ZOFRAN ODT) tablet 4 mg  4 mg Oral Q8H PRN    Or    ondansetron (ZOFRAN) injection 4 mg  4 mg IntraVENous Q6H PRN    heparin (porcine) injection 5,000 Units  5,000 Units SubCUTAneous Q8H    bumetanide (BUMEX) tablet 2 mg  2 mg Oral DAILY    buprenorphine-naloxone (SUBOXONE) 8-2mg SL tablet  1 Tablet SubLINGual BID    carvediloL (COREG) tablet 25 mg  25 mg Oral BID WITH MEALS    cloNIDine HCL (CATAPRES) tablet 0.1 mg  0.1 mg Oral BID    hydrALAZINE (APRESOLINE) tablet 100 mg  100 mg Oral TID    losartan (COZAAR) tablet 100 mg  100 mg Oral DAILY    NIFEdipine ER (PROCARDIA XL) tablet 60 mg  60 mg Oral BID    heparin (porcine) 1,000 unit/mL injection 2,100 Units  2,100 Units [facility] PRN    And    heparin (porcine) 1,000 unit/mL injection 2,000 Units  2,000 Units [facility] DIALYS[facility] PRN    hydrALAZINE (APRESOLINE) 20 mg/mL injection 20 mg  20 mg IntraVENous Q6H PRN    labetaloL (NORMODYNE;TRANDATE) injection 10 mg  10 mg IntraVENous Q6H PRN         Miguelito Mejia, [address] [facility]  07/01/22

## 2022-07-01 NOTE — PROGRESS NOTES
OCCUPATIONAL THERAPY EVALUATION/DISCHARGE  Patient: Makayla Weller (88 y.o. male)  Date: 7/1/2022  Primary Diagnosis: HTN (hypertension), malignant [I10]       Precautions:    (low fall risk per Tinetti)    ASSESSMENT  Based on the objective data described below, the patient presents with near baseline functional performance, demonstrating intact strength, balance, and coordination s/p admission for HTN. Patient ambulates at independent level, retrieving ADL items from around the room and carrying into bathroom for standing grooming and full-body bathing. Patient then performs lower body dressing, standing to don clean underwear. Although patient remains hyoertensive, he reports symptoms at admission (extreme muscle soreness, fatigue) now resolved. At this time, patient with no further acute OT needs. Patient clear for discharge from OT perspective. Current Level of Function (ADLs/self-care): Mod I - I    Functional Outcome Measure: The patient scored 90/100 on the Barthel Index. Other factors to consider for discharge:      PLAN :  Recommend with staff: supervision for safety; bathroom toileting    Recommendation for discharge: (in order for the patient to meet his/her long term goals)  No skilled occupational therapy/ follow up rehabilitation needs identified at this time. This discharge recommendation:  Has been made in collaboration with the attending provider and/or case management    IF patient discharges home will need the following DME: none       SUBJECTIVE:   Patient stated I haven't felt this good in a long time, for real!\"    OBJECTIVE DATA SUMMARY:   HISTORY:   Past Medical History:   Diagnosis Date    Chronic kidney disease     Hypertension     Liver disease    No past surgical history on file.     Prior Level of Function/Environment/Context: independent and active  Expanded or extensive additional review of patient history:   Home Situation  Home Environment: Private residence  # Steps to Enter: 0  One/Two Story Residence: Two story  # of Interior Steps: 12  Interior Rails: Right  Living Alone: No  Support Systems: Other Family Member(s)  Patient Expects to be Discharged to[de-identified] Home  Current DME Used/Available at Home: None    Hand dominance: Right    EXAMINATION OF PERFORMANCE DEFICITS:  Cognitive/Behavioral Status:  Neurologic State: Alert  Orientation Level: Oriented to person;Oriented to place;Oriented to situation;Oriented to time  Cognition: Follows commands; Appropriate decision making; Appropriate safety awareness  Perception: Appears intact  Perseveration: No perseveration noted  Safety/Judgement: Awareness of environment; Fall prevention;Home safety; Insight into deficits    Skin:     Edema:     Hearing:       Vision/Perceptual:    Acuity: Within Defined Limits    Corrective Lenses: Reading glasses    Range of Motion:  AROM: Within functional limits    Strength:  Strength: Within functional limits    Coordination:  Fine Motor Skills-Upper: Left Intact; Right Intact    Gross Motor Skills-Upper: Left Intact; Right Intact    Tone & Sensation:  Sensation: Intact    Balance:  Sitting: Intact; Without support  Standing: Intact; Without support    Functional Mobility and Transfers for ADLs:  Bed Mobility:  Supine to Sit: Independent  Sit to Supine: Independent    Transfers:  Sit to Stand: Independent  Stand to Sit: Independent  Bathroom Mobility: Independent    ADL Assessment:  Feeding: Independent    Oral Facial Hygiene/Grooming: Independent    Bathing: Independent    Upper Body Dressing: Independent    Lower Body Dressing: Modified independent    Toileting: Independent    ADL Intervention and task modifications:  Grooming  Grooming Assistance: Independent  Position Performed: Standing  Washing Face: Independent  Washing Hands: Independent  Brushing Teeth:  Independent    Upper Body Bathing  Bathing Assistance: Independent  Position Performed: Standing    Lower Body Bathing  Bathing Assistance: Modified independent  Lower Body : Modified independent  Position Performed: Standing    Upper Body Dressing Assistance  Dressing Assistance: Independent  Front Opened Shirt: Independent    Lower Body Dressing Assistance  Dressing Assistance: Modified independent  Underpants: Independent  Socks: Modified independent  Position Performed: Seated edge of bed;Standing  Cues: Don;Doff    Cognitive Retraining  Safety/Judgement: Awareness of environment; Fall prevention;Home safety; Insight into deficits    Functional Measure:    Barthel Index:  Bathin  Bladder: 10  Bowels: 10  Groomin  Dressing: 10  Feeding: 10  Mobility: 10  Stairs: 5  Toilet Use: 10  Transfer (Bed to Chair and Back): 15  Total: 90/100      The Barthel ADL Index: Guidelines  1. The index should be used as a record of what a patient does, not as a record of what a patient could do. 2. The main aim is to establish degree of independence from any help, physical or verbal, however minor and for whatever reason. 3. The need for supervision renders the patient not independent. 4. A patient's performance should be established using the best available evidence. Asking the patient, friends/relatives and nurses are the usual sources, but direct observation and common sense are also important. However direct testing is not needed. 5. Usually the patient's performance over the preceding 24-48 hours is important, but occasionally longer periods will be relevant. 6. Middle categories imply that the patient supplies over 50 per cent of the effort. 7. Use of aids to be independent is allowed. Score Interpretation (from [address] [facility] 83)    Independent   60-79 Minimally independent   40-59 Partially dependent   20-39 Very dependent   <20 Totally dependent     -Chuy Watts., Barthel, D.W. (1965). Functional evaluation: the Barthel Index. [address] [facility] ([address] [place] [address] (1997).  The Barthel activities of daily living index: self-reporting versus actual performance in the old (> or = 75 years). Journal of [address] [place] [address] 45(4), [address] [facility], CHEYANNEF, Bartolo Ramachandran, Fransico Guzman. (1999). Measuring the change in disability after inpatient rehabilitation; comparison of the responsiveness of the Barthel Index and Functional [place] Measure. Journal of Neurology, Neurosurgery, and Psychiatry, 66(4), 004-365. FRANDY Petit Caro, KASEY Viera, & Yeimi Martins M.A. (2004) Assessment of post-stroke quality of life in cost-effectiveness studies: The usefulness of the Barthel Index and the EuroQoL-5D. Quality of Life Research, 15, 767-25     Occupational Therapy Evaluation Charge Determination   History Examination Decision-Making   LOW Complexity : Brief history review  LOW Complexity : 1-3 performance deficits relating to physical, cognitive , or psychosocial skils that result in activity limitations and / or participation restrictions  LOW Complexity : No comorbidities that affect functional and no verbal or physical assistance needed to complete eval tasks       Based on the above components, the patient evaluation is determined to be of the following complexity level: LOW   Pain Rating:  No reports. Activity Tolerance:   Good    After treatment patient left in no apparent distress:    Supine in bed and Call bell within reach    COMMUNICATION/EDUCATION:   The patients plan of care was discussed with: Physical therapist and Registered nurse.      Thank you for this referral.  José Miguel Person OT  Time Calculation: 26 mins

## 2022-07-01 NOTE — PROGRESS NOTES
TRANSFER - IN REPORT:    Verbal report received from Praveen (name) on Mark Pink  being received from ED (unit) for routine progression of care      Report consisted of patients Situation, Background, Assessment and   Recommendations(SBAR). Information from the following report(s) SBAR, Kardex, Intake/Output, MAR and Recent Results was reviewed with the receiving nurse. Opportunity for questions and clarification was provided. Assessment completed upon patients arrival to unit and care assumed.

## 2022-07-01 NOTE — ROUTINE PROCESS
Patient discharged in stable condition. Discharge instructions given and understood and opportunity given for questions. Dr. Lam Gallegos instructed the patient to seek advice from his nephrologist regarding an alternative for losartan since he reported an allergic reaction to it prior to admission. No reports of pain, bleeding or sob at discharge.

## 2022-07-01 NOTE — PROGRESS NOTES
PHYSICAL THERAPY EVALUATION/DISCHARGE  Patient: Mai Bland (34 y.o. male)  Date: 7/1/2022  Primary Diagnosis: HTN (hypertension), malignant [I10]       Precautions:    (low fall risk per Tinetti)      ASSESSMENT  Based on the objective data described below, the patient presents with no noted mobility deficits. He amb 30 feet and note rise in diastolic BP with amb of 30 feet, discussed with his nurse, and see chart below. Pt was admitted with a diagnosis of malignant HTN. He is moving well, no noted mobility deficits. Can clear him for discharge to home. Vitals:     07/01/22 0802 07/01/22 0904 07/01/22 0911   BP:  (!) 151/64 (!) 156/86 (!) 156/105   BP 1 Location:  Left lower arm Left upper arm Left upper arm   BP Patient Position:  At rest;Lying Supine Standing  Comment: post amb   Pulse:  72 73 73   Temp:  98.1 °F (36.7 °C)     TempSrc:       Resp:  15 16    Height:       Weight:       SpO2:on room air  95% 96% 93%       Functional Outcome Measure: The patient scored 28/28 on the Tinetti outcome measure which is indicative of low fall risk. .      Other factors to consider for discharge:  CKD, HTN, liver disease       Further skilled acute physical therapy is not indicated at this time. PLAN :  Recommendation for discharge: (in order for the patient to meet his/her long term goals)  No skilled physical therapy/ follow up rehabilitation needs identified at this time. This discharge recommendation:  A follow-up discussion with the attending provider and/or case management is planned    IF patient discharges home will need the following DME: none       SUBJECTIVE:   Patient stated that he was having difficulty yesterday with amb because his body and joints ached and that he was feeling much better today.     OBJECTIVE DATA SUMMARY:   Consult received, chart reviewed, pt cleared by nursing  HISTORY:    Past Medical History:   Diagnosis Date    Chronic kidney disease     Hypertension     Liver disease No past surgical history on file. Prior level of function: independent and when asked reported no falls in the last 12 months. Personal factors and/or comorbidities impacting plan of care: CKD, HTN, liver disease    Home Situation  Home Environment: Private residence  # Steps to Enter: 0  One/Two Story Residence: Two story  # of Interior Steps: 12  Interior Rails: Right  Living Alone: No  Support Systems: Other Family Member(s)  Patient Expects to be Discharged to[de-identified] Home  Current DME Used/Available at Home: None    EXAMINATION/PRESENTATION/DECISION MAKING:   Critical Behavior:  Neurologic State: Alert  Orientation Level: Oriented to person,Oriented to place,Oriented to situation,Oriented to time  Cognition: Follows commands,Appropriate decision making,Appropriate safety awareness  Safety/Judgement: Awareness of environment,Fall prevention,Home safety,Insight into deficits  Hearing:     Skin:  Refer to MD and nursing notes  Edema: none noted  Range Of Motion:  AROM: Within functional limits                       Strength:    Strength: Within functional limits                    Tone & Sensation:                  Sensation: Intact               Coordination:     Vision:   Acuity: Within Defined Limits  Corrective Lenses: Reading glasses  Functional Mobility:  Bed Mobility:     Supine to Sit: Independent  Sit to Supine: Independent     Transfers:  Sit to Stand: Independent  Stand to Sit: Independent                       Balance:   Sitting: Intact; Without support  Standing: Intact; Without support  Ambulation/Gait Training:  Distance (ft): 30 Feet (ft)  Assistive Device: Gait belt  Ambulation - Level of Assistance: Independent                 Base of Support: Narrowed                              Stairs:               Therapeutic Exercises:       Functional Measure:  Tinetti test:    Sitting Balance: 1  Arises: 2  Attempts to Rise: 2  Immediate Standing Balance: 2  Standing Balance: 2  Nudged: 2  Eyes Closed: 1  Turn 360 Degrees - Continuous/Discontinuous: 1  Turn 360 Degrees - Steady/Unsteady: 1  Sitting Down: 2  Balance Score: 16 Balance total score  Indication of Gait: 1  R Step Length/Height: 1  L Step Length/Height: 1  R Foot Clearance: 1  L Foot Clearance: 1  Step Symmetry: 1  Step Continuity: 1  Path: 2  Trunk: 2  Walking Time: 1  Gait Score: 12 Gait total score  Total Score: 28/28 Overall total score         Tinetti Tool Score Risk of Falls  <19 = High Fall Risk  19-24 = Moderate Fall Risk  25-28 = Low Fall Risk  Tinetti ME. Performance-Oriented Assessment of Mobility Problems in Elderly Patients. [facility]; [id number]. (Scoring Description: PT Bulletin Feb. 10, 1993)    Older adults: Nii Breaux et al, 2009; n = 1000 [facility] elderly evaluated with ABC, JOHNATHAN, ADL, and IADL)  · Mean JOHNATHAN score for males aged 69-68 years = 26.21(3.40)  · Mean JOHNATHAN score for females age 69-68 years = 25.16(4.30)  · Mean JOHNATHAN score for males over 80 years = 23.29(6.02)  · Mean JOHNATHAN score for females over 80 years = 17.20(8.32)          Physical Therapy Evaluation Charge Determination   History Examination Presentation Decision-Making   MEDIUM  Complexity : 1-2 comorbidities / personal factors will impact the outcome/ POC  MEDIUM Complexity : 3 Standardized tests and measures addressing body structure, function, activity limitation and / or participation in recreation  MEDIUM Complexity : Evolving with changing characteristics  LOW Complexity : FOTO score of       Based on the above components, the patient evaluation is determined to be of the following complexity level: LOW     Pain Rating:  None rated    Activity Tolerance:   rising BP, see assessment      After treatment patient left in no apparent distress:   seated with OT present initiating her eval    COMMUNICATION/EDUCATION:   The patients plan of care was discussed with: Occupational therapist and Registered nurse.      Fall prevention education was provided and the patient/caregiver indicated understanding.     Thank you for this referral.  Niurka Hidalgo   Time Calculation: 22 mins

## 2022-07-01 NOTE — BH NOTES
07/01/22 1626   Readmission Assessment   Number of days since last admission? 8-30 days   Previous disposition Home with Family   Who is being interviewed? Patient   What was the patient's/caregiver's perception as to why they think they needed to return back to the hospital? [facility] discharge on prior admission; Other (Comment)  (Allergic reaction to medicatoin.)   Did you visit your Primary Care Physician after you left the hospital, before you returned this time? Yes   Who advised the patient to return to the hospital? Self-referral   In our efforts to provide the best possible care to you and others like you, can you think of anything that we could have done to help you after you left the hospital the first time, so that you might not have needed to return so soon?  Education on how to continue taking medications upon discharge

## 2022-07-01 NOTE — PROGRESS NOTES
Transition of Care    RUR: 28%       Readmission Assessment  Number of days since last admission?: 8-30 days  Previous disposition: Home with Family  Who is being interviewed?: Patient  What was the patient's/caregiver's perception as to why they think they needed to return back to the hospital?: [place] discharge on prior admission,Other (Comment) (Allergic reaction to medicatoin.)  Did you visit your Primary Care Physician after you left the hospital, before you returned this time?: Yes  Who advised the patient to return to the hospital?: Self-referral  In our efforts to provide the best possible care to you and others like you, can you think of anything that we could have done to help you after you left the hospital the first time, so that you might not have needed to return so soon?: Education on how to continue taking medications upon discharge     Care Management Interventions  PCP Verified by CM: Yes  Last Visit to PCP: 04/01/22  Palliative Care Criteria Met (RRAT>21 & CHF Dx)?: No  Mode of Transport at Discharge: Self  Transition of Care Consult (CM Consult): Discharge Planning  MyChart Signup: No  Discharge Durable Medical Equipment: No  Physical Therapy Consult: No  Occupational Therapy Consult: No  Speech Therapy Consult: No  Support Systems: Other Family Member(s)  Confirm Follow Up Transport: Self  The Patient and/or Patient Representative was Provided with a Choice of Provider and Agrees with the Discharge Plan?: No  Freedom of Choice List was Provided with Basic Dialogue that Supports the Patient's Individualized Plan of Care/Goals, Treatment Preferences and Shares the Quality Data Associated with the Providers?: No  [place] Resource Information Provided?: No  Discharge Location  Patient Expects to be Discharged to[de-identified] Home    Mr. Jaimee Howard was seen in his room. He reports that he lives with his sister in a [facility] with 2 steps to enter. He does not use any medical equipment.   He was independent with his ADLs and IADls prior to admission. He does not drive. He recieves dialysis at [facility] at 6:00am.  He confirmed his address and phone number. He did confirm that Dr. Debbie Abdullahi was his primary physician. He has transportation set up for his dialysis. Mr. Danny Cordero did ask about why he was denied social security disability. He was educated that he needed to appeal the denial or reapply. He needs to go to the social security office to do this. He stated that he would. Will continue to follow for discharge planning.   Signed By: Juan Miguel Simmons LCSW     July 1, 2022

## 2022-09-24 ENCOUNTER — APPOINTMENT (OUTPATIENT)
Dept: CT IMAGING | Age: 57
DRG: 304 | End: 2022-09-24
Attending: EMERGENCY MEDICINE
Payer: MEDICARE

## 2022-09-24 ENCOUNTER — APPOINTMENT (OUTPATIENT)
Dept: GENERAL RADIOLOGY | Age: 57
DRG: 304 | End: 2022-09-24
Attending: EMERGENCY MEDICINE
Payer: MEDICARE

## 2022-09-24 ENCOUNTER — APPOINTMENT (OUTPATIENT)
Dept: CT IMAGING | Age: 57
DRG: 304 | End: 2022-09-24
Attending: HOSPITALIST
Payer: MEDICARE

## 2022-09-24 ENCOUNTER — HOSPITAL ENCOUNTER (INPATIENT)
Age: 57
LOS: 9 days | Discharge: HOME | DRG: 304 | End: 2022-10-03
Attending: EMERGENCY MEDICINE | Admitting: HOSPITALIST
Payer: MEDICARE

## 2022-09-24 DIAGNOSIS — I10 MALIGNANT HYPERTENSION: Primary | ICD-10-CM

## 2022-09-24 DIAGNOSIS — Z99.2 ESRD (END STAGE RENAL DISEASE) ON DIALYSIS (HCC): ICD-10-CM

## 2022-09-24 DIAGNOSIS — R51.9 NONINTRACTABLE HEADACHE, UNSPECIFIED CHRONICITY PATTERN, UNSPECIFIED HEADACHE TYPE: ICD-10-CM

## 2022-09-24 DIAGNOSIS — N18.6 ESRD (END STAGE RENAL DISEASE) ON DIALYSIS (HCC): ICD-10-CM

## 2022-09-24 DIAGNOSIS — R07.9 CHEST PAIN, UNSPECIFIED TYPE: ICD-10-CM

## 2022-09-24 DIAGNOSIS — R10.84 ABDOMINAL PAIN, GENERALIZED: ICD-10-CM

## 2022-09-24 LAB
ALBUMIN SERPL-MCNC: 3.5 G/DL (ref 3.5–5)
ALBUMIN/GLOB SERPL: 0.8 {RATIO} (ref 1.1–2.2)
ALP SERPL-CCNC: 71 U/L (ref 45–117)
ALT SERPL-CCNC: 15 U/L (ref 12–78)
ANION GAP SERPL CALC-SCNC: 8 MMOL/L (ref 5–15)
AST SERPL-CCNC: 8 U/L (ref 15–37)
ATRIAL RATE: 70 BPM
BASOPHILS # BLD: 0.1 K/UL (ref 0–0.1)
BASOPHILS NFR BLD: 1 % (ref 0–1)
BILIRUB SERPL-MCNC: 0.5 MG/DL (ref 0.2–1)
BUN SERPL-MCNC: 74 MG/DL (ref 6–20)
BUN/CREAT SERPL: 6 (ref 12–20)
CALCIUM SERPL-MCNC: 9.4 MG/DL (ref 8.5–10.1)
CALCULATED P AXIS, ECG09: 75 DEGREES
CALCULATED R AXIS, ECG10: 46 DEGREES
CALCULATED T AXIS, ECG11: 130 DEGREES
CHLORIDE SERPL-SCNC: 102 MMOL/L (ref 97–108)
CK SERPL-CCNC: 160 U/L (ref 39–308)
CO2 SERPL-SCNC: 25 MMOL/L (ref 21–32)
COMMENT, HOLDF: NORMAL
CREAT SERPL-MCNC: 12.5 MG/DL (ref 0.7–1.3)
DIAGNOSIS, 93000: NORMAL
DIFFERENTIAL METHOD BLD: ABNORMAL
EOSINOPHIL # BLD: 0.4 K/UL (ref 0–0.4)
EOSINOPHIL NFR BLD: 6 % (ref 0–7)
ERYTHROCYTE [DISTWIDTH] IN BLOOD BY AUTOMATED COUNT: 12.5 % (ref 11.5–14.5)
GLOBULIN SER CALC-MCNC: 4.3 G/DL (ref 2–4)
GLUCOSE SERPL-MCNC: 96 MG/DL (ref 65–100)
HCT VFR BLD AUTO: 34.6 % (ref 36.6–50.3)
HGB BLD-MCNC: 11.7 G/DL (ref 12.1–17)
IMM GRANULOCYTES # BLD AUTO: 0 K/UL (ref 0–0.04)
IMM GRANULOCYTES NFR BLD AUTO: 0 % (ref 0–0.5)
LIPASE SERPL-CCNC: 619 U/L (ref 73–393)
LYMPHOCYTES # BLD: 2.2 K/UL (ref 0.8–3.5)
LYMPHOCYTES NFR BLD: 34 % (ref 12–49)
MCH RBC QN AUTO: 30 PG (ref 26–34)
MCHC RBC AUTO-ENTMCNC: 33.8 G/DL (ref 30–36.5)
MCV RBC AUTO: 88.7 FL (ref 80–99)
MONOCYTES # BLD: 0.7 K/UL (ref 0–1)
MONOCYTES NFR BLD: 10 % (ref 5–13)
NEUTS SEG # BLD: 3.2 K/UL (ref 1.8–8)
NEUTS SEG NFR BLD: 49 % (ref 32–75)
NRBC # BLD: 0 K/UL (ref 0–0.01)
NRBC BLD-RTO: 0 PER 100 WBC
P-R INTERVAL, ECG05: 162 MS
PLATELET # BLD AUTO: 147 K/UL (ref 150–400)
PMV BLD AUTO: 11.1 FL (ref 8.9–12.9)
POTASSIUM SERPL-SCNC: 4.3 MMOL/L (ref 3.5–5.1)
PROT SERPL-MCNC: 7.8 G/DL (ref 6.4–8.2)
Q-T INTERVAL, ECG07: 454 MS
QRS DURATION, ECG06: 98 MS
QTC CALCULATION (BEZET), ECG08: 490 MS
RBC # BLD AUTO: 3.9 M/UL (ref 4.1–5.7)
SAMPLES BEING HELD,HOLD: NORMAL
SODIUM SERPL-SCNC: 135 MMOL/L (ref 136–145)
TROPONIN-HIGH SENSITIVITY: 62 NG/L (ref 0–76)
TROPONIN-HIGH SENSITIVITY: 77 NG/L (ref 0–76)
VENTRICULAR RATE, ECG03: 70 BPM
WBC # BLD AUTO: 6.6 K/UL (ref 4.1–11.1)

## 2022-09-24 PROCEDURE — 74011000250 HC RX REV CODE- 250: Performed by: HOSPITALIST

## 2022-09-24 PROCEDURE — 85025 COMPLETE CBC W/AUTO DIFF WBC: CPT

## 2022-09-24 PROCEDURE — 71045 X-RAY EXAM CHEST 1 VIEW: CPT

## 2022-09-24 PROCEDURE — 90935 HEMODIALYSIS ONE EVALUATION: CPT

## 2022-09-24 PROCEDURE — 36415 COLL VENOUS BLD VENIPUNCTURE: CPT

## 2022-09-24 PROCEDURE — 96374 THER/PROPH/DIAG INJ IV PUSH: CPT

## 2022-09-24 PROCEDURE — 82550 ASSAY OF CK (CPK): CPT

## 2022-09-24 PROCEDURE — 65660000001 HC RM ICU INTERMED STEPDOWN

## 2022-09-24 PROCEDURE — 74011250637 HC RX REV CODE- 250/637: Performed by: HOSPITALIST

## 2022-09-24 PROCEDURE — 74011000250 HC RX REV CODE- 250: Performed by: EMERGENCY MEDICINE

## 2022-09-24 PROCEDURE — 80053 COMPREHEN METABOLIC PANEL: CPT

## 2022-09-24 PROCEDURE — 70450 CT HEAD/BRAIN W/O DYE: CPT

## 2022-09-24 PROCEDURE — 84484 ASSAY OF TROPONIN QUANT: CPT

## 2022-09-24 PROCEDURE — 74011250636 HC RX REV CODE- 250/636: Performed by: HOSPITALIST

## 2022-09-24 PROCEDURE — 74176 CT ABD & PELVIS W/O CONTRAST: CPT

## 2022-09-24 PROCEDURE — 99285 EMERGENCY DEPT VISIT HI MDM: CPT

## 2022-09-24 PROCEDURE — 83690 ASSAY OF LIPASE: CPT

## 2022-09-24 PROCEDURE — 65270000046 HC RM TELEMETRY

## 2022-09-24 PROCEDURE — 93005 ELECTROCARDIOGRAM TRACING: CPT

## 2022-09-24 RX ORDER — CARVEDILOL 12.5 MG/1
25 TABLET ORAL 2 TIMES DAILY WITH MEALS
Status: DISCONTINUED | OUTPATIENT
Start: 2022-09-24 | End: 2022-10-03 | Stop reason: HOSPADM

## 2022-09-24 RX ORDER — BUMETANIDE 1 MG/1
2 TABLET ORAL DAILY
Status: DISCONTINUED | OUTPATIENT
Start: 2022-09-25 | End: 2022-10-03 | Stop reason: HOSPADM

## 2022-09-24 RX ORDER — ACETAMINOPHEN 325 MG/1
650 TABLET ORAL
Status: DISCONTINUED | OUTPATIENT
Start: 2022-09-24 | End: 2022-10-03 | Stop reason: HOSPADM

## 2022-09-24 RX ORDER — ACETAMINOPHEN 650 MG/1
650 SUPPOSITORY RECTAL
Status: DISCONTINUED | OUTPATIENT
Start: 2022-09-24 | End: 2022-10-03 | Stop reason: HOSPADM

## 2022-09-24 RX ORDER — CLONIDINE HYDROCHLORIDE 0.1 MG/1
0.1 TABLET ORAL 2 TIMES DAILY
Status: DISCONTINUED | OUTPATIENT
Start: 2022-09-24 | End: 2022-09-24

## 2022-09-24 RX ORDER — ONDANSETRON 4 MG/1
4 TABLET, ORALLY DISINTEGRATING ORAL
Status: DISCONTINUED | OUTPATIENT
Start: 2022-09-24 | End: 2022-10-03 | Stop reason: HOSPADM

## 2022-09-24 RX ORDER — SODIUM CHLORIDE 0.9 % (FLUSH) 0.9 %
5-40 SYRINGE (ML) INJECTION EVERY 8 HOURS
Status: DISCONTINUED | OUTPATIENT
Start: 2022-09-24 | End: 2022-10-03 | Stop reason: HOSPADM

## 2022-09-24 RX ORDER — HYDRALAZINE HYDROCHLORIDE 50 MG/1
100 TABLET, FILM COATED ORAL 3 TIMES DAILY
Status: DISCONTINUED | OUTPATIENT
Start: 2022-09-24 | End: 2022-10-03 | Stop reason: HOSPADM

## 2022-09-24 RX ORDER — SODIUM CHLORIDE 0.9 % (FLUSH) 0.9 %
5-40 SYRINGE (ML) INJECTION AS NEEDED
Status: DISCONTINUED | OUTPATIENT
Start: 2022-09-24 | End: 2022-10-03 | Stop reason: HOSPADM

## 2022-09-24 RX ORDER — CALCIUM CARBONATE 200(500)MG
600 TABLET,CHEWABLE ORAL
Status: DISCONTINUED | OUTPATIENT
Start: 2022-09-24 | End: 2022-10-03 | Stop reason: HOSPADM

## 2022-09-24 RX ORDER — LOSARTAN POTASSIUM 100 MG/1
100 TABLET ORAL DAILY
Status: ON HOLD | COMMUNITY
End: 2022-09-26

## 2022-09-24 RX ORDER — CARVEDILOL 12.5 MG/1
25 TABLET ORAL 2 TIMES DAILY WITH MEALS
Status: DISCONTINUED | OUTPATIENT
Start: 2022-09-24 | End: 2022-09-24

## 2022-09-24 RX ORDER — BUPRENORPHINE HYDROCHLORIDE AND NALOXONE HYDROCHLORIDE DIHYDRATE 8; 2 MG/1; MG/1
1 TABLET SUBLINGUAL 2 TIMES DAILY
Status: DISCONTINUED | OUTPATIENT
Start: 2022-09-25 | End: 2022-10-03 | Stop reason: HOSPADM

## 2022-09-24 RX ORDER — HEPARIN SODIUM 5000 [USP'U]/ML
5000 INJECTION, SOLUTION INTRAVENOUS; SUBCUTANEOUS EVERY 8 HOURS
Status: DISCONTINUED | OUTPATIENT
Start: 2022-09-24 | End: 2022-10-03 | Stop reason: HOSPADM

## 2022-09-24 RX ORDER — LABETALOL HYDROCHLORIDE 5 MG/ML
20 INJECTION, SOLUTION INTRAVENOUS ONCE
Status: COMPLETED | OUTPATIENT
Start: 2022-09-24 | End: 2022-09-24

## 2022-09-24 RX ORDER — SPIRONOLACTONE 100 MG/1
100 TABLET, FILM COATED ORAL DAILY
COMMUNITY
End: 2022-10-03

## 2022-09-24 RX ORDER — PANTOPRAZOLE SODIUM 40 MG/1
40 TABLET, DELAYED RELEASE ORAL
Status: DISCONTINUED | OUTPATIENT
Start: 2022-09-25 | End: 2022-10-03 | Stop reason: HOSPADM

## 2022-09-24 RX ORDER — POLYETHYLENE GLYCOL 3350 17 G/17G
17 POWDER, FOR SOLUTION ORAL DAILY PRN
Status: DISCONTINUED | OUTPATIENT
Start: 2022-09-24 | End: 2022-10-03 | Stop reason: HOSPADM

## 2022-09-24 RX ORDER — CLONIDINE HYDROCHLORIDE 0.1 MG/1
0.1 TABLET ORAL 2 TIMES DAILY
Status: DISCONTINUED | OUTPATIENT
Start: 2022-09-25 | End: 2022-09-26

## 2022-09-24 RX ORDER — SEVELAMER CARBONATE 800 MG/1
800 TABLET, FILM COATED ORAL 3 TIMES DAILY
COMMUNITY

## 2022-09-24 RX ORDER — DOXAZOSIN 4 MG/1
4 TABLET ORAL DAILY
COMMUNITY
End: 2022-10-03

## 2022-09-24 RX ORDER — ONDANSETRON 2 MG/ML
4 INJECTION INTRAMUSCULAR; INTRAVENOUS
Status: DISCONTINUED | OUTPATIENT
Start: 2022-09-24 | End: 2022-10-03 | Stop reason: HOSPADM

## 2022-09-24 RX ORDER — CHLORTHALIDONE 25 MG/1
25 TABLET ORAL DAILY
COMMUNITY
End: 2022-10-03

## 2022-09-24 RX ADMIN — HEPARIN SODIUM 5000 UNITS: 5000 INJECTION INTRAVENOUS; SUBCUTANEOUS at 23:12

## 2022-09-24 RX ADMIN — SODIUM CHLORIDE 5 MG/HR: 9 INJECTION, SOLUTION INTRAVENOUS at 15:23

## 2022-09-24 RX ADMIN — SODIUM CHLORIDE 7.5 MG/HR: 9 INJECTION, SOLUTION INTRAVENOUS at 23:12

## 2022-09-24 RX ADMIN — SODIUM CHLORIDE, PRESERVATIVE FREE 10 ML: 5 INJECTION INTRAVENOUS at 23:13

## 2022-09-24 RX ADMIN — CARVEDILOL 25 MG: 12.5 TABLET, FILM COATED ORAL at 23:13

## 2022-09-24 RX ADMIN — HYDRALAZINE HYDROCHLORIDE 100 MG: 50 TABLET, FILM COATED ORAL at 15:23

## 2022-09-24 RX ADMIN — HYDRALAZINE HYDROCHLORIDE 100 MG: 50 TABLET, FILM COATED ORAL at 23:13

## 2022-09-24 RX ADMIN — LABETALOL HYDROCHLORIDE 20 MG: 5 INJECTION INTRAVENOUS at 12:38

## 2022-09-24 NOTE — ED PROVIDER NOTES
HPI       57y M with ESRD (on HD /) here with diffuse body pain, HA, elevated BP. Says he started to feel bad 2 days ago. His BP dropped to the 59'Z systolic at dialysis. Did not wake up in time to get to dialysis today. Says he felt poorly yesterday and didn't sleep well. Has a diffuse HA. Has chest pain and abdominal pain. No focal weakness or numbness. No chest pain. No trouble breathing. No speech or gait problems. No fever. Gets HD at [facility] on [address] [place] [address]. Past Medical History:   Diagnosis Date    Chronic kidney disease     Hypertension     Liver disease        No past surgical history on file. No family history on file. Social History     Socioeconomic History    Marital status: SINGLE     Spouse name: Not on file    Number of children: Not on file    Years of education: Not on file    Highest education level: Not on file   Occupational History    Not on file   Tobacco Use    Smoking status: Some Days     Packs/day: 1.00     Types: Cigarettes     Last attempt to quit: 2020     Years since quittin.1    Smokeless tobacco: Never    Tobacco comments:     started back smoking in last couple months   Vaping Use    Vaping Use: Never used   Substance and Sexual Activity    Alcohol use: Not Currently    Drug use: Yes     Types: Marijuana, Cocaine, Heroin     Comment: Herion & Cocaine 10 years ago as of 3/23/22, Marijuana last used 1 week ago    Sexual activity: Not Currently   Other Topics Concern    Not on file   Social History Narrative    Not on file     Social Determinants of Health     Financial Resource Strain: Not on file   Food Insecurity: Not on file   Transportation Needs: Not on file   Physical Activity: Not on file   Stress: Not on file   Social Connections: Not on file   Intimate Partner Violence: Not on file   Housing Stability: Not on file         ALLERGIES: Baclofen and Losartan    Review of Systems  Review of Systems   Constitutional: (-) weight loss.    HEENT: (-) stiff neck Eyes: (-) discharge. Respiratory: (-) cough. Cardiovascular: (-) syncope. Gastrointestinal: (-) blood in stool. Genitourinary: (-) hematuria. Musculoskeletal: (-) myalgias. Neurological: (-) seizure. Skin: (-) petechiae  Lymph/Immunologic: (-) enlarged lymph nodes  All other systems reviewed and are negative. There were no vitals filed for this visit. Physical Exam Nursing note and vitals reviewed. Constitutional: oriented to person, place, and time. appears well-developed and well-nourished. No distress. Head: Normocephalic and atraumatic. Sclera anicteric  Nose: No rhinorrhea  Mouth/Throat: Oropharynx is clear and moist. Pharynx normal  Eyes: Conjunctivae are normal. Pupils are equal, round, and reactive to light. Right eye exhibits no discharge. Left eye exhibits no discharge. Neck: Painless normal range of motion. Neck supple. No LAD. Cardiovascular: Normal rate, regular rhythm, normal heart sounds and intact distal pulses. Exam reveals no gallop and no friction rub. No murmur heard. Pulmonary/Chest:  No respiratory distress. No wheezes. No rales. No rhonchi. No increased work of breathing. No accessory muscle use. Good air exchange throughout. Abdominal: soft, non-tender, no rebound or guarding. No hepatosplenomegaly. Normal bowel sounds throughout. Back: no tenderness to palpation, no deformities, no CVA tenderness  Extremities/Musculoskeletal: Normal range of motion. no tenderness. No edema. Distal extremities are neurovasc intact. Lymphadenopathy:   No adenopathy. Neurological:  Alert and oriented to person, place, and time. Coordination normal. CN 2-12 intact. Motor and sensory function intact. Skin: Skin is warm and dry. No rash noted. No pallor. MDM  57y M here with diffuse body pain/myalgia, HA, and elevated BP. Missed HD today. Plan for labs, ECG, CT head, BP control. Procedures    ED EKG interpretation:  Rhythm: normal sinus rhythm; and regular . Rate (approx.): 70; Axis: normal; P wave: normal; QRS interval: normal ; ST/T wave: normal;  This EKG was interpreted by Emeka Koroma MD,ED Provider. Perfect Serve Consult for Admission  1:15 PM    ED Room Number: EL47/71  Patient Name and age:  Magdalena Kunz 64 y.o.  male  Working Diagnosis:   1. Malignant hypertension    2. ESRD (end stage renal disease) on dialysis (HCC)    3. Chest pain, unspecified type    4. Abdominal pain, generalized    5. Nonintractable headache, unspecified chronicity pattern, unspecified headache type        COVID-19 Suspicion:  no  Sepsis present:  no  Reassessment needed: no  Code Status:  Full Code  Readmission: no  Isolation Requirements:  no  Recommended Level of Care:  telemetry  Department:[facility] Adult ED - 21   Other:  57y M here with elevated BP, chest pain, body aches, HA. Started in the past 2 days. Admission for similar in the past. Is on HD T/T/S. Went on [facility] but did not make it today. Called EMS and BP the 240/120s. Getting labtalol here and now 200/100s. CT head ok. ECG ok. K+ ok. Gets HD at [facility] on [address].

## 2022-09-24 NOTE — PROGRESS NOTES
Nephrology Progress Note  Rickie Duong  Date of Admission : 9/24/2022    CC:  Follow up for ESRD       Assessment and Plan     ESRD-HD :  - dialyzes TTS at [facility]  - HD shortly  - UF 3kg     Hypertensive Urgency:  - HD for today  - resume home BP meds     Anemia in CKD:  - hgb stable, no need for ABI     Sec HPTH   - continue home binders          Interval History:  65 yo AAM  on HD TTS, missed HD today c/o not feeling well w/ headache. Now with hypertensive emergency. K stable, volume status relatively stable. SBP 220s now. No cp, sob, n/v/d reported. HA improving some. Current Medications: all current  Medications have been eviewed in EPIC  Review of Systems: Pertinent items are noted in HPI. Objective:  Vitals:    Vitals:    09/24/22 1156 09/24/22 1200 09/24/22 1237   BP: (!) 228/116  (!) 226/115   Pulse: 72  73   Resp: 17  16   SpO2: 100%  99%   Weight:  72.6 kg (160 lb)    Height:  5' 9\" (1.753 m)      Intake and Output:  No intake/output data recorded. No intake/output data recorded. Physical Examination:    General: NAD,Conversant   Neck:  Supple, no mass  Resp:  Lungs CTA B/L, no wheezing , normal respiratory effort  CV:  RRR,  no murmur or rub,trace LE edema  GI:  Soft, NT, + Bowel sounds, no hepatosplenomegaly  Neurologic:  Non focal  Psych:             AAO x 3 appropriate affect   Skin:  No Rash  Access:           [facility]    []    High complexity decision making was performed  []    Patient is at high-risk of decompensation with multiple organ involvement    Lab Data Personally Reviewed: I have reviewed all the pertinent labs, microbiology data and radiology studies during assessment.     Recent Labs     09/24/22  1200   *   K 4.3      CO2 25   GLU 96   BUN 74*   CREA 12.50*   CA 9.4   ALB 3.5   ALT 15     Recent Labs     09/24/22  1200   WBC 6.6   HGB 11.7*   HCT 34.6*   *     No results found for: SDES  Lab Results   Component Value Date/Time Culture result: NO GROWTH 5 DAYS 05/18/2022 06:06 AM    Culture result: No growth (<1,000 CFU/ML) 03/22/2022 04:11 PM     Recent Results (from the past 24 hour(s))   CBC WITH AUTOMATED DIFF    Collection Time: 09/24/22 12:00 PM   Result Value Ref Range    WBC 6.6 4.1 - 11.1 K/uL    RBC 3.90 (L) 4.10 - 5.70 M/uL    HGB 11.7 (L) 12.1 - 17.0 g/dL    HCT 34.6 (L) 36.6 - 50.3 %    MCV 88.7 80.0 - 99.0 FL    MCH 30.0 26.0 - 34.0 PG    MCHC 33.8 30.0 - 36.5 g/dL    RDW 12.5 11.5 - 14.5 %    PLATELET 690 (L) 641 - 400 K/uL    MPV 11.1 8.9 - 12.9 FL    NRBC 0.0 0  WBC    ABSOLUTE NRBC 0.00 0.00 - 0.01 K/uL    NEUTROPHILS 49 32 - 75 %    LYMPHOCYTES 34 12 - 49 %    MONOCYTES 10 5 - 13 %    EOSINOPHILS 6 0 - 7 %    BASOPHILS 1 0 - 1 %    IMMATURE GRANULOCYTES 0 0.0 - 0.5 %    ABS. NEUTROPHILS 3.2 1.8 - 8.0 K/UL    ABS. LYMPHOCYTES 2.2 0.8 - 3.5 K/UL    ABS. MONOCYTES 0.7 0.0 - 1.0 K/UL    ABS. EOSINOPHILS 0.4 0.0 - 0.4 K/UL    ABS. BASOPHILS 0.1 0.0 - 0.1 K/UL    ABS. IMM. GRANS. 0.0 0.00 - 0.04 K/UL    DF AUTOMATED     METABOLIC PANEL, COMPREHENSIVE    Collection Time: 09/24/22 12:00 PM   Result Value Ref Range    Sodium 135 (L) 136 - 145 mmol/L    Potassium 4.3 3.5 - 5.1 mmol/L    Chloride 102 97 - 108 mmol/L    CO2 25 21 - 32 mmol/L    Anion gap 8 5 - 15 mmol/L    Glucose 96 65 - 100 mg/dL    BUN 74 (H) 6 - 20 MG/DL    Creatinine 12.50 (H) 0.70 - 1.30 MG/DL    BUN/Creatinine ratio 6 (L) 12 - 20      GFR est AA 5 (L) >60 ml/min/1.73m2    GFR est non-AA 4 (L) >60 ml/min/1.73m2    Calcium 9.4 8.5 - 10.1 MG/DL    Bilirubin, total 0.5 0.2 - 1.0 MG/DL    ALT (SGPT) 15 12 - 78 U/L    AST (SGOT) 8 (L) 15 - 37 U/L    Alk.  phosphatase 71 45 - 117 U/L    Protein, total 7.8 6.4 - 8.2 g/dL    Albumin 3.5 3.5 - 5.0 g/dL    Globulin 4.3 (H) 2.0 - 4.0 g/dL    A-G Ratio 0.8 (L) 1.1 - 2.2     CK    Collection Time: 09/24/22 12:00 PM   Result Value Ref Range     39 - 308 U/L   TROPONIN-HIGH SENSITIVITY    Collection Time: 09/24/22 12:00 PM   Result Value Ref Range    Troponin-High Sensitivity 77 (H) 0 - 76 ng/L   LIPASE    Collection Time: 09/24/22 12:00 PM   Result Value Ref Range    Lipase 619 (H) 73 - 393 U/L   SAMPLES BEING HELD    Collection Time: 09/24/22 12:00 PM   Result Value Ref Range    SAMPLES BEING HELD 1RED 1BLU     COMMENT        Add-on orders for these samples will be processed based on acceptable specimen integrity and analyte stability, which may vary by analyte. EKG, 12 LEAD, INITIAL    Collection Time: 09/24/22 12:06 PM   Result Value Ref Range    Ventricular Rate 70 BPM    Atrial Rate 70 BPM    P-R Interval 162 ms    QRS Duration 98 ms    Q-T Interval 454 ms    QTC Calculation (Bezet) 490 ms    Calculated P Axis 75 degrees    Calculated R Axis 46 degrees    Calculated T Axis 130 degrees    Diagnosis       Normal sinus rhythm  Minimal voltage criteria for LVH, may be normal variant ( Sokolow-Cardoso )  ST & T wave abnormality, consider lateral ischemia  Prolonged QT  Abnormal ECG  When compared with ECG of 30-JUN-2022 04:54,  T wave inversion more evident in Lateral leads                 ELVA Feliz [address]   [address] [address]  Phone - [phone]   Fax - [phone]  www. [email or web address]

## 2022-09-24 NOTE — H&P
[facility] Adult  Hospitalist Group  History and Physical    Date of Service:  9/24/2022  Primary Care Provider: Dae Robison NP  Source of information: The patient    Chief Complaint: headache       History of Presenting Illness:   Rachana Jaimes is a 64 y.o. male who presents with headache, missed dialysis      57y M with ESRD (on HD Tu/Th/Sa) here with diffuse body pain, HA, elevated BP. Says he started to feel bad 2 days ago. His BP dropped to the 20'P systolic at dialysis. Did not wake up in time to get to dialysis today. Says he felt poorly yesterday and didn't sleep well. Has a diffuse HA. Has chest pain at right side and abdominal pain. No focal weakness or numbness. o trouble breathing. No speech or gait problems. No fever. Gets HD at [facility] on [address] [place] [address]   Denied fever, denied vision changes, denied sob. Currently pt denied any abdominal pain to me, no nausea, no vomiting, no diarrhea. He did not take his home meds today. REVIEW OF SYSTEMS:  General: HPI,  HEENT: no headache, no vision changes, no nose discharge, no hearing changes   RES: no wheezing, no cough, no sob  CVS: HPI, no palpitation. Muscular: no joint swelling, HPI. Skin: no rash, no itching   GI: no vomiting, no diarrhea  : no dysuria, no hematuria  Hemo: no gum bleeding, no petechial   Neuro: no sensation changes, no focal weakness   Endo: no polydipsia   Psych: denied depression      Past Medical History:   Diagnosis Date    Chronic kidney disease     Hypertension     Liver disease       No past surgical history on file. Prior to Admission medications    Medication Sig Start Date End Date Taking? Authorizing Provider   cloNIDine HCL (CATAPRES) 0.1 mg tablet Take 1 Tablet by mouth two (2) times a day. 5/18/22   Desiree Barajas MD   carvediloL (Coreg) 25 mg tablet Take 25 mg by mouth two (2) times daily (with meals).     Provider, Historical   NIFEdipine ER (ADALAT CC) 60 mg ER tablet Take 60 mg by mouth two (2) times a day. Provider, Historical   bumetanide (BUMEX) 2 mg tablet Take 2 mg by mouth daily. Provider, Historical   calcium carbonate (TUMS) 200 mg calcium (500 mg) chew Take 3 Tablets by mouth three (3) times daily (after meals). Provider, Historical   fluticasone propionate (FLONASE) 50 mcg/actuation nasal spray 2 Sprays by Both Nostrils route daily as needed. Other, MD Brian   pantoprazole (Protonix) 40 mg tablet Take 1 Tablet by mouth two (2) times a day. Indications: an ulcer of the duodenum  Patient not taking: Reported on 5/17/2022 3/25/22   Patito Lewis MD   hydrALAZINE (APRESOLINE) 100 mg tablet Take 100 mg by mouth three (3) times daily. Provider, Historical   ascorbic acid, vitamin C, (Vitamin C) 500 mg tablet Take 500 mg by mouth daily. Provider, Historical   cholecalciferol (VITAMIN D3) 25 mcg (1,000 unit) cap Take 1,000 Units by mouth daily. Provider, Historical   buprenorphine-naloxone (Suboxone) 8-2 mg film sublingaul film 1 Film by SubLINGual route two (2) times a day. Provider, Historical     Allergies   Allergen Reactions    Baclofen Rash     All over    Losartan Hives, Shortness of Breath and Rash      No family history on file. Social History:  reports that he has been smoking. He has been smoking an average of 1 pack per day. He has never used smokeless tobacco. He reports that he does not currently use alcohol. He reports current drug use. Drugs: Marijuana, Cocaine, and Heroin. Family and social history were personally reviewed, all pertinent and relevant details are outlined as above. Objective:   Visit Vitals  BP (!) 226/115 Comment: pre-labetalol   Pulse 73   Resp 16   Ht 5' 9\" (1.753 m)   Wt 72.6 kg (160 lb)   SpO2 99%   BMI 23.63 kg/m²           PHYSICAL EXAM:   General: Alert x oriented x 3, awake  HEENT: PEERL, EOMI, moist mucus membranes, left eyelid slightly small which is chronic per pt.  Bilat temporal tenderness   Neck: Supple, no JVD, no meningeal signs  Chest: Clear to auscultation bilaterally , right upper chest wall point tenderness on palpation. CVS: RRR, S1 S2 heard, no murmurs/rubs/gallops  Abd: Soft, non-tender, non-distended, +bowel sounds   Ext: No clubbing, no cyanosis, no edema  Neuro/Psych: Pleasant mood and affect, CN 2-12 grossly intact, sensory grossly within normal limit, Strength 5/5 in all extremities, DTR 1+ x 4  Cap refill: Brisk, less than 3 seconds  Pulses: 2+, symmetric in all extremities  Skin: Warm, dry, without rashes or lesions    Data Review: All diagnostic labs and studies have been reviewed. Abnormal Labs Reviewed   CBC WITH AUTOMATED DIFF - Abnormal; Notable for the following components:       Result Value    RBC 3.90 (*)     HGB 11.7 (*)     HCT 34.6 (*)     PLATELET 505 (*)     All other components within normal limits   METABOLIC PANEL, COMPREHENSIVE - Abnormal; Notable for the following components:    Sodium 135 (*)     BUN 74 (*)     Creatinine 12.50 (*)     BUN/Creatinine ratio 6 (*)     GFR est AA 5 (*)     GFR est non-AA 4 (*)     AST (SGOT) 8 (*)     Globulin 4.3 (*)     A-G Ratio 0.8 (*)     All other components within normal limits   TROPONIN-HIGH SENSITIVITY - Abnormal; Notable for the following components:    Troponin-High Sensitivity 77 (*)     All other components within normal limits   LIPASE - Abnormal; Notable for the following components:    Lipase 619 (*)     All other components within normal limits       All Micro Results       None            IMAGING:   XR CHEST PORT   Final Result   Normal chest.      CT HEAD WO CONT   Final Result   No acute intracranial abnormality.             CT ABD PELV WO CONT    (Results Pending)        ECG/ECHO:    Results for orders placed or performed during the hospital encounter of 09/24/22   EKG, 12 LEAD, INITIAL   Result Value Ref Range    Ventricular Rate 70 BPM    Atrial Rate 70 BPM    P-R Interval 162 ms    QRS Duration 98 ms    Q-T Interval 454 ms QTC Calculation (Bezet) 490 ms    Calculated P Axis 75 degrees    Calculated R Axis 46 degrees    Calculated T Axis 130 degrees    Diagnosis       Normal sinus rhythm  Minimal voltage criteria for LVH, may be normal variant ( Sokolow-Cardoso )  ST & T wave abnormality, consider lateral ischemia  Prolonged QT  Abnormal ECG  When compared with ECG of 30-JUN-2022 04:54,  T wave inversion more evident in Lateral leads          Assessment:   Given the patient's current clinical presentation, there is a high level of concern for decompensation if discharged from the emergency department. Complex decision making was performed, which includes reviewing the patient's available past medical records, laboratory results, and imaging studies. Active Problems:    Malignant hypertension (5/17/2022)      Plan:     Malignant hypertension: start cardene drip titrate to goal of SBParound 160. Restart home bp meds including hydralazine, coreg and clonidine. Hold his home nifedipine: which will restart when he is wean off cardene. ESRD: missed dialysis, stat renal consult, d/w nephrologist will have HD today, which will help his blood pressure. Headache: head ct negative, could be none specific, due to uncontrolled blood pressure, prn tylenol. Will follow and reassess. Check ESR. Chest pain: none cardiac, likely muscular pain at right upper chest wall. Elevated lipase: currently denied abd pain, did complaining abd pain to ER. Pt's history not very consistent. Known to have gall stone. Will check ABD ct. DIET: ADULT DIET Regular; Low Sodium (2 gm)   ISOLATION PRECAUTIONS: There are currently no Active Isolations  CODE STATUS: Full Code   DVT PROPHYLAXIS: Heparin  FUNCTIONAL STATUS PRIOR TO HOSPITALIZATION: Fully active and ambulatory; able to carry on all self-care without restriction.   Ambulatory status/function: By self   EARLY MOBILITY ASSESSMENT: Recommend routine ambulation while hospitalized with the assistance of nursing staff  ANTICIPATED DISCHARGE: Greater than 48 hours. ANTICIPATED DISPOSITION: Home  EMERGENCY CONTACT/SURROGATE DECISION MAKER: PT MAKES HIS OWN DECISION     CRITICAL CARE WAS PERFORMED FOR THIS ENCOUNTER: YES. I had a face to face encounter with the patient, reviewed and interpreted patient data including clinical events, labs, images, vital signs, I/O's, and examined patient. I have discussed the case and the plan and management of the patient's care with the consulting services, the bedside nurses and necessary ancillary providers. This patient has a high probability of imminent, clinically significant deterioration, which requires the highest level of preparedness to intervene urgently. I participated in the decision-making and personally managed or directed the management of the following life and organ supporting interventions that required my frequent assessment to treat or prevent imminent deterioration. I personally spent 45  minutes of critical care time. This is time spent at this critically ill patient's bedside actively involved in patient care as well as the coordination of care and discussions with the patient's family. This does not include any procedural time which has been billed separately. Signed By: Maria Eugenia Tong MD     September 24, 2022         Please note that this dictation may have been completed with Dragon, the Pixalate voice recognition software. Quite often unanticipated grammatical, syntax, homophones, and other interpretive errors are inadvertently transcribed by the computer software. Please disregard these errors. Please excuse any errors that have escaped final proofreading.

## 2022-09-24 NOTE — ED NOTES
Bedside shift change report given to [employer] (oncoming nurse) by Miguel Hsu (offgoing nurse). Report included the following information SBAR, ED Summary, Intake/Output, MAR, Recent Results, and Med Rec Status.

## 2022-09-24 NOTE — ED TRIAGE NOTES
Pt arrived from home via EMS with CC missed HD this morning and diffuse pain/ body aches for the past 2 weeks. Pt receives HD Tuesday, Thursday & Saturday. EMS reported BP 240s/120s. Pt denies N/V/D, Fever/ chills.

## 2022-09-24 NOTE — DIALYSIS
Hemodialysis / [phone]    Vitals Pre Post Assessment Pre Post   BP BP: (!) 186/84 (09/24/22 1600)   174/94 LOC AOX3 AOX3   HR Pulse (Heart Rate): 87 (09/24/22 1600) 74 Lungs CTA CTA   Resp Resp Rate: 13 (09/24/22 1600) 16 Cardiac REGULAR REGULAR   Temp Temp: 98 °F (36.7 °C) (09/24/22 1600) 98 Skin WARM DRY INTACT WARM DRY INTACT   Weight    Edema noen none   Tele status   Pain Pain Intensity 1: 10 (09/24/22 1156)      Orders   Duration: Start: Hemodialysis Start Time: 1600 (09/24/22 1600) End: 1945 Total: 3.5     Dialyzer: Dialyzer/Set Up Inspection: Deric Earthly (09/24/22 1600)   K Bath: Dialysate K (mEq/L): 2 (09/24/22 1600)   Ca Bath: Dialysate CA (mEq/L): 2.5 (09/24/22 1600)   Na: Dialysate NA (mEq/L): 138 (09/24/22 1600)   Bicarb: Dialysate HCO3 (mEq/L): 35 (09/24/22 1600)   Target Fluid Removal: Goal/Amount of Fluid to Remove (mL): 3000 mL (09/24/22 1600)     Access   Type & Location: Lij tunneled hd cvc-clinic dressing cdi, changed per p/p, +blood return flushes with ease. Each catheter limb disinfected per p&p, caps removed, hubs disinfected per p&p. Each dialysis catheter limb disinfected per p&p, blood returned per p&p, each dialysis hub disinfected per p&p, post dialysis catheter dwell instilled per order, and caps applied. Comments:       excellent flows                                 Labs   HBsAg (Antigen) / date: Negative 9/15/22 clinic                                     HBsAb (Antibody) / date: Hep B surface Ab Interp. Date Value Ref Range Status   03/13/2022 NONREACTIVE NR   Final     Comment:     (NOTE)  The ADVIA Centaur Anti-HBs2 assay is traceable to the [address] [facility]) Hepatitis B Immunoglobulin 1st International   Reference Preparation (2158). Samples with a calculated value of 10   mIU/mL or greater are considered reactive (protective) in accordance   with the CDC guidelines.  The accepted criteria for immunity to HBV is   anti-HBs activity greater than or equal to 10 mIU/mL, as defined by   the [facility] International Reference Preparation. Assay performance has not been established in pregnant women,   patients who are immunosuppressed or immunocompromised, nor have   performance characteristics been established in conjunction with   other 's assays for specific HBV serologic markers. This   assay does not differentiate between vaccine induced immune response   and a response due to infection with HBV. Passively acquired anti-HBs   may be identified following patient transfusion, receipt of   immunoglobulin products, etc.        Source:    Obtained/Reviewed  Critical Results Called HGB   Date Value Ref Range Status   09/24/2022 11.7 (L) 12.1 - 17.0 g/dL Final     Potassium   Date Value Ref Range Status   09/24/2022 4.3 3.5 - 5.1 mmol/L Final     Calcium   Date Value Ref Range Status   09/24/2022 9.4 8.5 - 10.1 MG/DL Final     BUN   Date Value Ref Range Status   09/24/2022 74 (H) 6 - 20 MG/DL Final     Creatinine   Date Value Ref Range Status   09/24/2022 12.50 (H) 0.70 - 1.30 MG/DL Final        Meds Given   Name Dose Route   none                 Adequacy / Fluid    Total Liters Process: 75L   Net Fluid Removed: 3L      Comments   Time Out Done:   (Time) 0505   Admitting Diagnosis: esrd   Consent obtained/signed: Informed Consent Verified: Yes (09/24/22 1600)   Machine / RO # Machine Number: [id number] (09/24/22 1600)   Primary Nurse Rpt Pre: Sanjuanita Snell   Primary Nurse Rpt Post: Sotero duke    Pt Education: procedural   Care Plan: hd   Pts outpatient clinic: [facility]   Comments: Tx progressed without incident. Patient tolerated well. All blood rinsed back to patient. writer left pts room with pt in no new acute distress, denying complaints, with stable vss, bed in lowest position with side rails upx3, wheels lockedx4, and call bell within reach.

## 2022-09-25 LAB
ALBUMIN SERPL-MCNC: 3.4 G/DL (ref 3.5–5)
ALBUMIN/GLOB SERPL: 0.8 {RATIO} (ref 1.1–2.2)
ALP SERPL-CCNC: 70 U/L (ref 45–117)
ALT SERPL-CCNC: 15 U/L (ref 12–78)
ANION GAP SERPL CALC-SCNC: 6 MMOL/L (ref 5–15)
AST SERPL-CCNC: 12 U/L (ref 15–37)
BASOPHILS # BLD: 0.1 K/UL (ref 0–0.1)
BASOPHILS NFR BLD: 1 % (ref 0–1)
BILIRUB SERPL-MCNC: 0.4 MG/DL (ref 0.2–1)
BUN SERPL-MCNC: 31 MG/DL (ref 6–20)
BUN/CREAT SERPL: 4 (ref 12–20)
CALCIUM SERPL-MCNC: 8.3 MG/DL (ref 8.5–10.1)
CHLORIDE SERPL-SCNC: 102 MMOL/L (ref 97–108)
CO2 SERPL-SCNC: 27 MMOL/L (ref 21–32)
COVID-19 RAPID TEST, COVR: NOT DETECTED
CREAT SERPL-MCNC: 7.52 MG/DL (ref 0.7–1.3)
DIFFERENTIAL METHOD BLD: ABNORMAL
EOSINOPHIL # BLD: 0.4 K/UL (ref 0–0.4)
EOSINOPHIL NFR BLD: 5 % (ref 0–7)
ERYTHROCYTE [DISTWIDTH] IN BLOOD BY AUTOMATED COUNT: 12.6 % (ref 11.5–14.5)
GLOBULIN SER CALC-MCNC: 4.2 G/DL (ref 2–4)
GLUCOSE SERPL-MCNC: 134 MG/DL (ref 65–100)
HCT VFR BLD AUTO: 33.6 % (ref 36.6–50.3)
HGB BLD-MCNC: 11.2 G/DL (ref 12.1–17)
IMM GRANULOCYTES # BLD AUTO: 0 K/UL (ref 0–0.04)
IMM GRANULOCYTES NFR BLD AUTO: 0 % (ref 0–0.5)
LYMPHOCYTES # BLD: 2.2 K/UL (ref 0.8–3.5)
LYMPHOCYTES NFR BLD: 27 % (ref 12–49)
MAGNESIUM SERPL-MCNC: 1.9 MG/DL (ref 1.6–2.4)
MCH RBC QN AUTO: 28.7 PG (ref 26–34)
MCHC RBC AUTO-ENTMCNC: 33.3 G/DL (ref 30–36.5)
MCV RBC AUTO: 86.2 FL (ref 80–99)
MONOCYTES # BLD: 0.8 K/UL (ref 0–1)
MONOCYTES NFR BLD: 10 % (ref 5–13)
NEUTS SEG # BLD: 4.6 K/UL (ref 1.8–8)
NEUTS SEG NFR BLD: 56 % (ref 32–75)
NRBC # BLD: 0 K/UL (ref 0–0.01)
NRBC BLD-RTO: 0 PER 100 WBC
PHOSPHATE SERPL-MCNC: 3.7 MG/DL (ref 2.6–4.7)
PLATELET # BLD AUTO: 119 K/UL (ref 150–400)
PMV BLD AUTO: 11.1 FL (ref 8.9–12.9)
POTASSIUM SERPL-SCNC: 3.6 MMOL/L (ref 3.5–5.1)
PROT SERPL-MCNC: 7.6 G/DL (ref 6.4–8.2)
RBC # BLD AUTO: 3.9 M/UL (ref 4.1–5.7)
SODIUM SERPL-SCNC: 135 MMOL/L (ref 136–145)
SOURCE, COVRS: NORMAL
WBC # BLD AUTO: 8.1 K/UL (ref 4.1–11.1)

## 2022-09-25 PROCEDURE — 85025 COMPLETE CBC W/AUTO DIFF WBC: CPT

## 2022-09-25 PROCEDURE — 83735 ASSAY OF MAGNESIUM: CPT

## 2022-09-25 PROCEDURE — 36415 COLL VENOUS BLD VENIPUNCTURE: CPT

## 2022-09-25 PROCEDURE — 99221 1ST HOSP IP/OBS SF/LOW 40: CPT | Performed by: SURGERY

## 2022-09-25 PROCEDURE — 74011250637 HC RX REV CODE- 250/637: Performed by: HOSPITALIST

## 2022-09-25 PROCEDURE — 65660000001 HC RM ICU INTERMED STEPDOWN

## 2022-09-25 PROCEDURE — 80053 COMPREHEN METABOLIC PANEL: CPT

## 2022-09-25 PROCEDURE — 74011250636 HC RX REV CODE- 250/636: Performed by: HOSPITALIST

## 2022-09-25 PROCEDURE — 65270000046 HC RM TELEMETRY

## 2022-09-25 PROCEDURE — 74011000250 HC RX REV CODE- 250: Performed by: HOSPITALIST

## 2022-09-25 PROCEDURE — 84100 ASSAY OF PHOSPHORUS: CPT

## 2022-09-25 PROCEDURE — 87635 SARS-COV-2 COVID-19 AMP PRB: CPT

## 2022-09-25 RX ORDER — DOXAZOSIN 2 MG/1
4 TABLET ORAL DAILY
Status: DISCONTINUED | OUTPATIENT
Start: 2022-09-25 | End: 2022-09-26

## 2022-09-25 RX ORDER — LABETALOL HYDROCHLORIDE 5 MG/ML
10 INJECTION, SOLUTION INTRAVENOUS
Status: DISCONTINUED | OUTPATIENT
Start: 2022-09-25 | End: 2022-10-03 | Stop reason: HOSPADM

## 2022-09-25 RX ORDER — HYDRALAZINE HYDROCHLORIDE 20 MG/ML
10 INJECTION INTRAMUSCULAR; INTRAVENOUS
Status: DISCONTINUED | OUTPATIENT
Start: 2022-09-25 | End: 2022-10-03 | Stop reason: HOSPADM

## 2022-09-25 RX ORDER — OXYCODONE HYDROCHLORIDE 5 MG/1
5 TABLET ORAL
Status: COMPLETED | OUTPATIENT
Start: 2022-09-25 | End: 2022-09-25

## 2022-09-25 RX ORDER — NIFEDIPINE 60 MG/1
60 TABLET, EXTENDED RELEASE ORAL 2 TIMES DAILY
Status: DISCONTINUED | OUTPATIENT
Start: 2022-09-25 | End: 2022-09-25

## 2022-09-25 RX ORDER — SEVELAMER CARBONATE 800 MG/1
800 TABLET, FILM COATED ORAL 3 TIMES DAILY
Status: DISCONTINUED | OUTPATIENT
Start: 2022-09-25 | End: 2022-10-03 | Stop reason: HOSPADM

## 2022-09-25 RX ADMIN — SODIUM CHLORIDE 10 MG/HR: 9 INJECTION, SOLUTION INTRAVENOUS at 08:55

## 2022-09-25 RX ADMIN — POLYETHYLENE GLYCOL 3350 17 G: 17 POWDER, FOR SOLUTION ORAL at 19:32

## 2022-09-25 RX ADMIN — CALCIUM CARBONATE (ANTACID) CHEW TAB 500 MG 600 MG: 500 CHEW TAB at 08:58

## 2022-09-25 RX ADMIN — DOXAZOSIN 4 MG: 2 TABLET ORAL at 12:07

## 2022-09-25 RX ADMIN — HYDRALAZINE HYDROCHLORIDE 100 MG: 50 TABLET, FILM COATED ORAL at 08:58

## 2022-09-25 RX ADMIN — PANTOPRAZOLE SODIUM 40 MG: 40 TABLET, DELAYED RELEASE ORAL at 07:30

## 2022-09-25 RX ADMIN — SODIUM CHLORIDE 10 MG/HR: 9 INJECTION, SOLUTION INTRAVENOUS at 03:12

## 2022-09-25 RX ADMIN — CARVEDILOL 25 MG: 12.5 TABLET, FILM COATED ORAL at 18:17

## 2022-09-25 RX ADMIN — CARVEDILOL 25 MG: 12.5 TABLET, FILM COATED ORAL at 08:58

## 2022-09-25 RX ADMIN — SODIUM CHLORIDE, PRESERVATIVE FREE 5 ML: 5 INJECTION INTRAVENOUS at 13:13

## 2022-09-25 RX ADMIN — CLONIDINE HYDROCHLORIDE 0.1 MG: 0.1 TABLET ORAL at 18:17

## 2022-09-25 RX ADMIN — SEVELAMER CARBONATE 800 MG: 800 TABLET, FILM COATED ORAL at 18:17

## 2022-09-25 RX ADMIN — SODIUM CHLORIDE, PRESERVATIVE FREE 10 ML: 5 INJECTION INTRAVENOUS at 06:02

## 2022-09-25 RX ADMIN — HYDRALAZINE HYDROCHLORIDE 10 MG: 20 INJECTION INTRAMUSCULAR; INTRAVENOUS at 23:53

## 2022-09-25 RX ADMIN — CLONIDINE HYDROCHLORIDE 0.1 MG: 0.1 TABLET ORAL at 00:49

## 2022-09-25 RX ADMIN — HYDRALAZINE HYDROCHLORIDE 100 MG: 50 TABLET, FILM COATED ORAL at 18:17

## 2022-09-25 RX ADMIN — HYDRALAZINE HYDROCHLORIDE 100 MG: 50 TABLET, FILM COATED ORAL at 22:10

## 2022-09-25 RX ADMIN — CALCIUM CARBONATE (ANTACID) CHEW TAB 500 MG 600 MG: 500 CHEW TAB at 18:17

## 2022-09-25 RX ADMIN — BUMETANIDE 2 MG: 1 TABLET ORAL at 09:44

## 2022-09-25 RX ADMIN — CLONIDINE HYDROCHLORIDE 0.1 MG: 0.1 TABLET ORAL at 08:58

## 2022-09-25 RX ADMIN — SEVELAMER CARBONATE 800 MG: 800 TABLET, FILM COATED ORAL at 22:10

## 2022-09-25 RX ADMIN — SODIUM CHLORIDE, PRESERVATIVE FREE 10 ML: 5 INJECTION INTRAVENOUS at 22:10

## 2022-09-25 RX ADMIN — OXYCODONE 5 MG: 5 TABLET ORAL at 00:49

## 2022-09-25 RX ADMIN — CALCIUM CARBONATE (ANTACID) CHEW TAB 500 MG 600 MG: 500 CHEW TAB at 12:07

## 2022-09-25 RX ADMIN — SODIUM CHLORIDE 10 MG/HR: 9 INJECTION, SOLUTION INTRAVENOUS at 06:01

## 2022-09-25 NOTE — CONSULTS
General Surgery Consultation    CC: Abdominal pain    History of Present Illness:      Simona Mayorga is a 64 y.o. male who presents with abdominal pain and headache. Patient reports after having blood pressure issues the last couple days. He has missed dialysis. He presented with a hypertensive emergency. Patient was worked up and found to have gallstones and some mild pancreatitis. Denies pain in the right upper quadrant. He reports some pain sometimes in the left lower abdomen. No correlation to food. The pain is mild at a 4 or so out of 10. Time improves the pain. Pressing the abdomen worsens the pain. He denies any alcohol use. Surgery called for gallstones found on imaging and some pancreatic stranding. Past Medical History:   Diagnosis Date    Chronic kidney disease     Hypertension     Liver disease      No past surgical history on file. No family history on file. Social History     Socioeconomic History    Marital status: SINGLE   Tobacco Use    Smoking status: Some Days     Packs/day: 1.00     Types: Cigarettes     Last attempt to quit: 2020     Years since quittin.1    Smokeless tobacco: Never    Tobacco comments:     started back smoking in last couple months   Vaping Use    Vaping Use: Never used   Substance and Sexual Activity    Alcohol use: Not Currently    Drug use: Yes     Types: Marijuana, Cocaine, Heroin     Comment: Herion & Cocaine 10 years ago as of 3/23/22, Marijuana last used 1 week ago    Sexual activity: Not Currently      Prior to Admission medications    Medication Sig Start Date End Date Taking? Authorizing Provider   spironolactone (ALDACTONE) 100 mg tablet Take 100 mg by mouth daily. Yes Provider, Historical   losartan (COZAAR) 100 mg tablet Take 100 mg by mouth daily. New medication as of  prescribed by  Dr. Homer Klein   Yes Provider, Historical   chlorthalidone (HYGROTON) 25 mg tablet Take 25 mg by mouth daily.  New prescription written by Dr. Ofelia Robles Catherine   Yes Provider, Historical   doxazosin (Cardura) 4 mg tablet Take 4 mg by mouth daily. New prescription as of 7/27/22, written by Dr. Gene Pringle   Yes Provider, Historical   sevelamer carbonate (RENVELA) 800 mg tab tab Take 800 mg by mouth three (3) times daily. New prescription as of 8/16/22, prescribed by Dr. Gene Pringle   Yes Provider, Historical   carvediloL (COREG) 25 mg tablet Take 25 mg by mouth two (2) times daily (with meals). Yes Provider, Historical   bumetanide (BUMEX) 2 mg tablet Take 2 mg by mouth two (2) times a day. Medication updated as of 8/4/22 by Dr. Gene Pringle   Yes Provider, Historical   hydrALAZINE (APRESOLINE) 100 mg tablet Take 100 mg by mouth three (3) times daily. Yes Provider, Historical   ascorbic acid, vitamin C, (VITAMIN C) 500 mg tablet Take 500 mg by mouth daily. Yes Provider, Historical   cholecalciferol (VITAMIN D3) 25 mcg (1,000 unit) cap Take 1,000 Units by mouth daily. Yes Provider, Historical   buprenorphine-naloxone (SUBOXONE) 8-2 mg film sublingaul film 1 Film by SubLINGual route two (2) times a day. Yes Provider, Historical   cloNIDine HCL (CATAPRES) 0.1 mg tablet Take 1 Tablet by mouth two (2) times a day. 5/18/22   Olen Boeck, MD   NIFEdipine ER (ADALAT CC) 60 mg ER tablet Take 60 mg by mouth two (2) times a day. Patient not taking: Reported on 9/24/2022    Provider, Historical   calcium carbonate (TUMS) 200 mg calcium (500 mg) chew Take 3 Tablets by mouth three (3) times daily (after meals). Provider, Historical   fluticasone propionate (FLONASE) 50 mcg/actuation nasal spray 2 Sprays by Both Nostrils route daily as needed. Patient not taking: Reported on 9/24/2022    Other, MD Brian   pantoprazole (Protonix) 40 mg tablet Take 1 Tablet by mouth two (2) times a day.  Indications: an ulcer of the duodenum  Patient not taking: No sig reported 3/25/22   Yinka Mendez MD     Allergies   Allergen Reactions    Baclofen Rash     All over Losartan Hives, Shortness of Breath and Rash       Review of Systems:  Constitutional: No fever or chills  Neurologic: No headache  Eyes: No scleral icterus or irritated eyes  Nose: No nasal pain or drainage  Mouth: No oral lesions or sore throat  Cardiac: No palpations or chest pain  Pulmonary: No cough or shortness of breath  Gastrointestinal: Left lower quadrant abdominal pain, no nausea, emesis, diarrhea, or constipation  Genitourinary: No dysuria  Musculoskeletal: No muscle or joint tenderness  Skin: No rashes or lesions  Psychiatric: No anxiety or depressed mood    Physical Exam:   Temp (24hrs), Av.2 °F (36.8 °C), Min:98 °F (36.7 °C), Max:98.8 °F (37.1 °C)      Visit Vitals  BP (!) 150/80   Pulse 77   Temp 98.2 °F (36.8 °C)   Resp 16   Ht 5' 9\" (1.753 m)   Wt 156 lb 4.9 oz (70.9 kg)   SpO2 100%   BMI 23.08 kg/m²     General: No acute distress, conversant  Eyes: PERRLA, no scleral icterus  HENT: Normocephalic without oral lesions  Neck: Trachea midline without LAD  Cardiac: Normal pulse rate and rhythm  Pulmonary: Symmetric chest rise with normal effort  GI: Soft, mild tenderness left mid abdomen, ND, no hernia, no splenomegaly  Skin: Warm without rash  Extremities: No edema or joint stiffness  Psych: Appropriate mood and affect    Assessment:     59-year-old male with many medical issues admitted for hypertensive emergency and dialysis needs with gallstones and some pancreatitis    Plan:     Pancreatitis appears to be minimal.  Okay with regular diet. Has some small gallstones with a  contracted gallbladder. I would be hesitant to believe that his gallstones are causing the pancreatitis. Previous imaging has shown some thickened gallbladder wall but also with ascites intermittently. I have no reason to think he has any acute gallbladder pathology. I would recommend observation and not operative intervention for this. Okay to discharge when cleared by medicine folks.   No follow-up needed with me.    Thank you for this consultation.       Signed By: Jerie Halsted, MD  Bariatric and General Surgeon  [facility]    September 25, 2022

## 2022-09-25 NOTE — PROGRESS NOTES
Nephrology Progress Note  Yumiko Buckner  Date of Admission : 9/24/2022    CC:  Follow up for ESRD       Assessment and Plan     ESRD-HD :  - dialyzes TTS at [facility]  - HD on Tuesday per routine    Hypertensive Urgency:  - improving  - wean cardene, resume oral meds     Anemia in CKD:  - hgb stable, no need for ABI     Sec HPTH   - continue home binders          Interval History:  Seen and examined. Feeling ok. Tolerated hemo, UF 3kg. Weaning down cardene,  just received his IV meds    Current Medications: all current  Medications have been eviewed in EPIC  Review of Systems: Pertinent items are noted in HPI. Objective:  Vitals:    Vitals:    09/25/22 0657 09/25/22 0712 09/25/22 0800 09/25/22 0858   BP: (!) 156/73 (!) 155/75 (!) 160/83 (!) 156/70   Pulse: 78 84 76 76   Resp:  16     Temp:  98.2 °F (36.8 °C)     SpO2:  100%     Weight:       Height:         Intake and Output:  No intake/output data recorded. 09/23 1901 - 09/25 0700  In: 240 [P.O.:240]  Out: 3000     Physical Examination:    General: NAD,Conversant   Neck:  Supple, no mass  Resp:  Lungs CTA B/L, no wheezing , normal respiratory effort  CV:  RRR,  no murmur or rub,trace LE edema  GI:  Soft, NT, + Bowel sounds, no hepatosplenomegaly  Neurologic:  Non focal  Psych:             AAO x 3 appropriate affect   Skin:  No Rash  Access:           [facility]    []    High complexity decision making was performed  []    Patient is at high-risk of decompensation with multiple organ involvement    Lab Data Personally Reviewed: I have reviewed all the pertinent labs, microbiology data and radiology studies during assessment.     Recent Labs     09/25/22  0328 09/24/22  1200   * 135*   K 3.6 4.3    102   CO2 27 25   * 96   BUN 31* 74*   CREA 7.52* 12.50*   CA 8.3* 9.4   MG 1.9  --    PHOS 3.7  --    ALB 3.4* 3.5   ALT 15 15       Recent Labs     09/25/22  0328 09/24/22  1200   WBC 8.1 6.6   HGB 11.2* 11.7*   HCT 33.6* 34.6*   PLT 119* 147*       No results found for: SDES  Lab Results   Component Value Date/Time    Culture result: NO GROWTH 5 DAYS 05/18/2022 06:06 AM    Culture result: No growth (<1,000 CFU/ML) 03/22/2022 04:11 PM     Recent Results (from the past 24 hour(s))   CBC WITH AUTOMATED DIFF    Collection Time: 09/24/22 12:00 PM   Result Value Ref Range    WBC 6.6 4.1 - 11.1 K/uL    RBC 3.90 (L) 4.10 - 5.70 M/uL    HGB 11.7 (L) 12.1 - 17.0 g/dL    HCT 34.6 (L) 36.6 - 50.3 %    MCV 88.7 80.0 - 99.0 FL    MCH 30.0 26.0 - 34.0 PG    MCHC 33.8 30.0 - 36.5 g/dL    RDW 12.5 11.5 - 14.5 %    PLATELET 837 (L) 964 - 400 K/uL    MPV 11.1 8.9 - 12.9 FL    NRBC 0.0 0  WBC    ABSOLUTE NRBC 0.00 0.00 - 0.01 K/uL    NEUTROPHILS 49 32 - 75 %    LYMPHOCYTES 34 12 - 49 %    MONOCYTES 10 5 - 13 %    EOSINOPHILS 6 0 - 7 %    BASOPHILS 1 0 - 1 %    IMMATURE GRANULOCYTES 0 0.0 - 0.5 %    ABS. NEUTROPHILS 3.2 1.8 - 8.0 K/UL    ABS. LYMPHOCYTES 2.2 0.8 - 3.5 K/UL    ABS. MONOCYTES 0.7 0.0 - 1.0 K/UL    ABS. EOSINOPHILS 0.4 0.0 - 0.4 K/UL    ABS. BASOPHILS 0.1 0.0 - 0.1 K/UL    ABS. IMM. GRANS. 0.0 0.00 - 0.04 K/UL    DF AUTOMATED     METABOLIC PANEL, COMPREHENSIVE    Collection Time: 09/24/22 12:00 PM   Result Value Ref Range    Sodium 135 (L) 136 - 145 mmol/L    Potassium 4.3 3.5 - 5.1 mmol/L    Chloride 102 97 - 108 mmol/L    CO2 25 21 - 32 mmol/L    Anion gap 8 5 - 15 mmol/L    Glucose 96 65 - 100 mg/dL    BUN 74 (H) 6 - 20 MG/DL    Creatinine 12.50 (H) 0.70 - 1.30 MG/DL    BUN/Creatinine ratio 6 (L) 12 - 20      GFR est AA 5 (L) >60 ml/min/1.73m2    GFR est non-AA 4 (L) >60 ml/min/1.73m2    Calcium 9.4 8.5 - 10.1 MG/DL    Bilirubin, total 0.5 0.2 - 1.0 MG/DL    ALT (SGPT) 15 12 - 78 U/L    AST (SGOT) 8 (L) 15 - 37 U/L    Alk.  phosphatase 71 45 - 117 U/L    Protein, total 7.8 6.4 - 8.2 g/dL    Albumin 3.5 3.5 - 5.0 g/dL    Globulin 4.3 (H) 2.0 - 4.0 g/dL    A-G Ratio 0.8 (L) 1.1 - 2.2     CK    Collection Time: 09/24/22 12:00 PM   Result Value Ref Range     39 - 308 U/L   TROPONIN-HIGH SENSITIVITY    Collection Time: 09/24/22 12:00 PM   Result Value Ref Range    Troponin-High Sensitivity 77 (H) 0 - 76 ng/L   LIPASE    Collection Time: 09/24/22 12:00 PM   Result Value Ref Range    Lipase 619 (H) 73 - 393 U/L   SAMPLES BEING HELD    Collection Time: 09/24/22 12:00 PM   Result Value Ref Range    SAMPLES BEING HELD 1RED 1BLU     COMMENT        Add-on orders for these samples will be processed based on acceptable specimen integrity and analyte stability, which may vary by analyte.    EKG, 12 LEAD, INITIAL    Collection Time: 09/24/22 12:06 PM   Result Value Ref Range    Ventricular Rate 70 BPM    Atrial Rate 70 BPM    P-R Interval 162 ms    QRS Duration 98 ms    Q-T Interval 454 ms    QTC Calculation (Bezet) 490 ms    Calculated P Axis 75 degrees    Calculated R Axis 46 degrees    Calculated T Axis 130 degrees    Diagnosis       Normal sinus rhythm  Voltage criteria for left ventricular hypertrophy with repolarization   abnormalities  Prolonged QT  Abnormal ECG  When compared with ECG of 30-JUN-2022 04:54,  Left ventricular hypertrophy present  Confirmed by Angela Lopez MD, Ashtyn Davila ([id number]) on 9/24/2022 6:01:41 PM     TROPONIN-HIGH SENSITIVITY    Collection Time: 09/24/22  4:44 PM   Result Value Ref Range    Troponin-High Sensitivity 62 0 - 76 ng/L   COVID-19 RAPID TEST    Collection Time: 09/25/22 12:31 AM   Result Value Ref Range    Specimen source Nasopharyngeal      COVID-19 rapid test Not detected NOTD     CBC WITH AUTOMATED DIFF    Collection Time: 09/25/22  3:28 AM   Result Value Ref Range    WBC 8.1 4.1 - 11.1 K/uL    RBC 3.90 (L) 4.10 - 5.70 M/uL    HGB 11.2 (L) 12.1 - 17.0 g/dL    HCT 33.6 (L) 36.6 - 50.3 %    MCV 86.2 80.0 - 99.0 FL    MCH 28.7 26.0 - 34.0 PG    MCHC 33.3 30.0 - 36.5 g/dL    RDW 12.6 11.5 - 14.5 %    PLATELET 086 (L) 733 - 400 K/uL    MPV 11.1 8.9 - 12.9 FL    NRBC 0.0 0  WBC    ABSOLUTE NRBC 0.00 0.00 - 0.01 K/uL    NEUTROPHILS 56 32 - 75 %    LYMPHOCYTES 27 12 - 49 %    MONOCYTES 10 5 - 13 %    EOSINOPHILS 5 0 - 7 %    BASOPHILS 1 0 - 1 %    IMMATURE GRANULOCYTES 0 0.0 - 0.5 %    ABS. NEUTROPHILS 4.6 1.8 - 8.0 K/UL    ABS. LYMPHOCYTES 2.2 0.8 - 3.5 K/UL    ABS. MONOCYTES 0.8 0.0 - 1.0 K/UL    ABS. EOSINOPHILS 0.4 0.0 - 0.4 K/UL    ABS. BASOPHILS 0.1 0.0 - 0.1 K/UL    ABS. IMM. GRANS. 0.0 0.00 - 0.04 K/UL    DF AUTOMATED     METABOLIC PANEL, COMPREHENSIVE    Collection Time: 09/25/22  3:28 AM   Result Value Ref Range    Sodium 135 (L) 136 - 145 mmol/L    Potassium 3.6 3.5 - 5.1 mmol/L    Chloride 102 97 - 108 mmol/L    CO2 27 21 - 32 mmol/L    Anion gap 6 5 - 15 mmol/L    Glucose 134 (H) 65 - 100 mg/dL    BUN 31 (H) 6 - 20 MG/DL    Creatinine 7.52 (H) 0.70 - 1.30 MG/DL    BUN/Creatinine ratio 4 (L) 12 - 20      GFR est AA 9 (L) >60 ml/min/1.73m2    GFR est non-AA 8 (L) >60 ml/min/1.73m2    Calcium 8.3 (L) 8.5 - 10.1 MG/DL    Bilirubin, total 0.4 0.2 - 1.0 MG/DL    ALT (SGPT) 15 12 - 78 U/L    AST (SGOT) 12 (L) 15 - 37 U/L    Alk. phosphatase 70 45 - 117 U/L    Protein, total 7.6 6.4 - 8.2 g/dL    Albumin 3.4 (L) 3.5 - 5.0 g/dL    Globulin 4.2 (H) 2.0 - 4.0 g/dL    A-G Ratio 0.8 (L) 1.1 - 2.2     MAGNESIUM    Collection Time: 09/25/22  3:28 AM   Result Value Ref Range    Magnesium 1.9 1.6 - 2.4 mg/dL   PHOSPHORUS    Collection Time: 09/25/22  3:28 AM   Result Value Ref Range    Phosphorus 3.7 2.6 - 4.7 MG/DL               Martha Monsalve MD  [facility]   [address] [facility] [address]  Phone - [phone]   Fax - [phone]  [email or web address]

## 2022-09-25 NOTE — PROGRESS NOTES
Problem: Hypertension  Goal: *Blood pressure within specified parameters  Outcome: Progressing Towards Goal  Goal: *Fluid volume balance  Outcome: Progressing Towards Goal  Goal: *Labs within defined limits  Outcome: Progressing Towards Goal     Problem: Falls - Risk of  Goal: *Absence of Falls  Description: Document Radha Fall Risk and appropriate interventions in the flowsheet. Outcome: Progressing Towards Goal  Note: Fall Risk Interventions:     Medication Interventions: Teach patient to arise slowly, Patient to call before getting OOB    0815: Bedside and Verbal shift change report given to Aniket LUCIO   (oncoming nurse) by Stephanie Blankenship  (offgoing nurse). Report included the following information SBAR, Kardex, Intake/Output, MAR, Recent Results, Med Rec Status, Cardiac Rhythm NSr, and Alarm Parameters .

## 2022-09-25 NOTE — ED NOTES
TRANSFER - OUT REPORT:    Verbal report given to NAVEED Johnson(name) on Kodi Capone  being transferred to  cvsu(unit) for routine progression of care       Report consisted of patients Situation, Background, Assessment and   Recommendations(SBAR). Information from the following report(s) SBAR, ED Summary, Procedure Summary, Intake/Output, MAR, Recent Results, Med Rec Status and Cardiac Rhythm nsr was reviewed with the receiving nurse. Lines:   Peripheral IV 09/24/22 Right; Anterior; Upper Arm (Active)   Site Assessment Clean, dry, & intact 09/24/22 1156   Phlebitis Assessment 0 09/24/22 1156   Infiltration Assessment 0 09/24/22 1156   Dressing Status Clean, dry, & intact 09/24/22 1156   Dressing Type Transparent 09/24/22 1156   Hub Color/Line Status Green;Capped;Flushed;Patent 09/24/22 1156   Action Taken Blood drawn 09/24/22 1156        Opportunity for questions and clarification was provided.       Patient transported with:   Monitor

## 2022-09-25 NOTE — PROGRESS NOTES
[facility] [facility]  Hospitalist Group                                                                                          Hospitalist Progress Note  Mckinley Wesley MD  Answering service: [phone] from in house phone        Date of Service:  2022  NAME:  Yumiko Buckner  :  1965  MRN:  [id number]      Admission Summary:   57y M with ESRD (on HD /) here with diffuse body pain, HA, elevated BP. Says he started to feel bad 2 days ago. His BP dropped to the 61'U systolic at dialysis. Did not wake up in time to get to dialysis today. Says he felt poorly yesterday and didn't sleep well. Has a diffuse HA. Has chest pain at right side and abdominal pain. No focal weakness or numbness. o trouble breathing. No speech or gait problems. No fever. Gets HD at [facility] on [facility] [place]   Denied fever, denied vision changes, denied sob. Currently pt denied any abdominal pain to me, no nausea, no vomiting, no diarrhea. He did not take his home meds today. Interval history / Subjective:   BP better, had hd last night   Denied headache      Assessment & Plan:      Malignant hypertension: cardene drip titrate to goal of SBParound 160 . Restarted home po meds, Bp better, wean off cardene . Monitor bp. Prn iv hydralazine or labetalol  Restart home bp meds including hydralazine, coreg and clonidine and cadura. ESRD: hd per renal.   Headache: head ct negative, could be none specific, due to uncontrolled blood pressure, prn tylenol. Improved . Check ESR. Chest pain: none cardiac, likely muscular pain at right upper chest wall. ? Acute pancreatitis: Elevated lipase 600: currently denied abd pain, did complaining abd pain to ER. Denied alcohol abuse. Known to have gall stone. ABD ct: reviewed :3 mm gallstone in gallbladder again demonstrated. Mild pancreatic enlargement with minimal peripancreatic fat stranding. No  ductal dilation or collection demonstrated   no pancreatic mass. Finding likely pancreatitis possible related to gallstone. Will consult general surgeon . Also check CA 19-9. Discussed this findings with patient        Code status: full   Prophylaxis: sc heparin   Care Plan discussed with: pt, RN   Anticipated Disposition: home in am if doing ok      Hospital Problems  Date Reviewed: 1/15/2021            Codes Class Noted POA    Malignant hypertension ICD-10-CM: I10  ICD-9-CM: 401.0  5/17/2022 Unknown             Review of Systems:   A comprehensive review of systems was negative except for that written in the HPI. Vital Signs:    Last 24hrs VS reviewed since prior progress note. Most recent are:  Visit Vitals  BP (!) 150/80   Pulse 77   Temp 98.2 °F (36.8 °C)   Resp 16   Ht 5' 9\" (1.753 m)   Wt 70.9 kg (156 lb 4.9 oz)   SpO2 100%   BMI 23.08 kg/m²         Intake/Output Summary (Last 24 hours) at 9/25/2022 1220  Last data filed at 9/24/2022 2314  Gross per 24 hour   Intake 240 ml   Output 3000 ml   Net -2760 ml        Physical Examination:     I had a face to face encounter with this patient and independently examined them on 9/25/2022 as outlined below:          Constitutional:  No acute distress, cooperative, pleasant    ENT:  Oral mucosa moist, oropharynx benign. Resp:  CTA bilaterally. No wheezing/rhonchi/rales. No accessory muscle use. CV:  Regular rhythm, normal rate, no murmurs, gallops, rubs    GI:  Soft, non distended, non tender. normoactive bowel sounds, no hepatosplenomegaly     Musculoskeletal:  No edema, warm, 2+ pulses throughout    Neurologic:  Moves all extremities.   AAOx3, CN II-XII reviewed            Data Review:    Review and/or order of clinical lab test      Labs:     Recent Labs     09/25/22  0328 09/24/22  1200   WBC 8.1 6.6   HGB 11.2* 11.7*   HCT 33.6* 34.6*   * 147*     Recent Labs     09/25/22  0328 09/24/22  1200   * 135*   K 3.6 4.3    102   CO2 27 25   BUN 31* 74*   CREA 7.52* 12.50*   * 96   CA 8.3* 9.4   MG 1.9  --    PHOS 3.7  --      Recent Labs     09/25/22  0328 09/24/22  1200   ALT 15 15   AP 70 71   TBILI 0.4 0.5   TP 7.6 7.8   ALB 3.4* 3.5   GLOB 4.2* 4.3*   LPSE  --  619*     No results for input(s): INR, PTP, APTT, INREXT in the last 72 hours. No results for input(s): FE, TIBC, PSAT, FERR in the last 72 hours. Lab Results   Component Value Date/Time    Folate 7.9 05/18/2022 12:18 PM      No results for input(s): PH, PCO2, PO2 in the last 72 hours.   Recent Labs     09/24/22  1200        Lab Results   Component Value Date/Time    Cholesterol, total 170 06/29/2020 03:46 AM    HDL Cholesterol 49 06/29/2020 03:46 AM    LDL, calculated 73.4 06/29/2020 03:46 AM    Triglyceride 238 (H) 06/29/2020 03:46 AM    CHOL/HDL Ratio 3.5 06/29/2020 03:46 AM     Lab Results   Component Value Date/Time    Glucose (POC) 97 05/31/2022 12:23 PM    Glucose (POC) 72 05/31/2022 08:34 AM    Glucose (POC) 86 05/30/2022 08:19 PM    Glucose (POC) 78 05/30/2022 07:50 PM    Glucose (POC) 101 (H) 03/29/2022 11:23 AM     Lab Results   Component Value Date/Time    Color YELLOW/STRAW 03/22/2022 04:11 PM    Appearance TURBID (A) 03/22/2022 04:11 PM    Specific gravity 1.021 03/22/2022 04:11 PM    pH (UA) 7.5 03/22/2022 04:11 PM    Protein >300 (A) 03/22/2022 04:11 PM    Glucose 100 (A) 03/22/2022 04:11 PM    Ketone Negative 03/22/2022 04:11 PM    Bilirubin Negative 03/22/2022 04:11 PM    Urobilinogen 0.2 03/22/2022 04:11 PM    Nitrites Negative 03/22/2022 04:11 PM    Leukocyte Esterase MODERATE (A) 03/22/2022 04:11 PM    Epithelial cells MODERATE (A) 03/22/2022 04:11 PM    Bacteria Negative 03/22/2022 04:11 PM    WBC 10-20 03/22/2022 04:11 PM    RBC 0-5 03/22/2022 04:11 PM         Medications Reviewed:     Current Facility-Administered Medications   Medication Dose Route Frequency    doxazosin (CARDURA) tablet 4 mg  4 mg Oral DAILY    sevelamer carbonate (RENVELA) tab 800 mg  800 mg Oral TID    hydrALAZINE (APRESOLINE) 20 mg/mL injection 10 mg  10 mg IntraVENous Q6H PRN    labetaloL (NORMODYNE;TRANDATE) injection 10 mg  10 mg IntraVENous Q6H PRN    sodium chloride (NS) flush 5-40 mL  5-40 mL IntraVENous Q8H    sodium chloride (NS) flush 5-40 mL  5-40 mL IntraVENous PRN    acetaminophen (TYLENOL) tablet 650 mg  650 mg Oral Q6H PRN    Or    acetaminophen (TYLENOL) suppository 650 mg  650 mg Rectal Q6H PRN    polyethylene glycol (MIRALAX) packet 17 g  17 g Oral DAILY PRN    ondansetron (ZOFRAN ODT) tablet 4 mg  4 mg Oral Q8H PRN    Or    ondansetron (ZOFRAN) injection 4 mg  4 mg IntraVENous Q6H PRN    heparin (porcine) injection 5,000 Units  5,000 Units SubCUTAneous Q8H    bumetanide (BUMEX) tablet 2 mg  2 mg Oral DAILY    buprenorphine-naloxone (SUBOXONE) 8-2mg SL tablet  1 Tablet SubLINGual BID    calcium carbonate (TUMS) chewable tablet 600 mg [elemental]  600 mg Oral TIDPC    hydrALAZINE (APRESOLINE) tablet 100 mg  100 mg Oral TID    pantoprazole (PROTONIX) tablet 40 mg  40 mg Oral ACB    cloNIDine HCL (CATAPRES) tablet 0.1 mg  0.1 mg Oral BID    carvediloL (COREG) tablet 25 mg  25 mg Oral BID WITH MEALS     ______________________________________________________________________  EXPECTED LENGTH OF STAY: - - -  ACTUAL LENGTH OF STAY:          1                 Ivan Cardoso MD

## 2022-09-25 NOTE — PROGRESS NOTES
0730: Bedside and Verbal shift change report given to NAVEED Stewart (oncoming nurse) by NAVEED Self (offgoing nurse). Report included the following information SBAR, Kardex, MAR, and Cardiac Rhythm NSR . Rate verified cardene gtt. 3305: Educated pt on preventing orthostatic Bps, rising and changing positions slowly, d/t BP medication adjustments. Pt acknowledged and verbalized understanding. 2081: Per MD Alvarez continue bumex daily. Called pharmacy to verify bumex order in [facility]     1213: Pt refused suboxone. Pt stated he hasn't used drugs in [place]" and stated the last time he took his suboxone was \"a month ago\". 1313: Pt refused heparin. Educated pt on importance of medication, pt continued to refuse. Educated pt on non-pharmacological methods of preventing blood clots including drinking water, pumping feet, and ambulating. Pt acknowledged and verbalized understanding. 1930: Bedside and Verbal shift change report given to Stewart Jenkins RN (oncoming nurse) by Rae Benites RN (offgoing nurse). Report included the following information SBAR, Kardex, MAR, and Cardiac Rhythm NSR .

## 2022-09-26 LAB
ALBUMIN SERPL-MCNC: 3 G/DL (ref 3.5–5)
ALBUMIN/GLOB SERPL: 0.8 {RATIO} (ref 1.1–2.2)
ALP SERPL-CCNC: 61 U/L (ref 45–117)
ALT SERPL-CCNC: 13 U/L (ref 12–78)
AMPHET UR QL SCN: NEGATIVE
ANION GAP SERPL CALC-SCNC: 8 MMOL/L (ref 5–15)
AST SERPL-CCNC: 10 U/L (ref 15–37)
BARBITURATES UR QL SCN: NEGATIVE
BASOPHILS # BLD: 0 K/UL (ref 0–0.1)
BASOPHILS NFR BLD: 1 % (ref 0–1)
BENZODIAZ UR QL: NEGATIVE
BILIRUB SERPL-MCNC: 0.4 MG/DL (ref 0.2–1)
BUN SERPL-MCNC: 46 MG/DL (ref 6–20)
BUN/CREAT SERPL: 5 (ref 12–20)
CALCIUM SERPL-MCNC: 8.4 MG/DL (ref 8.5–10.1)
CALCIUM SERPL-MCNC: 8.5 MG/DL (ref 8.5–10.1)
CANNABINOIDS UR QL SCN: NEGATIVE
CHLORIDE SERPL-SCNC: 103 MMOL/L (ref 97–108)
CO2 SERPL-SCNC: 24 MMOL/L (ref 21–32)
COCAINE UR QL SCN: NEGATIVE
CREAT SERPL-MCNC: 9.76 MG/DL (ref 0.7–1.3)
DIFFERENTIAL METHOD BLD: ABNORMAL
DRUG SCRN COMMENT,DRGCM: NORMAL
EOSINOPHIL # BLD: 0.3 K/UL (ref 0–0.4)
EOSINOPHIL NFR BLD: 6 % (ref 0–7)
ERYTHROCYTE [DISTWIDTH] IN BLOOD BY AUTOMATED COUNT: 12.6 % (ref 11.5–14.5)
GLOBULIN SER CALC-MCNC: 3.6 G/DL (ref 2–4)
GLUCOSE SERPL-MCNC: 72 MG/DL (ref 65–100)
HCT VFR BLD AUTO: 28.2 % (ref 36.6–50.3)
HGB BLD-MCNC: 9.3 G/DL (ref 12.1–17)
IMM GRANULOCYTES # BLD AUTO: 0 K/UL (ref 0–0.04)
IMM GRANULOCYTES NFR BLD AUTO: 0 % (ref 0–0.5)
LIPASE SERPL-CCNC: 417 U/L (ref 73–393)
LYMPHOCYTES # BLD: 2.3 K/UL (ref 0.8–3.5)
LYMPHOCYTES NFR BLD: 39 % (ref 12–49)
MAGNESIUM SERPL-MCNC: 1.9 MG/DL (ref 1.6–2.4)
MCH RBC QN AUTO: 28.7 PG (ref 26–34)
MCHC RBC AUTO-ENTMCNC: 33 G/DL (ref 30–36.5)
MCV RBC AUTO: 87 FL (ref 80–99)
METHADONE UR QL: NEGATIVE
MONOCYTES # BLD: 0.8 K/UL (ref 0–1)
MONOCYTES NFR BLD: 13 % (ref 5–13)
NEUTS SEG # BLD: 2.5 K/UL (ref 1.8–8)
NEUTS SEG NFR BLD: 41 % (ref 32–75)
NRBC # BLD: 0 K/UL (ref 0–0.01)
NRBC BLD-RTO: 0 PER 100 WBC
OPIATES UR QL: NEGATIVE
PCP UR QL: NEGATIVE
PHOSPHATE SERPL-MCNC: 5.3 MG/DL (ref 2.6–4.7)
PLATELET # BLD AUTO: 103 K/UL (ref 150–400)
PMV BLD AUTO: 12.1 FL (ref 8.9–12.9)
POTASSIUM SERPL-SCNC: 4.4 MMOL/L (ref 3.5–5.1)
PROT SERPL-MCNC: 6.6 G/DL (ref 6.4–8.2)
PTH-INTACT SERPL-MCNC: 493.9 PG/ML (ref 18.4–88)
RBC # BLD AUTO: 3.24 M/UL (ref 4.1–5.7)
SODIUM SERPL-SCNC: 135 MMOL/L (ref 136–145)
WBC # BLD AUTO: 5.9 K/UL (ref 4.1–11.1)

## 2022-09-26 PROCEDURE — 36415 COLL VENOUS BLD VENIPUNCTURE: CPT

## 2022-09-26 PROCEDURE — 74011250637 HC RX REV CODE- 250/637: Performed by: HOSPITALIST

## 2022-09-26 PROCEDURE — 84100 ASSAY OF PHOSPHORUS: CPT

## 2022-09-26 PROCEDURE — 83970 ASSAY OF PARATHORMONE: CPT

## 2022-09-26 PROCEDURE — 74011250636 HC RX REV CODE- 250/636: Performed by: HOSPITALIST

## 2022-09-26 PROCEDURE — 83690 ASSAY OF LIPASE: CPT

## 2022-09-26 PROCEDURE — 65270000046 HC RM TELEMETRY

## 2022-09-26 PROCEDURE — 85025 COMPLETE CBC W/AUTO DIFF WBC: CPT

## 2022-09-26 PROCEDURE — 80307 DRUG TEST PRSMV CHEM ANLYZR: CPT

## 2022-09-26 PROCEDURE — 80053 COMPREHEN METABOLIC PANEL: CPT

## 2022-09-26 PROCEDURE — 65660000001 HC RM ICU INTERMED STEPDOWN

## 2022-09-26 PROCEDURE — 86301 IMMUNOASSAY TUMOR CA 19-9: CPT

## 2022-09-26 PROCEDURE — 74011000250 HC RX REV CODE- 250: Performed by: HOSPITALIST

## 2022-09-26 PROCEDURE — 74011250637 HC RX REV CODE- 250/637: Performed by: INTERNAL MEDICINE

## 2022-09-26 PROCEDURE — 83735 ASSAY OF MAGNESIUM: CPT

## 2022-09-26 PROCEDURE — 74011250637 HC RX REV CODE- 250/637: Performed by: FAMILY MEDICINE

## 2022-09-26 RX ORDER — HYDRALAZINE HYDROCHLORIDE 20 MG/ML
10 INJECTION INTRAMUSCULAR; INTRAVENOUS ONCE
Status: COMPLETED | OUTPATIENT
Start: 2022-09-26 | End: 2022-09-26

## 2022-09-26 RX ORDER — LOSARTAN POTASSIUM 50 MG/1
100 TABLET ORAL DAILY
Status: DISCONTINUED | OUTPATIENT
Start: 2022-09-26 | End: 2022-09-26

## 2022-09-26 RX ORDER — CLONIDINE HYDROCHLORIDE 0.2 MG/1
0.2 TABLET ORAL
Status: COMPLETED | OUTPATIENT
Start: 2022-09-26 | End: 2022-09-26

## 2022-09-26 RX ORDER — CLONIDINE HYDROCHLORIDE 0.1 MG/1
0.1 TABLET ORAL
Status: COMPLETED | OUTPATIENT
Start: 2022-09-26 | End: 2022-09-26

## 2022-09-26 RX ORDER — MINOXIDIL 2.5 MG/1
5 TABLET ORAL 2 TIMES DAILY
Status: DISCONTINUED | OUTPATIENT
Start: 2022-09-26 | End: 2022-09-27

## 2022-09-26 RX ORDER — DOXAZOSIN 2 MG/1
8 TABLET ORAL
Status: DISCONTINUED | OUTPATIENT
Start: 2022-09-26 | End: 2022-10-03 | Stop reason: HOSPADM

## 2022-09-26 RX ORDER — CLONIDINE HYDROCHLORIDE 0.2 MG/1
0.2 TABLET ORAL 2 TIMES DAILY
Status: DISCONTINUED | OUTPATIENT
Start: 2022-09-27 | End: 2022-10-03 | Stop reason: HOSPADM

## 2022-09-26 RX ORDER — OXYCODONE HYDROCHLORIDE 5 MG/1
5 TABLET ORAL
Status: DISCONTINUED | OUTPATIENT
Start: 2022-09-26 | End: 2022-09-29

## 2022-09-26 RX ADMIN — PANTOPRAZOLE SODIUM 40 MG: 40 TABLET, DELAYED RELEASE ORAL at 07:19

## 2022-09-26 RX ADMIN — HYDRALAZINE HYDROCHLORIDE 100 MG: 50 TABLET, FILM COATED ORAL at 08:42

## 2022-09-26 RX ADMIN — CARVEDILOL 25 MG: 12.5 TABLET, FILM COATED ORAL at 08:42

## 2022-09-26 RX ADMIN — CLONIDINE HYDROCHLORIDE 0.1 MG: 0.1 TABLET ORAL at 18:15

## 2022-09-26 RX ADMIN — CARVEDILOL 25 MG: 12.5 TABLET, FILM COATED ORAL at 17:12

## 2022-09-26 RX ADMIN — SODIUM CHLORIDE, PRESERVATIVE FREE 10 ML: 5 INJECTION INTRAVENOUS at 18:21

## 2022-09-26 RX ADMIN — SODIUM CHLORIDE, PRESERVATIVE FREE 10 ML: 5 INJECTION INTRAVENOUS at 05:49

## 2022-09-26 RX ADMIN — CALCIUM CARBONATE (ANTACID) CHEW TAB 500 MG 600 MG: 500 CHEW TAB at 13:47

## 2022-09-26 RX ADMIN — SODIUM CHLORIDE, PRESERVATIVE FREE 10 ML: 5 INJECTION INTRAVENOUS at 21:41

## 2022-09-26 RX ADMIN — CALCIUM CARBONATE (ANTACID) CHEW TAB 500 MG 600 MG: 500 CHEW TAB at 18:00

## 2022-09-26 RX ADMIN — CLONIDINE HYDROCHLORIDE 0.1 MG: 0.1 TABLET ORAL at 08:41

## 2022-09-26 RX ADMIN — SEVELAMER CARBONATE 800 MG: 800 TABLET, FILM COATED ORAL at 15:13

## 2022-09-26 RX ADMIN — BUPRENORPHINE HYDROCHLORIDE AND NALOXONE HYDROCHLORIDE DIHYDRATE 1 TABLET: 8; 2 TABLET SUBLINGUAL at 17:11

## 2022-09-26 RX ADMIN — CALCIUM CARBONATE (ANTACID) CHEW TAB 500 MG 600 MG: 500 CHEW TAB at 08:41

## 2022-09-26 RX ADMIN — HYDRALAZINE HYDROCHLORIDE 10 MG: 20 INJECTION INTRAMUSCULAR; INTRAVENOUS at 13:52

## 2022-09-26 RX ADMIN — LABETALOL HYDROCHLORIDE 10 MG: 5 INJECTION INTRAVENOUS at 18:07

## 2022-09-26 RX ADMIN — DOXAZOSIN 4 MG: 2 TABLET ORAL at 08:41

## 2022-09-26 RX ADMIN — HYDRALAZINE HYDROCHLORIDE 10 MG: 20 INJECTION, SOLUTION INTRAMUSCULAR; INTRAVENOUS at 07:19

## 2022-09-26 RX ADMIN — SEVELAMER CARBONATE 800 MG: 800 TABLET, FILM COATED ORAL at 21:40

## 2022-09-26 RX ADMIN — OXYCODONE 5 MG: 5 TABLET ORAL at 09:51

## 2022-09-26 RX ADMIN — DOXAZOSIN 8 MG: 2 TABLET ORAL at 21:40

## 2022-09-26 RX ADMIN — MINOXIDIL 5 MG: 2.5 TABLET ORAL at 17:11

## 2022-09-26 RX ADMIN — CLONIDINE HYDROCHLORIDE 0.1 MG: 0.1 TABLET ORAL at 17:11

## 2022-09-26 RX ADMIN — ACETAMINOPHEN 650 MG: 325 TABLET, FILM COATED ORAL at 09:28

## 2022-09-26 RX ADMIN — SEVELAMER CARBONATE 800 MG: 800 TABLET, FILM COATED ORAL at 08:42

## 2022-09-26 RX ADMIN — HYDRALAZINE HYDROCHLORIDE 100 MG: 50 TABLET, FILM COATED ORAL at 15:13

## 2022-09-26 RX ADMIN — LABETALOL HYDROCHLORIDE 10 MG: 5 INJECTION INTRAVENOUS at 04:03

## 2022-09-26 RX ADMIN — SODIUM CHLORIDE, PRESERVATIVE FREE 10 ML: 5 INJECTION INTRAVENOUS at 16:00

## 2022-09-26 RX ADMIN — BUMETANIDE 2 MG: 1 TABLET ORAL at 08:42

## 2022-09-26 RX ADMIN — OXYCODONE 5 MG: 5 TABLET ORAL at 15:13

## 2022-09-26 RX ADMIN — HYDRALAZINE HYDROCHLORIDE 100 MG: 50 TABLET, FILM COATED ORAL at 21:40

## 2022-09-26 RX ADMIN — CLONIDINE HYDROCHLORIDE 0.2 MG: 0.2 TABLET ORAL at 05:48

## 2022-09-26 NOTE — PROGRESS NOTES
1930: Bedside shift change report given to Nkechi Fagan RN  (oncoming nurse) by Maribell Wilson RN  (offgoing nurse). Report included the following information SBAR, Kardex, Intake/Output, MAR, Recent Results, Med Rec Status, and Cardiac Rhythm SR .     0522: MD Villa notified of pt BP remaining elevated after both PRN medicatinos were given. Orders received. See MAR.     4076Pratik Guzman MD notified that pt BP remains elevated 199/91. Pt asymptomatic and resting quietly in bed.     0653: orders received for one time dose of hydralazine. See MAR     Problem: Falls - Risk of  Goal: *Absence of Falls  Description: Document [employer] Fall Risk and appropriate interventions in the flowsheet.   Outcome: Progressing Towards Goal  Note: Fall Risk Interventions:            Medication Interventions: Teach patient to arise slowly                   Problem: Patient Education: Go to Patient Education Activity  Goal: Patient/Family Education  Outcome: Progressing Towards Goal     Problem: Hypertension  Goal: *Blood pressure within specified parameters  Outcome: Not Progressing Towards Goal

## 2022-09-26 NOTE — PROGRESS NOTES
1130: Bedside shift change report given to America Bumpers, RN (oncoming nurse) by Magen Vega RN (offgoing nurse). Report included the following information SBAR, MAR, and Recent Results. 1730: Dr. Marcus Rangel notified regarding patient's elevated BP despite prn medications. Orders  received, see MAR.    1930: Bedside shift change report given to NAVEED Desouza (oncoming nurse) by Duane Bridge, RN (offgoing nurse). Report included the following information SBAR, MAR, and Recent Results. Charting and patient care of [facility] by Tato Godoy RN from [phone] to [phone] was supervised and reviewed by this RN.

## 2022-09-26 NOTE — PROGRESS NOTES
Nephrology Progress Note  Lj Wilson  Date of Admission : 9/24/2022    CC:  Follow up for ESRD       Assessment and Plan     ESRD-HD :  - dialyzes TTS at [facility]  - HD on Tuesday per routine    Uncontrolled HTN   - missed HD Rx on 9/17   - hx of cocaine use : ordered UDS  - check Pheo screen, ARR   - restarted Losartan      Anemia in CKD:  - hgb stable, no need for ABI     Sec HPTH   - continue home binders  - check PTH          Interval History:  Seen and examined. BP elevated. Out pt BP readings reviewqed  He denies missing HD, but records show that he missed one Rx     Current Medications: all current  Medications have been eviewed in EPIC  Review of Systems: Pertinent items are noted in HPI. Objective:  Vitals:    Vitals:    09/26/22 0640 09/26/22 0719 09/26/22 0800 09/26/22 1000   BP: (!) 199/91 (!) 195/95 (!) 177/94    Pulse: 67 73 72 71   Resp:   (!) 73    Temp:   98.3 °F (36.8 °C)    SpO2:   100%    Weight:       Height:         Intake and Output:  09/26 0701 - 09/26 1900  In: 240 [P.O.:240]  Out: -   09/24 1901 - 09/26 0700  In: 1410 [P.O.:1410]  Out: 3000     Physical Examination:    General: NAD,Conversant   Neck:  Supple, no mass  Resp:  Lungs CTA B/L, no wheezing , normal respiratory effort  CV:  RRR,  no murmur or rub,trace LE edema  GI:  Soft, NT, + Bowel sounds, no hepatosplenomegaly  Neurologic:  Non focal  Psych:             AAO x 3 appropriate affect   Skin:  No Rash  Access:           J PC    []    High complexity decision making was performed  []    Patient is at high-risk of decompensation with multiple organ involvement    Lab Data Personally Reviewed: I have reviewed all the pertinent labs, microbiology data and radiology studies during assessment.     Recent Labs     09/26/22  0422 09/25/22  0328 09/24/22  1200   * 135* 135*   K 4.4 3.6 4.3    102 102   CO2 24 27 25   GLU 72 134* 96   BUN 46* 31* 74*   CREA 9.76* 7.52* 12.50*   CA 8.4* 8.3* 9.4   MG 1.9 1.9  --    PHOS 5.3* 3.7  --    ALB 3.0* 3.4* 3.5   ALT 13 15 15       Recent Labs     09/26/22  0422 09/25/22  0328 09/24/22  1200   WBC 5.9 8.1 6.6   HGB 9.3* 11.2* 11.7*   HCT 28.2* 33.6* 34.6*   * 119* 147*       No results found for: SDES  Lab Results   Component Value Date/Time    Culture result: NO GROWTH 5 DAYS 05/18/2022 06:06 AM    Culture result: No growth (<1,000 CFU/ML) 03/22/2022 04:11 PM     Recent Results (from the past 24 hour(s))   LIPASE    Collection Time: 09/26/22  4:22 AM   Result Value Ref Range    Lipase 417 (H) 73 - 393 U/L   CBC WITH AUTOMATED DIFF    Collection Time: 09/26/22  4:22 AM   Result Value Ref Range    WBC 5.9 4.1 - 11.1 K/uL    RBC 3.24 (L) 4.10 - 5.70 M/uL    HGB 9.3 (L) 12.1 - 17.0 g/dL    HCT 28.2 (L) 36.6 - 50.3 %    MCV 87.0 80.0 - 99.0 FL    MCH 28.7 26.0 - 34.0 PG    MCHC 33.0 30.0 - 36.5 g/dL    RDW 12.6 11.5 - 14.5 %    PLATELET 137 (L) 138 - 400 K/uL    MPV 12.1 8.9 - 12.9 FL    NRBC 0.0 0  WBC    ABSOLUTE NRBC 0.00 0.00 - 0.01 K/uL    NEUTROPHILS 41 32 - 75 %    LYMPHOCYTES 39 12 - 49 %    MONOCYTES 13 5 - 13 %    EOSINOPHILS 6 0 - 7 %    BASOPHILS 1 0 - 1 %    IMMATURE GRANULOCYTES 0 0.0 - 0.5 %    ABS. NEUTROPHILS 2.5 1.8 - 8.0 K/UL    ABS. LYMPHOCYTES 2.3 0.8 - 3.5 K/UL    ABS. MONOCYTES 0.8 0.0 - 1.0 K/UL    ABS. EOSINOPHILS 0.3 0.0 - 0.4 K/UL    ABS. BASOPHILS 0.0 0.0 - 0.1 K/UL    ABS. IMM.  GRANS. 0.0 0.00 - 0.04 K/UL    DF AUTOMATED     METABOLIC PANEL, COMPREHENSIVE    Collection Time: 09/26/22  4:22 AM   Result Value Ref Range    Sodium 135 (L) 136 - 145 mmol/L    Potassium 4.4 3.5 - 5.1 mmol/L    Chloride 103 97 - 108 mmol/L    CO2 24 21 - 32 mmol/L    Anion gap 8 5 - 15 mmol/L    Glucose 72 65 - 100 mg/dL    BUN 46 (H) 6 - 20 MG/DL    Creatinine 9.76 (H) 0.70 - 1.30 MG/DL    BUN/Creatinine ratio 5 (L) 12 - 20      GFR est AA 7 (L) >60 ml/min/1.73m2    GFR est non-AA 6 (L) >60 ml/min/1.73m2    Calcium 8.4 (L) 8.5 - 10.1 MG/DL    Bilirubin, total 0.4 0.2 - 1.0 MG/DL    ALT (SGPT) 13 12 - 78 U/L    AST (SGOT) 10 (L) 15 - 37 U/L    Alk. phosphatase 61 45 - 117 U/L    Protein, total 6.6 6.4 - 8.2 g/dL    Albumin 3.0 (L) 3.5 - 5.0 g/dL    Globulin 3.6 2.0 - 4.0 g/dL    A-G Ratio 0.8 (L) 1.1 - 2.2     MAGNESIUM    Collection Time: 09/26/22  4:22 AM   Result Value Ref Range    Magnesium 1.9 1.6 - 2.4 mg/dL   PHOSPHORUS    Collection Time: 09/26/22  4:22 AM   Result Value Ref Range    Phosphorus 5.3 (H) 2.6 - 4.7 MG/DL               David Oneal MD  [address]  Phone - [phone]   Fax - [phone]  [email or web address]

## 2022-09-26 NOTE — PROGRESS NOTES
[address] [facility] Adult  Hospitalist Group                                                                                          Hospitalist Progress Note  Michele Davidson MD  Answering service: [phone] from in house phone        Date of Service:  2022  NAME:  Betty Tyson  :  1965  MRN:  [id number]      Admission Summary:     Patient presents with malignant hypertension, previously requiring Cardene drip. Interval history / Subjective:     No acute complaint today. Assessment & Plan:     Malignant hypertension  -Initially requiring Cardene drip, has been weaned off 2022  -On Bumex, clonidine, Coreg, hydralazine, Cardura, minoxidil, and losartan added today  -Urine drug screen, [place] for pheochromocytoma, ARR per nephrology    Mild pancreatitis  -CT abdomen and pelvis showed 3 mm gallstone with mild pancreatic enlargement with minimal peripancreatic fat stranding  -General surgery evaluated the patient, no acute gallbladder pathology as per general surgery  -Continue diet as tolerated    End-stage renal disease  -On hemodialysis   -Routine HD in a.m. Anemia of chronic kidney disease  -Hemoglobin stable     Code status: Full  Prophylaxis: SCDs  Care Plan discussed with: Patient  Anticipated Disposition: 24 to 48 hours     Hospital Problems  Date Reviewed: 1/15/2021            Codes Class Noted POA    Malignant hypertension ICD-10-CM: I10  ICD-9-CM: 401.0  2022 Unknown             Review of Systems:   A comprehensive review of systems was negative except for that written in the HPI. Vital Signs:    Last 24hrs VS reviewed since prior progress note.  Most recent are:  Visit Vitals  BP (!) (P) 191/111 (BP 1 Location: Left upper arm, BP Patient Position: Sitting)   Pulse (P) 69   Temp (P) 97.5 °F (36.4 °C)   Resp (P) 18   Ht 5' 9\" (1.753 m)   Wt 74.1 kg (163 lb 5.8 oz)   SpO2 100%   BMI 24.12 kg/m²         Intake/Output Summary (Last 24 hours) at 9/26/2022 1331  Last data filed at 9/26/2022 0800  Gross per 24 hour   Intake 930 ml   Output 0 ml   Net 930 ml        Physical Examination:     I had a face to face encounter with this patient and independently examined them on 9/26/2022 as outlined below:          Constitutional:  No acute distress, cooperative, pleasant    ENT:  Oral mucosa moist, oropharynx benign. Resp:  CTA bilaterally. No wheezing/rhonchi/rales. No accessory muscle use. CV:  Regular rhythm, normal rate, no murmurs, gallops, rubs    GI:  Soft, non distended, non tender. normoactive bowel sounds, no hepatosplenomegaly     Musculoskeletal:  No edema, warm, 2+ pulses throughout    Neurologic:  Moves all extremities. AAOx3, CN II-XII reviewed            Data Review:    Review and/or order of clinical lab test      Labs:     Recent Labs     09/26/22 0422 09/25/22 0328   WBC 5.9 8.1   HGB 9.3* 11.2*   HCT 28.2* 33.6*   * 119*     Recent Labs     09/26/22 0422 09/25/22 0328 09/24/22  1200   * 135* 135*   K 4.4 3.6 4.3    102 102   CO2 24 27 25   BUN 46* 31* 74*   CREA 9.76* 7.52* 12.50*   GLU 72 134* 96   CA 8.5  8.4* 8.3* 9.4   MG 1.9 1.9  --    PHOS 5.3* 3.7  --      Recent Labs     09/26/22 0422 09/25/22 0328 09/24/22  1200   ALT 13 15 15   AP 61 70 71   TBILI 0.4 0.4 0.5   TP 6.6 7.6 7.8   ALB 3.0* 3.4* 3.5   GLOB 3.6 4.2* 4.3*   LPSE 417*  --  619*     No results for input(s): INR, PTP, APTT, INREXT in the last 72 hours. No results for input(s): FE, TIBC, PSAT, FERR in the last 72 hours. Lab Results   Component Value Date/Time    Folate 7.9 05/18/2022 12:18 PM      No results for input(s): PH, PCO2, PO2 in the last 72 hours.   Recent Labs     09/24/22  1200        Lab Results   Component Value Date/Time    Cholesterol, total 170 06/29/2020 03:46 AM    HDL Cholesterol 49 06/29/2020 03:46 AM    LDL, calculated 73.4 06/29/2020 03:46 AM    Triglyceride 238 (H) 06/29/2020 03:46 AM CHOL/HDL Ratio 3.5 06/29/2020 03:46 AM     Lab Results   Component Value Date/Time    Glucose (POC) 97 05/31/2022 12:23 PM    Glucose (POC) 72 05/31/2022 08:34 AM    Glucose (POC) 86 05/30/2022 08:19 PM    Glucose (POC) 78 05/30/2022 07:50 PM    Glucose (POC) 101 (H) 03/29/2022 11:23 AM     Lab Results   Component Value Date/Time    Color YELLOW/STRAW 03/22/2022 04:11 PM    Appearance TURBID (A) 03/22/2022 04:11 PM    Specific gravity 1.021 03/22/2022 04:11 PM    pH (UA) 7.5 03/22/2022 04:11 PM    Protein >300 (A) 03/22/2022 04:11 PM    Glucose 100 (A) 03/22/2022 04:11 PM    Ketone Negative 03/22/2022 04:11 PM    Bilirubin Negative 03/22/2022 04:11 PM    Urobilinogen 0.2 03/22/2022 04:11 PM    Nitrites Negative 03/22/2022 04:11 PM    Leukocyte Esterase MODERATE (A) 03/22/2022 04:11 PM    Epithelial cells MODERATE (A) 03/22/2022 04:11 PM    Bacteria Negative 03/22/2022 04:11 PM    WBC 10-20 03/22/2022 04:11 PM    RBC 0-5 03/22/2022 04:11 PM         Medications Reviewed:     Current Facility-Administered Medications   Medication Dose Route Frequency    oxyCODONE IR (ROXICODONE) tablet 5 mg  5 mg Oral Q4H PRN    minoxidiL (LONITEN) tablet 5 mg  5 mg Oral BID    doxazosin (CARDURA) tablet 8 mg  8 mg Oral QHS    sevelamer carbonate (RENVELA) tab 800 mg  800 mg Oral TID    hydrALAZINE (APRESOLINE) 20 mg/mL injection 10 mg  10 mg IntraVENous Q6H PRN    labetaloL (NORMODYNE;TRANDATE) injection 10 mg  10 mg IntraVENous Q6H PRN    sodium chloride (NS) flush 5-40 mL  5-40 mL IntraVENous Q8H    sodium chloride (NS) flush 5-40 mL  5-40 mL IntraVENous PRN    acetaminophen (TYLENOL) tablet 650 mg  650 mg Oral Q6H PRN    Or    acetaminophen (TYLENOL) suppository 650 mg  650 mg Rectal Q6H PRN    polyethylene glycol (MIRALAX) packet 17 g  17 g Oral DAILY PRN    ondansetron (ZOFRAN ODT) tablet 4 mg  4 mg Oral Q8H PRN    Or    ondansetron (ZOFRAN) injection 4 mg  4 mg IntraVENous Q6H PRN    heparin (porcine) injection 5,000 Units 5,000 Units SubCUTAneous Q8H    bumetanide (BUMEX) tablet 2 mg  2 mg Oral DAILY    buprenorphine-naloxone (SUBOXONE) 8-2mg SL tablet  1 Tablet SubLINGual BID    calcium carbonate (TUMS) chewable tablet 600 mg [elemental]  600 mg Oral TIDPC    hydrALAZINE (APRESOLINE) tablet 100 mg  100 mg Oral TID    pantoprazole (PROTONIX) tablet 40 mg  40 mg Oral ACB    cloNIDine HCL (CATAPRES) tablet 0.1 mg  0.1 mg Oral BID    carvediloL (COREG) tablet 25 mg  25 mg Oral BID WITH MEALS     ______________________________________________________________________  EXPECTED LENGTH OF STAY: 2d 21h  ACTUAL LENGTH OF STAY:          2                 Sofia Slater MD

## 2022-09-26 NOTE — PROGRESS NOTES
0800: Bedside and Verbal shift change report given to [employer] (oncoming nurse) by Candi Herrera (offgoing nurse). Report included the following information SBAR, Kardex, Intake/Output, MAR, Cardiac Rhythm SR, and Alarm Parameters . 0935: Patient requesting additional pain medicine. MD paged. 1100: Bedside and Verbal shift change report given to [address] [place] [address] (oncoming nurse) by Dmitri Bennett RN (offgoing nurse). Report included the following information SBAR, Kardex, Procedure Summary, MAR, Recent Results, Cardiac Rhythm NSR, and Alarm Parameters .

## 2022-09-26 NOTE — PROGRESS NOTES
CM attempted to speak with patient via room phone, continues to ring. Call placed to patient's mobile phone and a vm was left. Call placed to patient's dtr, Ruthy and person who answered stated they did not speak english.  Call placed to patient's sister Kimberly Marquez, voice messages left.    4:09 PM  LISETH Nielsen

## 2022-09-27 ENCOUNTER — APPOINTMENT (OUTPATIENT)
Dept: CT IMAGING | Age: 57
DRG: 304 | End: 2022-09-27
Attending: INTERNAL MEDICINE
Payer: MEDICARE

## 2022-09-27 LAB
ANION GAP SERPL CALC-SCNC: 8 MMOL/L (ref 5–15)
ATRIAL RATE: 73 BPM
BASOPHILS # BLD: 0 K/UL (ref 0–0.1)
BASOPHILS NFR BLD: 1 % (ref 0–1)
BUN SERPL-MCNC: 66 MG/DL (ref 6–20)
BUN/CREAT SERPL: 6 (ref 12–20)
CALCIUM SERPL-MCNC: 8.3 MG/DL (ref 8.5–10.1)
CALCULATED P AXIS, ECG09: 64 DEGREES
CALCULATED R AXIS, ECG10: 36 DEGREES
CALCULATED T AXIS, ECG11: 143 DEGREES
CANCER AG19-9 SERPL-ACNC: 15 U/ML (ref 0–35)
CHLORIDE SERPL-SCNC: 100 MMOL/L (ref 97–108)
CO2 SERPL-SCNC: 25 MMOL/L (ref 21–32)
CREAT SERPL-MCNC: 11.7 MG/DL (ref 0.7–1.3)
DIAGNOSIS, 93000: NORMAL
DIFFERENTIAL METHOD BLD: ABNORMAL
EOSINOPHIL # BLD: 0.3 K/UL (ref 0–0.4)
EOSINOPHIL NFR BLD: 7 % (ref 0–7)
ERYTHROCYTE [DISTWIDTH] IN BLOOD BY AUTOMATED COUNT: 12.9 % (ref 11.5–14.5)
GLUCOSE BLD STRIP.AUTO-MCNC: 97 MG/DL (ref 65–117)
GLUCOSE SERPL-MCNC: 151 MG/DL (ref 65–100)
HCT VFR BLD AUTO: 27.7 % (ref 36.6–50.3)
HGB BLD-MCNC: 8.8 G/DL (ref 12.1–17)
IMM GRANULOCYTES # BLD AUTO: 0 K/UL (ref 0–0.04)
IMM GRANULOCYTES NFR BLD AUTO: 0 % (ref 0–0.5)
LYMPHOCYTES # BLD: 2 K/UL (ref 0.8–3.5)
LYMPHOCYTES NFR BLD: 41 % (ref 12–49)
MCH RBC QN AUTO: 28.6 PG (ref 26–34)
MCHC RBC AUTO-ENTMCNC: 31.8 G/DL (ref 30–36.5)
MCV RBC AUTO: 89.9 FL (ref 80–99)
MONOCYTES # BLD: 0.5 K/UL (ref 0–1)
MONOCYTES NFR BLD: 11 % (ref 5–13)
NEUTS SEG # BLD: 1.8 K/UL (ref 1.8–8)
NEUTS SEG NFR BLD: 40 % (ref 32–75)
NRBC # BLD: 0 K/UL (ref 0–0.01)
NRBC BLD-RTO: 0 PER 100 WBC
P-R INTERVAL, ECG05: 182 MS
PLATELET # BLD AUTO: 85 K/UL (ref 150–400)
PMV BLD AUTO: 11.5 FL (ref 8.9–12.9)
POTASSIUM SERPL-SCNC: 4.6 MMOL/L (ref 3.5–5.1)
Q-T INTERVAL, ECG07: 456 MS
QRS DURATION, ECG06: 100 MS
QTC CALCULATION (BEZET), ECG08: 502 MS
RBC # BLD AUTO: 3.08 M/UL (ref 4.1–5.7)
RBC MORPH BLD: ABNORMAL
SERVICE CMNT-IMP: NORMAL
SODIUM SERPL-SCNC: 133 MMOL/L (ref 136–145)
VENTRICULAR RATE, ECG03: 73 BPM
WBC # BLD AUTO: 4.6 K/UL (ref 4.1–11.1)

## 2022-09-27 PROCEDURE — 74011250636 HC RX REV CODE- 250/636: Performed by: HOSPITALIST

## 2022-09-27 PROCEDURE — 93005 ELECTROCARDIOGRAM TRACING: CPT

## 2022-09-27 PROCEDURE — 65660000001 HC RM ICU INTERMED STEPDOWN

## 2022-09-27 PROCEDURE — 74011000636 HC RX REV CODE- 636: Performed by: RADIOLOGY

## 2022-09-27 PROCEDURE — 80048 BASIC METABOLIC PNL TOTAL CA: CPT

## 2022-09-27 PROCEDURE — 5A1D70Z PERFORMANCE OF URINARY FILTRATION, INTERMITTENT, LESS THAN 6 HOURS PER DAY: ICD-10-PCS | Performed by: INTERNAL MEDICINE

## 2022-09-27 PROCEDURE — 85025 COMPLETE CBC W/AUTO DIFF WBC: CPT

## 2022-09-27 PROCEDURE — 74011250637 HC RX REV CODE- 250/637: Performed by: FAMILY MEDICINE

## 2022-09-27 PROCEDURE — 74011250637 HC RX REV CODE- 250/637: Performed by: INTERNAL MEDICINE

## 2022-09-27 PROCEDURE — 36415 COLL VENOUS BLD VENIPUNCTURE: CPT

## 2022-09-27 PROCEDURE — 74011250637 HC RX REV CODE- 250/637: Performed by: HOSPITALIST

## 2022-09-27 PROCEDURE — 74011250636 HC RX REV CODE- 250/636: Performed by: INTERNAL MEDICINE

## 2022-09-27 PROCEDURE — 65270000046 HC RM TELEMETRY

## 2022-09-27 PROCEDURE — 74011000250 HC RX REV CODE- 250: Performed by: INTERNAL MEDICINE

## 2022-09-27 PROCEDURE — 71275 CT ANGIOGRAPHY CHEST: CPT

## 2022-09-27 PROCEDURE — 82962 GLUCOSE BLOOD TEST: CPT

## 2022-09-27 PROCEDURE — 90935 HEMODIALYSIS ONE EVALUATION: CPT

## 2022-09-27 PROCEDURE — 74011000250 HC RX REV CODE- 250: Performed by: HOSPITALIST

## 2022-09-27 RX ORDER — MINOXIDIL 2.5 MG/1
10 TABLET ORAL 2 TIMES DAILY
Status: DISCONTINUED | OUTPATIENT
Start: 2022-09-27 | End: 2022-09-28

## 2022-09-27 RX ADMIN — CLONIDINE HYDROCHLORIDE 0.2 MG: 0.2 TABLET ORAL at 17:35

## 2022-09-27 RX ADMIN — BUPRENORPHINE HYDROCHLORIDE AND NALOXONE HYDROCHLORIDE DIHYDRATE 1 TABLET: 8; 2 TABLET SUBLINGUAL at 17:35

## 2022-09-27 RX ADMIN — SODIUM CHLORIDE 5 MG/HR: 900 INJECTION, SOLUTION INTRAVENOUS at 07:20

## 2022-09-27 RX ADMIN — CALCIUM CARBONATE (ANTACID) CHEW TAB 500 MG 600 MG: 500 CHEW TAB at 14:15

## 2022-09-27 RX ADMIN — HYDRALAZINE HYDROCHLORIDE 100 MG: 50 TABLET, FILM COATED ORAL at 21:56

## 2022-09-27 RX ADMIN — CARVEDILOL 25 MG: 12.5 TABLET, FILM COATED ORAL at 10:58

## 2022-09-27 RX ADMIN — SODIUM CHLORIDE, PRESERVATIVE FREE 10 ML: 5 INJECTION INTRAVENOUS at 17:36

## 2022-09-27 RX ADMIN — LABETALOL HYDROCHLORIDE 10 MG: 5 INJECTION INTRAVENOUS at 06:26

## 2022-09-27 RX ADMIN — HYDRALAZINE HYDROCHLORIDE 100 MG: 50 TABLET, FILM COATED ORAL at 17:35

## 2022-09-27 RX ADMIN — HEPARIN SODIUM 5000 UNITS: 5000 INJECTION INTRAVENOUS; SUBCUTANEOUS at 14:14

## 2022-09-27 RX ADMIN — DOXAZOSIN 8 MG: 2 TABLET ORAL at 21:56

## 2022-09-27 RX ADMIN — MINOXIDIL 10 MG: 2.5 TABLET ORAL at 17:35

## 2022-09-27 RX ADMIN — CARVEDILOL 25 MG: 12.5 TABLET, FILM COATED ORAL at 17:35

## 2022-09-27 RX ADMIN — SODIUM CHLORIDE, PRESERVATIVE FREE 10 ML: 5 INJECTION INTRAVENOUS at 21:56

## 2022-09-27 RX ADMIN — IOPAMIDOL 70 ML: 755 INJECTION, SOLUTION INTRAVENOUS at 06:49

## 2022-09-27 RX ADMIN — SODIUM CHLORIDE, PRESERVATIVE FREE 10 ML: 5 INJECTION INTRAVENOUS at 07:29

## 2022-09-27 RX ADMIN — HYDRALAZINE HYDROCHLORIDE 100 MG: 50 TABLET, FILM COATED ORAL at 10:53

## 2022-09-27 RX ADMIN — OXYCODONE 5 MG: 5 TABLET ORAL at 14:14

## 2022-09-27 RX ADMIN — CALCIUM CARBONATE (ANTACID) CHEW TAB 500 MG 600 MG: 500 CHEW TAB at 10:54

## 2022-09-27 RX ADMIN — SEVELAMER CARBONATE 800 MG: 800 TABLET, FILM COATED ORAL at 17:35

## 2022-09-27 RX ADMIN — SEVELAMER CARBONATE 800 MG: 800 TABLET, FILM COATED ORAL at 21:56

## 2022-09-27 RX ADMIN — CLONIDINE HYDROCHLORIDE 0.2 MG: 0.2 TABLET ORAL at 10:53

## 2022-09-27 RX ADMIN — HYDRALAZINE HYDROCHLORIDE 10 MG: 20 INJECTION INTRAMUSCULAR; INTRAVENOUS at 05:58

## 2022-09-27 RX ADMIN — SEVELAMER CARBONATE 800 MG: 800 TABLET, FILM COATED ORAL at 10:54

## 2022-09-27 RX ADMIN — CALCIUM CARBONATE (ANTACID) CHEW TAB 500 MG 600 MG: 500 CHEW TAB at 17:34

## 2022-09-27 RX ADMIN — MINOXIDIL 5 MG: 2.5 TABLET ORAL at 10:53

## 2022-09-27 RX ADMIN — BUMETANIDE 2 MG: 1 TABLET ORAL at 10:53

## 2022-09-27 NOTE — PROGRESS NOTES
0730: Bedside shift change report given to Yaniv Cisneros (oncoming nurse) by Leigh Ann Ocampo  (offgoing nurse). Report included the following information SBAR, MAR, and Recent Results. 1245: /68, cardene drip turned off.     1930: Bedside shift change report given to NAVEED Desouza (oncoming nurse) by Yaniv Cisneros (offgoing nurse). Report included the following information SBAR, MAR, and Recent Results. Charting and patient care of [facility] by Kwaku Oshea RN from 0730 to 0519 was supervised and reviewed by this RN.

## 2022-09-27 NOTE — PROGRESS NOTES
Problem: Falls - Risk of  Goal: *Absence of Falls  Description: Document [place] Fall Risk and appropriate interventions in the flowsheet.   Outcome: Progressing Towards Goal  Note: Fall Risk Interventions:            Medication Interventions: Teach patient to arise slowly

## 2022-09-27 NOTE — PROCEDURES
Hemodialysis / [phone]    Vitals Pre Post Assessment Pre Post   BP BP: (!) 200/88 (09/27/22 0600)   165/70 LOC AXOX4 NO CHANGE   HR Pulse (Heart Rate): 76 (09/27/22 0600) 102 Lungs CLEAR NO CHANGE   Resp Resp Rate: 18 (09/27/22 0325) 17 Cardiac NSR NO CHANGE   Temp Temp: 97.8 °F (36.6 °C) (09/27/22 0325) 97.5 Skin WDI NO CHANGE   Weight    Edema GENERALIZED NO CHANGE   Tele status   Pain Pain Intensity 1: 0 (09/26/22 2315) PT DENIES     Orders   Duration: Start: 8270 End: 1030 Total: 3.5   Dialyzer: Dialyzer/Set Up Inspection: Revaclear (09/27/22 0600)   K Bath: Dialysate K (mEq/L): 2 (09/27/22 0600)   Ca Bath: Dialysate CA (mEq/L): 2.5 (09/27/22 0600)   Na: Dialysate NA (mEq/L): 140 (09/27/22 0600)   Bicarb: Dialysate HCO3 (mEq/L): 35 (09/27/22 0600)   Target Fluid Removal: Goal/Amount of Fluid to Remove (mL): 3000 mL (09/27/22 0600)     Access   Type & Location: [facility] / PC : Dressing CDI. No s/s of infection. Both lumens aspirate & flush well. Running well at .     Comments:                                        Labs   HBsAg (Antigen) / date:                          09/15/22 NEGATIVE                    HBsAb (Antibody) / date: 03/13/22 [facility]   Source:    Obtained/Reviewed  Critical Results Called HGB   Date Value Ref Range Status   09/27/2022 8.8 (L) 12.1 - 17.0 g/dL Final     Potassium   Date Value Ref Range Status   09/27/2022 4.6 3.5 - 5.1 mmol/L Final     Calcium   Date Value Ref Range Status   09/27/2022 8.3 (L) 8.5 - 10.1 MG/DL Final     BUN   Date Value Ref Range Status   09/27/2022 66 (H) 6 - 20 MG/DL Final     Creatinine   Date Value Ref Range Status   09/27/2022 11.70 (H) 0.70 - 1.30 MG/DL Final        Meds Given   Name Dose Route                    Adequacy / Fluid    Total Liters Process: 68   Net Fluid Removed: 3000 ml      Comments   Time Out Done:   (Time) 0600 YES   Admitting Diagnosis: HYPERTENSIVE CRISIS     Consent obtained/signed: Informed Consent Verified: Yes (09/27/22 0600) Machine / RO # Machine Number: [id number] (09/27/22 0600)   Primary Nurse Rpt Pre: Dorian Mayorga RN   Primary Nurse Rpt Post: Annelise Robles RN   Pt Education: [facility] Plan: FOLLOW MD [facility]   Pts outpatient clinic:      Tx Summary  SBAR received from Primary RN. Pt arrived to HD [address]. Consent signed & on file.   0600: Each catheter limb disinfected per p&p, caps removed, hubs disinfected per p&p. Each lumen aspirated for blood return and flushed with Normal Saline per policy. Labs drawn per request/ order. 5270 VSS. Dialysis Tx initiated. 0630 PT COMPLAINT OF CHEST PAIN AND CONGESTIONS, BP SEVERELY ELEVATED. RD CONTACTED, FULL ASSESSMENT DONE AND RAPID CALLED WITH NEURO CHECK. DIALYSIS STOPPED AND PT RINSED BACK. MD ARRIVED, MORE RX GIVE, CT ORDERED, AND WELL AS EKG. I ACCOMPANIED PT TO CT WITH RRN. PT DID RECEIVE CONTRAST WITH CT, SO HD STARED AGAIN, AS WELL AS RX DRIP GIVEN TO HELP LOWER BP.   0900 PT DOING BETTER, BP STILL ELEVATED BUT BETTER. PT RESTLESS, AND A BIT ANXIOUS. 0930 PT IS MUCH BETTER, SLEEPING WELL, AND BP MORE STABLE  1030: Tx ended. VSS. Each dialysis catheter limb disinfected per p&p, all possible blood returned per p&p, and each dialysis hub disinfected per p&p. Each lumen flushed, post dialysis catheter saline dwell instilled per order, and caps applied. Bed locked and in the lowest position, call bell and belongings in reach. SBAR given to Primary, RN. Patient is stable at time of their/ my departure. All Dialysis related medications have been reviewed.        Comments:

## 2022-09-27 NOTE — PROGRESS NOTES
1930: Bedside and Verbal shift change report given to NAVEED Desouza (oncoming nurse) by Ree Campo RN (offgoing nurse). Report included the following information SBAR, Kardex, Intake/Output, Recent Results, and Cardiac Rhythm SR-ST .      0630: Rapid Response called this AM d/t HTN emergency and chest pain. EKG done, labs drawn, and CTA chest obtained. Patient about to start HD.

## 2022-09-27 NOTE — PROGRESS NOTES
Nephrology Progress Note  Kodi Capone  Date of Admission : 9/24/2022    CC:  Follow up for ESRD       Assessment and Plan     ESRD-HD :  - dialyzes TTS at [facility]  - HD again tomorrow     Uncontrolled HTN   - missed HD Rx on 9/17   - hx of cocaine use : negative UDS  - follow up Pheo screen, ARR   - increased minoxidil and additional HD planned for tomorrow      Anemia in CKD:  - hgb stable, no need for ABI     Sec HPTH   - continue home binders  - check PTH          Interval History:  Seen and examined. Events this morning during dialysis noted and d/w dialysis RN and unit staff. He is asymptomatic and BP improving     Current Medications: all current  Medications have been eviewed in EPIC  Review of Systems: Pertinent items are noted in HPI. Objective:  Vitals:    Vitals:    09/27/22 0945 09/27/22 1000 09/27/22 1015 09/27/22 1030   BP: (!) 178/67 (!) 184/69 (!) 144/56 (!) 153/61   Pulse: 78 76 76 72   Resp:       Temp:       SpO2:       Weight:       Height:         Intake and Output:  09/27 0701 - 09/27 1900  In: -   Out: 3000   09/25 1901 - 09/27 0700  In: 1380 [P.O.:1380]  Out: 120 [Urine:120]    Physical Examination:    General: NAD,Conversant   Neck:  Supple, no mass  Resp:  Lungs CTA B/L, no wheezing , normal respiratory effort  CV:  RRR,  no murmur or rub,trace LE edema  GI:  Soft, NT, + Bowel sounds, no hepatosplenomegaly  Neurologic:  Non focal  Psych:             AAO x 3 appropriate affect   Skin:  No Rash  Access:           [facility]    []    High complexity decision making was performed  []    Patient is at high-risk of decompensation with multiple organ involvement    Lab Data Personally Reviewed: I have reviewed all the pertinent labs, microbiology data and radiology studies during assessment.     Recent Labs     09/27/22  0553 09/26/22  0422 09/25/22  0328 09/24/22  1200   * 135* 135* 135*   K 4.6 4.4 3.6 4.3    103 102 102   CO2 25 24 27 25   * 72 134* 96 BUN 66* 46* 31* 74*   CREA 11.70* 9.76* 7.52* 12.50*   CA 8.3* 8.5  8.4* 8.3* 9.4   MG  --  1.9 1.9  --    PHOS  --  5.3* 3.7  --    ALB  --  3.0* 3.4* 3.5   ALT  --  13 15 15       Recent Labs     09/27/22  0553 09/26/22  0422 09/25/22  0328   WBC 4.6 5.9 8.1   HGB 8.8* 9.3* 11.2*   HCT 27.7* 28.2* 33.6*   PLT 85* 103* 119*       No results found for: SDES  Lab Results   Component Value Date/Time    Culture result: NO GROWTH 5 DAYS 05/18/2022 06:06 AM    Culture result: No growth (<1,000 CFU/ML) 03/22/2022 04:11 PM     Recent Results (from the past 24 hour(s))   DRUG SCREEN, URINE    Collection Time: 09/26/22  9:57 PM   Result Value Ref Range    AMPHETAMINES Negative NEG      BARBITURATES Negative NEG      BENZODIAZEPINES Negative NEG      COCAINE Negative NEG      METHADONE Negative NEG      OPIATES Negative NEG      PCP(PHENCYCLIDINE) Negative NEG      THC (TH-CANNABINOL) Negative NEG      Drug screen comment (NOTE)    METABOLIC PANEL, BASIC    Collection Time: 09/27/22  5:53 AM   Result Value Ref Range    Sodium 133 (L) 136 - 145 mmol/L    Potassium 4.6 3.5 - 5.1 mmol/L    Chloride 100 97 - 108 mmol/L    CO2 25 21 - 32 mmol/L    Anion gap 8 5 - 15 mmol/L    Glucose 151 (H) 65 - 100 mg/dL    BUN 66 (H) 6 - 20 MG/DL    Creatinine 11.70 (H) 0.70 - 1.30 MG/DL    BUN/Creatinine ratio 6 (L) 12 - 20      GFR est AA 5 (L) >60 ml/min/1.73m2    GFR est non-AA 5 (L) >60 ml/min/1.73m2    Calcium 8.3 (L) 8.5 - 10.1 MG/DL   CBC WITH AUTOMATED DIFF    Collection Time: 09/27/22  5:53 AM   Result Value Ref Range    WBC 4.6 4.1 - 11.1 K/uL    RBC 3.08 (L) 4.10 - 5.70 M/uL    HGB 8.8 (L) 12.1 - 17.0 g/dL    HCT 27.7 (L) 36.6 - 50.3 %    MCV 89.9 80.0 - 99.0 FL    MCH 28.6 26.0 - 34.0 PG    MCHC 31.8 30.0 - 36.5 g/dL    RDW 12.9 11.5 - 14.5 %    PLATELET 85 (L) 131 - 400 K/uL    MPV 11.5 8.9 - 12.9 FL    NRBC 0.0 0  WBC    ABSOLUTE NRBC 0.00 0.00 - 0.01 K/uL    NEUTROPHILS 40 32 - 75 %    LYMPHOCYTES 41 12 - 49 % MONOCYTES 11 5 - 13 %    EOSINOPHILS 7 0 - 7 %    BASOPHILS 1 0 - 1 %    IMMATURE GRANULOCYTES 0 0.0 - 0.5 %    ABS. NEUTROPHILS 1.8 1.8 - 8.0 K/UL    ABS. LYMPHOCYTES 2.0 0.8 - 3.5 K/UL    ABS. MONOCYTES 0.5 0.0 - 1.0 K/UL    ABS. EOSINOPHILS 0.3 0.0 - 0.4 K/UL    ABS. BASOPHILS 0.0 0.0 - 0.1 K/UL    ABS. IMM. GRANS. 0.0 0.00 - 0.04 K/UL    DF SMEAR SCANNED      RBC COMMENTS NORMOCYTIC, NORMOCHROMIC     EKG, 12 LEAD, INITIAL    Collection Time: 09/27/22  6:34 AM   Result Value Ref Range    Ventricular Rate 73 BPM    Atrial Rate 73 BPM    P-R Interval 182 ms    QRS Duration 100 ms    Q-T Interval 456 ms    QTC Calculation (Bezet) 502 ms    Calculated P Axis 64 degrees    Calculated R Axis 36 degrees    Calculated T Axis 143 degrees    Diagnosis       Normal sinus rhythm  Moderate voltage criteria for LVH, may be normal variant  T wave abnormality, consider lateral ischemia  Prolonged QT  When compared with ECG of 24-SEP-2022 12:06,  No significant change was found     GLUCOSE, POC    Collection Time: 09/27/22  6:48 AM   Result Value Ref Range    Glucose (POC) 97 65 - 117 mg/dL    Performed by Nevaeh Cavazos MD  [address]  Phone - [phone]   Fax - [phone]  [email or web address]

## 2022-09-27 NOTE — PROGRESS NOTES
[address] [facility]                                                                                          Hospitalist Progress Note  Sofia Slater MD  Answering service: [phone] from in house phone        Date of Service:  2022  NAME:  Lj Wilson  :  1965  MRN:  [id number]      Admission Summary:     Patient presents with malignant hypertension, previously requiring Cardene drip. Interval history / Subjective:     Rapid response overnight due to patient complaining of chest pain with elevated blood pressure. Cardene drip was resumed overnight. Assessment & Plan:     Malignant hypertension  -Cardene drip resumed back early this a.m. after being off  2022  -On Bumex, clonidine, Coreg, hydralazine, Cardura, minoxidil, and losartan  -Nephrology adjusting medications  -Urine drug screen negative, Screening for pheochromocytoma, ARR per nephrology    Mild pancreatitis  -CT abdomen and pelvis showed 3 mm gallstone with mild pancreatic enlargement with minimal peripancreatic fat stranding  -General surgery evaluated the patient, no acute gallbladder pathology as per general surgery  -Continue diet as tolerated    End-stage renal disease  -On hemodialysis   -HD per nephrology    Anemia of chronic kidney disease  -Hemoglobin stable     Code status: Full  Prophylaxis: SCDs  Care Plan discussed with: Patient  Anticipated Disposition: 24 to 48 hours    CRITICAL CARE ATTESTATION:  I had a face to face encounter with the patient, reviewed and interpreted patient data including clinical events, labs, images, vital signs, I/O's, and examined patient. I have discussed the case and the plan and management of the patient's care with the consulting services, the bedside nurses and necessary ancillary providers.        NOTE OF PERSONAL INVOLVEMENT IN CARE   This patient has a high probability of imminent, clinically significant deterioration, which requires the highest level of preparedness to intervene urgently. I participated in the decision-making and personally managed or directed the management of the following life and organ supporting interventions that required my frequent assessment to treat or prevent imminent deterioration. I personally spent 45 minutes of critical care time. This is time spent at this critically ill patient's bedside actively involved in patient care as well as the coordination of care and discussions with the patient's family. This does not include any procedural time which has been billed separately. Hospital Problems  Date Reviewed: 1/15/2021            Codes Class Noted POA    Malignant hypertension ICD-10-CM: I10  ICD-9-CM: 401.0  5/17/2022 Unknown             Review of Systems:   A comprehensive review of systems was negative except for that written in the HPI. Vital Signs:    Last 24hrs VS reviewed since prior progress note. Most recent are:  Visit Vitals  BP (!) 158/70   Pulse 84   Temp 97.5 °F (36.4 °C)   Resp 14   Ht 5' 9\" (1.753 m)   Wt 74.6 kg (164 lb 7.4 oz)   SpO2 94%   BMI 24.29 kg/m²         Intake/Output Summary (Last 24 hours) at 9/27/2022 1318  Last data filed at 9/27/2022 1030  Gross per 24 hour   Intake 690 ml   Output 3120 ml   Net -2430 ml        Physical Examination:     I had a face to face encounter with this patient and independently examined them on 9/27/2022 as outlined below:          Constitutional:  No acute distress, cooperative, pleasant    ENT:  Oral mucosa moist, oropharynx benign. Resp:  CTA bilaterally. No wheezing/rhonchi/rales. No accessory muscle use. CV:  Regular rhythm, normal rate, no murmurs, gallops, rubs    GI:  Soft, non distended, non tender. normoactive bowel sounds, no hepatosplenomegaly     Musculoskeletal:  No edema, warm, 2+ pulses throughout    Neurologic:  Moves all extremities.   AAOx3, CN II-XII reviewed            Data Review: Review and/or order of clinical lab test      Labs:     Recent Labs     09/27/22  0553 09/26/22 0422   WBC 4.6 5.9   HGB 8.8* 9.3*   HCT 27.7* 28.2*   PLT 85* 103*     Recent Labs     09/27/22  0553 09/26/22 0422 09/25/22 0328   * 135* 135*   K 4.6 4.4 3.6    103 102   CO2 25 24 27   BUN 66* 46* 31*   CREA 11.70* 9.76* 7.52*   * 72 134*   CA 8.3* 8.5  8.4* 8.3*   MG  --  1.9 1.9   PHOS  --  5.3* 3.7     Recent Labs     09/26/22 0422 09/25/22 0328   ALT 13 15   AP 61 70   TBILI 0.4 0.4   TP 6.6 7.6   ALB 3.0* 3.4*   GLOB 3.6 4.2*   LPSE 417*  --      No results for input(s): INR, PTP, APTT, INREXT, INREXT in the last 72 hours. No results for input(s): FE, TIBC, PSAT, FERR in the last 72 hours. Lab Results   Component Value Date/Time    Folate 7.9 05/18/2022 12:18 PM      No results for input(s): PH, PCO2, PO2 in the last 72 hours. No results for input(s): CPK, CKNDX, TROIQ in the last 72 hours.     No lab exists for component: CPKMB    Lab Results   Component Value Date/Time    Cholesterol, total 170 06/29/2020 03:46 AM    HDL Cholesterol 49 06/29/2020 03:46 AM    LDL, calculated 73.4 06/29/2020 03:46 AM    Triglyceride 238 (H) 06/29/2020 03:46 AM    CHOL/HDL Ratio 3.5 06/29/2020 03:46 AM     Lab Results   Component Value Date/Time    Glucose (POC) 97 09/27/2022 06:48 AM    Glucose (POC) 97 05/31/2022 12:23 PM    Glucose (POC) 72 05/31/2022 08:34 AM    Glucose (POC) 86 05/30/2022 08:19 PM    Glucose (POC) 78 05/30/2022 07:50 PM     Lab Results   Component Value Date/Time    Color YELLOW/STRAW 03/22/2022 04:11 PM    Appearance TURBID (A) 03/22/2022 04:11 PM    Specific gravity 1.021 03/22/2022 04:11 PM    pH (UA) 7.5 03/22/2022 04:11 PM    Protein >300 (A) 03/22/2022 04:11 PM    Glucose 100 (A) 03/22/2022 04:11 PM    Ketone Negative 03/22/2022 04:11 PM    Bilirubin Negative 03/22/2022 04:11 PM    Urobilinogen 0.2 03/22/2022 04:11 PM    Nitrites Negative 03/22/2022 04:11 PM    Leukocyte Esterase MODERATE (A) 03/22/2022 04:11 PM    Epithelial cells MODERATE (A) 03/22/2022 04:11 PM    Bacteria Negative 03/22/2022 04:11 PM    WBC 10-20 03/22/2022 04:11 PM    RBC 0-5 03/22/2022 04:11 PM         Medications Reviewed:     Current Facility-Administered Medications   Medication Dose Route Frequency    niCARdipine (CARDENE) 25 mg in 0.9% sodium chloride 250 mL ([id number])  0-15 mg/hr IntraVENous TITRATE    minoxidiL (LONITEN) tablet 10 mg  10 mg Oral BID    oxyCODONE IR (ROXICODONE) tablet 5 mg  5 mg Oral Q4H PRN    doxazosin (CARDURA) tablet 8 mg  8 mg Oral QHS    cloNIDine HCL (CATAPRES) tablet 0.2 mg  0.2 mg Oral BID    sevelamer carbonate (RENVELA) tab 800 mg  800 mg Oral TID    hydrALAZINE (APRESOLINE) 20 mg/mL injection 10 mg  10 mg IntraVENous Q6H PRN    labetaloL (NORMODYNE;TRANDATE) injection 10 mg  10 mg IntraVENous Q6H PRN    sodium chloride (NS) flush 5-40 mL  5-40 mL IntraVENous Q8H    sodium chloride (NS) flush 5-40 mL  5-40 mL IntraVENous PRN    acetaminophen (TYLENOL) tablet 650 mg  650 mg Oral Q6H PRN    Or    acetaminophen (TYLENOL) suppository 650 mg  650 mg Rectal Q6H PRN    polyethylene glycol (MIRALAX) packet 17 g  17 g Oral DAILY PRN    ondansetron (ZOFRAN ODT) tablet 4 mg  4 mg Oral Q8H PRN    Or    ondansetron (ZOFRAN) injection 4 mg  4 mg IntraVENous Q6H PRN    heparin (porcine) injection 5,000 Units  5,000 Units SubCUTAneous Q8H    bumetanide (BUMEX) tablet 2 mg  2 mg Oral DAILY    buprenorphine-naloxone (SUBOXONE) 8-2mg SL tablet  1 Tablet SubLINGual BID    calcium carbonate (TUMS) chewable tablet 600 mg [elemental]  600 mg Oral TIDPC    hydrALAZINE (APRESOLINE) tablet 100 mg  100 mg Oral TID    pantoprazole (PROTONIX) tablet 40 mg  40 mg Oral ACB    carvediloL (COREG) tablet 25 mg  25 mg Oral BID WITH MEALS     ______________________________________________________________________  EXPECTED LENGTH OF STAY: 2d 21h  ACTUAL LENGTH OF STAY:          3                 Holden Mcguire MD

## 2022-09-27 NOTE — PROGRESS NOTES
Transitions of Care Plan  RUR: 23% - high  Clinical Update: RRT this AM for high BP and chest pain; HD this AM  Consults: Nephrology  Baseline: independent without DME; resides w sister  Barriers to Discharge: medical  Disposition: home with family assistance; OP HD - [facility]  Estimated Discharge Date: 2+ days    Reason for Admission:   Hypertensive Urgency               RUR Score:     23% - high    PCP: First and Last name:  Elida Carmona NP     Name of Practice:   Are you a current patient: Yes/No:   Approximate date of last visit:    Can you do a virtual visit with your PCP:              Resources/supports as identified by patient/family:   Limited family support; resides w sister                Top Challenges facing patient (as identified by patient/family and CM): Management of BP in ESRD patient                     Finances/Medication cost?      No concerns. [employer]? [employer]              Support system or lack thereof? Resides w supportive sister                     Living arrangements? Address on file; 2 steps to enter in 2 level [facility]; resides w/ sister, Jackie Prince           Self-care/ADLs/Cognition? Independent without DME; AOx4          Current Advanced Directive/Advance Care Plan:  Full Code      Healthcare Decision Maker:   Click here to complete HealthCare Decision Makers including selection of the Healthcare Decision Maker Relationship (ie \"Primary\")      Primary Decision MakerAlflorenciaani Tucker - Child - [phone]    Payor Source Payor: [employer] / Plan: [address] / Product Type: Managed Care Medicare /                             Plan for utilizing home health:    No                 Transition of Care Plan:                  CM attempted to call patient's family re initial assessment.  Patient currently on HD after RRT around 0700 due to high BP and chest pain. Chart reviewed - assessment follows -     Patient is fully independent without DME and resides at address on file with his sister, Dawn Soler. Patient is ESRD on HD TTS at 0600 and utilizes [place] for transportation to/from HD sessions. Patient has hx of substance abuse. Preferred [facility] [address]. CM will continue to follow. Ravinder Sharpe, MPH  Care Manager [facility]  Available via [facility] or  [facility]    Care Management Interventions  PCP Verified by CM: Yes  Palliative Care Criteria Met (RRAT>21 & CHF Dx)?: Yes  Palliative Consult Recommended?: Yes  Mode of Transport at Discharge:  Other (see comment)  Transition of Care Consult (CM Consult): Discharge Planning  MyChart Signup: No  Discharge Durable Medical Equipment: No  Health Maintenance Reviewed: Yes  Physical Therapy Consult: No  Occupational Therapy Consult: No  Speech Therapy Consult: No  Support Systems: Other Family Member(s), Child(harish)  Confirm Follow Up Transport: Family  Discharge Location  Patient Expects to be Discharged to[de-identified] Home

## 2022-09-27 NOTE — PROGRESS NOTES
0730: Bedside shift change report given to Digna Ventura and Chris Sandoval RN (oncoming nurse) by Leigh Ann Ocampo RN (offgoing nurse). Report included the following information SBAR, Intake/Output, MAR, and Recent Results. 1030: Dialysis ended. Handoff report given to Chris Sandoval RN. Patient is stable and resting in bed. 1245: Nicardipine drip turned off!     1930: Bedside shift change report given to NAVEED Desouza (oncoming nurse) by Cipriano Klein RN and Chris Sandoval RN (offgoing nurse). Report included the following information SBAR, Procedure Summary, MAR, and Recent Results.

## 2022-09-27 NOTE — PROGRESS NOTES
Subjective: RRT called for patient because of chest pain and markedly elevated blood pressure. Chest pain is located at the left side of the chest, sharp, constant, no radiation, no known aggravating or relieving factors, 7/10 in severity. Patient was initially admitted for evaluation and management of malignant hypertensive urgency. Was on Cardene drip and subsequently placed back on his home medications. Patient was about to start dialysis when he developed these symptoms. Objective:  Head: Normocephalic, atraumatic. Eyes: Normal eye movement, no drainage, no discharge. Ears: Normal external ears with no obvious ear drainage. Nose: No deformity no drainage  Mouth and throat: No visible oral lesion. Chest: Clear breath sounds, no wheezing, no crackles. Heart: Normal S1 and S2 regular, no clinically appreciable murmur. Abdomen: Soft, nontender, normal bowel sounds. CNS: Alert oriented x3, no gross or focal neurological deficit. Extremities: No edema, pulses 2+ bilaterally. Musculoskeletal system: No visible joint or deformity and swelling. Skin: No active skin lesions seen in the exposed part of the body. Psychiatry: Normal mood and affect. A/P. Malignant hypertensive urgency: We will place the patient back on Cardene drip for blood pressure control. We will obtain EKG, cardiac markers, CBC, CMP, magnesium, phosphorus and TSH. Will obtain CTA of the chest to evaluate the patient for aortic dissection/aneurysm. Patient will continue to receive dialysis as per nephrology service. Critical care was performed for this encounter. CRITICAL CARE ATTESTATION:   I had a face to face encounter with the patient, reviewed and interpreted patient data including clinical events, labs, images, vital signs, I/O's, and examined patient. I have discussed the case and the plan and management of the patient's care with the consulting services, the bedside nurses and necessary ancillary providers. NOTE OF PERSONAL INVOLVEMENT IN CARE   This patient has a high probability of imminent, clinically significant deterioration, which requires the highest level of preparedness to intervene urgently. I participated in the decision-making and personally managed or directed the management of the following life and organ supporting interventions that required my frequent assessment to treat or prevent imminent deterioration. I personally spent 45 minutes of critical care time. This is time spent at this critically ill patient's bedside actively involved in patient care as well as the coordination of care and discussions with the patient's family. This does not include any procedural time which has been billed separately.

## 2022-09-28 LAB — TROPONIN-HIGH SENSITIVITY: 53 NG/L (ref 0–76)

## 2022-09-28 PROCEDURE — 74011250636 HC RX REV CODE- 250/636: Performed by: NURSE PRACTITIONER

## 2022-09-28 PROCEDURE — 90935 HEMODIALYSIS ONE EVALUATION: CPT

## 2022-09-28 PROCEDURE — 74011250637 HC RX REV CODE- 250/637: Performed by: HOSPITALIST

## 2022-09-28 PROCEDURE — 74011250637 HC RX REV CODE- 250/637: Performed by: INTERNAL MEDICINE

## 2022-09-28 PROCEDURE — 74011000250 HC RX REV CODE- 250: Performed by: HOSPITALIST

## 2022-09-28 PROCEDURE — 84484 ASSAY OF TROPONIN QUANT: CPT

## 2022-09-28 PROCEDURE — 36415 COLL VENOUS BLD VENIPUNCTURE: CPT

## 2022-09-28 PROCEDURE — 74011250636 HC RX REV CODE- 250/636: Performed by: HOSPITALIST

## 2022-09-28 PROCEDURE — 65660000001 HC RM ICU INTERMED STEPDOWN

## 2022-09-28 PROCEDURE — 74011250637 HC RX REV CODE- 250/637: Performed by: FAMILY MEDICINE

## 2022-09-28 PROCEDURE — 93005 ELECTROCARDIOGRAM TRACING: CPT

## 2022-09-28 RX ORDER — CLONIDINE HYDROCHLORIDE 0.2 MG/1
0.2 TABLET ORAL 2 TIMES DAILY
Qty: 60 TABLET | Refills: 0 | Status: SHIPPED | OUTPATIENT
Start: 2022-09-28

## 2022-09-28 RX ORDER — DOXAZOSIN 8 MG/1
8 TABLET ORAL
Qty: 30 TABLET | Refills: 0 | Status: SHIPPED | OUTPATIENT
Start: 2022-09-28

## 2022-09-28 RX ORDER — HYDRALAZINE HYDROCHLORIDE 20 MG/ML
10 INJECTION INTRAMUSCULAR; INTRAVENOUS ONCE
Status: COMPLETED | OUTPATIENT
Start: 2022-09-28 | End: 2022-09-28

## 2022-09-28 RX ORDER — BUMETANIDE 2 MG/1
2 TABLET ORAL DAILY
Qty: 30 TABLET | Refills: 0 | Status: SHIPPED | OUTPATIENT
Start: 2022-09-29

## 2022-09-28 RX ORDER — MINOXIDIL 2.5 MG/1
5 TABLET ORAL 2 TIMES DAILY
Qty: 120 TABLET | Refills: 0 | Status: SHIPPED
Start: 2022-09-28 | End: 2022-10-02

## 2022-09-28 RX ORDER — CARVEDILOL 25 MG/1
25 TABLET ORAL 2 TIMES DAILY WITH MEALS
Qty: 60 TABLET | Refills: 0 | Status: SHIPPED | OUTPATIENT
Start: 2022-09-28

## 2022-09-28 RX ORDER — HYDRALAZINE HYDROCHLORIDE 100 MG/1
100 TABLET, FILM COATED ORAL 3 TIMES DAILY
Qty: 90 TABLET | Refills: 0 | Status: SHIPPED | OUTPATIENT
Start: 2022-09-28

## 2022-09-28 RX ORDER — MINOXIDIL 2.5 MG/1
5 TABLET ORAL 2 TIMES DAILY
Status: DISCONTINUED | OUTPATIENT
Start: 2022-09-28 | End: 2022-09-30

## 2022-09-28 RX ADMIN — OXYCODONE 5 MG: 5 TABLET ORAL at 15:23

## 2022-09-28 RX ADMIN — SODIUM CHLORIDE, PRESERVATIVE FREE 10 ML: 5 INJECTION INTRAVENOUS at 08:12

## 2022-09-28 RX ADMIN — SODIUM CHLORIDE, PRESERVATIVE FREE 10 ML: 5 INJECTION INTRAVENOUS at 15:24

## 2022-09-28 RX ADMIN — CLONIDINE HYDROCHLORIDE 0.2 MG: 0.2 TABLET ORAL at 08:04

## 2022-09-28 RX ADMIN — HYDRALAZINE HYDROCHLORIDE 100 MG: 50 TABLET, FILM COATED ORAL at 21:11

## 2022-09-28 RX ADMIN — HEPARIN SODIUM 5000 UNITS: 5000 INJECTION INTRAVENOUS; SUBCUTANEOUS at 15:23

## 2022-09-28 RX ADMIN — CLONIDINE HYDROCHLORIDE 0.2 MG: 0.2 TABLET ORAL at 17:57

## 2022-09-28 RX ADMIN — MINOXIDIL 5 MG: 2.5 TABLET ORAL at 09:30

## 2022-09-28 RX ADMIN — BUPRENORPHINE HYDROCHLORIDE AND NALOXONE HYDROCHLORIDE DIHYDRATE 1 TABLET: 8; 2 TABLET SUBLINGUAL at 17:57

## 2022-09-28 RX ADMIN — SEVELAMER CARBONATE 800 MG: 800 TABLET, FILM COATED ORAL at 21:11

## 2022-09-28 RX ADMIN — CALCIUM CARBONATE (ANTACID) CHEW TAB 500 MG 600 MG: 500 CHEW TAB at 15:24

## 2022-09-28 RX ADMIN — HYDRALAZINE HYDROCHLORIDE 100 MG: 50 TABLET, FILM COATED ORAL at 15:23

## 2022-09-28 RX ADMIN — CARVEDILOL 25 MG: 12.5 TABLET, FILM COATED ORAL at 08:04

## 2022-09-28 RX ADMIN — HYDRALAZINE HYDROCHLORIDE 10 MG: 20 INJECTION INTRAMUSCULAR; INTRAVENOUS at 18:05

## 2022-09-28 RX ADMIN — SEVELAMER CARBONATE 800 MG: 800 TABLET, FILM COATED ORAL at 15:23

## 2022-09-28 RX ADMIN — DOXAZOSIN 8 MG: 2 TABLET ORAL at 21:11

## 2022-09-28 RX ADMIN — MINOXIDIL 5 MG: 2.5 TABLET ORAL at 17:57

## 2022-09-28 RX ADMIN — SEVELAMER CARBONATE 800 MG: 800 TABLET, FILM COATED ORAL at 08:03

## 2022-09-28 RX ADMIN — BUMETANIDE 2 MG: 1 TABLET ORAL at 08:03

## 2022-09-28 RX ADMIN — SODIUM CHLORIDE, PRESERVATIVE FREE 10 ML: 5 INJECTION INTRAVENOUS at 21:11

## 2022-09-28 RX ADMIN — HYDRALAZINE HYDROCHLORIDE 100 MG: 50 TABLET, FILM COATED ORAL at 08:03

## 2022-09-28 RX ADMIN — HYDRALAZINE HYDROCHLORIDE 10 MG: 20 INJECTION INTRAMUSCULAR; INTRAVENOUS at 20:01

## 2022-09-28 RX ADMIN — PANTOPRAZOLE SODIUM 40 MG: 40 TABLET, DELAYED RELEASE ORAL at 08:04

## 2022-09-28 RX ADMIN — OXYCODONE 5 MG: 5 TABLET ORAL at 08:04

## 2022-09-28 RX ADMIN — ACETAMINOPHEN 650 MG: 325 TABLET, FILM COATED ORAL at 19:14

## 2022-09-28 RX ADMIN — CALCIUM CARBONATE (ANTACID) CHEW TAB 500 MG 600 MG: 500 CHEW TAB at 17:58

## 2022-09-28 RX ADMIN — CALCIUM CARBONATE (ANTACID) CHEW TAB 500 MG 600 MG: 500 CHEW TAB at 08:04

## 2022-09-28 RX ADMIN — CARVEDILOL 25 MG: 12.5 TABLET, FILM COATED ORAL at 17:57

## 2022-09-28 NOTE — PROGRESS NOTES
1930: Bedside and Verbal shift change report given to NAVEED Desouza (oncoming nurse) by Atul Ariza RN (offgoing nurse).  Report included the following information SBAR, Kardex, Intake/Output, MAR, Recent Results, and Cardiac Rhythm SR .

## 2022-09-28 NOTE — PROGRESS NOTES
0730: Bedside shift change report given to Darylene Homes, RN and Lane Payton RN (oncoming nurse) by [facility], RN (offgoing nurse). Report included the following information SBAR, Intake/Output, MAR, and Recent Results. [phone]: Patient gave nurse a call on his call bell and informed the nurse that he had sharp stabbing pain all over his chest that was 10/10 on the pain scale. NP was notified via perfect serve and patient was ordered an ekg. Additionally, NP texted that she will come and assess the patient at bedside. It is also noted that while the patient was getting the electrodes placed on his chest he winced in pain whenever his chest was touched. 1921: NP at bedside, Troponins ordered. And labs have been sent. 1930: Bedside shift change report given to NAVEED Desouza (oncoming nurse) by Darylene Homes, RN and Lane Payton RN (offgoing nurse). Report included the following information SBAR, Kardex, Intake/Output, MAR, and Recent Results.

## 2022-09-28 NOTE — PROGRESS NOTES
Problem: Falls - Risk of  Goal: *Absence of Falls  Description: Document Clearence Skates Fall Risk and appropriate interventions in the flowsheet.   Outcome: Progressing Towards Goal  Note: Fall Risk Interventions:            Medication Interventions: Assess postural VS orthostatic hypotension, Patient to call before getting OOB, Teach patient to arise slowly                   Problem: Hypertension  Goal: *Blood pressure within specified parameters  Outcome: Progressing Towards Goal

## 2022-09-28 NOTE — PROGRESS NOTES
[facility] [facility]                                                                                          Hospitalist Progress Note  Yudy Hudson MD  Answering service: [phone] from in house phone        Date of Service:  2022  NAME:  Souleymane Shaikh  :  1965  MRN:  [id number]      Admission Summary:     Patient presents with malignant hypertension, previously requiring Cardene drip. Interval history / Subjective:     Overall better. Off cardene drip. Assessment & Plan:     Malignant hypertension  -Off cardene drip  -On Bumex, clonidine, Coreg, hydralazine, Cardura, minoxidil, and losartan  -Nephrology adjusting medications  -Urine drug screen negative, Screening for pheochromocytoma, ARR per nephrology    Mild pancreatitis  -CT abdomen and pelvis showed 3 mm gallstone with mild pancreatic enlargement with minimal peripancreatic fat stranding  -General surgery evaluated the patient, no acute gallbladder pathology as per general surgery  -Continue diet as tolerated    End-stage renal disease  -On hemodialysis   -HD per nephrology    Anemia of chronic kidney disease  -Hemoglobin stable     Code status: Full  Prophylaxis: SCDs  Care Plan discussed with: Patient  Anticipated Disposition: 24 to 48 hours     Hospital Problems  Date Reviewed: 1/15/2021            Codes Class Noted POA    Malignant hypertension ICD-10-CM: I10  ICD-9-CM: 401.0  2022 Unknown             Review of Systems:   A comprehensive review of systems was negative except for that written in the HPI. Vital Signs:    Last 24hrs VS reviewed since prior progress note.  Most recent are:  Visit Vitals  BP (!) 168/68 (BP 1 Location: Left upper arm, BP Patient Position: Lying right side)   Pulse 81   Temp 97.5 °F (36.4 °C)   Resp 24   Ht 5' 9\" (1.753 m)   Wt 75.4 kg (166 lb 3.6 oz)   SpO2 100%   BMI 24.55 kg/m²         Intake/Output Summary (Last 24 hours) at 9/28/2022 1705  Last data filed at 9/28/2022 1535  Gross per 24 hour   Intake 975 ml   Output 100 ml   Net 875 ml        Physical Examination:     I had a face to face encounter with this patient and independently examined them on 9/28/2022 as outlined below:          Constitutional:  No acute distress, cooperative, pleasant    ENT:  Oral mucosa moist, oropharynx benign. Resp:  CTA bilaterally. No wheezing/rhonchi/rales. No accessory muscle use. CV:  Regular rhythm, normal rate, no murmurs, gallops, rubs    GI:  Soft, non distended, non tender. normoactive bowel sounds, no hepatosplenomegaly     Musculoskeletal:  No edema, warm, 2+ pulses throughout    Neurologic:  Moves all extremities. AAOx3, CN II-XII reviewed            Data Review:    Review and/or order of clinical lab test      Labs:     Recent Labs     09/27/22 0553 09/26/22 0422   WBC 4.6 5.9   HGB 8.8* 9.3*   HCT 27.7* 28.2*   PLT 85* 103*     Recent Labs     09/27/22  0553 09/26/22 0422   * 135*   K 4.6 4.4    103   CO2 25 24   BUN 66* 46*   CREA 11.70* 9.76*   * 72   CA 8.3* 8.5  8.4*   MG  --  1.9   PHOS  --  5.3*     Recent Labs     09/26/22 0422   ALT 13   AP 61   TBILI 0.4   TP 6.6   ALB 3.0*   GLOB 3.6   LPSE 417*     No results for input(s): INR, PTP, APTT, INREXT, INREXT in the last 72 hours. No results for input(s): FE, TIBC, PSAT, FERR in the last 72 hours. Lab Results   Component Value Date/Time    Folate 7.9 05/18/2022 12:18 PM      No results for input(s): PH, PCO2, PO2 in the last 72 hours. No results for input(s): CPK, CKNDX, TROIQ in the last 72 hours.     No lab exists for component: CPKMB    Lab Results   Component Value Date/Time    Cholesterol, total 170 06/29/2020 03:46 AM    HDL Cholesterol 49 06/29/2020 03:46 AM    LDL, calculated 73.4 06/29/2020 03:46 AM    Triglyceride 238 (H) 06/29/2020 03:46 AM    CHOL/HDL Ratio 3.5 06/29/2020 03:46 AM     Lab Results   Component Value Date/Time    Glucose (POC) 97 09/27/2022 06:48 AM    Glucose (POC) 97 05/31/2022 12:23 PM    Glucose (POC) 72 05/31/2022 08:34 AM    Glucose (POC) 86 05/30/2022 08:19 PM    Glucose (POC) 78 05/30/2022 07:50 PM     Lab Results   Component Value Date/Time    Color YELLOW/STRAW 03/22/2022 04:11 PM    Appearance TURBID (A) 03/22/2022 04:11 PM    Specific gravity 1.021 03/22/2022 04:11 PM    pH (UA) 7.5 03/22/2022 04:11 PM    Protein >300 (A) 03/22/2022 04:11 PM    Glucose 100 (A) 03/22/2022 04:11 PM    Ketone Negative 03/22/2022 04:11 PM    Bilirubin Negative 03/22/2022 04:11 PM    Urobilinogen 0.2 03/22/2022 04:11 PM    Nitrites Negative 03/22/2022 04:11 PM    Leukocyte Esterase MODERATE (A) 03/22/2022 04:11 PM    Epithelial cells MODERATE (A) 03/22/2022 04:11 PM    Bacteria Negative 03/22/2022 04:11 PM    WBC 10-20 03/22/2022 04:11 PM    RBC 0-5 03/22/2022 04:11 PM         Medications Reviewed:     Current Facility-Administered Medications   Medication Dose Route Frequency    minoxidiL (LONITEN) tablet 5 mg  5 mg Oral BID    niCARdipine (CARDENE) 25 mg in 0.9% sodium chloride 250 mL ([id number])  0-15 mg/hr IntraVENous TITRATE    oxyCODONE IR (ROXICODONE) tablet 5 mg  5 mg Oral Q4H PRN    doxazosin (CARDURA) tablet 8 mg  8 mg Oral QHS    cloNIDine HCL (CATAPRES) tablet 0.2 mg  0.2 mg Oral BID    sevelamer carbonate (RENVELA) tab 800 mg  800 mg Oral TID    hydrALAZINE (APRESOLINE) 20 mg/mL injection 10 mg  10 mg IntraVENous Q6H PRN    labetaloL (NORMODYNE;TRANDATE) injection 10 mg  10 mg IntraVENous Q6H PRN    sodium chloride (NS) flush 5-40 mL  5-40 mL IntraVENous Q8H    sodium chloride (NS) flush 5-40 mL  5-40 mL IntraVENous PRN    acetaminophen (TYLENOL) tablet 650 mg  650 mg Oral Q6H PRN    Or    acetaminophen (TYLENOL) suppository 650 mg  650 mg Rectal Q6H PRN    polyethylene glycol (MIRALAX) packet 17 g  17 g Oral DAILY PRN    ondansetron (ZOFRAN ODT) tablet 4 mg  4 mg Oral Q8H PRN    Or    ondansetron (ZOFRAN) injection 4 mg  4 mg IntraVENous Q6H PRN    heparin (porcine) injection 5,000 Units  5,000 Units SubCUTAneous Q8H    bumetanide (BUMEX) tablet 2 mg  2 mg Oral DAILY    buprenorphine-naloxone (SUBOXONE) 8-2mg SL tablet  1 Tablet SubLINGual BID    calcium carbonate (TUMS) chewable tablet 600 mg [elemental]  600 mg Oral TIDPC    hydrALAZINE (APRESOLINE) tablet 100 mg  100 mg Oral TID    pantoprazole (PROTONIX) tablet 40 mg  40 mg Oral ACB    carvediloL (COREG) tablet 25 mg  25 mg Oral BID WITH MEALS     ______________________________________________________________________  EXPECTED LENGTH OF STAY: 2d 21h  ACTUAL LENGTH OF STAY:          4                 Rigo Herring MD

## 2022-09-28 NOTE — PROGRESS NOTES
Nephrology Progress Note  Winsome Valero  Date of Admission : 9/24/2022    CC:  Follow up for ESRD       Assessment and Plan     ESRD-HD :  - dialyzes TTS at [facility]  - HD today and ok for d/c post HD  - hold BP meds before HD today   - HD again tomorrow as out pt     Uncontrolled HTN   - missed HD Rx on 9/17   - hx of cocaine use : negative UDS  - Pheo screen and ARR was ordered but never sent   - reduced Minoxidil dose   - advised to fill prescriptions prior to d/c      Anemia in CKD:  - hgb stable, no need for ABI     Sec HPTH   - continue home binders  - PTH at goal           Interval History:  Seen and examined. BP stable. [facility] for d/c post HD today   No complaints     Current Medications: all current  Medications have been eviewed in EPIC  Review of Systems: Pertinent items are noted in HPI. Objective:  Vitals:    Vitals:    09/28/22 0200 09/28/22 0400 09/28/22 0600 09/28/22 0715   BP:  (!) 149/74  136/77   Pulse: 70 74 77 80   Resp:  16  16   Temp:  98.4 °F (36.9 °C)  98.5 °F (36.9 °C)   SpO2:  99%  100%   Weight:       Height:         Intake and Output:  No intake/output data recorded. 09/26 1901 - 09/28 0700  In: 1180 [P.O.:1180]  Out: 3120 [Urine:120]    Physical Examination:    General: NAD,Conversant   Neck:  Supple, no mass  Resp:  Lungs CTA B/L, no wheezing , normal respiratory effort  CV:  RRR,  no murmur or rub,trace LE edema  GI:  Soft, NT, + Bowel sounds, no hepatosplenomegaly  Neurologic:  Non focal  Psych:             AAO x 3 appropriate affect   Skin:  No Rash  Access:           [facility]    []    High complexity decision making was performed  []    Patient is at high-risk of decompensation with multiple organ involvement    Lab Data Personally Reviewed: I have reviewed all the pertinent labs, microbiology data and radiology studies during assessment.     Recent Labs     09/27/22  0553 09/26/22  0422   * 135*   K 4.6 4.4    103   CO2 25 24   * 72   BUN 66* 46* CREA 11.70* 9.76*   CA 8.3* 8.5  8.4*   MG  --  1.9   PHOS  --  5.3*   ALB  --  3.0*   ALT  --  13       Recent Labs     09/27/22  0553 09/26/22  0422   WBC 4.6 5.9   HGB 8.8* 9.3*   HCT 27.7* 28.2*   PLT 85* 103*       No results found for: SDES  Lab Results   Component Value Date/Time    Culture result: NO GROWTH 5 DAYS 05/18/2022 06:06 AM    Culture result: No growth (<1,000 CFU/ML) 03/22/2022 04:11 PM     No results found for this or any previous visit (from the past 24 hour(s)). Ghislaine Daly MD  [place] [id number]  Phone - [phone]   Fax - [phone]  www. [email or web address]

## 2022-09-28 NOTE — PROGRESS NOTES
0730: Bedside shift change report given to Preet Latif RN (oncoming nurse) by Lelia Parks RN (offgoing nurse). Report included the following information SBAR, MAR, and Recent Results. 1900: Patient complaining of 10/10 chest pain. EKG obtained, Rhiannon Fine NP notified. Orders received, stat troponin sent. Prn medications given per order. 1930: Bedside shift change report given to NAVEED Desouza (oncoming nurse) by Preet Latif RN (offgoing nurse). Report included the following information SBAR, MAR, and Recent Results. Charting and patient care of Brian Cook by Quincy Herron RN from 0730 to 4333 was supervised and reviewed by this RN.

## 2022-09-29 LAB
ATRIAL RATE: 80 BPM
CALCULATED P AXIS, ECG09: 64 DEGREES
CALCULATED R AXIS, ECG10: 50 DEGREES
CALCULATED T AXIS, ECG11: 164 DEGREES
DIAGNOSIS, 93000: NORMAL
P-R INTERVAL, ECG05: 154 MS
Q-T INTERVAL, ECG07: 432 MS
QRS DURATION, ECG06: 94 MS
QTC CALCULATION (BEZET), ECG08: 498 MS
VENTRICULAR RATE, ECG03: 80 BPM

## 2022-09-29 PROCEDURE — 65660000001 HC RM ICU INTERMED STEPDOWN

## 2022-09-29 PROCEDURE — 74011250636 HC RX REV CODE- 250/636: Performed by: INTERNAL MEDICINE

## 2022-09-29 PROCEDURE — 90935 HEMODIALYSIS ONE EVALUATION: CPT

## 2022-09-29 PROCEDURE — 74011250637 HC RX REV CODE- 250/637: Performed by: HOSPITALIST

## 2022-09-29 PROCEDURE — 74011000250 HC RX REV CODE- 250: Performed by: INTERNAL MEDICINE

## 2022-09-29 PROCEDURE — 74011250637 HC RX REV CODE- 250/637: Performed by: INTERNAL MEDICINE

## 2022-09-29 PROCEDURE — 74011250636 HC RX REV CODE- 250/636: Performed by: HOSPITALIST

## 2022-09-29 PROCEDURE — 74011000250 HC RX REV CODE- 250: Performed by: HOSPITALIST

## 2022-09-29 PROCEDURE — 74011250637 HC RX REV CODE- 250/637: Performed by: FAMILY MEDICINE

## 2022-09-29 RX ADMIN — CLONIDINE HYDROCHLORIDE 0.2 MG: 0.2 TABLET ORAL at 17:51

## 2022-09-29 RX ADMIN — SODIUM CHLORIDE 7.5 MG/HR: 900 INJECTION, SOLUTION INTRAVENOUS at 12:51

## 2022-09-29 RX ADMIN — SODIUM CHLORIDE, PRESERVATIVE FREE 10 ML: 5 INJECTION INTRAVENOUS at 14:24

## 2022-09-29 RX ADMIN — CARVEDILOL 25 MG: 12.5 TABLET, FILM COATED ORAL at 08:46

## 2022-09-29 RX ADMIN — SODIUM CHLORIDE 10 MG/HR: 900 INJECTION, SOLUTION INTRAVENOUS at 18:16

## 2022-09-29 RX ADMIN — MINOXIDIL 5 MG: 2.5 TABLET ORAL at 17:51

## 2022-09-29 RX ADMIN — HEPARIN SODIUM 5000 UNITS: 5000 INJECTION INTRAVENOUS; SUBCUTANEOUS at 21:49

## 2022-09-29 RX ADMIN — SODIUM CHLORIDE 10 MG/HR: 900 INJECTION, SOLUTION INTRAVENOUS at 20:00

## 2022-09-29 RX ADMIN — HYDRALAZINE HYDROCHLORIDE 100 MG: 50 TABLET, FILM COATED ORAL at 17:51

## 2022-09-29 RX ADMIN — CALCIUM CARBONATE (ANTACID) CHEW TAB 500 MG 600 MG: 500 CHEW TAB at 14:19

## 2022-09-29 RX ADMIN — SODIUM CHLORIDE 5 MG/HR: 900 INJECTION, SOLUTION INTRAVENOUS at 06:52

## 2022-09-29 RX ADMIN — SEVELAMER CARBONATE 800 MG: 800 TABLET, FILM COATED ORAL at 21:49

## 2022-09-29 RX ADMIN — CALCIUM CARBONATE (ANTACID) CHEW TAB 500 MG 600 MG: 500 CHEW TAB at 08:45

## 2022-09-29 RX ADMIN — DOXAZOSIN 8 MG: 2 TABLET ORAL at 21:49

## 2022-09-29 RX ADMIN — CARVEDILOL 25 MG: 12.5 TABLET, FILM COATED ORAL at 17:51

## 2022-09-29 RX ADMIN — HEPARIN SODIUM 5000 UNITS: 5000 INJECTION INTRAVENOUS; SUBCUTANEOUS at 14:19

## 2022-09-29 RX ADMIN — ACETAMINOPHEN 650 MG: 325 TABLET, FILM COATED ORAL at 19:51

## 2022-09-29 RX ADMIN — SODIUM CHLORIDE, PRESERVATIVE FREE 10 ML: 5 INJECTION INTRAVENOUS at 21:54

## 2022-09-29 RX ADMIN — CLONIDINE HYDROCHLORIDE 0.2 MG: 0.2 TABLET ORAL at 08:47

## 2022-09-29 RX ADMIN — SEVELAMER CARBONATE 800 MG: 800 TABLET, FILM COATED ORAL at 08:46

## 2022-09-29 RX ADMIN — MINOXIDIL 5 MG: 2.5 TABLET ORAL at 08:46

## 2022-09-29 RX ADMIN — PANTOPRAZOLE SODIUM 40 MG: 40 TABLET, DELAYED RELEASE ORAL at 06:53

## 2022-09-29 RX ADMIN — SODIUM CHLORIDE, PRESERVATIVE FREE 10 ML: 5 INJECTION INTRAVENOUS at 06:54

## 2022-09-29 RX ADMIN — BUMETANIDE 2 MG: 1 TABLET ORAL at 08:46

## 2022-09-29 RX ADMIN — HYDRALAZINE HYDROCHLORIDE 100 MG: 50 TABLET, FILM COATED ORAL at 21:50

## 2022-09-29 RX ADMIN — LABETALOL HYDROCHLORIDE 10 MG: 5 INJECTION INTRAVENOUS at 04:05

## 2022-09-29 RX ADMIN — CALCIUM CARBONATE (ANTACID) CHEW TAB 500 MG 600 MG: 500 CHEW TAB at 17:51

## 2022-09-29 RX ADMIN — SEVELAMER CARBONATE 800 MG: 800 TABLET, FILM COATED ORAL at 17:51

## 2022-09-29 RX ADMIN — BUPRENORPHINE HYDROCHLORIDE AND NALOXONE HYDROCHLORIDE DIHYDRATE 1 TABLET: 8; 2 TABLET SUBLINGUAL at 18:46

## 2022-09-29 RX ADMIN — HYDRALAZINE HYDROCHLORIDE 100 MG: 50 TABLET, FILM COATED ORAL at 08:47

## 2022-09-29 RX ADMIN — HYDRALAZINE HYDROCHLORIDE 10 MG: 20 INJECTION INTRAMUSCULAR; INTRAVENOUS at 15:37

## 2022-09-29 NOTE — PROGRESS NOTES
0730 Bedside shift change report [facility] by Olman Calhoun (offgoing nurse). Report included the following information SBAR, Kardex, ED Summary, Intake/Output, MAR, and Recent Results. 2000 Bedside shift change report given to Darrell [facility]ueroa [facility] by Didier (offgoing nurse). Report included the following information SBAR, Kardex, ED Summary, Intake/Output, MAR, and Recent Results.

## 2022-09-29 NOTE — PROGRESS NOTES
Problem: Falls - Risk of  Goal: *Absence of Falls  Description: Document [employer] Fall Risk and appropriate interventions in the flowsheet.   Outcome: Progressing Towards Goal  Note: Fall Risk Interventions:            Medication Interventions: Assess postural VS orthostatic hypotension, Teach patient to arise slowly                   Problem: Hypertension  Goal: *Fluid volume balance  Outcome: Progressing Towards Goal  Goal: *Labs within defined limits  Outcome: Progressing Towards Goal

## 2022-09-29 NOTE — PROGRESS NOTES
Transitions of Care Plan  RUR: 23% - high  Clinical Update: RRT this AM for high BP and chest pain; HD this AM  Consults: Nephrology  Baseline: independent without DME; resides w sister  Barriers to Discharge: medical  Disposition: home with family assistance; OP HD - [facility]  Estimated Discharge Date: 2+ days        [facility], [address] [facility] [phone]  [phone].      Nando Bland LCSW, [facility]-  Case Management [address]  C: [phone]

## 2022-09-29 NOTE — ADVANCED PRACTICE NURSE
Hemodialysis / [phone]    Vitals Pre Post Assessment Pre Post   BP BP: (!) 178/71 (09/29/22 1330)   191/80 LOC A&O x 3 A&O x 3   HR Pulse (Heart Rate): 78 (09/29/22 1330) 96 Lungs clear clear   Resp Resp Rate: 18 (09/29/22 1330) 18 Cardiac regular regular   Temp Temp: 97 °F (36.1 °C) (09/29/22 1200) 98.2 Skin warm warm   Weight Pre-Dialysis Weight: 73.7 kg (162 lb 7.7 oz) (09/29/22 1155)  Edema No edema No edema   Tele status remote remote Pain Pain Intensity 1: 0 (09/29/22 1200) No pain     Orders   Duration: Start: 1200 End: 1530 Total: 3.5 hr   Dialyzer: Dialyzer/Set Up Inspection: Connie Schwab ([id number]) (09/29/22 1155)   K Bath: Dialysate K (mEq/L): 2 (09/29/22 1155)   Ca Bath: Dialysate CA (mEq/L): 2.5 (09/29/22 1155)   Na: Dialysate NA (mEq/L): 138 (09/29/22 1155)   Bicarb: Dialysate HCO3 (mEq/L): 35 (09/29/22 1155)   Target Fluid Removal: Goal/Amount of Fluid to Remove (mL): 2000 mL (09/29/22 1155)     Access   Type & Location: C cath   Comments:                                 in place, patent, dressing dry and intact, no S/S of infection       Labs   HBsAg (Antigen) / date:  09/15/22 negative                                             HBsAb (Antibody) / date: 09/23/21 [facility] [place]   Source: [facility]   Obtained/Reviewed  Critical Results Called HGB   Date Value Ref Range Status   09/27/2022 8.8 (L) 12.1 - 17.0 g/dL Final     Potassium   Date Value Ref Range Status   09/27/2022 4.6 3.5 - 5.1 mmol/L Final     Calcium   Date Value Ref Range Status   09/27/2022 8.3 (L) 8.5 - 10.1 MG/DL Final     BUN   Date Value Ref Range Status   09/27/2022 66 (H) 6 - 20 MG/DL Final     Creatinine   Date Value Ref Range Status   09/27/2022 11.70 (H) 0.70 - 1.30 MG/DL Final        Meds Given   Name Dose Route                    Adequacy / Fluid    Total Liters Process: 62   Net Fluid Removed: 2000 ml      Comments   Time Out Done:   (Time) Yes, 1155   Admitting Diagnosis: Renal failure   Consent obtained/signed: Informed Consent Verified: Yes (09/29/22 1155)   Machine / RO # Machine Number: [id number] (09/29/22 1155)   Primary Nurse Rpt Pre: Dianna Waterman RN   Primary Nurse Rpt Post: Dianna Waterman RN   Pt Education: Yes, r/t dialysis process   Care Plan: Continue HD as ordered   Pts outpatient clinic:      Tx Summary   Comments:         tolerated tx poorly, after first 10 min of tx, patient started compllaining of gheneraly not feeling well, Nauseated, tingling in fingers, BFR decreased to 250, 10 min after that patient back at base line. , at end, all blood in circuit returned with 300 ml NS, LSC cath in place, patent, dressing dry and intact, no S/S of infection.                   2

## 2022-09-29 NOTE — PROGRESS NOTES
[facility]ist [facility]                                                                                          Hospitalist Progress Note  Ollie Cha MD  Answering service: [phone] from in house phone        Date of Service:  2022  NAME:  Stephani Livingston  :  1965  MRN:  [id number]      Admission Summary:     Patient presents with malignant hypertension, previously requiring Cardene drip. Interval history / Subjective:     Overall better. Resumed back on Cardene drip 2022. Assessment & Plan:     Malignant hypertension  -On Bumex, clonidine, Coreg, hydralazine, Cardura, minoxidil, and losartan  -Nephrology adjusting medications  -Urine drug screen negative, Screening for pheochromocytoma, ARR per nephrology  -Patient has been on and off of Cardene drip, Cardene drip resumed 2022  -Attempt to wean off the drip after dialysis    Mild pancreatitis  -CT abdomen and pelvis showed 3 mm gallstone with mild pancreatic enlargement with minimal peripancreatic fat stranding  -General surgery evaluated the patient, no acute gallbladder pathology as per general surgery  -Continue diet as tolerated    End-stage renal disease  -On hemodialysis   -HD per nephrology    Anemia of chronic kidney disease  -Hemoglobin stable     Code status: Full  Prophylaxis: SCDs  Care Plan discussed with: Patient  Anticipated Disposition: 24 to 48 hours     Hospital Problems  Date Reviewed: 1/15/2021            Codes Class Noted POA    Malignant hypertension ICD-10-CM: I10  ICD-9-CM: 401.0  2022 Unknown             Review of Systems:   A comprehensive review of systems was negative except for that written in the HPI. Vital Signs:    Last 24hrs VS reviewed since prior progress note.  Most recent are:  Visit Vitals  BP (!) 185/86   Pulse 83   Temp 97 °F (36.1 °C)   Resp 17   Ht 5' 9\" (1.753 m)   Wt 75.4 kg (166 lb 3.6 oz)   SpO2 100%   BMI 24.55 kg/m²         Intake/Output Summary (Last 24 hours) at 9/29/2022 1512  Last data filed at 9/29/2022 0845  Gross per 24 hour   Intake 1230 ml   Output 2500 ml   Net -1270 ml        Physical Examination:     I had a face to face encounter with this patient and independently examined them on 9/29/2022 as outlined below:          Constitutional:  No acute distress, cooperative, pleasant    ENT:  Oral mucosa moist, oropharynx benign. Resp:  CTA bilaterally. No wheezing/rhonchi/rales. No accessory muscle use. CV:  Regular rhythm, normal rate, no murmurs, gallops, rubs    GI:  Soft, non distended, non tender. normoactive bowel sounds, no hepatosplenomegaly     Musculoskeletal:  No edema, warm, 2+ pulses throughout    Neurologic:  Moves all extremities. AAOx3, CN II-XII reviewed            Data Review:    Review and/or order of clinical lab test      Labs:     Recent Labs     09/27/22  0553   WBC 4.6   HGB 8.8*   HCT 27.7*   PLT 85*     Recent Labs     09/27/22  0553   *   K 4.6      CO2 25   BUN 66*   CREA 11.70*   *   CA 8.3*     No results for input(s): ALT, AP, TBIL, TBILI, TP, ALB, GLOB, GGT, AML, LPSE in the last 72 hours. No lab exists for component: SGOT, GPT, AMYP, HLPSE    No results for input(s): INR, PTP, APTT, INREXT, INREXT in the last 72 hours. No results for input(s): FE, TIBC, PSAT, FERR in the last 72 hours. Lab Results   Component Value Date/Time    Folate 7.9 05/18/2022 12:18 PM      No results for input(s): PH, PCO2, PO2 in the last 72 hours. No results for input(s): CPK, CKNDX, TROIQ in the last 72 hours.     No lab exists for component: CPKMB    Lab Results   Component Value Date/Time    Cholesterol, total 170 06/29/2020 03:46 AM    HDL Cholesterol 49 06/29/2020 03:46 AM    LDL, calculated 73.4 06/29/2020 03:46 AM    Triglyceride 238 (H) 06/29/2020 03:46 AM    CHOL/HDL Ratio 3.5 06/29/2020 03:46 AM     Lab Results   Component Value Date/Time    Glucose (POC) 97 09/27/2022 06:48 AM    Glucose (POC) 97 05/31/2022 12:23 PM    Glucose (POC) 72 05/31/2022 08:34 AM    Glucose (POC) 86 05/30/2022 08:19 PM    Glucose (POC) 78 05/30/2022 07:50 PM     Lab Results   Component Value Date/Time    Color YELLOW/STRAW 03/22/2022 04:11 PM    Appearance TURBID (A) 03/22/2022 04:11 PM    Specific gravity 1.021 03/22/2022 04:11 PM    pH (UA) 7.5 03/22/2022 04:11 PM    Protein >300 (A) 03/22/2022 04:11 PM    Glucose 100 (A) 03/22/2022 04:11 PM    Ketone Negative 03/22/2022 04:11 PM    Bilirubin Negative 03/22/2022 04:11 PM    Urobilinogen 0.2 03/22/2022 04:11 PM    Nitrites Negative 03/22/2022 04:11 PM    Leukocyte Esterase MODERATE (A) 03/22/2022 04:11 PM    Epithelial cells MODERATE (A) 03/22/2022 04:11 PM    Bacteria Negative 03/22/2022 04:11 PM    WBC 10-20 03/22/2022 04:11 PM    RBC 0-5 03/22/2022 04:11 PM         Medications Reviewed:     Current Facility-Administered Medications   Medication Dose Route Frequency    minoxidiL (LONITEN) tablet 5 mg  5 mg Oral BID    niCARdipine (CARDENE) 25 mg in 0.9% sodium chloride 250 mL (Auwn0Bea)  0-15 mg/hr IntraVENous TITRATE    doxazosin (CARDURA) tablet 8 mg  8 mg Oral QHS    cloNIDine HCL (CATAPRES) tablet 0.2 mg  0.2 mg Oral BID    sevelamer carbonate (RENVELA) tab 800 mg  800 mg Oral TID    hydrALAZINE (APRESOLINE) 20 mg/mL injection 10 mg  10 mg IntraVENous Q6H PRN    labetaloL (NORMODYNE;TRANDATE) injection 10 mg  10 mg IntraVENous Q6H PRN    sodium chloride (NS) flush 5-40 mL  5-40 mL IntraVENous Q8H    sodium chloride (NS) flush 5-40 mL  5-40 mL IntraVENous PRN    acetaminophen (TYLENOL) tablet 650 mg  650 mg Oral Q6H PRN    Or    acetaminophen (TYLENOL) suppository 650 mg  650 mg Rectal Q6H PRN    polyethylene glycol (MIRALAX) packet 17 g  17 g Oral DAILY PRN    ondansetron (ZOFRAN ODT) tablet 4 mg  4 mg Oral Q8H PRN    Or    ondansetron (ZOFRAN) injection 4 mg  4 mg IntraVENous Q6H PRN    heparin (porcine) injection 5,000 Units  5,000 Units SubCUTAneous Q8H    bumetanide (BUMEX) tablet 2 mg  2 mg Oral DAILY    buprenorphine-naloxone (SUBOXONE) 8-2mg SL tablet  1 Tablet SubLINGual BID    calcium carbonate (TUMS) chewable tablet 600 mg [elemental]  600 mg Oral TIDPC    hydrALAZINE (APRESOLINE) tablet 100 mg  100 mg Oral TID    pantoprazole (PROTONIX) tablet 40 mg  40 mg Oral ACB    carvediloL (COREG) tablet 25 mg  25 mg Oral BID WITH MEALS     ______________________________________________________________________  EXPECTED LENGTH OF STAY: 2d 21h  ACTUAL LENGTH OF STAY:          5                 Hung Ford MD

## 2022-09-29 NOTE — PROGRESS NOTES
1930: Bedside and Verbal shift change report given to Lyly RN (oncoming nurse) by Cipriano Klein RN and Chris Sandoval RN (offgoing nurse). Report included the following information SBAR, Kardex, Intake/Output, MAR, Recent Results, and Cardiac Rhythm SR .      0700: Patient received HD last night d/t elevated pressures. BP not responding to IV hydralazine and IV labetalol. Finished HD around 0200, 2.5 L pulled. Pressures still extremely elevated (see flow sheets for readings), started cardene gtt. 0730: Bedside and Verbal shift change report given to NAVEED Flood (oncoming nurse) by Leigh Ann Ocampo RN (offgoing nurse).  Report included the following information SBAR, Kardex, Intake/Output, MAR, Recent Results, and Cardiac Rhythm SR .

## 2022-09-29 NOTE — PROCEDURES
Hemodialysis / [phone]    Vitals Pre Post Assessment Pre Post   BP BP: (!) 180/80 (09/28/22 2220)   191/85 LOC A/Ox4 No change   HR Pulse (Heart Rate): 71 (09/28/22 2220) 75 Lungs Diminished No change   Resp Resp Rate: 18 (09/28/22 2220) 16 Cardiac RRR No change   Temp Temp: 97.8 °F (36.6 °C) (09/28/22 2220) 98 Skin Warm/Dry No change   Weight    Edema Generalized 1+ No change   Tele status NSR NSR Pain Pain Intensity 1: 10 (09/28/22 1900) 4     Orders   Duration: Start: 2220 End: 0205 Total: 3.75 hrs   Dialyzer: Dialyzer/Set Up Inspection: Revaclear (09/28/22 2220)   K Bath: Dialysate K (mEq/L): 3.5 (09/28/22 2220)   Ca Bath: Dialysate CA (mEq/L): 2.5 (09/28/22 2220)   Na: Dialysate NA (mEq/L): 138 (09/28/22 2220)   Bicarb: Dialysate HCO3 (mEq/L): 35 (09/28/22 2220)   Target Fluid Removal: Goal/Amount of Fluid to Remove (mL): 3000 mL (09/28/22 2220)     Access   Type & Location: L Chest PC : Dressing CDI. No s/s of infection. Both lumens aspirate & flush well. Running well at .     Comments:                                        Labs   HBsAg (Antigen) / date: Negative 9/15/2022                                              HBsAb (Antibody) / date: Susceptible 3/13/2022   Source: Epic   Obtained/Reviewed  Critical Results Called HGB   Date Value Ref Range Status   09/27/2022 8.8 (L) 12.1 - 17.0 g/dL Final     Potassium   Date Value Ref Range Status   09/27/2022 4.6 3.5 - 5.1 mmol/L Final     Calcium   Date Value Ref Range Status   09/27/2022 8.3 (L) 8.5 - 10.1 MG/DL Final     BUN   Date Value Ref Range Status   09/27/2022 66 (H) 6 - 20 MG/DL Final     Creatinine   Date Value Ref Range Status   09/27/2022 11.70 (H) 0.70 - 1.30 MG/DL Final        Meds Given   Name Dose Route   None ordered                 Adequacy / Fluid    Total Liters Process: 77 L   Net Fluid Removed: 2500 ml      Comments   Time Out Done:   (Time) 2215   Admitting Diagnosis: Malignant HTN   Consent obtained/signed: Informed Consent Verified: Yes (center consent) (09/28/22 2220)   Machine / Lor Heardudent # Machine Number: [id number] (09/28/22 0399)   Primary Nurse Rpt Pre: Lindsay Márquez RN   Primary Nurse Rpt Post: Lindsay Márquez RN   Pt Education: Procedural   Care Plan: Ongoing   Pts outpatient clinic: [address] [place] [address]     Tx Summary   SBAR received from Primary RN. Arrived to pt room, pt A&Ox4. Consent signed & on file. 2125: Received page from Primary RNLyly, concerning pt's severe hypertension (200's/100's). Informed Primary that this patient was scheduled to be run last.  2135: Spoke with EDIS Pittman concerning this pt's hypertension. EDIS Pittman stated this pt should be run second rather than third. 2145: Notified Primary RN that pt would run next. 2220: Each catheter limb disinfected per p&p, caps removed, hubs disinfected per p&p. Each lumen aspirated for blood return and flushed with Normal Saline per policy. VSS. Dialysis Tx initiated. 2245: Pt c/o being lightheaded. BP trending slightly back up. All other VS stable. 2300: Pt states lightheaded feeling has passed, resting quietly. 0145: Pt again c/o dizziness. States we may need to stop the pull. UF almost at 2.5L, reduced goal accordingly. 0200: Pt still c/o dizziness, requesting to end treatment early. Treatment ended 15 minutes early. 0205: Tx ended. VSS. Each dialysis catheter limb disinfected per p&p, all possible blood returned per p&p, and each dialysis hub disinfected per p&p. Each lumen flushed, and caps applied. Bed locked and in the lowest position, call bell and belongings in reach. SBAR given to Primary, RN. Patient is stable at time of their/ my departure. All Dialysis related medications have been reviewed.

## 2022-09-29 NOTE — PROGRESS NOTES
Nephrology Progress Note  Rachana Jaimes  Date of Admission : 9/24/2022    CC:  Follow up for ESRD       Assessment and Plan     ESRD-HD :  - dialyzes TTS at [facility]  - HD today and TTS while here  - UF 3kg  - wean cardene as able    Uncontrolled HTN   - missed HD Rx on 9/17   - hx of cocaine use : negative UDS  - on cardene now - attempt to wean after dialysis     Anemia in CKD:  - hgb stable, no need for ABI     Sec HPTH   - continue home binders  - PTH at goal           Interval History:  Seen and examined. Placed back on cardene overnight for CP. No SOB or HA. Mild chest pain this AM.  BP better. For HD later today    Current Medications: all current  Medications have been eviewed in EPIC  Review of Systems: Pertinent items are noted in HPI. Objective:  Vitals:    Vitals:    09/29/22 0305 09/29/22 0400 09/29/22 0556 09/29/22 0700   BP: (!) 209/101   (!) 194/85   Pulse: 87 91 71 85   Resp: 20   18   Temp: 98 °F (36.7 °C)   98.1 °F (36.7 °C)   SpO2: 100%   100%   Weight:       Height:         Intake and Output:  No intake/output data recorded. 09/27 1901 - 09/29 0700  In: 1915 [P.O.:1905; I.V.:10]  Out: 26 [Urine:100]    Physical Examination:    General: NAD,Conversant   Neck:  Supple, no mass  Resp:  Lungs CTA B/L, no wheezing , normal respiratory effort  CV:  RRR,  no murmur or rub,trace LE edema  GI:  Soft, NT, + Bowel sounds, no hepatosplenomegaly  Neurologic:  Non focal  Psych:             AAO x 3 appropriate affect   Skin:  No Rash  Access:           [facility]    []    High complexity decision making was performed  []    Patient is at high-risk of decompensation with multiple organ involvement    Lab Data Personally Reviewed: I have reviewed all the pertinent labs, microbiology data and radiology studies during assessment.     Recent Labs     09/27/22  0553   *   K 4.6      CO2 25   *   BUN 66*   CREA 11.70*   CA 8.3*       Recent Labs     09/27/22  0553   WBC 4.6 HGB 8.8*   HCT 27.7*   PLT 85*       No results found for: SDES  Lab Results   Component Value Date/Time    Culture result: NO GROWTH 5 DAYS 05/18/2022 06:06 AM    Culture result: No growth (<1,000 CFU/ML) 03/22/2022 04:11 PM     Recent Results (from the past 24 hour(s))   EKG, 12 LEAD, INITIAL    Collection Time: 09/28/22  7:10 PM   Result Value Ref Range    Ventricular Rate 80 BPM    Atrial Rate 80 BPM    P-R Interval 154 ms    QRS Duration 94 ms    Q-T Interval 432 ms    QTC Calculation (Bezet) 498 ms    Calculated P Axis 64 degrees    Calculated R Axis 50 degrees    Calculated T Axis 164 degrees    Diagnosis       Normal sinus rhythm  Minimal voltage criteria for LVH, may be normal variant  T wave abnormality, consider inferolateral ischemia  Prolonged QT  When compared with ECG of 27-SEP-2022 06:34,  No significant change was found     TROPONIN-HIGH SENSITIVITY    Collection Time: 09/28/22  7:30 PM   Result Value Ref Range    Troponin-High Sensitivity 53 0 - 76 ng/L                 Francisco Kirby MD  [address]  Phone - [phone]   Fax - [phone]  www. [email or web address]

## 2022-09-30 ENCOUNTER — APPOINTMENT (OUTPATIENT)
Dept: CT IMAGING | Age: 57
DRG: 304 | End: 2022-09-30
Attending: INTERNAL MEDICINE
Payer: MEDICARE

## 2022-09-30 LAB
ALBUMIN SERPL-MCNC: 3.9 G/DL (ref 3.5–5)
ALBUMIN/GLOB SERPL: 0.8 {RATIO} (ref 1.1–2.2)
ALP SERPL-CCNC: 88 U/L (ref 45–117)
ALT SERPL-CCNC: 17 U/L (ref 12–78)
ANION GAP SERPL CALC-SCNC: 4 MMOL/L (ref 5–15)
AST SERPL-CCNC: 16 U/L (ref 15–37)
BASOPHILS # BLD: 0 K/UL (ref 0–0.1)
BASOPHILS NFR BLD: 1 % (ref 0–1)
BILIRUB SERPL-MCNC: 0.4 MG/DL (ref 0.2–1)
BUN SERPL-MCNC: 18 MG/DL (ref 6–20)
BUN/CREAT SERPL: 4 (ref 12–20)
CALCIUM SERPL-MCNC: 9.6 MG/DL (ref 8.5–10.1)
CHLORIDE SERPL-SCNC: 98 MMOL/L (ref 97–108)
CO2 SERPL-SCNC: 29 MMOL/L (ref 21–32)
CREAT SERPL-MCNC: 4.56 MG/DL (ref 0.7–1.3)
DIFFERENTIAL METHOD BLD: ABNORMAL
EOSINOPHIL # BLD: 0.4 K/UL (ref 0–0.4)
EOSINOPHIL NFR BLD: 6 % (ref 0–7)
ERYTHROCYTE [DISTWIDTH] IN BLOOD BY AUTOMATED COUNT: 12.5 % (ref 11.5–14.5)
GLOBULIN SER CALC-MCNC: 5 G/DL (ref 2–4)
GLUCOSE SERPL-MCNC: 107 MG/DL (ref 65–100)
HCT VFR BLD AUTO: 30.9 % (ref 36.6–50.3)
HGB BLD-MCNC: 9.7 G/DL (ref 12.1–17)
IMM GRANULOCYTES # BLD AUTO: 0 K/UL (ref 0–0.04)
IMM GRANULOCYTES NFR BLD AUTO: 0 % (ref 0–0.5)
LYMPHOCYTES # BLD: 1.4 K/UL (ref 0.8–3.5)
LYMPHOCYTES NFR BLD: 22 % (ref 12–49)
MCH RBC QN AUTO: 28.6 PG (ref 26–34)
MCHC RBC AUTO-ENTMCNC: 31.4 G/DL (ref 30–36.5)
MCV RBC AUTO: 91.2 FL (ref 80–99)
MONOCYTES # BLD: 0.7 K/UL (ref 0–1)
MONOCYTES NFR BLD: 11 % (ref 5–13)
NEUTS SEG # BLD: 3.8 K/UL (ref 1.8–8)
NEUTS SEG NFR BLD: 60 % (ref 32–75)
NRBC # BLD: 0 K/UL (ref 0–0.01)
NRBC BLD-RTO: 0 PER 100 WBC
PLATELET # BLD AUTO: 118 K/UL (ref 150–400)
PMV BLD AUTO: 11.6 FL (ref 8.9–12.9)
POTASSIUM SERPL-SCNC: 4.4 MMOL/L (ref 3.5–5.1)
PROT SERPL-MCNC: 8.9 G/DL (ref 6.4–8.2)
RBC # BLD AUTO: 3.39 M/UL (ref 4.1–5.7)
SODIUM SERPL-SCNC: 131 MMOL/L (ref 136–145)
WBC # BLD AUTO: 6.3 K/UL (ref 4.1–11.1)

## 2022-09-30 PROCEDURE — 80053 COMPREHEN METABOLIC PANEL: CPT

## 2022-09-30 PROCEDURE — 74011250637 HC RX REV CODE- 250/637: Performed by: FAMILY MEDICINE

## 2022-09-30 PROCEDURE — 74011000636 HC RX REV CODE- 636: Performed by: RADIOLOGY

## 2022-09-30 PROCEDURE — 74011250636 HC RX REV CODE- 250/636: Performed by: HOSPITALIST

## 2022-09-30 PROCEDURE — 74011250636 HC RX REV CODE- 250/636: Performed by: INTERNAL MEDICINE

## 2022-09-30 PROCEDURE — 74011250637 HC RX REV CODE- 250/637: Performed by: HOSPITALIST

## 2022-09-30 PROCEDURE — 65660000001 HC RM ICU INTERMED STEPDOWN

## 2022-09-30 PROCEDURE — 74011000250 HC RX REV CODE- 250: Performed by: INTERNAL MEDICINE

## 2022-09-30 PROCEDURE — 74174 CTA ABD&PLVS W/CONTRAST: CPT

## 2022-09-30 PROCEDURE — 85025 COMPLETE CBC W/AUTO DIFF WBC: CPT

## 2022-09-30 PROCEDURE — 36415 COLL VENOUS BLD VENIPUNCTURE: CPT

## 2022-09-30 PROCEDURE — 74011000250 HC RX REV CODE- 250: Performed by: HOSPITALIST

## 2022-09-30 PROCEDURE — 74011250637 HC RX REV CODE- 250/637: Performed by: INTERNAL MEDICINE

## 2022-09-30 RX ORDER — ALISKIREN 300 MG/1
300 TABLET, FILM COATED ORAL DAILY
Status: DISCONTINUED | OUTPATIENT
Start: 2022-09-30 | End: 2022-10-03 | Stop reason: HOSPADM

## 2022-09-30 RX ORDER — MINOXIDIL 2.5 MG/1
10 TABLET ORAL 2 TIMES DAILY
Status: DISCONTINUED | OUTPATIENT
Start: 2022-09-30 | End: 2022-10-03 | Stop reason: HOSPADM

## 2022-09-30 RX ADMIN — ALISKIREN HEMIFUMARATE 300 MG: 300 TABLET, FILM COATED ORAL at 08:57

## 2022-09-30 RX ADMIN — SODIUM CHLORIDE 10 MG/HR: 900 INJECTION, SOLUTION INTRAVENOUS at 00:06

## 2022-09-30 RX ADMIN — CALCIUM CARBONATE (ANTACID) CHEW TAB 500 MG 600 MG: 500 CHEW TAB at 08:57

## 2022-09-30 RX ADMIN — SODIUM CHLORIDE, PRESERVATIVE FREE 10 ML: 5 INJECTION INTRAVENOUS at 07:47

## 2022-09-30 RX ADMIN — SODIUM CHLORIDE 10 MG/HR: 900 INJECTION, SOLUTION INTRAVENOUS at 13:33

## 2022-09-30 RX ADMIN — HYDRALAZINE HYDROCHLORIDE 10 MG: 20 INJECTION INTRAMUSCULAR; INTRAVENOUS at 02:22

## 2022-09-30 RX ADMIN — BUPRENORPHINE HYDROCHLORIDE AND NALOXONE HYDROCHLORIDE DIHYDRATE 1 TABLET: 8; 2 TABLET SUBLINGUAL at 19:21

## 2022-09-30 RX ADMIN — PANTOPRAZOLE SODIUM 40 MG: 40 TABLET, DELAYED RELEASE ORAL at 07:46

## 2022-09-30 RX ADMIN — SODIUM CHLORIDE 15 MG/HR: 900 INJECTION, SOLUTION INTRAVENOUS at 05:59

## 2022-09-30 RX ADMIN — IOPAMIDOL 100 ML: 755 INJECTION, SOLUTION INTRAVENOUS at 16:45

## 2022-09-30 RX ADMIN — HYDRALAZINE HYDROCHLORIDE 100 MG: 50 TABLET, FILM COATED ORAL at 08:57

## 2022-09-30 RX ADMIN — SODIUM CHLORIDE, PRESERVATIVE FREE 10 ML: 5 INJECTION INTRAVENOUS at 19:21

## 2022-09-30 RX ADMIN — SODIUM CHLORIDE 15 MG/HR: 900 INJECTION, SOLUTION INTRAVENOUS at 04:11

## 2022-09-30 RX ADMIN — LABETALOL HYDROCHLORIDE 10 MG: 5 INJECTION INTRAVENOUS at 00:12

## 2022-09-30 RX ADMIN — MINOXIDIL 10 MG: 2.5 TABLET ORAL at 17:27

## 2022-09-30 RX ADMIN — HEPARIN SODIUM 5000 UNITS: 5000 INJECTION INTRAVENOUS; SUBCUTANEOUS at 21:26

## 2022-09-30 RX ADMIN — SEVELAMER CARBONATE 800 MG: 800 TABLET, FILM COATED ORAL at 21:25

## 2022-09-30 RX ADMIN — CARVEDILOL 25 MG: 12.5 TABLET, FILM COATED ORAL at 17:27

## 2022-09-30 RX ADMIN — CLONIDINE HYDROCHLORIDE 0.2 MG: 0.2 TABLET ORAL at 17:27

## 2022-09-30 RX ADMIN — CARVEDILOL 25 MG: 12.5 TABLET, FILM COATED ORAL at 07:46

## 2022-09-30 RX ADMIN — CLONIDINE HYDROCHLORIDE 0.2 MG: 0.2 TABLET ORAL at 08:57

## 2022-09-30 RX ADMIN — SODIUM CHLORIDE 10 MG/HR: 900 INJECTION, SOLUTION INTRAVENOUS at 21:06

## 2022-09-30 RX ADMIN — HYDRALAZINE HYDROCHLORIDE 100 MG: 50 TABLET, FILM COATED ORAL at 17:27

## 2022-09-30 RX ADMIN — BUMETANIDE 2 MG: 1 TABLET ORAL at 08:58

## 2022-09-30 RX ADMIN — DOXAZOSIN 8 MG: 2 TABLET ORAL at 21:24

## 2022-09-30 RX ADMIN — CALCIUM CARBONATE (ANTACID) CHEW TAB 500 MG 600 MG: 500 CHEW TAB at 17:27

## 2022-09-30 RX ADMIN — SEVELAMER CARBONATE 800 MG: 800 TABLET, FILM COATED ORAL at 17:27

## 2022-09-30 RX ADMIN — SODIUM CHLORIDE 10 MG/HR: 900 INJECTION, SOLUTION INTRAVENOUS at 10:35

## 2022-09-30 RX ADMIN — SODIUM CHLORIDE 15 MG/HR: 900 INJECTION, SOLUTION INTRAVENOUS at 07:45

## 2022-09-30 RX ADMIN — HYDRALAZINE HYDROCHLORIDE 100 MG: 50 TABLET, FILM COATED ORAL at 21:25

## 2022-09-30 RX ADMIN — ACETAMINOPHEN 650 MG: 325 TABLET, FILM COATED ORAL at 07:46

## 2022-09-30 RX ADMIN — SODIUM CHLORIDE 15 MG/HR: 900 INJECTION, SOLUTION INTRAVENOUS at 02:22

## 2022-09-30 RX ADMIN — SODIUM CHLORIDE 10 MG/HR: 900 INJECTION, SOLUTION INTRAVENOUS at 17:47

## 2022-09-30 RX ADMIN — MINOXIDIL 10 MG: 2.5 TABLET ORAL at 08:57

## 2022-09-30 RX ADMIN — SEVELAMER CARBONATE 800 MG: 800 TABLET, FILM COATED ORAL at 08:58

## 2022-09-30 NOTE — PROGRESS NOTES
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 HD yesterday and BP did not budge   Asymptomatic now   He is high risk for Stroke without adequate BP control and need to remain in the hospital    Current Medications: all current  Medications have been eviewed in EPIC  Review of Systems: Pertinent items are noted in HPI. Objective:  Vitals:    Vitals:    09/30/22 0415 09/30/22 0555 09/30/22 0559 09/30/22 0745   BP: (!) 190/75  (!) 176/75 (!) 156/75   Pulse: 90 83 84 81   Resp: 16      Temp: 97.6 °F (36.4 °C)      TempSrc:       SpO2: 94%      Weight:       Height:         Intake and Output:  09/30 0701 - 09/30 1900  In: 500 [P.O.:500]  Out: -   09/28 1901 - 09/30 0700  In: 4379.6 [P.O.:1955;  I.V.:2424.6]  Out: 4500     Physical Examination:    General: NAD,Conversant   Neck:  Supple, no mass  Resp:  Lungs CTA B/L, no wheezing , normal respiratory effort  CV:  RRR,  no murmur or rub,trace LE edema  GI:  Soft, NT, + Bowel sounds, no hepatosplenomegaly  Neurologic:  Non focal  Psych:             AAO x 3 appropriate affect   Skin:  No Rash  Access:           [facility]    []    High complexity decision making was performed  []    Patient is at high-risk of decompensation with multiple organ involvement    Lab Data Personally Reviewed: I have reviewed all the pertinent labs, microbiology data and radiology studies during assessment. Recent Labs     09/30/22  0059   *   K 4.4   CL 98   CO2 29   *   BUN 18   CREA 4.56*   CA 9.6   ALB 3.9   ALT 17       Recent Labs     09/30/22  0059   WBC 6.3   HGB 9.7*   HCT 30.9*   *       No results found for: SDES  Lab Results   Component Value Date/Time    Culture result: NO GROWTH 5 DAYS 05/18/2022 06:06 AM    Culture result: No growth (<1,000 CFU/ML) 03/22/2022 04:11 PM     Recent Results (from the past 24 hour(s))   CBC WITH AUTOMATED DIFF    Collection Time: 09/30/22 12:59 AM   Result Value Ref Range    WBC 6.3 4.1 - 11.1 K/uL    RBC 3.39 (L) 4.10 - 5.70 M/uL    HGB 9.7 (L) 12.1 - 17.0 g/dL    HCT 30.9 (L) 36.6 - 50.3 %    MCV 91.2 80.0 - 99.0 FL    MCH 28.6 26.0 - 34.0 PG    MCHC 31.4 30.0 - 36.5 g/dL    RDW 12.5 11.5 - 14.5 %    PLATELET 014 (L) 708 - 400 K/uL    MPV 11.6 8.9 - 12.9 FL    NRBC 0.0 0  WBC    ABSOLUTE NRBC 0.00 0.00 - 0.01 K/uL    NEUTROPHILS 60 32 - 75 %    LYMPHOCYTES 22 12 - 49 %    MONOCYTES 11 5 - 13 %    EOSINOPHILS 6 0 - 7 %    BASOPHILS 1 0 - 1 %    IMMATURE GRANULOCYTES 0 0.0 - 0.5 %    ABS. NEUTROPHILS 3.8 1.8 - 8.0 K/UL    ABS. LYMPHOCYTES 1.4 0.8 - 3.5 K/UL    ABS. MONOCYTES 0.7 0.0 - 1.0 K/UL    ABS. EOSINOPHILS 0.4 0.0 - 0.4 K/UL    ABS. BASOPHILS 0.0 0.0 - 0.1 K/UL    ABS. IMM.  GRANS. 0.0 0.00 - 0.04 K/UL    DF AUTOMATED     METABOLIC PANEL, COMPREHENSIVE    Collection Time: 09/30/22 12:59 AM   Result Value Ref Range    Sodium 131 (L) 136 - 145 mmol/L    Potassium 4.4 3.5 - 5.1 mmol/L    Chloride 98 97 - 108 mmol/L    CO2 29 21 - 32 mmol/L    Anion gap 4 (L) 5 - 15 mmol/L    Glucose 107 (H) 65 - 100 mg/dL    BUN 18 6 - 20 MG/DL    Creatinine 4.56 (H) 0.70 - 1.30 MG/DL    BUN/Creatinine ratio 4 (L) 12 - 20      GFR est AA 16 (L) >60 ml/min/1.73m2    GFR est non-AA 13 (L) >60 ml/min/1.73m2    Calcium 9.6 8.5 - 10.1 MG/DL Bilirubin, total 0.4 0.2 - 1.0 MG/DL    ALT (SGPT) 17 12 - 78 U/L    AST (SGOT) 16 15 - 37 U/L    Alk. phosphatase 88 45 - 117 U/L    Protein, total 8.9 (H) 6.4 - 8.2 g/dL    Albumin 3.9 3.5 - 5.0 g/dL    Globulin 5.0 (H) 2.0 - 4.0 g/dL    A-G Ratio 0.8 (L) 1.1 - 2.2                   Daylin Dumont MD  [address]  Phone - [phone]   Fax - [phone]  [email or web address]

## 2022-09-30 NOTE — PROGRESS NOTES
0730 Bedside shift change report given to Jessie Webber (oncoming nurse) by Jerald Toure (offgoing nurse). Report included the following information SBAR, Kardex, Intake/Output, MAR, and Recent Results. 1400 Patient went down for CT.    1650 Patient returned from CT.

## 2022-10-01 PROCEDURE — 74011250636 HC RX REV CODE- 250/636: Performed by: INTERNAL MEDICINE

## 2022-10-01 PROCEDURE — 74011000250 HC RX REV CODE- 250: Performed by: HOSPITALIST

## 2022-10-01 PROCEDURE — 90935 HEMODIALYSIS ONE EVALUATION: CPT

## 2022-10-01 PROCEDURE — 74011000250 HC RX REV CODE- 250: Performed by: INTERNAL MEDICINE

## 2022-10-01 PROCEDURE — 74011250636 HC RX REV CODE- 250/636: Performed by: HOSPITALIST

## 2022-10-01 PROCEDURE — 74011250637 HC RX REV CODE- 250/637: Performed by: FAMILY MEDICINE

## 2022-10-01 PROCEDURE — 74011250637 HC RX REV CODE- 250/637: Performed by: INTERNAL MEDICINE

## 2022-10-01 PROCEDURE — 65660000001 HC RM ICU INTERMED STEPDOWN

## 2022-10-01 PROCEDURE — 74011250637 HC RX REV CODE- 250/637: Performed by: HOSPITALIST

## 2022-10-01 RX ADMIN — HEPARIN SODIUM 5000 UNITS: 5000 INJECTION INTRAVENOUS; SUBCUTANEOUS at 08:52

## 2022-10-01 RX ADMIN — DOXAZOSIN 8 MG: 2 TABLET ORAL at 22:01

## 2022-10-01 RX ADMIN — HYDRALAZINE HYDROCHLORIDE 100 MG: 50 TABLET, FILM COATED ORAL at 08:46

## 2022-10-01 RX ADMIN — SEVELAMER CARBONATE 800 MG: 800 TABLET, FILM COATED ORAL at 09:00

## 2022-10-01 RX ADMIN — SEVELAMER CARBONATE 800 MG: 800 TABLET, FILM COATED ORAL at 22:01

## 2022-10-01 RX ADMIN — ALISKIREN HEMIFUMARATE 300 MG: 300 TABLET, FILM COATED ORAL at 08:45

## 2022-10-01 RX ADMIN — HYDRALAZINE HYDROCHLORIDE 100 MG: 50 TABLET, FILM COATED ORAL at 22:01

## 2022-10-01 RX ADMIN — CALCIUM CARBONATE (ANTACID) CHEW TAB 500 MG 600 MG: 500 CHEW TAB at 08:45

## 2022-10-01 RX ADMIN — HYDRALAZINE HYDROCHLORIDE 100 MG: 50 TABLET, FILM COATED ORAL at 17:03

## 2022-10-01 RX ADMIN — PANTOPRAZOLE SODIUM 40 MG: 40 TABLET, DELAYED RELEASE ORAL at 08:46

## 2022-10-01 RX ADMIN — CARVEDILOL 25 MG: 12.5 TABLET, FILM COATED ORAL at 08:46

## 2022-10-01 RX ADMIN — CALCIUM CARBONATE (ANTACID) CHEW TAB 500 MG 600 MG: 500 CHEW TAB at 13:06

## 2022-10-01 RX ADMIN — EPOETIN ALFA-EPBX 10000 UNITS: 10000 INJECTION, SOLUTION INTRAVENOUS; SUBCUTANEOUS at 21:58

## 2022-10-01 RX ADMIN — CLONIDINE HYDROCHLORIDE 0.2 MG: 0.2 TABLET ORAL at 08:46

## 2022-10-01 RX ADMIN — HEPARIN SODIUM 5000 UNITS: 5000 INJECTION INTRAVENOUS; SUBCUTANEOUS at 22:01

## 2022-10-01 RX ADMIN — CARVEDILOL 25 MG: 12.5 TABLET, FILM COATED ORAL at 17:03

## 2022-10-01 RX ADMIN — MINOXIDIL 10 MG: 2.5 TABLET ORAL at 08:46

## 2022-10-01 RX ADMIN — BUPRENORPHINE HYDROCHLORIDE AND NALOXONE HYDROCHLORIDE DIHYDRATE 1 TABLET: 8; 2 TABLET SUBLINGUAL at 17:04

## 2022-10-01 RX ADMIN — HEPARIN SODIUM 5000 UNITS: 5000 INJECTION INTRAVENOUS; SUBCUTANEOUS at 13:06

## 2022-10-01 RX ADMIN — SEVELAMER CARBONATE 800 MG: 800 TABLET, FILM COATED ORAL at 17:04

## 2022-10-01 RX ADMIN — SODIUM CHLORIDE 10 MG/HR: 900 INJECTION, SOLUTION INTRAVENOUS at 04:47

## 2022-10-01 RX ADMIN — CLONIDINE HYDROCHLORIDE 0.2 MG: 0.2 TABLET ORAL at 17:04

## 2022-10-01 RX ADMIN — SODIUM CHLORIDE, PRESERVATIVE FREE 10 ML: 5 INJECTION INTRAVENOUS at 08:52

## 2022-10-01 RX ADMIN — CALCIUM CARBONATE (ANTACID) CHEW TAB 500 MG 600 MG: 500 CHEW TAB at 17:04

## 2022-10-01 RX ADMIN — SODIUM CHLORIDE 10 MG/HR: 900 INJECTION, SOLUTION INTRAVENOUS at 01:41

## 2022-10-01 RX ADMIN — SODIUM CHLORIDE, PRESERVATIVE FREE 10 ML: 5 INJECTION INTRAVENOUS at 22:02

## 2022-10-01 RX ADMIN — MINOXIDIL 10 MG: 2.5 TABLET ORAL at 17:04

## 2022-10-01 RX ADMIN — BUMETANIDE 2 MG: 1 TABLET ORAL at 08:46

## 2022-10-01 NOTE — PROCEDURES
[facility]                      [phone]  Vitals Pre Post Assessment Pre Post   BP BP: (!) 175/70 (10/01/22 0845)   105/48 LOC A&Ox4 A&Ox4   HR Pulse (Heart Rate): 86 (10/01/22 0845) 77 Lungs Clear, RA Remains on RA   Resp Resp Rate: 16 (10/01/22 0845) 16 Cardiac S1S2, NSR NSR   Temp Temp: 97.5 °F (36.4 °C) (10/01/22 0845) 97.6 Skin Warm, dry, intact Remains warm and dry   Weight Pre-Dialysis Weight: 80.2 kg (176 lb 12.9 oz) (10/01/22 0845)  Edema No peripheral edema noted none   Pain Pain Intensity 1: 0 (09/30/22 2344)  Tele Status Bedside cardiac monitor      Orders   Duration: Start: 0845 End: 1215 Total: 3.5 hrs   Dialyzer: Dialyzer/Set Up Inspection: Revaclear (10/01/22 0845)   K Bath: Dialysate K (mEq/L): 2 (10/01/22 0845)   Ca Bath: Dialysate CA (mEq/L): 2.5 (10/01/22 0845)   Na: Dialysate NA (mEq/L): 138 (10/01/22 0845)   Bicarb: 35   Target Fluid Removal: Goal/Amount of Fluid to Remove (mL): 3000 mL (10/01/22 0845)     Access   Type & Location: [facility]IJ tunneled CVC- transparent dsg CDI and dated 09/27/22   Comments: +aspiration/flush,  as ordered with good AP/.                   Labs   HBsAg (Antigen) / date: Neg (09/15/22)                                      HBsAb (Antibody) / date: Susceptible (03/13/22)   Source: EPIC   Obtained/Reviewed  Critical Results Called HGB   Date Value Ref Range Status   09/30/2022 9.7 (L) 12.1 - 17.0 g/dL Final     Potassium   Date Value Ref Range Status   09/30/2022 4.4 3.5 - 5.1 mmol/L Final     Calcium   Date Value Ref Range Status   09/30/2022 9.6 8.5 - 10.1 MG/DL Final     BUN   Date Value Ref Range Status   09/30/2022 18 6 - 20 MG/DL Final     Creatinine   Date Value Ref Range Status   09/30/2022 4.56 (H) 0.70 - 1.30 MG/DL Final     Comment:     INVESTIGATED PER DELTA CHECK PROTOCOL        Meds Given   Name Dose Route                    Adequacy / Fluid    Total Liters Process: 78L   Net Fluid Removed: 3000 mL      Comments   Time Out Done: 0669 Admitting Diagnosis: Malignant HTN, ESRD   Consent obtained/signed: Informed Consent Verified: Yes (10/01/22 0845)   Machine / RO # Machine Number: [id number] (10/01/22 0845)   Primary Nurse Rpt Pre: Alisa Mariscal RN   Primary Nurse Rpt Post: Alisa Mariscal RN   Pt Education: CVC precautions   Care Plan: TTS    Pts outpatient clinic: [facility] Summary   Comments:                         0840: Safety checks complete, time out performed. 0845: CVC assessed, no redness, warmth or drainage noted. Transparent dressing and biopatch CDI. Each catheter limb disinfected for 60 seconds per limb with alcohol swabs. Caps removed, dialysis CVC hub scrubbed with Prevantics for 15 seconds, followed by a 5 second dry time per Hospital P&P. Aspirated and discarded 5ml from each lumen. +aspiration/flush. HD initiated. Access visible, lines secure. Medications reviewed. 1215: HD complete. All possible blood rinsed back. Each catheter limb disinfected for 60 seconds per limb with alcohol swabs. Dialysis CVC hubs scrubbed with Prevantics for 15 seconds, followed by a 5 second dry time per Hospital P&P. Saline flushed, locked and capped. SBAR given to primary RN.

## 2022-10-01 NOTE — PROGRESS NOTES
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 He is off Cardene drip  BP stable  Starting dialysis shortly    Current Medications: all current  Medications have been eviewed in EPIC  Review of Systems: Pertinent items are noted in HPI. Objective:  Vitals:    Vitals:    10/01/22 0915 10/01/22 0930 10/01/22 0945 10/01/22 1000   BP: (!) 175/89 (!) 161/70 (!) 158/98 (!) 162/76   Pulse: 100 93 87 85   Resp:       Temp:       TempSrc:       SpO2:       Weight:       Height:         Intake and Output:  No intake/output data recorded. 09/29 1901 - 10/01 0700  In: 4985.4 [P.O.:1820; I.V.:3165.4]  Out: 100 [Urine:100]    Physical Examination:    General: NAD,Conversant   Neck:  Supple, no mass  Resp:  Lungs CTA B/L, no wheezing , normal respiratory effort  CV:  RRR,  no murmur or rub,trace LE edema  GI:  Soft, NT, + Bowel sounds, no hepatosplenomegaly  Neurologic:  Non focal  Psych:             AAO x 3 appropriate affect   Skin:  No Rash  Access:           [facility]    []    High complexity decision making was performed  []    Patient is at high-risk of decompensation with multiple organ involvement    Lab Data Personally Reviewed: I have reviewed all the pertinent labs, microbiology data and radiology studies during assessment.     Recent Labs     09/30/22  0059   *   K 4.4   CL 98   CO2 29   *   BUN 18   CREA 4.56*   CA 9.6   ALB 3.9   ALT 17       Recent Labs     09/30/22  0059   WBC 6.3   HGB 9.7*   HCT 30.9*   *       No results found for: SDES  Lab Results   Component Value Date/Time    Culture result: NO GROWTH 5 DAYS 05/18/2022 06:06 AM    Culture result: No growth (<1,000 CFU/ML) 03/22/2022 04:11 PM     No results found for this or any previous visit (from the past 24 hour(s)). Kamaljit Clark MD  [address]  Phone - [phone]   Fax - [phone]  [email or web address]

## 2022-10-01 NOTE — PROGRESS NOTES
End of Shift Note    Bedside shift change report given to RN (oncoming nurse) by Alexandr Augustine RN (offgoing nurse). Report included the following information SBAR    Shift worked:  [id number]     Shift summary and any significant changes:     0830 Dr. Cornelius Yusuf with Nephrology at bedside. . Cardene gtt stopped. Dwaine Vu RN at bedside to start HD and AM meds given. 1330 Received call from patient's sister, Judith Medina.  Update provided at patient's request.     Concerns for physician to address:   None     Zone phone for oncoming shift:    See staffing sheet         Alexandr Augustine RN

## 2022-10-02 PROCEDURE — 74011250637 HC RX REV CODE- 250/637: Performed by: FAMILY MEDICINE

## 2022-10-02 PROCEDURE — 74011250637 HC RX REV CODE- 250/637: Performed by: INTERNAL MEDICINE

## 2022-10-02 PROCEDURE — 74011250636 HC RX REV CODE- 250/636: Performed by: HOSPITALIST

## 2022-10-02 PROCEDURE — 74011250637 HC RX REV CODE- 250/637: Performed by: HOSPITALIST

## 2022-10-02 PROCEDURE — 65660000001 HC RM ICU INTERMED STEPDOWN

## 2022-10-02 PROCEDURE — 74011000250 HC RX REV CODE- 250: Performed by: HOSPITALIST

## 2022-10-02 RX ORDER — ALISKIREN 300 MG/1
300 TABLET, FILM COATED ORAL DAILY
Qty: 60 TABLET | Refills: 0 | Status: SHIPPED | OUTPATIENT
Start: 2022-10-03

## 2022-10-02 RX ORDER — MINOXIDIL 10 MG/1
10 TABLET ORAL 2 TIMES DAILY
Qty: 60 TABLET | Refills: 0 | Status: SHIPPED | OUTPATIENT
Start: 2022-10-02

## 2022-10-02 RX ADMIN — CARVEDILOL 25 MG: 12.5 TABLET, FILM COATED ORAL at 17:37

## 2022-10-02 RX ADMIN — ALISKIREN HEMIFUMARATE 300 MG: 300 TABLET, FILM COATED ORAL at 10:22

## 2022-10-02 RX ADMIN — HYDRALAZINE HYDROCHLORIDE 100 MG: 50 TABLET, FILM COATED ORAL at 22:26

## 2022-10-02 RX ADMIN — SODIUM CHLORIDE, PRESERVATIVE FREE 10 ML: 5 INJECTION INTRAVENOUS at 13:34

## 2022-10-02 RX ADMIN — BUMETANIDE 2 MG: 1 TABLET ORAL at 10:22

## 2022-10-02 RX ADMIN — HEPARIN SODIUM 5000 UNITS: 5000 INJECTION INTRAVENOUS; SUBCUTANEOUS at 13:31

## 2022-10-02 RX ADMIN — CALCIUM CARBONATE (ANTACID) CHEW TAB 500 MG 600 MG: 500 CHEW TAB at 13:32

## 2022-10-02 RX ADMIN — CARVEDILOL 25 MG: 12.5 TABLET, FILM COATED ORAL at 10:26

## 2022-10-02 RX ADMIN — SODIUM CHLORIDE, PRESERVATIVE FREE 10 ML: 5 INJECTION INTRAVENOUS at 22:26

## 2022-10-02 RX ADMIN — MINOXIDIL 10 MG: 2.5 TABLET ORAL at 17:36

## 2022-10-02 RX ADMIN — CLONIDINE HYDROCHLORIDE 0.2 MG: 0.2 TABLET ORAL at 10:22

## 2022-10-02 RX ADMIN — HEPARIN SODIUM 5000 UNITS: 5000 INJECTION INTRAVENOUS; SUBCUTANEOUS at 07:18

## 2022-10-02 RX ADMIN — CALCIUM CARBONATE (ANTACID) CHEW TAB 500 MG 600 MG: 500 CHEW TAB at 17:41

## 2022-10-02 RX ADMIN — HYDRALAZINE HYDROCHLORIDE 100 MG: 50 TABLET, FILM COATED ORAL at 17:36

## 2022-10-02 RX ADMIN — SEVELAMER CARBONATE 800 MG: 800 TABLET, FILM COATED ORAL at 10:22

## 2022-10-02 RX ADMIN — HYDRALAZINE HYDROCHLORIDE 100 MG: 50 TABLET, FILM COATED ORAL at 10:22

## 2022-10-02 RX ADMIN — DOXAZOSIN 8 MG: 2 TABLET ORAL at 17:37

## 2022-10-02 RX ADMIN — BUPRENORPHINE HYDROCHLORIDE AND NALOXONE HYDROCHLORIDE DIHYDRATE 1 TABLET: 8; 2 TABLET SUBLINGUAL at 10:22

## 2022-10-02 RX ADMIN — PANTOPRAZOLE SODIUM 40 MG: 40 TABLET, DELAYED RELEASE ORAL at 07:33

## 2022-10-02 RX ADMIN — BUPRENORPHINE HYDROCHLORIDE AND NALOXONE HYDROCHLORIDE DIHYDRATE 1 TABLET: 8; 2 TABLET SUBLINGUAL at 17:37

## 2022-10-02 RX ADMIN — SEVELAMER CARBONATE 800 MG: 800 TABLET, FILM COATED ORAL at 17:37

## 2022-10-02 RX ADMIN — SODIUM CHLORIDE, PRESERVATIVE FREE 10 ML: 5 INJECTION INTRAVENOUS at 07:18

## 2022-10-02 RX ADMIN — SEVELAMER CARBONATE 800 MG: 800 TABLET, FILM COATED ORAL at 22:26

## 2022-10-02 RX ADMIN — CALCIUM CARBONATE (ANTACID) CHEW TAB 500 MG 600 MG: 500 CHEW TAB at 10:22

## 2022-10-02 RX ADMIN — CLONIDINE HYDROCHLORIDE 0.2 MG: 0.2 TABLET ORAL at 17:37

## 2022-10-02 RX ADMIN — MINOXIDIL 10 MG: 2.5 TABLET ORAL at 10:22

## 2022-10-02 NOTE — DISCHARGE SUMMARY
Discharge Summary       PATIENT ID: Fausto Candelaria  MRN: [id number]   YOB: 1965    DATE OF ADMISSION: 9/24/2022 11:47 AM    DATE OF DISCHARGE: 10/2/2022   PRIMARY CARE PROVIDER: Jero Fuentes NP     ATTENDING PHYSICIAN: Mariola Huff  DISCHARGING PROVIDER: Gwendolyn Fernandez MD    To contact this individual call [phone] and ask the  to page. If unavailable ask to be transferred the Adult Hospitalist Department. CONSULTATIONS: IP CONSULT TO NEPHROLOGY  IP CONSULT TO GENERAL SURGERY    PROCEDURES/SURGERIES: * No surgery found *    DISCHARGE DIAGNOSES:    Malignant hypertension  Mild pancreatitis  End-stage renal disease  Anemia of CKD  History of drug abuse    ADMISSION SUMMARY AND HOSPITAL COURSE:     HPI  Fausto Candelaria is a 64 y.o. male who presents with headache, missed dialysis. 57y M with ESRD (on HD Tu/Th/Sa) here with diffuse body pain, HA, elevated BP. Says he started to feel bad 2 days ago. His BP dropped to the 13'K systolic at dialysis. Did not wake up in time to get to dialysis today. Says he felt poorly yesterday and didn't sleep well. Has a diffuse HA. Has chest pain at right side and abdominal pain. No focal weakness or numbness. o trouble breathing. No speech or gait problems. No fever. Hospital Course  Patient presents with missed hemodialysis and on presentation to the ER, patient was noted to have significantly elevated blood pressure, with initial blood pressure of 240/120. Patient has initially received IV labetalol in the ER and subsequently put on Cardene drip and admitted for further evaluation management. Patient required multiple antihypertensives including Bumex, clonidine, Coreg, hydralazine, Cardura, minoxidil. Patient initially was able to come off of Cardene drip. However patient's blood pressure elevated again with a complaint of chest pain and Cardene drip was again resumed.  Nephrology follows the patient and further adjustment made to his antihypertensive regimen. Was able to come off of Cardene drip and patient's blood pressure much improved. Patient also with end-stage renal disease and has missed hemodialysis on presentation. Patient continues on hemodialysis during this hospitalization. Patient also noted mild pancreatitis with elevated lipase on presentation, CT abdomen pelvis showed 3 mm gallstone with mild pancreatic enlargement with minimal peripancreatic fat stranding. General surgery evaluated the patient and no acute gallbladder pathology as per general surgery. DISCHARGE DIAGNOSES / PLAN:          BMI: Body mass index is 25.69 kg/m². . This patient: Meets criteria for overweight given BMI >/= 25 and < 30 due to excess calories/nutritional. Weight loss and lifestyle modifications should be encouraged as an outpatient. PENDING TEST RESULTS:   At the time of discharge the following test results are still pending: None     ADDITIONAL CARE RECOMMENDATIONS:     Check ambulatory blood pressure 2-3 times a day. Follow-up with PCP in 1 week. NOTIFY YOUR PHYSICIAN FOR ANY OF THE FOLLOWING:   Fever over 101 degrees for 24 hours. Chest pain, shortness of breath, fever, chills, nausea, vomiting, diarrhea, change in mentation, falling, weakness, bleeding. Severe pain or pain not relieved by medications, as well as any other signs or symptoms that you may have questions about. FOLLOW UP APPOINTMENTS:    Follow-up Information    None            DIET: Cardiac Diet    ACTIVITY: Activity as tolerated    EQUIPMENT needed: None    DISCHARGE MEDICATIONS:  Current Discharge Medication List        START taking these medications    Details   aliskiren (TEKTURNA) 300 mg tablet Take 1 Tablet by mouth daily. Qty: 60 Tablet, Refills: 0  Start date: 10/3/2022      minoxidiL (LONITEN) 10 mg tablet Take 1 Tablet by mouth two (2) times a day.   Qty: 60 Tablet, Refills: 0  Start date: 10/2/2022           CONTINUE these medications which have CHANGED Details   bumetanide (BUMEX) 2 mg tablet Take 1 Tablet by mouth daily. Qty: 30 Tablet, Refills: 0  Start date: 9/29/2022      carvediloL (COREG) 25 mg tablet Take 1 Tablet by mouth two (2) times daily (with meals). Qty: 60 Tablet, Refills: 0  Start date: 9/28/2022      cloNIDine HCL (CATAPRES) 0.2 mg tablet Take 1 Tablet by mouth two (2) times a day. Qty: 60 Tablet, Refills: 0  Start date: 9/28/2022      doxazosin (CARDURA) 8 mg tablet Take 1 Tablet by mouth nightly. Qty: 30 Tablet, Refills: 0  Start date: 9/28/2022      hydrALAZINE (APRESOLINE) 100 mg tablet Take 1 Tablet by mouth three (3) times daily. Qty: 90 Tablet, Refills: 0  Start date: 9/28/2022           CONTINUE these medications which have NOT CHANGED    Details   sevelamer carbonate (RENVELA) 800 mg tab tab Take 800 mg by mouth three (3) times daily. New prescription as of 8/16/22, prescribed by Dr. Jim Galloway      ascorbic acid, vitamin C, (VITAMIN C) 500 mg tablet Take 500 mg by mouth daily. cholecalciferol (VITAMIN D3) 25 mcg (1,000 unit) cap Take 1,000 Units by mouth daily. buprenorphine-naloxone (SUBOXONE) 8-2 mg film sublingaul film 1 Film by SubLINGual route two (2) times a day. Comments: .      calcium carbonate (TUMS) 200 mg calcium (500 mg) chew Take 3 Tablets by mouth three (3) times daily (after meals). pantoprazole (Protonix) 40 mg tablet Take 1 Tablet by mouth two (2) times a day.  Indications: an ulcer of the duodenum  Qty: 60 Tablet, Refills: 0           STOP taking these medications       spironolactone (ALDACTONE) 100 mg tablet Comments:   Reason for Stopping:         chlorthalidone (HYGROTON) 25 mg tablet Comments:   Reason for Stopping:         losartan (COZAAR) 100 mg tablet Comments:   Reason for Stopping:         NIFEdipine ER (ADALAT CC) 60 mg ER tablet Comments:   Reason for Stopping:         fluticasone propionate (FLONASE) 50 mcg/actuation nasal spray Comments:   Reason for Stopping: DISPOSITION:  *  Home With:   OT  PT  HH  RN       Long term SNF/Inpatient Rehab    Independent/assisted living    Hospice    Other:       PATIENT CONDITION AT DISCHARGE:     Functional status    Poor     Deconditioned    * Independent      Cognition    * Lucid     Forgetful     Dementia      Catheters/lines (plus indication)    Montoya     PICC     PEG    * None      Code status    * Full code     DNR      PHYSICAL EXAMINATION AT DISCHARGE:  General:          Alert, cooperative, no distress, appears stated age. HEENT:           Atraumatic, anicteric sclerae, pink conjunctivae                          No oral ulcers, mucosa moist, throat clear, dentition fair  Neck:               Supple, symmetrical  Lungs:             Clear to auscultation bilaterally. No Wheezing or Rhonchi. No rales. Chest wall:      No tenderness  No Accessory muscle use. Heart:              Regular  rhythm,  No  murmur   No edema  Abdomen:        Soft, non-tender. Not distended. Bowel sounds normal  Extremities:     No cyanosis. No clubbing,                            Skin turgor normal, Capillary refill normal  Skin:                Not pale. Not Jaundiced  No rashes   Psych:             Not anxious or agitated.   Neurologic:      Alert, moves all extremities, answers questions appropriately and responds to commands       CHRONIC MEDICAL DIAGNOSES:  Problem List as of 10/2/2022 Date Reviewed: 1/15/2021            Codes Class Noted - Resolved    HTN (hypertension), malignant ICD-10-CM: I10  ICD-9-CM: 401.0  6/30/2022 - Present        Seizures ([facility] ICD-10-CM: R56.9  ICD-9-CM: 780.39  5/30/2022 - Present        Malignant hypertension ICD-10-CM: I10  ICD-9-CM: 401.0  5/17/2022 - Present        Enteritis ICD-10-CM: K52.9  ICD-9-CM: 558.9  5/17/2022 - Present        Bone metastasis ([facility] ICD-10-CM: C79.51  ICD-9-CM: 198.5  5/17/2022 - Present        ESRD needing dialysis ([facility] ICD-10-CM: N18.6, Z99.2  ICD-9-CM: 585.6  3/29/2022 - Present        Duodenitis ICD-10-CM: K29.80  ICD-9-CM: 535.60  3/25/2022 - Present        Peptic ulcer disease ICD-10-CM: K27.9  ICD-9-CM: 533.90  3/22/2022 - Present        Intractable abdominal pain ICD-10-CM: R10.9  ICD-9-CM: 789.00  3/22/2022 - Present        ESRD (end stage renal disease) ([facility] ICD-10-CM: N18.6  ICD-9-CM: 585.6  3/22/2022 - Present        Non-compliance ICD-10-CM: Z91.199  ICD-9-CM: V15.81  3/12/2022 - Present        ESRD (end stage renal disease) on dialysis [facility]) ICD-10-CM: N18.6, Z99.2  ICD-9-CM: 585.6, V45.11  3/12/2022 - Present        Hypertensive urgency ICD-10-CM: I16.0  ICD-9-CM: 401.9  3/12/2022 - Present        Hypertensive emergency ICD-10-CM: I16.1  ICD-9-CM: 401.9  1/15/2022 - Present        Iron deficiency anemia ICD-10-CM: D50.9  ICD-9-CM: 280.9  2/15/2021 - Present        Chronic hepatitis C ([facility] ICD-10-CM: B18.2  ICD-9-CM: 070.54  1/15/2021 - Present        CKD (chronic kidney disease) ICD-10-CM: N18.9  ICD-9-CM: 585.9  6/13/2020 - Present        Chest pain ICD-10-CM: R07.9  ICD-9-CM: 786.50  5/10/2019 - Present        Hypertension ICD-10-CM: I10  ICD-9-CM: 401.9  5/10/2019 - Present        RESOLVED: Acute renal failure (ARF) ([facility] ICD-10-CM: N17.9  ICD-9-CM: 584.9  6/28/2020 - 1/15/2021        RESOLVED: Elevated troponin ICD-10-CM: R77.8  ICD-9-CM: 790.6  6/13/2020 - 1/15/2021        RESOLVED: Cocaine abuse ([facility] ICD-10-CM: F14.10  ICD-9-CM: 305.60  5/10/2019 - 5/24/2019           Greater than 60 minutes were spent with the patient on counseling and coordination of care    Signed:   Ana Serrato MD  10/2/2022  1:14 PM

## 2022-10-02 NOTE — PROGRESS NOTES
Bedside shift change report given to lisa (oncoming nurse) by Lindy Harrell (offgoing nurse). Report included the following information SBAR, Kardex, Intake/Output, and Recent Results.

## 2022-10-02 NOTE — PROGRESS NOTES
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 D/c on current meds      Anemia in CKD:  - hgb stable, no need for ABI     Sec HPTH   - continue home binders  - PTH at goal           Interval History:  Seen and examined. BP stable   Did well w/ HD    Current Medications: all current  Medications have been eviewed in EPIC  Review of Systems: Pertinent items are noted in HPI. Objective:  Vitals:    Vitals:    10/02/22 0223 10/02/22 0357 10/02/22 0555 10/02/22 0719   BP:  136/78  (!) 151/58   Pulse:  80 93    Resp:  16  14   Temp:  97.8 °F (36.6 °C)  98 °F (36.7 °C)   TempSrc:       SpO2:  98%  98%   Weight: 78.9 kg (173 lb 15.1 oz)      Height:         Intake and Output:  No intake/output data recorded. 09/30 1901 - 10/02 0700  In: 4666 [P.O.:720; I.V.:1590]  Out: 3000     Physical Examination:    General: NAD,Conversant   Neck:  Supple, no mass  Resp:  Lungs CTA B/L, no wheezing , normal respiratory effort  CV:  RRR,  no murmur or rub,trace LE edema  GI:  Soft, NT, + Bowel sounds, no hepatosplenomegaly  Neurologic:  Non focal  Psych:             AAO x 3 appropriate affect   Skin:  No Rash  Access:           [facility]    []    High complexity decision making was performed  []    Patient is at high-risk of decompensation with multiple organ involvement    Lab Data Personally Reviewed: I have reviewed all the pertinent labs, microbiology data and radiology studies during assessment.     Recent Labs     09/30/22  0059   *   K 4.4   CL 98   CO2 29   *   BUN 18   CREA 4.56*   CA 9.6   ALB 3.9   ALT 17       Recent Labs     09/30/22  0059   WBC 6.3   HGB 9.7*   HCT 30.9*   *       No results found for: SDES  Lab Results   Component Value Date/Time    Culture result: NO GROWTH 5 DAYS 05/18/2022 06:06 AM    Culture result: No growth (<1,000 CFU/ML) 03/22/2022 04:11 PM     No results found for this or any previous visit (from the past 24 hour(s)). Pepito Vick MD  [facility]   [address] [address]  Phone - [phone]   Fax - [phone]  www. [facility]

## 2022-10-03 VITALS
RESPIRATION RATE: 16 BRPM | OXYGEN SATURATION: 96 % | WEIGHT: 177.47 LBS | BODY MASS INDEX: 26.29 KG/M2 | SYSTOLIC BLOOD PRESSURE: 146 MMHG | HEIGHT: 69 IN | HEART RATE: 72 BPM | TEMPERATURE: 98.6 F | DIASTOLIC BLOOD PRESSURE: 68 MMHG

## 2022-10-03 PROCEDURE — 74011000250 HC RX REV CODE- 250: Performed by: HOSPITALIST

## 2022-10-03 PROCEDURE — 74011250637 HC RX REV CODE- 250/637: Performed by: HOSPITALIST

## 2022-10-03 PROCEDURE — 74011250637 HC RX REV CODE- 250/637: Performed by: INTERNAL MEDICINE

## 2022-10-03 PROCEDURE — 74011250637 HC RX REV CODE- 250/637: Performed by: FAMILY MEDICINE

## 2022-10-03 PROCEDURE — 74011250636 HC RX REV CODE- 250/636: Performed by: HOSPITALIST

## 2022-10-03 PROCEDURE — 74011250637 HC RX REV CODE- 250/637: Performed by: NURSE PRACTITIONER

## 2022-10-03 RX ORDER — DIPHENHYDRAMINE HCL 25 MG
25 CAPSULE ORAL ONCE
Status: COMPLETED | OUTPATIENT
Start: 2022-10-03 | End: 2022-10-03

## 2022-10-03 RX ADMIN — PANTOPRAZOLE SODIUM 40 MG: 40 TABLET, DELAYED RELEASE ORAL at 06:32

## 2022-10-03 RX ADMIN — MINOXIDIL 10 MG: 2.5 TABLET ORAL at 09:12

## 2022-10-03 RX ADMIN — BUPRENORPHINE HYDROCHLORIDE AND NALOXONE HYDROCHLORIDE DIHYDRATE 1 TABLET: 8; 2 TABLET SUBLINGUAL at 09:13

## 2022-10-03 RX ADMIN — SEVELAMER CARBONATE 800 MG: 800 TABLET, FILM COATED ORAL at 09:12

## 2022-10-03 RX ADMIN — CALCIUM CARBONATE (ANTACID) CHEW TAB 500 MG 600 MG: 500 CHEW TAB at 09:12

## 2022-10-03 RX ADMIN — BUMETANIDE 2 MG: 1 TABLET ORAL at 09:12

## 2022-10-03 RX ADMIN — HYDRALAZINE HYDROCHLORIDE 100 MG: 50 TABLET, FILM COATED ORAL at 09:12

## 2022-10-03 RX ADMIN — CARVEDILOL 25 MG: 12.5 TABLET, FILM COATED ORAL at 09:12

## 2022-10-03 RX ADMIN — SODIUM CHLORIDE, PRESERVATIVE FREE 10 ML: 5 INJECTION INTRAVENOUS at 06:36

## 2022-10-03 RX ADMIN — CLONIDINE HYDROCHLORIDE 0.2 MG: 0.2 TABLET ORAL at 09:13

## 2022-10-03 RX ADMIN — DIPHENHYDRAMINE HYDROCHLORIDE 25 MG: 25 CAPSULE ORAL at 03:59

## 2022-10-03 RX ADMIN — ALISKIREN HEMIFUMARATE 300 MG: 300 TABLET, FILM COATED ORAL at 09:12

## 2022-10-03 NOTE — PROGRESS NOTES
Nephrology Progress Note  Violet Xiao  Date of Admission : 9/24/2022    CC:  Follow up for ESRD       Assessment and Plan     ESRD-HD :  - ESRD 2/2 Collapsing FSGS   - dialyzes TTS at [facility]  -Next HD tomorrow as outpatient  -Advised to continue with target weight challenge at dialysis    Uncontrolled HTN   - missed HD Rx on 9/17   - hx of cocaine use : negative UDS this admission   -Off Cardene and avoid restarting it  -Continue with current antihypertensives  -Continue with target weight challenge post hospital discharge  - allergic to ACE/ ARB  - D/c on current meds      Anemia in CKD:  - hgb stable, no need for ABI     Sec HPTH   - continue home binders  - PTH at goal           Interval History:  Seen and examined. Blood pressure has been relatively well controlled. He scheduled for discharge later today. He denies any new symptoms. Discussed about dialysis tomorrow and continue target weight challenge. Current Medications: all current  Medications have been eviewed in EPIC  Review of Systems: Pertinent items are noted in HPI. Objective:  Vitals:    Vitals:    10/03/22 0441 10/03/22 0559 10/03/22 0715 10/03/22 1000   BP:   (!) 156/69    Pulse: 77 83 87 84   Resp:   16    Temp:   98.6 °F (37 °C)    TempSrc:       SpO2:   96%    Weight:       Height:         Intake and Output:  No intake/output data recorded. No intake/output data recorded.     Physical Examination:    General: NAD,Conversant   Neck:  Supple, no mass  Resp:  Lungs CTA B/L, no wheezing , normal respiratory effort  CV:  RRR,  no murmur or rub,trace LE edema  GI:  Soft, NT, + Bowel sounds, no hepatosplenomegaly  Neurologic:  Non focal  Psych:             AAO x 3 appropriate affect   Skin:  No Rash  Access:           [facility]    []    High complexity decision making was performed  []    Patient is at high-risk of decompensation with multiple organ involvement    Lab Data Personally Reviewed: I have reviewed all the pertinent labs, microbiology data and radiology studies during assessment. No results for input(s): NA, K, CL, CO2, GLU, BUN, CREA, CA, MG, PHOS, ALB, TBIL, ALT, INR, INREXT, INREXT in the last 72 hours. No lab exists for component: SGOT    No results for input(s): WBC, HGB, HCT, PLT, HGBEXT, HCTEXT, PLTEXT, HGBEXT, HCTEXT, PLTEXT in the last 72 hours. No results found for: SDES  Lab Results   Component Value Date/Time    Culture result: NO GROWTH 5 DAYS 05/18/2022 06:06 AM    Culture result: No growth (<1,000 CFU/ML) 03/22/2022 04:11 PM     No results found for this or any previous visit (from the past 24 hour(s)). Radha Call MD  [address]  Phone - [phone]   Fax - [phone]  [email or web address]. [email or web address]

## 2022-10-03 NOTE — PROGRESS NOTES
Bedside and Verbal shift change report given to José Miguel Carlisle RN (oncoming nurse) by [facility], RN (offgoing nurse). Report included the following information SBAR, Kardex, and MAR.

## 2022-10-03 NOTE — PROGRESS NOTES
[address] [facility] Adult  Hospitalist Group                                                                                          Hospitalist Progress Note  Rigo Herring MD  Answering service: [phone] from in house phone        Date of Service:  10/3/2022  NAME:  Jamie Emanuel  :  1965  MRN:  [id number]      Admission Summary:     Patient presents with malignant hypertension, previously requiring Cardene drip. Interval history / Subjective:     Overall better. Off Cardene drip. Assessment & Plan:     Malignant hypertension  -On Bumex, clonidine, Coreg, hydralazine, Cardura, minoxidil, and losartan, added Tekturna  -Nephrology adjusting medications  -Urine drug screen negative, Screening for pheochromocytoma, ARR pending  -CTA of the abdomen and pelvis does not show any renal artery stenosis  -Patient has been on and off of Cardene drip, drip has been off since overnight    Mild pancreatitis  -CT abdomen and pelvis showed 3 mm gallstone with mild pancreatic enlargement with minimal peripancreatic fat stranding  -General surgery evaluated the patient, no acute gallbladder pathology as per general surgery  -Continue diet as tolerated    End-stage renal disease  -On hemodialysis   -HD per nephrology    Anemia of chronic kidney disease  -Hemoglobin stable     Code status: Full  Prophylaxis: SCDs  Care Plan discussed with: Patient  Anticipated Disposition: 24 to 48 hours     Hospital Problems  Date Reviewed: 1/15/2021            Codes Class Noted POA    Malignant hypertension ICD-10-CM: I10  ICD-9-CM: 401.0  2022 Unknown             Review of Systems:   A comprehensive review of systems was negative except for that written in the HPI. Vital Signs:    Last 24hrs VS reviewed since prior progress note.  Most recent are:  Visit Vitals  BP (!) 156/69 (BP 1 Location: Left upper arm, BP Patient Position: At rest)   Pulse 84   Temp 98.6 °F (37 °C) Resp 16   Ht 5' 9\" (1.753 m)   Wt 80.5 kg (177 lb 7.5 oz)   SpO2 96%   BMI 26.21 kg/m²       No intake or output data in the 24 hours ending 10/03/22 1239       Physical Examination:     I had a face to face encounter with this patient and independently examined them on 10/3/2022 as outlined below:          Constitutional:  No acute distress, cooperative, pleasant    ENT:  Oral mucosa moist, oropharynx benign. Resp:  CTA bilaterally. No wheezing/rhonchi/rales. No accessory muscle use. CV:  Regular rhythm, normal rate, no murmurs, gallops, rubs    GI:  Soft, non distended, non tender. normoactive bowel sounds, no hepatosplenomegaly     Musculoskeletal:  No edema, warm, 2+ pulses throughout    Neurologic:  Moves all extremities. AAOx3, CN II-XII reviewed            Data Review:    Review and/or order of clinical lab test      Labs:     No results for input(s): WBC, HGB, HCT, PLT, HGBEXT, HCTEXT, PLTEXT, HGBEXT, HCTEXT, PLTEXT in the last 72 hours. No results for input(s): NA, K, CL, CO2, BUN, CREA, GLU, CA, MG, PHOS, URICA in the last 72 hours. No results for input(s): ALT, AP, TBIL, TBILI, TP, ALB, GLOB, GGT, AML, LPSE in the last 72 hours. No lab exists for component: SGOT, GPT, AMYP, HLPSE      No results for input(s): INR, PTP, APTT, INREXT, INREXT in the last 72 hours. No results for input(s): FE, TIBC, PSAT, FERR in the last 72 hours. Lab Results   Component Value Date/Time    Folate 7.9 05/18/2022 12:18 PM      No results for input(s): PH, PCO2, PO2 in the last 72 hours. No results for input(s): CPK, CKNDX, TROIQ in the last 72 hours.     No lab exists for component: CPKMB    Lab Results   Component Value Date/Time    Cholesterol, total 170 06/29/2020 03:46 AM    HDL Cholesterol 49 06/29/2020 03:46 AM    LDL, calculated 73.4 06/29/2020 03:46 AM    Triglyceride 238 (H) 06/29/2020 03:46 AM    CHOL/HDL Ratio 3.5 06/29/2020 03:46 AM     Lab Results   Component Value Date/Time    Glucose (POC) 97 09/27/2022 06:48 AM    Glucose (POC) 97 05/31/2022 12:23 PM    Glucose (POC) 72 05/31/2022 08:34 AM    Glucose (POC) 86 05/30/2022 08:19 PM    Glucose (POC) 78 05/30/2022 07:50 PM     Lab Results   Component Value Date/Time    Color YELLOW/STRAW 03/22/2022 04:11 PM    Appearance TURBID (A) 03/22/2022 04:11 PM    Specific gravity 1.021 03/22/2022 04:11 PM    pH (UA) 7.5 03/22/2022 04:11 PM    Protein >300 (A) 03/22/2022 04:11 PM    Glucose 100 (A) 03/22/2022 04:11 PM    Ketone Negative 03/22/2022 04:11 PM    Bilirubin Negative 03/22/2022 04:11 PM    Urobilinogen 0.2 03/22/2022 04:11 PM    Nitrites Negative 03/22/2022 04:11 PM    Leukocyte Esterase MODERATE (A) 03/22/2022 04:11 PM    Epithelial cells MODERATE (A) 03/22/2022 04:11 PM    Bacteria Negative 03/22/2022 04:11 PM    WBC 10-20 03/22/2022 04:11 PM    RBC 0-5 03/22/2022 04:11 PM         Medications Reviewed:     Current Facility-Administered Medications   Medication Dose Route Frequency    minoxidiL (LONITEN) tablet 10 mg  10 mg Oral BID    aliskiren (TEKTURNA) tablet 300 mg  300 mg Oral DAILY    epoetin shawn-epbx (RETACRIT) injection 10,000 Units  10,000 Units SubCUTAneous Q TUE, THU & SAT    niCARdipine (CARDENE) 25 mg in 0.9% sodium chloride 250 mL ([id number])  0-15 mg/hr IntraVENous TITRATE    doxazosin (CARDURA) tablet 8 mg  8 mg Oral QHS    cloNIDine HCL (CATAPRES) tablet 0.2 mg  0.2 mg Oral BID    sevelamer carbonate (RENVELA) tab 800 mg  800 mg Oral TID    hydrALAZINE (APRESOLINE) 20 mg/mL injection 10 mg  10 mg IntraVENous Q6H PRN    labetaloL (NORMODYNE;TRANDATE) injection 10 mg  10 mg IntraVENous Q6H PRN    sodium chloride (NS) flush 5-40 mL  5-40 mL IntraVENous Q8H    sodium chloride (NS) flush 5-40 mL  5-40 mL IntraVENous PRN    acetaminophen (TYLENOL) tablet 650 mg  650 mg Oral Q6H PRN    Or    acetaminophen (TYLENOL) suppository 650 mg  650 mg Rectal Q6H PRN    polyethylene glycol (MIRALAX) packet 17 g  17 g Oral DAILY PRN    ondansetron (ZOFRAN ODT) tablet 4 mg  4 mg Oral Q8H PRN    Or    ondansetron (ZOFRAN) injection 4 mg  4 mg IntraVENous Q6H PRN    heparin (porcine) injection 5,000 Units  5,000 Units SubCUTAneous Q8H    bumetanide (BUMEX) tablet 2 mg  2 mg Oral DAILY    buprenorphine-naloxone (SUBOXONE) 8-2mg SL tablet  1 Tablet SubLINGual BID    calcium carbonate (TUMS) chewable tablet 600 mg [elemental]  600 mg Oral TIDPC    hydrALAZINE (APRESOLINE) tablet 100 mg  100 mg Oral TID    pantoprazole (PROTONIX) tablet 40 mg  40 mg Oral ACB    carvediloL (COREG) tablet 25 mg  25 mg Oral BID WITH MEALS     ______________________________________________________________________  EXPECTED LENGTH OF STAY: 2d 21h  ACTUAL LENGTH OF STAY:          9                 Melony Ralph MD

## 2022-10-03 NOTE — PROGRESS NOTES
Hospital follow-up PCP transitional care appointment has been scheduled with Dr. Severino Zamarripa for Friday, October 7th, 2022 at 8:40a.m. Pending patient discharge.   Paul Sands, Care Management Assistant

## 2022-10-03 NOTE — DISCHARGE SUMMARY
Discharge Summary       PATIENT ID: Tushar Whitaker  MRN: [id number]   YOB: 1965    DATE OF ADMISSION: 9/24/2022 11:47 AM    DATE OF DISCHARGE: 10/3/2022   PRIMARY CARE PROVIDER: Sreedhar Montalvo NP     ATTENDING PHYSICIAN: Jc Hobbs  DISCHARGING PROVIDER: Hung Ford MD    To contact this individual call [phone] and ask the  to page. If unavailable ask to be transferred the Adult Hospitalist Department. CONSULTATIONS: IP CONSULT TO NEPHROLOGY  IP CONSULT TO GENERAL SURGERY    PROCEDURES/SURGERIES: * No surgery found *    DISCHARGE DIAGNOSES:    Malignant hypertension  Mild pancreatitis  End-stage renal disease  Anemia of CKD  History of drug abuse    ADMISSION SUMMARY AND HOSPITAL COURSE:     HPI  Tushar Whitaker is a 64 y.o. male who presents with headache, missed dialysis. 57y M with ESRD (on HD Tu/Th/Sa) here with diffuse body pain, HA, elevated BP. Says he started to feel bad 2 days ago. His BP dropped to the 91'L systolic at dialysis. Did not wake up in time to get to dialysis today. Says he felt poorly yesterday and didn't sleep well. Has a diffuse HA. Has chest pain at right side and abdominal pain. No focal weakness or numbness. o trouble breathing. No speech or gait problems. No fever. Hospital Course  Patient presents with missed hemodialysis and on presentation to the ER, patient was noted to have significantly elevated blood pressure, with initial blood pressure of 240/120. Patient has initially received IV labetalol in the ER and subsequently put on Cardene drip and admitted for further evaluation management. Patient required multiple antihypertensives including Bumex, clonidine, Coreg, hydralazine, Cardura, minoxidil. Patient initially was able to come off of Cardene drip. However patient's blood pressure elevated again with a complaint of chest pain and Cardene drip was again resumed.  Nephrology follows the patient and further adjustment made to his antihypertensive regimen. Was able to come off of Cardene drip and patient's blood pressure much improved. Patient also with end-stage renal disease and has missed hemodialysis on presentation. Patient continues on hemodialysis during this hospitalization. Patient also noted mild pancreatitis with elevated lipase on presentation, CT abdomen pelvis showed 3 mm gallstone with mild pancreatic enlargement with minimal peripancreatic fat stranding. General surgery evaluated the patient and no acute gallbladder pathology as per general surgery. DISCHARGE DIAGNOSES / PLAN:          BMI: Body mass index is 26.21 kg/m². . This patient: Meets criteria for overweight given BMI >/= 25 and < 30 due to excess calories/nutritional. Weight loss and lifestyle modifications should be encouraged as an outpatient. PENDING TEST RESULTS:   At the time of discharge the following test results are still pending: None     ADDITIONAL CARE RECOMMENDATIONS:     Check ambulatory blood pressure 2-3 times a day. Follow-up with PCP in 1 week. NOTIFY YOUR PHYSICIAN FOR ANY OF THE FOLLOWING:   Fever over 101 degrees for 24 hours. Chest pain, shortness of breath, fever, chills, nausea, vomiting, diarrhea, change in mentation, falling, weakness, bleeding. Severe pain or pain not relieved by medications, as well as any other signs or symptoms that you may have questions about.     FOLLOW UP APPOINTMENTS:    Follow-up Information       Follow up With Specialties Details Why Contact Info    Luis Grant NP Nurse Practitioner   [place] [id number] [phone]      Obie Akbar MD Infectious Disease Physician  [facility] follow up scheduled for Friday, October 7th, 2022 at 11:45 a.m. [address] [place] [address]  [phone]                 DIET: Cardiac Diet    ACTIVITY: Activity as tolerated    EQUIPMENT needed: None    DISCHARGE MEDICATIONS:  Current Discharge Medication List        START taking these medications    Details   aliskiren (TEKTURNA) 300 mg tablet Take 1 Tablet by mouth daily. Qty: 60 Tablet, Refills: 0  Start date: 10/3/2022      minoxidiL (LONITEN) 10 mg tablet Take 1 Tablet by mouth two (2) times a day. Qty: 60 Tablet, Refills: 0  Start date: 10/2/2022           CONTINUE these medications which have CHANGED    Details   bumetanide (BUMEX) 2 mg tablet Take 1 Tablet by mouth daily. Qty: 30 Tablet, Refills: 0  Start date: 9/29/2022      carvediloL (COREG) 25 mg tablet Take 1 Tablet by mouth two (2) times daily (with meals). Qty: 60 Tablet, Refills: 0  Start date: 9/28/2022      cloNIDine HCL (CATAPRES) 0.2 mg tablet Take 1 Tablet by mouth two (2) times a day. Qty: 60 Tablet, Refills: 0  Start date: 9/28/2022      doxazosin (CARDURA) 8 mg tablet Take 1 Tablet by mouth nightly. Qty: 30 Tablet, Refills: 0  Start date: 9/28/2022      hydrALAZINE (APRESOLINE) 100 mg tablet Take 1 Tablet by mouth three (3) times daily. Qty: 90 Tablet, Refills: 0  Start date: 9/28/2022           CONTINUE these medications which have NOT CHANGED    Details   sevelamer carbonate (RENVELA) 800 mg tab tab Take 800 mg by mouth three (3) times daily. New prescription as of 8/16/22, prescribed by Dr. Rosalva Cox      ascorbic acid, vitamin C, (VITAMIN C) 500 mg tablet Take 500 mg by mouth daily. cholecalciferol (VITAMIN D3) 25 mcg (1,000 unit) cap Take 1,000 Units by mouth daily. buprenorphine-naloxone (SUBOXONE) 8-2 mg film sublingaul film 1 Film by SubLINGual route two (2) times a day. Comments: .      calcium carbonate (TUMS) 200 mg calcium (500 mg) chew Take 3 Tablets by mouth three (3) times daily (after meals). pantoprazole (Protonix) 40 mg tablet Take 1 Tablet by mouth two (2) times a day.  Indications: an ulcer of the duodenum  Qty: 60 Tablet, Refills: 0           STOP taking these medications       spironolactone (ALDACTONE) 100 mg tablet Comments:   Reason for Stopping: chlorthalidone (HYGROTON) 25 mg tablet Comments:   Reason for Stopping:         losartan (COZAAR) 100 mg tablet Comments:   Reason for Stopping:         NIFEdipine ER (ADALAT CC) 60 mg ER tablet Comments:   Reason for Stopping:         fluticasone propionate (FLONASE) 50 mcg/actuation nasal spray Comments:   Reason for Stopping:               DISPOSITION:  *  Home With:   OT  PT  HH  RN       Long term SNF/Inpatient Rehab    Independent/assisted living    Hospice    Other:       PATIENT CONDITION AT DISCHARGE:     Functional status    Poor     Deconditioned    * Independent      Cognition    * Lucid     Forgetful     Dementia      Catheters/lines (plus indication)    Montoya     PICC     PEG    * None      Code status    * Full code     DNR      PHYSICAL EXAMINATION AT DISCHARGE:  General:          Alert, cooperative, no distress, appears stated age. HEENT:           Atraumatic, anicteric sclerae, pink conjunctivae                          No oral ulcers, mucosa moist, throat clear, dentition fair  Neck:               Supple, symmetrical  Lungs:             Clear to auscultation bilaterally. No Wheezing or Rhonchi. No rales. Chest wall:      No tenderness  No Accessory muscle use. Heart:              Regular  rhythm,  No  murmur   No edema  Abdomen:        Soft, non-tender. Not distended. Bowel sounds normal  Extremities:     No cyanosis. No clubbing,                            Skin turgor normal, Capillary refill normal  Skin:                Not pale. Not Jaundiced  No rashes   Psych:             Not anxious or agitated.   Neurologic:      Alert, moves all extremities, answers questions appropriately and responds to commands       CHRONIC MEDICAL DIAGNOSES:  Problem List as of 10/3/2022 Date Reviewed: 1/15/2021            Codes Class Noted - Resolved    HTN (hypertension), malignant ICD-10-CM: I10  ICD-9-CM: 401.0  6/30/2022 - Present        [facility] ([facility] ICD-10-CM: R56.9  ICD-9-CM: 780.39  5/30/2022 - Present        Malignant hypertension ICD-10-CM: I10  ICD-9-CM: 401.0  5/17/2022 - Present        Enteritis ICD-10-CM: K52.9  ICD-9-CM: 558.9  5/17/2022 - Present        Bone metastasis ([facility] ICD-10-CM: C79.51  ICD-9-CM: 198.5  5/17/2022 - Present        ESRD needing dialysis ([facility] ICD-10-CM: N18.6, Z99.2  ICD-9-CM: 585.6  3/29/2022 - Present        Duodenitis ICD-10-CM: K29.80  ICD-9-CM: 535.60  3/25/2022 - Present        Peptic ulcer disease ICD-10-CM: K27.9  ICD-9-CM: 533.90  3/22/2022 - Present        Intractable abdominal pain ICD-10-CM: R10.9  ICD-9-CM: 789.00  3/22/2022 - Present        ESRD (end stage renal disease) ([facility] ICD-10-CM: N18.6  ICD-9-CM: 585.6  3/22/2022 - Present        Non-compliance ICD-10-CM: Z91.199  ICD-9-CM: V15.81  3/12/2022 - Present        ESRD (end stage renal disease) on dialysis [facility]) ICD-10-CM: N18.6, Z99.2  ICD-9-CM: 585.6, V45.11  3/12/2022 - Present        Hypertensive urgency ICD-10-CM: I16.0  ICD-9-CM: 401.9  3/12/2022 - Present        Hypertensive emergency ICD-10-CM: I16.1  ICD-9-CM: 401.9  1/15/2022 - Present        Iron deficiency anemia ICD-10-CM: D50.9  ICD-9-CM: 280.9  2/15/2021 - Present        Chronic hepatitis C ([facility] ICD-10-CM: B18.2  ICD-9-CM: 070.54  1/15/2021 - Present        CKD (chronic kidney disease) ICD-10-CM: N18.9  ICD-9-CM: 585.9  6/13/2020 - Present        Chest pain ICD-10-CM: R07.9  ICD-9-CM: 786.50  5/10/2019 - Present        Hypertension ICD-10-CM: I10  ICD-9-CM: 401.9  5/10/2019 - Present        RESOLVED: Acute renal failure ([facility]F) ([facility] ICD-10-CM: N17.9  ICD-9-CM: 584.9  6/28/2020 - 1/15/2021        RESOLVED: Elevated troponin ICD-10-CM: R77.8  ICD-9-CM: 790.6  6/13/2020 - 1/15/2021        RESOLVED: Cocaine abuse ([facility] ICD-10-CM: F14.10  ICD-9-CM: 305.60  5/10/2019 - 5/24/2019           Greater than 60 minutes were spent with the patient on counseling and coordination of care    Signed:   Annemarie Victoria MD  10/3/2022  1:14 PM

## 2022-10-04 ENCOUNTER — PATIENT OUTREACH (OUTPATIENT)
Dept: CASE MANAGEMENT | Age: 57
End: 2022-10-04

## 2022-10-04 NOTE — PROGRESS NOTES
Care Transitions Initial Call    Call within 2 business days of discharge: Yes     Patient: Rachana Jaimes Patient : 1965 MRN: [id number]    Last Discharge  Street       Date Complaint Diagnosis Description Type Department Provider    22 HTN Malignant hypertension . .. ED to Hosp-Admission (Discharged) (ADMIT) Ishan Kasper MD; Johanny Palacios. .. Was this an external facility discharge? No.     Challenges to be reviewed by the provider   Additional needs identified to be addressed with provider: yes  - Reports swelling in bilateral legs and feet, up to bilateral thighs, and states there has been no change in the amount of swelling since discharge. Method of communication with provider:  chart routing to Valdemar Velazquez NP/PCP marked with high priority. Message left for the nurse for Valdemar Velazquez NP, with contact information for this Care Transitions Nurse (CTN), requesting call back (first message left 10-4-22 at 3:04pm). 10-5-22 at 10:50am Update:  Second message left for the nurse for Valdemar Velazquez NP/PCP, with contact information for this CTN, requesting call back. 10-5-22 at 3:55pm Update: Third message left for the nurse for Valdemar Velazquez NP/PCP with contact information for this CTN, requesting call back. CTN has not received a response from PCP office in regards to previous messages left, requesting call backs, on 10-4-22 and 10-5-22. Unable to reach nurse at PCP office. CTN will continue to await call back from PCP office. Chart routed to PCP again and marked with high priority.      Component      Latest Ref Rng & Units 2022          12:59 AM  5:53 AM  4:22 AM   RBC      4.10 - 5.70 M/uL 3.39 (L) 3.08 (L) 3.24 (L)   HGB      12.1 - 17.0 g/dL 9.7 (L) 8.8 (L) 9.3 (L)   HCT      36.6 - 50.3 % 30.9 (L) 27.7 (L) 28.2 (L)     Component      Latest Ref Rng & Units 2022/2022          12:59 AM  5:53 AM  4:22 AM PLATELET      609 - 397 K/uL 118 (L) 85 (L) 103 (L)     Component      Latest Ref Rng & Units 9/30/2022 9/27/2022 9/26/2022          12:59 AM  5:53 AM  4:22 AM   Sodium      136 - 145 mmol/L 131 (L) 133 (L) 135 (L)     Component      Latest Ref Rng & Units 9/30/2022 9/27/2022 9/26/2022          12:59 AM  5:53 AM  4:22 AM   Protein, total      6.4 - 8.2 g/dL 8.9 (H)  6.6     Component      Latest Ref Rng & Units 9/30/2022 9/27/2022 9/26/2022          12:59 AM  5:53 AM  4:22 AM   Globulin      2.0 - 4.0 g/dL 5.0 (H)  3.6     Component      Latest Ref Rng & Units 9/26/2022           4:22 AM   PTH, Intact      18.4 - 88.0 pg/mL 493.9 (H)     Component      Latest Ref Rng & Units 9/26/2022 9/26/2022 9/26/2022 9/25/2022           4:22 AM  4:22 AM  4:22 AM  3:28 AM   Phosphorus      2.6 - 4.7 MG/DL 5.3 (H)   3.7     Component      Latest Ref Rng & Units 9/26/2022 9/24/2022           4:22 AM 12:00 PM   Lipase      73 - 393 U/L 417 (H) 619 (H)     Discussed COVID-19 related testing which was available at this time. Test results were  negative on 9-25-22 . Patient informed of results, if available? Yes. Advance Care Planning:   Does patient have an Advance Directive: not on file; education provided. Inpatient Readmission Risk score: Unplanned Readmit Risk Score: 17.1    Was this a readmission? No.   Patient stated reason for the admission: \"I couldn't get out of my bed. \"     Patients top risk factors for readmission:  Medical condition - Malignant hypertension, history of hypertensive urgency, chronic hepatitis C, peptic ulcer disease, history of duodenitis and enteritis, cirrhosis, ESRD on dialysis, anemia in chronic kidney disease, history of non-compliance, and history of seizures per chart.      Interventions to address risk factors: Obtained and reviewed discharge summary and/or continuity of care documents and Education of patient/family/caregiver/guardian to support self-management-Education provided regarding signs/symptoms of malignant hypertension, patient verbalized an understanding. Care Transition Nurse (CTN) contacted the patient by telephone to perform post hospital discharge assessment. Verified name and  with patient as identifiers. Provided introduction to self, and explanation of the CTN role. CTN reviewed discharge instructions, medical action plan and red flags with patient who verbalized understanding. Were discharge instructions available to patient? yes. Reviewed appropriate site of care based on symptoms and resources available to patient including: PCP, Specialist, Urgent Care Clinics, When to call 911, and outpatient hemodialysis treatments at [facility] in [place], [place] . Patient given an opportunity to ask questions and does not have any further questions or concerns at this time. The patient agrees to contact the PCP office for questions related to their healthcare. Medication reconciliation was performed with patient, who verbalizes understanding of administration of home medications. Advised obtaining a 90-day supply of all daily and as-needed medications. Referral to Pharm D needed: no.      Home Health/Outpatient orders at discharge: [address]: n/a  Date of initial visit: [facility] ordered at discharge: None  [address] received: n/a    Was patient discharged with a pulse oximeter? no.     Discussed follow-up appointments. If no appointment was previously scheduled, appointment scheduling offered: yes. Is follow up appointment scheduled within 7 days of discharge? Yes, patient has infectious disease follow-up appointment scheduled with Dr. Catia Byrd on 10-7-22. Patient states he will call his PCP office today to schedule LALY appointment .    [facility] follow up appointment(s):   Future Appointments   Date Time Provider Red Reyes   10/7/2022 11:45 AM Shanel Foster MD RAEANN DUMONT [facility]     Non-BS follow up appointment(s): None noted at this time. Plan for follow-up call in 10-14 days based on severity of symptoms and risk factors. Plan for next call: self management-Review red flags of malignant hypertension, and follow up appointment-Evaluate if patient is attending follow-up appointments as scheduled, offer assistance with scheduling as needed. CTN provided contact information for future needs. Goals        Understands red flags post discharge. 10-4-22: Red flags of malignant hypertension reviewed with patient and patient verbalized an understanding. Patient states he has monitored his home blood pressure once since discharge and the reading is as follows:  183/78 at 11:35pm on 10-3-22. Patient states he will monitor his home blood pressure three times daily going forward, as instructed prior to discharge on 10-3-22. Patient denies chest pain, denies shortness of breath, denies fever/chills, and denies nausea/vomiting. Reports swelling in bilateral legs and feet, up to bilateral thighs, and states there has been no change in the amount of swelling since discharge. States he is utilizing a renal diet at home, no added salt, states appetite is fair. Reports no falls in the last 6 months, and reports a total of 1 fall in the last 12 months, states he did not sustain traumatic injury during this 1 fall. States he continues to attend outpatient hemodialysis treatments at [facility][1] in [place], [place]. Care Transitions Nurse will review red flags again on next phone conversation with patient.  Jeanine Bello

## 2022-10-07 ENCOUNTER — OFFICE VISIT (OUTPATIENT)
Dept: INTERNAL MEDICINE CLINIC | Age: 57
End: 2022-10-07
Payer: MEDICARE

## 2022-10-07 VITALS
HEART RATE: 100 BPM | TEMPERATURE: 98.6 F | RESPIRATION RATE: 16 BRPM | OXYGEN SATURATION: 96 % | DIASTOLIC BLOOD PRESSURE: 47 MMHG | HEIGHT: 69 IN | WEIGHT: 162.6 LBS | SYSTOLIC BLOOD PRESSURE: 87 MMHG | BODY MASS INDEX: 24.08 KG/M2

## 2022-10-07 DIAGNOSIS — I95.2 HYPOTENSION DUE TO DRUGS: Primary | ICD-10-CM

## 2022-10-07 DIAGNOSIS — Z86.79 HISTORY OF MALIGNANT HYPERTENSION: ICD-10-CM

## 2022-10-07 DIAGNOSIS — N18.6 ESRD ON HEMODIALYSIS (HCC): ICD-10-CM

## 2022-10-07 DIAGNOSIS — Z76.89 ENCOUNTER TO ESTABLISH CARE: ICD-10-CM

## 2022-10-07 DIAGNOSIS — Z99.2 ESRD ON HEMODIALYSIS (HCC): ICD-10-CM

## 2022-10-07 PROCEDURE — G8427 DOCREV CUR MEDS BY ELIG CLIN: HCPCS | Performed by: INTERNAL MEDICINE

## 2022-10-07 PROCEDURE — G8510 SCR DEP NEG, NO PLAN REQD: HCPCS | Performed by: INTERNAL MEDICINE

## 2022-10-07 PROCEDURE — 99204 OFFICE O/P NEW MOD 45 MIN: CPT | Performed by: INTERNAL MEDICINE

## 2022-10-07 PROCEDURE — G9231 DOC ESRD DIA TRANS PREG: HCPCS | Performed by: INTERNAL MEDICINE

## 2022-10-07 PROCEDURE — 3017F COLORECTAL CA SCREEN DOC REV: CPT | Performed by: INTERNAL MEDICINE

## 2022-10-07 PROCEDURE — 1111F DSCHRG MED/CURRENT MED MERGE: CPT | Performed by: INTERNAL MEDICINE

## 2022-10-07 PROCEDURE — G8420 CALC BMI NORM PARAMETERS: HCPCS | Performed by: INTERNAL MEDICINE

## 2022-10-07 NOTE — PROGRESS NOTES
Rm: 18  Sharp pain in right side of chest     Was in the hospital at [facility] 2 weeks ago     First reading was 90/56    BP is now 87/47    Chief Complaint   Patient presents with    Hospital Follow Up     Malignant HTH       Visit Vitals  BP (!) 91/52 (BP 1 Location: Left upper arm, BP Patient Position: Sitting, BP Cuff Size: Adult long)   Pulse 100   Temp 98.6 °F (37 °C) (Oral)   Resp 16   Ht 5' 9\" (1.753 m)   Wt 162 lb 9.6 oz (73.8 kg)   SpO2 96%   BMI 24.01 kg/m²       1. Have you been to the ER, urgent care clinic since your last visit? Hospitalized since your last visit? Yes Where: [facility]    2. Have you seen or consulted any other health care providers outside of the [address] [place] [address] since your last visit? Include any pap smears or colon screening.  No

## 2022-10-07 NOTE — PROGRESS NOTES
Jamie Emanuel (: 1965) is a 64 y.o. male, new patient, here for evaluation of the following chief complaint(s):  Chief Complaint   Patient presents with    Hospital Follow Up     Malignant H       Assessment and Plan:       ICD-10-CM ICD-9-CM    1. Hypotension due to drugs  I95.2 458.8      E947.9       2. ESRD on hemodialysis (HCC)  N18.6 585.6     Z99.2 V45.11       3. History of malignant hypertension  Z86.79 V12.59       4. Encounter to establish care  Z76.89 V65.8           1:  Compliant with BP medications, but hypotensive as below. Plan transport via EMS to ER for re-evaluation and mgt of hypotension. 2-3:  Reviewed renal follow-up and eval inpt if needed for above. 4.  Follow-up once evaluated in ER reviewed with pt/sister at visit. Follow-up and Dispositions    Return if symptoms worsen or fail to improve.       lab results and schedule of future lab studies reviewed with patient  reviewed medications and side effects in detail    For additional documentation of information and/or recommendations discussed this visit, please see notes in instructions. Plan and evaluation (above) reviewed with pt at visit  Patient voiced understanding of plan and provided with time to ask/review questions. After Visit Summary (AVS) provided to pt after visit with additional instructions as needed/reviewed. No future appointments. --Updated future visits after patient check-out. On this date 10/07/2022 I have spent 55 minutes reviewing previous notes, test results and face to face with the patient discussing the diagnosis and importance of compliance with the treatment plan as well as documenting on the day of the visit. History of Present Illness:     Notes (nursing/rooming note copied below in italics):  Sharp pain in right side of chest      Was in the hospital at [facility] 2 weeks ago      First reading was 90/56     BP is now 87/47    Here to establish care.     Reviewed BP mgt and recent admit. He was admitted for HD and malignant HTN. Notes since discharge, felt dizzy and \"jittery\" this AM.    He has right-sided CP, but no left sided CP or dyspnea. His normal nephrologist is Dr. Beau Nicolas. He will see him at HD but not in office. Sees Dr. Roger Lara (?sp)--she will not be at HD tomorrow--he notes she us usually there only on Tuesdays and sometimes Thursdays. The renal provider who saw him in the hospital was not either of the 2 providers above. He feels dizzy and jittery today. He took his minoxidil this AM.  He did not have/take the new aliskiren (300mg daily). He took his Bumex, clonidine, carvedilol as per discharge meds this AM.  Took Doxazosin last night. He took hydralazine 100mg this AM also. Here with his sister. She has previously been helping him coordinate his care and follow-up visits. She has not been helping him as much recently, but is starting to help him again. Notes there been some communication problems previously but those have been resolved. She notes he regularly takes his medicines as prescribed and has not had history of compliance problems (per her knowledge). He notes he has been taking his medications regularly without problems except for not being able to refill the aliskiren since discharge on [facility] [place] [facility] records reviewed as below, including discharge summary, last nephrology note, and blood pressure trends and flowsheet for current visit and during admission.     YOB: 1965    DATE OF ADMISSION: 9/24/2022 11:47 AM    DATE OF DISCHARGE: 10/3/2022   PRIMARY CARE PROVIDER: Samantha Neville NP     CONSULTATIONS: IP CONSULT TO NEPHROLOGY  IP CONSULT TO GENERAL SURGERY     PROCEDURES/SURGERIES: * No surgery found *     DISCHARGE DIAGNOSES:     Malignant hypertension  Mild pancreatitis  End-stage renal disease  Anemia of CKD  History of drug abuse     ADMISSION SUMMARY AND HOSPITAL COURSE:      JUAN moreno a 64 y.o. male who presents with headache, missed dialysis. 57y M with ESRD (on HD Tu/Th/Sa) here with diffuse body pain, HA, elevated BP. Says he started to feel bad 2 days ago. His BP dropped to the 76'S systolic at dialysis. Did not wake up in time to get to dialysis today. Says he felt poorly yesterday and didn't sleep well. Has a diffuse HA. Has chest pain at right side and abdominal pain. No focal weakness or numbness. o trouble breathing. No speech or gait problems. No fever. Hospital Course  Patient presents with missed hemodialysis and on presentation to the ER, patient was noted to have significantly elevated blood pressure, with initial blood pressure of 240/120. Patient has initially received IV labetalol in the ER and subsequently put on Cardene drip and admitted for further evaluation management. Patient required multiple antihypertensives including Bumex, clonidine, Coreg, hydralazine, Cardura, minoxidil. Patient initially was able to come off of Cardene drip. However patient's blood pressure elevated again with a complaint of chest pain and Cardene drip was again resumed. Nephrology follows the patient and further adjustment made to his antihypertensive regimen. Was able to come off of Cardene drip and patient's blood pressure much improved. Patient also with end-stage renal disease and has missed hemodialysis on presentation. Patient continues on hemodialysis during this hospitalization. Patient also noted mild pancreatitis with elevated lipase on presentation, CT abdomen pelvis showed 3 mm gallstone with mild pancreatic enlargement with minimal peripancreatic fat stranding. General surgery evaluated the patient and no acute gallbladder pathology as per general surgery. Reviewed plan with patient and sister at visit. He feels too dizzy and not at baseline with current blood pressures.   Reviewed since he has taken his blood pressure medicine this morning he may need an evaluation in the emergency room for his low blood pressures today. Reviewed transport to ER--even though he came with his sister he prefers transport which is medically appropriate given his low blood pressures. Plan to transport to [facility] at which he was admitted previously for reevaluation and possibly readmission    Patient agreeable with plan as was his sister. He he was placed in a supine position with head slightly elevated for comfort, with legs elevated on top of a pillow on exam table, until EMS was arrived. Assisted EMS team, with 2 [facility] [place] Nurses and phlebotomist/EMT, and transported patient in a wheelchair to EMS [facility] [place] prior to transport. Copy of patient instructions which included 3 blood pressures and current medication list provided to and reviewed with EMS on arrival.    Follow-up reviewed with his sister prior to her leaving office. Nursing screenings reviewed by provider at visit. Past Medical History:   Diagnosis Date    Chronic kidney disease     Hypertension     Liver disease      History reviewed. No pertinent surgical history. Prior to Admission medications    Medication Sig Start Date End Date Taking? Authorizing Provider   aliskiren (TEKTURNA) 300 mg tablet Take 1 Tablet by mouth daily. 10/3/22  Yes Beronica Kinsey MD   minoxidiL (LONITEN) 10 mg tablet Take 1 Tablet by mouth two (2) times a day. 10/2/22  Yes Beronica Kinsey MD   bumetanide (BUMEX) 2 mg tablet Take 1 Tablet by mouth daily. 9/29/22  Yes Beronica Kinsey MD   carvediloL (COREG) 25 mg tablet Take 1 Tablet by mouth two (2) times daily (with meals). 9/28/22  Yes Beronica Kinsey MD   cloNIDine HCL (CATAPRES) 0.2 mg tablet Take 1 Tablet by mouth two (2) times a day. 9/28/22  Yes Beronica Kinsey MD   doxazosin (CARDURA) 8 mg tablet Take 1 Tablet by mouth nightly. 9/28/22  Yes Beronica Kinsey MD   hydrALAZINE (APRESOLINE) 100 mg tablet Take 1 Tablet by mouth three (3) times daily.  9/28/22  Yes Beronica Kinsey MD   sevelamer carbonate (RENVELA) 800 mg tab tab Take 800 mg by mouth three (3) times daily. New prescription as of 8/16/22, prescribed by Dr. Jamison Echavarria   Yes Provider, Historical   calcium carbonate (TUMS) 200 mg calcium (500 mg) chew Take 3 Tablets by mouth three (3) times daily (after meals). Yes Provider, Historical   pantoprazole (Protonix) 40 mg tablet Take 1 Tablet by mouth two (2) times a day. Indications: an ulcer of the duodenum 3/25/22  Yes Marlen ALDANA MD   ascorbic acid, vitamin C, (VITAMIN C) 500 mg tablet Take 500 mg by mouth daily. Yes Provider, Historical   cholecalciferol (VITAMIN D3) 25 mcg (1,000 unit) cap Take 1,000 Units by mouth daily. Yes Provider, Historical   buprenorphine-naloxone (SUBOXONE) 8-2 mg film sublingaul film 1 Film by SubLINGual route two (2) times a day. Yes Provider, Historical        ROS    Vitals:    10/07/22 1127 10/07/22 1200 10/07/22 1211   BP: (!) 91/52 (!) 90/56 (!) 87/47   Pulse: 100     Resp: 16     Temp: 98.6 °F (37 °C)     TempSrc: Oral     SpO2: 96%     Weight: 162 lb 9.6 oz (73.8 kg)     Height: 5' 9\" (1.753 m)     PainSc:   5     PainLoc: Chest        Body mass index is 24.01 kg/m². Physical Exam:     Physical Exam  Vitals and nursing note reviewed. Constitutional:       General: He is not in acute distress. Appearance: Normal appearance. He is well-developed. He is not diaphoretic. Comments: Able to ambulate to bathroom (~40 feet) without assistance or unsteady gait--prior to seeing provider. HENT:      Head: Normocephalic and atraumatic. Eyes:      General: No scleral icterus. Right eye: No discharge. Left eye: No discharge. Conjunctiva/sclera: Conjunctivae normal.   Cardiovascular:      Rate and Rhythm: Normal rate and regular rhythm. Pulses: Normal pulses. Heart sounds: Normal heart sounds. No murmur heard. No friction rub. No gallop. Pulmonary:      Effort: Pulmonary effort is normal. No respiratory distress. Breath sounds: Normal breath sounds. No stridor. No wheezing, rhonchi or rales. Abdominal:      General: Bowel sounds are normal. There is no distension. Palpations: Abdomen is soft. Tenderness: There is no abdominal tenderness. There is no guarding or rebound. Musculoskeletal:         General: No swelling, tenderness or deformity. Right lower leg: No edema. Left lower leg: No edema. Skin:     General: Skin is warm. Coloration: Skin is not jaundiced or pale. Findings: No bruising, erythema or rash. Neurological:      General: No focal deficit present. Mental Status: He is alert. Motor: No abnormal muscle tone. Coordination: Coordination normal.      Gait: Gait normal.   Psychiatric:         Mood and Affect: Mood normal.         Behavior: Behavior normal.         Thought Content: Thought content normal.         Judgment: Judgment normal.       An electronic signature was used to authenticate this note.   -- Cj Henriquez MD

## 2022-10-07 NOTE — PATIENT INSTRUCTIONS
Vitals:    10/07/22 1127 10/07/22 1200 10/07/22 1211   BP: (!) 91/52 (!) 90/56 (!) 87/47   BP 1 Location: Left upper arm     BP Patient Position: Sitting     BP Cuff Size: Adult long     Pulse: 100     Temp: 98.6 °F (37 °C)     TempSrc: Oral     Resp: 16     Height: 5' 9\" (1.753 m)     Weight: 162 lb 9.6 oz (73.8 kg)     SpO2: 96%         Prior to Admission medications    Medication Sig Start Date End Date Taking? Authorizing Provider   aliskiren (TEKTURNA) 300 mg tablet Take 1 Tablet by mouth daily. 10/3/22  Yes Chandni Wilson MD   minoxidiL (LONITEN) 10 mg tablet Take 1 Tablet by mouth two (2) times a day. 10/2/22  Yes Chandni Wilson MD   bumetanide (BUMEX) 2 mg tablet Take 1 Tablet by mouth daily. 9/29/22  Yes Chandni Wilson MD   carvediloL (COREG) 25 mg tablet Take 1 Tablet by mouth two (2) times daily (with meals). 9/28/22  Yes Chandni Wilson MD   cloNIDine HCL (CATAPRES) 0.2 mg tablet Take 1 Tablet by mouth two (2) times a day. 9/28/22  Yes Chandni Wilson MD   doxazosin (CARDURA) 8 mg tablet Take 1 Tablet by mouth nightly. 9/28/22  Yes Chandni Wilson MD   hydrALAZINE (APRESOLINE) 100 mg tablet Take 1 Tablet by mouth three (3) times daily. 9/28/22  Yes Chandni Wilson MD   sevelamer carbonate (RENVELA) 800 mg tab tab Take 800 mg by mouth three (3) times daily. New prescription as of 8/16/22, prescribed by Dr. Toni Wilkerson   Yes Provider, Historical   calcium carbonate (TUMS) 200 mg calcium (500 mg) chew Take 3 Tablets by mouth three (3) times daily (after meals). Yes Provider, Historical   pantoprazole (Protonix) 40 mg tablet Take 1 Tablet by mouth two (2) times a day. Indications: an ulcer of the duodenum 3/25/22  Yes Skip ALDANA MD   ascorbic acid, vitamin C, (VITAMIN C) 500 mg tablet Take 500 mg by mouth daily. Yes Provider, Historical   cholecalciferol (VITAMIN D3) 25 mcg (1,000 unit) cap Take 1,000 Units by mouth daily.    Yes Provider, Historical   buprenorphine-naloxone (SUBOXONE) 8-2 mg film sublingaul film 1 Film by SubLINGual route two (2) times a day.    Yes Provider, Historical

## 2022-11-28 ENCOUNTER — APPOINTMENT (OUTPATIENT)
Dept: CT IMAGING | Age: 57
DRG: 304 | End: 2022-11-28
Attending: EMERGENCY MEDICINE
Payer: MEDICARE

## 2022-11-28 ENCOUNTER — HOSPITAL ENCOUNTER (INPATIENT)
Age: 57
LOS: 2 days | Discharge: HOME | DRG: 304 | End: 2022-11-30
Attending: EMERGENCY MEDICINE | Admitting: FAMILY MEDICINE
Payer: MEDICARE

## 2022-11-28 DIAGNOSIS — I10 MALIGNANT HYPERTENSION: Primary | ICD-10-CM

## 2022-11-28 DIAGNOSIS — I31.39 PERICARDIAL EFFUSION: ICD-10-CM

## 2022-11-28 PROBLEM — I16.0 HYPERTENSIVE URGENCY, MALIGNANT: Status: ACTIVE | Noted: 2022-11-28

## 2022-11-28 LAB
ALBUMIN SERPL-MCNC: 3.5 G/DL (ref 3.5–5)
ALBUMIN/GLOB SERPL: 0.7 {RATIO} (ref 1.1–2.2)
ALP SERPL-CCNC: 112 U/L (ref 45–117)
ALT SERPL-CCNC: 11 U/L (ref 12–78)
ANION GAP SERPL CALC-SCNC: 12 MMOL/L (ref 5–15)
AST SERPL-CCNC: 9 U/L (ref 15–37)
ATRIAL RATE: 59 BPM
ATRIAL RATE: 59 BPM
BASOPHILS # BLD: 0.1 K/UL (ref 0–0.1)
BASOPHILS NFR BLD: 1 % (ref 0–1)
BILIRUB SERPL-MCNC: 0.5 MG/DL (ref 0.2–1)
BNP SERPL-MCNC: 9152 PG/ML
BUN SERPL-MCNC: 38 MG/DL (ref 6–20)
BUN/CREAT SERPL: 3 (ref 12–20)
CALCIUM SERPL-MCNC: 8.7 MG/DL (ref 8.5–10.1)
CALCULATED P AXIS, ECG09: 72 DEGREES
CALCULATED P AXIS, ECG09: 74 DEGREES
CALCULATED R AXIS, ECG10: 53 DEGREES
CALCULATED R AXIS, ECG10: 58 DEGREES
CALCULATED T AXIS, ECG11: 100 DEGREES
CALCULATED T AXIS, ECG11: 99 DEGREES
CHLORIDE SERPL-SCNC: 100 MMOL/L (ref 97–108)
CO2 SERPL-SCNC: 25 MMOL/L (ref 21–32)
COMMENT, HOLDF: NORMAL
CREAT SERPL-MCNC: 11.6 MG/DL (ref 0.7–1.3)
DIAGNOSIS, 93000: NORMAL
DIAGNOSIS, 93000: NORMAL
DIFFERENTIAL METHOD BLD: NORMAL
EOSINOPHIL # BLD: 0.4 K/UL (ref 0–0.4)
EOSINOPHIL NFR BLD: 6 % (ref 0–7)
ERYTHROCYTE [DISTWIDTH] IN BLOOD BY AUTOMATED COUNT: 13.8 % (ref 11.5–14.5)
GLOBULIN SER CALC-MCNC: 4.7 G/DL (ref 2–4)
GLUCOSE SERPL-MCNC: 83 MG/DL (ref 65–100)
HBV SURFACE AB SER QL: NONREACTIVE
HBV SURFACE AB SER-ACNC: <3.1 MIU/ML
HBV SURFACE AG SER QL: <0.1 INDEX
HBV SURFACE AG SER QL: NEGATIVE
HCT VFR BLD AUTO: 40.1 % (ref 36.6–50.3)
HGB BLD-MCNC: 12.9 G/DL (ref 12.1–17)
IMM GRANULOCYTES # BLD AUTO: 0 K/UL (ref 0–0.04)
IMM GRANULOCYTES NFR BLD AUTO: 0 % (ref 0–0.5)
LYMPHOCYTES # BLD: 2.4 K/UL (ref 0.8–3.5)
LYMPHOCYTES NFR BLD: 37 % (ref 12–49)
MAGNESIUM SERPL-MCNC: 2.6 MG/DL (ref 1.6–2.4)
MCH RBC QN AUTO: 30.2 PG (ref 26–34)
MCHC RBC AUTO-ENTMCNC: 32.2 G/DL (ref 30–36.5)
MCV RBC AUTO: 93.9 FL (ref 80–99)
MONOCYTES # BLD: 0.8 K/UL (ref 0–1)
MONOCYTES NFR BLD: 13 % (ref 5–13)
NEUTS SEG # BLD: 2.8 K/UL (ref 1.8–8)
NEUTS SEG NFR BLD: 43 % (ref 32–75)
NRBC # BLD: 0 K/UL (ref 0–0.01)
NRBC BLD-RTO: 0 PER 100 WBC
P-R INTERVAL, ECG05: 180 MS
P-R INTERVAL, ECG05: 182 MS
PLATELET # BLD AUTO: 197 K/UL (ref 150–400)
PMV BLD AUTO: 10.9 FL (ref 8.9–12.9)
POTASSIUM SERPL-SCNC: 4.3 MMOL/L (ref 3.5–5.1)
PROT SERPL-MCNC: 8.2 G/DL (ref 6.4–8.2)
Q-T INTERVAL, ECG07: 494 MS
Q-T INTERVAL, ECG07: 496 MS
QRS DURATION, ECG06: 100 MS
QRS DURATION, ECG06: 98 MS
QTC CALCULATION (BEZET), ECG08: 489 MS
QTC CALCULATION (BEZET), ECG08: 491 MS
RBC # BLD AUTO: 4.27 M/UL (ref 4.1–5.7)
SAMPLES BEING HELD,HOLD: NORMAL
SODIUM SERPL-SCNC: 137 MMOL/L (ref 136–145)
TROPONIN-HIGH SENSITIVITY: 20 NG/L (ref 0–76)
VENTRICULAR RATE, ECG03: 59 BPM
VENTRICULAR RATE, ECG03: 59 BPM
WBC # BLD AUTO: 6.4 K/UL (ref 4.1–11.1)

## 2022-11-28 PROCEDURE — 96376 TX/PRO/DX INJ SAME DRUG ADON: CPT

## 2022-11-28 PROCEDURE — 74011000250 HC RX REV CODE- 250: Performed by: NURSE PRACTITIONER

## 2022-11-28 PROCEDURE — 90935 HEMODIALYSIS ONE EVALUATION: CPT

## 2022-11-28 PROCEDURE — 85025 COMPLETE CBC W/AUTO DIFF WBC: CPT

## 2022-11-28 PROCEDURE — 36415 COLL VENOUS BLD VENIPUNCTURE: CPT

## 2022-11-28 PROCEDURE — 80053 COMPREHEN METABOLIC PANEL: CPT

## 2022-11-28 PROCEDURE — 74011250636 HC RX REV CODE- 250/636: Performed by: FAMILY MEDICINE

## 2022-11-28 PROCEDURE — 93005 ELECTROCARDIOGRAM TRACING: CPT

## 2022-11-28 PROCEDURE — 70450 CT HEAD/BRAIN W/O DYE: CPT

## 2022-11-28 PROCEDURE — 74011250636 HC RX REV CODE- 250/636: Performed by: NURSE PRACTITIONER

## 2022-11-28 PROCEDURE — G0378 HOSPITAL OBSERVATION PER HR: HCPCS

## 2022-11-28 PROCEDURE — 96374 THER/PROPH/DIAG INJ IV PUSH: CPT

## 2022-11-28 PROCEDURE — 83735 ASSAY OF MAGNESIUM: CPT

## 2022-11-28 PROCEDURE — 74011000636 HC RX REV CODE- 636: Performed by: RADIOLOGY

## 2022-11-28 PROCEDURE — 96372 THER/PROPH/DIAG INJ SC/IM: CPT

## 2022-11-28 PROCEDURE — 96366 THER/PROPH/DIAG IV INF ADDON: CPT

## 2022-11-28 PROCEDURE — 65660000001 HC RM ICU INTERMED STEPDOWN

## 2022-11-28 PROCEDURE — 84484 ASSAY OF TROPONIN QUANT: CPT

## 2022-11-28 PROCEDURE — 99285 EMERGENCY DEPT VISIT HI MDM: CPT

## 2022-11-28 PROCEDURE — 74011250636 HC RX REV CODE- 250/636: Performed by: EMERGENCY MEDICINE

## 2022-11-28 PROCEDURE — 74011000250 HC RX REV CODE- 250: Performed by: EMERGENCY MEDICINE

## 2022-11-28 PROCEDURE — 96365 THER/PROPH/DIAG IV INF INIT: CPT

## 2022-11-28 PROCEDURE — 83880 ASSAY OF NATRIURETIC PEPTIDE: CPT

## 2022-11-28 PROCEDURE — 74011250637 HC RX REV CODE- 250/637: Performed by: NURSE PRACTITIONER

## 2022-11-28 PROCEDURE — 96367 TX/PROPH/DG ADDL SEQ IV INF: CPT

## 2022-11-28 PROCEDURE — 86706 HEP B SURFACE ANTIBODY: CPT

## 2022-11-28 PROCEDURE — P9047 ALBUMIN (HUMAN), 25%, 50ML: HCPCS | Performed by: FAMILY MEDICINE

## 2022-11-28 PROCEDURE — 96375 TX/PRO/DX INJ NEW DRUG ADDON: CPT

## 2022-11-28 PROCEDURE — 87340 HEPATITIS B SURFACE AG IA: CPT

## 2022-11-28 PROCEDURE — 71275 CT ANGIOGRAPHY CHEST: CPT

## 2022-11-28 RX ORDER — BUMETANIDE 1 MG/1
2 TABLET ORAL DAILY
Status: DISCONTINUED | OUTPATIENT
Start: 2022-11-28 | End: 2022-11-30 | Stop reason: HOSPADM

## 2022-11-28 RX ORDER — HEPARIN SODIUM 1000 [USP'U]/ML
4100 INJECTION, SOLUTION INTRAVENOUS; SUBCUTANEOUS
Status: DISCONTINUED | OUTPATIENT
Start: 2022-11-28 | End: 2022-11-28 | Stop reason: SDUPTHER

## 2022-11-28 RX ORDER — ONDANSETRON 2 MG/ML
4 INJECTION INTRAMUSCULAR; INTRAVENOUS
Status: DISCONTINUED | OUTPATIENT
Start: 2022-11-28 | End: 2022-11-30 | Stop reason: HOSPADM

## 2022-11-28 RX ORDER — CALCIUM CARBONATE 200(500)MG
600 TABLET,CHEWABLE ORAL
Status: DISCONTINUED | OUTPATIENT
Start: 2022-11-28 | End: 2022-11-30 | Stop reason: HOSPADM

## 2022-11-28 RX ORDER — ALISKIREN 300 MG/1
300 TABLET, FILM COATED ORAL DAILY
Status: DISCONTINUED | OUTPATIENT
Start: 2022-11-28 | End: 2022-11-30 | Stop reason: HOSPADM

## 2022-11-28 RX ORDER — HYDRALAZINE HYDROCHLORIDE 20 MG/ML
10 INJECTION INTRAMUSCULAR; INTRAVENOUS ONCE
Status: COMPLETED | OUTPATIENT
Start: 2022-11-28 | End: 2022-11-28

## 2022-11-28 RX ORDER — POLYETHYLENE GLYCOL 3350 17 G/17G
17 POWDER, FOR SOLUTION ORAL DAILY PRN
Status: DISCONTINUED | OUTPATIENT
Start: 2022-11-28 | End: 2022-11-30 | Stop reason: HOSPADM

## 2022-11-28 RX ORDER — HYDRALAZINE HYDROCHLORIDE 50 MG/1
100 TABLET, FILM COATED ORAL 3 TIMES DAILY
Status: DISCONTINUED | OUTPATIENT
Start: 2022-11-28 | End: 2022-11-30 | Stop reason: HOSPADM

## 2022-11-28 RX ORDER — DOXAZOSIN 2 MG/1
8 TABLET ORAL
Status: DISCONTINUED | OUTPATIENT
Start: 2022-11-28 | End: 2022-11-30 | Stop reason: HOSPADM

## 2022-11-28 RX ORDER — MORPHINE SULFATE 2 MG/ML
2 INJECTION, SOLUTION INTRAMUSCULAR; INTRAVENOUS
Status: DISCONTINUED | OUTPATIENT
Start: 2022-11-28 | End: 2022-11-30 | Stop reason: HOSPADM

## 2022-11-28 RX ORDER — SODIUM CHLORIDE 0.9 % (FLUSH) 0.9 %
5-40 SYRINGE (ML) INJECTION EVERY 8 HOURS
Status: DISCONTINUED | OUTPATIENT
Start: 2022-11-28 | End: 2022-11-30 | Stop reason: HOSPADM

## 2022-11-28 RX ORDER — ACETAMINOPHEN 325 MG/1
650 TABLET ORAL
Status: DISCONTINUED | OUTPATIENT
Start: 2022-11-28 | End: 2022-11-30 | Stop reason: HOSPADM

## 2022-11-28 RX ORDER — BUPRENORPHINE HYDROCHLORIDE AND NALOXONE HYDROCHLORIDE DIHYDRATE 8; 2 MG/1; MG/1
1 TABLET SUBLINGUAL 2 TIMES DAILY
Status: DISCONTINUED | OUTPATIENT
Start: 2022-11-28 | End: 2022-11-30 | Stop reason: HOSPADM

## 2022-11-28 RX ORDER — PANTOPRAZOLE SODIUM 40 MG/1
40 TABLET, DELAYED RELEASE ORAL
Status: DISCONTINUED | OUTPATIENT
Start: 2022-11-29 | End: 2022-11-30 | Stop reason: HOSPADM

## 2022-11-28 RX ORDER — SODIUM CHLORIDE 0.9 % (FLUSH) 0.9 %
5-40 SYRINGE (ML) INJECTION AS NEEDED
Status: DISCONTINUED | OUTPATIENT
Start: 2022-11-28 | End: 2022-11-30 | Stop reason: HOSPADM

## 2022-11-28 RX ORDER — SEVELAMER CARBONATE 800 MG/1
800 TABLET, FILM COATED ORAL 3 TIMES DAILY
Status: DISCONTINUED | OUTPATIENT
Start: 2022-11-28 | End: 2022-11-29

## 2022-11-28 RX ORDER — ONDANSETRON 4 MG/1
4 TABLET, ORALLY DISINTEGRATING ORAL
Status: DISCONTINUED | OUTPATIENT
Start: 2022-11-28 | End: 2022-11-30 | Stop reason: HOSPADM

## 2022-11-28 RX ORDER — ACETAMINOPHEN 650 MG/1
650 SUPPOSITORY RECTAL
Status: DISCONTINUED | OUTPATIENT
Start: 2022-11-28 | End: 2022-11-30 | Stop reason: HOSPADM

## 2022-11-28 RX ORDER — MINOXIDIL 2.5 MG/1
10 TABLET ORAL 2 TIMES DAILY
Status: DISCONTINUED | OUTPATIENT
Start: 2022-11-28 | End: 2022-11-30 | Stop reason: HOSPADM

## 2022-11-28 RX ORDER — CLONIDINE HYDROCHLORIDE 0.2 MG/1
0.2 TABLET ORAL 2 TIMES DAILY
Status: DISCONTINUED | OUTPATIENT
Start: 2022-11-28 | End: 2022-11-30 | Stop reason: HOSPADM

## 2022-11-28 RX ORDER — HEPARIN SODIUM 5000 [USP'U]/ML
5000 INJECTION, SOLUTION INTRAVENOUS; SUBCUTANEOUS EVERY 8 HOURS
Status: DISCONTINUED | OUTPATIENT
Start: 2022-11-28 | End: 2022-11-30 | Stop reason: HOSPADM

## 2022-11-28 RX ORDER — ALBUMIN HUMAN 250 G/1000ML
25 SOLUTION INTRAVENOUS ONCE
Status: COMPLETED | OUTPATIENT
Start: 2022-11-28 | End: 2022-11-28

## 2022-11-28 RX ADMIN — CLONIDINE HYDROCHLORIDE 0.2 MG: 0.2 TABLET ORAL at 10:46

## 2022-11-28 RX ADMIN — SODIUM CHLORIDE, PRESERVATIVE FREE 10 ML: 5 INJECTION INTRAVENOUS at 21:44

## 2022-11-28 RX ADMIN — SODIUM CHLORIDE 5 MG/HR: 900 INJECTION, SOLUTION INTRAVENOUS at 09:52

## 2022-11-28 RX ADMIN — SODIUM CHLORIDE, PRESERVATIVE FREE 10 ML: 5 INJECTION INTRAVENOUS at 10:50

## 2022-11-28 RX ADMIN — HEPARIN SODIUM 5000 UNITS: 5000 INJECTION INTRAVENOUS; SUBCUTANEOUS at 10:46

## 2022-11-28 RX ADMIN — SEVELAMER CARBONATE 800 MG: 800 TABLET, FILM COATED ORAL at 10:48

## 2022-11-28 RX ADMIN — ALISKIREN HEMIFUMARATE 300 MG: 300 TABLET, FILM COATED ORAL at 15:45

## 2022-11-28 RX ADMIN — MORPHINE SULFATE 2 MG: 2 INJECTION, SOLUTION INTRAMUSCULAR; INTRAVENOUS at 09:48

## 2022-11-28 RX ADMIN — HYDRALAZINE HYDROCHLORIDE 100 MG: 50 TABLET, FILM COATED ORAL at 10:46

## 2022-11-28 RX ADMIN — HYDRALAZINE HYDROCHLORIDE 10 MG: 20 INJECTION INTRAMUSCULAR; INTRAVENOUS at 07:30

## 2022-11-28 RX ADMIN — ALBUMIN (HUMAN) 25 G: 0.25 INJECTION, SOLUTION INTRAVENOUS at 20:07

## 2022-11-28 RX ADMIN — HYDRALAZINE HYDROCHLORIDE 100 MG: 50 TABLET, FILM COATED ORAL at 15:45

## 2022-11-28 RX ADMIN — SODIUM CHLORIDE 2.5 MG/HR: 900 INJECTION, SOLUTION INTRAVENOUS at 15:45

## 2022-11-28 RX ADMIN — HYDRALAZINE HYDROCHLORIDE 10 MG: 20 INJECTION INTRAMUSCULAR; INTRAVENOUS at 17:16

## 2022-11-28 RX ADMIN — CLONIDINE HYDROCHLORIDE 0.2 MG: 0.2 TABLET ORAL at 17:16

## 2022-11-28 RX ADMIN — SEVELAMER CARBONATE 800 MG: 800 TABLET, FILM COATED ORAL at 21:44

## 2022-11-28 RX ADMIN — MINOXIDIL 10 MG: 10 TABLET ORAL at 15:45

## 2022-11-28 RX ADMIN — BUMETANIDE 2 MG: 1 TABLET ORAL at 10:46

## 2022-11-28 RX ADMIN — SEVELAMER CARBONATE 800 MG: 800 TABLET, FILM COATED ORAL at 15:45

## 2022-11-28 RX ADMIN — HEPARIN SODIUM 2000 UNITS: 1000 INJECTION INTRAVENOUS; SUBCUTANEOUS at 14:35

## 2022-11-28 RX ADMIN — HEPARIN SODIUM 2100 UNITS: 1000 INJECTION INTRAVENOUS; SUBCUTANEOUS at 14:36

## 2022-11-28 RX ADMIN — IOPAMIDOL 80 ML: 755 INJECTION, SOLUTION INTRAVENOUS at 07:40

## 2022-11-28 NOTE — ED TRIAGE NOTES
Pt arrives from home via ems/als for c/o chest pain L/R tightness pressure woke him from sleeping at [id number] no hx of same; dialysis patient Tu/Thur/Sat, has been compliant with medications at home; also c/o headache

## 2022-11-28 NOTE — DIALYSIS
Hemodialysis / [phone]    Vitals Pre Post Assessment Pre Post   /80   152/94 LOC A&O X4 A&O X4   HR 73 59 Lungs Unlabored, RA Unlabored, RA   Resp 16 15 Cardiac RRR RRR   Temp Temp: 97.7 °F (36.5 °C) (11/28/22 1140) 98.1 Skin Warm, dry Warm, dry   Weight  N/A N/A Edema Generalized Generalized   Tele status Bedside Bedside Pain Pain Intensity 1: 10 (11/28/22 0712) 8.5     Orders   Duration: Start: 1140 End: 1510 Total: 3.5 hrs   Dialyzer: Dialyzer/Set Up Inspection: Revaclear (11/28/22 1140)   K Bath: Dialysate K (mEq/L): 3 (11/28/22 1140)   Ca Bath: Dialysate CA (mEq/L): 2.5 (11/28/22 1140)   Na: Dialysate NA (mEq/L): 138 (11/28/22 1140)   Bicarb: Dialysate HCO3 (mEq/L): 35 (11/28/22 1140)   Target Fluid Removal: Goal/Amount of Fluid to Remove (mL): 4000 mL (11/28/22 1140)     Access   Type & Location: L chest permacath   Comments: Dressing changed per policy & procedure.  Each catheter limb disinfected per p&p, caps removed, hubs disinfected per p&p. +aspiration/+flush x2 ports                                       Labs   HBsAg (Antigen) / date: Negative 11/17/22                                              HBsAb (Antibody) / date: Pending   Source: Physician Portal   Obtained/Reviewed  Critical Results Called HGB   Date Value Ref Range Status   11/28/2022 12.9 12.1 - 17.0 g/dL Final     Potassium   Date Value Ref Range Status   11/28/2022 4.3 3.5 - 5.1 mmol/L Final     Calcium   Date Value Ref Range Status   11/28/2022 8.7 8.5 - 10.1 MG/DL Final     BUN   Date Value Ref Range Status   11/28/2022 38 (H) 6 - 20 MG/DL Final     Creatinine   Date Value Ref Range Status   11/28/2022 11.60 (H) 0.70 - 1.30 MG/DL Final        Meds Given   Name Dose Route   Heparin 2.0 ml Red port dwell   Heparin 2.1 ml Blue port dwell          Adequacy / Fluid    Total Liters Process: 69.8 L   Net Fluid Removed: 4000 ml      Comments   Time Out Done:   (Time) 1100   Admitting Diagnosis: hypertension   Consent obtained/signed: Chronic pt   Machine / RO # Machine Number: [id number] (11/28/22 1140)   Primary Nurse Rpt Pre: GRACIA Gates, RN   Primary Nurse Rpt Post: KAILEY Del Rio RN   Pt Education: CVC infection prevention & control. Care Plan: Continue current hemodialysis plan of care. Pts outpatient clinic: [facility]     Tx Summary   Comments: 2371 Hemodialysis initiated. Dr. Yang Campuzano in to see pt while on dialysis. 1510 Hemodialysis completed. Each dialysis catheter limb disinfected per p&p, blood returned per p&p, each dialysis hub disinfected per p&p, post dialysis Heparin catheter dwell instilled per order, and caps applied. CVC dressing CDI dated 11/28/22. SBAR report given to primary RN.

## 2022-11-28 NOTE — ED PROVIDER NOTES
The history is provided by the patient. No  was used. Chest Pain (Angina)   This is a new problem. The current episode started 3 to 5 hours ago. The problem has not changed since onset. The problem occurs constantly. The pain is associated with normal activity. The pain is present in the substernal region. The pain is severe. The quality of the pain is described as sharp. The pain radiates to the mid back. Associated symptoms include headaches. Pertinent negatives include no abdominal pain, no back pain, no claudication, no cough, no diaphoresis, no dizziness, no exertional chest pressure, no fever, no hemoptysis, no irregular heartbeat, no leg pain, no lower extremity edema, no malaise/fatigue, no nausea, no near-syncope, no numbness, no orthopnea, no palpitations, no PND, no shortness of breath, no sputum production, no vomiting and no weakness. He has tried nothing for the symptoms. The treatment provided no relief. Risk factors include hypertension. His past medical history is significant for HTN. His past medical history does not include aneurysm, cancer, DM, DVT, PE or CHF. Procedural history includes echocardiogram.     Past Medical History:   Diagnosis Date    Chronic kidney disease     Hypertension     Liver disease        No past surgical history on file. No family history on file. Social History     Socioeconomic History    Marital status: SINGLE     Spouse name: Not on file    Number of children: Not on file    Years of education: Not on file    Highest education level: Not on file   Occupational History    Not on file   Tobacco Use    Smoking status: Some Days     Packs/day: 1.00     Types: Cigarettes     Last attempt to quit: 2020     Years since quittin.3    Smokeless tobacco: Never    Tobacco comments:     started back smoking in last couple months   Vaping Use    Vaping Use: Never used   Substance and Sexual Activity    Alcohol use: Not Currently    Drug use:  Yes Types: Marijuana, Cocaine, Heroin     Comment: Herion & Cocaine 10 years ago as of 3/23/22, Marijuana last used 1 week ago    Sexual activity: Not Currently   Other Topics Concern    Not on file   Social History Narrative    Not on file     Social Determinants of Health     Financial Resource Strain: Not on file   Food Insecurity: Not on file   Transportation Needs: Not on file   Physical Activity: Not on file   Stress: Not on file   Social Connections: Not on file   Intimate Partner Violence: Not on file   Housing Stability: Not on file         ALLERGIES: Baclofen and Losartan    Review of Systems   Constitutional:  Negative for activity change, chills, diaphoresis, fever and malaise/fatigue. HENT:  Negative for nosebleeds, sore throat, trouble swallowing and voice change. Eyes:  Negative for visual disturbance. Respiratory:  Negative for cough, hemoptysis, sputum production and shortness of breath. Cardiovascular:  Positive for chest pain. Negative for palpitations, orthopnea, claudication, PND and near-syncope. Gastrointestinal:  Negative for abdominal pain, constipation, diarrhea, nausea and vomiting. Genitourinary:  Negative for difficulty urinating, dysuria, hematuria and urgency. Musculoskeletal:  Negative for back pain, neck pain and neck stiffness. Skin:  Negative for color change. Allergic/Immunologic: Negative for immunocompromised state. Neurological:  Positive for headaches. Negative for dizziness, seizures, syncope, weakness, light-headedness and numbness. Psychiatric/Behavioral:  Negative for behavioral problems, confusion, hallucinations, self-injury and suicidal ideas. Vitals:    11/28/22 0712   BP: (!) 241/118   Pulse: 60   Resp: 12   Temp: 98.2 °F (36.8 °C)   SpO2: 100%   Weight: 74.9 kg (165 lb 2 oz)   Height: 5' 9\" (1.753 m)            Physical Exam  Vitals and nursing note reviewed. Constitutional:       General: He is not in acute distress.      Appearance: He is well-developed. He is ill-appearing. He is not diaphoretic. HENT:      Head: Normocephalic and atraumatic. Eyes:      Pupils: Pupils are equal, round, and reactive to light. Cardiovascular:      Rate and Rhythm: Normal rate and regular rhythm. Heart sounds: Normal heart sounds. No murmur heard. No friction rub. No gallop. Pulmonary:      Effort: Pulmonary effort is normal. No respiratory distress. Breath sounds: Normal breath sounds. No wheezing. Abdominal:      General: Bowel sounds are normal. There is no distension. Palpations: Abdomen is soft. Tenderness: There is no abdominal tenderness. There is no guarding or rebound. Musculoskeletal:         General: Normal range of motion. Cervical back: Normal range of motion and neck supple. Skin:     General: Skin is warm. Findings: No rash. Neurological:      Mental Status: He is alert and oriented to person, place, and time. Psychiatric:         Behavior: Behavior normal.         Thought Content: Thought content normal.         Judgment: Judgment normal.        MDM  Number of Diagnoses or Management Options  Malignant hypertension  Pericardial effusion  Diagnosis management comments: Total critical care time (not including time spent performing separately reportable procedures): 54min        This is a 80-year-old male with past medical history, review of systems, physical exam as above, presenting with complaints of chest pain, headache, hypertension. Patient states he noted his blood pressure was elevated 3 days ago, greater than 200, prior to dialysis, states it improved into the 160s and 170s after dialysis. Patient states he woke this morning with headache and chest pain, notes global headache, without visual disturbance, substernal chest pain radiating to his back, and elected to present to the emergency department. He states he is compliant with his antihypertensive, including dosing this morning.   Upon arrival he is noted to be extremely hypertensive, afebrile without tachycardia, satting well on room air. Chart review indicates nuclear stress and echo performed 4 months ago, without signs of ischemia. He also had a CTA of the chest in the last several months which is noted to have no acute finding including aneurysm. Exam is remarkable for uncomfortable appearing middle-age male, in no acute distress, noted to have clear breath sounds to auscultation, regular rate and rhythm without murmurs gallops rubs, soft nontender abdomen. Patient states he is due for dialysis tomorrow. Chart review indicates he was admitted to the hospital 2 months ago for malignant hypertension, suspect recurrence of the same. Plan to repeat CTA chest, CT head, CMP, CBC, mag, cardiac enzymes and BNP. We will provide a small dose of hydralazine, reassess, and make a disposition. Procedures    EKG interpretation: (Preliminary)  Rhythm: sinus bradycardia; and regular . Rate (approx.): 59; Axis: normal; P wave: normal; QRS interval: normal ; ST/T wave: non-specific changes; Other findings: abnormal ekg. Perfect Serve Consult for Admission  8:55 AM    ED Room Number: ER10/10  Patient Name and age:  Javier Rader 62 y.o.  male  Working Diagnosis:   1. Malignant hypertension    2.  Pericardial effusion        COVID-19 Suspicion:  no  Sepsis present:  no  Reassessment needed: no  Code Status:  Full Code  Readmission: no  Isolation Requirements:  no  Recommended Level of Care:  step down  Department:[facility] Adult ED - [phone]  Other:  d/w EDIS Campos

## 2022-11-28 NOTE — ED NOTES
Bedside and Verbal shift change report given to Cruz Newton RN (oncoming nurse) by Bc Ontiveros RN (offgoing nurse). Report included the following information SBAR, ED Summary, MAR and Recent Results.

## 2022-11-28 NOTE — H&P
[facility] [facility] Adult  Hospitalist Group  History and Physical    Date of Service:  11/28/2022  Primary Care Provider: Nikita Griggs NP  Source of information: The patient and Chart review    Chief Complaint: Chest Pain      History of Presenting Illness:   Javier Rader is a 62 y.o. male with a past medical history significant for end-stage renal disease, hypertension. While he receives hemodialysis Tuesday, Thursday, Saturday, was admitted to our facility about 6 weeks ago for hypertensive urgency, was discharged with stable condition on 10/3.  10/4 he was at his PCPs office with hypotension. 11/28 he presents to the emergency department with complaints of headache and chest pain. He was noted to have systolic blood pressure in the 200 range. Further work-up in the emergency department revealed normal white count, 6.4 and normal hemoglobin of 12.9. Creatinine was 11.6, magnesium was mildly high at 2.6 however potassium normal at 4.3 and his troponin was normal at 20 with BNP of 9152. CT of the head was unremarkable CTA of the chest with moderate pericardial effusion, no pulmonary embolus, noted sclerotic lesion T1. Nicardipine drip was ordered. Patient was referred to the hospitalist service for further management    At the moment of the initial interview he was cooperative with the exam.  His headache had improved however still with chest and back discomfort, noted respiratory component. He tells me he is due for hemodialysis tomorrow, has not missed any dialysis, and is compliant with his antihypertensive regimen. He has not missed any doses of medications.   He does endorse some dyspnea on exertion, denies fever however does endorse chills    The patient denies any headache, blurry vision, sore throat, trouble swallowing, trouble with speech, chest pain, SOB, cough, fever, chills, N/V/D, abd pain, urinary symptoms, constipation, recent travels, sick contacts, focal or generalized neurological symptoms, falls, injuries, rashes, contact with COVID-19 diagnosed patients, hematemesis, melena, hemoptysis, hematuria, rashes, denies starting any new medications and denies any other concerns or problems besides as mentioned above. REVIEW OF SYSTEMS:  Constitutional: negative for chills  Eyes: negative  Ears, Nose, Mouth, Throat, and Face: negative  Respiratory: Dyspnea on exertion  Cardiovascular: Chest and back pain  Gastrointestinal: negative  Genitourinary:negative  Integument/Breast: negative  Hematologic/Lymphatic: negative  Musculoskeletal:negative  Neurological: negative headache improved    Past Medical History:   Diagnosis Date    Chronic kidney disease     Hypertension     Liver disease       No past surgical history on file. Prior to Admission medications    Medication Sig Start Date End Date Taking? Authorizing Provider   aliskiren (TEKTURNA) 300 mg tablet Take 1 Tablet by mouth daily. 10/3/22   Sheldon Lyons MD   minoxidiL (LONITEN) 10 mg tablet Take 1 Tablet by mouth two (2) times a day. 10/2/22   Sheldon Lyons MD   bumetanide (BUMEX) 2 mg tablet Take 1 Tablet by mouth daily. 9/29/22   Sheldon Lyons MD   carvediloL (COREG) 25 mg tablet Take 1 Tablet by mouth two (2) times daily (with meals). 9/28/22   Sheldon Lyons MD   cloNIDine HCL (CATAPRES) 0.2 mg tablet Take 1 Tablet by mouth two (2) times a day. 9/28/22   Sheldon Lyons MD   doxazosin (CARDURA) 8 mg tablet Take 1 Tablet by mouth nightly. 9/28/22   Sheldon Lyons MD   hydrALAZINE (APRESOLINE) 100 mg tablet Take 1 Tablet by mouth three (3) times daily. 9/28/22   Sheldon Lyons MD   sevelamer carbonate (RENVELA) 800 mg tab tab Take 800 mg by mouth three (3) times daily. New prescription as of 8/16/22, prescribed by Dr. Luis Dobbs    Provider, Historical   calcium carbonate (TUMS) 200 mg calcium (500 mg) chew Take 3 Tablets by mouth three (3) times daily (after meals).     Provider, Historical   pantoprazole (Protonix) 40 mg tablet Take 1 Tablet by mouth two (2) times a day. Indications: an ulcer of the duodenum 3/25/22   Demarcus ALDANA MD   ascorbic acid, vitamin C, (VITAMIN C) 500 mg tablet Take 500 mg by mouth daily. Provider, Historical   cholecalciferol (VITAMIN D3) 25 mcg (1,000 unit) cap Take 1,000 Units by mouth daily. Provider, Historical   buprenorphine-naloxone (SUBOXONE) 8-2 mg film sublingaul film 1 Film by SubLINGual route two (2) times a day. Provider, Historical     Allergies   Allergen Reactions    Baclofen Rash     All over    Losartan Hives, Shortness of Breath and Rash     Verified allergy with patient      No family history on file. Social History:  reports that he has been smoking. He has been smoking an average of 1 pack per day. He has never used smokeless tobacco. He reports that he does not currently use alcohol. He reports current drug use. Drugs: Marijuana, Cocaine, and Heroin. Family and social history were personally reviewed, all pertinent and relevant details are outlined as above. Objective   Visit Vitals  BP (!) 225/98   Pulse 63   Temp 98.2 °F (36.8 °C)   Resp 17   Ht 5' 9\" (1.753 m)   Wt 74.9 kg (165 lb 2 oz)   SpO2 97%   BMI 24.38 kg/m²      O2 Device: None (Room air)    PHYSICAL EXAM:  General: Alert x oriented x 3, awake, mild- moderate distress, resting in bed, pleasant male, appears to be stated age  HEENT: PEERL, EOMI, moist mucus membranes  Neck: Supple, no JVD,   Chest: Clear to auscultation bilaterally   CVS: RRR, S1 S2 heard, no murmurs/rubs/gallops  Abd: Soft, non-tender, non-distended, +bowel sounds   Ext: No clubbing, no cyanosis, no edema  Neuro/Psych: Pleasant mood and affect, CN 2-12 grossly intact, sensory grossly within normal limit, Strength 5/5 in all extremities,   Cap refill: Brisk, less than 3 seconds  Pulses: 2+, symmetric in all extremities  Skin: Warm, dry, without rashes or lesions    Data Review:    All diagnostic labs and studies have been reviewed. Abnormal Labs Reviewed   METABOLIC PANEL, COMPREHENSIVE - Abnormal; Notable for the following components:       Result Value    BUN 38 (*)     Creatinine 11.60 (*)     BUN/Creatinine ratio 3 (*)     eGFR 5 (*)     ALT (SGPT) 11 (*)     AST (SGOT) 9 (*)     Globulin 4.7 (*)     A-G Ratio 0.7 (*)     All other components within normal limits   NT-PRO BNP - Abnormal; Notable for the following components:    NT pro-BNP 9,152 (*)     All other components within normal limits   MAGNESIUM - Abnormal; Notable for the following components:    Magnesium 2.6 (*)     All other components within normal limits       All Micro Results       Procedure Component Value Units Date/Time    CULTURE, BLOOD #1 [[id number]]     Order Status: Sent Specimen: Blood             IMAGING:   CT HEAD WO CONT   Final Result   1. No evidence of acute intracranial abnormality. CTA CHEST W OR W WO CONT   Final Result      1. Moderate pericardial effusion   2. No acute pulmonary embolus   3. Sclerotic lesion within T1 and possibly within T4.  MR imaging is recommended   to further evaluate           ECG/ECHO:    Results for orders placed or performed during the hospital encounter of 11/28/22   EKG, 12 LEAD, INITIAL   Result Value Ref Range    Ventricular Rate 59 BPM    Atrial Rate 59 BPM    P-R Interval 180 ms    QRS Duration 98 ms    Q-T Interval 494 ms    QTC Calculation (Bezet) 489 ms    Calculated P Axis 72 degrees    Calculated R Axis 58 degrees    Calculated T Axis 100 degrees    Diagnosis       Sinus bradycardia  Minimal voltage criteria for LVH, may be normal variant ( Sokolow-Cardoso )  Nonspecific T wave abnormality  Prolonged QT  Abnormal ECG  When compared with ECG of 28-SEP-2022 19:10,  T wave inversion no longer evident in Inferior leads  Nonspecific T wave abnormality has replaced inverted T waves in Lateral leads          Assessment:   Given the patient's current clinical presentation, there is a high level of concern for decompensation if discharged from the emergency department. Complex decision making was performed, which includes reviewing the patient's available past medical records, laboratory results, and imaging studies. Active Problems:    Hypertensive urgency, malignant (11/28/2022)      Plan:     Chest pain  Troponin was within normal limits,  EKG without ischemic changes  This does have a respiratory variation likely due to his hypertension  Does have noted pericardial effusion on CT  Will update echocardiogram  Will consult with cardiology  In the meantime we will give morphine for pain    Malignant hypertensive urgency  Nicardipine drip was ordered by the emergency department provider, yet to be started  Will restart his antihypertensives  Titrate nicardipine drip to affect  Consulting with nephrology    End-stage renal disease  Normal dialysis is tomorrow, likely does have element of fluid overload  Consulting with nephrology  Continuing sevelamer  We will monitor electrolytes    Chills  No other symptoms, denies fever, cough  We will check a blood culture    Hx Substance abuse  I did review the , patient has been on Suboxone for quite some time  Continuing with that medication      DIET: ADULT DIET Regular; No Salt Added (3-4 gm); Low Potassium (Less than 3000 mg/day); Low Phosphorus (Less than 1000 mg)   ISOLATION PRECAUTIONS: There are currently no Active Isolations  CODE STATUS: Full Code   DVT PROPHYLAXIS: UFH  FUNCTIONAL STATUS PRIOR TO HOSPITALIZATION: Fully active and ambulatory; able to carry on all self-care without restriction. Ambulatory status/function: By self   EARLY MOBILITY ASSESSMENT: Out of bed when blood pressure controlled  ANTICIPATED DISCHARGE: 24-48 hours. ANTICIPATED DISPOSITION: Home        Signed By: Alicia Bergeron NP     November 28, 2022         Please note that this dictation may have been completed with Kamari Castanon, the computer voice recognition software.   Quite often unanticipated grammatical, syntax, homophones, and other interpretive errors are inadvertently transcribed by the computer software. Please disregard these errors. Please excuse any errors that have escaped final proofreading.

## 2022-11-28 NOTE — ED NOTES
Bedside and Verbal shift change report given to Maeve Hwang RN (oncoming nurse) by Armando Portillo RN (offgoing nurse). Report included the following information SBAR, ED Summary, MAR and Recent Results.

## 2022-11-28 NOTE — PROGRESS NOTES
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 Leila Schneider is a 43-year-old -American male with history of collapsing FSGS and resultant ESRD presents to ER with uncontrolled hypertension. I have reviewed his outpatient dialysis records and he was at dialysis clinic on Saturday with a blood pressure of 230/130. His blood pressure improved postdialysis and was relatively asymptomatic per records. He has missed 1 dialysis treatment at the beginning of the month and he also missed a dialysis treatment last month for which he never come back for make-up treatments. He denies any active substance use. Review of Systems: A comprehensive review of systems was negative except for that written in the HPI.     Current Medications:   Current Facility-Administered Medications   Medication Dose Route Frequency    niCARdipine (CARDENE) 25 mg in 0.9% sodium chloride 250 mL ([id number])  0-15 mg/hr IntraVENous TITRATE    morphine injection 2 mg  2 mg IntraVENous Q4H PRN    aliskiren (TEKTURNA) tablet 300 mg  300 mg Oral DAILY    bumetanide (BUMEX) tablet 2 mg  2 mg Oral DAILY buprenorphine-naloxone (SUBOXONE) 8-2mg SL tablet  1 Tablet SubLINGual BID    calcium carbonate (TUMS) chewable tablet 600 mg [elemental]  600 mg Oral TIDPC    cloNIDine HCL (CATAPRES) tablet 0.2 mg  0.2 mg Oral BID    doxazosin (CARDURA) tablet 8 mg  8 mg Oral QHS    hydrALAZINE (APRESOLINE) tablet 100 mg  100 mg Oral TID    minoxidiL (LONITEN) tablet 10 mg  10 mg Oral BID    sevelamer carbonate (RENVELA) tab 800 mg  800 mg Oral TID    pantoprazole (PROTONIX) tablet 40 mg  40 mg Oral BID    sodium chloride (NS) flush 5-40 mL  5-40 mL IntraVENous Q8H    sodium chloride (NS) flush 5-40 mL  5-40 mL IntraVENous PRN    acetaminophen (TYLENOL) tablet 650 mg  650 mg Oral Q6H PRN    Or    acetaminophen (TYLENOL) suppository 650 mg  650 mg Rectal Q6H PRN    polyethylene glycol (MIRALAX) packet 17 g  17 g Oral DAILY PRN    ondansetron (ZOFRAN ODT) tablet 4 mg  4 mg Oral Q8H PRN    Or    ondansetron (ZOFRAN) injection 4 mg  4 mg IntraVENous Q6H PRN    heparin (porcine) injection 5,000 Units  5,000 Units SubCUTAneous Q8H     Current Outpatient Medications   Medication Sig    aliskiren (TEKTURNA) 300 mg tablet Take 1 Tablet by mouth daily. minoxidiL (LONITEN) 10 mg tablet Take 1 Tablet by mouth two (2) times a day. bumetanide (BUMEX) 2 mg tablet Take 1 Tablet by mouth daily. carvediloL (COREG) 25 mg tablet Take 1 Tablet by mouth two (2) times daily (with meals). cloNIDine HCL (CATAPRES) 0.2 mg tablet Take 1 Tablet by mouth two (2) times a day. doxazosin (CARDURA) 8 mg tablet Take 1 Tablet by mouth nightly. hydrALAZINE (APRESOLINE) 100 mg tablet Take 1 Tablet by mouth three (3) times daily. sevelamer carbonate (RENVELA) 800 mg tab tab Take 800 mg by mouth three (3) times daily. New prescription as of 8/16/22, prescribed by Dr. Berkley Fajardo    calcium carbonate (TUMS) 200 mg calcium (500 mg) chew Take 3 Tablets by mouth three (3) times daily (after meals).     pantoprazole (Protonix) 40 mg tablet Take 1 Tablet by mouth two (2) times a day. Indications: an ulcer of the duodenum    ascorbic acid, vitamin C, (VITAMIN C) 500 mg tablet Take 500 mg by mouth daily. cholecalciferol (VITAMIN D3) 25 mcg (1,000 unit) cap Take 1,000 Units by mouth daily. buprenorphine-naloxone (SUBOXONE) 8-2 mg film sublingaul film 1 Film by SubLINGual route two (2) times a day. Allergies   Allergen Reactions    Baclofen Rash     All over    Losartan Hives, Shortness of Breath and Rash     Verified allergy with patient       Objective:  Vitals:    Vitals:    11/28/22 1011 11/28/22 1032 11/28/22 1114 11/28/22 1133   BP: (!) 191/94 (!) 178/92 (!) 164/80 (!) 159/80   Pulse: 71 74 73 69   Resp:       Temp:       SpO2:       Weight:       Height:         Intake and Output:  No intake/output data recorded. No intake/output data recorded. Physical Examination:        General: NAD,Conversant   Neck:  Supple, no mass  Resp:  Lungs CTA B/L, no wheezing , normal respiratory effort  CV:  RRR,  no murmur or rub, LE edema  GI:  Soft, NT, + BS, no HS megaly  Neurologic:  Non focal  Psych:             AAO x 3 appropriate affect   Dialysis Access :[facility]    []    High complexity decision making was performed  []    Patient is at high-risk of decompensation with multiple organ involvement    Lab Data Personally Reviewed: I have reviewed all the pertinent labs, microbiology data and radiology studies during assessment.     Recent Labs     11/28/22  0718      K 4.3      CO2 25   GLU 83   BUN 38*   CREA 11.60*   CA 8.7   MG 2.6*   ALB 3.5   ALT 11*     Recent Labs     11/28/22  0718   WBC 6.4   HGB 12.9   HCT 40.1        No results found for: SDES  Lab Results   Component Value Date/Time    Culture result: NO GROWTH 5 DAYS 05/18/2022 06:06 AM    Culture result: No growth (<1,000 CFU/ML) 03/22/2022 04:11 PM     Recent Results (from the past 24 hour(s))   CBC WITH AUTOMATED DIFF    Collection Time: 11/28/22  7:18 AM   Result Value Ref Range WBC 6.4 4.1 - 11.1 K/uL    RBC 4.27 4. 10 - 5.70 M/uL    HGB 12.9 12.1 - 17.0 g/dL    HCT 40.1 36.6 - 50.3 %    MCV 93.9 80.0 - 99.0 FL    MCH 30.2 26.0 - 34.0 PG    MCHC 32.2 30.0 - 36.5 g/dL    RDW 13.8 11.5 - 14.5 %    PLATELET 093 229 - 031 K/uL    MPV 10.9 8.9 - 12.9 FL    NRBC 0.0 0  WBC    ABSOLUTE NRBC 0.00 0.00 - 0.01 K/uL    NEUTROPHILS 43 32 - 75 %    LYMPHOCYTES 37 12 - 49 %    MONOCYTES 13 5 - 13 %    EOSINOPHILS 6 0 - 7 %    BASOPHILS 1 0 - 1 %    IMMATURE GRANULOCYTES 0 0.0 - 0.5 %    ABS. NEUTROPHILS 2.8 1.8 - 8.0 K/UL    ABS. LYMPHOCYTES 2.4 0.8 - 3.5 K/UL    ABS. MONOCYTES 0.8 0.0 - 1.0 K/UL    ABS. EOSINOPHILS 0.4 0.0 - 0.4 K/UL    ABS. BASOPHILS 0.1 0.0 - 0.1 K/UL    ABS. IMM. GRANS. 0.0 0.00 - 0.04 K/UL    DF AUTOMATED     METABOLIC PANEL, COMPREHENSIVE    Collection Time: 11/28/22  7:18 AM   Result Value Ref Range    Sodium 137 136 - 145 mmol/L    Potassium 4.3 3.5 - 5.1 mmol/L    Chloride 100 97 - 108 mmol/L    CO2 25 21 - 32 mmol/L    Anion gap 12 5 - 15 mmol/L    Glucose 83 65 - 100 mg/dL    BUN 38 (H) 6 - 20 MG/DL    Creatinine 11.60 (H) 0.70 - 1.30 MG/DL    BUN/Creatinine ratio 3 (L) 12 - 20      eGFR 5 (L) >60 ml/min/1.73m2    Calcium 8.7 8.5 - 10.1 MG/DL    Bilirubin, total 0.5 0.2 - 1.0 MG/DL    ALT (SGPT) 11 (L) 12 - 78 U/L    AST (SGOT) 9 (L) 15 - 37 U/L    Alk.  phosphatase 112 45 - 117 U/L    Protein, total 8.2 6.4 - 8.2 g/dL    Albumin 3.5 3.5 - 5.0 g/dL    Globulin 4.7 (H) 2.0 - 4.0 g/dL    A-G Ratio 0.7 (L) 1.1 - 2.2     TROPONIN-HIGH SENSITIVITY    Collection Time: 11/28/22  7:18 AM   Result Value Ref Range    Troponin-High Sensitivity 20 0 - 76 ng/L   NT-PRO BNP    Collection Time: 11/28/22  7:18 AM   Result Value Ref Range    NT pro-BNP 9,152 (H) <125 PG/ML   MAGNESIUM    Collection Time: 11/28/22  7:18 AM   Result Value Ref Range    Magnesium 2.6 (H) 1.6 - 2.4 mg/dL   SAMPLES BEING HELD    Collection Time: 11/28/22  7:18 AM   Result Value Ref Range    SAMPLES BEING HELD [id number]     COMMENT        Add-on orders for these samples will be processed based on acceptable specimen integrity and analyte stability, which may vary by analyte. EKG, 12 LEAD, INITIAL    Collection Time: 11/28/22  9:43 AM   Result Value Ref Range    Ventricular Rate 59 BPM    Atrial Rate 59 BPM    P-R Interval 180 ms    QRS Duration 98 ms    Q-T Interval 494 ms    QTC Calculation (Bezet) 489 ms    Calculated P Axis 72 degrees    Calculated R Axis 58 degrees    Calculated T Axis 100 degrees    Diagnosis       Sinus bradycardia  Minimal voltage criteria for LVH, may be normal variant ( Sokolow-Cardoso )  Nonspecific T wave abnormality  Prolonged QT  Abnormal ECG  When compared with ECG of 28-SEP-2022 19:10,  T wave inversion no longer evident in Inferior leads  Nonspecific T wave abnormality has replaced inverted T waves in Lateral leads             I have reviewed the flowsheets. Chart and Pertinent Notes have been reviewed. No change in PMH ,family and social history from Consult note.       Dottie Bence, [facility]

## 2022-11-29 ENCOUNTER — APPOINTMENT (OUTPATIENT)
Dept: NON INVASIVE DIAGNOSTICS | Age: 57
DRG: 304 | End: 2022-11-29
Attending: NURSE PRACTITIONER
Payer: MEDICARE

## 2022-11-29 LAB
ALBUMIN SERPL-MCNC: 3.7 G/DL (ref 3.5–5)
ALBUMIN/GLOB SERPL: 0.8 {RATIO} (ref 1.1–2.2)
ALP SERPL-CCNC: 102 U/L (ref 45–117)
ALT SERPL-CCNC: 10 U/L (ref 12–78)
ANION GAP SERPL CALC-SCNC: 6 MMOL/L (ref 5–15)
AST SERPL-CCNC: 6 U/L (ref 15–37)
BASOPHILS # BLD: 0.1 K/UL (ref 0–0.1)
BASOPHILS NFR BLD: 1 % (ref 0–1)
BILIRUB SERPL-MCNC: 0.4 MG/DL (ref 0.2–1)
BUN SERPL-MCNC: 30 MG/DL (ref 6–20)
BUN/CREAT SERPL: 4 (ref 12–20)
CALCIUM SERPL-MCNC: 8.6 MG/DL (ref 8.5–10.1)
CHLORIDE SERPL-SCNC: 99 MMOL/L (ref 97–108)
CO2 SERPL-SCNC: 29 MMOL/L (ref 21–32)
CREAT SERPL-MCNC: 8.44 MG/DL (ref 0.7–1.3)
DIFFERENTIAL METHOD BLD: ABNORMAL
ECHO AO ROOT DIAM: 3.4 CM
ECHO AO ROOT INDEX: 1.81 CM/M2
ECHO AV AREA PEAK VELOCITY: 2.9 CM2
ECHO AV AREA/BSA PEAK VELOCITY: 1.5 CM2/M2
ECHO AV PEAK GRADIENT: 9 MMHG
ECHO AV PEAK VELOCITY: 1.5 M/S
ECHO AV VELOCITY RATIO: 0.87
ECHO EST RA PRESSURE: 3 MMHG
ECHO LA DIAMETER INDEX: 1.7 CM/M2
ECHO LA DIAMETER: 3.2 CM
ECHO LA TO AORTIC ROOT RATIO: 0.94
ECHO LV E' LATERAL VELOCITY: 8 CM/S
ECHO LV E' SEPTAL VELOCITY: 4 CM/S
ECHO LV FRACTIONAL SHORTENING: 24 % (ref 28–44)
ECHO LV INTERNAL DIMENSION DIASTOLE INDEX: 2.93 CM/M2
ECHO LV INTERNAL DIMENSION DIASTOLIC: 5.5 CM (ref 4.2–5.9)
ECHO LV INTERNAL DIMENSION SYSTOLIC INDEX: 2.23 CM/M2
ECHO LV INTERNAL DIMENSION SYSTOLIC: 4.2 CM
ECHO LV IVSD: 1 CM (ref 0.6–1)
ECHO LV MASS 2D: 160 G (ref 88–224)
ECHO LV MASS INDEX 2D: 85.1 G/M2 (ref 49–115)
ECHO LV POSTERIOR WALL DIASTOLIC: 0.6 CM (ref 0.6–1)
ECHO LV RELATIVE WALL THICKNESS RATIO: 0.22
ECHO LVOT AREA: 3.5 CM2
ECHO LVOT DIAM: 2.1 CM
ECHO LVOT PEAK GRADIENT: 7 MMHG
ECHO LVOT PEAK VELOCITY: 1.3 M/S
ECHO MV A VELOCITY: 0.64 M/S
ECHO MV A VELOCITY: 0.64 M/S
ECHO MV AREA PHT: 2.2 CM2
ECHO MV E DECELERATION TIME (DT): 349.5 MS
ECHO MV E VELOCITY: 0.56 M/S
ECHO MV E VELOCITY: 0.56 M/S
ECHO MV PRESSURE HALF TIME (PHT): 101.4 MS
ECHO MV REGURGITANT PEAK GRADIENT: 7 MMHG
ECHO MV REGURGITANT PEAK VELOCITY: 1.3 M/S
ECHO PV MAX VELOCITY: 0.9 M/S
ECHO PV PEAK GRADIENT: 3 MMHG
ECHO RIGHT VENTRICULAR SYSTOLIC PRESSURE (RVSP): 5 MMHG
ECHO RV FREE WALL PEAK S': 12 CM/S
ECHO RV TAPSE: 2.2 CM (ref 1.7–?)
ECHO TV REGURGITANT MAX VELOCITY: 0.78 M/S
ECHO TV REGURGITANT PEAK GRADIENT: 2 MMHG
EOSINOPHIL # BLD: 0.4 K/UL (ref 0–0.4)
EOSINOPHIL NFR BLD: 7 % (ref 0–7)
ERYTHROCYTE [DISTWIDTH] IN BLOOD BY AUTOMATED COUNT: 13.7 % (ref 11.5–14.5)
GLOBULIN SER CALC-MCNC: 4.4 G/DL (ref 2–4)
GLUCOSE BLD STRIP.AUTO-MCNC: 112 MG/DL (ref 65–117)
GLUCOSE BLD STRIP.AUTO-MCNC: 145 MG/DL (ref 65–117)
GLUCOSE SERPL-MCNC: 123 MG/DL (ref 65–100)
HCT VFR BLD AUTO: 40.7 % (ref 36.6–50.3)
HGB BLD-MCNC: 12.8 G/DL (ref 12.1–17)
IMM GRANULOCYTES # BLD AUTO: 0 K/UL (ref 0–0.04)
IMM GRANULOCYTES NFR BLD AUTO: 0 % (ref 0–0.5)
LYMPHOCYTES # BLD: 2 K/UL (ref 0.8–3.5)
LYMPHOCYTES NFR BLD: 35 % (ref 12–49)
MAGNESIUM SERPL-MCNC: 2.5 MG/DL (ref 1.6–2.4)
MCH RBC QN AUTO: 29.2 PG (ref 26–34)
MCHC RBC AUTO-ENTMCNC: 31.4 G/DL (ref 30–36.5)
MCV RBC AUTO: 92.7 FL (ref 80–99)
MONOCYTES # BLD: 0.8 K/UL (ref 0–1)
MONOCYTES NFR BLD: 15 % (ref 5–13)
NEUTS SEG # BLD: 2.3 K/UL (ref 1.8–8)
NEUTS SEG NFR BLD: 42 % (ref 32–75)
NRBC # BLD: 0 K/UL (ref 0–0.01)
NRBC BLD-RTO: 0 PER 100 WBC
PHOSPHATE SERPL-MCNC: 4.8 MG/DL (ref 2.6–4.7)
PLATELET # BLD AUTO: 175 K/UL (ref 150–400)
PMV BLD AUTO: 11.5 FL (ref 8.9–12.9)
POTASSIUM SERPL-SCNC: 4.3 MMOL/L (ref 3.5–5.1)
PROT SERPL-MCNC: 8.1 G/DL (ref 6.4–8.2)
RBC # BLD AUTO: 4.39 M/UL (ref 4.1–5.7)
SERVICE CMNT-IMP: ABNORMAL
SERVICE CMNT-IMP: NORMAL
SODIUM SERPL-SCNC: 134 MMOL/L (ref 136–145)
WBC # BLD AUTO: 5.6 K/UL (ref 4.1–11.1)

## 2022-11-29 PROCEDURE — 80053 COMPREHEN METABOLIC PANEL: CPT

## 2022-11-29 PROCEDURE — 36415 COLL VENOUS BLD VENIPUNCTURE: CPT

## 2022-11-29 PROCEDURE — 83735 ASSAY OF MAGNESIUM: CPT

## 2022-11-29 PROCEDURE — 93306 TTE W/DOPPLER COMPLETE: CPT

## 2022-11-29 PROCEDURE — 74011250636 HC RX REV CODE- 250/636: Performed by: FAMILY MEDICINE

## 2022-11-29 PROCEDURE — 96372 THER/PROPH/DIAG INJ SC/IM: CPT

## 2022-11-29 PROCEDURE — 90935 HEMODIALYSIS ONE EVALUATION: CPT

## 2022-11-29 PROCEDURE — 83835 ASSAY OF METANEPHRINES: CPT

## 2022-11-29 PROCEDURE — G0378 HOSPITAL OBSERVATION PER HR: HCPCS

## 2022-11-29 PROCEDURE — 74011000250 HC RX REV CODE- 250: Performed by: NURSE PRACTITIONER

## 2022-11-29 PROCEDURE — 65660000001 HC RM ICU INTERMED STEPDOWN

## 2022-11-29 PROCEDURE — 74011250637 HC RX REV CODE- 250/637: Performed by: FAMILY MEDICINE

## 2022-11-29 PROCEDURE — 74011250636 HC RX REV CODE- 250/636: Performed by: NURSE PRACTITIONER

## 2022-11-29 PROCEDURE — 5A1D70Z PERFORMANCE OF URINARY FILTRATION, INTERMITTENT, LESS THAN 6 HOURS PER DAY: ICD-10-PCS | Performed by: FAMILY MEDICINE

## 2022-11-29 PROCEDURE — 82962 GLUCOSE BLOOD TEST: CPT

## 2022-11-29 PROCEDURE — 85025 COMPLETE CBC W/AUTO DIFF WBC: CPT

## 2022-11-29 PROCEDURE — 84100 ASSAY OF PHOSPHORUS: CPT

## 2022-11-29 PROCEDURE — 74011250637 HC RX REV CODE- 250/637: Performed by: NURSE PRACTITIONER

## 2022-11-29 RX ORDER — SEVELAMER CARBONATE 800 MG/1
800 TABLET, FILM COATED ORAL
Status: DISCONTINUED | OUTPATIENT
Start: 2022-11-29 | End: 2022-11-30 | Stop reason: HOSPADM

## 2022-11-29 RX ADMIN — CLONIDINE HYDROCHLORIDE 0.2 MG: 0.2 TABLET ORAL at 11:28

## 2022-11-29 RX ADMIN — DOXAZOSIN 8 MG: 2 TABLET ORAL at 21:35

## 2022-11-29 RX ADMIN — ALISKIREN HEMIFUMARATE 300 MG: 300 TABLET, FILM COATED ORAL at 11:33

## 2022-11-29 RX ADMIN — CALCIUM CARBONATE (ANTACID) CHEW TAB 500 MG 600 MG: 500 CHEW TAB at 11:28

## 2022-11-29 RX ADMIN — MINOXIDIL 10 MG: 10 TABLET ORAL at 21:35

## 2022-11-29 RX ADMIN — CALCIUM CARBONATE (ANTACID) CHEW TAB 500 MG 200 MG: 500 CHEW TAB at 17:40

## 2022-11-29 RX ADMIN — MINOXIDIL 10 MG: 10 TABLET ORAL at 11:33

## 2022-11-29 RX ADMIN — BUPRENORPHINE HYDROCHLORIDE AND NALOXONE HYDROCHLORIDE DIHYDRATE 1 TABLET: 8; 2 TABLET SUBLINGUAL at 17:41

## 2022-11-29 RX ADMIN — BUMETANIDE 2 MG: 1 TABLET ORAL at 11:27

## 2022-11-29 RX ADMIN — HEPARIN SODIUM 2100 UNITS: 1000 INJECTION INTRAVENOUS; SUBCUTANEOUS at 11:30

## 2022-11-29 RX ADMIN — HEPARIN SODIUM 5000 UNITS: 5000 INJECTION INTRAVENOUS; SUBCUTANEOUS at 21:36

## 2022-11-29 RX ADMIN — SEVELAMER CARBONATE 800 MG: 800 TABLET, FILM COATED ORAL at 11:28

## 2022-11-29 RX ADMIN — SODIUM CHLORIDE, PRESERVATIVE FREE 10 ML: 5 INJECTION INTRAVENOUS at 07:13

## 2022-11-29 RX ADMIN — HEPARIN SODIUM 5000 UNITS: 5000 INJECTION INTRAVENOUS; SUBCUTANEOUS at 07:13

## 2022-11-29 RX ADMIN — SEVELAMER CARBONATE 800 MG: 800 TABLET, FILM COATED ORAL at 16:37

## 2022-11-29 RX ADMIN — BUPRENORPHINE HYDROCHLORIDE AND NALOXONE HYDROCHLORIDE DIHYDRATE 1 TABLET: 8; 2 TABLET SUBLINGUAL at 11:33

## 2022-11-29 RX ADMIN — PANTOPRAZOLE SODIUM 40 MG: 40 TABLET, DELAYED RELEASE ORAL at 11:28

## 2022-11-29 RX ADMIN — SEVELAMER CARBONATE 800 MG: 800 TABLET, FILM COATED ORAL at 11:34

## 2022-11-29 RX ADMIN — HEPARIN SODIUM 5000 UNITS: 5000 INJECTION INTRAVENOUS; SUBCUTANEOUS at 13:58

## 2022-11-29 RX ADMIN — HEPARIN SODIUM 2000 UNITS: 1000 INJECTION INTRAVENOUS; SUBCUTANEOUS at 11:30

## 2022-11-29 RX ADMIN — SODIUM CHLORIDE, PRESERVATIVE FREE 10 ML: 5 INJECTION INTRAVENOUS at 13:56

## 2022-11-29 RX ADMIN — CLONIDINE HYDROCHLORIDE 0.2 MG: 0.2 TABLET ORAL at 18:23

## 2022-11-29 NOTE — DISCHARGE SUMMARY
Discharge Summary       PATIENT ID: Jean-Paul Collins  MRN: [id number]   YOB: 1965    DATE OF ADMISSION: 11/28/2022  7:00 AM    DATE OF DISCHARGE: 11/29/2022   PRIMARY CARE PROVIDER: Jessika Coppola NP     ATTENDING PHYSICIAN: Solomon Tripp  DISCHARGING PROVIDER: Yosvany Mays MD    To contact this individual call [phone] and ask the  to page. If unavailable ask to be transferred the Adult Hospitalist Department. CONSULTATIONS: IP CONSULT TO NEPHROLOGY  IP CONSULT TO CARDIOLOGY    PROCEDURES/SURGERIES: * No surgery found *    DISCHARGE DIAGNOSES:     Hypertensive urgency  Chest pain  Pericardial effusion  End-stage renal disease  Anemia  History of substance abuse    ADMISSION SUMMARY AND HOSPITAL COURSE:     HPI  Jean-Paul Collins is a 62 y.o. male with a past medical history significant for end-stage renal disease, hypertension. While he receives hemodialysis Tuesday, Thursday, Saturday, was admitted to our facility about 6 weeks ago for hypertensive urgency, was discharged with stable condition on 10/3.  10/4 he was at his PCPs office with hypotension. 11/28 he presents to the emergency department with complaints of headache and chest pain. He was noted to have systolic blood pressure in the 200 range. Further work-up in the emergency department revealed normal white count, 6.4 and normal hemoglobin of 12.9. Creatinine was 11.6, magnesium was mildly high at 2.6 however potassium normal at 4.3 and his troponin was normal at 20 with BNP of 9152. CT of the head was unremarkable CTA of the chest with moderate pericardial effusion, no pulmonary embolus, noted sclerotic lesion T1. Nicardipine drip was ordered. Patient was referred to the hospitalist service for further management. At the moment of the initial interview he was cooperative with the exam.  His headache had improved however still with chest and back discomfort, noted respiratory component.   He tells me he is due for hemodialysis tomorrow, has not missed any dialysis, and is compliant with his antihypertensive regimen. He has not missed any doses of medications. He does endorse some dyspnea on exertion, denies fever however does endorse chills. Hospital Course  Patient was admitted for further management of hypertensive urgency. Initial systolic blood pressure in the 200s range. Work-up includes CT head which was negative for acute pathology, patient was started on Cardene drip initially. Later blood pressure improves and drip was discontinued. Patient on multiple antihypertensives at home and apparently has been noncompliant. Patient's blood pressure improves after resuming home blood pressure medications. Patient also with history of end-stage renal disease and has missed some of the dialysis sessions. Patient was evaluated by nephrology and dialysis was resumed this admission. It was noted that on CTA of the chest patient has moderate pericardial effusion, this was followed with the echo. Echocardiogram shows small pleural effusion, normal EF. Also patient with initial complaint of chest pain, no clinical signs or symptoms of acute coronary syndrome. DISCHARGE DIAGNOSES / PLAN:        BMI: Body mass index is 22.59 kg/m². . This patient: Has a BMI within normal limits. PENDING TEST RESULTS:   At the time of discharge the following test results are still pending: None     ADDITIONAL CARE RECOMMENDATIONS:     Follow-up with PCP in 1 week. Recommended ambulatory blood pressure check. NOTIFY YOUR PHYSICIAN FOR ANY OF THE FOLLOWING:   Fever over 101 degrees for 24 hours. Chest pain, shortness of breath, fever, chills, nausea, vomiting, diarrhea, change in mentation, falling, weakness, bleeding. Severe pain or pain not relieved by medications, as well as any other signs or symptoms that you may have questions about.     FOLLOW UP APPOINTMENTS:    Follow-up Information    None            DIET: Cardiac Diet    ACTIVITY: Activity as tolerated    EQUIPMENT needed: None    DISCHARGE MEDICATIONS:  Current Discharge Medication List        CONTINUE these medications which have NOT CHANGED    Details   minoxidiL (LONITEN) 10 mg tablet Take 1 Tablet by mouth two (2) times a day. Qty: 60 Tablet, Refills: 0      bumetanide (BUMEX) 2 mg tablet Take 1 Tablet by mouth daily. Qty: 30 Tablet, Refills: 0      carvediloL (COREG) 25 mg tablet Take 1 Tablet by mouth two (2) times daily (with meals). Qty: 60 Tablet, Refills: 0      cloNIDine HCL (CATAPRES) 0.2 mg tablet Take 1 Tablet by mouth two (2) times a day. Qty: 60 Tablet, Refills: 0      doxazosin (CARDURA) 8 mg tablet Take 1 Tablet by mouth nightly. Qty: 30 Tablet, Refills: 0      hydrALAZINE (APRESOLINE) 100 mg tablet Take 1 Tablet by mouth three (3) times daily. Qty: 90 Tablet, Refills: 0      sevelamer carbonate (RENVELA) 800 mg tab tab Take 800 mg by mouth three (3) times daily. New prescription as of 8/16/22, prescribed by Dr. Manuel Lawrence      calcium carbonate (TUMS) 200 mg calcium (500 mg) chew Take 3 Tablets by mouth three (3) times daily (after meals). pantoprazole (Protonix) 40 mg tablet Take 1 Tablet by mouth two (2) times a day. Indications: an ulcer of the duodenum  Qty: 60 Tablet, Refills: 0      ascorbic acid, vitamin C, (VITAMIN C) 500 mg tablet Take 500 mg by mouth daily. cholecalciferol (VITAMIN D3) 25 mcg (1,000 unit) cap Take 1,000 Units by mouth daily. buprenorphine-naloxone (SUBOXONE) 8-2 mg film sublingaul film 1 Film by SubLINGual route two (2) times a day. Comments: .      aliskiren (TEKTURNA) 300 mg tablet Take 1 Tablet by mouth daily.   Qty: 60 Tablet, Refills: 0           Addendum to discharge med list  -Hold hydralazine      DISPOSITION:   * Home With:   OT  PT  HH  RN       Long term SNF/Inpatient Rehab    Independent/assisted living    Hospice    Other:       PATIENT CONDITION AT DISCHARGE:     Functional status    Poor Deconditioned    * Independent      Cognition    * Lucid     Forgetful     Dementia      Catheters/lines (plus indication)    Montoya     PICC     PEG    * None      Code status    * Full code     DNR      PHYSICAL EXAMINATION AT DISCHARGE:  General:          Alert, cooperative, no distress, appears stated age. HEENT:           Atraumatic, anicteric sclerae, pink conjunctivae                          No oral ulcers, mucosa moist, throat clear, dentition fair  Neck:               Supple, symmetrical  Lungs:             Clear to auscultation bilaterally. No Wheezing or Rhonchi. No rales. Chest wall:      No tenderness  No Accessory muscle use. Heart:              Regular  rhythm,  No  murmur   No edema  Abdomen:        Soft, non-tender. Not distended. Bowel sounds normal  Extremities:     No cyanosis. No clubbing,                            Skin turgor normal, Capillary refill normal  Skin:                Not pale. Not Jaundiced  No rashes   Psych:             Not anxious or agitated.   Neurologic:      Alert, moves all extremities, answers questions appropriately and responds to commands       [address]:  Problem List as of 11/29/2022 Date Reviewed: 1/15/2021            Codes Class Noted - Resolved    Hypertensive urgency, malignant ICD-10-CM: I16.0  ICD-9-CM: 401.0  11/28/2022 - Present        HTN (hypertension), malignant ICD-10-CM: I10  ICD-9-CM: 401.0  6/30/2022 - Present        Seizures ([facility] ICD-10-CM: R56.9  ICD-9-CM: 780.39  5/30/2022 - Present        Malignant hypertension ICD-10-CM: I10  ICD-9-CM: 401.0  5/17/2022 - Present        Enteritis ICD-10-CM: K52.9  ICD-9-CM: 558.9  5/17/2022 - Present        Bone metastasis ([facility] ICD-10-CM: C79.51  ICD-9-CM: 198.5  5/17/2022 - Present        ESRD needing dialysis ([facility] ICD-10-CM: N18.6, Z99.2  ICD-9-CM: 585.6  3/29/2022 - Present        Duodenitis ICD-10-CM: K29.80  ICD-9-CM: 535.60  3/25/2022 - Present        Peptic ulcer disease ICD-10-CM: K27.9  ICD-9-CM: 533.90  3/22/2022 - Present        Intractable abdominal pain ICD-10-CM: R10.9  ICD-9-CM: 789.00  3/22/2022 - Present        ESRD (end stage renal disease) ([facility] ICD-10-CM: N18.6  ICD-9-CM: 585.6  3/22/2022 - Present        Non-compliance ICD-10-CM: Z91.199  ICD-9-CM: V15.81  3/12/2022 - Present        ESRD (end stage renal disease) on dialysis [facility]) ICD-10-CM: N18.6, Z99.2  ICD-9-CM: 585.6, V45.11  3/12/2022 - Present        Hypertensive urgency ICD-10-CM: I16.0  ICD-9-CM: 401.9  3/12/2022 - Present        Hypertensive emergency ICD-10-CM: I16.1  ICD-9-CM: 401.9  1/15/2022 - Present        Iron deficiency anemia ICD-10-CM: D50.9  ICD-9-CM: 280.9  2/15/2021 - Present        Chronic hepatitis C ([facility] ICD-10-CM: B18.2  ICD-9-CM: 070.54  1/15/2021 - Present        CKD (chronic kidney disease) ICD-10-CM: N18.9  ICD-9-CM: 585.9  6/13/2020 - Present        Chest pain ICD-10-CM: R07.9  ICD-9-CM: 786.50  5/10/2019 - Present        Hypertension ICD-10-CM: I10  ICD-9-CM: 401.9  5/10/2019 - Present        RESOLVED: Acute renal failure (ARF) ([facility] ICD-10-CM: N17.9  ICD-9-CM: 584.9  6/28/2020 - 1/15/2021        RESOLVED: Elevated troponin ICD-10-CM: R77.8  ICD-9-CM: 790.6  6/13/2020 - 1/15/2021        RESOLVED: Cocaine abuse ([facility] ICD-10-CM: F14.10  ICD-9-CM: 305.60  5/10/2019 - 5/24/2019           Greater than 60 minutes were spent with the patient on counseling and coordination of care    Signed:   Jasper Tatum MD  11/29/2022  1:23 PM

## 2022-11-29 NOTE — ED NOTES
Verbal shift change report given to [employer] RN (oncoming nurse) by [id number] Arredondo RN (offgoing nurse). Report included the following information SBAR and ED Summary.

## 2022-11-29 NOTE — ROUTINE PROCESS
TRANSFER - IN REPORT:    Verbal report received from [address] (name) on Yumiko Buckner  being received from ED(unit) for routine progression of care      Report consisted of patients Situation, Background, Assessment and   Recommendations(SBAR). Information from the following report(s) SBAR, Kardex, ED Summary, Procedure Summary, Intake/Output, MAR, Accordion, Recent Results, Med Rec Status, Cardiac Rhythm SR, Alarm Parameters , Pre Procedure Checklist, Procedure Verification, Quality Measures, and Dual Neuro Assessment was reviewed with the receiving nurse. Opportunity for questions and clarification was provided. Assessment completed upon patients arrival to unit and care assumed.      Primary Nurse Bety Dexter RN and Vince Summers RN performed a dual skin assessment on this patient Impairment noted- see wound doc flow sheet  Brock score is 23          Pt refused blood cultures

## 2022-11-29 NOTE — PROGRESS NOTES
[facility] Adult  Hospitalist Group                                                                                          Hospitalist Progress Note  Ana Serrato MD  Answering service: [phone] from in house phone        Date of Service:  2022  NAME:  Maria A Harris  :  1965  MRN:  [id number]      Admission Summary:     Patient presents with hypertensive urgency and chest pain. Interval history / Subjective:     Overall feeling better. No acute complaint at this time. Assessment & Plan:     Hypertensive urgency  -Initially requiring Cardene drip, now off of the drip  -Continue aliskiren, Bumex, clonidine, Cardura, minoxidil  -Overall blood pressure improving    Chest pain  -EKG shows criteria for LVH, septal infarct, age undetermined, nonspecific T wave abnormalities  -Patient with normal troponin, echo pending  -Cardiology evaluation    Pericardial effusion  -Moderate pericardial effusion noted on CTA  -Echo pending, no clinical evidence of tamponade  -Cardiology to further evaluate    End-stage renal disease  -On hemodialysis TTS schedule  -Nephrology following    Anemia  -Anemia of chronic disease due to CKD  -ABI prescription per nephrology    History of substance abuse  -Continue Suboxone     Code status: Full  Prophylaxis: Heparin  Care Plan discussed with: Patient  Anticipated Disposition: 24 to 48 hours     Hospital Problems  Date Reviewed: 1/15/2021            Codes Class Noted POA    Hypertensive urgency, malignant ICD-10-CM: I16.0  ICD-9-CM: 401.0  2022 Unknown             Review of Systems:   A comprehensive review of systems was negative except for that written in the HPI. Vital Signs:    Last 24hrs VS reviewed since prior progress note.  Most recent are:  Visit Vitals  /63   Pulse 69   Temp 98.6 °F (37 °C)   Resp 15   Ht 5' 9\" (1.753 m)   Wt 72.3 kg (159 lb 6.3 oz)   SpO2 98%   BMI 23.54 kg/m²         Intake/Output Summary (Last 24 hours) at 11/29/2022 0803  Last data filed at 11/28/2022 1510  Gross per 24 hour   Intake --   Output 4000 ml   Net -4000 ml        Physical Examination:     I had a face to face encounter with this patient and independently examined them on 11/29/2022 as outlined below:          Constitutional:  No acute distress, cooperative, pleasant    ENT:  Oral mucosa moist, oropharynx benign. Resp:  CTA bilaterally. No wheezing/rhonchi/rales. No accessory muscle use. CV:  Regular rhythm, normal rate, no murmurs, gallops, rubs    GI:  Soft, non distended, non tender. normoactive bowel sounds, no hepatosplenomegaly     Musculoskeletal:  No edema, warm, 2+ pulses throughout    Neurologic:  Moves all extremities. AAOx3, CN II-XII reviewed            Data Review:    Review and/or order of clinical lab test      Labs:     Recent Labs     11/29/22 0143 11/28/22 0718   WBC 5.6 6.4   HGB 12.8 12.9   HCT 40.7 40.1    197     Recent Labs     11/29/22 0143 11/28/22 0718   * 137   K 4.3 4.3   CL 99 100   CO2 29 25   BUN 30* 38*   CREA 8.44* 11.60*   * 83   CA 8.6 8.7   MG 2.5* 2.6*   PHOS 4.8*  --      Recent Labs     11/29/22 0143 11/28/22 0718   ALT 10* 11*    112   TBILI 0.4 0.5   TP 8.1 8.2   ALB 3.7 3.5   GLOB 4.4* 4.7*     No results for input(s): INR, PTP, APTT, INREXT in the last 72 hours. No results for input(s): FE, TIBC, PSAT, FERR in the last 72 hours. Lab Results   Component Value Date/Time    Folate 7.9 05/18/2022 12:18 PM      No results for input(s): PH, PCO2, PO2 in the last 72 hours. No results for input(s): CPK, CKNDX, TROIQ in the last 72 hours.     No lab exists for component: CPKMB  Lab Results   Component Value Date/Time    Cholesterol, total 170 06/29/2020 03:46 AM    HDL Cholesterol 49 06/29/2020 03:46 AM    LDL, calculated 73.4 06/29/2020 03:46 AM    Triglyceride 238 (H) 06/29/2020 03:46 AM    CHOL/HDL Ratio 3.5 06/29/2020 03:46 AM     Lab Results   Component Value Date/Time    Glucose (POC) 97 09/27/2022 06:48 AM    Glucose (POC) 97 05/31/2022 12:23 PM    Glucose (POC) 72 05/31/2022 08:34 AM    Glucose (POC) 86 05/30/2022 08:19 PM    Glucose (POC) 78 05/30/2022 07:50 PM     Lab Results   Component Value Date/Time    Color YELLOW/STRAW 03/22/2022 04:11 PM    Appearance TURBID (A) 03/22/2022 04:11 PM    Specific gravity 1.021 03/22/2022 04:11 PM    pH (UA) 7.5 03/22/2022 04:11 PM    Protein >300 (A) 03/22/2022 04:11 PM    Glucose 100 (A) 03/22/2022 04:11 PM    Ketone Negative 03/22/2022 04:11 PM    Bilirubin Negative 03/22/2022 04:11 PM    Urobilinogen 0.2 03/22/2022 04:11 PM    Nitrites Negative 03/22/2022 04:11 PM    Leukocyte Esterase MODERATE (A) 03/22/2022 04:11 PM    Epithelial cells MODERATE (A) 03/22/2022 04:11 PM    Bacteria Negative 03/22/2022 04:11 PM    WBC 10-20 03/22/2022 04:11 PM    RBC 0-5 03/22/2022 04:11 PM         Medications Reviewed:     Current Facility-Administered Medications   Medication Dose Route Frequency    morphine injection 2 mg  2 mg IntraVENous Q4H PRN    aliskiren (TEKTURNA) tablet 300 mg  300 mg Oral DAILY    bumetanide (BUMEX) tablet 2 mg  2 mg Oral DAILY    buprenorphine-naloxone (SUBOXONE) 8-2mg SL tablet  1 Tablet SubLINGual BID    calcium carbonate (TUMS) chewable tablet 600 mg [elemental]  600 mg Oral TIDPC    cloNIDine HCL (CATAPRES) tablet 0.2 mg  0.2 mg Oral BID    doxazosin (CARDURA) tablet 8 mg  8 mg Oral QHS    [Held by provider] hydrALAZINE (APRESOLINE) tablet 100 mg  100 mg Oral TID    minoxidiL (LONITEN) tablet 10 mg  10 mg Oral BID    sevelamer carbonate (RENVELA) tab 800 mg  800 mg Oral TID    pantoprazole (PROTONIX) tablet 40 mg  40 mg Oral ACB    sodium chloride (NS) flush 5-40 mL  5-40 mL IntraVENous Q8H    sodium chloride (NS) flush 5-40 mL  5-40 mL IntraVENous PRN    acetaminophen (TYLENOL) tablet 650 mg  650 mg Oral Q6H PRN    Or    acetaminophen (TYLENOL) suppository 650 mg  650 mg Rectal Q6H PRN    polyethylene glycol (MIRALAX) packet 17 g  17 g Oral DAILY PRN    ondansetron (ZOFRAN ODT) tablet 4 mg  4 mg Oral Q8H PRN    Or    ondansetron (ZOFRAN) injection 4 mg  4 mg IntraVENous Q6H PRN    heparin (porcine) injection 5,000 Units  5,000 Units SubCUTAneous Q8H    heparin (porcine) 1,000 unit/mL injection 2,100 Units  2,100 Units InterCATHeter DIALYSIS PRN    And    heparin (porcine) 1,000 unit/mL injection 2,000 Units  2,000 Units InterCATHeter DIALYSIS PRN     ______________________________________________________________________  EXPECTED LENGTH OF STAY: - - -  ACTUAL LENGTH OF STAY:          1                 Jasper Tatum MD

## 2022-11-29 NOTE — ED NOTES
Pt SBP noted to be below 90 for several consecutive readings despite replacing and repositioning BP cuff. MD messaged through [employer]. 250mL NS bolus hung while awaiting MD response. Will CTM.

## 2022-11-29 NOTE — PROCEDURES
Hemodialysis / [phone]    Vitals Pre Post Assessment Pre Post   /63 131/68 LOC A/O x 3 A/O x 3   HR 62 73 Lungs Clear-denies SOB clear   Resp 18 16 Cardiac HRR HRR   Temp 98.6 98.3 Skin WDI WDI   Weight 73 kg 72 kg Edema None noted None noted   Tele status Bedside/ remote Bedside/ remote Pain denies denies     Orders   Duration: Start: 0800 End: 1130 Total: 3.5 hrs   Dialyzer: Dialyzer/Set Up Inspection: aclear ([id number]/ [id number]) (11/29/22 0755)   K Bath: Dialysate K (mEq/L): 3 (11/29/22 0755)   Ca Bath: Dialysate CA (mEq/L): 2.5 (11/29/22 0755)   Na: Dialysate NA (mEq/L): 138 (11/29/22 0755)   Bicarb: Dialysate HCO3 (mEq/L): 35 (11/29/22 0755)   Target Fluid Removal: Goal/Amount of Fluid to Remove (mL): 1000 mL (11/29/22 0755)     Access   Type & Location: Left chest cath   Comments:    CHG drsg CDI, dated 11/28/22. No redness or drainage noted. Both ports cleaned w/ alcohol and CHG and flush/ aspirate well.                                     Labs   HBsAg (Antigen) / date:         Neg 11/28/22                                      HBsAb (Antibody) / date: Susc 11/28/22   Source: EPIC   Obtained/Reviewed  Critical Results Called HGB   Date Value Ref Range Status   11/29/2022 12.8 12.1 - 17.0 g/dL Final     Potassium   Date Value Ref Range Status   11/29/2022 4.3 3.5 - 5.1 mmol/L Final     Calcium   Date Value Ref Range Status   11/29/2022 8.6 8.5 - 10.1 MG/DL Final     BUN   Date Value Ref Range Status   11/29/2022 30 (H) 6 - 20 MG/DL Final     Creatinine   Date Value Ref Range Status   11/29/2022 8.44 (H) 0.70 - 1.30 MG/DL Final     Comment:     INVESTIGATED PER DELTA CHECK PROTOCOL        Meds Given   Name Dose Route   Heparin 2000 units Red port   Heparin 2100 units Blue port          Adequacy / Fluid    Total Liters Process: 77 L   Net Fluid Removed: 1000 ml      Comments   Time Out Done:   (Time) 7227 mjb   Admitting Diagnosis: hypertension   Consent obtained/signed: Informed Consent Verified: Yes (continuation of care from clinic) (11/29/22 4912)   Machine / RO # Machine Number: Sade Chopra (11/29/22 7282)   Primary Nurse Rpt Pre: Valeri Schilder, RN   Primary Nurse Rpt Post: Berkley Grayson RN   Pt Education: Discussed procedure and cath care   Care Plan: Continue to do HD txs as ordered   Pts outpatient clinic: [facility]  TTS     Tx Summary   Comments: Tolerated tx well. At end of tx, blood in circuit returned w/ 300ml NS. Ports flushed w/ NS and dwelled w/ heparin. Sterile qsyte-purehub caps applied. Clamps secured. Drsg still CDI. Remains in rm 412. Bed in lowest position  All rails up  Call lite in hand.

## 2022-11-29 NOTE — PROGRESS NOTES
Problem: Falls - Risk of  Goal: *Absence of Falls  Description: Document Rosanne Malcolm Fall Risk and appropriate interventions in the flowsheet.   Note: Fall Risk Interventions:

## 2022-11-29 NOTE — PROGRESS NOTES
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 BP was low yesterday. Tolerating dialysis well. No complaints. Review of Systems: A comprehensive review of systems was negative except for that written in the HPI.     Current Medications:   Current Facility-Administered Medications   Medication Dose Route Frequency    morphine injection 2 mg  2 mg IntraVENous Q4H PRN    aliskiren (TEKTURNA) tablet 300 mg  300 mg Oral DAILY    bumetanide (BUMEX) tablet 2 mg  2 mg Oral DAILY    buprenorphine-naloxone (SUBOXONE) 8-2mg SL tablet  1 Tablet SubLINGual BID    calcium carbonate (TUMS) chewable tablet 600 mg [elemental]  600 mg Oral TIDPC    cloNIDine HCL (CATAPRES) tablet 0.2 mg  0.2 mg Oral BID    doxazosin (CARDURA) tablet 8 mg  8 mg Oral QHS    [Held by provider] hydrALAZINE (APRESOLINE) tablet 100 mg  100 mg Oral TID    minoxidiL (LONITEN) tablet 10 mg  10 mg Oral BID    sevelamer carbonate (RENVELA) tab 800 mg  800 mg Oral TID    pantoprazole (PROTONIX) tablet 40 mg  40 mg Oral ACB    sodium chloride (NS) flush 5-40 mL  5-40 mL IntraVENous Q8H    sodium chloride (NS) flush 5-40 mL  5-40 mL IntraVENous PRN    acetaminophen (TYLENOL) tablet 650 mg  650 mg Oral Q6H PRN    Or acetaminophen (TYLENOL) suppository 650 mg  650 mg Rectal Q6H PRN    polyethylene glycol (MIRALAX) packet 17 g  17 g Oral DAILY PRN    ondansetron (ZOFRAN ODT) tablet 4 mg  4 mg Oral Q8H PRN    Or    ondansetron (ZOFRAN) injection 4 mg  4 mg IntraVENous Q6H PRN    heparin (porcine) injection 5,000 Units  5,000 Units SubCUTAneous Q8H    heparin (porcine) 1,000 unit/mL injection 2,100 Units  2,100 Units InterCATHeter DIALYSIS PRN    And    heparin (porcine) 1,000 unit/mL injection 2,000 Units  2,000 Units InterCATHeter DIALYSIS PRN      Allergies   Allergen Reactions    Baclofen Rash     All over    Losartan Hives, Shortness of Breath and Rash     Verified allergy with patient       Objective:  Vitals:    Vitals:    11/29/22 0945 11/29/22 1000 11/29/22 1015 11/29/22 1034   BP: (P) 135/79 (!) (P) 147/83 (!) (P) 134/91 (!) 132/98   Pulse: (P) 61 (P) 65 (P) 61    Resp: (P) 16 (P) 18 (P) 18    Temp:       TempSrc:       SpO2:       Weight:    72.3 kg (159 lb 6.3 oz)   Height:    5' 9\" (1.753 m)     Intake and Output:  No intake/output data recorded. 11/27 1901 - 11/29 0700  In: -   Out: 4000     Physical Examination:        General: NAD,Conversant   Neck:  Supple, no mass  Resp:  Lungs CTA B/L, no wheezing , normal respiratory effort  CV:  RRR,  no murmur or rub, LE edema  GI:  Soft, NT, + BS, no HS megaly  Neurologic:  Non focal  Psych:             AAO x 3 appropriate affect   Dialysis Access :[facility]    []    High complexity decision making was performed  []    Patient is at high-risk of decompensation with multiple organ involvement    Lab Data Personally Reviewed: I have reviewed all the pertinent labs, microbiology data and radiology studies during assessment.     Recent Labs     11/29/22  0143 11/28/22  0718   * 137   K 4.3 4.3   CL 99 100   CO2 29 25   * 83   BUN 30* 38*   CREA 8.44* 11.60*   CA 8.6 8.7   MG 2.5* 2.6*   PHOS 4.8*  --    ALB 3.7 3.5   ALT 10* 11*       Recent Labs     11/29/22  0146 11/28/22  0718   WBC 5.6 6.4   HGB 12.8 12.9   HCT 40.7 40.1    197       No results found for: SDES  Lab Results   Component Value Date/Time    Culture result: NO GROWTH 5 DAYS 05/18/2022 06:06 AM    Culture result: No growth (<1,000 CFU/ML) 03/22/2022 04:11 PM     Recent Results (from the past 24 hour(s))   HEP B SURFACE AG    Collection Time: 11/28/22  7:22 PM   Result Value Ref Range    Hepatitis B surface Ag <0.10 Index    Hep B surface Ag Interp. Negative NEG     HEP B SURFACE AB    Collection Time: 11/28/22  7:22 PM   Result Value Ref Range    Hepatitis B surface Ab <3.10 mIU/mL    Hep B surface Ab Interp. NONREACTIVE NR     METABOLIC PANEL, COMPREHENSIVE    Collection Time: 11/29/22  1:43 AM   Result Value Ref Range    Sodium 134 (L) 136 - 145 mmol/L    Potassium 4.3 3.5 - 5.1 mmol/L    Chloride 99 97 - 108 mmol/L    CO2 29 21 - 32 mmol/L    Anion gap 6 5 - 15 mmol/L    Glucose 123 (H) 65 - 100 mg/dL    BUN 30 (H) 6 - 20 MG/DL    Creatinine 8.44 (H) 0.70 - 1.30 MG/DL    BUN/Creatinine ratio 4 (L) 12 - 20      eGFR 7 (L) >60 ml/min/1.73m2    Calcium 8.6 8.5 - 10.1 MG/DL    Bilirubin, total 0.4 0.2 - 1.0 MG/DL    ALT (SGPT) 10 (L) 12 - 78 U/L    AST (SGOT) 6 (L) 15 - 37 U/L    Alk. phosphatase 102 45 - 117 U/L    Protein, total 8.1 6.4 - 8.2 g/dL    Albumin 3.7 3.5 - 5.0 g/dL    Globulin 4.4 (H) 2.0 - 4.0 g/dL    A-G Ratio 0.8 (L) 1.1 - 2.2     CBC WITH AUTOMATED DIFF    Collection Time: 11/29/22  1:43 AM   Result Value Ref Range    WBC 5.6 4.1 - 11.1 K/uL    RBC 4.39 4. 10 - 5.70 M/uL    HGB 12.8 12.1 - 17.0 g/dL    HCT 40.7 36.6 - 50.3 %    MCV 92.7 80.0 - 99.0 FL    MCH 29.2 26.0 - 34.0 PG    MCHC 31.4 30.0 - 36.5 g/dL    RDW 13.7 11.5 - 14.5 %    PLATELET 664 562 - 553 K/uL    MPV 11.5 8.9 - 12.9 FL    NRBC 0.0 0  WBC    ABSOLUTE NRBC 0.00 0.00 - 0.01 K/uL    NEUTROPHILS 42 32 - 75 %    LYMPHOCYTES 35 12 - 49 %    MONOCYTES 15 (H) 5 - 13 %    EOSINOPHILS 7 0 - 7 %    BASOPHILS 1 0 - 1 % IMMATURE GRANULOCYTES 0 0.0 - 0.5 %    ABS. NEUTROPHILS 2.3 1.8 - 8.0 K/UL    ABS. LYMPHOCYTES 2.0 0.8 - 3.5 K/UL    ABS. MONOCYTES 0.8 0.0 - 1.0 K/UL    ABS. EOSINOPHILS 0.4 0.0 - 0.4 K/UL    ABS. BASOPHILS 0.1 0.0 - 0.1 K/UL    ABS. IMM. GRANS. 0.0 0.00 - 0.04 K/UL    DF AUTOMATED     PHOSPHORUS    Collection Time: 11/29/22  1:43 AM   Result Value Ref Range    Phosphorus 4.8 (H) 2.6 - 4.7 MG/DL   MAGNESIUM    Collection Time: 11/29/22  1:43 AM   Result Value Ref Range    Magnesium 2.5 (H) 1.6 - 2.4 mg/dL           I have reviewed the flowsheets. Chart and Pertinent Notes have been reviewed. No change in PMH ,family and social history from Consult note.       [address] [facility]

## 2022-11-29 NOTE — PROGRESS NOTES
Bedside and Verbal shift change report given to NAVEED Riley (oncoming nurse) by Barb Saavedra (offgoing nurse). Report included the following information SBAR, ED Summary, and MAR.      0700 Patient just starting dialysis. Dialysis nurse goal to pull one liter. Head to toe assessment done. Will give morning medications whn dialysis is finished. 1120 Dialysis finished. Total of one liter pulled. Will obtain standing weight once patient is done eating. Morning medications given. 1220 Patient up to bathroom, getting cleaned up. Standing weight of 69.4 kg.

## 2022-11-29 NOTE — ROUTINE PROCESS
TRANSFER - OUT REPORT:    Verbal report given to Danielle (name) on Rachana Jaimes  being transferred to [address] [place] [address] (unit) for routine progression of care       Report consisted of patients Situation, Background, Assessment and   Recommendations(SBAR). Information from the following report(s) SBAR, Kardex, ED Summary, MAR, and Cardiac Rhythm - Sinus Rhythm  was reviewed with the receiving nurse. Lines:   Peripheral IV 11/28/22 Right Forearm (Active)   Site Assessment Clean, dry, & intact 11/28/22 0717   Phlebitis Assessment 0 11/28/22 0717   Infiltration Assessment 0 11/28/22 0717   Dressing Status Clean, dry, & intact; New 11/28/22 0717   Dressing Type Transparent;Tape 11/28/22 0717   Hub Color/Line Status Pink;Flushed;Patent 11/28/22 0717   Action Taken Blood drawn 11/28/22 8771        Opportunity for questions and clarification was provided.       Patient transported with:   Monitor  Registered Nurse

## 2022-11-30 VITALS
HEIGHT: 69 IN | HEART RATE: 79 BPM | WEIGHT: 153 LBS | BODY MASS INDEX: 22.66 KG/M2 | SYSTOLIC BLOOD PRESSURE: 98 MMHG | DIASTOLIC BLOOD PRESSURE: 64 MMHG | TEMPERATURE: 97.9 F | RESPIRATION RATE: 14 BRPM | OXYGEN SATURATION: 95 %

## 2022-11-30 LAB
GLUCOSE BLD STRIP.AUTO-MCNC: 112 MG/DL (ref 65–117)
GLUCOSE BLD STRIP.AUTO-MCNC: 82 MG/DL (ref 65–117)
SERVICE CMNT-IMP: NORMAL
SERVICE CMNT-IMP: NORMAL

## 2022-11-30 PROCEDURE — 82962 GLUCOSE BLOOD TEST: CPT

## 2022-11-30 PROCEDURE — 74011250636 HC RX REV CODE- 250/636: Performed by: NURSE PRACTITIONER

## 2022-11-30 PROCEDURE — G0378 HOSPITAL OBSERVATION PER HR: HCPCS

## 2022-11-30 PROCEDURE — 74011250637 HC RX REV CODE- 250/637: Performed by: NURSE PRACTITIONER

## 2022-11-30 PROCEDURE — 74011250637 HC RX REV CODE- 250/637: Performed by: FAMILY MEDICINE

## 2022-11-30 RX ADMIN — BUMETANIDE 2 MG: 1 TABLET ORAL at 09:51

## 2022-11-30 RX ADMIN — SEVELAMER CARBONATE 800 MG: 800 TABLET, FILM COATED ORAL at 09:51

## 2022-11-30 RX ADMIN — PANTOPRAZOLE SODIUM 40 MG: 40 TABLET, DELAYED RELEASE ORAL at 06:56

## 2022-11-30 RX ADMIN — SEVELAMER CARBONATE 800 MG: 800 TABLET, FILM COATED ORAL at 13:53

## 2022-11-30 RX ADMIN — CALCIUM CARBONATE (ANTACID) CHEW TAB 500 MG 600 MG: 500 CHEW TAB at 09:50

## 2022-11-30 RX ADMIN — ALISKIREN HEMIFUMARATE 300 MG: 300 TABLET, FILM COATED ORAL at 10:05

## 2022-11-30 RX ADMIN — CALCIUM CARBONATE (ANTACID) CHEW TAB 500 MG 600 MG: 500 CHEW TAB at 13:53

## 2022-11-30 RX ADMIN — BUPRENORPHINE HYDROCHLORIDE AND NALOXONE HYDROCHLORIDE DIHYDRATE 1 TABLET: 8; 2 TABLET SUBLINGUAL at 09:50

## 2022-11-30 RX ADMIN — CLONIDINE HYDROCHLORIDE 0.2 MG: 0.2 TABLET ORAL at 09:51

## 2022-11-30 RX ADMIN — MINOXIDIL 10 MG: 10 TABLET ORAL at 09:51

## 2022-11-30 NOTE — PROGRESS NOTES
I have reviewed discharge instructions with the patient. The patient verbalized understanding. Current Discharge Medication List        CONTINUE these medications which have NOT CHANGED    Details   minoxidiL (LONITEN) 10 mg tablet Take 1 Tablet by mouth two (2) times a day. Qty: 60 Tablet, Refills: 0      bumetanide (BUMEX) 2 mg tablet Take 1 Tablet by mouth daily. Qty: 30 Tablet, Refills: 0      carvediloL (COREG) 25 mg tablet Take 1 Tablet by mouth two (2) times daily (with meals). Qty: 60 Tablet, Refills: 0      cloNIDine HCL (CATAPRES) 0.2 mg tablet Take 1 Tablet by mouth two (2) times a day. Qty: 60 Tablet, Refills: 0      doxazosin (CARDURA) 8 mg tablet Take 1 Tablet by mouth nightly. Qty: 30 Tablet, Refills: 0      sevelamer carbonate (RENVELA) 800 mg tab tab Take 800 mg by mouth three (3) times daily. New prescription as of 8/16/22, prescribed by Dr. Toni Wilkerson      calcium carbonate (TUMS) 200 mg calcium (500 mg) chew Take 3 Tablets by mouth three (3) times daily (after meals). pantoprazole (Protonix) 40 mg tablet Take 1 Tablet by mouth two (2) times a day. Indications: an ulcer of the duodenum  Qty: 60 Tablet, Refills: 0      ascorbic acid, vitamin C, (VITAMIN C) 500 mg tablet Take 500 mg by mouth daily. cholecalciferol (VITAMIN D3) 25 mcg (1,000 unit) cap Take 1,000 Units by mouth daily. buprenorphine-naloxone (SUBOXONE) 8-2 mg film sublingaul film 1 Film by SubLINGual route two (2) times a day. Comments: .      aliskiren (TEKTURNA) 300 mg tablet Take 1 Tablet by mouth daily.   Qty: 60 Tablet, Refills: 0           STOP taking these medications       hydrALAZINE (APRESOLINE) 100 mg tablet Comments:   Reason for Stopping:

## 2022-11-30 NOTE — PROGRESS NOTES
1930: Bedside shift change report given to Nury Self  (oncoming nurse) by Rasta Darling (offgoing nurse). Report included the following information SBAR, Kardex, Intake/Output, and MAR.     0730: Bedside shift change report given to Gita  (oncoming nurse) by Nury Self  (offgoing nurse). Report included the following information SBAR, Kardex, Intake/Output, and MAR.

## 2022-11-30 NOTE — PROGRESS NOTES
Transition Plan of Care  RUR 22%-High  Disposition-discharging home today no skilled needs. Will return to dialysis schedule as prior to admit. TTS at [facility]. Medicare pt has received, reviewed, and signed 2nd IM letter informing them of their right to appeal the discharge. Signed copy has been placed on pt bedside chart. Family can provide transport. CM can assist with transport if needed.

## 2022-11-30 NOTE — PROGRESS NOTES
Attempted to schedule hospital follow up PCP appointment. Unable to reach anyone, left voicemail. Pending patient discharge.  Elizbaeth Treadwell, Care Management Assistant

## 2022-11-30 NOTE — PROGRESS NOTES
Hospital follow-up PCP transitional care appointment has been scheduled with Josh Mabry for Friday, December 16th, 2022 at 1:30 p.m. Pending patient discharge. This was earliest appointment available.  Ragini Sinclair, Care Management Assistant

## 2022-11-30 NOTE — PROGRESS NOTES
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 BP stable. No complaints     Review of Systems: A comprehensive review of systems was negative except for that written in the HPI.     Current Medications:   Current Facility-Administered Medications   Medication Dose Route Frequency    sevelamer carbonate (RENVELA) tab 800 mg  800 mg Oral TID WITH MEALS    morphine injection 2 mg  2 mg IntraVENous Q4H PRN    aliskiren (TEKTURNA) tablet 300 mg  300 mg Oral DAILY    bumetanide (BUMEX) tablet 2 mg  2 mg Oral DAILY    buprenorphine-naloxone (SUBOXONE) 8-2mg SL tablet  1 Tablet SubLINGual BID    calcium carbonate (TUMS) chewable tablet 600 mg [elemental]  600 mg Oral TIDPC    cloNIDine HCL (CATAPRES) tablet 0.2 mg  0.2 mg Oral BID    doxazosin (CARDURA) tablet 8 mg  8 mg Oral QHS    [Held by provider] hydrALAZINE (APRESOLINE) tablet 100 mg  100 mg Oral TID    minoxidiL (LONITEN) tablet 10 mg  10 mg Oral BID    pantoprazole (PROTONIX) tablet 40 mg  40 mg Oral ACB    sodium chloride (NS) flush 5-40 mL  5-40 mL IntraVENous Q8H    sodium chloride (NS) flush 5-40 mL  5-40 mL IntraVENous PRN    acetaminophen (TYLENOL) tablet 650 mg  650 mg Oral Q6H PRN    Or    acetaminophen (TYLENOL) suppository 650 mg  650 mg Rectal Q6H PRN    polyethylene glycol (MIRALAX) packet 17 g  17 g Oral DAILY PRN    ondansetron (ZOFRAN ODT) tablet 4 mg  4 mg Oral Q8H PRN    Or    ondansetron (ZOFRAN) injection 4 mg  4 mg IntraVENous Q6H PRN    heparin (porcine) injection 5,000 Units  5,000 Units SubCUTAneous Q8H    heparin (porcine) 1,000 unit/mL injection 2,100 Units  2,100 Units InterCATHeter DIALYSIS PRN    And    heparin (porcine) 1,000 unit/mL injection 2,000 Units  2,000 Units InterCATHeter DIALYSIS PRN      Allergies   Allergen Reactions    Baclofen Rash     All over    Losartan Hives, Shortness of Breath and Rash     Verified allergy with patient       Objective:  Vitals:    Vitals:    11/30/22 0305 11/30/22 0400 11/30/22 0558 11/30/22 0753   BP:    119/63   Pulse: 69 64 60 65   Resp: 12   14   Temp:       TempSrc:       SpO2: 98%   98%   Weight:       Height:         Intake and Output:  No intake/output data recorded. 11/28 1901 - 11/30 0700  In: 603.3 [P.O.:300; I.V.:303.3]  Out: 1000     Physical Examination:        General: NAD,Conversant   Neck:  Supple, no mass  Resp:  Lungs CTA B/L, no wheezing , normal respiratory effort  CV:  RRR,  no murmur or rub, LE edema  GI:  Soft, NT, + BS, no HS megaly  Neurologic:  Non focal  Psych:             AAO x 3 appropriate affect   Dialysis Access :[facility]    []    High complexity decision making was performed  []    Patient is at high-risk of decompensation with multiple organ involvement    Lab Data Personally Reviewed: I have reviewed all the pertinent labs, microbiology data and radiology studies during assessment.     Recent Labs     11/29/22 0143 11/28/22 0718   * 137   K 4.3 4.3   CL 99 100   CO2 29 25   * 83   BUN 30* 38*   CREA 8.44* 11.60*   CA 8.6 8.7   MG 2.5* 2.6*   PHOS 4.8*  --    ALB 3.7 3.5   ALT 10* 11*       Recent Labs     11/29/22 0143 11/28/22  0718   WBC 5.6 6.4   HGB 12.8 12.9   HCT 40.7 40.1    197       No results found for: SDES  Lab Results   Component Value Date/Time    Culture result: NO GROWTH 5 DAYS 05/18/2022 06:06 AM    Culture result: No growth (<1,000 CFU/ML) 03/22/2022 04:11 PM     Recent Results (from the past 24 hour(s))   ECHO ADULT COMPLETE    Collection Time: 11/29/22 10:25 AM   Result Value Ref Range    IVSd 1.0 0.6 - 1.0 cm    LVIDd 5.5 4.2 - 5.9 cm    LVIDs 4.2 cm    LVOT Diameter 2.1 cm    LVPWd 0.6 0.6 - 1.0 cm    LVOT Peak Gradient 7 mmHg    LVOT Peak Velocity 1.3 m/s    RVSP 5 mmHg    RV Free Wall Peak S' 12 cm/s    LA Diameter 3.2 cm    Est. RA Pressure 3 mmHg    AV Area by Peak Velocity 2.9 cm2    AV Peak Gradient 9 mmHg    AV Peak Velocity 1.5 m/s    MV A Velocity 0.64 m/s    MV A Velocity 0.64 m/s    MV E Wave Deceleration Time 349.5 ms    MV E Velocity 0.56 m/s    MV E Velocity 0.56 m/s    LV E' Lateral Velocity 8 cm/s    LV E' Septal Velocity 4 cm/s    MV .4 ms    MV Area by PHT 2.2 cm2    MR Peak Gradient 7 mmHg    MR Peak Velocity 1.3 m/s    PV Peak Gradient 3 mmHg    PV Max Velocity 0.9 m/s    TAPSE 2.2 1.7 cm    TR Peak Gradient 2 mmHg    TR Max Velocity 0.78 m/s    Aortic Root 3.4 cm    Fractional Shortening 2D 24 28 - 44 %    LVIDd Index 2.93 cm/m2    LVIDs Index 2.23 cm/m2    LV RWT Ratio 0.22     LV Mass 2D 160.0 88 - 224 g    LV Mass 2D Index 85.1 49 - 115 g/m2    LVOT Area 3.5 cm2    LA Size Index 1.70 cm/m2    LA/AO Root Ratio 0.94     Ao Root Index 1.81 cm/m2    AV Velocity Ratio 0.87     PHYLLIS/BSA Peak Velocity 1.5 cm2/m2   GLUCOSE, POC    Collection Time: 11/29/22 12:26 PM   Result Value Ref Range    Glucose (POC) 145 (H) 65 - 117 mg/dL    Performed by Alma Whitaker, POC    Collection Time: 11/29/22  9:35 PM   Result Value Ref Range    Glucose (POC) 112 65 - 117 mg/dL    Performed by Mauri Alcantara    GLUCOSE, POC    Collection Time: 11/30/22  8:34 AM   Result Value Ref Range    Glucose (POC) 82 65 - 117 mg/dL    Performed by Yomi Velazquez            I have reviewed the flowsheets. Chart and Pertinent Notes have been reviewed. No change in PMH ,family and social history from Consult note.       Vonnie White, [facility]

## 2022-12-03 LAB
METANEPH FREE SERPL-MCNC: 138.8 PG/ML (ref 0–88)
NORMETANEPHRINE SERPL-MCNC: 250.2 PG/ML (ref 0–244)

## 2023-02-18 ENCOUNTER — HOSPITAL ENCOUNTER (EMERGENCY)
Age: 58
Discharge: OTHER INSTITUTION | End: 2023-02-18
Attending: STUDENT IN AN ORGANIZED HEALTH CARE EDUCATION/TRAINING PROGRAM
Payer: MEDICARE

## 2023-02-18 ENCOUNTER — ANESTHESIA (OUTPATIENT)
Dept: SURGERY | Age: 58
DRG: 025 | End: 2023-02-18
Payer: MEDICARE

## 2023-02-18 ENCOUNTER — ANESTHESIA EVENT (OUTPATIENT)
Dept: SURGERY | Age: 58
DRG: 025 | End: 2023-02-18
Payer: MEDICARE

## 2023-02-18 ENCOUNTER — APPOINTMENT (OUTPATIENT)
Dept: GENERAL RADIOLOGY | Age: 58
DRG: 025 | End: 2023-02-18
Attending: NEUROLOGICAL SURGERY
Payer: MEDICARE

## 2023-02-18 ENCOUNTER — APPOINTMENT (OUTPATIENT)
Dept: CT IMAGING | Age: 58
End: 2023-02-18
Attending: STUDENT IN AN ORGANIZED HEALTH CARE EDUCATION/TRAINING PROGRAM
Payer: MEDICARE

## 2023-02-18 ENCOUNTER — HOSPITAL ENCOUNTER (INPATIENT)
Age: 58
LOS: 10 days | Discharge: NURSING, REHAB OR LONG-TERM CARE | DRG: 025 | End: 2023-02-28
Attending: NEUROLOGICAL SURGERY | Admitting: NEUROLOGICAL SURGERY
Payer: MEDICARE

## 2023-02-18 ENCOUNTER — APPOINTMENT (OUTPATIENT)
Dept: GENERAL RADIOLOGY | Age: 58
End: 2023-02-18
Attending: STUDENT IN AN ORGANIZED HEALTH CARE EDUCATION/TRAINING PROGRAM
Payer: MEDICARE

## 2023-02-18 VITALS
HEIGHT: 69 IN | RESPIRATION RATE: 14 BRPM | WEIGHT: 171.08 LBS | SYSTOLIC BLOOD PRESSURE: 188 MMHG | HEART RATE: 122 BPM | TEMPERATURE: 98.2 F | BODY MASS INDEX: 25.34 KG/M2 | OXYGEN SATURATION: 99 % | DIASTOLIC BLOOD PRESSURE: 85 MMHG

## 2023-02-18 DIAGNOSIS — S06.5XAA SUBDURAL HEMATOMA: Primary | ICD-10-CM

## 2023-02-18 DIAGNOSIS — I62.00 SUBDURAL HEMORRHAGE (HCC): Primary | ICD-10-CM

## 2023-02-18 DIAGNOSIS — G93.40 ENCEPHALOPATHY: ICD-10-CM

## 2023-02-18 LAB
ALBUMIN SERPL-MCNC: 3.6 G/DL (ref 3.5–5)
ALBUMIN SERPL-MCNC: 3.7 G/DL (ref 3.5–5)
ALBUMIN SERPL-MCNC: 3.8 G/DL (ref 3.5–5)
ALBUMIN/GLOB SERPL: 0.8 (ref 1.1–2.2)
ALBUMIN/GLOB SERPL: 0.8 (ref 1.1–2.2)
ALBUMIN/GLOB SERPL: 0.9 (ref 1.1–2.2)
ALP SERPL-CCNC: 82 U/L (ref 45–117)
ALP SERPL-CCNC: 84 U/L (ref 45–117)
ALP SERPL-CCNC: 95 U/L (ref 45–117)
ALT SERPL-CCNC: 9 U/L (ref 12–78)
ANION GAP SERPL CALC-SCNC: 14 MMOL/L (ref 5–15)
ANION GAP SERPL CALC-SCNC: 16 MMOL/L (ref 5–15)
ANION GAP SERPL CALC-SCNC: 21 MMOL/L (ref 5–15)
ARTERIAL PATENCY WRIST A: YES
AST SERPL-CCNC: 12 U/L (ref 15–37)
AST SERPL-CCNC: 5 U/L (ref 15–37)
AST SERPL-CCNC: 8 U/L (ref 15–37)
BASE DEFICIT BLDA-SCNC: 7.1 MMOL/L
BASOPHILS # BLD: 0.1 K/UL (ref 0–0.1)
BASOPHILS NFR BLD: 1 % (ref 0–1)
BDY SITE: ABNORMAL
BILIRUB DIRECT SERPL-MCNC: <0.1 MG/DL (ref 0–0.2)
BILIRUB SERPL-MCNC: 0.3 MG/DL (ref 0.2–1)
BUN SERPL-MCNC: 130 MG/DL (ref 6–20)
BUN SERPL-MCNC: 133 MG/DL (ref 6–20)
BUN SERPL-MCNC: 137 MG/DL (ref 6–20)
BUN/CREAT SERPL: 7 (ref 12–20)
CALCIUM SERPL-MCNC: 8.1 MG/DL (ref 8.5–10.1)
CALCIUM SERPL-MCNC: 8.3 MG/DL (ref 8.5–10.1)
CALCIUM SERPL-MCNC: 8.7 MG/DL (ref 8.5–10.1)
CHLORIDE SERPL-SCNC: 103 MMOL/L (ref 97–108)
CHLORIDE SERPL-SCNC: 103 MMOL/L (ref 97–108)
CHLORIDE SERPL-SCNC: 98 MMOL/L (ref 97–108)
CO2 SERPL-SCNC: 19 MMOL/L (ref 21–32)
CO2 SERPL-SCNC: 20 MMOL/L (ref 21–32)
CO2 SERPL-SCNC: 23 MMOL/L (ref 21–32)
CREAT SERPL-MCNC: 18.8 MG/DL (ref 0.7–1.3)
CREAT SERPL-MCNC: 19 MG/DL (ref 0.7–1.3)
CREAT SERPL-MCNC: 19.2 MG/DL (ref 0.7–1.3)
DIFFERENTIAL METHOD BLD: ABNORMAL
EOSINOPHIL # BLD: 0.3 K/UL (ref 0–0.4)
EOSINOPHIL NFR BLD: 2 % (ref 0–7)
ERYTHROCYTE [DISTWIDTH] IN BLOOD BY AUTOMATED COUNT: 13.5 % (ref 11.5–14.5)
ERYTHROCYTE [DISTWIDTH] IN BLOOD BY AUTOMATED COUNT: 13.6 % (ref 11.5–14.5)
FIO2 ON VENT: 40 %
GAS FLOW.O2 SETTING OXYMISER: 12
GLOBULIN SER CALC-MCNC: 3.9 G/DL (ref 2–4)
GLOBULIN SER CALC-MCNC: 4.4 G/DL (ref 2–4)
GLOBULIN SER CALC-MCNC: 4.7 G/DL (ref 2–4)
GLUCOSE SERPL-MCNC: 127 MG/DL (ref 65–100)
GLUCOSE SERPL-MCNC: 151 MG/DL (ref 65–100)
GLUCOSE SERPL-MCNC: 81 MG/DL (ref 65–100)
HCO3 BLDA-SCNC: 18 MMOL/L (ref 22–26)
HCT VFR BLD AUTO: 23.5 % (ref 36.6–50.3)
HCT VFR BLD AUTO: 24.3 % (ref 36.6–50.3)
HGB BLD-MCNC: 7.5 G/DL (ref 12.1–17)
HGB BLD-MCNC: 8 G/DL (ref 12.1–17)
IMM GRANULOCYTES # BLD AUTO: 0 K/UL (ref 0–0.04)
IMM GRANULOCYTES NFR BLD AUTO: 0 % (ref 0–0.5)
INR PPP: 1.1 (ref 0.9–1.1)
LYMPHOCYTES # BLD: 1.1 K/UL (ref 0.8–3.5)
LYMPHOCYTES NFR BLD: 9 % (ref 12–49)
MAGNESIUM SERPL-MCNC: 2.6 MG/DL (ref 1.6–2.4)
MAGNESIUM SERPL-MCNC: 2.6 MG/DL (ref 1.6–2.4)
MCH RBC QN AUTO: 30.4 PG (ref 26–34)
MCH RBC QN AUTO: 30.9 PG (ref 26–34)
MCHC RBC AUTO-ENTMCNC: 31.9 G/DL (ref 30–36.5)
MCHC RBC AUTO-ENTMCNC: 32.9 G/DL (ref 30–36.5)
MCV RBC AUTO: 93.8 FL (ref 80–99)
MCV RBC AUTO: 95.1 FL (ref 80–99)
MONOCYTES # BLD: 0.6 K/UL (ref 0–1)
MONOCYTES NFR BLD: 5 % (ref 5–13)
NEUTS SEG # BLD: 9.7 K/UL (ref 1.8–8)
NEUTS SEG NFR BLD: 83 % (ref 32–75)
NRBC # BLD: 0 K/UL (ref 0–0.01)
NRBC # BLD: 0 K/UL (ref 0–0.01)
NRBC BLD-RTO: 0 PER 100 WBC
NRBC BLD-RTO: 0 PER 100 WBC
PCO2 BLDA: 36 MMHG (ref 35–45)
PEEP RESPIRATORY: 5
PH BLDA: 7.32 (ref 7.35–7.45)
PHOSPHATE SERPL-MCNC: 7.8 MG/DL (ref 2.6–4.7)
PLATELET # BLD AUTO: 194 K/UL (ref 150–400)
PLATELET # BLD AUTO: 253 K/UL (ref 150–400)
PMV BLD AUTO: 10.7 FL (ref 8.9–12.9)
PMV BLD AUTO: 11.4 FL (ref 8.9–12.9)
PO2 BLDA: 149 MMHG (ref 80–100)
POTASSIUM SERPL-SCNC: 4.1 MMOL/L (ref 3.5–5.1)
POTASSIUM SERPL-SCNC: 4.4 MMOL/L (ref 3.5–5.1)
POTASSIUM SERPL-SCNC: 6.1 MMOL/L (ref 3.5–5.1)
PROT SERPL-MCNC: 7.6 G/DL (ref 6.4–8.2)
PROT SERPL-MCNC: 8 G/DL (ref 6.4–8.2)
PROT SERPL-MCNC: 8.5 G/DL (ref 6.4–8.2)
PROTHROMBIN TIME: 11.4 SEC (ref 9–11.1)
RBC # BLD AUTO: 2.47 M/UL (ref 4.1–5.7)
RBC # BLD AUTO: 2.59 M/UL (ref 4.1–5.7)
SAO2 % BLD: 99 % (ref 92–97)
SAO2% DEVICE SAO2% SENSOR NAME: ABNORMAL
SODIUM SERPL-SCNC: 136 MMOL/L (ref 136–145)
SODIUM SERPL-SCNC: 139 MMOL/L (ref 136–145)
SODIUM SERPL-SCNC: 142 MMOL/L (ref 136–145)
SPECIMEN SITE: ABNORMAL
VENTILATION MODE VENT: ABNORMAL
VT SETTING VENT: 500
WBC # BLD AUTO: 11.8 K/UL (ref 4.1–11.1)
WBC # BLD AUTO: 9.4 K/UL (ref 4.1–11.1)

## 2023-02-18 PROCEDURE — 77030014007 HC SPNG HEMSTAT J&J -B: Performed by: NEUROLOGICAL SURGERY

## 2023-02-18 PROCEDURE — 85610 PROTHROMBIN TIME: CPT

## 2023-02-18 PROCEDURE — 74011000250 HC RX REV CODE- 250: Performed by: STUDENT IN AN ORGANIZED HEALTH CARE EDUCATION/TRAINING PROGRAM

## 2023-02-18 PROCEDURE — 82803 BLOOD GASES ANY COMBINATION: CPT

## 2023-02-18 PROCEDURE — 77030014650 HC SEAL MTRX FLOSEL BAXT -C: Performed by: NEUROLOGICAL SURGERY

## 2023-02-18 PROCEDURE — 74011250636 HC RX REV CODE- 250/636: Performed by: STUDENT IN AN ORGANIZED HEALTH CARE EDUCATION/TRAINING PROGRAM

## 2023-02-18 PROCEDURE — 70450 CT HEAD/BRAIN W/O DYE: CPT

## 2023-02-18 PROCEDURE — 76210000006 HC OR PH I REC 0.5 TO 1 HR: Performed by: NEUROLOGICAL SURGERY

## 2023-02-18 PROCEDURE — 77030040361 HC SLV COMPR DVT MDII -B: Performed by: NEUROLOGICAL SURGERY

## 2023-02-18 PROCEDURE — 77030002946 HC SUT NRLN J&J -B: Performed by: NEUROLOGICAL SURGERY

## 2023-02-18 PROCEDURE — 36415 COLL VENOUS BLD VENIPUNCTURE: CPT

## 2023-02-18 PROCEDURE — 94002 VENT MGMT INPAT INIT DAY: CPT

## 2023-02-18 PROCEDURE — 74011250637 HC RX REV CODE- 250/637: Performed by: ANESTHESIOLOGY

## 2023-02-18 PROCEDURE — 77030014008 HC SPNG HEMSTAT J&J -C: Performed by: NEUROLOGICAL SURGERY

## 2023-02-18 PROCEDURE — 36600 WITHDRAWAL OF ARTERIAL BLOOD: CPT

## 2023-02-18 PROCEDURE — 5A1D70Z PERFORMANCE OF URINARY FILTRATION, INTERMITTENT, LESS THAN 6 HOURS PER DAY: ICD-10-PCS | Performed by: INTERNAL MEDICINE

## 2023-02-18 PROCEDURE — 74011250636 HC RX REV CODE- 250/636: Performed by: NURSE ANESTHETIST, CERTIFIED REGISTERED

## 2023-02-18 PROCEDURE — 88304 TISSUE EXAM BY PATHOLOGIST: CPT

## 2023-02-18 PROCEDURE — 86706 HEP B SURFACE ANTIBODY: CPT

## 2023-02-18 PROCEDURE — 77030040506 HC DRN WND MDII -A: Performed by: NEUROLOGICAL SURGERY

## 2023-02-18 PROCEDURE — 88342 IMHCHEM/IMCYTCHM 1ST ANTB: CPT

## 2023-02-18 PROCEDURE — 84100 ASSAY OF PHOSPHORUS: CPT

## 2023-02-18 PROCEDURE — 74011000250 HC RX REV CODE- 250: Performed by: NEUROLOGICAL SURGERY

## 2023-02-18 PROCEDURE — 77030003029 HC SUT VCRL J&J -B: Performed by: NEUROLOGICAL SURGERY

## 2023-02-18 PROCEDURE — 31500 INSERT EMERGENCY AIRWAY: CPT

## 2023-02-18 PROCEDURE — 74018 RADEX ABDOMEN 1 VIEW: CPT

## 2023-02-18 PROCEDURE — 00BC0ZX EXCISION OF CEREBELLUM, OPEN APPROACH, DIAGNOSTIC: ICD-10-PCS | Performed by: NEUROLOGICAL SURGERY

## 2023-02-18 PROCEDURE — 74011250636 HC RX REV CODE- 250/636: Performed by: ANESTHESIOLOGY

## 2023-02-18 PROCEDURE — 00C40ZZ EXTIRPATION OF MATTER FROM INTRACRANIAL SUBDURAL SPACE, OPEN APPROACH: ICD-10-PCS | Performed by: NEUROLOGICAL SURGERY

## 2023-02-18 PROCEDURE — 74011000250 HC RX REV CODE- 250: Performed by: ANESTHESIOLOGY

## 2023-02-18 PROCEDURE — 74011000250 HC RX REV CODE- 250: Performed by: NURSE ANESTHETIST, CERTIFIED REGISTERED

## 2023-02-18 PROCEDURE — 87340 HEPATITIS B SURFACE AG IA: CPT

## 2023-02-18 PROCEDURE — 2709999900 HC NON-CHARGEABLE SUPPLY: Performed by: NEUROLOGICAL SURGERY

## 2023-02-18 PROCEDURE — 76010000171 HC OR TIME 2 TO 2.5 HR INTENSV-TIER 1: Performed by: NEUROLOGICAL SURGERY

## 2023-02-18 PROCEDURE — 75810000275 HC EMERGENCY DEPT VISIT NO LEVEL OF CARE

## 2023-02-18 PROCEDURE — 74011636637 HC RX REV CODE- 636/637: Performed by: STUDENT IN AN ORGANIZED HEALTH CARE EDUCATION/TRAINING PROGRAM

## 2023-02-18 PROCEDURE — 65610000006 HC RM INTENSIVE CARE

## 2023-02-18 PROCEDURE — 77030014355 HC CVR BUR H TI BIOM -C: Performed by: NEUROLOGICAL SURGERY

## 2023-02-18 PROCEDURE — C1713 ANCHOR/SCREW BN/BN,TIS/BN: HCPCS | Performed by: NEUROLOGICAL SURGERY

## 2023-02-18 PROCEDURE — 71045 X-RAY EXAM CHEST 1 VIEW: CPT

## 2023-02-18 PROCEDURE — 80053 COMPREHEN METABOLIC PANEL: CPT

## 2023-02-18 PROCEDURE — C9113 INJ PANTOPRAZOLE SODIUM, VIA: HCPCS | Performed by: ANESTHESIOLOGY

## 2023-02-18 PROCEDURE — 74011250636 HC RX REV CODE- 250/636: Performed by: NEUROLOGICAL SURGERY

## 2023-02-18 PROCEDURE — 83735 ASSAY OF MAGNESIUM: CPT

## 2023-02-18 PROCEDURE — 77030010813: Performed by: NEUROLOGICAL SURGERY

## 2023-02-18 PROCEDURE — 76060000035 HC ANESTHESIA 2 TO 2.5 HR: Performed by: NEUROLOGICAL SURGERY

## 2023-02-18 PROCEDURE — 74011000272 HC RX REV CODE- 272: Performed by: NEUROLOGICAL SURGERY

## 2023-02-18 PROCEDURE — 77030040504 HC DRN WND MDII -B: Performed by: NEUROLOGICAL SURGERY

## 2023-02-18 PROCEDURE — 85027 COMPLETE CBC AUTOMATED: CPT

## 2023-02-18 PROCEDURE — 96365 THER/PROPH/DIAG IV INF INIT: CPT

## 2023-02-18 PROCEDURE — 74011000250 HC RX REV CODE- 250: Performed by: NURSE PRACTITIONER

## 2023-02-18 PROCEDURE — 85025 COMPLETE CBC W/AUTO DIFF WBC: CPT

## 2023-02-18 PROCEDURE — 77030009081 HC CLP NEUR GUN SET MEDT -B: Performed by: NEUROLOGICAL SURGERY

## 2023-02-18 PROCEDURE — 90935 HEMODIALYSIS ONE EVALUATION: CPT

## 2023-02-18 PROCEDURE — 74011250637 HC RX REV CODE- 250/637: Performed by: STUDENT IN AN ORGANIZED HEALTH CARE EDUCATION/TRAINING PROGRAM

## 2023-02-18 PROCEDURE — 99285 EMERGENCY DEPT VISIT HI MDM: CPT

## 2023-02-18 PROCEDURE — 80076 HEPATIC FUNCTION PANEL: CPT

## 2023-02-18 DEVICE — PLATE BONE LNG L16MM THK0.6MM 2 H TI STR FOR 1.5MM SCR: Type: IMPLANTABLE DEVICE | Site: SKULL | Status: FUNCTIONAL

## 2023-02-18 DEVICE — COVER BUR H L DIA18.5MM THK0.5MM 5 H NEURO TI W/O TAB: Type: IMPLANTABLE DEVICE | Site: SKULL | Status: FUNCTIONAL

## 2023-02-18 DEVICE — SCREW BNE L4MM DIA1.5MM CORT MAXILLOMANDIBULAR GRN TI SELF: Type: IMPLANTABLE DEVICE | Site: SKULL | Status: FUNCTIONAL

## 2023-02-18 RX ORDER — CALCIUM GLUCONATE 20 MG/ML
1 INJECTION, SOLUTION INTRAVENOUS ONCE
Status: DISCONTINUED | OUTPATIENT
Start: 2023-02-18 | End: 2023-02-18 | Stop reason: SDUPTHER

## 2023-02-18 RX ORDER — POLYETHYLENE GLYCOL 3350 17 G/17G
17 POWDER, FOR SOLUTION ORAL DAILY PRN
Status: DISCONTINUED | OUTPATIENT
Start: 2023-02-18 | End: 2023-02-28 | Stop reason: HOSPADM

## 2023-02-18 RX ORDER — CALCIUM GLUCONATE 20 MG/ML
1 INJECTION, SOLUTION INTRAVENOUS ONCE
Status: COMPLETED | OUTPATIENT
Start: 2023-02-18 | End: 2023-02-18

## 2023-02-18 RX ORDER — SODIUM CHLORIDE 0.9 % (FLUSH) 0.9 %
5-40 SYRINGE (ML) INJECTION EVERY 8 HOURS
Status: DISCONTINUED | OUTPATIENT
Start: 2023-02-18 | End: 2023-02-18 | Stop reason: SDUPTHER

## 2023-02-18 RX ORDER — ACETAMINOPHEN 325 MG/1
650 TABLET ORAL ONCE
Status: COMPLETED | OUTPATIENT
Start: 2023-02-18 | End: 2023-02-18

## 2023-02-18 RX ORDER — FENTANYL CITRATE 50 UG/ML
INJECTION, SOLUTION INTRAMUSCULAR; INTRAVENOUS AS NEEDED
Status: DISCONTINUED | OUTPATIENT
Start: 2023-02-18 | End: 2023-02-18 | Stop reason: HOSPADM

## 2023-02-18 RX ORDER — BUMETANIDE 1 MG/1
2 TABLET ORAL DAILY
Status: DISCONTINUED | OUTPATIENT
Start: 2023-02-18 | End: 2023-02-28 | Stop reason: HOSPADM

## 2023-02-18 RX ORDER — NITROGLYCERIN 20 MG/100ML
0-20 INJECTION INTRAVENOUS
Status: DISCONTINUED | OUTPATIENT
Start: 2023-02-18 | End: 2023-02-18 | Stop reason: HOSPADM

## 2023-02-18 RX ORDER — SODIUM CHLORIDE 0.9 % (FLUSH) 0.9 %
5-40 SYRINGE (ML) INJECTION AS NEEDED
Status: DISCONTINUED | OUTPATIENT
Start: 2023-02-18 | End: 2023-02-18 | Stop reason: SDUPTHER

## 2023-02-18 RX ORDER — SODIUM CHLORIDE, SODIUM LACTATE, POTASSIUM CHLORIDE, CALCIUM CHLORIDE 600; 310; 30; 20 MG/100ML; MG/100ML; MG/100ML; MG/100ML
INJECTION, SOLUTION INTRAVENOUS
Status: DISCONTINUED | OUTPATIENT
Start: 2023-02-18 | End: 2023-02-18 | Stop reason: HOSPADM

## 2023-02-18 RX ORDER — LEVETIRACETAM 500 MG/5ML
500 INJECTION, SOLUTION, CONCENTRATE INTRAVENOUS EVERY 12 HOURS
Status: COMPLETED | OUTPATIENT
Start: 2023-02-18 | End: 2023-02-20

## 2023-02-18 RX ORDER — ACETAMINOPHEN 650 MG/1
650 SUPPOSITORY RECTAL
Status: DISCONTINUED | OUTPATIENT
Start: 2023-02-18 | End: 2023-02-28 | Stop reason: HOSPADM

## 2023-02-18 RX ORDER — BUPIVACAINE HYDROCHLORIDE AND EPINEPHRINE 5; 5 MG/ML; UG/ML
INJECTION, SOLUTION EPIDURAL; INTRACAUDAL; PERINEURAL AS NEEDED
Status: DISCONTINUED | OUTPATIENT
Start: 2023-02-18 | End: 2023-02-18 | Stop reason: HOSPADM

## 2023-02-18 RX ORDER — CARVEDILOL 12.5 MG/1
12.5 TABLET ORAL 2 TIMES DAILY WITH MEALS
Status: DISCONTINUED | OUTPATIENT
Start: 2023-02-18 | End: 2023-02-20

## 2023-02-18 RX ORDER — ONDANSETRON 2 MG/ML
INJECTION INTRAMUSCULAR; INTRAVENOUS AS NEEDED
Status: DISCONTINUED | OUTPATIENT
Start: 2023-02-18 | End: 2023-02-18 | Stop reason: HOSPADM

## 2023-02-18 RX ORDER — PROPOFOL 10 MG/ML
0-50 VIAL (ML) INTRAVENOUS
Status: DISCONTINUED | OUTPATIENT
Start: 2023-02-18 | End: 2023-02-20

## 2023-02-18 RX ORDER — DEXAMETHASONE SODIUM PHOSPHATE 4 MG/ML
INJECTION, SOLUTION INTRA-ARTICULAR; INTRALESIONAL; INTRAMUSCULAR; INTRAVENOUS; SOFT TISSUE AS NEEDED
Status: DISCONTINUED | OUTPATIENT
Start: 2023-02-18 | End: 2023-02-18 | Stop reason: HOSPADM

## 2023-02-18 RX ORDER — FENTANYL CITRATE 50 UG/ML
25 INJECTION, SOLUTION INTRAMUSCULAR; INTRAVENOUS
Status: DISCONTINUED | OUTPATIENT
Start: 2023-02-18 | End: 2023-02-20

## 2023-02-18 RX ORDER — ACETAMINOPHEN 325 MG/1
650 TABLET ORAL
Status: DISCONTINUED | OUTPATIENT
Start: 2023-02-18 | End: 2023-02-28 | Stop reason: HOSPADM

## 2023-02-18 RX ORDER — ONDANSETRON 2 MG/ML
4 INJECTION INTRAMUSCULAR; INTRAVENOUS
Status: DISCONTINUED | OUTPATIENT
Start: 2023-02-18 | End: 2023-02-28 | Stop reason: HOSPADM

## 2023-02-18 RX ORDER — CLONIDINE HYDROCHLORIDE 0.1 MG/1
0.3 TABLET ORAL
Status: DISCONTINUED | OUTPATIENT
Start: 2023-02-18 | End: 2023-02-18

## 2023-02-18 RX ORDER — HYDRALAZINE HYDROCHLORIDE 20 MG/ML
20 INJECTION INTRAMUSCULAR; INTRAVENOUS
Status: DISCONTINUED | OUTPATIENT
Start: 2023-02-18 | End: 2023-02-23

## 2023-02-18 RX ORDER — ROCURONIUM BROMIDE 10 MG/ML
INJECTION, SOLUTION INTRAVENOUS AS NEEDED
Status: DISCONTINUED | OUTPATIENT
Start: 2023-02-18 | End: 2023-02-18 | Stop reason: HOSPADM

## 2023-02-18 RX ORDER — SODIUM BICARBONATE 1 MEQ/ML
50 SYRINGE (ML) INTRAVENOUS ONCE
Status: COMPLETED | OUTPATIENT
Start: 2023-02-18 | End: 2023-02-18

## 2023-02-18 RX ORDER — CEFAZOLIN SODIUM 1 G/3ML
INJECTION, POWDER, FOR SOLUTION INTRAMUSCULAR; INTRAVENOUS AS NEEDED
Status: DISCONTINUED | OUTPATIENT
Start: 2023-02-18 | End: 2023-02-18 | Stop reason: HOSPADM

## 2023-02-18 RX ORDER — LABETALOL HYDROCHLORIDE 5 MG/ML
20 INJECTION, SOLUTION INTRAVENOUS
Status: DISCONTINUED | OUTPATIENT
Start: 2023-02-18 | End: 2023-02-28 | Stop reason: HOSPADM

## 2023-02-18 RX ORDER — CLONIDINE HYDROCHLORIDE 0.1 MG/1
0.2 TABLET ORAL
Status: DISCONTINUED | OUTPATIENT
Start: 2023-02-18 | End: 2023-02-18 | Stop reason: HOSPADM

## 2023-02-18 RX ORDER — PROPOFOL 10 MG/ML
0-200 INJECTION, EMULSION INTRAVENOUS
Status: DISCONTINUED | OUTPATIENT
Start: 2023-02-18 | End: 2023-02-18 | Stop reason: HOSPADM

## 2023-02-18 RX ORDER — CLONIDINE HYDROCHLORIDE 0.1 MG/1
0.2 TABLET ORAL 2 TIMES DAILY
Status: DISCONTINUED | OUTPATIENT
Start: 2023-02-18 | End: 2023-02-22

## 2023-02-18 RX ORDER — SODIUM CHLORIDE 0.9 % (FLUSH) 0.9 %
5-40 SYRINGE (ML) INJECTION EVERY 8 HOURS
Status: DISCONTINUED | OUTPATIENT
Start: 2023-02-18 | End: 2023-02-28 | Stop reason: HOSPADM

## 2023-02-18 RX ORDER — SODIUM CHLORIDE 0.9 % (FLUSH) 0.9 %
5-40 SYRINGE (ML) INJECTION AS NEEDED
Status: CANCELLED | OUTPATIENT
Start: 2023-02-18

## 2023-02-18 RX ORDER — SODIUM CHLORIDE 0.9 % (FLUSH) 0.9 %
5-40 SYRINGE (ML) INJECTION EVERY 8 HOURS
Status: CANCELLED | OUTPATIENT
Start: 2023-02-18

## 2023-02-18 RX ADMIN — FENTANYL CITRATE 25 MCG: 50 INJECTION, SOLUTION INTRAMUSCULAR; INTRAVENOUS at 13:30

## 2023-02-18 RX ADMIN — NICARDIPINE HYDROCHLORIDE 5 MG/HR: 25 INJECTION, SOLUTION INTRAVENOUS at 16:08

## 2023-02-18 RX ADMIN — LABETALOL HYDROCHLORIDE 20 MG: 5 INJECTION INTRAVENOUS at 17:14

## 2023-02-18 RX ADMIN — CEFAZOLIN 2 G: 330 INJECTION, POWDER, FOR SOLUTION INTRAMUSCULAR; INTRAVENOUS at 13:10

## 2023-02-18 RX ADMIN — SODIUM CHLORIDE, PRESERVATIVE FREE 10 ML: 5 INJECTION INTRAVENOUS at 22:21

## 2023-02-18 RX ADMIN — ACETAMINOPHEN 650 MG: 325 TABLET ORAL at 09:48

## 2023-02-18 RX ADMIN — PROPOFOL 40 MCG/KG/MIN: 10 INJECTION, EMULSION INTRAVENOUS at 22:13

## 2023-02-18 RX ADMIN — LEVETIRACETAM 4000 MG: 100 INJECTION, SOLUTION, CONCENTRATE INTRAVENOUS at 11:15

## 2023-02-18 RX ADMIN — CALCIUM GLUCONATE 1000 MG: 20 INJECTION, SOLUTION INTRAVENOUS at 19:35

## 2023-02-18 RX ADMIN — FENTANYL CITRATE 25 MCG: 50 INJECTION, SOLUTION INTRAMUSCULAR; INTRAVENOUS at 16:37

## 2023-02-18 RX ADMIN — SODIUM CHLORIDE 10 MG/HR: 9 INJECTION, SOLUTION INTRAVENOUS at 10:24

## 2023-02-18 RX ADMIN — SODIUM CHLORIDE 15 MG/HR: 9 INJECTION, SOLUTION INTRAVENOUS at 11:35

## 2023-02-18 RX ADMIN — BUMETANIDE 2 MG: 1 TABLET ORAL at 16:37

## 2023-02-18 RX ADMIN — PROPOFOL 25 MCG/KG/MIN: 10 INJECTION, EMULSION INTRAVENOUS at 10:53

## 2023-02-18 RX ADMIN — Medication 50 MCG/HR: at 11:11

## 2023-02-18 RX ADMIN — ROCURONIUM BROMIDE 50 MG: 10 SOLUTION INTRAVENOUS at 12:42

## 2023-02-18 RX ADMIN — DEXTROSE MONOHYDRATE 250 ML: 10 INJECTION, SOLUTION INTRAVENOUS at 19:12

## 2023-02-18 RX ADMIN — SODIUM BICARBONATE 50 MEQ: 84 INJECTION, SOLUTION INTRAVENOUS at 20:29

## 2023-02-18 RX ADMIN — SODIUM CHLORIDE, PRESERVATIVE FREE 10 ML: 5 INJECTION INTRAVENOUS at 16:09

## 2023-02-18 RX ADMIN — NITROGLYCERIN 10 MCG/MIN: 20 INJECTION INTRAVENOUS at 11:30

## 2023-02-18 RX ADMIN — FENTANYL CITRATE 50 MCG: 50 INJECTION, SOLUTION INTRAMUSCULAR; INTRAVENOUS at 12:43

## 2023-02-18 RX ADMIN — CLONIDINE HYDROCHLORIDE 0.2 MG: 0.1 TABLET ORAL at 17:11

## 2023-02-18 RX ADMIN — LEVETIRACETAM 500 MG: 100 INJECTION, SOLUTION, CONCENTRATE INTRAVENOUS at 22:21

## 2023-02-18 RX ADMIN — CARVEDILOL 12.5 MG: 12.5 TABLET, FILM COATED ORAL at 16:37

## 2023-02-18 RX ADMIN — SODIUM CHLORIDE, POTASSIUM CHLORIDE, SODIUM LACTATE AND CALCIUM CHLORIDE: 600; 310; 30; 20 INJECTION, SOLUTION INTRAVENOUS at 12:38

## 2023-02-18 RX ADMIN — PANTOPRAZOLE SODIUM 40 MG: 40 INJECTION, POWDER, LYOPHILIZED, FOR SOLUTION INTRAVENOUS at 16:09

## 2023-02-18 RX ADMIN — ROCURONIUM BROMIDE 40 MG: 10 SOLUTION INTRAVENOUS at 13:00

## 2023-02-18 RX ADMIN — PHENYLEPHRINE HYDROCHLORIDE 20 MCG/MIN: 10 INJECTION INTRAVENOUS at 12:44

## 2023-02-18 RX ADMIN — FENTANYL CITRATE 25 MCG: 50 INJECTION, SOLUTION INTRAMUSCULAR; INTRAVENOUS at 13:36

## 2023-02-18 RX ADMIN — NICARDIPINE HYDROCHLORIDE 10 MG/HR: 25 INJECTION, SOLUTION INTRAVENOUS at 17:36

## 2023-02-18 RX ADMIN — PROPOFOL 45 MCG/KG/MIN: 10 INJECTION, EMULSION INTRAVENOUS at 17:39

## 2023-02-18 RX ADMIN — Medication 5 UNITS: at 19:12

## 2023-02-18 RX ADMIN — SODIUM CHLORIDE, PRESERVATIVE FREE 10 ML: 5 INJECTION INTRAVENOUS at 16:16

## 2023-02-18 RX ADMIN — CEFAZOLIN 2 G: 1 INJECTION, POWDER, FOR SOLUTION INTRAMUSCULAR; INTRAVENOUS at 16:38

## 2023-02-18 RX ADMIN — ONDANSETRON HYDROCHLORIDE 4 MG: 2 INJECTION, SOLUTION INTRAMUSCULAR; INTRAVENOUS at 14:43

## 2023-02-18 RX ADMIN — PROPOFOL 100 MCG/KG/MIN: 10 INJECTION, EMULSION INTRAVENOUS at 11:05

## 2023-02-18 RX ADMIN — DEXAMETHASONE SODIUM PHOSPHATE 10 MG: 4 INJECTION, SOLUTION INTRAMUSCULAR; INTRAVENOUS at 13:00

## 2023-02-18 NOTE — H&P
[facility]    ICU TEAM Progress Note    Name: Earnesteen Councilman   : 1965   MRN: [id number]   Date: 2023           ICU Assessment     SDH  CKD  Liver disease         ICU Comprehensive Plan of Care:     Neurological System  Q 1 h neuro checks  Spontaneous Awakening Trial: Pending  Sedation: Propofol  Analgesia: Fentanyl    Cardiovascular System  Normotension  SBP Goal of: < 140 mmHg  MAP Goal of: 65-85 mmHg  None - For above SBP/MAP goals  Transfusion Trigger (Hgb): <7 g/dL  Keep K > 4; Mg > 2     Respiratory System  Optimize PEEP/Ventilation/Oxygenation  Spontaneous Breathing Trial: Pending  Pulmonary toilet:  None    SpO2 Goal: > 92%  DVT Prophylaxis: No VTE Prophylaxis Needed     Renal/GI/Endocrine System  IVFs: NS at 75  Ulcer Prophylaxis: Protonix (pantoprazole)   Bowel Regimen: MiraLax  Feeding:  Pending   Blood Sugar Goal 120-180 - Glycemic Control: Insulin    Infectious Disease:  No concern for infection    PT/OT:  Deferred      Goals of Care Discussion with family Pending     Plan of Care/Code Status: Full Code    Discussed Care Plan with Bedside RN    Documentation of Current Medications    Subjective:   Progress Note: 2023      Reason for ICU Admission: SDH     HPI:  80-year-old male with history of end-stage kidney disease, liver disease, hypertension presenting to the ED with concern of headache, upon triage evaluation patient stated that he woke up today with the worst headache of his life, unsure what time, states that he is HD patient and was at HD, had 15 minutes and and had to stop secondary to severe headache, received Tylenol in route, patient states that his last full session was earlier in the week and missed 1 episode, states that he is on a blood thinner but unable to state which one, no history of this in his med rec.   Upon my evaluation of patient he is incoherent, unable to response or questions secondary to lethargy, opens eyes and moves all extremities, does not appear to have any focal neurologic deficits on exam but very lethargic and not responding to questions appropriately, grumbling and intermittently answering. Further history is limited secondary to altered mental status. POD:  Day of Surgery    S/P:   Procedure(s):  LEFT SUBDURAL CRANIOTOMY    Active Problem List:     Problem List  Date Reviewed: 2/18/2023            Codes Class    Hypertensive urgency, malignant ICD-10-CM: I16.0  ICD-9-CM: 401.0         HTN (hypertension), malignant ICD-10-CM: I10  ICD-9-CM: 401.0         Seizures ([facility] ICD-10-CM: R56.9  ICD-9-CM: 780.39         Malignant hypertension ICD-10-CM: I10  ICD-9-CM: 401.0         Enteritis ICD-10-CM: K52.9  ICD-9-CM: 558.9         Bone metastasis ([facility] ICD-10-CM: C79.51  ICD-9-CM: 198.5         ESRD needing dialysis ([facility] ICD-10-CM: N18.6, Z99.2  ICD-9-CM: 839. 6         Duodenitis ICD-10-CM: K29.80  ICD-9-CM: 535.60         Peptic ulcer disease ICD-10-CM: K27.9  ICD-9-CM: 533.90         Intractable abdominal pain ICD-10-CM: R10.9  ICD-9-CM: 789.00         ESRD (end stage renal disease) ([facility] ICD-10-CM: N18.6  ICD-9-CM: 585.6         Non-compliance ICD-10-CM: Z91.199  ICD-9-CM: V15.81         ESRD (end stage renal disease) on dialysis ([facility]) ICD-10-CM: N18.6, Z99.2  ICD-9-CM: 585.6, V45.11         Hypertensive urgency ICD-10-CM: I16.0  ICD-9-CM: 401.9         Hypertensive emergency ICD-10-CM: I16.1  ICD-9-CM: 401.9         Iron deficiency anemia ICD-10-CM: D50.9  ICD-9-CM: 280. 9         Chronic hepatitis C ([facility] ICD-10-CM: B18.2  ICD-9-CM: 070.54         CKD (chronic kidney disease) ICD-10-CM: N18.9  ICD-9-CM: 159. 9         Chest pain ICD-10-CM: R07.9  ICD-9-CM: 786.50         Hypertension ICD-10-CM: I10  ICD-9-CM: 401.9            Past Medical History:      has a past medical history of Chronic kidney disease, Hypertension, and Liver disease. Past Surgical History:      has no past surgical history on file.     Home Medications:     Prior to Admission medications    Medication Sig Start Date End Date Taking? Authorizing Provider   aliskiren (TEKTURNA) 300 mg tablet Take 1 Tablet by mouth daily. 10/3/22   Darius Duff MD   minoxidiL (LONITEN) 10 mg tablet Take 1 Tablet by mouth two (2) times a day. 10/2/22   Darius Duff MD   bumetanide (BUMEX) 2 mg tablet Take 1 Tablet by mouth daily. 22   Darius Duff MD   carvediloL (COREG) 25 mg tablet Take 1 Tablet by mouth two (2) times daily (with meals). 22   Darius Duff MD   cloNIDine HCL (CATAPRES) 0.2 mg tablet Take 1 Tablet by mouth two (2) times a day. 22   Darius Duff MD   doxazosin (CARDURA) 8 mg tablet Take 1 Tablet by mouth nightly. 22   Darius Duff MD   sevelamer carbonate (RENVELA) 800 mg tab tab Take 800 mg by mouth three (3) times daily. New prescription as of 22, prescribed by Dr. Susan Albarado    Provider, Historical   calcium carbonate (TUMS) 200 mg calcium (500 mg) chew Take 3 Tablets by mouth three (3) times daily (after meals). Provider, Historical   pantoprazole (Protonix) 40 mg tablet Take 1 Tablet by mouth two (2) times a day. Indications: an ulcer of the duodenum 3/25/22   Luis ALDANA MD   ascorbic acid, vitamin C, (VITAMIN C) 500 mg tablet Take 500 mg by mouth daily. Provider, Historical   cholecalciferol (VITAMIN D3) 25 mcg (1,000 unit) cap Take 1,000 Units by mouth daily. Provider, Historical   buprenorphine-naloxone (SUBOXONE) 8-2 mg film sublingaul film 1 Film by SubLINGual route two (2) times a day. Provider, Historical       Allergies/Social/Family History:      Allergies   Allergen Reactions    Baclofen Rash     All over    Losartan Hives, Shortness of Breath and Rash     Verified allergy with patient      Social History     Tobacco Use    Smoking status: Some Days     Packs/day: 1.00     Types: Cigarettes     Last attempt to quit: 2020     Years since quittin.5    Smokeless tobacco: Never    Tobacco comments:     started back smoking in last couple months   Substance Use Topics    Alcohol use: Not Currently      No family history on file. Review of Systems:     Review of systems not obtained due to patient factors. Objective:   Vital Signs: There were no vitals taken for this visit. Temp (24hrs), Av.2 °F (36.8 °C), Min:98.2 °F (36.8 °C), Max:98.2 °F (36.8 °C)           Intake/Output:     Intake/Output Summary (Last 24 hours) at 2023 1455  Last data filed at 2023 1436  Gross per 24 hour   Intake 700 ml   Output 500 ml   Net 200 ml       Physical Exam:    Gen Intubated male in NAD  CV RRR  Lungs Clear B/L  Abd soft, NT, ND  Ext no rashes nor edema      LABS AND  DATA: Personally reviewed  Recent Labs     23  0949   WBC 11.8*   HGB 8.0*   HCT 24.3*        Recent Labs     23  0949      K 4.4   CL 98   CO2 20*   *   CREA 19.00*   *   CA 8.7   MG 2.6*     Recent Labs     23  0949   AP 95   TP 8.5*   ALB 3.8   GLOB 4.7*     No results for input(s): INR, PTP, APTT, INREXT in the last 72 hours. No results for input(s): PHI, PCO2I, PO2I, FIO2I in the last 72 hours. No results for input(s): CPK, CKMB, TROIQ, BNPP in the last 72 hours. Ventilator Settings:  Mode Rate Tidal Volume Pressure FiO2 PEEP                    Peak airway pressure:      Minute ventilation:          MEDS: Reviewed    Chest X-Ray:  CXR Results  (Last 48 hours)                 23 1108  XR CHEST PORT Final result    Impression:  ET tube in satisfactory position. NG tube enters the stomach. The   lungs are clear. Narrative:  INDICATION:  post intubation        COMPARISON: CT 2022       FINDINGS: Single AP portable view of the chest obtained at 1103 demonstrates an   ET tube in satisfactory position. NG tube enters the stomach. Left-sided central   venous catheter is unchanged. The lungs are clear. There is no pneumothorax.                      CRITICAL CARE CONSULTANT NOTE  I had a face to face encounter with the patient, reviewed and interpreted patient data including clinical events, labs, images, vital signs, I/O's, and examined patient. I have discussed the case and the plan and management of the patient's care with the consulting services, the bedside nurses and the respiratory therapist.      NOTE OF PERSONAL INVOLVEMENT IN CARE   This patient has a high probability of imminent, clinically significant deterioration, which requires the highest level of preparedness to intervene urgently. I participated in the decision-making and personally managed or directed the management of the following life and organ supporting interventions that required my frequent assessment to treat or prevent imminent deterioration. I personally spent 45 minutes of critical care time. This is time spent at this critically ill patient's bedside actively involved in patient care as well as the coordination of care. This does not include any procedural time which has been billed separately.     Landa Riedel, MD  [facility] Critical Care  2/18/2023

## 2023-02-18 NOTE — PERIOP NOTES
Patient: Elvira Jones MRN: [id number]  SSN: [id number]   YOB: 1965  Age: 62 y.o. Sex: male     Patient is status post Procedure(s):  LEFT SUBDURAL CRANIOTOMY. Surgeon(s) and Role:     * Nancy Arizmendi MD - Primary    Local/Dose/Irrigation:  10 ml No flowsheet data found. .No flowsheet data found. Marcaine with epi to scalp                  Peripheral IV 02/18/23 Right Antecubital (Active)   Site Assessment Clean, dry, & intact 02/18/23 0951   Phlebitis Assessment 0 02/18/23 0951   Infiltration Assessment 0 02/18/23 0951   Dressing Status Clean, dry, & intact 02/18/23 0951   Hub Color/Line Status Pink 02/18/23 0951       Peripheral IV 02/18/23 Left Antecubital (Active)   Site Assessment Clean, dry, & intact 02/18/23 1132   Phlebitis Assessment 0 02/18/23 1132   Infiltration Assessment 0 02/18/23 1132   Dressing Status Clean, dry, & intact 02/18/23 1132   Hub Color/Line Status Pink 02/18/23 1132       Peripheral IV 02/18/23 Right Forearm (Active)   Site Assessment Clean, dry, & intact 02/18/23 1137   Phlebitis Assessment 0 02/18/23 1137   Infiltration Assessment 0 02/18/23 1137   Dressing Status Clean, dry, & intact 02/18/23 1137   Hub Color/Line Status Green 02/18/23 1137          Mitch-Lo Drain 02/18/23 Anterior;Left;Superior Head (Active)   Site Assessment Clean, dry, & intact 02/18/23 1357   Dressing Status Clean, dry, & intact 02/18/23 1357   Status Patent; Charged 02/18/23 1357   Drainage Color Serosanguinous 02/18/23 1357       Orogastric Tube 02/18/23 (Active)   Output (ml) 500 ml 02/18/23 1137      Airway - Endotracheal Tube 02/18/23 Oral (Active)   Insertion Depth (cm) 24 cm 02/18/23 1100   Line Jerry Teeth 02/18/23 1100   Side Secured Device; Left 02/18/23 1100   Site Assessment Clean, dry, & intact 02/18/23 1100                   Dressing/Packing:  Incision 02/18/23 Head Left-Dressing/Treatment: Petroleum gauze;Dry dressing (02/18/23 1300)    Splint/Cast:  ]    Other:  Pt has 7 mm flat drain. Montoya removed at end of procedure.

## 2023-02-18 NOTE — ED PROVIDER NOTES
[facility] EMERGENCY DEPT  EMERGENCY DEPARTMENT ENCOUNTER       Pt Name: Anne Godoy  MRN: [id number]  [place] 1965  Date of evaluation: 2/18/2023  Provider: Justina Zapata MD   PCP: Lyndsay Pierce NP  Note Started: 11:29 AM 2/18/23     CHIEF COMPLAINT       Chief Complaint   Patient presents with    Headache     Pt arrives via ems with cc of HA, HTN today only. States this is the worst HA of his life. Pt is a HD pt, T, THUR, SAT schedule. Pt was picked up from dialysis today, after only being 15 minutes into treatment. Had to stop d/t HA. He missed dialysis Thursday, when asked why he sattes \"he does not remember\". Last full session was Tuesday. States he is on a blood thinner but is unable to recall the name. Hypertension        HISTORY OF PRESENT ILLNESS: 1 or more elements    History From: Patient  HPI Limitations : Altered Mental Status     Anne Gdooy is a 62 y.o. male with Pmhx listed below     63-year-old male with history of end-stage kidney disease, liver disease, hypertension presenting to the ED with concern of headache, upon triage evaluation patient stated that he woke up today with the worst headache of his life, unsure what time, states that he is HD patient and was at HD, had 15 minutes and and had to stop secondary to severe headache, received Tylenol in route, patient states that his last full session was earlier in the week and missed 1 episode, states that he is on a blood thinner but unable to state which one, no history of this in his med rec. Upon my evaluation of patient he is incoherent, unable to response or questions secondary to lethargy, opens eyes and moves all extremities, does not appear to have any focal neurologic deficits on exam but very lethargic and not responding to questions appropriately, grumbling and intermittently answering. Further history is limited secondary to altered mental status.       Nursing Notes were all reviewed and agreed with or any disagreements were addressed in the HPI. REVIEW OF SYSTEMS      Positives and Pertinent negatives as per HPI. PAST HISTORY     Past Medical History:  Past Medical History:   Diagnosis Date    Chronic kidney disease     Hypertension     Liver disease      Previous Medications    ALISKIREN (TEKTURNA) 300 MG TABLET    Take 1 Tablet by mouth daily. ASCORBIC ACID, VITAMIN C, (VITAMIN C) 500 MG TABLET    Take 500 mg by mouth daily. BUMETANIDE (BUMEX) 2 MG TABLET    Take 1 Tablet by mouth daily. BUPRENORPHINE-NALOXONE (SUBOXONE) 8-2 MG FILM SUBLINGAUL FILM    1 Film by SubLINGual route two (2) times a day. CALCIUM CARBONATE (TUMS) 200 MG CALCIUM (500 MG) CHEW    Take 3 Tablets by mouth three (3) times daily (after meals). CARVEDILOL (COREG) 25 MG TABLET    Take 1 Tablet by mouth two (2) times daily (with meals). CHOLECALCIFEROL (VITAMIN D3) 25 MCG (1,000 UNIT) CAP    Take 1,000 Units by mouth daily. CLONIDINE HCL (CATAPRES) 0.2 MG TABLET    Take 1 Tablet by mouth two (2) times a day. DOXAZOSIN (CARDURA) 8 MG TABLET    Take 1 Tablet by mouth nightly. MINOXIDIL (LONITEN) 10 MG TABLET    Take 1 Tablet by mouth two (2) times a day. PANTOPRAZOLE (PROTONIX) 40 MG TABLET    Take 1 Tablet by mouth two (2) times a day. Indications: an ulcer of the duodenum    SEVELAMER CARBONATE (RENVELA) 800 MG TAB TAB    Take 800 mg by mouth three (3) times daily. New prescription as of 22, prescribed by Dr. Laura Swift       Past Surgical History:  No past surgical history on file. Family History:  History reviewed. No pertinent family history.     Social History:  Social History     Tobacco Use    Smoking status: Some Days     Packs/day: 1.00     Types: Cigarettes     Last attempt to quit: 2020     Years since quittin.5    Smokeless tobacco: Never    Tobacco comments:     started back smoking in last couple months   Vaping Use    Vaping Use: Never used   Substance Use Topics    Alcohol use: Not Currently    Drug use: Yes     Types: Marijuana, Cocaine, Heroin     Comment: [employer] & Cocaine 10 years ago as of 3/23/22, Marijuana last used 1 week ago       Allergies: Allergies   Allergen Reactions    Baclofen Rash     All over    Losartan Hives, Shortness of Breath and Rash     Verified allergy with patient       PHYSICAL EXAM      ED Triage Vitals [02/18/23 0849]   ED Encounter Vitals Group      BP (!) 257/94      Pulse (Heart Rate) (!) 106      Resp Rate 18      Temp 98.2 °F (36.8 °C)      Temp src       O2 Sat (%) 100 %      Weight 171 lb 1.2 oz      Height 5' 9\"        Physical Exam  Vitals reviewed. Constitutional:       General: He is not in acute distress. Appearance: Normal appearance. He is ill-appearing and toxic-appearing. He is not diaphoretic. HENT:      Head: Normocephalic. Mouth/Throat:      Mouth: Mucous membranes are moist.   Eyes:      Conjunctiva/sclera: Conjunctivae normal.   Cardiovascular:      Rate and Rhythm: Normal rate. Pulmonary:      Effort: Pulmonary effort is normal. No respiratory distress. Abdominal:      General: Abdomen is flat. Musculoskeletal:         General: No deformity. Cervical back: Neck supple. Skin:     General: Skin is warm and dry. Neurological:      General: No focal deficit present. Mental Status: He is alert. He is disoriented. Cranial Nerves: No cranial nerve deficit. Sensory: No sensory deficit. Motor: No weakness. Comments: Patient does not appear to have any focal neurologic deficits, of cranial nerves, upper or lower extremities, responds to questions yes or no but mumbles indiscriminately at times and does not respond to orientation questions.   Appears to be significant change from triage evaluation, also has unequal pupils 3 on l 2 on r         EMERGENCY DEPARTMENT COURSE and DIFFERENTIAL DIAGNOSIS/MDM   CC/HPI Summary, DDx, ED Course, and Reassessment:     MDM  Number of Diagnoses or Management Options  Encephalopathy  Subdural hematoma  Diagnosis management comments: Patient is a 71-year-old male presenting with concern of worst headache of his life, upon wakening this morning, could not finish his dialysis session, is ESRD patient and per chart review has significant history of malignant hypertension, multiple medications at home, unsure if he has been taking as directed, did miss 1 session but was in session today for only 15 minutes. Upon arrival patient was alert and oriented, no focal neurologic deficits, but the time provider evaluated patient approximately 30 minutes later patient is incoherent, nods yes and no and moves all extremities, follows commands but does not answer orientation question and is very lethargic, difficult to respond to questions on exam.  Also appears to have unequal pupils left compared with right, due to this CT scan was called urgently for noncontrast CT, concern of ICH. Also concern of hypertensive emergency with initial blood pressure in the 250s, subdural hematoma was seen and radiology called this provider, I urgently began Cardene drip and discussed case with neurosurgery, Dr. Alaniz Both, arrange for critical care transport to [facility] where intervention would be held, patient was intubated due to acute change in mental status and concern for protecting airway. Additional nitroglycerin drip was began for continued hypertension, also given patient clonidine which she takes at home through his OG. ED Course as of 02/18/23 1151   Sat Feb 18, 2023   1022 Patient found to have a large subdural with shift, received call from radiology, will begin nicardipine drip and I have reviewed patient's med list, does not appear to have any blood thinners, Please consultation with neurosurgery. [RN]   2161 With acute change in mentation, no longer protecting airway, will plan on intubation at this time and discussion with neurosurgery.  [RN]   1114 Successfully intubated, continues to be hypertensive into the 200s, Cardene at 15, will plan home dose clonidine and addition of nitroglycerin drip for BP goal  [RN]      ED Course User Index  [RN] Caren Pugh MD       Vitals:    02/18/23 1118 02/18/23 1124 02/18/23 1130 02/18/23 1133   BP: (!) 233/100 (!) 204/95 (!) 188/85 (!) 188/85   Pulse: (!) 139 (!) 124  (!) 122   Resp:       Temp:       SpO2: 99% 99%  99%   Weight:       Height:            Patient was given the following medications:  Medications   niCARdipine (CARDENE) 25 mg in 0.9% sodium chloride 250 mL ([id number]) (0 mg/hr IntraVENous Continued On External Transport 2/18/23 1200)   propofoL (DIPRIVAN) 10 mg/mL injection (0 mcg/kg/min × 77.6 kg IntraVENous Continued On External Transport 2/18/23 1149)   fentaNYL (PF) 1,500 mcg/30 mL (50 mcg/mL) infusion (0 mcg/hr IntraVENous Stopped 2/18/23 1200)   nitroGLYcerin (Tridil) 200 mcg/ml infusion (0 mcg/min IntraVENous Continued On External Transport 2/18/23 1149)   cloNIDine HCL (CATAPRES) tablet 0.2 mg (has no administration in time range)   acetaminophen (TYLENOL) tablet 650 mg (650 mg Oral Given 2/18/23 0948)   levETIRAcetam (KEPPRA) 4,000 mg in 0.9% sodium chloride 250 mL IVPB (4,000 mg IntraVENous New Bag 2/18/23 1115)       CONSULTS:  IP CONSULT TO NEUROSURGERY    Social Determinants affecting Dx or Tx:  [facility]    Records Reviewed (source and summary of external notes): Prior medical records  DIAGNOSTIC RESULTS   LABS:     Recent Results (from the past 12 hour(s))   CBC WITH AUTOMATED DIFF    Collection Time: 02/18/23  9:49 AM   Result Value Ref Range    WBC 11.8 (H) 4.1 - 11.1 K/uL    RBC 2.59 (L) 4.10 - 5.70 M/uL    HGB 8.0 (L) 12.1 - 17.0 g/dL    HCT 24.3 (L) 36.6 - 50.3 %    MCV 93.8 80.0 - 99.0 FL    MCH 30.9 26.0 - 34.0 PG    MCHC 32.9 30.0 - 36.5 g/dL    RDW 13.6 11.5 - 14.5 %    PLATELET 090 783 - 422 K/uL    MPV 10.7 8.9 - 12.9 FL    NRBC 0.0 0  WBC    ABSOLUTE NRBC 0.00 0.00 - 0.01 K/uL    NEUTROPHILS 83 (H) 32 - 75 %    LYMPHOCYTES 9 (L) 12 - 49 %    MONOCYTES 5 5 - 13 %    EOSINOPHILS 2 0 - 7 %    BASOPHILS 1 0 - 1 %    IMMATURE GRANULOCYTES 0 0.0 - 0.5 %    ABS. NEUTROPHILS 9.7 (H) 1.8 - 8.0 K/UL    ABS. LYMPHOCYTES 1.1 0.8 - 3.5 K/UL    ABS. MONOCYTES 0.6 0.0 - 1.0 K/UL    ABS. EOSINOPHILS 0.3 0.0 - 0.4 K/UL    ABS. BASOPHILS 0.1 0.0 - 0.1 K/UL    ABS. IMM. GRANS. 0.0 0.00 - 0.04 K/UL    DF AUTOMATED     METABOLIC PANEL, COMPREHENSIVE    Collection Time: 02/18/23  9:49 AM   Result Value Ref Range    Sodium 139 136 - 145 mmol/L    Potassium 4.4 3.5 - 5.1 mmol/L    Chloride 98 97 - 108 mmol/L    CO2 20 (L) 21 - 32 mmol/L    Anion gap 21 (H) 5 - 15 mmol/L    Glucose 151 (H) 65 - 100 mg/dL     (H) 6 - 20 MG/DL    Creatinine 19.00 (H) 0.70 - 1.30 MG/DL    BUN/Creatinine ratio 7 (L) 12 - 20      eGFR 3 (L) >60 ml/min/1.73m2    Calcium 8.7 8.5 - 10.1 MG/DL    Bilirubin, total 0.3 0.2 - 1.0 MG/DL    ALT (SGPT) 9 (L) 12 - 78 U/L    AST (SGOT) 5 (L) 15 - 37 U/L    Alk. phosphatase 95 45 - 117 U/L    Protein, total 8.5 (H) 6.4 - 8.2 g/dL    Albumin 3.8 3.5 - 5.0 g/dL    Globulin 4.7 (H) 2.0 - 4.0 g/dL    A-G Ratio 0.8 (L) 1.1 - 2.2     MAGNESIUM    Collection Time: 02/18/23  9:49 AM   Result Value Ref Range    Magnesium 2.6 (H) 1.6 - 2.4 mg/dL        EKG:      RADIOLOGY:  Non-plain film images such as CT, Ultrasound and MRI are read by the radiologist.   Plain radiographic images are often visualized and preliminarily interpreted by the ED, if an interpretation was completed the findings will be listed below:   Sub dural      Interpretation per the Radiologist below, if available at the time of this note:     CT HEAD WO CONT    Result Date: 2/18/2023  EXAM: CT HEAD WO CONT INDICATION: Ha, worse oflife, eval for ICH COMPARISON: None. CONTRAST: None. TECHNIQUE: Unenhanced CT of the head was performed using 5 mm images. Brain and bone windows were generated. Coronal and sagittal reformats.  CT dose reduction was achieved through use of a standardized protocol tailored for this examination and automatic exposure control for dose modulation. FINDINGS: There is an acute left subdural hematoma measuring 17 mm in width, there is a 1 cm shift of the midline with some periventricular dilatation of the temporal horn suspicious for herniation. Acute left subdural hematoma which shift, Dr. Nessa Pabon notified by myself    XR CHEST PORT    Result Date: 2/18/2023  INDICATION:  post intubation COMPARISON: CT November 2022 FINDINGS: Single AP portable view of the chest obtained at 1103 demonstrates an ET tube in satisfactory position. NG tube enters the stomach. Left-sided central venous catheter is unchanged. The lungs are clear. There is no pneumothorax. ET tube in satisfactory position. NG tube enters the stomach. The lungs are clear. PROCEDURES   Unless otherwise noted below, none  Intubation    Date/Time: 2/18/2023 11:49 AM  Performed by: Roni Casanova MD  Authorized by: Roni Casanova MD     Consent:     Consent obtained:  Emergent situation  Universal protocol:     Patient identity confirmed:  Arm band  Pre-procedure details:     Indication: failure to protect airway and predicted clinical deterioration      Patient status:  Altered mental status    Look externally: no concerns      Neck mobility: normal      Pharmacologic strategy: RSI      Induction agents:  Etomidate    Paralytics:  Rocuronium  Procedure details:     Preoxygenation:  Nonrebreather mask    CPR in progress: no      Intubation method:  Oral    Intubation technique: endoscope assisted and video assisted      Laryngoscope blade:   Mac 4    Bougie used: no      Grade view: II      Tube size (mm):  8.0    Tube type:  Cuffed    Number of attempts:  1    Ventilation between attempts: no      Tube visualized through cords: yes    Placement assessment:     ETT at teeth/gumline (cm):  24    Tube secured with:  ETT varghese    Breath sounds:  Equal    Placement verification: chest rise and CXR verification      CXR findings:  Appropriate position  Post-procedure details:     Procedure completion:  Tolerated with difficulty    Complications: hypoxia    Comments:      Brief Hypoxia to 42%, bagged appropriately and responded within approximately 1 minute. CRITICAL CARE TIME   I have spent 90 minutes of critical care time in evaluating and treating this patient. This includes time spent at bedside, time with family and decision makers, documentation, review of labs and imaging, and/or consultation with specialists. It does not include time spent on separately billed procedures. This patient presents with a critical illness or injury that acutely impairs one or more vital organ systems such that there is a high probability of imminent or life threatening deterioration in the patient's condition. This case involved decision making of high complexity to assess, manipulate, and support vital organ system failure and/or to prevent further life threatening deterioration of the patient's condition. Failure to initiate these interventions on an urgent basis would likely result in sudden, clinically significant or life threatening deterioration in the patient's condition. Abnormal findings supporting critical care: Subdural hematoma, acute hypoxic respiratory failure, altered mental status  Interventions to support critical care: Performing bedside care, reviewing ancillary studies, discussion with family/consultants, completing documentation  Failure to intervene may result in: Decompensation/death      FINAL IMPRESSION     1. Subdural hematoma    2. Encephalopathy          DISPOSITION/PLAN   Transferred to Another Facility    Transfer: The patient is being transferred to [facility] for Subdural. The results of their tests and reasons for their transfer have been discussed with the patient and/or available family.  The patient/family has conveyed agreement and understanding for the need to be admitted and for their admission diagnosis. Consultation has been made with Daniele with [facility] and Romi Vu with [facility] , who agrees to accept the transfer. PATIENT REFERRED TO:  Follow-up Information    None           DISCHARGE MEDICATIONS:  Current Discharge Medication List            DISCONTINUED MEDICATIONS:  Current Discharge Medication List          I am the Primary Clinician of Record. Signed By: Neville Daigle MD     February 18, 2023      (Please note that parts of this dictation were completed with voice recognition software. Quite often unanticipated grammatical, syntax, homophones, and other interpretive errors are inadvertently transcribed by the computer software. Please disregards these errors.  Please excuse any errors that have escaped final proofreading.)

## 2023-02-18 NOTE — ED PROVIDER NOTES
Patient arrives via critical care as a transfer from [facility]. Patient to go to OR with neurosurgery. Arrives on antihypertensives, intubated.      Patient went directly to OR

## 2023-02-18 NOTE — PERIOP NOTES
TRANSFER - OUT REPORT:    Verbal report given to [facility], RN(name) on Ivanna Seats  being transferred to ICU(unit) for ordered procedure       Report consisted of patients Situation, Background, Assessment and   Recommendations(SBAR). Time Pre op antibiotic given:1310  Anesthesia Stop time: 7342    Information from the following report(s) SBAR, Kardex, ED Summary, OR Summary, Procedure Summary, Intake/Output, MAR, and Recent Results was reviewed with the receiving nurse. Opportunity for questions and clarification was provided. Is the patient on 02? YES       Other 100% vent    Is the patient on a monitor? YES    Is the nurse transporting with the patient? YES    Surgical Waiting Area notified of patient's transfer from PACU?  YES- patient's sister called and updated on patient status and room assignment      The following personal items collected during your admission accompanied patient upon transfer:   Dental Appliance:    Vision:    Hearing Aid:    Jewelry:    Clothing:    Other Valuables:    Valuables sent to safe:

## 2023-02-18 NOTE — OP NOTES
[address]   OPERATIVE REPORT    Name:  Daniel Ruiz  MR#:  [id number]  :  1965  ACCOUNT #:  [de-identified]  DATE OF SERVICE:  2023    PREOPERATIVE DIAGNOSIS:  Acute and chronic subdural hematoma. POSTOPERATIVE DIAGNOSIS:  Acute and chronic subdural hematoma. PROCEDURE PERFORMED:  Left frontotemporal parietal craniotomy, evacuation of acute and chronic subdural hematoma. SURGEON:  Shelli Henriquez MD    ASSISTANT:  [facility].    ANESTHESIA:  General.    COMPLICATIONS:  None. SPECIMENS REMOVED:  Clot. IMPLANTS:  Mini plates and screws for the bone flap. DRAINS:  Subdural drain, 7-mm Mitch-Lo. ESTIMATED BLOOD LOSS:  Minimal.    INDICATIONS:  Review of imaging studies revealed a very large acute and chronic subdural hematoma in this hemodialysis patient who was rapidly becoming unresponsive at an outside hospital, was intubated and transferred emergently to [facility].  He was taken directly to the operating room when he was brought to the hospital from [facility]. I had spoken to the patient's fiance and sister who were the primary decision makers, and after discussing the risks, benefits, and alternatives of surgery, they agreed to proceed. PROCEDURE:  He had already been intubated at the outside hospital, was placed on the operating table in the supine position, and the head resting on a circular donut headrest.  The left frontotemporal parietal region was shaved, scrubbed, prepped, and draped in the usual sterile fashion. A time-out was performed to identify the correct patient and procedure. A small incision was made from the zygoma to the frontal region anteriorly. Ramirez clips attached to the skin edges for hemostasis. Temporalis muscle was taken down with the skin flap and secured to the surrounding drapes with towel clamps and rubber bands. A free bone flap was elevated. The dura was noted to be quite tense. It was tacked up to the surrounding bone with 4-0 Nurolon sutures in the typical fashion. A cruciate incision was made in the dura, and immediately, subdural blood began to emanate from it. There were clearly multiple components of the subdural, i.e., some chronic-appearing fluid but mostly acute and subacute thickened clot. I had to remove it with a [place] forceps at times as I pulled it from the subdural space. The brain was noted to be pulsating more normally but did not expand to occupy the subdural space, so I placed a subdural drain and brought it out through a separate stab incision in the region of one of the bur holes. There were some bridging veins that I cauterized and divided sharply that were attached to the undersurface of the skull. I did not see any active bleeding on the surface of the brain after inspecting it closely after removing the clot. I irrigated the subdural space with normal saline, closed the dura over the drain with 4-0 Nurolon sutures, and covered the dura with a large piece of Gelfoam.  The bone flap was replaced and secured with mini plates from the [employer] system. The galea was closed with 2-0 interrupted inverted Vicryl sutures after reapproximating the temporalis muscle with 2-0 Vicryl sutures as well. A running Rapide Vicryl stitch was used to close the skin. A sterile dressing was applied. The needle, sponge, and instrument count were correct at the end of the procedure.       Rosemary Benedict MD      [id number]  D:  02/18/2023 15:00  T:  02/18/2023 17:42  JOB #:  [id number]  CC:  Venessa Bashir MD

## 2023-02-18 NOTE — ANESTHESIA POSTPROCEDURE EVALUATION
Post-Anesthesia Evaluation and Assessment    Patient: Neel Gomez MRN: [id number]  SSN: [id number]    YOB: 1965  Age: 62 y.o. Sex: male      I have evaluated the patient and they are stable and ready for discharge from the PACU. Cardiovascular Function/Vital Signs  Visit Vitals  /65   Pulse 72   Temp (!) 35.8 °C (96.5 °F)   Resp 20   Ht 5' 9\" (1.753 m)   Wt 74.2 kg (163 lb 9.3 oz)   SpO2 100%   BMI 24.16 kg/m²       Patient is status post General anesthesia for Procedure(s):  LEFT SUBDURAL CRANIOTOMY. Nausea/Vomiting: None    Postoperative hydration reviewed and adequate. Pain:  Pain Scale 1: Adult Nonverbal Pain Scale (02/18/23 1700)  Pain Intensity 1: 0 (02/18/23 1700)   Managed    Neurological Status:   Neuro (WDL): Exceptions to WDL (02/18/23 1517)  Neuro  Neurologic State: Pharmacologically induced (comment) (propofol gtt) (02/18/23 1517)  Orientation Level: Unable to verbalize (02/18/23 1517)  Cognition: Unable to assess (comment) (02/18/23 1517)  Speech: Intubated (02/18/23 1517)  Assessment L Pupil: Round;Sluggish (02/18/23 1517)  Size L Pupil (mm): 3 (02/18/23 1517)  Assessment R Pupil: Round;Sluggish (02/18/23 1517)  Size R Pupil (mm): 2 (02/18/23 1517)  LUE Motor Response: No movement to any stimulus (02/18/23 1517)  LLE Motor Response: No movement to any stimulus (02/18/23 1517)  RUE Motor Response: No movement to any stimulus (02/18/23 1517)  RLE Motor Response: No movement to any stimulus (02/18/23 1517)   At baseline    Mental Status, Level of Consciousness: intubated and sedated    Pulmonary Status:   O2 Device: Endotracheal tube;Ventilator (02/18/23 1545)   Adequate oxygenation and airway patent    Complications related to anesthesia: None    Post-anesthesia assessment completed.  No concerns    Signed By: Zena Sagastume MD     February 18, 2023

## 2023-02-18 NOTE — CONSULTS
Consult received  reviewed the head CT  he will need surgery to remove the scute and chronic SDH on the left but we need to ascertain if he is on anticoag therapy as the notes suggest   typically renal failure dialysis pts are coagulopathic anyway  will call ER at [facility]

## 2023-02-18 NOTE — ANESTHESIA PREPROCEDURE EVALUATION
Relevant Problems   CARDIOVASCULAR   (+) Hypertension   (+) Hypertensive emergency   (+) Hypertensive urgency      GASTROINTESTINAL   (+) Chronic hepatitis C (HCC)   (+) Peptic ulcer disease      RENAL FAILURE   (+) CKD (chronic kidney disease)   (+) ESRD (end stage renal disease) (HCC)   (+) ESRD (end stage renal disease) on dialysis (HCC)      HEMATOLOGY   (+) Iron deficiency anemia       Anesthetic History   No history of anesthetic complications            Review of Systems / Medical History  Patient summary reviewed, nursing notes reviewed and pertinent labs reviewed    Pulmonary  Within defined limits                 Neuro/Psych     seizures    Headaches     Cardiovascular    Hypertension (Poorly controlled, admitted 12/2022 for Hypertensive emergency with headache symptoms): poorly controlled                   GI/Hepatic/Renal       Hepatitis: type C  Renal disease: dialysis  PUD and liver disease     Endo/Other  Within defined limits           Other Findings   Comments: NC Head CT FINDINGS:  There is an acute left subdural hematoma measuring 17 mm in width, there is a 1  cm shift of the midline with some periventricular dilatation of the temporal  horn suspicious for herniation. Physical Exam    Airway    TM Distance: 4 - 6 cm      Intubated   Cardiovascular  Regular rate and rhythm,  S1 and S2 normal,  no murmur, click, rub, or gallop             Dental  No notable dental hx       Pulmonary  Breath sounds clear to auscultation               Abdominal  GI exam deferred      Comments: Comes from OSH already intubated.  Other Findings            Anesthetic Plan    ASA: 3, emergent  Anesthesia type: general          Induction: Intravenous  Anesthetic plan and risks discussed with: Healthcare power of  and Family

## 2023-02-18 NOTE — PERIOP NOTES
# 16 Spanish brown inserted with 10 ml balloon of sterile water, clear yellow urine return present. Brown remained free from tension.

## 2023-02-18 NOTE — PROGRESS NOTES
1530: TRANSFER - IN REPORT:    Verbal report received from [address] RN(name) on Isael Stack  being received from PACU (unit) for routine progression of care      Report consisted of patients Situation, Background, Assessment and   Recommendations(SBAR). Information from the following report(s) SBAR, Kardex, ED Summary, OR Summary, Intake/Output, MAR, Recent Results, Cardiac Rhythm SR, Alarm Parameters , and Dual Neuro Assessment was reviewed with the receiving nurse. Opportunity for questions and clarification was provided. Assessment completed upon patients arrival to unit and care assumed. 1625: SBP remains in 190s on 15mg/hr cardene and 50 propofol, intensivist aware, orders received     [phone]: Nephrology consult called    0700: Faye Justice MD informed of potassium level 6.1 - orders received. Chayo paged. 1930: Bedside shift change report given to [facility] (oncoming nurse) by Steph King (offgoing nurse). Report included the following information SBAR, Kardex, OR Summary, Procedure Summary, Intake/Output, MAR, Recent Results, Cardiac Rhythm SR, Alarm Parameters , and Dual Neuro Assessment.

## 2023-02-18 NOTE — ED NOTES
Headache (Pt arrives via ems with cc of HA, HTN today only. States this is the worst HA of his life. Pt is a HD pt, T, THUR, SAT schedule. Pt was picked up from dialysis today, after only being 15 minutes into treatment. Had to stop d/t HA. He missed dialysis Thursday, when asked why he sattes \"he does not remember\". Last full session was Tuesday. States he is on a blood thinner but is unable to recall the name, RN unable to find a blood thinner on his med list. Pt is A+Ox4, but takes prompting to answer questions. Pt placed on monitor x2 call light in reach    1015 pt off floor to CT, prior to leaving pt talking on phone to fiance    1030 Upon return from CT, pt is less alert, unable to respond to questions at this time. MD notified of critical CT results. Prepping for intubation    1045 pt sedated with 20mg of etomidate.  100mg of rocoronium per Dalila Soares, [facility] report given to John Alexander RN at [facility] ED    1130 Critical care truck has arrived    [phone] pt transferred to EMS stretcher without complication    5245 pt departed ED [facility] at this time

## 2023-02-19 ENCOUNTER — APPOINTMENT (OUTPATIENT)
Dept: CT IMAGING | Age: 58
DRG: 025 | End: 2023-02-19
Attending: NEUROLOGICAL SURGERY
Payer: MEDICARE

## 2023-02-19 LAB
ALBUMIN SERPL-MCNC: 3.2 G/DL (ref 3.5–5)
ALBUMIN/GLOB SERPL: 0.8 (ref 1.1–2.2)
ALP SERPL-CCNC: 74 U/L (ref 45–117)
ALT SERPL-CCNC: 8 U/L (ref 12–78)
ANION GAP SERPL CALC-SCNC: 9 MMOL/L (ref 5–15)
ARTERIAL PATENCY WRIST A: POSITIVE
AST SERPL-CCNC: 8 U/L (ref 15–37)
BASE EXCESS BLD CALC-SCNC: 4.2 MMOL/L
BDY SITE: ABNORMAL
BILIRUB SERPL-MCNC: 0.3 MG/DL (ref 0.2–1)
BUN SERPL-MCNC: 50 MG/DL (ref 6–20)
BUN/CREAT SERPL: 5 (ref 12–20)
CALCIUM SERPL-MCNC: 8.9 MG/DL (ref 8.5–10.1)
CHLORIDE SERPL-SCNC: 98 MMOL/L (ref 97–108)
CO2 SERPL-SCNC: 28 MMOL/L (ref 21–32)
CREAT SERPL-MCNC: 9.77 MG/DL (ref 0.7–1.3)
ERYTHROCYTE [DISTWIDTH] IN BLOOD BY AUTOMATED COUNT: 13.2 % (ref 11.5–14.5)
GAS FLOW.O2 O2 DELIVERY SYS: ABNORMAL
GAS FLOW.O2 SETTING OXYMISER: 12 BPM
GLOBULIN SER CALC-MCNC: 3.9 G/DL (ref 2–4)
GLUCOSE SERPL-MCNC: 82 MG/DL (ref 65–100)
HBV SURFACE AB SER QL: NONREACTIVE
HBV SURFACE AB SER-ACNC: <3.1 MIU/ML
HBV SURFACE AG SER QL: <0.1 INDEX
HBV SURFACE AG SER QL: NEGATIVE
HCO3 BLD-SCNC: 27.4 MMOL/L (ref 22–26)
HCT VFR BLD AUTO: 19.8 % (ref 36.6–50.3)
HGB BLD-MCNC: 6.6 G/DL (ref 12.1–17)
HISTORY CHECKED?,CKHIST: NORMAL
MAGNESIUM SERPL-MCNC: 2 MG/DL (ref 1.6–2.4)
MCH RBC QN AUTO: 30.7 PG (ref 26–34)
MCHC RBC AUTO-ENTMCNC: 33.3 G/DL (ref 30–36.5)
MCV RBC AUTO: 92.1 FL (ref 80–99)
NRBC # BLD: 0 K/UL (ref 0–0.01)
NRBC BLD-RTO: 0 PER 100 WBC
O2/TOTAL GAS SETTING VFR VENT: 30 %
PCO2 BLD: 34 MMHG (ref 35–45)
PEEP RESPIRATORY: 5 CMH2O
PH BLD: 7.51 (ref 7.35–7.45)
PHOSPHATE SERPL-MCNC: 2.8 MG/DL (ref 2.6–4.7)
PLATELET # BLD AUTO: 185 K/UL (ref 150–400)
PMV BLD AUTO: 11.3 FL (ref 8.9–12.9)
PO2 BLD: 129 MMHG (ref 80–100)
POTASSIUM SERPL-SCNC: 5 MMOL/L (ref 3.5–5.1)
PROT SERPL-MCNC: 7.1 G/DL (ref 6.4–8.2)
RBC # BLD AUTO: 2.15 M/UL (ref 4.1–5.7)
SAO2 % BLD: 99.3 % (ref 92–97)
SODIUM SERPL-SCNC: 135 MMOL/L (ref 136–145)
SPECIMEN TYPE: ABNORMAL
VENTILATION MODE VENT: ABNORMAL
VT SETTING VENT: 500 ML
WBC # BLD AUTO: 9 K/UL (ref 4.1–11.1)

## 2023-02-19 PROCEDURE — 74011250636 HC RX REV CODE- 250/636: Performed by: STUDENT IN AN ORGANIZED HEALTH CARE EDUCATION/TRAINING PROGRAM

## 2023-02-19 PROCEDURE — 86900 BLOOD TYPING SEROLOGIC ABO: CPT

## 2023-02-19 PROCEDURE — 36430 TRANSFUSION BLD/BLD COMPNT: CPT

## 2023-02-19 PROCEDURE — 74011250637 HC RX REV CODE- 250/637: Performed by: ANESTHESIOLOGY

## 2023-02-19 PROCEDURE — 74011250636 HC RX REV CODE- 250/636: Performed by: NEUROLOGICAL SURGERY

## 2023-02-19 PROCEDURE — 83735 ASSAY OF MAGNESIUM: CPT

## 2023-02-19 PROCEDURE — 84100 ASSAY OF PHOSPHORUS: CPT

## 2023-02-19 PROCEDURE — 82803 BLOOD GASES ANY COMBINATION: CPT

## 2023-02-19 PROCEDURE — C9113 INJ PANTOPRAZOLE SODIUM, VIA: HCPCS | Performed by: ANESTHESIOLOGY

## 2023-02-19 PROCEDURE — 36415 COLL VENOUS BLD VENIPUNCTURE: CPT

## 2023-02-19 PROCEDURE — 65610000006 HC RM INTENSIVE CARE

## 2023-02-19 PROCEDURE — 74011000250 HC RX REV CODE- 250: Performed by: ANESTHESIOLOGY

## 2023-02-19 PROCEDURE — 36600 WITHDRAWAL OF ARTERIAL BLOOD: CPT

## 2023-02-19 PROCEDURE — 74011250636 HC RX REV CODE- 250/636: Performed by: ANESTHESIOLOGY

## 2023-02-19 PROCEDURE — 85027 COMPLETE CBC AUTOMATED: CPT

## 2023-02-19 PROCEDURE — 86923 COMPATIBILITY TEST ELECTRIC: CPT

## 2023-02-19 PROCEDURE — 94003 VENT MGMT INPAT SUBQ DAY: CPT

## 2023-02-19 PROCEDURE — 70450 CT HEAD/BRAIN W/O DYE: CPT

## 2023-02-19 PROCEDURE — 74011250636 HC RX REV CODE- 250/636: Performed by: INTERNAL MEDICINE

## 2023-02-19 PROCEDURE — 80053 COMPREHEN METABOLIC PANEL: CPT

## 2023-02-19 PROCEDURE — P9016 RBC LEUKOCYTES REDUCED: HCPCS

## 2023-02-19 PROCEDURE — 74011000250 HC RX REV CODE- 250: Performed by: NEUROLOGICAL SURGERY

## 2023-02-19 RX ORDER — SODIUM CHLORIDE 9 MG/ML
250 INJECTION, SOLUTION INTRAVENOUS AS NEEDED
Status: DISCONTINUED | OUTPATIENT
Start: 2023-02-19 | End: 2023-02-28 | Stop reason: HOSPADM

## 2023-02-19 RX ADMIN — FENTANYL CITRATE 25 MCG: 50 INJECTION, SOLUTION INTRAMUSCULAR; INTRAVENOUS at 08:21

## 2023-02-19 RX ADMIN — CARVEDILOL 12.5 MG: 12.5 TABLET, FILM COATED ORAL at 08:21

## 2023-02-19 RX ADMIN — HYDRALAZINE HYDROCHLORIDE 20 MG: 20 INJECTION INTRAMUSCULAR; INTRAVENOUS at 09:32

## 2023-02-19 RX ADMIN — PANTOPRAZOLE SODIUM 40 MG: 40 INJECTION, POWDER, LYOPHILIZED, FOR SOLUTION INTRAVENOUS at 08:21

## 2023-02-19 RX ADMIN — CLONIDINE HYDROCHLORIDE 0.2 MG: 0.1 TABLET ORAL at 17:14

## 2023-02-19 RX ADMIN — CARVEDILOL 12.5 MG: 12.5 TABLET, FILM COATED ORAL at 17:14

## 2023-02-19 RX ADMIN — SODIUM CHLORIDE, PRESERVATIVE FREE 10 ML: 5 INJECTION INTRAVENOUS at 13:02

## 2023-02-19 RX ADMIN — PROPOFOL 45 MCG/KG/MIN: 10 INJECTION, EMULSION INTRAVENOUS at 02:50

## 2023-02-19 RX ADMIN — ACETAMINOPHEN 650 MG: 325 TABLET ORAL at 17:21

## 2023-02-19 RX ADMIN — LABETALOL HYDROCHLORIDE 20 MG: 5 INJECTION INTRAVENOUS at 00:34

## 2023-02-19 RX ADMIN — LEVETIRACETAM 500 MG: 100 INJECTION, SOLUTION, CONCENTRATE INTRAVENOUS at 09:32

## 2023-02-19 RX ADMIN — LABETALOL HYDROCHLORIDE 20 MG: 5 INJECTION INTRAVENOUS at 09:07

## 2023-02-19 RX ADMIN — SODIUM CHLORIDE, PRESERVATIVE FREE 30 ML: 5 INJECTION INTRAVENOUS at 05:23

## 2023-02-19 RX ADMIN — EPOETIN ALFA-EPBX 20000 UNITS: 20000 INJECTION, SOLUTION INTRAVENOUS; SUBCUTANEOUS at 12:57

## 2023-02-19 RX ADMIN — LEVETIRACETAM 500 MG: 100 INJECTION, SOLUTION, CONCENTRATE INTRAVENOUS at 21:04

## 2023-02-19 RX ADMIN — CLONIDINE HYDROCHLORIDE 0.2 MG: 0.1 TABLET ORAL at 08:21

## 2023-02-19 RX ADMIN — ACETAMINOPHEN 650 MG: 325 TABLET ORAL at 12:52

## 2023-02-19 RX ADMIN — SODIUM CHLORIDE, PRESERVATIVE FREE 10 ML: 5 INJECTION INTRAVENOUS at 21:04

## 2023-02-19 RX ADMIN — BUMETANIDE 2 MG: 1 TABLET ORAL at 08:21

## 2023-02-19 NOTE — PROGRESS NOTES
Nephrology Progress Note  [address] [facility]  [address], [place], [address]  Phone - [phone]  Fax - [phone]                 Patient: Gloria Martinez                   YOB: 1965        Date- 2/19/2023                      Admit Date: 2/18/2023  CC: Follow up for esrd          IMPRESSION & PLAN:   Esrd - hd tts - [facility]   Hypertension  S/p craniotomy  SDH  Hypertensive urgency  Non compliance to meds and diet and HD schedule  Anemia of ckd  Sec. hyperpara      PLAN-  No hd indicated today  Follow bmp  Epogen for anemia  Continue home bp meds  D/w nurse     Subjective: Interval History:   -  s/p hd last night- he missed hd on last Tuesday  S/p craniotomy for SDH    Objective:   Vitals:    02/19/23 0942 02/19/23 1000 02/19/23 1035 02/19/23 1040   BP: (!) 164/85 (!) 151/89  (!) 161/81   Pulse: 74 83 89 82   Resp: 14 17 24 17   Temp: 98.4 °F (36.9 °C)      TempSrc:       SpO2: 99% 100% 100% 100%   Weight:       Height:          02/18 0701 - 02/19 0700  In: 1647.6 [I.V.:1527.6]  Out: 3650 [Drains:150]    Last 3 Recorded Weights in this Encounter    02/18/23 1545   Weight: 74.2 kg (163 lb 9.3 oz)        Physical exam:    GEN: NAD  NECK- no mass  RESP: No wheezing, decreased BS b/l  CVS: S1,S2  RRR  NEURO: Normal speech,  EXT: No Edema   PSYCH: Can't assess due to patient's current condition   Left permacath +    Chart reviewed. Pertinent Notes reviewed.      Data Review :  Recent Labs     02/19/23  0523 02/18/23 2050 02/18/23  1721 02/18/23  0949   * 142 136 139   K 5.0 4.1 6.1* 4.4   CL 98 103 103 98   CO2 28 23 19* 20*   BUN 50* 137* 133* 130*   CREA 9.77* 19.20* 18.80* 19.00*   GLU 82 81 127* 151*   CA 8.9 8.3* 8.1* 8.7   MG 2.0  --  2.6* 2.6*   PHOS 2.8  --  7.8*  --      Recent Labs     02/19/23  0523 02/18/23  1721 02/18/23  0949   WBC 9.0 9.4 11.8*   HGB 6.6* 7.5* 8.0*   HCT 19.8* 23.5* 24.3*  253 194     No results for input(s): FE, TIBC, PSAT, FERR in the last 72 hours.    Lab Results   Component Value Date/Time    Hemoglobin A1c 4.7 06/29/2020 03:46 AM      No results found for: MCACR, MCA1, MCA2, MCA3, MCAU, MCAU2, MCALPOCT  US Results (most recent):  Medication list  reviewed  Current Facility-Administered Medications   Medication Dose Route Frequency    0.9% sodium chloride infusion 250 mL  250 mL IntraVENous PRN    sodium chloride (NS) flush 5-40 mL  5-40 mL IntraVENous Q8H    ondansetron (ZOFRAN) injection 4 mg  4 mg IntraVENous Q4H PRN    niCARdipine (CARDENE) 25 mg in 0.9% sodium chloride 250 mL ([id number])  5 mg/hr IntraVENous TITRATE    acetaminophen (TYLENOL) tablet 650 mg  650 mg Oral Q6H PRN    Or    acetaminophen (TYLENOL) suppository 650 mg  650 mg Rectal Q6H PRN    polyethylene glycol (MIRALAX) packet 17 g  17 g Oral DAILY PRN    propofol (DIPRIVAN) 10 mg/mL infusion  0-50 mcg/kg/min IntraVENous TITRATE    pantoprazole (PROTONIX) 40 mg in 0.9% sodium chloride 10 mL injection  40 mg IntraVENous DAILY    levETIRAcetam (KEPPRA) injection 500 mg  500 mg IntraVENous Q12H    hydrALAZINE (APRESOLINE) 20 mg/mL injection 20 mg  20 mg IntraVENous Q6H PRN    labetaloL (NORMODYNE;TRANDATE) injection 20 mg  20 mg IntraVENous Q4H PRN    bumetanide (BUMEX) tablet 2 mg  2 mg Oral DAILY    carvediloL (COREG) tablet 12.5 mg  12.5 mg Oral BID WITH MEALS    cloNIDine HCL (CATAPRES) tablet 0.2 mg  0.2 mg Oral BID    fentaNYL citrate (PF) injection 25 mcg  25 mcg IntraVENous Q4H PRN        Yahir Rosenthal MD  2/19/2023

## 2023-02-19 NOTE — CONSULTS
NEPHROLOGY CONSULT NOTE     Patient: Gloria Martinez MRN: [id number]  PCP: Lupe Bergman NP   :     10/29/1956  Age:   77 y.o. Sex:  male      Referring physician: Niurka Streeter MD  Reason for consultation: 77 y.o. male with Subdural hematoma [S06. 5XAA] complicated by ECHO   Admission Date: 2023 12:28 PM  LOS: 0 days     DISCUSSION / PLAN :   ESRD on dialysis  - [id number], TTS  - Last dialysis treatment last week  - Access: Left CVC  - HD treatment tonight, 3.5 hours, goal UF 3L, dialysis nurse at bedside  - Continue TTS schedule  - Renally dose all medication  - Trend renal indices daily     Hyperkalemia  - K+ 6.1, treated with temp agents  - improved to 4.1  - HD per above    Hypertension   - Continue home medication regiment    SDH  - s/p craniotomy drain in place  - NSGY following        Subjective:   HPI: Gloria Martinez is a 77 y.o.  male who has a past medical history significant for ESRD on dialysis, hypertension, and liver disease. Presented to [facility] emergency room with complaints of headache. Reportedly patient developed headache this morning and when he went for his dialysis treatment got progressively worse and he was taken off the machine 15 minutes into treatment and sent to the hospital for further evaluation. CT of the head revealed acute SDH and he was transferred to [facility] for surgical intervention. On arrival patient was intubated and sedated and on cardene drip. He was taken emergently to the OR for craniotomy. On exam, patient is intubated/sedated receiving his dialysis treatment. Significant other at the bedside who provides most of history. She reports patient did not go to dialysis this week due to a death in the family. Nephrology was consulted for the management of dialysis  - Patient is followed by [address], TTS  - Last full, completed dialysis treatment last week.    - Access: Left CVC    Past Medical Hx:   Past Medical History: Diagnosis Date    Chronic kidney disease     Hypertension     Liver disease         Past Surgical Hx:   No past surgical history on file. Medications:  Prior to Admission medications    Medication Sig Start Date End Date Taking? Authorizing Provider   aliskiren (TEKTURNA) 300 mg tablet Take 1 Tablet by mouth daily. 10/3/22   Brenda Willson MD   minoxidiL (LONITEN) 10 mg tablet Take 1 Tablet by mouth two (2) times a day. 10/2/22   Brenda Willson MD   bumetanide (BUMEX) 2 mg tablet Take 1 Tablet by mouth daily. 9/29/22   Brenda Willson MD   carvediloL (COREG) 25 mg tablet Take 1 Tablet by mouth two (2) times daily (with meals). 9/28/22   Brenda Willson MD   cloNIDine HCL (CATAPRES) 0.2 mg tablet Take 1 Tablet by mouth two (2) times a day. 9/28/22   Brenda Willson MD   doxazosin (CARDURA) 8 mg tablet Take 1 Tablet by mouth nightly. 9/28/22   Brenda Willson MD   sevelamer carbonate (RENVELA) 800 mg tab tab Take 800 mg by mouth three (3) times daily. New prescription as of 8/16/22, prescribed by Dr. Gladys Junior    Provider, Historical   calcium carbonate (TUMS) 200 mg calcium (500 mg) chew Take 3 Tablets by mouth three (3) times daily (after meals). Provider, Historical   pantoprazole (Protonix) 40 mg tablet Take 1 Tablet by mouth two (2) times a day. Indications: an ulcer of the duodenum 3/25/22   Miguel Ángel ALDANA MD   ascorbic acid, vitamin C, (VITAMIN C) 500 mg tablet Take 500 mg by mouth daily. Provider, Historical   cholecalciferol (VITAMIN D3) 25 mcg (1,000 unit) cap Take 1,000 Units by mouth daily. Provider, Historical   buprenorphine-naloxone (SUBOXONE) 8-2 mg film sublingaul film 1 Film by SubLINGual route two (2) times a day. Provider, Historical       Allergies   Allergen Reactions    Baclofen Rash     All over    Losartan Hives, Shortness of Breath and Rash     Verified allergy with patient       Social Hx:  reports that he has been smoking. He has been smoking an average of 1 pack per day.  He has never used smokeless tobacco. He reports that he does not currently use alcohol. He reports current drug use. Drugs: Marijuana, Cocaine, and Heroin. No family history on file. Review of Systems:  A twelve point review of system was performed today. Pertinent positives and negatives are mentioned in the HPI. The reminder of the ROS is negative and noncontributory. Objective:    Vitals:    Vitals:    02/18/23 2100 02/18/23 2115 02/18/23 2130 02/18/23 2145   BP: (!) 150/87 (!) 154/86 (!) 154/91 (!) 154/94   Pulse: 71 71 70 69   Resp: 14 13 12 12   Temp:       TempSrc:       SpO2: 100% 100% 100% 100%   Weight:       Height:         I&O's:  No intake/output data recorded. Visit Vitals  BP (!) 154/94   Pulse 69   Temp 97.6 °F (36.4 °C) (Axillary)   Resp 12   Ht 5' 9\" (1.753 m)   Wt 74.2 kg (163 lb 9.3 oz)   SpO2 100%   BMI 24.16 kg/m²       Physical Exam:  General: Sedated  HEENT: Eyes are PERRL. Conjunctiva without pallor ,erythema.    Neck: Supple,no mass palpable  Lungs : on vent support  CVS: RRR,  No rub, no LE edema  Abdomen: Soft  Extremities: No cyanosis, No clubbing  Skin: No rash or lesions, left CVC  Neurologic: intubated/sedated  Psych: unable to assess    Laboratory Results:    Lab Results   Component Value Date     (H) 02/18/2023     02/18/2023    K 4.1 02/18/2023     02/18/2023    CO2 23 02/18/2023       Lab Results   Component Value Date     (H) 02/18/2023     (H) 02/18/2023     (H) 02/18/2023    BUN 30 (H) 11/29/2022    BUN 38 (H) 11/28/2022    K 4.1 02/18/2023    K 6.1 (H) 02/18/2023    K 4.4 02/18/2023    K 4.3 11/29/2022    K 4.3 11/28/2022       Lab Results   Component Value Date    WBC 9.4 02/18/2023    RBC 2.47 (L) 02/18/2023    HGB 7.5 (L) 02/18/2023    HCT 23.5 (L) 02/18/2023    MCV 95.1 02/18/2023    MCH 30.4 02/18/2023    RDW 13.5 02/18/2023     02/18/2023       Lab Results   Component Value Date    PHOS 7.8 (H) 02/18/2023       Urine dipstick:   Lab Results   Component Value Date/Time    Color YELLOW/STRAW 03/22/2022 04:11 PM    Appearance TURBID (A) 03/22/2022 04:11 PM    Specific gravity 1.021 03/22/2022 04:11 PM    pH (UA) 7.5 03/22/2022 04:11 PM    Protein >300 (A) 03/22/2022 04:11 PM    Glucose 100 (A) 03/22/2022 04:11 PM    Ketone Negative 03/22/2022 04:11 PM    Bilirubin Negative 03/22/2022 04:11 PM    Urobilinogen 0.2 03/22/2022 04:11 PM    Nitrites Negative 03/22/2022 04:11 PM    Leukocyte Esterase MODERATE (A) 03/22/2022 04:11 PM    Epithelial cells MODERATE (A) 03/22/2022 04:11 PM    Bacteria Negative 03/22/2022 04:11 PM    WBC 10-20 03/22/2022 04:11 PM    RBC 0-5 03/22/2022 04:11 PM       I have reviewed the following: All pertinent labs, microbiology data, radiology imaging for my assessment     ECG- Rev: Yes / No  Xray/CT/US/MRI REV: Yes/ No    Care Plan discussed with: primary nurse, dialysis nurse, significant other      Chart reviewed. Medications list Personally Reviewed   [x]      Yes     []               No      Thank you for allowing us to participate in the care of this patient. We will follow patient. Please dont hesitate to call with any questions    Nando Hernandez NP  2/18/2023    [facility]  [address], [address]  Phone - [phone]  Fax - [phone]  [email or web address]

## 2023-02-19 NOTE — H&P
[facility]    ICU TEAM Progress Note    Name: Luis Alberto Sapp   : 10/29/1956   MRN: [id number]   Date: 2023           ICU Assessment     SDH  CKD  Liver disease         ICU Comprehensive Plan of Care:     Neurological System  Q 1 h neuro checks  Spontaneous Awakening Trial: Pending  Sedation: Propofol  Analgesia: Fentanyl    Cardiovascular System  Normotension  On home anti HTN meds  SBP Goal of: < 140 mmHg  MAP Goal of: 65-85 mmHg  None - For above SBP/MAP goals  Transfusion Trigger (Hgb): <7 g/dL  Keep K > 4; Mg > 2     Respiratory System  Optimize PEEP/Ventilation/Oxygenation  Spontaneous Breathing Trial: Pending Hopeful to extubate today  Pulmonary toilet:  None    SpO2 Goal: > 92%  DVT Prophylaxis: No VTE Prophylaxis Needed     Renal/GI/Endocrine System  IVFs: NS at 75  Ulcer Prophylaxis: Protonix (pantoprazole)   Bowel Regimen: MiraLax  Feeding:  Pending   Blood Sugar Goal 120-180 - Glycemic Control: Insulin    Infectious Disease:  No concern for infection    PT/OT:  Deferred      Goals of Care Discussion with family Pending     Plan of Care/Code Status: Full Code    Discussed Care Plan with Bedside RN Yes    Documentation of Current Medications Yes    Subjective:   Progress Note: 2023      Reason for ICU Admission: SDH     HPI:  59-year-old male with history of end-stage kidney disease, liver disease, hypertension presenting to the ED with concern of headache, upon triage evaluation patient stated that he woke up today with the worst headache of his life, unsure what time, states that he is HD patient and was at HD, had 15 minutes and and had to stop secondary to severe headache, received Tylenol in route, patient states that his last full session was earlier in the week and missed 1 episode, states that he is on a blood thinner but unable to state which one, no history of this in his med rec.   Upon my evaluation of patient he is incoherent, unable to response or questions secondary to lethargy, opens eyes and moves all extremities, does not appear to have any focal neurologic deficits on exam but very lethargic and not responding to questions appropriately, grumbling and intermittently answering. Further history is limited secondary to altered mental status. POD:  1    S/P:   Procedure(s):  LEFT SUBDURAL CRANIOTOMY    Active Problem List:     Problem List  Date Reviewed: 2/18/2023            Codes Class    Subdural hematoma ICD-10-CM: S06. 5XAA  ICD-9-CM: 432.1         Hypertensive urgency, malignant ICD-10-CM: I16.0  ICD-9-CM: 401.0         HTN (hypertension), malignant ICD-10-CM: I10  ICD-9-CM: 401.0         Seizures (HCC) ICD-10-CM: R56.9  ICD-9-CM: 780.39         Malignant hypertension ICD-10-CM: I10  ICD-9-CM: 401.0         Enteritis ICD-10-CM: K52.9  ICD-9-CM: 558.9         Bone metastasis ([facility] ICD-10-CM: C79.51  ICD-9-CM: 198.5         ESRD needing dialysis ([facility] ICD-10-CM: N18.6, Z99.2  ICD-9-CM: 585. 6         Duodenitis ICD-10-CM: K29.80  ICD-9-CM: 535.60         Peptic ulcer disease ICD-10-CM: K27.9  ICD-9-CM: 533.90         Intractable abdominal pain ICD-10-CM: R10.9  ICD-9-CM: 789.00         ESRD (end stage renal disease) ([facility] ICD-10-CM: N18.6  ICD-9-CM: 585.6         Non-compliance ICD-10-CM: Z91.199  ICD-9-CM: V15.81         ESRD (end stage renal disease) on dialysis (HCC) ICD-10-CM: N18.6, Z99.2  ICD-9-CM: 585.6, V45.11         Hypertensive urgency ICD-10-CM: I16.0  ICD-9-CM: 401.9         Hypertensive emergency ICD-10-CM: I16.1  ICD-9-CM: 401.9         Iron deficiency anemia ICD-10-CM: D50.9  ICD-9-CM: 280. 9         Chronic hepatitis C ([facility] ICD-10-CM: B18.2  ICD-9-CM: 070.54         CKD (chronic kidney disease) ICD-10-CM: N18.9  ICD-9-CM: 164. 9         Chest pain ICD-10-CM: R07.9  ICD-9-CM: 786.50         Hypertension ICD-10-CM: I10  ICD-9-CM: 401.9          Past Medical History:      has a past medical history of Chronic kidney disease, Hypertension, and Liver disease. Past Surgical History:      has no past surgical history on file. Home Medications:     Prior to Admission medications    Medication Sig Start Date End Date Taking? Authorizing Provider   aliskiren (TEKTURNA) 300 mg tablet Take 1 Tablet by mouth daily. 10/3/22   Harper Young MD   minoxidiL (LONITEN) 10 mg tablet Take 1 Tablet by mouth two (2) times a day. 10/2/22   Harper Young MD   bumetanide (BUMEX) 2 mg tablet Take 1 Tablet by mouth daily. 9/29/22   Harper Young MD   carvediloL (COREG) 25 mg tablet Take 1 Tablet by mouth two (2) times daily (with meals). 9/28/22   Harper Young MD   cloNIDine HCL (CATAPRES) 0.2 mg tablet Take 1 Tablet by mouth two (2) times a day. 9/28/22   Harper Young MD   doxazosin (CARDURA) 8 mg tablet Take 1 Tablet by mouth nightly. 9/28/22   Harper Young MD   sevelamer carbonate (RENVELA) 800 mg tab tab Take 800 mg by mouth three (3) times daily. New prescription as of 8/16/22, prescribed by Dr. Sami Morris    Provider, Historical   calcium carbonate (TUMS) 200 mg calcium (500 mg) chew Take 3 Tablets by mouth three (3) times daily (after meals). Provider, Historical   pantoprazole (Protonix) 40 mg tablet Take 1 Tablet by mouth two (2) times a day. Indications: an ulcer of the duodenum 3/25/22   Feliberto ALDANA MD   ascorbic acid, vitamin C, (VITAMIN C) 500 mg tablet Take 500 mg by mouth daily. Provider, Historical   cholecalciferol (VITAMIN D3) 25 mcg (1,000 unit) cap Take 1,000 Units by mouth daily. Provider, Historical   buprenorphine-naloxone (SUBOXONE) 8-2 mg film sublingaul film 1 Film by SubLINGual route two (2) times a day. Provider, Historical       Allergies/Social/Family History:      Allergies   Allergen Reactions    Baclofen Rash     All over    Losartan Hives, Shortness of Breath and Rash     Verified allergy with patient      Social History     Tobacco Use    Smoking status: Some Days     Packs/day: 1.00     Types: Cigarettes     Last attempt to quit: 2020     Years since quittin.5    Smokeless tobacco: Never    Tobacco comments:     started back smoking in last couple months   Substance Use Topics    Alcohol use: Not Currently      No family history on file. Review of Systems:     Review of systems not obtained due to patient factors. Objective:   Vital Signs:  Visit Vitals  /81   Pulse 75   Temp 98.1 °F (36.7 °C)   Resp 20   Ht 5' 9\" (1.753 m)   Wt 74.2 kg (163 lb 9.3 oz)   SpO2 100%   BMI 24.16 kg/m²      O2 Device: Endotracheal tube, Ventilator Temp (24hrs), Av.6 °F (36.4 °C), Min:96.5 °F (35.8 °C), Max:98.1 °F (36.7 °C)           Intake/Output:     Intake/Output Summary (Last 24 hours) at 2023 0849  Last data filed at 2023 0700  Gross per 24 hour   Intake 1647.59 ml   Output 3650 ml   Net -2002.41 ml         Physical Exam:    Gen Intubated male in NAD  CV RRR  Lungs Clear B/L  Abd soft, NT, ND  Ext no rashes nor edema      LABS AND  DATA: Personally reviewed  Recent Labs     23  1721   WBC 9.0 9.4   HGB 6.6* 7.5*   HCT 19.8* 23.5*    253       Recent Labs     23  1721   * 142 136   K 5.0 4.1 6.1*   CL 98 103 103   CO2 28  19*   BUN 50* 137* 133*   CREA 9.77* 19.20* 18.80*   GLU 82 81 127*   CA 8.9 8.3* 8.1*   MG 2.0  --  2.6*   PHOS 2.8  --  7.8*       Recent Labs     23  1721   AP 74 82  84   TP 7.1 8.0  7.6   ALB 3.2* 3.6  3.7   GLOB 3.9 4.4*  3.9       Recent Labs     23  1721   INR 1.1   PTP 11.4*        Recent Labs     23  0429   PHI 7.51*   PCO2I 34.0*   PO2I 129*   FIO2I 30     No results for input(s): CPK, CKMB, TROIQ, BNPP in the last 72 hours.       Ventilator Settings:  Mode Rate Tidal Volume Pressure FiO2 PEEP   Assist control, Volume control   500 ml    21 % 5 cm H20     Peak airway pressure: 16 cm H2O    Minute ventilation: 8.06 l/min        MEDS: Reviewed    Chest X-Ray:  CXR Results  (Last 48 hours)                 02/18/23 1525  XR CHEST PORT Final result    Impression:  Support devices in place no acute infiltrate. Narrative:  EXAM:  XR CHEST PORT       INDICATION: ET tube placement       COMPARISON: Earlier examination same day       TECHNIQUE: portable chest AP view       FINDINGS: Support devices are in place, ET tube above the porsha. Double lumen   catheter in the right atrium. Lung fields are clear the heart size is normal.           02/18/23 1108  XR CHEST PORT Final result    Impression:  ET tube in satisfactory position. NG tube enters the stomach. The   lungs are clear. Narrative:  INDICATION:  post intubation        COMPARISON: CT November 2022       FINDINGS: Single AP portable view of the chest obtained at 1103 demonstrates an   ET tube in satisfactory position. NG tube enters the stomach. Left-sided central   venous catheter is unchanged. The lungs are clear. There is no pneumothorax. CRITICAL CARE CONSULTANT NOTE  I had a face to face encounter with the patient, reviewed and interpreted patient data including clinical events, labs, images, vital signs, I/O's, and examined patient. I have discussed the case and the plan and management of the patient's care with the consulting services, the bedside nurses and the respiratory therapist.      NOTE OF PERSONAL INVOLVEMENT IN CARE   This patient has a high probability of imminent, clinically significant deterioration, which requires the highest level of preparedness to intervene urgently. I participated in the decision-making and personally managed or directed the management of the following life and organ supporting interventions that required my frequent assessment to treat or prevent imminent deterioration. I personally spent 45 minutes of critical care time. This is time spent at this critically ill patient's bedside actively involved in patient care as well as the coordination of care.   This does not include any procedural time which has been billed separately.     Lonnie Avalos MD  [facility]  2/19/2023

## 2023-02-19 NOTE — PROGRESS NOTES
Awake extubated  HERNANDEZ well  excellent progress  non compliant with BP meds at home, BP a little high in ICU but won't want to decrease too rapidly  CT looks good

## 2023-02-19 NOTE — PROCEDURES
Hemodialysis / [phone]    Vitals Pre Post Assessment Pre Post   BP BP: (!) 156/81 (02/18/23 2037)   153/94 LOC Sedated  Sedated    HR Pulse (Heart Rate): 72 (02/18/23 2037) 67 Lungs Intubated  Intubated    Resp Resp Rate: 13 (02/18/23 2037) 12 Cardiac NSR  NSR    Temp Temp: 97.6 °F (36.4 °C) (02/18/23 2037)  Skin Warm and dry  Warm and dry    Weight Pre-Dialysis Weight: 78 kg (171 lb 15.3 oz) (02/18/23 2037) na Edema Trace  Trace    Tele status Monitored at bedside  Monitored at bedside  Pain Pain Intensity 1: 0 (02/18/23 2000) 0     Orders   Duration: Start: 2037 End: 0010 Total: 3.45hrs   Dialyzer: Dialyzer/Set Up Inspection: Revaclear (02/18/23 2037)   K Bath: Dialysate K (mEq/L): 2 (02/18/23 2037)   Ca Bath: Dialysate CA (mEq/L): 2.5 (02/18/23 2037)   Na: Dialysate NA (mEq/L): 139 (02/18/23 2037)   Bicarb: Dialysate HCO3 (mEq/L): 38 (02/18/23 2037)   Target Fluid Removal: Goal/Amount of Fluid to Remove (mL): 2000 mL (02/18/23 2037)     Access   Type & Location: Left Subclavian CVC    Comments:                             No s/s of infection noted at this time           Labs   HBsAg (Antigen) / date:    Unknown                                            HBsAb (Antibody) / date: Unknown    Source:    Obtained/Reviewed  Critical Results Called HGB   Date Value Ref Range Status   02/18/2023 7.5 (L) 12.1 - 17.0 g/dL Final     Potassium   Date Value Ref Range Status   02/18/2023 6.1 (H) 3.5 - 5.1 mmol/L Final     Calcium   Date Value Ref Range Status   02/18/2023 8.1 (L) 8.5 - 10.1 MG/DL Final     BUN   Date Value Ref Range Status   02/18/2023 133 (H) 6 - 20 MG/DL Final     Creatinine   Date Value Ref Range Status   02/18/2023 18.80 (H) 0.70 - 1.30 MG/DL Final        Meds Given   Name Dose Route   None  None  None                Adequacy / Fluid    Total Liters Process: 76.0L   Net Fluid Removed: 3000ml      Comments   Time Out Done:   (Time) 2032   Admitting Diagnosis: ESRD    Consent obtained/signed: Informed Consent Verified: Yes (02/18/23 2037)   Machine / RO # Machine Number: [id number] (02/18/23 2037)   Primary Nurse Rpt Pre: Deborah Hatch RN    Primary Nurse Rpt Post: Jose Raul Acosta RN    Pt Education: Procedural Education to the family and infection control    Care Plan: Continue with HD care plan    Pts outpatient clinic: [facility]   Comments:Patient's partner and daughter at bedside. 2037:Each catheter limb disinfected per p&p, caps removed, hubs disinfected per p&p. Both lines aspirated and flushed with no difficulty. Treatment initiated with 200ml nss given. CVC running well at 400. No distress reported. Will continue to monitor patient. 2110:EDIS Pepe came to patient room, changed UF goal to 3L. No further changes will continue to monitor patient. 1613:Treatment completed with all possible blood returned. Each dialysis catheter limb disinfected per p&p, blood returned per p&p, each dialysis hub disinfected per p&p, post dialysis catheter dwell instilled per order, and caps applied. SBAR given to primary nurse. Patient bed at lowest position and call bell and patient's belongings at her reach. At baseline upon departure. Patient's partner at bedside.

## 2023-02-19 NOTE — PROGRESS NOTES
0730: Bedside shift change report given to [address] [place] [address] (oncoming nurse) by Yumiko Aranda RN (offgoing nurse). Report included the following information SBAR, Kardex, Intake/Output, MAR, Recent Results, Cardiac Rhythm SR, Alarm Parameters , and Dual Neuro Assessment. 1035: Extubated to 4L NC, patient oriented x2    1740: During purposeful hourly rounding was asked by the patient's fiance not to come in the room. Ensured patient was safe and comfortable in bed, sitting up eating dinner, multiple family members present. Offered education with regards to RN role as patient advocate and caregiver, explained purposeful hourly rounding. 1930: Bedside shift change report given to [facility] (oncoming nurse) by [address] [place] [address] (offgoing nurse). Report included the following information SBAR, Kardex, Intake/Output, MAR, Recent Results, Cardiac Rhythm SR w/ PVCs, and Alarm Parameters .

## 2023-02-19 NOTE — PROGRESS NOTES
1930: Bedside and Verbal shift change report given to GRACIA Pittman RNC (oncoming nurse) by Byron Wray, RN (offgoing nurse). Report included the following information SBAR, Kardex, Intake/Output, MAR, Accordion, Recent Results, Cardiac Rhythm NSR, Alarm Parameters , and Dual Neuro Assessment. Per Duc Reza, patient's fiance, there are a lot of family dynamics at play and she is concerned about discharge disposition and plan for follow up. She states patient receives suboxone from [employer] [facility] [employer]. She will be bringing a list of his home medications that she reports he probably does not take as prescribed.

## 2023-02-19 NOTE — PROGRESS NOTES
Pt passed SBT and followed command       02/19/23 0932   Weaning Parameters   Spontaneous Breathing Trial Complete Yes   Resp Rate Observed 16   Ve 8.9      RSBI 36

## 2023-02-19 NOTE — PROGRESS NOTES
Reason for Admission:   SDH                 RUR Score:     23%      PCP: First and Last name:  Roxana Escobedo NP     Name of Practice:   Are you a current patient: Yes/No:   Approximate date of last visit:    Can you do a virtual visit with your PCP:              Resources/supports as identified by patient/family:   Supportive sister Dean Solid facing patient (as identified by patient/family and CM): Finances/Medication cost?     None voiced               Transportation? Medicaid transport through [facility] or lack thereof? Sister                      Living arrangements? Lives part time with sister Janine Ragland and part time with his Fiance  and her daughter            Self-care/ADLs/Cognition? A&O          Current Advanced Directive/Advance Care Plan:  Full Code      Healthcare Decision Maker:   Click here to complete HealthCare Decision Makers including selection of the Healthcare Decision Maker Relationship (ie \"Primary\")      Primary Decision MakeNaseem Iniguez - [phone]    Payor Source Payor: [address] [place] [address] / Plan:  [facility] MCR / Product Type: Managed Care Medicare /                             Plan for utilizing home health:    TBD                 Transition of Care Plan:       Patient is S/P  Left frontotemporal parietal craniotomy, evacuation of acute and chronic subdural hematoma. and placement of a GAVINO drain. Care manager met with patient and his fiance to introduce self and explain role. Patient is independent no previous DME or IPR needs, maybe HH. Patient goe to dialysis at [facility] on a Tues-Thurs-Sat schedule and is transported by [email or web address]. Patient uses the [facility] as his pharmacy. CM confirmed demographic information. Will follow for transitions of care. Martínez Chaudhry RN,Care Management  Care Management Interventions  PCP Verified by CM:  Yes Briana Rivera Remi NP)  [place] Signup: No  Discharge Durable Medical Equipment: No  Physical Therapy Consult: No  Occupational Therapy Consult: No  Speech Therapy Consult: No  Support Systems: Child(harish) [phone])

## 2023-02-20 LAB
ABO + RH BLD: NORMAL
ALBUMIN SERPL-MCNC: 3 G/DL (ref 3.5–5)
ALBUMIN SERPL-MCNC: 3.1 G/DL (ref 3.5–5)
ALBUMIN/GLOB SERPL: 0.8 (ref 1.1–2.2)
ALBUMIN/GLOB SERPL: 0.8 (ref 1.1–2.2)
ALP SERPL-CCNC: 74 U/L (ref 45–117)
ALP SERPL-CCNC: 76 U/L (ref 45–117)
ALT SERPL-CCNC: 6 U/L (ref 12–78)
ALT SERPL-CCNC: 8 U/L (ref 12–78)
ANION GAP SERPL CALC-SCNC: 8 MMOL/L (ref 5–15)
AST SERPL-CCNC: 13 U/L (ref 15–37)
AST SERPL-CCNC: 13 U/L (ref 15–37)
BILIRUB DIRECT SERPL-MCNC: 0.1 MG/DL (ref 0–0.2)
BILIRUB SERPL-MCNC: 0.3 MG/DL (ref 0.2–1)
BILIRUB SERPL-MCNC: 0.4 MG/DL (ref 0.2–1)
BLD PROD TYP BPU: NORMAL
BLOOD GROUP ANTIBODIES SERPL: NORMAL
BPU ID: NORMAL
BUN SERPL-MCNC: 69 MG/DL (ref 6–20)
BUN/CREAT SERPL: 6 (ref 12–20)
CALCIUM SERPL-MCNC: 8.7 MG/DL (ref 8.5–10.1)
CHLORIDE SERPL-SCNC: 99 MMOL/L (ref 97–108)
CO2 SERPL-SCNC: 25 MMOL/L (ref 21–32)
CREAT SERPL-MCNC: 12.4 MG/DL (ref 0.7–1.3)
CROSSMATCH RESULT,%XM: NORMAL
ERYTHROCYTE [DISTWIDTH] IN BLOOD BY AUTOMATED COUNT: 15.9 % (ref 11.5–14.5)
GLOBULIN SER CALC-MCNC: 3.8 G/DL (ref 2–4)
GLOBULIN SER CALC-MCNC: 3.8 G/DL (ref 2–4)
GLUCOSE SERPL-MCNC: 87 MG/DL (ref 65–100)
HCT VFR BLD AUTO: 28.4 % (ref 36.6–50.3)
HGB BLD-MCNC: 8.7 G/DL (ref 12.1–17)
MCH RBC QN AUTO: 29.1 PG (ref 26–34)
MCHC RBC AUTO-ENTMCNC: 30.6 G/DL (ref 30–36.5)
MCV RBC AUTO: 95 FL (ref 80–99)
NRBC # BLD: 0.02 K/UL (ref 0–0.01)
NRBC BLD-RTO: 0.2 PER 100 WBC
PLATELET # BLD AUTO: 190 K/UL (ref 150–400)
PMV BLD AUTO: 11.3 FL (ref 8.9–12.9)
POTASSIUM SERPL-SCNC: 4.8 MMOL/L (ref 3.5–5.1)
POTASSIUM SERPL-SCNC: 5.4 MMOL/L (ref 3.5–5.1)
PROT SERPL-MCNC: 6.8 G/DL (ref 6.4–8.2)
PROT SERPL-MCNC: 6.9 G/DL (ref 6.4–8.2)
RBC # BLD AUTO: 2.99 M/UL (ref 4.1–5.7)
SODIUM SERPL-SCNC: 132 MMOL/L (ref 136–145)
SPECIMEN EXP DATE BLD: NORMAL
STATUS OF UNIT,%ST: NORMAL
UNIT DIVISION, %UDIV: 0
WBC # BLD AUTO: 10.6 K/UL (ref 4.1–11.1)

## 2023-02-20 PROCEDURE — 74011250636 HC RX REV CODE- 250/636: Performed by: NEUROLOGICAL SURGERY

## 2023-02-20 PROCEDURE — 80076 HEPATIC FUNCTION PANEL: CPT

## 2023-02-20 PROCEDURE — 80053 COMPREHEN METABOLIC PANEL: CPT

## 2023-02-20 PROCEDURE — 36415 COLL VENOUS BLD VENIPUNCTURE: CPT

## 2023-02-20 PROCEDURE — 74011000250 HC RX REV CODE- 250: Performed by: NEUROLOGICAL SURGERY

## 2023-02-20 PROCEDURE — 51798 US URINE CAPACITY MEASURE: CPT

## 2023-02-20 PROCEDURE — 74011250636 HC RX REV CODE- 250/636: Performed by: STUDENT IN AN ORGANIZED HEALTH CARE EDUCATION/TRAINING PROGRAM

## 2023-02-20 PROCEDURE — 74011000250 HC RX REV CODE- 250: Performed by: STUDENT IN AN ORGANIZED HEALTH CARE EDUCATION/TRAINING PROGRAM

## 2023-02-20 PROCEDURE — 90935 HEMODIALYSIS ONE EVALUATION: CPT

## 2023-02-20 PROCEDURE — 74011250637 HC RX REV CODE- 250/637: Performed by: NURSE PRACTITIONER

## 2023-02-20 PROCEDURE — 74011250637 HC RX REV CODE- 250/637: Performed by: ANESTHESIOLOGY

## 2023-02-20 PROCEDURE — 74011250637 HC RX REV CODE- 250/637: Performed by: STUDENT IN AN ORGANIZED HEALTH CARE EDUCATION/TRAINING PROGRAM

## 2023-02-20 PROCEDURE — 84132 ASSAY OF SERUM POTASSIUM: CPT

## 2023-02-20 PROCEDURE — 74011000250 HC RX REV CODE- 250: Performed by: ANESTHESIOLOGY

## 2023-02-20 PROCEDURE — 74011250636 HC RX REV CODE- 250/636: Performed by: ANESTHESIOLOGY

## 2023-02-20 PROCEDURE — C9113 INJ PANTOPRAZOLE SODIUM, VIA: HCPCS | Performed by: ANESTHESIOLOGY

## 2023-02-20 PROCEDURE — 65610000006 HC RM INTENSIVE CARE

## 2023-02-20 PROCEDURE — 85027 COMPLETE CBC AUTOMATED: CPT

## 2023-02-20 RX ORDER — NIFEDIPINE 60 MG/1
60 TABLET, EXTENDED RELEASE ORAL DAILY
Status: DISCONTINUED | OUTPATIENT
Start: 2023-02-21 | End: 2023-02-22

## 2023-02-20 RX ORDER — LEVETIRACETAM 500 MG/5ML
500 INJECTION, SOLUTION, CONCENTRATE INTRAVENOUS
Status: DISCONTINUED | OUTPATIENT
Start: 2023-02-21 | End: 2023-02-21

## 2023-02-20 RX ORDER — LEVETIRACETAM 500 MG/5ML
500 INJECTION, SOLUTION, CONCENTRATE INTRAVENOUS DAILY
Status: DISCONTINUED | OUTPATIENT
Start: 2023-02-21 | End: 2023-02-21

## 2023-02-20 RX ORDER — LABETALOL 100 MG/1
300 TABLET, FILM COATED ORAL EVERY 12 HOURS
Status: DISCONTINUED | OUTPATIENT
Start: 2023-02-20 | End: 2023-02-26

## 2023-02-20 RX ORDER — CARVEDILOL 12.5 MG/1
25 TABLET ORAL 2 TIMES DAILY WITH MEALS
Status: DISCONTINUED | OUTPATIENT
Start: 2023-02-20 | End: 2023-02-23

## 2023-02-20 RX ADMIN — LEVETIRACETAM 500 MG: 100 INJECTION, SOLUTION, CONCENTRATE INTRAVENOUS at 22:18

## 2023-02-20 RX ADMIN — SODIUM ZIRCONIUM CYCLOSILICATE 10 G: 10 POWDER, FOR SUSPENSION ORAL at 06:08

## 2023-02-20 RX ADMIN — NICARDIPINE HYDROCHLORIDE 7.5 MG/HR: 25 INJECTION, SOLUTION INTRAVENOUS at 16:26

## 2023-02-20 RX ADMIN — NICARDIPINE HYDROCHLORIDE 10 MG/HR: 25 INJECTION, SOLUTION INTRAVENOUS at 17:23

## 2023-02-20 RX ADMIN — SODIUM CHLORIDE, PRESERVATIVE FREE 10 ML: 5 INJECTION INTRAVENOUS at 13:26

## 2023-02-20 RX ADMIN — CLONIDINE HYDROCHLORIDE 0.2 MG: 0.1 TABLET ORAL at 09:03

## 2023-02-20 RX ADMIN — LEVETIRACETAM 500 MG: 100 INJECTION, SOLUTION, CONCENTRATE INTRAVENOUS at 09:04

## 2023-02-20 RX ADMIN — LABETALOL HYDROCHLORIDE 20 MG: 5 INJECTION INTRAVENOUS at 13:25

## 2023-02-20 RX ADMIN — CLONIDINE HYDROCHLORIDE 0.2 MG: 0.1 TABLET ORAL at 17:23

## 2023-02-20 RX ADMIN — LABETALOL HYDROCHLORIDE 300 MG: 100 TABLET, FILM COATED ORAL at 16:33

## 2023-02-20 RX ADMIN — SODIUM CHLORIDE, PRESERVATIVE FREE 10 ML: 5 INJECTION INTRAVENOUS at 05:15

## 2023-02-20 RX ADMIN — NICARDIPINE HYDROCHLORIDE 5 MG/HR: 25 INJECTION, SOLUTION INTRAVENOUS at 14:39

## 2023-02-20 RX ADMIN — BUMETANIDE 2 MG: 1 TABLET ORAL at 09:03

## 2023-02-20 RX ADMIN — PANTOPRAZOLE SODIUM 40 MG: 40 INJECTION, POWDER, LYOPHILIZED, FOR SOLUTION INTRAVENOUS at 09:03

## 2023-02-20 RX ADMIN — LABETALOL HYDROCHLORIDE 20 MG: 5 INJECTION INTRAVENOUS at 05:15

## 2023-02-20 RX ADMIN — NICARDIPINE HYDROCHLORIDE 7.5 MG/HR: 25 INJECTION, SOLUTION INTRAVENOUS at 22:21

## 2023-02-20 RX ADMIN — HYDRALAZINE HYDROCHLORIDE 20 MG: 20 INJECTION INTRAMUSCULAR; INTRAVENOUS at 08:10

## 2023-02-20 RX ADMIN — ACETAMINOPHEN 650 MG: 325 TABLET ORAL at 14:46

## 2023-02-20 RX ADMIN — SODIUM CHLORIDE, PRESERVATIVE FREE 10 ML: 5 INJECTION INTRAVENOUS at 22:18

## 2023-02-20 RX ADMIN — CARVEDILOL 12.5 MG: 12.5 TABLET, FILM COATED ORAL at 07:11

## 2023-02-20 RX ADMIN — NICARDIPINE HYDROCHLORIDE 10 MG/HR: 25 INJECTION, SOLUTION INTRAVENOUS at 19:57

## 2023-02-20 RX ADMIN — HYDRALAZINE HYDROCHLORIDE 20 MG: 20 INJECTION INTRAMUSCULAR; INTRAVENOUS at 14:11

## 2023-02-20 NOTE — PROGRESS NOTES
Shift summary:    1115 - Verbal shift change report given to [facility], [address] (oncoming nurse) by Arminda Verdugo, RN (offgoing nurse). Report included the following information SBAR, Kardex, ED Summary, OR Summary, Procedure Summary, Intake/Output, MAR, Cardiac Rhythm NSR, Alarm Parameters , and Dual Neuro Assessment. 1400 - Bladder scanned pt as pt told this RN that he usually urinates at home. BS revealed 0 mls in bladder. 1415 - Neuro PA pulled GAVINO drain from surgical site. Pt tolerated well. 1439 - Pt restarted on cardene gtt due to BP unable to be controlled with PRN medication. See MAR for titration details. 1600 - CHG bath given. Over course of shift, patient intermittently compliant answering questions, making neuro assessments difficult. Neuro assessments in flowsheets reflect such. Bedside and Verbal shift change report given to Armando Eaton, Yaniv (oncoming nurse) by [facility], RN (offgoing nurse). Report included the following information SBAR, Kardex, ED Summary, OR Summary, Procedure Summary, Intake/Output, MAR, Accordion, Cardiac Rhythm NSR, Alarm Parameters , and Dual Neuro Assessment.

## 2023-02-20 NOTE — PROGRESS NOTES
Physical Therapy:     Orders received, chart reviewed, attempted to see pt for PT eval.  Pt currently on bedside HD. Will defer and follow up as able and appropriate.       Patrick Clark, PT, DPT

## 2023-02-20 NOTE — PROGRESS NOTES
Neurosurgery Progress Note  Blas Guerin PA-C    Admit Date: 2023   LOS: 2 days        Daily Progress Note: 2023    HPI: Mr. Abhinav Bella is a 58yo gentleman with a PMH of HTN, ESRD on HD, PUD and chronic hepatitis who presented to the ED [facility] ED with a HA. He rapidly became unresponsive and was intubated and transferred to [facility]. Imaging revealed a large acute and chronic subdural hematoma. Pt was taken to the OR for left frontotemporal parietal craniotomy w evacuation of acute & chronic SDH on 23 by Dr. Claudia Griggs. POD:2 Days Post-Op    S/P: Procedure(s):  LEFT SUBDURAL CRANIOTOMY    Subjective:     Pt is drowsy but arousable. Pt resting comfortably in bed w family at bedside. HD today. Objective:     Vital signs  Temp (24hrs), Av.7 °F (37.1 °C), Min:98.3 °F (36.8 °C), Max:99.3 °F (37.4 °C)   701 - 1900  In: -   Out: 60 [Drains:60]  1901 -  0700  In: 7991 [P.O.:200; I.V.:576]  Out: 3420 [Drains:420]    Visit Vitals  BP (!) 183/95   Pulse 87   Temp 98.4 °F (36.9 °C)   Resp 18   Ht 5' 9\" (1.753 m)   Wt 69.7 kg (153 lb 10.6 oz)   SpO2 95%   BMI 22.69 kg/m²    O2 Flow Rate (L/min): 4 l/min O2 Device: None (Room air)       Output (mL)  Last Bowel Movement Date: 23 (23)     Pain control  Pain Assessment  Pain Scale 1: Numeric (0 - 10)  Pain Intensity 1: 0  Pain Location 1: Head  Pain Intervention(s) 1: Medication (see MAR)    PT/OT  Gait              Physical Exam:  Gen: No acute distress. Neuro: A&O to self, place. Follows commands. Drowsy. PERRL. Face symmetric. HERNANDEZ spontaneously and purposefully. Generalized weakness. Sensation intact. Negative drift. Gait deferred.   Skin: Incision C/D/I    24 hour results:    Recent Results (from the past 24 hour(s))   HEPATIC FUNCTION PANEL    Collection Time: 23  3:18 AM   Result Value Ref Range    Protein, total 6.9 6.4 - 8.2 g/dL    Albumin 3.1 (L) 3.5 - 5.0 g/dL    Globulin 3.8 2.0 - 4.0 g/dL    A-G Ratio 0.8 (L) 1.1 - 2.2      Bilirubin, total 0.4 0.2 - 1.0 MG/DL    Bilirubin, direct 0.1 0.0 - 0.2 MG/DL    Alk. phosphatase 74 45 - 117 U/L    AST (SGOT) 13 (L) 15 - 37 U/L    ALT (SGPT) 8 (L) 12 - 78 U/L   METABOLIC PANEL, COMPREHENSIVE    Collection Time: 02/20/23  3:18 AM   Result Value Ref Range    Sodium 132 (L) 136 - 145 mmol/L    Potassium 5.4 (H) 3.5 - 5.1 mmol/L    Chloride 99 97 - 108 mmol/L    CO2 25 21 - 32 mmol/L    Anion gap 8 5 - 15 mmol/L    Glucose 87 65 - 100 mg/dL    BUN 69 (H) 6 - 20 MG/DL    Creatinine 12.40 (H) 0.70 - 1.30 MG/DL    BUN/Creatinine ratio 6 (L) 12 - 20      eGFR 4 (L) >60 ml/min/1.73m2    Calcium 8.7 8.5 - 10.1 MG/DL    Bilirubin, total 0.3 0.2 - 1.0 MG/DL    ALT (SGPT) 6 (L) 12 - 78 U/L    AST (SGOT) 13 (L) 15 - 37 U/L    Alk. phosphatase 76 45 - 117 U/L    Protein, total 6.8 6.4 - 8.2 g/dL    Albumin 3.0 (L) 3.5 - 5.0 g/dL    Globulin 3.8 2.0 - 4.0 g/dL    A-G Ratio 0.8 (L) 1.1 - 2.2     CBC W/O DIFF    Collection Time: 02/20/23  3:18 AM   Result Value Ref Range    WBC 10.6 4.1 - 11.1 K/uL    RBC 2.99 (L) 4.10 - 5.70 M/uL    HGB 8.7 (L) 12.1 - 17.0 g/dL    HCT 28.4 (L) 36.6 - 50.3 %    MCV 95.0 80.0 - 99.0 FL    MCH 29.1 26.0 - 34.0 PG    MCHC 30.6 30.0 - 36.5 g/dL    RDW 15.9 (H) 11.5 - 14.5 %    PLATELET 707 853 - 155 K/uL    MPV 11.3 8.9 - 12.9 FL    NRBC 0.2 (H) 0  WBC    ABSOLUTE NRBC 0.02 (H) 0.00 - 0.01 K/uL   POTASSIUM    Collection Time: 02/20/23  9:15 AM   Result Value Ref Range    Potassium 4.8 3.5 - 5.1 mmol/L        Assessment:     Active Problems:    Subdural hematoma (2/18/2023)      Plan:     s/p Left frontotemporal parietal craniotomy, evacuation of acute and chronic subdural hematoma 2/18   -Drain output ~60/hr. D/c today v tomorrow  -PT/OT/SLP as able  -BP control per primary team  -Keppra 500mg BID    -q4hr neuro checks   -Hgb 8.7 today.  1 unit PRBC transfused 2/18   -Head CT scan for neuro changes   -Plans for HD today; nephrology following   -Medical mgmt per ICU team    Will discuss plan w Dr. Colton Prajapati & update as necessary    AYLEEN Parker

## 2023-02-20 NOTE — PROGRESS NOTES
Occupational Therapy: defer    Chart reviewed, note patient is POD 2 craniotomy for SDH. Attempted OT evaluation. Patient is receiving HD at bedside. Will defer OT at this time and will follow-up as able and appropriate.     Marbella Hernandez, OTR/L

## 2023-02-20 NOTE — PROGRESS NOTES
Nephrology Progress Note  Isael Stack  Date of Admission : 2/18/2023    CC: Follow up for ESRD       Assessment and Plan     ESRD on HD:  - TTS [address] today then tomorrow    Hyperkalemia:  - HD today    SDH s/p craniotomy:  - per NSG    HTN:  - off cardene this AM  - cont po meds    Anemia of CKD:  - ABI TTS    Secondary HPT       Interval History:  Seen and examined. Awake, alert. K up this AM. Received lokelma. No cp or sob reported. Current Medications: all current  Medications have been eviewed in EPIC  Review of Systems: Pertinent items are noted in HPI. Objective:  Vitals:    Vitals:    02/20/23 0300 02/20/23 0400 02/20/23 0500 02/20/23 0600   BP: (!) 133/100 (!) 176/88 (!) 188/92 (!) 167/85   Pulse: 71 80 81 71   Resp: 15 29 18 14   Temp:  99.1 °F (37.3 °C)     TempSrc:       SpO2: 98% 97% 97% 98%   Weight:       Height:         Intake and Output:  No intake/output data recorded. 02/18 1901 - 02/20 0700  In: 1186 [P.O.:200; I.V.:576]  Out: 3420 [Drains:420]    Physical Examination:  Pt intubated    No  General: Awake, alert  Neck:  Supple, no mass  Resp:  Lungs CTA B/L, no wheezing , normal respiratory effort  CV:  RRR,  no murmur or rub, trace LE edema  GI:  Soft, NT, + Bowel sounds, no hepatosplenomegaly  Neurologic:  Non focal  Psych:             AAO x 3 appropriate affect   Skin:  No Rash  Access:           [facility]    []    High complexity decision making was performed  []    Patient is at high-risk of decompensation with multiple organ involvement    Lab Data Personally Reviewed: I have reviewed all the pertinent labs, microbiology data and radiology studies during assessment.     Recent Labs     02/20/23  0318 02/19/23  0523 02/18/23  2050 02/18/23  1721 02/18/23  0949   * 135* 142 136 139   K 5.4* 5.0 4.1 6.1* 4.4   CL 99 98 103 103 98   CO2 25 28 23 19* 20*   GLU 87 82 81 127* 151*   BUN 69* 50* 137* 133* 130*   CREA 12.40* 9.77* 19.20* 18.80* 19.00*   CA 8.7 8.9 8.3* 8. 1* 8.7   MG  --  2.0  --  2.6* 2.6*   PHOS  --  2.8  --  7.8*  --    ALB 3.1*  3.0* 3.2*  --  3.6  3.7 3.8   ALT 8*  6* 8*  --  9*  9* 9*   INR  --   --   --  1.1  --      Recent Labs     02/20/23  0318 02/19/23  0523 02/18/23  1721   WBC 10.6 9.0 9.4   HGB 8.7* 6.6* 7.5*   HCT 28.4* 19.8* 23.5*    185 253     No results found for: SDES  Lab Results   Component Value Date/Time    Culture result: NO GROWTH 5 DAYS 05/18/2022 06:06 AM    Culture result: No growth (<1,000 CFU/ML) 03/22/2022 04:11 PM     Recent Results (from the past 24 hour(s))   HEPATIC FUNCTION PANEL    Collection Time: 02/20/23  3:18 AM   Result Value Ref Range    Protein, total 6.9 6.4 - 8.2 g/dL    Albumin 3.1 (L) 3.5 - 5.0 g/dL    Globulin 3.8 2.0 - 4.0 g/dL    A-G Ratio 0.8 (L) 1.1 - 2.2      Bilirubin, total 0.4 0.2 - 1.0 MG/DL    Bilirubin, direct 0.1 0.0 - 0.2 MG/DL    Alk. phosphatase 74 45 - 117 U/L    AST (SGOT) 13 (L) 15 - 37 U/L    ALT (SGPT) 8 (L) 12 - 78 U/L   METABOLIC PANEL, COMPREHENSIVE    Collection Time: 02/20/23  3:18 AM   Result Value Ref Range    Sodium 132 (L) 136 - 145 mmol/L    Potassium 5.4 (H) 3.5 - 5.1 mmol/L    Chloride 99 97 - 108 mmol/L    CO2 25 21 - 32 mmol/L    Anion gap 8 5 - 15 mmol/L    Glucose 87 65 - 100 mg/dL    BUN 69 (H) 6 - 20 MG/DL    Creatinine 12.40 (H) 0.70 - 1.30 MG/DL    BUN/Creatinine ratio 6 (L) 12 - 20      eGFR 4 (L) >60 ml/min/1.73m2    Calcium 8.7 8.5 - 10.1 MG/DL    Bilirubin, total 0.3 0.2 - 1.0 MG/DL    ALT (SGPT) 6 (L) 12 - 78 U/L    AST (SGOT) 13 (L) 15 - 37 U/L    Alk.  phosphatase 76 45 - 117 U/L    Protein, total 6.8 6.4 - 8.2 g/dL    Albumin 3.0 (L) 3.5 - 5.0 g/dL    Globulin 3.8 2.0 - 4.0 g/dL    A-G Ratio 0.8 (L) 1.1 - 2.2     CBC W/O DIFF    Collection Time: 02/20/23  3:18 AM   Result Value Ref Range    WBC 10.6 4.1 - 11.1 K/uL    RBC 2.99 (L) 4.10 - 5.70 M/uL    HGB 8.7 (L) 12.1 - 17.0 g/dL    HCT 28.4 (L) 36.6 - 50.3 %    MCV 95.0 80.0 - 99.0 FL    MCH 29.1 26.0 - 34.0 PG    MCHC 30.6 30.0 - 36.5 g/dL    RDW 15.9 (H) 11.5 - 14.5 %    PLATELET 863 428 - 114 K/uL    MPV 11.3 8.9 - 12.9 FL    NRBC 0.2 (H) 0  WBC    ABSOLUTE NRBC 0.02 (H) 0.00 - 0.01 K/uL                 Brock Cadena MD  [facility]   [address] [address]  Phone - [phone]   Fax - [phone]  [email or web address]

## 2023-02-20 NOTE — PROCEDURES
Hemodialysis / [phone]    Vitals Pre Post Assessment Pre Post   BP BP: (!) 156/76 (02/20/23 1250)   202/88 LOC Alert, follows commands answering my questions when asked alert   HR Pulse (Heart Rate): 85 (02/20/23 1250) 89 Lungs Clear to auscultation  Unlabored, even   Resp Resp Rate: 17 (02/20/23 1250) 17 Cardiac Regular bedside telemetry Regular bedside telemetry    Temp Temp: 99.5 °F (37.5 °C) (02/20/23 1250) 98.1 Skin Warm and dry  Warm and dry    Weight Pre-Dialysis Weight: 78 kg (171 lb 15.3 oz) (02/18/23 2037) N/a Edema None detected None detected    Tele status Bedside  Bedside  Pain Pain Intensity 1: 0 (02/20/23 0800) Medicated with tylenol for headache     Orders   Duration: Start: 1250 End: 1450 2    Dialyzer: Dialyzer/Set Up Inspection: Revaclear (02/20/23 1250)   K Bath: Dialysate K (mEq/L): 2 (02/20/23 1250)   Ca Bath: Dialysate CA (mEq/L): 2.5 (02/20/23 1250)   Na: Dialysate NA (mEq/L): 138 (02/20/23 1250)   Bicarb: Dialysate HCO3 (mEq/L): 35 (02/20/23 1250)   Target Fluid Removal: Goal/Amount of Fluid to Remove (mL): 1000 mL (02/20/23 1250)     Access   Type & Location: Right tunneled cvc    Comments:                                 dressing clean, dry and intact dated 02/18/23, Each catheter limb disinfected per p&p, caps removed, hubs disinfected per p&p.   Aspirated 5 nl each limb, flushed easily        Labs   HBsAg (Antigen) / date:                           02/18/23 negative                    HBsAb (Antibody) / date: 02/18/23 susceptible    Source: epic   Obtained/Reviewed  Critical Results Called HGB   Date Value Ref Range Status   02/20/2023 8.7 (L) 12.1 - 17.0 g/dL Final     Potassium   Date Value Ref Range Status   02/20/2023 4.8 3.5 - 5.1 mmol/L Final     Calcium   Date Value Ref Range Status   02/20/2023 8.7 8.5 - 10.1 MG/DL Final     BUN   Date Value Ref Range Status   02/20/2023 69 (H) 6 - 20 MG/DL Final     Creatinine   Date Value Ref Range Status   02/20/2023 12.40 (H) 0.70 - 1.30 MG/DL Final        Meds Given   Name Dose Route                    Adequacy / Fluid    Total Liters Process: 44.5   Net Fluid Removed: 1000      Comments   Time Out Done:   (Time) 1245   Admitting Diagnosis: Subdural hematoma   Consent obtained/signed: Informed Consent Verified:  (chronic consent applies) (02/20/23 1250)   Machine / RO # Machine Number: [id number] (02/20/23 1250)   Primary Nurse Rpt Pre: Gale Elmore RN    Primary Nurse Rpt Post: Gale Elmore RN    Pt Education: Procedural 2 hours today with regularly scheduled treatment tomorrow   Care Plan: Continue current HD plan of care    Pts outpatient clinic: [facility] Summary   Comments:          1250 HD initiated as ordered. [phone] primary nurse notified of blood pressure range. 12 primary nurse in medicated for hypertension          1400 notified primary nurse of current blood pressure 187/93   1410 medicated for hypertension  1415 notified primary nurse patient now complaints of headache at this   1450 treatment completed all possible blood returned, Each dialysis catheter limb disinfected per p&p, blood returned per p&p, each dialysis hub disinfected per p&p, post dialysis catheter dwell instilled per order, and caps applied. All dialysis related medications have been reviewed.

## 2023-02-20 NOTE — PROGRESS NOTES
ICU NIGHT CHECKLIST     Night round completed with bedside nurse. Plan of care for patient reviewed. Medication weaning / changes discussed. Necessity of any lines, drains, and/or tubes addressed. Respiratory status / modalities discussed.        Ciara Lung, NP-C    Critical Care Medicine  [employer]

## 2023-02-20 NOTE — PROGRESS NOTES
--------------  CONTINUED STAY REVIEW    Payor: [employer]  Subscriber #:  [de-identified]  Authorization Number: [id number]    Admit date: 12/4/17  Admit time: [address] 26    Admitting Physician: Louie Allen MD  Attending Physician:  MD Zhanna De La Paz Problem: Hemorrhagic Stroke: Day 2  Goal: Activity/Safety  Outcome: Progressing Towards Goal  Goal: Diagnostic Test/Procedures  Outcome: Progressing Towards Goal  Goal: Nutrition/Diet  Outcome: Progressing Towards Goal  Goal: Medications  Outcome: Progressing Towards Goal  Goal: Respiratory  Outcome: Progressing Towards Goal  Goal: Treatments/Interventions/Procedures  Outcome: Progressing Towards Goal  Goal: Psychosocial  Outcome: Progressing Towards Goal  Goal: *Hemodynamically stable  Outcome: Progressing Towards Goal  Goal: *Verbalizes anxiety and depression are reduced or absent  Outcome: Progressing Towards Goal  Goal: *Absence of aspiration  Outcome: Progressing Towards Goal  Goal: *Absence of signs and symptoms of DVT  Outcome: Progressing Towards Goal  Goal: *Optimal pain control at patient's stated goal  Outcome: Progressing Towards Goal  Goal: *Tolerating diet  Outcome: Progressing Towards Goal  Goal: *Progressive mobility and function  Outcome: Progressing Towards Goal  Goal: *Rehabilitation readiness  Outcome: Progressing Towards Goal     Problem: Hemorrhagic Stroke: Discharge Outcomes  Goal: *Verbalizes anxiety and depression are reduced or absent  Outcome: Progressing Towards Goal  Goal: *Verbalize understanding of risk factor modification(Stroke Metric)  Outcome: Progressing Towards Goal  Goal: *Optimal pain control at patient's stated goal  Outcome: Progressing Towards Goal  Goal: *Hemodynamically stable  Outcome: Progressing Towards Goal  Goal: *Absence of aspiration pneumonia  Outcome: Progressing Towards Goal  Goal: *Aware of needed dietary changes  Outcome: Progressing Towards Goal  Goal: *Verbalizes understanding and describes medication purposes and frequencies  Outcome: Progressing Towards Goal  Goal: *Tolerating diet  Outcome: Progressing Towards Goal  Goal: *Absence of signs and symptoms of DVT  Outcome: Progressing Towards Goal  Goal: *Absence of aspiration  Outcome: Progressing Towards Goal  Goal: *Progressive mobility and function  Outcome: Progressing Towards Goal  Goal: *Home safety concerns addressed  Outcome: Progressing Towards Goal     Problem: Pressure Injury - Risk of  Goal: *Prevention of pressure injury  Description: Document Brock Scale and appropriate interventions in the flowsheet. Outcome: Progressing Towards Goal  Note: Pressure Injury Interventions:  Sensory Interventions: Assess changes in LOC, Assess need for specialty bed, Avoid rigorous massage over bony prominences, Float heels, Keep linens dry and wrinkle-free, Minimize linen layers, Maintain/enhance activity level, Pressure redistribution bed/mattress (bed type), Turn and reposition approx. every two hours (pillows and wedges if needed)         Activity Interventions: Assess need for specialty bed, Increase time out of bed, Pressure redistribution bed/mattress(bed type)    Mobility Interventions: Assess need for specialty bed, Float heels, HOB 30 degrees or less, Pressure redistribution bed/mattress (bed type), Turn and reposition approx. every two hours(pillow and wedges)    Nutrition Interventions: Document food/fluid/supplement intake    Friction and Shear Interventions: Apply protective barrier, creams and emollients, Foam dressings/transparent film/skin sealants, HOB 30 degrees or less, Lift sheet                Problem: Patient Education: Go to Patient Education Activity  Goal: Patient/Family Education  Outcome: Progressing Towards Goal     Problem: Falls - Risk of  Goal: *Absence of Falls  Description: Document Radha Fall Risk and appropriate interventions in the flowsheet. Outcome: Progressing Towards Goal  Note: Fall Risk Interventions:            Medication Interventions: Bed/chair exit alarm    Elimination Interventions:  Toileting schedule/hourly rounds              Problem: Patient Education: Go to Patient Education Activity  Goal: Patient/Family Education  Outcome: Progressing Towards Goal stress cues and no overt clinical signs of aspiration in 30 minutes or less.     The infant was fed in sideline position with hands midline, up to chin and swaddled. The infant rooted to bottle after 1 strokes to lips with adequate latch.  Immediate suckin Follow-up Date: 01/15/18  Number of Visits to Meet Established Goals: 9  Frequency (Obs): 3x a week     Physical Therapy     Physical Therapy        Met with infant's father and discussed purpose of physical therapy and what we have been working on, provid

## 2023-02-20 NOTE — PROGRESS NOTES
CRITICAL CARE NOTE      Name: All Rosario   : 1965   MRN: [id number]   Date: 2023      REASON FOR ICU ADMISSION:  SDH    PRINCIPAL ICU DIAGNOSIS   SDH  ESRD  HTN    HPI   63M with HTN ESRD, PUD , Hepatitis presenting with HA found to have acute on chronic SDH on L, s/p OR craniotomy and evac  with Dr Padmini Layne. ASSESSMENT & PLAN     Neuro  Keppra  Q4 Neurocheck  Nrsg to drain mngt    Cardiac  Telemetry  EKG  Goal MAP>65, I am actively titrating pressors to goal  Procardia, Labetalol starting today  SBP <160   Cardene gtt    Pulmonary  Head of Bed >30  IS   Goal SpO2 >92%, I am actively titrating oxygen to goal  Nebs PRN  CXR    GI  ADAT  protonix    Renal  Strict I/o  HD     Endocrine  SSI   Goal glucose 140-180    ID  Ancef with GAVINO     Heme  Scd    Lines- piv  Brown - no  DVT- scd  SUP - protonix  Diet - ADULT DIET Easy to Chew; 3 carb choices (45 gm/meal); Low Fat/Low Chol/High Fiber/CAPRI; Low Sodium (2 gm); Low Potassium (Less than 3000 mg/day); Low Phosphorus (Less than 1000 mg);  Less than 60 gm  Mobility - ptot  Pt Contact - family in room  Dispo - ICU    Code Status - FULL            OBJECTIVE     Labs and Data: Reviewed 23  Medications: Reviewed 23  Imaging: Reviewed 23    Visit Vitals  BP (!) 176/80   Pulse 90   Temp 98.1 °F (36.7 °C)   Resp 17   Ht 5' 9\" (1.753 m)   Wt 69.7 kg (153 lb 10.6 oz)   SpO2 98%   BMI 22.69 kg/m²    O2 Flow Rate (L/min): 4 l/min O2 Device: None (Room air) Temp (24hrs), Av.8 °F (37.1 °C), Min:98.1 °F (36.7 °C), Max:99.5 °F (37.5 °C)             Intake/Output:     Intake/Output Summary (Last 24 hours) at 2023 1525  Last data filed at 2023 1450  Gross per 24 hour   Intake --   Output 1310 ml   Net -1310 ml     General - in bed, GAVINO  HEENT- pupils equal , anicteric  Neuro - conversant, follows basic commands, no focal deficit  CV - RRR  Resp - CTA  Abd - soft, nontender, no guarding   - no brown  MSK- intact, no sacral ulcer    All Micro Results       None             Imaging  Key imaging reviewed       CRITICAL CARE DOCUMENTATION  I had a face to face encounter with the patient, reviewed and interpreted patient data including clinical events, labs, images, vital signs, I/O's, and examined patient. I have discussed the case and the plan and management of the patient's care with the consulting services, the bedside nurses and the respiratory therapist.      NOTE OF PERSONAL INVOLVEMENT IN CARE   This patient has a high probability of imminent, clinically significant deterioration, which requires the highest level of preparedness to intervene urgently. I participated in the decision-making and personally managed or directed the management of the following life and organ supporting interventions that required my frequent assessment to treat or prevent imminent deterioration. I personally spent 45 minutes of critical care time. This is time spent at this critically ill patient's bedside actively involved in patient care as well as the coordination of care. This does not include any procedural time which has been billed separately.     Jesenia Jaimes DO  Staff Intensivist  [facility] Critical Care  2/20/2023

## 2023-02-20 NOTE — ROUTINE PROCESS
1930: Bedside shift change report given to Caitie Ny RN (oncoming nurse) by Gauri Cox RN (offgoing nurse). Report included the following information SBAR, Kardex, ED Summary, Procedure Summary, Intake/Output, MAR, Recent Results, Cardiac Rhythm SR, and Dual Neuro Assessment. 0440: suture around drain site oozing blood. Santiago Castro MD informed, no new orders received. 1150: updated pt's sister on telephone. 1200: Bedside shift change report given to [facility] (oncoming nurse) by Caitie Ny RN (offgoing nurse). Report included the following information SBAR, Kardex, ED Summary, Procedure Summary, Intake/Output, MAR, Recent Results, Cardiac Rhythm SR, and Dual Neuro Assessment.

## 2023-02-20 NOTE — PROGRESS NOTES
Awake alert  remarkably well considering his condition pre op on admission  Venkata commented that his elevated BP yesterday of ~190/100+ was the best it has been indicating he is not very compliant with BP meds at home and he may run abnormally high at all times.  Good progress  continue BP control and post op care

## 2023-02-21 LAB
ALBUMIN SERPL-MCNC: 2.8 G/DL (ref 3.5–5)
ALBUMIN SERPL-MCNC: 2.8 G/DL (ref 3.5–5)
ALBUMIN/GLOB SERPL: 0.6 (ref 1.1–2.2)
ALBUMIN/GLOB SERPL: 0.6 (ref 1.1–2.2)
ALP SERPL-CCNC: 75 U/L (ref 45–117)
ALP SERPL-CCNC: 76 U/L (ref 45–117)
ALT SERPL-CCNC: 7 U/L (ref 12–78)
ALT SERPL-CCNC: <6 U/L (ref 12–78)
ANION GAP SERPL CALC-SCNC: 10 MMOL/L (ref 5–15)
AST SERPL-CCNC: 12 U/L (ref 15–37)
AST SERPL-CCNC: 15 U/L (ref 15–37)
ATRIAL RATE: 94 BPM
BILIRUB DIRECT SERPL-MCNC: <0.1 MG/DL (ref 0–0.2)
BILIRUB SERPL-MCNC: 0.2 MG/DL (ref 0.2–1)
BILIRUB SERPL-MCNC: 0.3 MG/DL (ref 0.2–1)
BUN SERPL-MCNC: 46 MG/DL (ref 6–20)
BUN/CREAT SERPL: 5 (ref 12–20)
CALCIUM SERPL-MCNC: 8.6 MG/DL (ref 8.5–10.1)
CALCULATED P AXIS, ECG09: 75 DEGREES
CALCULATED R AXIS, ECG10: 37 DEGREES
CALCULATED T AXIS, ECG11: 121 DEGREES
CHLORIDE SERPL-SCNC: 98 MMOL/L (ref 97–108)
CO2 SERPL-SCNC: 26 MMOL/L (ref 21–32)
CREAT SERPL-MCNC: 9.92 MG/DL (ref 0.7–1.3)
DIAGNOSIS, 93000: NORMAL
ERYTHROCYTE [DISTWIDTH] IN BLOOD BY AUTOMATED COUNT: 14.7 % (ref 11.5–14.5)
GLOBULIN SER CALC-MCNC: 4.5 G/DL (ref 2–4)
GLOBULIN SER CALC-MCNC: 4.6 G/DL (ref 2–4)
GLUCOSE SERPL-MCNC: 163 MG/DL (ref 65–100)
HCT VFR BLD AUTO: 24.9 % (ref 36.6–50.3)
HGB BLD-MCNC: 8.3 G/DL (ref 12.1–17)
MCH RBC QN AUTO: 29.9 PG (ref 26–34)
MCHC RBC AUTO-ENTMCNC: 33.3 G/DL (ref 30–36.5)
MCV RBC AUTO: 89.6 FL (ref 80–99)
NRBC # BLD: 0 K/UL (ref 0–0.01)
NRBC BLD-RTO: 0 PER 100 WBC
P-R INTERVAL, ECG05: 148 MS
PLATELET # BLD AUTO: 159 K/UL (ref 150–400)
PMV BLD AUTO: 11.3 FL (ref 8.9–12.9)
POTASSIUM SERPL-SCNC: 4 MMOL/L (ref 3.5–5.1)
PROT SERPL-MCNC: 7.3 G/DL (ref 6.4–8.2)
PROT SERPL-MCNC: 7.4 G/DL (ref 6.4–8.2)
Q-T INTERVAL, ECG07: 370 MS
QRS DURATION, ECG06: 98 MS
QTC CALCULATION (BEZET), ECG08: 462 MS
RBC # BLD AUTO: 2.78 M/UL (ref 4.1–5.7)
SODIUM SERPL-SCNC: 134 MMOL/L (ref 136–145)
TROPONIN I SERPL HS-MCNC: 21 NG/L (ref 0–57)
VENTRICULAR RATE, ECG03: 94 BPM
WBC # BLD AUTO: 10.9 K/UL (ref 4.1–11.1)

## 2023-02-21 PROCEDURE — 74011250637 HC RX REV CODE- 250/637: Performed by: STUDENT IN AN ORGANIZED HEALTH CARE EDUCATION/TRAINING PROGRAM

## 2023-02-21 PROCEDURE — 65660000001 HC RM ICU INTERMED STEPDOWN

## 2023-02-21 PROCEDURE — 74011250636 HC RX REV CODE- 250/636: Performed by: ANESTHESIOLOGY

## 2023-02-21 PROCEDURE — 93005 ELECTROCARDIOGRAM TRACING: CPT

## 2023-02-21 PROCEDURE — 74011000250 HC RX REV CODE- 250: Performed by: STUDENT IN AN ORGANIZED HEALTH CARE EDUCATION/TRAINING PROGRAM

## 2023-02-21 PROCEDURE — 97165 OT EVAL LOW COMPLEX 30 MIN: CPT

## 2023-02-21 PROCEDURE — 74011250636 HC RX REV CODE- 250/636: Performed by: STUDENT IN AN ORGANIZED HEALTH CARE EDUCATION/TRAINING PROGRAM

## 2023-02-21 PROCEDURE — 90935 HEMODIALYSIS ONE EVALUATION: CPT

## 2023-02-21 PROCEDURE — C9113 INJ PANTOPRAZOLE SODIUM, VIA: HCPCS | Performed by: ANESTHESIOLOGY

## 2023-02-21 PROCEDURE — 97530 THERAPEUTIC ACTIVITIES: CPT

## 2023-02-21 PROCEDURE — 65610000006 HC RM INTENSIVE CARE

## 2023-02-21 PROCEDURE — 36415 COLL VENOUS BLD VENIPUNCTURE: CPT

## 2023-02-21 PROCEDURE — 97162 PT EVAL MOD COMPLEX 30 MIN: CPT

## 2023-02-21 PROCEDURE — 85027 COMPLETE CBC AUTOMATED: CPT

## 2023-02-21 PROCEDURE — 84484 ASSAY OF TROPONIN QUANT: CPT

## 2023-02-21 PROCEDURE — 97116 GAIT TRAINING THERAPY: CPT

## 2023-02-21 PROCEDURE — 80053 COMPREHEN METABOLIC PANEL: CPT

## 2023-02-21 PROCEDURE — 74011000250 HC RX REV CODE- 250: Performed by: ANESTHESIOLOGY

## 2023-02-21 PROCEDURE — 80076 HEPATIC FUNCTION PANEL: CPT

## 2023-02-21 PROCEDURE — 74011000250 HC RX REV CODE- 250: Performed by: NEUROLOGICAL SURGERY

## 2023-02-21 PROCEDURE — 74011250637 HC RX REV CODE- 250/637: Performed by: ANESTHESIOLOGY

## 2023-02-21 PROCEDURE — 74011250636 HC RX REV CODE- 250/636: Performed by: INTERNAL MEDICINE

## 2023-02-21 RX ORDER — LEVETIRACETAM 500 MG/5ML
500 INJECTION, SOLUTION, CONCENTRATE INTRAVENOUS DAILY
Status: DISCONTINUED | OUTPATIENT
Start: 2023-02-22 | End: 2023-02-22

## 2023-02-21 RX ORDER — LEVETIRACETAM 500 MG/5ML
500 INJECTION, SOLUTION, CONCENTRATE INTRAVENOUS
Status: DISCONTINUED | OUTPATIENT
Start: 2023-02-23 | End: 2023-02-22

## 2023-02-21 RX ORDER — HEPARIN SODIUM 5000 [USP'U]/ML
5000 INJECTION, SOLUTION INTRAVENOUS; SUBCUTANEOUS EVERY 12 HOURS
Status: DISCONTINUED | OUTPATIENT
Start: 2023-02-21 | End: 2023-02-23

## 2023-02-21 RX ADMIN — LABETALOL HYDROCHLORIDE 20 MG: 5 INJECTION INTRAVENOUS at 10:05

## 2023-02-21 RX ADMIN — LABETALOL HYDROCHLORIDE 300 MG: 100 TABLET, FILM COATED ORAL at 05:07

## 2023-02-21 RX ADMIN — SODIUM CHLORIDE, PRESERVATIVE FREE 10 ML: 5 INJECTION INTRAVENOUS at 14:19

## 2023-02-21 RX ADMIN — ACETAMINOPHEN 650 MG: 325 TABLET ORAL at 10:16

## 2023-02-21 RX ADMIN — NICARDIPINE HYDROCHLORIDE 5 MG/HR: 25 INJECTION, SOLUTION INTRAVENOUS at 08:10

## 2023-02-21 RX ADMIN — CLONIDINE HYDROCHLORIDE 0.2 MG: 0.1 TABLET ORAL at 10:09

## 2023-02-21 RX ADMIN — NICARDIPINE HYDROCHLORIDE 5 MG/HR: 25 INJECTION, SOLUTION INTRAVENOUS at 05:59

## 2023-02-21 RX ADMIN — NICARDIPINE HYDROCHLORIDE 7.5 MG/HR: 25 INJECTION, SOLUTION INTRAVENOUS at 02:11

## 2023-02-21 RX ADMIN — LABETALOL HYDROCHLORIDE 300 MG: 100 TABLET, FILM COATED ORAL at 16:48

## 2023-02-21 RX ADMIN — SODIUM CHLORIDE, PRESERVATIVE FREE 10 ML: 5 INJECTION INTRAVENOUS at 06:00

## 2023-02-21 RX ADMIN — PANTOPRAZOLE SODIUM 40 MG: 40 INJECTION, POWDER, LYOPHILIZED, FOR SOLUTION INTRAVENOUS at 10:09

## 2023-02-21 RX ADMIN — NIFEDIPINE 60 MG: 60 TABLET, EXTENDED RELEASE ORAL at 10:09

## 2023-02-21 RX ADMIN — HEPARIN SODIUM 5000 UNITS: 5000 INJECTION INTRAVENOUS; SUBCUTANEOUS at 12:22

## 2023-02-21 RX ADMIN — CLONIDINE HYDROCHLORIDE 0.2 MG: 0.1 TABLET ORAL at 17:42

## 2023-02-21 RX ADMIN — BUMETANIDE 2 MG: 1 TABLET ORAL at 10:09

## 2023-02-21 RX ADMIN — EPOETIN ALFA-EPBX 20000 UNITS: 20000 INJECTION, SOLUTION INTRAVENOUS; SUBCUTANEOUS at 20:00

## 2023-02-21 RX ADMIN — NICARDIPINE HYDROCHLORIDE 7.5 MG/HR: 25 INJECTION, SOLUTION INTRAVENOUS at 12:23

## 2023-02-21 RX ADMIN — LEVETIRACETAM 500 MG: 100 INJECTION INTRAVENOUS at 12:22

## 2023-02-21 NOTE — DISCHARGE INSTRUCTIONS
After Hospital Care Plan:  Discharge Instructions Craniotomy  [facility]    Patient Name: Delfina Murillo    Date of procedure: 2/18/2023      Procedure: Procedure(s):  LEFT SUBDURAL CRANIOTOMY      Follow up appointments  Follow up with your neurosurgeon in 10-14 days. Call [phone] to make an appointment as soon as you get home from the hospital.  Follow-up care is a key part of your treatment and safety. Be sure to make and go to all appointments, and call your doctor if you are having problems. It's also a good idea to know your test results and keep a list of the medicines you take. A craniotomy is surgery to open your skull to fix a problem in your brain. It can be done for many reasons. For example, you may need a craniotomy if your brain or blood vessels are damaged or if you have a tumor or an infection in your brain. You will probably feel very tired for several weeks after surgery. You may also have headaches or problems concentrating. It can take 4 to 8 weeks to recover from surgery. Your cuts (incisions) may be sore for about 5 days after surgery. You may also have numbness and shooting pains near your wound, or swelling and bruising around your eyes. As your wound starts to heal, it may begin to itch. Medicines and ice packs can help with headaches, pain, swelling, and itching. The stitches that hold your incisions together may go away on their own or will be removed in 7 to 10 days. This depends on the type of stitches the doctor uses. Staples are usually removed in 10-14 days. It is common for your scalp to swell with fluid. After the swelling goes down, you may have a dent in your head. Some kinds of plates stay attached to hold the skull flap to your head. If your head was shaved, you may wear clean hats or scarves on your head until your hair grows back. This care sheet gives you a general idea about how long it will take for you to recover.  But each person recovers at a different pace. Follow the steps below to get better as quickly as possible. When to call your Neurosurgeon: You have trouble thinking clearly. You are sleeping more than you are awake. You have a fever with a stiff neck or a severe headache. You have any sudden vision changes. Nausea or vomiting, severe headache. Loss of bowel or bladder function, inability to urinate. Increased weakness-greater than before your surgery. Severe pain or pain not relieved by medications. Signs of a blood clot in your leg-calf pain, tenderness, redness, swelling of lower leg. Signs of infection-if your incision is red; continues to have drainage; drainage has a foul odor or if you have a persistent fever over 101 degrees for 24 hours. You fall and hit your head. When to call your Primary Care Physician:  Concerns about medical conditions such as diabetes, high blood pressure, asthma, congestive heart failure. Call if blood sugars are elevated, persistent headache or dizziness, coughing or congestion, constipation or diarrhea, burning with urination, abnormal heart rate. When to call 911 and go to the nearest emergency room:  Acute onset of chest pain, shortness of breath, difficulty breathing  You passed out (lost consciousness). It is hard to think, move, speak, or see. Your body is jerking or shaking. Activity  Rest when you feel tired. It is normal to want to sleep during the day. It is a good idea to plan to take a nap every day. Getting enough sleep will help you recover. Try not to lie flat when you rest or sleep. You can use a wedge pillow, or you can put a rolled towel or foam padding under your pillow. You can also raise the head of your bed by putting bricks or wooden blocks under the bed legs. After lying down, bring your head up slowly. This can prevent headaches or dizziness. Try to walk each day. Start by walking a little more than you did the day before.  Bit by bit, increase the amount you walk. Walking boosts blood flow and helps prevent pneumonia and constipation. Avoid heavy lifting until your doctor says it is okay. Do not drive for 2 to 3 weeks or until your doctor says it is okay. When you begin driving again, start with short, familiar routes in the daytime. Ask your doctor if it is safe for you to travel by plane. Avoid risky activities, such as climbing a ladder, for 3 months after surgery. Avoid strenuous activities, such as bicycle riding, jogging, weight lifting, or aerobic exercise, for 3 months or until your doctor says it is okay. Do not play any rough or contact sports for 3 months or until your doctor says it is okay. You may engage in sexual activity. Diet  Resume usual diet; drink plenty of fluids; eat foods high in fiber  It is important to have regular bowel movements. Pain medications may cause constipation. You may want to take a stool softener (such as Senokot-S or Colace) to prevent constipation. If constipation occurs, take a laxative (such as Dulcolax tablets, Milk of Magnesia, or a suppository). Laxatives should only be used if the above preventable measures have failed and you still have not had a bowel movement after three days  Try to avoid constipation and straining with bowel movements. Incision Care      You can wash your hair 2 to 3 days after your surgery. But do not soak your head or swim for 2 to 3 weeks. Do not dye or color your hair for 4 weeks after your surgery. Do not rub or apply any lotions or ointments to your incision site. Do not scrub your wound    Medicines   If your doctor or nurse practitioner prescribed antibiotics, take them as directed. Do not stop taking them just because you feel better. You need to take the full course of antibiotics. If you get medicines to prevent seizures, take them exactly as directed.   If the doctor or nurse practitioner gave you a prescription medicine for pain, take it as prescribed. If you are not taking a prescription pain medicine, ask your doctor or nurse practitioner if you can take an over-the-counter medicine. Pain Medication Safety  DO:  Read the Medication Guide   Take your medicine exactly as prescribed   Store your medicine away from children and in a safe place   Call your healthcare provider for medical advice about side effects. You may report side effects to FDA at 3-298-JCQ-6784. Please be aware that many medications contain Tylenol. We do not want you to over medicate so please read the information below as a guide. Do not take more than 4 Grams of Tylenol in a 24 hour period if you are under age 79 or 3 Grams in 24 hours if you are over 79years old. (There are 1000 milligrams in one Gram)  Percocet contains 325 mg of Tylenol per tablet (do not take more than 12 tablets in 24 hours)  Lortab contains 500 mg of Tylenol per tablet (do not take more than 8 tablets in 24 hours)  Norco contains 325 mg of Tylenol per tablet (do not take more than 12 tablets in 24 hours). DO NOT:  Do not give your medicine to others. Do not take medicine unless it was prescribed for you. Do not stop taking your medicine without talking to your healthcare provider. Do not break, chew, crush, dissolve, or inject your medicine. If you cannot swallow your medicine whole, talk to your healthcare provider. Do not drink alcohol while taking this medicine. Do not take anti-inflammatory medications or aspirin unless instructed by your physician.

## 2023-02-21 NOTE — PROGRESS NOTES
Problem: Self Care Deficits Care Plan (Adult)  Goal: *Acute Goals and Plan of Care (Insert Text)  Description: FUNCTIONAL STATUS PRIOR TO ADMISSION: Patient was independent and active without use of DME.    HOME SUPPORT: The patient lived with fiance but did not require assist.    Occupational Therapy Goals  Initiated 2/21/2023   1. Patient will perform grooming in unsupported sitting using L hand for GM/FM tasks with minimal assistance within 7 day(s). 2.  Patient will perform anterior neck to thigh bathing in unsupported sitting with minimal assistance within 7 day(s). 3.  Patient will perform toilet transfers to/from [facility] with minimal assistance within 7 day(s). 4.  Patient will perform all aspects of toileting with minimal assistance within 7 day(s). 5.  Patient will participate in upper extremity therapeutic exercise/activities with supervision/set-up within 7 day(s). 6.  Patient will utilize energy conservation techniques during functional activities with verbal cues within 7 day(s). 7.  Patient will improve their Fugl Dailey score by 6 points in prep for ADLs within 7 days. Outcome: Not Met    OCCUPATIONAL THERAPY EVALUATION  Patient: Justin Skinner (34 y.o. male)  Date: 2/21/2023  Primary Diagnosis: Subdural hematoma [S06. 5XAA]  Procedure(s) (LRB):  LEFT SUBDURAL CRANIOTOMY (Left) 3 Days Post-Op   Precautions:  Fall (SBP <160)    ASSESSMENT  Based on the objective data described below, the patient presents with impaired balance, generalized weakness, swelling of L eye (impairing vision), decreased functional activity tolerance, impaired coordination in L side, impaired access to lower body, and impaired cognition (attention to task, command following, safety, etc) s/p SDH with resultant need for craniotomy, now POD #3. Today, patient required min A for bed mobility and min Ax2 to stand and side step to chair. Prior to mobility, patient required mod A to mikie socks.  Of note, patient with periodic LOB in sitting (posterior lean) and able to right self with minimal manual cues. Noted patient with LUE impaired coordination during FTN testing and ADL tasks. Patient BP within range throughout session. Patient left sitting in chair with fiance and sister bedside, RN aware, VSS, call bell in reach, alarm active. Recommend IPR once cleared for D/C. Will continue to follow. Current Level of Function Impacting Discharge (ADLs/self-care): min-max A for ADLs and mobility    Functional Outcome Measure: The patient scored Total A-D  Total A-D (Motor Function): 62/66 on the Fugl-Dailey Assessment which is indicative of mild impairment in upper extremity functional status. Other factors to consider for discharge: was IND and active PTA     Patient will benefit from skilled therapy intervention to address the above noted impairments. PLAN :  Recommendations and Planned Interventions: self care training, functional mobility training, therapeutic exercise, balance training, therapeutic activities, cognitive retraining, endurance activities, neuromuscular re-education, patient education, home safety training, and family training/education    Frequency/Duration: Patient will be followed by occupational therapy 5 times a week to address goals. Recommendation for discharge: (in order for the patient to meet his/her long term goals)  Therapy 3 hours per day 5-7 days per week    This discharge recommendation:  Has been made in collaboration with the attending provider and/or case management    IF patient discharges home will need the following DME: shower chair       SUBJECTIVE:   Patient stated I feel better than yesterday.     OBJECTIVE DATA SUMMARY:   HISTORY:   Past Medical History:   Diagnosis Date    Chronic kidney disease     Hypertension     Liver disease    No past surgical history on file.   Expanded or extensive additional review of patient history:     Home Situation  Home Environment: Private residence  # Steps to Enter: 3  Rails to Enter: Yes  Hand Rails : Bilateral  One/Two Story Residence: Two story  # of Interior Steps: 13  Living Alone: No  Support Systems: Spouse/Significant Other, Child(harish)  Current DME Used/Available at Home: None, Grab bars  Tub or Shower Type: Shower    Hand dominance: Right    EXAMINATION OF PERFORMANCE DEFICITS:  Cognitive/Behavioral Status:  Neurologic State: Drowsy  Orientation Level: Oriented to person;Disoriented to time  Cognition: Decreased attention/concentration; Follows commands;Poor safety awareness  Perception: Cues to maintain midline in standing (kyphotic posture - manual cues for upright posture, posteroir lean in sitting)  Perseveration: No perseveration noted  Safety/Judgement: Awareness of environment;Decreased awareness of need for assistance;Decreased awareness of need for safety;Decreased insight into deficits; Fall prevention    Skin: appear intact    Edema: none noted in BUEs    Hearing: Auditory  Auditory Impairment: None    Vision/Perceptual:    Tracking:  (NT this session, significant swelling in L eye, minimally able to open)    Acuity: Impaired near vision; Impaired far vision    Corrective Lenses: Reading glasses    Range of Motion:  In BUEs  AROM: Within functional limits (except LUE and LLE decreased)    Strength: In BUEs  Strength: Generally decreased, functional (LUE/LLE decreased)     Coordination:  Coordination: Generally decreased, functional (LUE/LLE)  Fine Motor Skills-Upper: Left Impaired;Right Intact    Gross Motor Skills-Upper: Left Impaired;Right Intact    Tone & Sensation:  In BUEs  Tone: Abnormal (L side)  Sensation: Intact    Balance:  Sitting: Impaired  Sitting - Static: Fair (occasional)  Sitting - Dynamic: Fair (occasional)  Standing: Impaired; With support  Standing - Static: Constant support; Fair  Standing - Dynamic : Constant support;Poor    Functional Mobility and Transfers for ADLs:  Bed Mobility:  Supine to Sit: Contact guard assistance;Minimum assistance    Transfers:  Sit to Stand: Minimum assistance;Assist x2  Stand to Sit: Minimum assistance;Assist x2  Bed to Chair: Minimum assistance; Moderate assistance;Assist x2    ADL Assessment:  Feeding: Minimum assistance    Oral Facial Hygiene/Grooming: Moderate assistance    Bathing: Moderate assistance    Upper Body Dressing: Moderate assistance    Lower Body Dressing: Maximum assistance    Toileting: Maximum assistance     ADL Intervention and task modifications:    Lower Body Dressing Assistance  Socks: Maximum assistance  Leg Crossed Method Used: Yes  Position Performed: Seated edge of bed  Cues: Don;Physical assistance;Verbal cues provided    Cognitive Retraining  Safety/Judgement: Awareness of environment;Decreased awareness of need for assistance;Decreased awareness of need for safety;Decreased insight into deficits; Fall prevention    Functional Measure:  Fugl-Dailey Assessment of Motor Recovery after Stroke:        Reflex Activity  Flexors/Biceps/Fingers: Can be elicited  Extensors/Triceps: Can be elicited  Reflex Subtotal: 4    Volitional Movement Within Synergies  Shoulder Retraction: Full  Shoulder Elevation: Full  Shoulder Abduction (90 degrees): Full  Shoulder External Rotation: Full  Elbow Flexion: Full  Forearm Supination: Partial  Shoulder Adduction/Internal Rotation: Full  Elbow Extension: Full  Forearm Pronation: Full  Subtotal: 17    Volitional Movement Mixing Synergies  Hand to Lumbar Spine: Full  Shoulder Flexion (0-90 degrees): Full  Pronation-Supination: Partial  Subtotal: 5    Volitional Movement With Little or No Synergy  Shoulder Abduction (0-90 degrees): Full  Shoulder Flexion ( degrees): Full  Pronation/Supination: Partial  Subtotal : 5    Normal Reflex Activity  Biceps, Triceps, Finger Flexors:  Full  Subtotal : 2    Upper Extremity Total   Upper Extremity Total: 33    Wrist  Stability at 15 Degree Dorsiflexion: Full  Repeated Dorsiflexion/ Volar Flexion: Full  Stability at 15 Degree Dorsiflexion: Full  Repeated Dorsiflexion/ Volar Flexion: Full  Circumduction: Full  Wrist Total: 10    Hand  Mass Flexion: Full  Mass Extension: Full  Grasp A: Full  Grasp B: Full  Grasp C: Full  Grasp D: Full  Grasp E: Full  Hand Total: 14    Coordination/Speed  Tremor: None  Dysmetria: Slight  Time: <1s  Coordination/Speed Total : 5    Total A-D  Total A-D (Motor Function): 62/66     This is a reliable/valid measure of arm function after a neurological event. It has established value to characterize functional status and for measuring spontaneous and therapy-induced recovery; tests proximal and distal motor functions. Fugl-Dailey Assessment - UE scores recorded between five and 30 days post neurologic event can be used to predict UE recovery at six months post neurologic event. Severe = 0-21 points   Moderately Severe = 22-33 points   Moderate = 34-47 points   Mild = 48-66 points  LIBERTAD Ramirez, REYNOLD Grossman, & MARCELLA Silva (1992). Measurement of motor recovery after stroke: Outcome assessment and sample size requirements.  Stroke, 23, pp. 6738-1527.   ------------------------------------------------------------------------------------------------------------------------------------------------------------------  MCID:  Stroke:   Dulce Suggs et al, 2001; n = 171; mean age 79 (5) years; assessed within 16 (12) days of stroke, Acute Stroke)  FMA Motor Scores from Admission to Discharge   10 point increase in FMA Upper Extremity = 1.5 change in discharge FIM   10 point increase in FMA Lower Extremity = 1.9 change in discharge FIM  MDC:   Stroke:   Bayron Mitchell et al, 2008, n = 14, mean age = 59.9 (14.6) years, assessed on average 14 (6.5) months post stroke, Chronic Stroke)   FMA = 5.2 points for the Upper Extremity portion of the assessment     Occupational Therapy Evaluation Charge Determination   History Examination Decision-Making   LOW Complexity : Brief history review  MEDIUM Complexity : 3-5 performance deficits relating to physical, cognitive , or psychosocial skils that result in activity limitations and / or participation restrictions HIGH Complexity : Patient presents with comorbidities that affect occupational performance. Signifigant modification of tasks or assistance (eg, physical or verbal) with assessment (s) is necessary to enable patient to complete evaluation       Based on the above components, the patient evaluation is determined to be of the following complexity level: MEDIUM  Pain Rating:  Reporting no pain    Activity Tolerance:   Fair    After treatment patient left in no apparent distress:    Sitting in chair, Call bell within reach, Bed / chair alarm activated, Caregiver / family present, and RN aware    COMMUNICATION/EDUCATION:   The patients plan of care was discussed with: Physical therapist, Registered nurse, and Case management. Patient was educated regarding his deficit(s) of L impaired coordination and balance as this relates to his diagnosis of L crani. He demonstrated Fair understanding as evidenced by verbal responses. Home safety education was provided and the patient/caregiver indicated understanding. and Patient/family have participated as able in goal setting and plan of care. This patients plan of care is appropriate for delegation to [facility].     Thank you for this referral.  Burnetta Cheadle, OT  Time Calculation: 31 mins

## 2023-02-21 NOTE — PROGRESS NOTES
Problem: Hemorrhagic Stroke: Day 3  Goal: Activity/Safety  Outcome: Progressing Towards Goal  Goal: Consults, if ordered  Outcome: Progressing Towards Goal  Goal: Diagnostic Test/Procedures  Outcome: Progressing Towards Goal  Goal: Nutrition/Diet  Outcome: Progressing Towards Goal  Goal: Medications  Outcome: Progressing Towards Goal  Goal: Respiratory  Outcome: Progressing Towards Goal  Goal: Treatments/Interventions/Procedures  Outcome: Progressing Towards Goal

## 2023-02-21 NOTE — PROGRESS NOTES
Nephrology Progress Note  Ruby Sethi  Date of Admission : 2/18/2023    CC: Follow up for ESRD       Assessment and Plan     ESRD on HD:  - TTS [facility] Comp  - HD today and back to TTS schedule    Hyperkalemia:  - resolved post dialysis    SDH s/p craniotomy:  - per NSG    HTN:  - on cardene  - BP goals per Neuro    Anemia of CKD:  - ABI TTS    Secondary HPT       Interval History:  Seen and examined. Awake, alert. About to start dialysis this AM.  Family at bedside. Current Medications: all current  Medications have been eviewed in EPIC  Review of Systems: Pertinent items are noted in HPI. Objective:  Vitals:    Vitals:    02/21/23 0600 02/21/23 0700 02/21/23 0720 02/21/23 0811   BP: 130/74 127/72     Pulse: 84 82  89   Resp: 15 17  18   Temp:    98.6 °F (37 °C)   TempSrc:       SpO2: 95% 98%     Weight:   75.1 kg (165 lb 9.1 oz)    Height:         Intake and Output:  No intake/output data recorded. 02/19 1901 - 02/21 0700  In: 1401.3 [I.V.:1401.3]  Out: 1315 [Urine:50; Drains:265]    Physical Examination:  Pt intubated    No  General: Awake, alert  Neck:  Supple, no mass  Resp:  Lungs CTA B/L, no wheezing , normal respiratory effort  CV:  RRR,  no murmur or rub, trace LE edema  GI:  Soft, NT, + Bowel sounds, no hepatosplenomegaly  Neurologic:  Non focal  Psych:             AAO x 3 appropriate affect   Skin:  No Rash  Access:           [facility]    []    High complexity decision making was performed  []    Patient is at high-risk of decompensation with multiple organ involvement    Lab Data Personally Reviewed: I have reviewed all the pertinent labs, microbiology data and radiology studies during assessment.     Recent Labs     02/21/23  0125 02/21/23  0124 02/20/23  0915 02/20/23  0318 02/19/23  0523 02/18/23  2050 02/18/23  1721 02/18/23  0949   *  --   --  132* 135*   < > 136 139   K 4.0  --  4.8 5.4* 5.0   < > 6.1* 4.4   CL 98  --   --  99 98   < > 103 98   CO2 26  --   --  25 28   < > 19* 20*   *  --   --  87 82   < > 127* 151*   BUN 46*  --   --  69* 50*   < > 133* 130*   CREA 9.92*  --   --  12.40* 9.77*   < > 18.80* 19.00*   CA 8.6  --   --  8.7 8.9   < > 8.1* 8.7   MG  --   --   --   --  2.0  --  2.6* 2.6*   PHOS  --   --   --   --  2.8  --  7.8*  --    ALB 2.8* 2.8*  --  3.1*  3.0* 3.2*  --  3.6  3.7 3.8   ALT <6* 7*  --  8*  6* 8*  --  9*  9* 9*   INR  --   --   --   --   --   --  1.1  --     < > = values in this interval not displayed. Recent Labs     02/21/23  0124 02/20/23  0318 02/19/23  0523   WBC 10.9 10.6 9.0   HGB 8.3* 8.7* 6.6*   HCT 24.9* 28.4* 19.8*    190 185       No results found for: SDES  Lab Results   Component Value Date/Time    Culture result: NO GROWTH 5 DAYS 05/18/2022 06:06 AM    Culture result: No growth (<1,000 CFU/ML) 03/22/2022 04:11 PM     Recent Results (from the past 24 hour(s))   POTASSIUM    Collection Time: 02/20/23  9:15 AM   Result Value Ref Range    Potassium 4.8 3.5 - 5.1 mmol/L   EKG, 12 LEAD, INITIAL    Collection Time: 02/21/23  1:00 AM   Result Value Ref Range    Ventricular Rate 94 BPM    Atrial Rate 94 BPM    P-R Interval 148 ms    QRS Duration 98 ms    Q-T Interval 370 ms    QTC Calculation (Bezet) 462 ms    Calculated P Axis 75 degrees    Calculated R Axis 37 degrees    Calculated T Axis 121 degrees    Diagnosis       Normal sinus rhythm  Voltage criteria for left ventricular hypertrophy  T wave abnormality, consider lateral ischemia  Prolonged QT  When compared with ECG of 28-NOV-2022 09:43,  Vent. rate has increased BY  35 BPM  Inverted T waves have replaced nonspecific T wave abnormality in Lateral   leads     HEPATIC FUNCTION PANEL    Collection Time: 02/21/23  1:24 AM   Result Value Ref Range    Protein, total 7.4 6.4 - 8.2 g/dL    Albumin 2.8 (L) 3.5 - 5.0 g/dL    Globulin 4.6 (H) 2.0 - 4.0 g/dL    A-G Ratio 0.6 (L) 1.1 - 2.2      Bilirubin, total 0.3 0.2 - 1.0 MG/DL    Bilirubin, direct <0.1 0.0 - 0.2 MG/DL    Alk. phosphatase 75 45 - 117 U/L    AST (SGOT) 15 15 - 37 U/L    ALT (SGPT) 7 (L) 12 - 78 U/L   CBC W/O DIFF    Collection Time: 02/21/23  1:24 AM   Result Value Ref Range    WBC 10.9 4.1 - 11.1 K/uL    RBC 2.78 (L) 4.10 - 5.70 M/uL    HGB 8.3 (L) 12.1 - 17.0 g/dL    HCT 24.9 (L) 36.6 - 50.3 %    MCV 89.6 80.0 - 99.0 FL    MCH 29.9 26.0 - 34.0 PG    MCHC 33.3 30.0 - 36.5 g/dL    RDW 14.7 (H) 11.5 - 14.5 %    PLATELET 050 997 - 459 K/uL    MPV 11.3 8.9 - 12.9 FL    NRBC 0.0 0  WBC    ABSOLUTE NRBC 0.00 0.00 - 0.01 K/uL   TROPONIN-HIGH SENSITIVITY    Collection Time: 02/21/23  1:24 AM   Result Value Ref Range    Troponin-High Sensitivity 21 0 - 57 ng/L   METABOLIC PANEL, COMPREHENSIVE    Collection Time: 02/21/23  1:25 AM   Result Value Ref Range    Sodium 134 (L) 136 - 145 mmol/L    Potassium 4.0 3.5 - 5.1 mmol/L    Chloride 98 97 - 108 mmol/L    CO2 26 21 - 32 mmol/L    Anion gap 10 5 - 15 mmol/L    Glucose 163 (H) 65 - 100 mg/dL    BUN 46 (H) 6 - 20 MG/DL    Creatinine 9.92 (H) 0.70 - 1.30 MG/DL    BUN/Creatinine ratio 5 (L) 12 - 20      eGFR 6 (L) >60 ml/min/1.73m2    Calcium 8.6 8.5 - 10.1 MG/DL    Bilirubin, total 0.2 0.2 - 1.0 MG/DL    ALT (SGPT) <6 (L) 12 - 78 U/L    AST (SGOT) 12 (L) 15 - 37 U/L    Alk. phosphatase 76 45 - 117 U/L    Protein, total 7.3 6.4 - 8.2 g/dL    Albumin 2.8 (L) 3.5 - 5.0 g/dL    Globulin 4.5 (H) 2.0 - 4.0 g/dL    A-G Ratio 0.6 (L) 1.1 - 2.2                   Rashmi Vazquez MD  [facility]   [address] [address]  Phone - [phone]   Fax - [phone]  [email or web address]

## 2023-02-21 NOTE — PROGRESS NOTES
Problem: Mobility Impaired (Adult and Pediatric)  Goal: *Acute Goals and Plan of Care (Insert Text)  Description:   FUNCTIONAL STATUS PRIOR TO ADMISSION: Patient was independent and active without use of DME.    HOME SUPPORT PRIOR TO ADMISSION: The patient lived with family but did not require assist.    Physical Therapy Goals  Initiated 2/21/2023  1. Patient will move from supine to sit and sit to supine  and roll side to side in bed with supervision/set-up within 7 day(s). 2.  Patient will transfer from bed to chair and chair to bed with minimal assistance/contact guard assist using the least restrictive device within 7 day(s). 3.  Patient will perform sit to stand with minimal assistance within 7 day(s). 4.  Patient will ambulate with minimal assistance for 25 feet with the least restrictive device within 7 day(s). 5.  Patient will improve Olivas Balance score by 7 points within 7 days. Outcome: Not Progressing Towards Goal   PHYSICAL THERAPY EVALUATION- NEURO POPULATION  Patient: Jessica Tijerina (75 y.o. male)  Date: 2/21/2023  Primary Diagnosis: Subdural hematoma [S06. 5XAA]  Procedure(s) (LRB):  LEFT SUBDURAL CRANIOTOMY (Left) 3 Days Post-Op   Precautions:   Fall (SBP <160)      ASSESSMENT  Based on the objective data described below, the patient presents with L sided weakness, impulsivity, impaired coordination and proprioception L side, decreased functional mobility, impaired balance and gait, increased risk for falls and dependency from baseline s/p admission on 2/18 with a headache and found to have L SDH. Pt underwent L SBD craniotomy, POD #3. Pt received supine in bed with fiance and daughter at bedside. Pt demonstrated 4-/5 LLE strength; pt denied numbness or tingling in BLEs. Pt completed bed mobility with CGA-min A and noted impaired seated balance initially. Pt with LOB and trunk leaning posteriorly and L.  Pt performed sit<>stands with min A x 2 and side stepping and bed to chair with min-mod A x 2. Facilitation cues provided at L quad to prevent buckling and noted impaired LLE swing phase. Pt remained seated in the chair with all needs met. Recommend inpatient rehab upon discharge to continue therapy efforts and reach highest level of independence. .    Current Level of Function Impacting Discharge (mobility/balance): min A x 2 sit<>stands, bed to chair and side stepping with min A-mod A x 2    Functional Outcome Measure: The patient scored Total: 1/56 on the [address] which is indicative of high fall risk. Other factors to consider for discharge: previously independent and active     Patient will benefit from skilled therapy intervention to address the above noted impairments. PLAN :  Recommendations and Planned Interventions: bed mobility training, transfer training, gait training, therapeutic exercises, neuromuscular re-education, patient and family training/education, and therapeutic activities      Frequency/Duration: Patient will be followed by physical therapy:  5 times a week to address goals. Recommendation for discharge: (in order for the patient to meet his/her long term goals)  Therapy 3 hours per day 5-7 days per week    This discharge recommendation:  Has been made in collaboration with the attending provider and/or case management    IF patient discharges home will need the following DME: to be determined (TBD)         SUBJECTIVE:   Patient stated It feels great to be up.     OBJECTIVE DATA SUMMARY:   HISTORY:    Past Medical History:   Diagnosis Date    Chronic kidney disease     Hypertension     Liver disease    No past surgical history on file.     Personal factors and/or comorbidities impacting plan of care:     Home Situation  Home Environment: Private residence  # Steps to Enter: 3  Rails to Enter: Yes  Hand Rails : Bilateral  One/Two Story Residence: Two story  # of Interior Steps: 13  Living Alone: No  Support Systems: Spouse/Significant Other, Child(harish)  Current DME Used/Available at Home: None, Grab bars  Tub or Shower Type: Shower    EXAMINATION/PRESENTATION/DECISION MAKING:   Critical Behavior:  Neurologic State: Drowsy  Orientation Level: Oriented to person, Disoriented to time  Cognition: Appropriate decision making     Hearing: Auditory  Auditory Impairment: None  Skin:  dressing to craniotomy site  Edema:   Range Of Motion:  AROM: Within functional limits (except LUE and LLE decreased)                       Strength:    Strength: Generally decreased, functional (LUE/LLE decreased)                    Tone & Sensation:   Tone: Abnormal (L side)              Sensation: Intact               Coordination:  Coordination: Generally decreased, functional (LUE/LLE)  Vision:      Functional Mobility:  Bed Mobility:     Supine to Sit: Contact guard assistance;Minimum assistance        Transfers:  Sit to Stand: Minimum assistance;Assist x2  Stand to Sit: Minimum assistance;Assist x2        Bed to Chair: Minimum assistance; Moderate assistance;Assist x2              Balance:   Sitting: Impaired  Sitting - Static: Fair (occasional)  Sitting - Dynamic: Fair (occasional)  Standing: Impaired; With support  Standing - Static: Constant support; Fair  Standing - Dynamic : Constant support;Poor  Ambulation/Gait Training:  Distance (ft): 2 Feet (ft)  Assistive Device: Gait belt (bilateral HHA)  Ambulation - Level of Assistance: Minimal assistance; Moderate assistance;Assist x2 (facilitation cues at L quad)        Gait Abnormalities: Decreased step clearance; Step to gait;Trunk sway increased        Base of Support: Center of gravity altered;Narrowed     Speed/Shayna: Pace decreased (<100 feet/min)  Step Length: Right shortened;Left shortened  Swing Pattern: Left asymmetrical         Functional Measure  Olivas Balance Test:    Sitting to Standin  Standing Unsupported: 0  Sitting with Back Unsupported: 1  Standing to Sittin  Transfers: 0  Standing Unsupported with Eyes Closed: 0  Standing Unsupported with Feet Together: 0  Reach Forward with Outstretched Arm: 0   Object: 0  Turn to Look Over Shoulders: 0  Turn 360 Degrees: 0  Alternate Foot on Step/Stool: 0  Standing Unsupported One Foot in Front: 0  Stand on One Le  Total:          56=Maximum possible score;   0-20=High fall risk  21-40=Moderate fall risk   41-56=Low fall risk           Based on the above components, the patient evaluation is determined to be of the following complexity level: MEDIUM    Pain Rating:  Pt denied pain    Activity Tolerance:   Good and Fair      After treatment patient left in no apparent distress:   Sitting in chair, Call bell within reach, Bed / chair alarm activated, Caregiver / family present, and Side rails x 3    COMMUNICATION/EDUCATION:   The patients plan of care was discussed with: Occupational therapist and Registered nurse. Fall prevention education was provided and the patient/caregiver indicated understanding., Patient/family have participated as able in goal setting and plan of care. , and Patient/family agree to work toward stated goals and plan of care.     Thank you for this referral.  Rowan Boucher, PT, DPT   Time Calculation: 35 mins

## 2023-02-21 NOTE — PROGRESS NOTES
1930: Bedside shift change report given to Kat Ying RN (oncoming nurse) by [employer] (offgoing nurse). Report included the following information SBAR, Kardex, Intake/Output, MAR, Recent Results, Cardiac Rhythm NSR, Alarm Parameters , and Dual Neuro Assessment. 0100: ST depression noted on monitor, order for EKG from [facility] NP. Verbal order to draw AM labs now and add troponin. 0730: Bedside shift change report given to Abigail Desir RN (oncoming nurse) by Patrick Floyd RN (offgoing nurse). Report included the following information SBAR, Kardex, Intake/Output, MAR, Recent Results, Cardiac Rhythm NSR, Alarm Parameters , and Dual Neuro Assessment.

## 2023-02-21 NOTE — PROCEDURES
[facility]                      [phone]  Vitals Pre Post Assessment Pre Post   BP BP: 127/72 (02/21/23 0700) 134/74 LOC AOX4 AOX4   HR Pulse (Heart Rate): 89 (02/21/23 0811) 93 Lungs CTA, denies SOB CTA   Resp Resp Rate: 18 (02/21/23 0811) 19 Cardiac NRR NRR   Temp Temp: 98.6 °F (37 °C) (02/21/23 0811) 98.2 Skin WDI WDI   Weight 75.1 kg 73.1 kg Edema L periorbital L periorbital   Pain Pain Intensity 1: 0 (02/21/23 0400) 0/10 Tele Status Bedside Bedside     Orders   Duration: Start: 4235 End: 9170 Total: 3hr&45 min   Dialyzer: Dialyzer/Set Up Inspection: Edgard Armstrong (02/20/23 1250)   K Bath: Dialysate K (mEq/L): 2 (02/20/23 1250)   Ca Bath: Dialysate CA (mEq/L): 2.5 (02/20/23 1250)   Na: 139   Bicarb:  38   Target Fluid Removal: Goal/Amount of Fluid to Remove (mL): 1000 mL (02/20/23 1250)     Access   Type & Location: R chest catheter   Comments:  Access Dressing is clean and intact, no evidence of used and dated. 2/18/2023  Each catheter limb disinfected per p&p, caps removed, hubs disinfected per p&p. Both lumen flushes without issues. Dressing changed today as per policy.  2/21/2023                                        Labs   HBsAg (Antigen) / date:   Negative     2/18/2023                       HBsAb (Antibody) / date:  Susceptible 2/18/2023   Source: Epic   Obtained/Reviewed  Critical Results Called HGB   Date Value Ref Range Status   02/21/2023 8.3 (L) 12.1 - 17.0 g/dL Final     Potassium   Date Value Ref Range Status   02/21/2023 4.0 3.5 - 5.1 mmol/L Final     Calcium   Date Value Ref Range Status   02/21/2023 8.6 8.5 - 10.1 MG/DL Final     BUN   Date Value Ref Range Status   02/21/2023 46 (H) 6 - 20 MG/DL Final     Creatinine   Date Value Ref Range Status   02/21/2023 9.92 (H) 0.70 - 1.30 MG/DL Final        Meds Given   Name Dose Route                    Adequacy / Fluid    Total Liters Process:                88   Liters   Net Fluid Removed:                2000 ml      Comments   Time Out Done: 0830   Admitting Diagnosis: Subdural Hematoma, ESRD on HD   Consent obtained/signed: Informed Consent Verified:  (chronic consent applies) (02/20/23 1250)   Machine / RO # Machine Number: [id number] (02/20/23 1250)   Primary Nurse Rpt Pre: Ginny Eagle R.N   Primary Nurse Rpt Post: Ginny Eagle R.N   Pt Education: HD procedure   Care Plan: HD as ordered   [facility]     Tx Summary   Comments:   Time out done, order parameters checked, Dialysis related medications and consent have been reviewed. Dr. Vinny Cannon @ bedside. Treatment started with slow flow, gradually increased to ordered BF. Monitored closely. Lines visible and secured at all time. 1020: C/O mild headache, primary nurse given meds. BP high, primary aware, meds given. 1120: Extracorporeal lines flushed with 100CC NS to monitor for clots. Small clot noted in the filter,pressures and good. Patients vitally stable. 1220: Treatment completed, all blood returned. Each dialysis catheter limb disinfected per p&p,  post dialysis  caps applied. Dressing kept intact and dated. 2/21/2023  Treatment tolerated well. Comfort and care provided, needs attended, questions answered. Call bell within reach, bed to lowest position. Reports given to primary nurse.

## 2023-02-21 NOTE — PROGRESS NOTES
Occupational Therapy   02.21.2023    Chart reviewed in prep for OT evaluation, patient currently on HD. Will defer and follow up as able and medically appropriate. Thank you. Aramis Benton MS, OTR/L

## 2023-02-21 NOTE — PROGRESS NOTES
Physical Therapy   02.21.2023    Chart reviewed in prep for PT evaluation, patient currently on HD. Will defer and follow up as able and medically appropriate. Thank you.      Gina Harry, PT, DPT

## 2023-02-21 NOTE — PROGRESS NOTES
CRITICAL CARE NOTE      Name: Ki Forde   : 1965   MRN: [id number]   Date: 2023      REASON FOR ICU ADMISSION:  SDH    PRINCIPAL ICU DIAGNOSIS   SDH  ESRD  HTN    HPI   63M with HTN ESRD, PUD , Hepatitis presenting with HA found to have acute on chronic SDH on L, s/p OR craniotomy and evac  with Dr Estevan Villarreal. ASSESSMENT & PLAN     Neuro  Keppra  Q4 Neurocheck  Nrsg to drain mngt - drain out     Cardiac  Telemetry  EKG  Goal MAP>65, I am actively titrating pressors to goal  Procardia, Labetalol  - uptitrate prn  SBP <160   Cardene gtt    Pulmonary  Head of Bed >30  IS   Goal SpO2 >92%, I am actively titrating oxygen to goal  Nebs PRN  CXR    GI  ADAT  protonix    Renal  Strict I/o  HD      Endocrine  SSI   Goal glucose 140-180    ID  Ancef with GAVINO - d/cd    Heme  Scd    Lines- piv  Montoya - no  DVT- scd  SUP - protonix  Diet - ADULT DIET Easy to Chew; 3 carb choices (45 gm/meal); Low Fat/Low Chol/High Fiber/CAPRI; Low Sodium (2 gm); Low Potassium (Less than 3000 mg/day); Low Phosphorus (Less than 1000 mg);  Less than 60 gm  Mobility - ptot  Pt Contact - family in room  Dispo - ICU    Code Status - FULL            OBJECTIVE     Labs and Data: Reviewed 23  Medications: Reviewed 23  Imaging: Reviewed 23    Visit Vitals  /72   Pulse 89   Temp 98.6 °F (37 °C)   Resp 18   Ht 5' 9\" (1.753 m)   Wt 75.1 kg (165 lb 9.1 oz)   SpO2 98%   BMI 24.45 kg/m²    O2 Flow Rate (L/min): 4 l/min O2 Device: None (Room air) Temp (24hrs), Av.2 °F (37.3 °C), Min:98.1 °F (36.7 °C), Max:100 °F (37.8 °C)             Intake/Output:     Intake/Output Summary (Last 24 hours) at 2023 0820  Last data filed at 2023 0700  Gross per 24 hour   Intake 1401.25 ml   Output 1075 ml   Net 326.25 ml     General - in bed, GAVINO  HEENT- pupils equal , anicteric  Neuro - conversant, follows basic commands, no focal deficit  CV - RRR  Resp - CTA  Abd - soft, nontender, no guarding   - no brown  MSK- intact, no sacral ulcer    All Micro Results       None             Imaging  Key imaging reviewed       CRITICAL CARE DOCUMENTATION  I had a face to face encounter with the patient, reviewed and interpreted patient data including clinical events, labs, images, vital signs, I/O's, and examined patient. I have discussed the case and the plan and management of the patient's care with the consulting services, the bedside nurses and the respiratory therapist.      NOTE OF PERSONAL INVOLVEMENT IN CARE   This patient has a high probability of imminent, clinically significant deterioration, which requires the highest level of preparedness to intervene urgently. I participated in the decision-making and personally managed or directed the management of the following life and organ supporting interventions that required my frequent assessment to treat or prevent imminent deterioration. I personally spent 45 minutes of critical care time. This is time spent at this critically ill patient's bedside actively involved in patient care as well as the coordination of care. This does not include any procedural time which has been billed separately.     Jesenia Jaimes DO  Staff Intensivist  [facility]  2/21/2023

## 2023-02-21 NOTE — PROGRESS NOTES
Neurosurgery Progress Note  Lalo Talavera PA-C    Admit Date: 2023   LOS: 3 days        Daily Progress Note: 2023    HPI: Mr. Gilford Kleine is a 60yo gentleman with a PMH of HTN, ESRD on HD, PUD and chronic hepatitis who presented to the ED [facility] ED with a HA. He rapidly became unresponsive and was intubated and transferred to [facility]. Imaging revealed a large acute and chronic subdural hematoma. Pt was taken to the OR for left frontotemporal parietal craniotomy w evacuation of acute & chronic SDH on 23 by Dr. Rufus Howe. POD:3 Days Post-Op    S/P: Procedure(s):  LEFT SUBDURAL CRANIOTOMY    Subjective:     Pt much more awake and alert today. Slight HA but otherwise no complaints. Pt resting comfortably in bed w family at bedside. HD today then returning to TTS schedule. Objective:     Vital signs  Temp (24hrs), Av.1 °F (37.3 °C), Min:98.1 °F (36.7 °C), Max:100 °F (37.8 °C)   701 - 1900  In: 210.8 [I.V.:210.8]  Out: -   1901 -  0700  In: 1401.3 [I.V.:1401.3]  Out: 1315 [Urine:50; Drains:265]    Visit Vitals  /71   Pulse 79   Temp 98.6 °F (37 °C)   Resp 14   Ht 5' 9\" (1.753 m)   Wt 75.1 kg (165 lb 9.1 oz)   SpO2 95%   BMI 24.45 kg/m²    O2 Flow Rate (L/min): 4 l/min O2 Device: None (Room air)       Output (mL)  Urine Voided: 50 ml (23 1800)  Last Bowel Movement Date:  (PTA) (23 0800)  Unmeasurable Output  Urine Occurrence(s): 1 (23 1800)     Pain control  Pain Assessment  Pain Scale 1: Numeric (0 - 10)  Pain Intensity 1: 1  Pain Location 1: Head  Pain Intervention(s) 1: Medication (see MAR)    PT/OT  Gait              Physical Exam:  Gen: No acute distress. Neuro: A&Ox4. Follows commands. PERRL. Face symmetric. HERNANDEZ spontaneously and purposefully. Generalized weakness. Sensation intact. Negative drift. Gait deferred.   Skin: Incision C/D/I  Left eye swelling    24 hour results:    Recent Results (from the past 24 hour(s))   EKG, 12 LEAD, INITIAL Collection Time: 02/21/23  1:00 AM   Result Value Ref Range    Ventricular Rate 94 BPM    Atrial Rate 94 BPM    P-R Interval 148 ms    QRS Duration 98 ms    Q-T Interval 370 ms    QTC Calculation (Bezet) 462 ms    Calculated P Axis 75 degrees    Calculated R Axis 37 degrees    Calculated T Axis 121 degrees    Diagnosis       Normal sinus rhythm  Voltage criteria for left ventricular hypertrophy  T wave abnormality, consider lateral ischemia  Prolonged QT  When compared with ECG of 28-NOV-2022 09:43,  Vent. rate has increased BY  35 BPM  Inverted T waves have replaced nonspecific T wave abnormality in Lateral   leads     HEPATIC FUNCTION PANEL    Collection Time: 02/21/23  1:24 AM   Result Value Ref Range    Protein, total 7.4 6.4 - 8.2 g/dL    Albumin 2.8 (L) 3.5 - 5.0 g/dL    Globulin 4.6 (H) 2.0 - 4.0 g/dL    A-G Ratio 0.6 (L) 1.1 - 2.2      Bilirubin, total 0.3 0.2 - 1.0 MG/DL    Bilirubin, direct <0.1 0.0 - 0.2 MG/DL    Alk.  phosphatase 75 45 - 117 U/L    AST (SGOT) 15 15 - 37 U/L    ALT (SGPT) 7 (L) 12 - 78 U/L   CBC W/O DIFF    Collection Time: 02/21/23  1:24 AM   Result Value Ref Range    WBC 10.9 4.1 - 11.1 K/uL    RBC 2.78 (L) 4.10 - 5.70 M/uL    HGB 8.3 (L) 12.1 - 17.0 g/dL    HCT 24.9 (L) 36.6 - 50.3 %    MCV 89.6 80.0 - 99.0 FL    MCH 29.9 26.0 - 34.0 PG    MCHC 33.3 30.0 - 36.5 g/dL    RDW 14.7 (H) 11.5 - 14.5 %    PLATELET 712 775 - 421 K/uL    MPV 11.3 8.9 - 12.9 FL    NRBC 0.0 0  WBC    ABSOLUTE NRBC 0.00 0.00 - 0.01 K/uL   TROPONIN-HIGH SENSITIVITY    Collection Time: 02/21/23  1:24 AM   Result Value Ref Range    Troponin-High Sensitivity 21 0 - 57 ng/L   METABOLIC PANEL, COMPREHENSIVE    Collection Time: 02/21/23  1:25 AM   Result Value Ref Range    Sodium 134 (L) 136 - 145 mmol/L    Potassium 4.0 3.5 - 5.1 mmol/L    Chloride 98 97 - 108 mmol/L    CO2 26 21 - 32 mmol/L    Anion gap 10 5 - 15 mmol/L    Glucose 163 (H) 65 - 100 mg/dL    BUN 46 (H) 6 - 20 MG/DL    Creatinine 9.92 (H) 0.70 - 1.30 MG/DL    BUN/Creatinine ratio 5 (L) 12 - 20      eGFR 6 (L) >60 ml/min/1.73m2    Calcium 8.6 8.5 - 10.1 MG/DL    Bilirubin, total 0.2 0.2 - 1.0 MG/DL    ALT (SGPT) <6 (L) 12 - 78 U/L    AST (SGOT) 12 (L) 15 - 37 U/L    Alk. phosphatase 76 45 - 117 U/L    Protein, total 7.3 6.4 - 8.2 g/dL    Albumin 2.8 (L) 3.5 - 5.0 g/dL    Globulin 4.5 (H) 2.0 - 4.0 g/dL    A-G Ratio 0.6 (L) 1.1 - 2.2          Assessment:     Active Problems:    Subdural hematoma (2/18/2023)    Plan:     s/p Left frontotemporal parietal craniotomy, evacuation of acute and chronic subdural hematoma 2/18   -GAVINO drain d/c'd 2/20  -PT/OT/SLP as able  -BP control per primary team  -Keppra 500mg BID    -q4hr neuro checks   -Hgb 8.3 today.  1 unit PRBC transfused 2/18   -Plans for HD today; nephrology following   -Medical mgmt per ICU team   -SBP goal <160    Plan d/w Dr. Lela Norris, PA

## 2023-02-22 LAB
ALBUMIN SERPL-MCNC: 2.7 G/DL (ref 3.5–5)
ALBUMIN SERPL-MCNC: 2.7 G/DL (ref 3.5–5)
ALBUMIN/GLOB SERPL: 0.6 (ref 1.1–2.2)
ALBUMIN/GLOB SERPL: 0.6 (ref 1.1–2.2)
ALP SERPL-CCNC: 71 U/L (ref 45–117)
ALP SERPL-CCNC: 73 U/L (ref 45–117)
ALT SERPL-CCNC: <6 U/L (ref 12–78)
ALT SERPL-CCNC: <6 U/L (ref 12–78)
ANION GAP SERPL CALC-SCNC: 11 MMOL/L (ref 5–15)
AST SERPL-CCNC: 14 U/L (ref 15–37)
AST SERPL-CCNC: 16 U/L (ref 15–37)
BILIRUB DIRECT SERPL-MCNC: 0.2 MG/DL (ref 0–0.2)
BILIRUB SERPL-MCNC: 0.2 MG/DL (ref 0.2–1)
BILIRUB SERPL-MCNC: 0.3 MG/DL (ref 0.2–1)
BUN SERPL-MCNC: 26 MG/DL (ref 6–20)
BUN/CREAT SERPL: 4 (ref 12–20)
CALCIUM SERPL-MCNC: 8.8 MG/DL (ref 8.5–10.1)
CHLORIDE SERPL-SCNC: 96 MMOL/L (ref 97–108)
CO2 SERPL-SCNC: 26 MMOL/L (ref 21–32)
CREAT SERPL-MCNC: 7.24 MG/DL (ref 0.7–1.3)
ERYTHROCYTE [DISTWIDTH] IN BLOOD BY AUTOMATED COUNT: 14.4 % (ref 11.5–14.5)
GLOBULIN SER CALC-MCNC: 4.8 G/DL (ref 2–4)
GLOBULIN SER CALC-MCNC: 4.9 G/DL (ref 2–4)
GLUCOSE SERPL-MCNC: 107 MG/DL (ref 65–100)
HCT VFR BLD AUTO: 25.7 % (ref 36.6–50.3)
HGB BLD-MCNC: 8.1 G/DL (ref 12.1–17)
MCH RBC QN AUTO: 29 PG (ref 26–34)
MCHC RBC AUTO-ENTMCNC: 31.5 G/DL (ref 30–36.5)
MCV RBC AUTO: 92.1 FL (ref 80–99)
NRBC # BLD: 0.06 K/UL (ref 0–0.01)
NRBC BLD-RTO: 0.6 PER 100 WBC
PLATELET # BLD AUTO: 160 K/UL (ref 150–400)
PMV BLD AUTO: 11.5 FL (ref 8.9–12.9)
POTASSIUM SERPL-SCNC: 4.2 MMOL/L (ref 3.5–5.1)
PROT SERPL-MCNC: 7.5 G/DL (ref 6.4–8.2)
PROT SERPL-MCNC: 7.6 G/DL (ref 6.4–8.2)
RBC # BLD AUTO: 2.79 M/UL (ref 4.1–5.7)
SODIUM SERPL-SCNC: 133 MMOL/L (ref 136–145)
WBC # BLD AUTO: 10.5 K/UL (ref 4.1–11.1)

## 2023-02-22 PROCEDURE — 74011000250 HC RX REV CODE- 250: Performed by: NEUROLOGICAL SURGERY

## 2023-02-22 PROCEDURE — C9113 INJ PANTOPRAZOLE SODIUM, VIA: HCPCS | Performed by: ANESTHESIOLOGY

## 2023-02-22 PROCEDURE — 65270000046 HC RM TELEMETRY

## 2023-02-22 PROCEDURE — 74011250636 HC RX REV CODE- 250/636: Performed by: ANESTHESIOLOGY

## 2023-02-22 PROCEDURE — 80053 COMPREHEN METABOLIC PANEL: CPT

## 2023-02-22 PROCEDURE — 65660000001 HC RM ICU INTERMED STEPDOWN

## 2023-02-22 PROCEDURE — 97112 NEUROMUSCULAR REEDUCATION: CPT

## 2023-02-22 PROCEDURE — 80076 HEPATIC FUNCTION PANEL: CPT

## 2023-02-22 PROCEDURE — 74011250637 HC RX REV CODE- 250/637: Performed by: HOSPITALIST

## 2023-02-22 PROCEDURE — 74011250637 HC RX REV CODE- 250/637: Performed by: STUDENT IN AN ORGANIZED HEALTH CARE EDUCATION/TRAINING PROGRAM

## 2023-02-22 PROCEDURE — 74011000250 HC RX REV CODE- 250: Performed by: ANESTHESIOLOGY

## 2023-02-22 PROCEDURE — 97535 SELF CARE MNGMENT TRAINING: CPT

## 2023-02-22 PROCEDURE — 74011250637 HC RX REV CODE- 250/637: Performed by: ANESTHESIOLOGY

## 2023-02-22 PROCEDURE — 36415 COLL VENOUS BLD VENIPUNCTURE: CPT

## 2023-02-22 PROCEDURE — 85027 COMPLETE CBC AUTOMATED: CPT

## 2023-02-22 PROCEDURE — 97116 GAIT TRAINING THERAPY: CPT

## 2023-02-22 PROCEDURE — 74011250636 HC RX REV CODE- 250/636: Performed by: STUDENT IN AN ORGANIZED HEALTH CARE EDUCATION/TRAINING PROGRAM

## 2023-02-22 RX ORDER — LEVETIRACETAM 500 MG/1
500 TABLET ORAL
Status: DISCONTINUED | OUTPATIENT
Start: 2023-02-23 | End: 2023-02-28 | Stop reason: HOSPADM

## 2023-02-22 RX ORDER — CLONIDINE HYDROCHLORIDE 0.1 MG/1
0.3 TABLET ORAL 2 TIMES DAILY
Status: DISCONTINUED | OUTPATIENT
Start: 2023-02-22 | End: 2023-02-26

## 2023-02-22 RX ORDER — PANTOPRAZOLE SODIUM 40 MG/1
40 TABLET, DELAYED RELEASE ORAL
Status: DISCONTINUED | OUTPATIENT
Start: 2023-02-23 | End: 2023-02-28 | Stop reason: HOSPADM

## 2023-02-22 RX ORDER — LEVETIRACETAM 500 MG/1
500 TABLET ORAL DAILY
Status: DISCONTINUED | OUTPATIENT
Start: 2023-02-23 | End: 2023-02-28 | Stop reason: HOSPADM

## 2023-02-22 RX ORDER — NIFEDIPINE 30 MG/1
30 TABLET, EXTENDED RELEASE ORAL
Status: COMPLETED | OUTPATIENT
Start: 2023-02-22 | End: 2023-02-22

## 2023-02-22 RX ADMIN — CLONIDINE HYDROCHLORIDE 0.3 MG: 0.1 TABLET ORAL at 17:32

## 2023-02-22 RX ADMIN — HYDRALAZINE HYDROCHLORIDE 20 MG: 20 INJECTION INTRAMUSCULAR; INTRAVENOUS at 03:18

## 2023-02-22 RX ADMIN — NIFEDIPINE 60 MG: 60 TABLET, EXTENDED RELEASE ORAL at 08:39

## 2023-02-22 RX ADMIN — LABETALOL HYDROCHLORIDE 300 MG: 100 TABLET, FILM COATED ORAL at 04:18

## 2023-02-22 RX ADMIN — LABETALOL HYDROCHLORIDE 20 MG: 5 INJECTION INTRAVENOUS at 08:44

## 2023-02-22 RX ADMIN — NIFEDIPINE 30 MG: 30 TABLET, EXTENDED RELEASE ORAL at 11:55

## 2023-02-22 RX ADMIN — SODIUM CHLORIDE, PRESERVATIVE FREE 10 ML: 5 INJECTION INTRAVENOUS at 06:13

## 2023-02-22 RX ADMIN — LEVETIRACETAM 500 MG: 100 INJECTION, SOLUTION, CONCENTRATE INTRAVENOUS at 08:40

## 2023-02-22 RX ADMIN — LABETALOL HYDROCHLORIDE 20 MG: 5 INJECTION INTRAVENOUS at 00:18

## 2023-02-22 RX ADMIN — CLONIDINE HYDROCHLORIDE 0.2 MG: 0.1 TABLET ORAL at 08:39

## 2023-02-22 RX ADMIN — SODIUM CHLORIDE, PRESERVATIVE FREE 10 ML: 5 INJECTION INTRAVENOUS at 22:00

## 2023-02-22 RX ADMIN — LABETALOL HYDROCHLORIDE 300 MG: 100 TABLET, FILM COATED ORAL at 17:32

## 2023-02-22 RX ADMIN — BUMETANIDE 2 MG: 1 TABLET ORAL at 08:39

## 2023-02-22 RX ADMIN — PANTOPRAZOLE SODIUM 40 MG: 40 INJECTION, POWDER, LYOPHILIZED, FOR SOLUTION INTRAVENOUS at 08:40

## 2023-02-22 RX ADMIN — ACETAMINOPHEN 650 MG: 325 TABLET ORAL at 08:53

## 2023-02-22 RX ADMIN — SODIUM CHLORIDE, PRESERVATIVE FREE 10 ML: 5 INJECTION INTRAVENOUS at 14:54

## 2023-02-22 RX ADMIN — ACETAMINOPHEN 650 MG: 325 TABLET ORAL at 17:32

## 2023-02-22 RX ADMIN — HEPARIN SODIUM 5000 UNITS: 5000 INJECTION INTRAVENOUS; SUBCUTANEOUS at 00:17

## 2023-02-22 RX ADMIN — ACETAMINOPHEN 650 MG: 325 TABLET ORAL at 03:36

## 2023-02-22 NOTE — H&P
History and Physical    Date of Service:  2/21/2023  Primary Care Provider: Chandrakant Biswas NP  Source of information: The patient and Chart review    Chief Complaint: No chief complaint on file. History of Presenting Illness:   Antonio Campos is a 62 y.o. male with medical history of ESRD on hypertension, CKD, and parenchymal liver disease who was admitted on the 19th 2023 after presenting with headache that started during dialysis, and diagnosed with subdural hemorrhage. He underwent evacuation urgently by neurosurgery, and was admitted to the ICU for further care. Surgical drains have currently been removed status post left craniotomy. He is receiving carvedilol, hydralazine, labetalol, and nifedipine for blood pressure management to maintain SBP less than 140 and has no residual focal deficits. REVIEW OF SYSTEMS:  A comprehensive review of systems was negative except for that written in the History of Present Illness. Past Medical History:   Diagnosis Date    Chronic kidney disease     Hypertension     Liver disease       No past surgical history on file. Prior to Admission medications    Medication Sig Start Date End Date Taking? Authorizing Provider   aliskiren (TEKTURNA) 300 mg tablet Take 1 Tablet by mouth daily. 10/3/22   Torsten Contreras MD   minoxidiL (LONITEN) 10 mg tablet Take 1 Tablet by mouth two (2) times a day. 10/2/22   Torsten Contreras MD   bumetanide (BUMEX) 2 mg tablet Take 1 Tablet by mouth daily. 9/29/22   Torsten Contreras MD   carvediloL (COREG) 25 mg tablet Take 1 Tablet by mouth two (2) times daily (with meals). 9/28/22   Torsten Contreras MD   cloNIDine HCL (CATAPRES) 0.2 mg tablet Take 1 Tablet by mouth two (2) times a day. 9/28/22   Torsten Contreras MD   doxazosin (CARDURA) 8 mg tablet Take 1 Tablet by mouth nightly. 9/28/22   Torsten Contreras MD   sevelamer carbonate (RENVELA) 800 mg tab tab Take 800 mg by mouth three (3) times daily.  New prescription as of 8/16/22, prescribed by Dr. Hiwot Frazier    Provider, Historical   calcium carbonate (TUMS) 200 mg calcium (500 mg) chew Take 3 Tablets by mouth three (3) times daily (after meals). Provider, Historical   pantoprazole (Protonix) 40 mg tablet Take 1 Tablet by mouth two (2) times a day. Indications: an ulcer of the duodenum 3/25/22   Jayden ALDANA MD   ascorbic acid, vitamin C, (VITAMIN C) 500 mg tablet Take 500 mg by mouth daily. Provider, Historical   cholecalciferol (VITAMIN D3) 25 mcg (1,000 unit) cap Take 1,000 Units by mouth daily. Provider, Historical   buprenorphine-naloxone (SUBOXONE) 8-2 mg film sublingaul film 1 Film by SubLINGual route two (2) times a day. Provider, Historical     Allergies   Allergen Reactions    Baclofen Rash     All over    Losartan Hives, Shortness of Breath and Rash     Verified allergy with patient      No family history on file. Social History:  reports that he has been smoking. He has been smoking an average of 1 pack per day. He has never used smokeless tobacco. He reports that he does not currently use alcohol. He reports current drug use. Drugs: Marijuana, Cocaine, and Heroin. Social Determinants of Health     Tobacco Use: High Risk    Smoking Tobacco Use: Some Days    Smokeless Tobacco Use: Never    Passive Exposure: Not on file   Alcohol Use: Not on file   Financial Resource Strain: Not on file   Food Insecurity: Not on file   Transportation Needs: Not on file   Physical Activity: Not on file   Stress: Not on file   Social Connections: Not on file   Intimate Partner Violence: Not on file   Depression: Not at risk    PHQ-2 Score: 0   Housing Stability: Not on file        Medications were reconciled to the best of my ability given all available resources at the time of admission. Route is PO if not otherwise noted. Family and social history were personally reviewed, all pertinent and relevant details are outlined as above.     Objective:   Visit Vitals  BP (!) 145/79   Pulse 88 Temp 99.4 °F (37.4 °C)   Resp 16   Ht 5' 9\" (1.753 m)   Wt 75.1 kg (165 lb 9.1 oz)   SpO2 98%   BMI 24.45 kg/m²    O2 Flow Rate (L/min): 4 l/min O2 Device: None (Room air)    PHYSICAL EXAM:   General: Alert x oriented x 3, awake, no acute distress,   HEENT: PEERL, EOMI, moist mucus membranes, left scalp dressing clean dry and intact  Neck: Supple, no JVD, no meningeal signs  Chest: Clear to auscultation bilaterally   CVS: RRR, S1 S2 heard, no murmurs/rubs/gallops  Abd: Soft, non-tender, non-distended, +bowel sounds   Ext: No clubbing, no cyanosis, no edema  Neuro/Psych: Pleasant mood and affect, CN 2-12 grossly intact, sensory grossly within normal limit, Strength 5/5 in all extremities  Cap refill: Brisk, less than 3 seconds  Pulses: 2+ radial pulses  Skin: Warm, dry, without rashes or lesions    Data Review:   I have independently reviewed and interpreted patient's lab and all other diagnostic data    Abnormal Labs Reviewed   METABOLIC PANEL, COMPREHENSIVE - Abnormal; Notable for the following components:       Result Value    Potassium 6.1 (*)     CO2 19 (*)     Glucose 127 (*)      (*)     Creatinine 18.80 (*)     BUN/Creatinine ratio 7 (*)     eGFR 3 (*)     Calcium 8.1 (*)     ALT (SGPT) 9 (*)     AST (SGOT) 12 (*)     Globulin 4.4 (*)     A-G Ratio 0.8 (*)     All other components within normal limits   HEPATIC FUNCTION PANEL - Abnormal; Notable for the following components:    A-G Ratio 0.9 (*)     AST (SGOT) 8 (*)     ALT (SGPT) 9 (*)     All other components within normal limits   CBC W/O DIFF - Abnormal; Notable for the following components:    RBC 2.47 (*)     HGB 7.5 (*)     HCT 23.5 (*)     All other components within normal limits   PROTHROMBIN TIME + INR - Abnormal; Notable for the following components:    Prothrombin time 11.4 (*)     All other components within normal limits   BLOOD GAS, ARTERIAL - Abnormal; Notable for the following components:    pH 7.32 (*)     PO2 149 (*)     O2 SAT 99 (*)     BICARBONATE 18 (*)     All other components within normal limits   MAGNESIUM - Abnormal; Notable for the following components:    Magnesium 2.6 (*)     All other components within normal limits   PHOSPHORUS - Abnormal; Notable for the following components:    Phosphorus 7.8 (*)     All other components within normal limits   METABOLIC PANEL, BASIC - Abnormal; Notable for the following components:    Anion gap 16 (*)      (*)     Creatinine 19.20 (*)     BUN/Creatinine ratio 7 (*)     eGFR 2 (*)     Calcium 8.3 (*)     All other components within normal limits   CBC W/O DIFF - Abnormal; Notable for the following components:    RBC 2.15 (*)     HGB 6.6 (*)     HCT 19.8 (*)     All other components within normal limits   METABOLIC PANEL, COMPREHENSIVE - Abnormal; Notable for the following components:    Sodium 135 (*)     BUN 50 (*)     Creatinine 9.77 (*)     BUN/Creatinine ratio 5 (*)     eGFR 5 (*)     ALT (SGPT) 8 (*)     AST (SGOT) 8 (*)     Albumin 3.2 (*)     A-G Ratio 0.8 (*)     All other components within normal limits   POC G3 - PUL - Abnormal; Notable for the following components:    pH (POC) 7.51 (*)     pCO2 (POC) 34.0 (*)     pO2 (POC) 129 (*)     HCO3 (POC) 27.4 (*)     sO2 (POC) 99.3 (*)     All other components within normal limits   HEPATIC FUNCTION PANEL - Abnormal; Notable for the following components:    Albumin 3.1 (*)     A-G Ratio 0.8 (*)     AST (SGOT) 13 (*)     ALT (SGPT) 8 (*)     All other components within normal limits   METABOLIC PANEL, COMPREHENSIVE - Abnormal; Notable for the following components:    Sodium 132 (*)     Potassium 5.4 (*)     BUN 69 (*)     Creatinine 12.40 (*)     BUN/Creatinine ratio 6 (*)     eGFR 4 (*)     ALT (SGPT) 6 (*)     AST (SGOT) 13 (*)     Albumin 3.0 (*)     A-G Ratio 0.8 (*)     All other components within normal limits   CBC W/O DIFF - Abnormal; Notable for the following components:    RBC 2.99 (*)     HGB 8.7 (*)     HCT 28.4 (*)     RDW 15.9 (*)     NRBC 0.2 (*)     ABSOLUTE NRBC 0.02 (*)     All other components within normal limits   HEPATIC FUNCTION PANEL - Abnormal; Notable for the following components:    Albumin 2.8 (*)     Globulin 4.6 (*)     A-G Ratio 0.6 (*)     ALT (SGPT) 7 (*)     All other components within normal limits   METABOLIC PANEL, COMPREHENSIVE - Abnormal; Notable for the following components:    Sodium 134 (*)     Glucose 163 (*)     BUN 46 (*)     Creatinine 9.92 (*)     BUN/Creatinine ratio 5 (*)     eGFR 6 (*)     ALT (SGPT) <6 (*)     AST (SGOT) 12 (*)     Albumin 2.8 (*)     Globulin 4.5 (*)     A-G Ratio 0.6 (*)     All other components within normal limits   CBC W/O DIFF - Abnormal; Notable for the following components:    RBC 2.78 (*)     HGB 8.3 (*)     HCT 24.9 (*)     RDW 14.7 (*)     All other components within normal limits       All Micro Results       None            IMAGING:   CT HEAD WO CONT   Final Result   Expected postoperative appearance status post left subdural   evacuation and drain placement. XR ABD (KUB)   Final Result    impression: NG tube in place. XR CHEST PORT   Final Result   Support devices in place no acute infiltrate. ECG/ECHO:    Results for orders placed or performed during the hospital encounter of 02/18/23   EKG, 12 LEAD, INITIAL   Result Value Ref Range    Ventricular Rate 94 BPM    Atrial Rate 94 BPM    P-R Interval 148 ms    QRS Duration 98 ms    Q-T Interval 370 ms    QTC Calculation (Bezet) 462 ms    Calculated P Axis 75 degrees    Calculated R Axis 37 degrees    Calculated T Axis 121 degrees    Diagnosis       Normal sinus rhythm  Voltage criteria for left ventricular hypertrophy  T wave abnormality, consider lateral ischemia  Prolonged QT  When compared with ECG of 28-NOV-2022 09:43,  Vent.  rate has increased BY  35 BPM  Inverted T waves have replaced nonspecific T wave abnormality in Lateral   leads  Confirmed by Karlie Escobedo M.D., [facility] ([id number]) on 2/21/2023 3:13:04 PM            Notes reviewed from all clinical/nonclinical/nursing services involved in patient's clinical care. Care coordination discussions were held with appropriate clinical/nonclinical/ nursing providers based on care coordination needs. Assessment:   Given the patient's current clinical presentation, there is a high level of concern for decompensation if discharged from the emergency department. Complex decision making was performed, which includes reviewing the patient's available past medical records, laboratory results, and imaging studies. Active Problems:    Subdural hematoma (2/18/2023)        Plan:     #SDH  #HTN  -s/p left craniotomy and evacuation on 2/18, nsgy following  -maintain SBP <140  -continue keppra  -q4h neuro check    #stress ulcer ppx  -continue PPI    #ESRD  -continue IP dialysis      DIET: ADULT DIET Easy to Chew; 3 carb choices (45 gm/meal); Low Fat/Low Chol/High Fiber/CAPRI; Low Sodium (2 gm); Low Potassium (Less than 3000 mg/day); Low Phosphorus (Less than 1000 mg); Less than 60 gm   ISOLATION PRECAUTIONS: There are currently no Active Isolations  CODE STATUS: Full Code   DVT PROPHYLAXIS: Heparin  ANTICIPATED DISCHARGE: Greater than 48 hours. ANTICIPATED DISPOSITION: Home with Home Healthcare  EMERGENCY CONTACT/SURROGATE DECISION MAKER: Dick Harris (daughter)    Signed By: Fatou Otto MD     February 21, 2023         Please note that this dictation may have been completed with Dragon, the [employer] voice recognition software. Quite often unanticipated grammatical, syntax, homophones, and other interpretive errors are inadvertently transcribed by the computer software. Please disregard these errors. Please excuse any errors that have escaped final proofreading.

## 2023-02-22 NOTE — PROGRESS NOTES
[facility]  Hospitalist Group                                                                                          Hospitalist Progress Note  Vincent López MD  Answering service: [phone] from in house phone        Date of Service:  2023  NAME:  Pita Mehta  :  1965  MRN:  [id number]       Admission Summary:   Pita Mehta is a 62 y.o. male with medical history of ESRD on hypertension, CKD, and parenchymal liver disease who was admitted on the 2023 after presenting with headache that started during dialysis, and diagnosed with subdural hemorrhage. He underwent evacuation urgently by neurosurgery, and was admitted to the ICU for further care. Surgical drains have currently been removed status post left craniotomy. He is receiving carvedilol, hydralazine, labetalol, and nifedipine for blood pressure management to maintain SBP less than 140 and has no residual focal deficits. Interval history / Subjective:   No acute events overnight, no complaints, family at the bedside. Worked with PT. Assessment & Plan:        #SDH  #HTN  -s/p left craniotomy and evacuation on , nsgy following  -maintain SBP <140 - increased meds for today as BP was above goal  -continue keppra   -q4h neuro check     #stress ulcer ppx  -continue PPI     #ESRD  -continue IP dialysis             Code status: full  Prophylaxis: started on sqh by ICU team  Care Plan discussed with: family  Anticipated Disposition: TBD  Outpatient  Cardiac monitoring: xTelemetry     Hospital Problems  Date Reviewed: 2023            Codes Class Noted POA    Subdural hematoma ICD-10-CM: S06. 5XAA  ICD-9-CM: 432.1  2023 Unknown           Social Determinants of Health     Tobacco Use: High Risk    Smoking Tobacco Use: Some Days    Smokeless Tobacco Use: Never    Passive Exposure: Not on file   Alcohol Use: Not on file   Financial Resource Strain: Not on file   Food Insecurity: Not on file   Transportation Needs: Not on file   Physical Activity: Not on file   Stress: Not on file   Social Connections: Not on file   Intimate Partner Violence: Not on file   Depression: Not at risk    PHQ-2 Score: 0   Housing Stability: Not on file       Review of Systems:   A comprehensive review of systems was negative except for that written in the HPI. Vital Signs:    Last 24hrs VS reviewed since prior progress note. Most recent are:  Visit Vitals  /77 (BP 1 Location: Left upper arm, BP Patient Position: At rest)   Pulse 79   Temp 97.6 °F (36.4 °C)   Resp 14   Ht 5' 9\" (1.753 m)   Wt 72 kg (158 lb 11.7 oz)   SpO2 98%   BMI 23.44 kg/m²         Intake/Output Summary (Last 24 hours) at 2/22/2023 3369  Last data filed at 2/22/2023 0400  Gross per 24 hour   Intake --   Output 50 ml   Net -50 ml        Physical Examination:     I had a face to face encounter with this patient and independently examined them on 2/22/2023 as outlined below:          General :NAD, drowsy  HEENT: anicteric sclerae, wound covered, L periorbital swelling  Neck: supple, no JVD, no meningeal signs  Chest: Clear to auscultation bilaterally   CVS: S1 S2 heard, Capillary refill less than 2 seconds  Abd: soft/ non tender, non distended, BS physiological,   Ext: no clubbing, no cyanosis, no edema  Neuro/Psych: grossly non-focal  Skin: warm     Data Review:    Review and/or order of clinical lab test  Review and/or order of tests in the radiology section of CPT      I have personally and independently reviewed all pertinent labs, diagnostic studies, imaging, and have provided independent interpretation of the same.      Labs:     Recent Labs     02/22/23 0328 02/21/23  0124   WBC 10.5 10.9   HGB 8.1* 8.3*   HCT 25.7* 24.9*    159     Recent Labs     02/22/23  0328 02/21/23  0125 02/20/23  0915 02/20/23  0318   * 134*  --  132*   K 4.2 4.0 4.8 5.4*   CL 96* 98  --  99   CO2 26 26  --  25   BUN 26* 46*  --  69*   CREA 7.24* 9.92*  --  12.40*   * 163*  --  87   CA 8.8 8.6  --  8.7     Recent Labs     02/22/23  0328 02/21/23  0125 02/21/23  0124   ALT <6*  <6* <6* 7*   AP 73  71 76 75   TBILI 0.2  0.3 0.2 0.3   TP 7.6  7.5 7.3 7.4   ALB 2.7*  2.7* 2.8* 2.8*   GLOB 4.9*  4.8* 4.5* 4.6*     No results for input(s): INR, PTP, APTT, INREXT in the last 72 hours. No results for input(s): FE, TIBC, PSAT, FERR in the last 72 hours. Lab Results   Component Value Date/Time    Folate 7.9 05/18/2022 12:18 PM      No results for input(s): PH, PCO2, PO2 in the last 72 hours. No results for input(s): CPK, CKNDX, TROIQ in the last 72 hours.     No lab exists for component: CPKMB  Lab Results   Component Value Date/Time    Cholesterol, total 170 06/29/2020 03:46 AM    HDL Cholesterol 49 06/29/2020 03:46 AM    LDL, calculated 73.4 06/29/2020 03:46 AM    Triglyceride 238 (H) 06/29/2020 03:46 AM    CHOL/HDL Ratio 3.5 06/29/2020 03:46 AM     Lab Results   Component Value Date/Time    Glucose (POC) 112 11/30/2022 12:08 PM    Glucose (POC) 82 11/30/2022 08:34 AM    Glucose (POC) 112 11/29/2022 09:35 PM    Glucose (POC) 145 (H) 11/29/2022 12:26 PM    Glucose (POC) 97 09/27/2022 06:48 AM     Lab Results   Component Value Date/Time    Color YELLOW/STRAW 03/22/2022 04:11 PM    Appearance TURBID (A) 03/22/2022 04:11 PM    Specific gravity 1.021 03/22/2022 04:11 PM    pH (UA) 7.5 03/22/2022 04:11 PM    Protein >300 (A) 03/22/2022 04:11 PM    Glucose 100 (A) 03/22/2022 04:11 PM    Ketone Negative 03/22/2022 04:11 PM    Bilirubin Negative 03/22/2022 04:11 PM    Urobilinogen 0.2 03/22/2022 04:11 PM    Nitrites Negative 03/22/2022 04:11 PM    Leukocyte Esterase MODERATE (A) 03/22/2022 04:11 PM    Epithelial cells MODERATE (A) 03/22/2022 04:11 PM    Bacteria Negative 03/22/2022 04:11 PM    WBC 10-20 03/22/2022 04:11 PM    RBC 0-5 03/22/2022 04:11 PM       Notes reviewed from all clinical/nonclinical/nursing services involved in patient's clinical care. Care coordination discussions were held with appropriate clinical/nonclinical/ nursing providers based on care coordination needs. Patients current active Medications were reviewed, considered, added and adjusted based on the clinical condition today. Home Medications were reconciled to the best of my ability given all available resources at the time of admission. Route is PO if not otherwise noted.       Admission Status:[id number]:::1}      Medications Reviewed:     Current Facility-Administered Medications   Medication Dose Route Frequency    cloNIDine HCL (CATAPRES) tablet 0.3 mg  0.3 mg Oral BID    [START ON 2/23/2023] NIFEdipine ER (PROCARDIA XL) tablet 90 mg  90 mg Oral DAILY    [START ON 2/23/2023] levETIRAcetam (KEPPRA) tablet 500 mg  500 mg Oral DAILY    And    [START ON 2/23/2023] levETIRAcetam (KEPPRA) tablet 500 mg  500 mg Oral DIALYSIS TUE, THU & SAT    [START ON 2/23/2023] pantoprazole (PROTONIX) tablet 40 mg  40 mg Oral ACB    heparin (porcine) injection 5,000 Units  5,000 Units SubCUTAneous Q12H    [Held by provider] carvediloL (COREG) tablet 25 mg  25 mg Oral BID WITH MEALS    labetaloL (NORMODYNE) tablet 300 mg  300 mg Oral Q12H    0.9% sodium chloride infusion 250 mL  250 mL IntraVENous PRN    epoetin shawn-epbx (RETACRIT) injection 20,000 Units  20,000 Units SubCUTAneous Q TUE, THU & SAT    sodium chloride (NS) flush 5-40 mL  5-40 mL IntraVENous Q8H    ondansetron (ZOFRAN) injection 4 mg  4 mg IntraVENous Q4H PRN    acetaminophen (TYLENOL) tablet 650 mg  650 mg Oral Q6H PRN    Or    acetaminophen (TYLENOL) suppository 650 mg  650 mg Rectal Q6H PRN    polyethylene glycol (MIRALAX) packet 17 g  17 g Oral DAILY PRN    hydrALAZINE (APRESOLINE) 20 mg/mL injection 20 mg  20 mg IntraVENous Q6H PRN    labetaloL (NORMODYNE;TRANDATE) injection 20 mg  20 mg IntraVENous Q4H PRN    bumetanide (BUMEX) tablet 2 mg  2 mg Oral DAILY ______________________________________________________________________  EXPECTED LENGTH OF STAY: 4d 12h  ACTUAL LENGTH OF STAY:          4                 Lane Bruce MD

## 2023-02-22 NOTE — PROGRESS NOTES
Nephrology Progress Note  All Rosario  Date of Admission : 2/18/2023    CC: Follow up for ESRD       Assessment and Plan     ESRD on HD:  - TTS [facility]  - HD tomorrow per routine    Hyperkalemia:  - resolved post dialysis    SDH s/p craniotomy:  - per NSG    HTN:  - cont current meds    Anemia of CKD:  - ABI TTS    Secondary HPT       Interval History:  Seen and examined. Awake, alert. No issues with HD. BP better. No cp, sob, n/v/d. Mild HA. Current Medications: all current  Medications have been eviewed in EPIC  Review of Systems: Pertinent items are noted in HPI. Objective:  Vitals:    Vitals:    02/22/23 0418 02/22/23 0500 02/22/23 0600 02/22/23 0700   BP: (!) 170/85 139/73 (!) 145/79 (!) 162/84   Pulse: 92 79 78 88   Resp:  13 14 23   Temp:       TempSrc:       SpO2:  96% 96% 93%   Weight:    72 kg (158 lb 11.7 oz)   Height:         Intake and Output:  No intake/output data recorded. 02/20 1901 - 02/22 0700  In: 1815.4 [I.V.:1815.4]  Out: 50 [Urine:50]    Physical Examination:  Pt intubated    No  General: Awake, alert  Neck:  Supple, no mass  Resp:  Lungs CTA B/L, no wheezing , normal respiratory effort  CV:  RRR,  no murmur or rub, trace LE edema  GI:  Soft, NT, + Bowel sounds, no hepatosplenomegaly  Neurologic:  Non focal  Psych:             AAO x 3 appropriate affect   Skin:  No Rash  Access:           [facility]    []    High complexity decision making was performed  []    Patient is at high-risk of decompensation with multiple organ involvement    Lab Data Personally Reviewed: I have reviewed all the pertinent labs, microbiology data and radiology studies during assessment.     Recent Labs     02/22/23  0328 02/21/23  0125 02/21/23  0124 02/20/23  0915 02/20/23  0318   * 134*  --   --  132*   K 4.2 4.0  --  4.8 5.4*   CL 96* 98  --   --  99   CO2 26 26  --   --  25   * 163*  --   --  87   BUN 26* 46*  --   --  69*   CREA 7.24* 9.92*  --   --  12.40*   CA 8.8 8.6  --   -- 8. 7   ALB 2.7*  2.7* 2.8* 2.8*  --  3.1*  3.0*   ALT <6*  <6* <6* 7*  --  8*  6*       Recent Labs     02/22/23  0328 02/21/23  0124 02/20/23  0318   WBC 10.5 10.9 10.6   HGB 8.1* 8.3* 8.7*   HCT 25.7* 24.9* 28.4*    159 190       No results found for: SDES  Lab Results   Component Value Date/Time    Culture result: NO GROWTH 5 DAYS 05/18/2022 06:06 AM    Culture result: No growth (<1,000 CFU/ML) 03/22/2022 04:11 PM     Recent Results (from the past 24 hour(s))   HEPATIC FUNCTION PANEL    Collection Time: 02/22/23  3:28 AM   Result Value Ref Range    Protein, total 7.5 6.4 - 8.2 g/dL    Albumin 2.7 (L) 3.5 - 5.0 g/dL    Globulin 4.8 (H) 2.0 - 4.0 g/dL    A-G Ratio 0.6 (L) 1.1 - 2.2      Bilirubin, total 0.3 0.2 - 1.0 MG/DL    Bilirubin, direct 0.2 0.0 - 0.2 MG/DL    Alk. phosphatase 71 45 - 117 U/L    AST (SGOT) 14 (L) 15 - 37 U/L    ALT (SGPT) <6 (L) 12 - 78 U/L   METABOLIC PANEL, COMPREHENSIVE    Collection Time: 02/22/23  3:28 AM   Result Value Ref Range    Sodium 133 (L) 136 - 145 mmol/L    Potassium 4.2 3.5 - 5.1 mmol/L    Chloride 96 (L) 97 - 108 mmol/L    CO2 26 21 - 32 mmol/L    Anion gap 11 5 - 15 mmol/L    Glucose 107 (H) 65 - 100 mg/dL    BUN 26 (H) 6 - 20 MG/DL    Creatinine 7.24 (H) 0.70 - 1.30 MG/DL    BUN/Creatinine ratio 4 (L) 12 - 20      eGFR 8 (L) >60 ml/min/1.73m2    Calcium 8.8 8.5 - 10.1 MG/DL    Bilirubin, total 0.2 0.2 - 1.0 MG/DL    ALT (SGPT) <6 (L) 12 - 78 U/L    AST (SGOT) 16 15 - 37 U/L    Alk.  phosphatase 73 45 - 117 U/L    Protein, total 7.6 6.4 - 8.2 g/dL    Albumin 2.7 (L) 3.5 - 5.0 g/dL    Globulin 4.9 (H) 2.0 - 4.0 g/dL    A-G Ratio 0.6 (L) 1.1 - 2.2     CBC W/O DIFF    Collection Time: 02/22/23  3:28 AM   Result Value Ref Range    WBC 10.5 4.1 - 11.1 K/uL    RBC 2.79 (L) 4.10 - 5.70 M/uL    HGB 8.1 (L) 12.1 - 17.0 g/dL    HCT 25.7 (L) 36.6 - 50.3 %    MCV 92.1 80.0 - 99.0 FL    MCH 29.0 26.0 - 34.0 PG    MCHC 31.5 30.0 - 36.5 g/dL    RDW 14.4 11.5 - 14.5 %    PLATELET 160 150 - 400 K/uL    MPV 11.5 8.9 - 12.9 FL    NRBC 0.6 (H) 0  WBC    ABSOLUTE NRBC 0.06 (H) 0.00 - 0.01 K/uL                 Roselyn Ramirez MD  [facility]   [id number] [place] [address]  Phone - [phone]   Fax - [phone]  [email or web address]

## 2023-02-22 NOTE — PROGRESS NOTES
Clinical Pharmacy Note: IV to PO Automatic Conversion  Please note: Claudell Norma Thomass medication(s) (levetiracetam, pantoprazole) has/have been changed from IV to PO based on the following critiera:    Patient is tolerating oral medications  Patient is tolerating a diet more advanced than clear liquids  Patient is not requiring vasopressors    This IV to PO conversion is based on the [employer] approved automatic conversion policy for eligible patients. Please call with questions.

## 2023-02-22 NOTE — PROGRESS NOTES
Physical Therapy 2/22/23    Chart reviewed, conferred with RN. Patient cleared to see, SBP under parameters (<160) and appropriate for PT. Attempted to start session, patient's family member at bedside reporting that he was too lethargic since receiving medication and strongly requested that therapists do not see patient at this time. Strong request to return at a later time. Informed RN. Will defer and follow up later as able/appropriate.     Thank you for your consideration,    Osmel Rodriguez, PT, DPT

## 2023-02-22 NOTE — PROGRESS NOTES
200: family member let RN staff know that patient is bleeding near [address] site, but not from the site itself. MD notified. New bandage placed. Will continue to monitor.

## 2023-02-22 NOTE — PROGRESS NOTES
Problem: Self Care Deficits Care Plan (Adult)  Goal: *Acute Goals and Plan of Care (Insert Text)  Description: FUNCTIONAL STATUS PRIOR TO ADMISSION: Patient was independent and active without use of DME.    HOME SUPPORT: The patient lived with fiance but did not require assist.    Occupational Therapy Goals  Initiated 2/21/2023   1. Patient will perform grooming in unsupported sitting using L hand for GM/FM tasks with minimal assistance within 7 day(s). 2.  Patient will perform anterior neck to thigh bathing in unsupported sitting with minimal assistance within 7 day(s). 3.  Patient will perform toilet transfers to/from [facility] with minimal assistance within 7 day(s). 4.  Patient will perform all aspects of toileting with minimal assistance within 7 day(s). 5.  Patient will participate in upper extremity therapeutic exercise/activities with supervision/set-up within 7 day(s). 6.  Patient will utilize energy conservation techniques during functional activities with verbal cues within 7 day(s). 7.  Patient will improve their Fugl Dailey score by 6 points in prep for ADLs within 7 days. Outcome: Progressing Towards Goal     OCCUPATIONAL THERAPY TREATMENT  Patient: Syed Llamas (89 y.o. male)  Date: 2/22/2023  Diagnosis: Subdural hematoma [S06. 5XAA] <principal problem not specified>  Procedure(s) (LRB):  LEFT SUBDURAL CRANIOTOMY (Left) 4 Days Post-Op  Precautions: Fall (SBP <160)  Chart, occupational therapy assessment, plan of care, and goals were reviewed. ASSESSMENT  Patient continues with skilled OT services and is progressing towards goals. Remains limited by L hemiparesis (LUE overall 4- to 4/5), mild L dysmetria (undershooting), impaired vision (L eye swollen shut, able to open eyelid slightly), mild-moderate L neglect (assistance to avoid L obstacles, cuing to maintain L  on RW), impulsivity with mobility, and impaired safety awareness.   Patient was able to integrate LUE for ADL tasks with cuing and also significantly progressed mobility (see PT note, better with RW). Despite progress he remains significantly below functional baseline and would benefit from inpatient rehab at d/c. Current Level of Function Impacting Discharge (ADLs): infer up to moderate assistance ADLs         PLAN :  Patient continues to benefit from skilled intervention to address the above impairments. Continue treatment per established plan of care to address goals. Recommendation for discharge: (in order for the patient to meet his/her long term goals)  Therapy 3 hours per day 5-7 days per week    This discharge recommendation:  Has been made in collaboration with the attending provider and/or case management         SUBJECTIVE:   Patient stated I feel okay.     OBJECTIVE DATA SUMMARY:   Cognitive/Behavioral Status:  Neurologic State: Alert  Orientation Level: Oriented X4  Cognition: Follows commands  Perception: Cues to attend left visual field;Cues to attend to left side of body          Functional Mobility and Transfers for ADLs:    Transfers:  Sit to Stand: Contact guard assistance;Minimum assistance     Bed to Chair: Minimum assistance; Moderate assistance    Balance:  Sitting: Impaired  Sitting - Static: Fair (occasional)  Sitting - Dynamic: Fair (occasional); Poor (constant support)  Standing: Impaired; With support  Standing - Static: Constant support;Fair;Occasional  Standing - Dynamic : Constant support;Fair;Poor    ADL Intervention:       Grooming  Washing Hands: Minimum assistance (with intermittent light steadying, standing at sink, able to integrate LUE)       Lower Body Dressing Assistance  Socks: Supervision (practiced donning and doffing L sock in tailor sit, able to integrate LUE, required additional time)            Pain:  Patient did not report pain    Activity Tolerance:   Good    After treatment patient left in no apparent distress:   Sitting in chair, Call bell within reach, Bed / chair alarm activated, and Caregiver / family present    COMMUNICATION/COLLABORATION:   The patients plan of care was discussed with: Physical therapist and Registered nurse.      Kiarra Monreal OT  Time Calculation: 24 mins

## 2023-02-22 NOTE — PROGRESS NOTES
Bedside shift change report given to Patrick Floyd RN (oncoming nurse) by Sylvia Benavidez RN (offgoing nurse). Report included the following information SBAR, Kardex, MAR, Recent Results, and Cardiac Rhythm NSR .

## 2023-02-22 NOTE — PROGRESS NOTES
Transition of Care Plan  RUR- High Risks   DISPOSITION: IPR  F/U with PCP/Specialist    Transport: [facility]  Care manager met with patient's family and they have chosen [facility], referral made through [facility].   Geronimo Hernandez RN,Care Management

## 2023-02-22 NOTE — PROGRESS NOTES
1930: Bedside and Verbal shift change report given to Jayro Pringle RN/David RN (oncoming nurse) by Toni Arnold RN (offgoing nurse). Report included the following information SBAR, Kardex, ED Summary, OR Summary, Procedure Summary, Intake/Output, MAR, Recent Results, Cardiac Rhythm NSR, Alarm Parameters , Quality Measures, and Dual Neuro Assessment. 0018: SBP > 160, PRN labetolol given.

## 2023-02-22 NOTE — PROGRESS NOTES
Problem: Patient Education: Go to Patient Education Activity  Goal: Patient/Family Education  Outcome: Progressing Towards Goal     Problem: General Medical Care Plan  Goal: *Labs within defined limits  Outcome: Progressing Towards Goal     Problem: General Medical Care Plan  Goal: *Optimal pain control at patient's stated goal  Outcome: Progressing Towards Goal     Problem: General Medical Care Plan  Goal: *Skin integrity maintained  Outcome: Progressing Towards Goal     Problem: General Medical Care Plan  Goal: *Optimize nutritional status  Outcome: Progressing Towards Goal     Problem: General Medical Care Plan  Goal: *Fluid volume balance  Outcome: Progressing Towards Goal

## 2023-02-22 NOTE — PROGRESS NOTES
Neurosurgery Progress Note  Justice Ganser, PA-C    Admit Date: 2023   LOS: 4 days        Daily Progress Note: 2023    HPI: Mr. Florin Dee is a 60yo gentleman with a PMH of HTN, ESRD on HD, PUD and chronic hepatitis who presented to the [id number] [place] [address] ED with a HA. He rapidly became unresponsive and was intubated and transferred to [facility]. Imaging revealed a large acute and chronic subdural hematoma. Pt was taken to the OR for left frontotemporal parietal craniotomy w evacuation of acute & chronic SDH on 23 by Dr. Katie Mayorga. POD:4 Days Post-Op    S/P: Procedure(s):  LEFT SUBDURAL CRANIOTOMY    Subjective: Only complaint is left face swelling. Pt resting comfortably in bed w family at bedside. Objective:     Vital signs  Temp (24hrs), Av.1 °F (31.7 °C), Min:32 °F (0 °C), Max:99.4 °F (37.4 °C)   No intake/output data recorded.    1901 -  0700  In: 1815.4 [I.V.:1815.4]  Out: 50 [Urine:50]    Visit Vitals  /81   Pulse 76   Temp (!) 32 °F (0 °C)   Resp 13   Ht 5' 9\" (1.753 m)   Wt 72 kg (158 lb 11.7 oz)   SpO2 98%   BMI 23.44 kg/m²    O2 Flow Rate (L/min): 4 l/min O2 Device: None (Room air)     Output (mL)  Urine Voided: 50 ml (23 0400)  Last Bowel Movement Date:  (PTA) (23 0800)  Unmeasurable Output  Urine Occurrence(s): 1 (23 040)     Pain control  Pain Assessment  Pain Scale 1: Numeric (0 - 10)  Pain Intensity 1: 10  Pain Location 1: Head  Pain Orientation 1: Anterior, Left  Pain Description 1: Aching  Pain Intervention(s) 1: Medication (see MAR)    PT/OT  Gait     Gait  Base of Support: Center of gravity altered, Narrowed  Speed/Shayna: Pace decreased (<100 feet/min)  Step Length: Right shortened, Left shortened  Swing Pattern: Left asymmetrical  Gait Abnormalities: Decreased step clearance, Step to gait, Trunk sway increased  Ambulation - Level of Assistance: Minimal assistance, Moderate assistance, Assist x2 (facilitation cues at L quad)  Distance (ft): 2 Feet (ft)  Assistive Device: Gait belt (bilateral HHA)        Physical Exam:  Gen: No acute distress. Neuro: A&Ox4. Follows commands. PERRL. Face symmetric. HERNANDEZ spontaneously and purposefully. Generalized weakness. Sensation intact. Negative drift. Gait deferred. Skin: Incision C/D/I  Left eye swelling    24 hour results:    Recent Results (from the past 24 hour(s))   HEPATIC FUNCTION PANEL    Collection Time: 02/22/23  3:28 AM   Result Value Ref Range    Protein, total 7.5 6.4 - 8.2 g/dL    Albumin 2.7 (L) 3.5 - 5.0 g/dL    Globulin 4.8 (H) 2.0 - 4.0 g/dL    A-G Ratio 0.6 (L) 1.1 - 2.2      Bilirubin, total 0.3 0.2 - 1.0 MG/DL    Bilirubin, direct 0.2 0.0 - 0.2 MG/DL    Alk. phosphatase 71 45 - 117 U/L    AST (SGOT) 14 (L) 15 - 37 U/L    ALT (SGPT) <6 (L) 12 - 78 U/L   METABOLIC PANEL, COMPREHENSIVE    Collection Time: 02/22/23  3:28 AM   Result Value Ref Range    Sodium 133 (L) 136 - 145 mmol/L    Potassium 4.2 3.5 - 5.1 mmol/L    Chloride 96 (L) 97 - 108 mmol/L    CO2 26 21 - 32 mmol/L    Anion gap 11 5 - 15 mmol/L    Glucose 107 (H) 65 - 100 mg/dL    BUN 26 (H) 6 - 20 MG/DL    Creatinine 7.24 (H) 0.70 - 1.30 MG/DL    BUN/Creatinine ratio 4 (L) 12 - 20      eGFR 8 (L) >60 ml/min/1.73m2    Calcium 8.8 8.5 - 10.1 MG/DL    Bilirubin, total 0.2 0.2 - 1.0 MG/DL    ALT (SGPT) <6 (L) 12 - 78 U/L    AST (SGOT) 16 15 - 37 U/L    Alk.  phosphatase 73 45 - 117 U/L    Protein, total 7.6 6.4 - 8.2 g/dL    Albumin 2.7 (L) 3.5 - 5.0 g/dL    Globulin 4.9 (H) 2.0 - 4.0 g/dL    A-G Ratio 0.6 (L) 1.1 - 2.2     CBC W/O DIFF    Collection Time: 02/22/23  3:28 AM   Result Value Ref Range    WBC 10.5 4.1 - 11.1 K/uL    RBC 2.79 (L) 4.10 - 5.70 M/uL    HGB 8.1 (L) 12.1 - 17.0 g/dL    HCT 25.7 (L) 36.6 - 50.3 %    MCV 92.1 80.0 - 99.0 FL    MCH 29.0 26.0 - 34.0 PG    MCHC 31.5 30.0 - 36.5 g/dL    RDW 14.4 11.5 - 14.5 %    PLATELET 184 647 - 094 K/uL    MPV 11.5 8.9 - 12.9 FL    NRBC 0.6 (H) 0  WBC    ABSOLUTE NRBC 0.06 (H) 0.00 - 0.01 K/uL        Assessment:     Active Problems:    Subdural hematoma (2/18/2023)    Plan:     s/p Left frontotemporal parietal craniotomy, evacuation of acute and chronic subdural hematoma 2/18   -GAVINO drain d/c'd 2/20  -PT/OT/SLP as able  -BP control per primary team  -Keppra 500mg BID  -Recheck temp    -q4hr neuro checks   -Hgb 8.1 today. 1 unit PRBC transfused 2/18. Monitor.    -HD TTS   -Medical mgmt per hospitalist team   -Awaiting transfer to [facility]    Plan d/w Dr. Mendoza , PA

## 2023-02-22 NOTE — PROGRESS NOTES
Brief Transfer Note    Aragon Police is stable to transfer out of ICU. PerfectServe sent to Dr. Timothy Burger. Please reach out to ICU with any concerns.     Otto Marin AGACN-BC  [phone]

## 2023-02-22 NOTE — PROGRESS NOTES
Problem: Mobility Impaired (Adult and Pediatric)  Goal: *Acute Goals and Plan of Care (Insert Text)  Description:   FUNCTIONAL STATUS PRIOR TO ADMISSION: Patient was independent and active without use of DME.    HOME SUPPORT PRIOR TO ADMISSION: The patient lived with family but did not require assist.    Physical Therapy Goals  Initiated 2/21/2023  1. Patient will move from supine to sit and sit to supine  and roll side to side in bed with supervision/set-up within 7 day(s). 2.  Patient will transfer from bed to chair and chair to bed with minimal assistance/contact guard assist using the least restrictive device within 7 day(s). 3.  Patient will perform sit to stand with minimal assistance within 7 day(s). 4.  Patient will ambulate with minimal assistance for 25 feet with the least restrictive device within 7 day(s). 5.  Patient will improve Olivas Balance score by 7 points within 7 days. Outcome: Progressing Towards Goal     PHYSICAL THERAPY TREATMENT  Patient: Bhupinder Arroyo (13 y.o. male)  Date: 2/22/2023  Diagnosis: Subdural hematoma [S06. 5XAA] <principal problem not specified>  Procedure(s) (LRB):  LEFT SUBDURAL CRANIOTOMY (Left) 4 Days Post-Op  Precautions: Fall (SBP <160)  Chart, physical therapy assessment, plan of care and goals were reviewed. ASSESSMENT  Patient continues with skilled PT services and is progressing towards goals. This is a 62year old male who is POD#4 s/p L crani. He is received sitting up in chair with daughter and wife at bedside. Agreeable to treatment. SBP within parameters of <160. Patient noted to have slight L knee buckling today (lacking full extension) however did not require blocking of L knee today. Strong initial L lateral lean in standing, able to shift weight R. Able to ambulate to sink with HHA and while standing, L lateral lean as he fatigued. Able to ambulated further in room distances with bilateral HHA and min A x 2 before resting.  Elevated BP once resting after gait training. Rested for several minutes, able to tolerate second trial of gait training with RW. Required mod A x 2 for gait training with AD. Patient required almost constant cues to widen APPLE, shift weight R, keep AD close to body (patient pushing AD way ahead of him despite cues). Attempted to train with step to pattern however patient slightly impulsive trying a step through gait pattern. As he fatigued, increased L lateral lean into therapist requiring mod A to prevent lateral LOB. Discussed safety concerns, fall risk, and need to slow down gait speed as neuromuscular re-ed continues. Patient will benefit from IPR at discharge. Will continue to follow. Current Level of Function Impacting Discharge (mobility/balance): CGA to min A for sit to stand/stand to sit, min to mod A x 2 for SPT, min to mod A x 2 for gait training with bilateral HHA then RW, constant support, poor standing balance    Other factors to consider for discharge: high PLOF, strong family support         PLAN :  Patient continues to benefit from skilled intervention to address the above impairments. Continue treatment per established plan of care. to address goals. Recommendation for discharge: (in order for the patient to meet his/her long term goals)  Therapy 3 hours per day 5-7 days per week    This discharge recommendation:  Has been made in collaboration with the attending provider and/or case management    IF patient discharges home will need the following DME: to be determined (TBD)       SUBJECTIVE:   Patient stated feels good to move.     OBJECTIVE DATA SUMMARY:   Critical Behavior:  Neurologic State: Drowsy, Eyes open spontaneously, Eyes open to voice  Orientation Level: Oriented X4  Cognition: Follows commands  Safety/Judgement: Awareness of environment, Decreased awareness of need for assistance, Decreased awareness of need for safety, Decreased insight into deficits, Fall prevention  Functional Mobility Training:  Bed Mobility:     Supine to Sit:  (received in chair)  Sit to Supine:  (left in chair)           Transfers:  Sit to Stand: Contact guard assistance;Minimum assistance  Stand to Sit: Contact guard assistance;Minimum assistance        Bed to Chair: Minimum assistance; Moderate assistance             Balance:  Sitting: Impaired  Sitting - Static: Fair (occasional)  Sitting - Dynamic: Fair (occasional); Poor (constant support)  Standing: Impaired; With support  Standing - Static: Constant support;Fair;Occasional  Standing - Dynamic : Constant support;Fair;Poor  Ambulation/Gait Training:  Distance (ft): 50 Feet (ft)  Assistive Device: Gait belt;Walker, rolling  Ambulation - Level of Assistance: Minimal assistance; Moderate assistance;Assist x2        Gait Abnormalities: Decreased step clearance; Path deviations; Shuffling gait;Trunk sway increased        Base of Support: Center of gravity altered;Narrowed; Shift to left  Stance: Left increased;Right decreased  Speed/Shanya: Shuffled;Slow;Fluctuations  Step Length: Left shortened;Right shortened  Swing Pattern: Left asymmetrical          Pain Rating:  No pain reported     Activity Tolerance:   Fair and requires rest breaks    After treatment patient left in no apparent distress:   Sitting in chair, Call bell within reach, Bed / chair alarm activated, and Caregiver / family present    COMMUNICATION/COLLABORATION:   The patients plan of care was discussed with: Occupational therapist and Registered nurse. Patient was educated regarding His deficit(s) of L sided weakness as this relates to His diagnosis of SDH s/p crani. He demonstrated Fair understanding as evidenced by discussion of deficits. Patient and/or family was verbally educated on the BE FAST acronym for signs/symptoms of CVA and TIA. BE FAST was written on patient's communication board  for visual education and reinforcement. All questions answered with patient indicating fair understanding. Luis Quinones, PT   Time Calculation: 24 mins

## 2023-02-23 ENCOUNTER — APPOINTMENT (OUTPATIENT)
Dept: CT IMAGING | Age: 58
DRG: 025 | End: 2023-02-23
Attending: PHYSICIAN ASSISTANT
Payer: MEDICARE

## 2023-02-23 LAB
ALBUMIN SERPL-MCNC: 2.6 G/DL (ref 3.5–5)
ALBUMIN/GLOB SERPL: 0.7 (ref 1.1–2.2)
ALP SERPL-CCNC: 66 U/L (ref 45–117)
ALT SERPL-CCNC: <6 U/L (ref 12–78)
ANION GAP SERPL CALC-SCNC: 10 MMOL/L (ref 5–15)
AST SERPL-CCNC: 8 U/L (ref 15–37)
BILIRUB SERPL-MCNC: 0.3 MG/DL (ref 0.2–1)
BUN SERPL-MCNC: 42 MG/DL (ref 6–20)
BUN/CREAT SERPL: 4 (ref 12–20)
CALCIUM SERPL-MCNC: 8 MG/DL (ref 8.5–10.1)
CHLORIDE SERPL-SCNC: 94 MMOL/L (ref 97–108)
CO2 SERPL-SCNC: 28 MMOL/L (ref 21–32)
CREAT SERPL-MCNC: 9.42 MG/DL (ref 0.7–1.3)
ERYTHROCYTE [DISTWIDTH] IN BLOOD BY AUTOMATED COUNT: 13.9 % (ref 11.5–14.5)
GLOBULIN SER CALC-MCNC: 3.8 G/DL (ref 2–4)
GLUCOSE SERPL-MCNC: 106 MG/DL (ref 65–100)
HCT VFR BLD AUTO: 23.1 % (ref 36.6–50.3)
HGB BLD-MCNC: 7.7 G/DL (ref 12.1–17)
MCH RBC QN AUTO: 30.1 PG (ref 26–34)
MCHC RBC AUTO-ENTMCNC: 33.3 G/DL (ref 30–36.5)
MCV RBC AUTO: 90.2 FL (ref 80–99)
NRBC # BLD: 0.04 K/UL (ref 0–0.01)
NRBC BLD-RTO: 0.4 PER 100 WBC
PLATELET # BLD AUTO: 179 K/UL (ref 150–400)
PMV BLD AUTO: 11.5 FL (ref 8.9–12.9)
POTASSIUM SERPL-SCNC: 4.3 MMOL/L (ref 3.5–5.1)
PROT SERPL-MCNC: 6.4 G/DL (ref 6.4–8.2)
RBC # BLD AUTO: 2.56 M/UL (ref 4.1–5.7)
SODIUM SERPL-SCNC: 132 MMOL/L (ref 136–145)
WBC # BLD AUTO: 9.1 K/UL (ref 4.1–11.1)

## 2023-02-23 PROCEDURE — 36415 COLL VENOUS BLD VENIPUNCTURE: CPT

## 2023-02-23 PROCEDURE — 85027 COMPLETE CBC AUTOMATED: CPT

## 2023-02-23 PROCEDURE — 74011250637 HC RX REV CODE- 250/637: Performed by: ANESTHESIOLOGY

## 2023-02-23 PROCEDURE — 74011250636 HC RX REV CODE- 250/636: Performed by: INTERNAL MEDICINE

## 2023-02-23 PROCEDURE — 74011250637 HC RX REV CODE- 250/637: Performed by: STUDENT IN AN ORGANIZED HEALTH CARE EDUCATION/TRAINING PROGRAM

## 2023-02-23 PROCEDURE — 74011250636 HC RX REV CODE- 250/636: Performed by: ANESTHESIOLOGY

## 2023-02-23 PROCEDURE — 74011250636 HC RX REV CODE- 250/636: Performed by: STUDENT IN AN ORGANIZED HEALTH CARE EDUCATION/TRAINING PROGRAM

## 2023-02-23 PROCEDURE — 65270000046 HC RM TELEMETRY

## 2023-02-23 PROCEDURE — 80053 COMPREHEN METABOLIC PANEL: CPT

## 2023-02-23 PROCEDURE — 74011000250 HC RX REV CODE- 250: Performed by: NEUROLOGICAL SURGERY

## 2023-02-23 PROCEDURE — 65660000001 HC RM ICU INTERMED STEPDOWN

## 2023-02-23 PROCEDURE — 74011250637 HC RX REV CODE- 250/637: Performed by: HOSPITALIST

## 2023-02-23 PROCEDURE — 74011250637 HC RX REV CODE- 250/637: Performed by: INTERNAL MEDICINE

## 2023-02-23 PROCEDURE — 70450 CT HEAD/BRAIN W/O DYE: CPT

## 2023-02-23 RX ORDER — HYDRALAZINE HYDROCHLORIDE 20 MG/ML
10 INJECTION INTRAMUSCULAR; INTRAVENOUS
Status: DISCONTINUED | OUTPATIENT
Start: 2023-02-23 | End: 2023-02-26

## 2023-02-23 RX ORDER — HYDRALAZINE HYDROCHLORIDE 20 MG/ML
20 INJECTION INTRAMUSCULAR; INTRAVENOUS ONCE
Status: COMPLETED | OUTPATIENT
Start: 2023-02-23 | End: 2023-02-23

## 2023-02-23 RX ORDER — HYDRALAZINE HYDROCHLORIDE 50 MG/1
50 TABLET, FILM COATED ORAL 3 TIMES DAILY
Status: DISCONTINUED | OUTPATIENT
Start: 2023-02-23 | End: 2023-02-24

## 2023-02-23 RX ORDER — ACETAMINOPHEN 325 MG/1
650 TABLET ORAL ONCE
Status: COMPLETED | OUTPATIENT
Start: 2023-02-23 | End: 2023-02-23

## 2023-02-23 RX ORDER — IBUPROFEN 600 MG/1
600 TABLET ORAL ONCE
Status: COMPLETED | OUTPATIENT
Start: 2023-02-23 | End: 2023-02-23

## 2023-02-23 RX ADMIN — ACETAMINOPHEN 650 MG: 325 TABLET ORAL at 08:56

## 2023-02-23 RX ADMIN — EPOETIN ALFA-EPBX 20000 UNITS: 20000 INJECTION, SOLUTION INTRAVENOUS; SUBCUTANEOUS at 21:37

## 2023-02-23 RX ADMIN — LEVETIRACETAM 500 MG: 500 TABLET, FILM COATED ORAL at 08:52

## 2023-02-23 RX ADMIN — HYDRALAZINE HYDROCHLORIDE 20 MG: 20 INJECTION INTRAMUSCULAR; INTRAVENOUS at 12:08

## 2023-02-23 RX ADMIN — LABETALOL HYDROCHLORIDE 300 MG: 100 TABLET, FILM COATED ORAL at 18:44

## 2023-02-23 RX ADMIN — LEVETIRACETAM 500 MG: 500 TABLET, FILM COATED ORAL at 21:37

## 2023-02-23 RX ADMIN — HEPARIN SODIUM 5000 UNITS: 5000 INJECTION INTRAVENOUS; SUBCUTANEOUS at 00:00

## 2023-02-23 RX ADMIN — HYDRALAZINE HYDROCHLORIDE 20 MG: 20 INJECTION INTRAMUSCULAR; INTRAVENOUS at 03:00

## 2023-02-23 RX ADMIN — NIFEDIPINE 90 MG: 30 TABLET, EXTENDED RELEASE ORAL at 08:56

## 2023-02-23 RX ADMIN — SODIUM CHLORIDE, PRESERVATIVE FREE 10 ML: 5 INJECTION INTRAVENOUS at 22:00

## 2023-02-23 RX ADMIN — PANTOPRAZOLE SODIUM 40 MG: 40 TABLET, DELAYED RELEASE ORAL at 06:08

## 2023-02-23 RX ADMIN — CLONIDINE HYDROCHLORIDE 0.3 MG: 0.1 TABLET ORAL at 18:43

## 2023-02-23 RX ADMIN — ACETAMINOPHEN 650 MG: 325 TABLET ORAL at 21:37

## 2023-02-23 RX ADMIN — LABETALOL HYDROCHLORIDE 300 MG: 100 TABLET, FILM COATED ORAL at 06:08

## 2023-02-23 RX ADMIN — HYDRALAZINE HYDROCHLORIDE 20 MG: 20 INJECTION INTRAMUSCULAR; INTRAVENOUS at 10:43

## 2023-02-23 RX ADMIN — HYDRALAZINE HYDROCHLORIDE 50 MG: 50 TABLET, FILM COATED ORAL at 18:44

## 2023-02-23 RX ADMIN — HYDRALAZINE HYDROCHLORIDE 50 MG: 50 TABLET, FILM COATED ORAL at 21:37

## 2023-02-23 RX ADMIN — ACETAMINOPHEN 650 MG: 325 TABLET ORAL at 12:08

## 2023-02-23 RX ADMIN — BUMETANIDE 2 MG: 1 TABLET ORAL at 08:55

## 2023-02-23 RX ADMIN — SODIUM CHLORIDE, PRESERVATIVE FREE 10 ML: 5 INJECTION INTRAVENOUS at 06:09

## 2023-02-23 RX ADMIN — CLONIDINE HYDROCHLORIDE 0.3 MG: 0.1 TABLET ORAL at 08:53

## 2023-02-23 RX ADMIN — SODIUM CHLORIDE, PRESERVATIVE FREE 10 ML: 5 INJECTION INTRAVENOUS at 15:31

## 2023-02-23 RX ADMIN — IBUPROFEN 600 MG: 600 TABLET, FILM COATED ORAL at 18:44

## 2023-02-23 RX ADMIN — LABETALOL HYDROCHLORIDE 20 MG: 5 INJECTION INTRAVENOUS at 01:30

## 2023-02-23 NOTE — PROGRESS NOTES
Occupational Therapy   02.23.2023    Chart reviewed in prep for OT, patient hypertensive and currently on HD. Will defer and follow-up as able and medically appropriate. Thank you. @1300 - Patient continues to be hypertensive (outside of neurosurgery parameters), will follow- up tomorrow. Aramis Benton MS, OTR/L

## 2023-02-23 NOTE — PROGRESS NOTES
[facility] Adult  Hospitalist Group                                                                                          Hospitalist Progress Note  Ginny Burt MD  Answering service: [phone] from in house phone        Date of Service:  2023  NAME:  Niko Leon  :  1965  MRN:  [id number]       Admission Summary:   Niko Leon is a 62 y.o. male with medical history of ESRD on hypertension, CKD, and parenchymal liver disease who was admitted on the 2023 after presenting with headache that started during dialysis, and diagnosed with subdural hemorrhage. He underwent evacuation urgently by neurosurgery, and was admitted to the ICU for further care. Surgical drains have currently been removed status post left craniotomy. He is receiving carvedilol, hydralazine, labetalol, and nifedipine for blood pressure management to maintain SBP less than 140 and has no residual focal deficits. Interval history / Subjective:   Patient seen and examined during dialysis. Noted BP is elevated. Patient complains of a headache. Assessment & Plan:        SDH  -s/p left craniotomy and evacuation on ;   - Neurosurgery following  - goal SBP <140 mmHg;   - continue Keppra for seizure prophylaxis;    HTN:  - uncontrolled;   - discontinue Coreg as patient is already on Labetalol;   - he is on Clonidine, Nifedipine, Bumex;   - : add Hydralazine 50 mg TID;      Stress ulcer ppx  -continue PPI     ESRD, Hemodialysis dependent:  - appreciate Nephrology follow-up and rec's;         Code status: full  Prophylaxis: started on sqh by ICU team  Care Plan discussed with: family  Anticipated Disposition:  IPR ([facility])  ; Hospital Problems  Date Reviewed: 2023            Codes Class Noted POA    Subdural hematoma ICD-10-CM: S06. 5XAA  ICD-9-CM: 432.1  2023 Unknown         Social Determinants of Health     Tobacco Use: High Risk Smoking Tobacco Use: Some Days    Smokeless Tobacco Use: Never    Passive Exposure: Not on file   Alcohol Use: Not on file   Financial Resource Strain: Not on file   Food Insecurity: Not on file   Transportation Needs: Not on file   Physical Activity: Not on file   Stress: Not on file   Social Connections: Not on file   Intimate Partner Violence: Not on file   Depression: Not at risk    PHQ-2 Score: 0   Housing Stability: Not on file       Review of Systems:   A comprehensive review of systems was negative except for that written in the HPI. Vital Signs:    Last 24hrs VS reviewed since prior progress note. Most recent are:  Visit Vitals  BP (!) 172/84 (BP 1 Location: Left upper arm)   Pulse 84   Temp 97.6 °F (36.4 °C)   Resp 20   Ht 5' 9\" (1.753 m)   Wt 75.5 kg (166 lb 7.2 oz)   SpO2 97%   BMI 24.58 kg/m²       No intake or output data in the 24 hours ending 02/23/23 1153       Physical Examination:     I had a face to face encounter with this patient and independently examined them on 2/23/2023 as outlined below:          General :NAD, drowsy  HEENT: anicteric sclerae, wound covered, L periorbital swelling  Neck: supple, no JVD, no meningeal signs  Chest: Clear to auscultation bilaterally   CVS: S1 S2 heard, Capillary refill less than 2 seconds  Abd: soft/ non tender, non distended, BS physiological,   Ext: no clubbing, no cyanosis, no edema  Neuro/Psych: grossly non-focal  Skin: warm     Data Review:    Review and/or order of clinical lab test  Review and/or order of tests in the radiology section of CPT      I have personally and independently reviewed all pertinent labs, diagnostic studies, imaging, and have provided independent interpretation of the same.      Labs:     Recent Labs     02/23/23  0108 02/22/23  0328   WBC 9.1 10.5   HGB 7.7* 8.1*   HCT 23.1* 25.7*    160       Recent Labs     02/23/23  0108 02/22/23  0328 02/21/23  0125   * 133* 134*   K 4.3 4.2 4.0   CL 94* 96* 98   CO2 28 26 26   BUN 42* 26* 46*   CREA 9.42* 7.24* 9.92*   * 107* 163*   CA 8.0* 8.8 8.6       Recent Labs     02/23/23  0108 02/22/23  0328 02/21/23  0125   ALT <6* <6*  <6* <6*   AP 66 73  71 76   TBILI 0.3 0.2  0.3 0.2   TP 6.4 7.6  7.5 7.3   ALB 2.6* 2.7*  2.7* 2.8*   GLOB 3.8 4.9*  4.8* 4.5*       No results for input(s): INR, PTP, APTT, INREXT, INREXT in the last 72 hours. No results for input(s): FE, TIBC, PSAT, FERR in the last 72 hours. Lab Results   Component Value Date/Time    Folate 7.9 05/18/2022 12:18 PM        No results for input(s): PH, PCO2, PO2 in the last 72 hours. No results for input(s): CPK, CKNDX, TROIQ in the last 72 hours.     No lab exists for component: CPKMB  Lab Results   Component Value Date/Time    Cholesterol, total 170 06/29/2020 03:46 AM    HDL Cholesterol 49 06/29/2020 03:46 AM    LDL, calculated 73.4 06/29/2020 03:46 AM    Triglyceride 238 (H) 06/29/2020 03:46 AM    CHOL/HDL Ratio 3.5 06/29/2020 03:46 AM     Lab Results   Component Value Date/Time    Glucose (POC) 112 11/30/2022 12:08 PM    Glucose (POC) 82 11/30/2022 08:34 AM    Glucose (POC) 112 11/29/2022 09:35 PM    Glucose (POC) 145 (H) 11/29/2022 12:26 PM    Glucose (POC) 97 09/27/2022 06:48 AM     Lab Results   Component Value Date/Time    Color YELLOW/STRAW 03/22/2022 04:11 PM    Appearance TURBID (A) 03/22/2022 04:11 PM    Specific gravity 1.021 03/22/2022 04:11 PM    pH (UA) 7.5 03/22/2022 04:11 PM    Protein >300 (A) 03/22/2022 04:11 PM    Glucose 100 (A) 03/22/2022 04:11 PM    Ketone Negative 03/22/2022 04:11 PM    Bilirubin Negative 03/22/2022 04:11 PM    Urobilinogen 0.2 03/22/2022 04:11 PM    Nitrites Negative 03/22/2022 04:11 PM    Leukocyte Esterase MODERATE (A) 03/22/2022 04:11 PM    Epithelial cells MODERATE (A) 03/22/2022 04:11 PM    Bacteria Negative 03/22/2022 04:11 PM    WBC 10-20 03/22/2022 04:11 PM    RBC 0-5 03/22/2022 04:11 PM       Notes reviewed from all clinical/nonclinical/nursing services involved in patient's clinical care. Care coordination discussions were held with appropriate clinical/nonclinical/ nursing providers based on care coordination needs. Patients current active Medications were reviewed, considered, added and adjusted based on the clinical condition today. Home Medications were reconciled to the best of my ability given all available resources at the time of admission. Route is PO if not otherwise noted.       Admission Status:[id number]:::1}      Medications Reviewed:     Current Facility-Administered Medications   Medication Dose Route Frequency    hydrALAZINE (APRESOLINE) 20 mg/mL injection 20 mg  20 mg IntraVENous ONCE    hydrALAZINE (APRESOLINE) 20 mg/mL injection 10 mg  10 mg IntraVENous Q6H PRN    acetaminophen (TYLENOL) tablet 650 mg  650 mg Oral ONCE    cloNIDine HCL (CATAPRES) tablet 0.3 mg  0.3 mg Oral BID    NIFEdipine ER (PROCARDIA XL) tablet 90 mg  90 mg Oral DAILY    levETIRAcetam (KEPPRA) tablet 500 mg  500 mg Oral DAILY    And    levETIRAcetam (KEPPRA) tablet 500 mg  500 mg Oral DIALYSIS TUE, THU & SAT    pantoprazole (PROTONIX) tablet 40 mg  40 mg Oral ACB    heparin (porcine) injection 5,000 Units  5,000 Units SubCUTAneous Q12H    [Held by provider] carvediloL (COREG) tablet 25 mg  25 mg Oral BID WITH MEALS    labetaloL (NORMODYNE) tablet 300 mg  300 mg Oral Q12H    0.9% sodium chloride infusion 250 mL  250 mL IntraVENous PRN    epoetin shawn-epbx (RETACRIT) injection 20,000 Units  20,000 Units SubCUTAneous Q TUE, THU & SAT    sodium chloride (NS) flush 5-40 mL  5-40 mL IntraVENous Q8H    ondansetron (ZOFRAN) injection 4 mg  4 mg IntraVENous Q4H PRN    acetaminophen (TYLENOL) tablet 650 mg  650 mg Oral Q6H PRN    Or    acetaminophen (TYLENOL) suppository 650 mg  650 mg Rectal Q6H PRN    polyethylene glycol (MIRALAX) packet 17 g  17 g Oral DAILY PRN    labetaloL (NORMODYNE;TRANDATE) injection 20 mg  20 mg IntraVENous Q4H PRN    bumetanide (BUMEX) tablet 2 mg 2 mg Oral DAILY     ______________________________________________________________________  EXPECTED LENGTH OF STAY: 4d 12h  ACTUAL LENGTH OF STAY:          Palmer Forte MD

## 2023-02-23 NOTE — PROGRESS NOTES
Neurosurgery Progress Note  Marisel Valdes PA-C    Admit Date: 2023   LOS: 5 days        Daily Progress Note: 2023    HPI: Mr. Adiel Fermin is a 60yo gentleman with a PMH of HTN, ESRD on HD, PUD and chronic hepatitis who presented to the [address] [facility] ED with a HA. He rapidly became unresponsive and was intubated and transferred to [facility]. Imaging revealed a large acute and chronic subdural hematoma. Pt was taken to the OR for left frontotemporal parietal craniotomy w evacuation of acute & chronic SDH on 23 by Dr. Mp Franklin. Subjective:     Complaint of moderate-severe HA today. BP elevated. Pt in dialysis at time of my visit.     Objective:     Vital signs  Temp (24hrs), Av.1 °F (36.7 °C), Min:97.6 °F (36.4 °C), Max:98.7 °F (37.1 °C)   701 - 1900  In: -   Out:  - 700  In: -   Out: 50 [Urine:50]    Visit Vitals  BP (!) 147/72 (BP 1 Location: Left upper arm)   Pulse 82   Temp 98.7 °F (37.1 °C)   Resp 13   Ht 5' 9\" (1.753 m)   Wt 75.5 kg (166 lb 7.2 oz)   SpO2 94%   BMI 24.58 kg/m²    O2 Flow Rate (L/min): 4 l/min O2 Device: None (Room air)     Output (mL)  Urine Voided: 50 ml (23 0400)  Last Bowel Movement Date:  (PTA) (23 08)  Unmeasurable Output  Urine Occurrence(s): 1 (23 07)     Pain control  Pain Assessment  Pain Scale 1: Numeric (0 - 10)  Pain Intensity 1: 10  Pain Location 1: Head  Pain Orientation 1: Anterior  Pain Description 1: Aching  Pain Intervention(s) 1: Medication (see MAR)    PT/OT  Gait     Gait  Base of Support: Center of gravity altered, Narrowed, Shift to left  Speed/Shayna: Shuffled, Slow, Fluctuations  Step Length: Left shortened, Right shortened  Swing Pattern: Left asymmetrical  Stance: Left increased, Right decreased  Gait Abnormalities: Decreased step clearance, Path deviations, Shuffling gait, Trunk sway increased  Ambulation - Level of Assistance: Minimal assistance, Moderate assistance, Assist x2  Distance (ft): 50 Feet (ft)  Assistive Device: Gait belt, Walker, rolling        Physical Exam:  Gen: No acute distress. Neuro: A&Ox4. Follows commands. PERRL. Face symmetric. Left eye swelling  HERNANDEZ spontaneously and purposefully. Generalized weakness. Sensation intact. Negative drift. Gait deferred. Skin: Incision C/D/I    24 hour results:    Recent Results (from the past 24 hour(s))   METABOLIC PANEL, COMPREHENSIVE    Collection Time: 02/23/23  1:08 AM   Result Value Ref Range    Sodium 132 (L) 136 - 145 mmol/L    Potassium 4.3 3.5 - 5.1 mmol/L    Chloride 94 (L) 97 - 108 mmol/L    CO2 28 21 - 32 mmol/L    Anion gap 10 5 - 15 mmol/L    Glucose 106 (H) 65 - 100 mg/dL    BUN 42 (H) 6 - 20 MG/DL    Creatinine 9.42 (H) 0.70 - 1.30 MG/DL    BUN/Creatinine ratio 4 (L) 12 - 20      eGFR 6 (L) >60 ml/min/1.73m2    Calcium 8.0 (L) 8.5 - 10.1 MG/DL    Bilirubin, total 0.3 0.2 - 1.0 MG/DL    ALT (SGPT) <6 (L) 12 - 78 U/L    AST (SGOT) 8 (L) 15 - 37 U/L    Alk. phosphatase 66 45 - 117 U/L    Protein, total 6.4 6.4 - 8.2 g/dL    Albumin 2.6 (L) 3.5 - 5.0 g/dL    Globulin 3.8 2.0 - 4.0 g/dL    A-G Ratio 0.7 (L) 1.1 - 2.2     CBC W/O DIFF    Collection Time: 02/23/23  1:08 AM   Result Value Ref Range    WBC 9.1 4.1 - 11.1 K/uL    RBC 2.56 (L) 4.10 - 5.70 M/uL    HGB 7.7 (L) 12.1 - 17.0 g/dL    HCT 23.1 (L) 36.6 - 50.3 %    MCV 90.2 80.0 - 99.0 FL    MCH 30.1 26.0 - 34.0 PG    MCHC 33.3 30.0 - 36.5 g/dL    RDW 13.9 11.5 - 14.5 %    PLATELET 024 673 - 983 K/uL    MPV 11.5 8.9 - 12.9 FL    NRBC 0.4 (H) 0  WBC    ABSOLUTE NRBC 0.04 (H) 0.00 - 0.01 K/uL        Assessment:     Active Problems:    Subdural hematoma (2/18/2023)    Plan:     s/p Left frontotemporal parietal craniotomy, evacuation of acute and chronic subdural hematoma 2/18   -GAVINO drain d/c'd 2/20  -PT/OT/SLP as able  -BP control per primary team  -Keppra 500mg BID  -Recheck temp  -Head CT today for BP in the 200's + HA pending    -q4hr neuro checks   -Hgb 7.7 today.  1 unit PRBC transfused 2/18. Monitor.    -HD TTS; neprhology following   -Medical mgmt per hospitalist team    Plan d/w Dr. Cindy Cantor PA     Addendum: repeat Head CT 2/23 Stable/showing improvement

## 2023-02-23 NOTE — PROGRESS NOTES
Transition of Care Plan  RUR- High 23%  DISPOSITION: IPR - [facility] - pending insurance auth to be started 2/23  F/U with PCP/Specialist    Transport: [facility]    Emergency contact: Daughter, Steven Feeling [phone]    CM barriers to discharge: Insurance auth    [facility] has accepted patient and will plan to start insurance auth today, 2/23. CM received consult for RW - will defer for now as patient going to IPR at discharge and RW will be ordered at [facility]. CM will follow. ISAIAS Diamond.

## 2023-02-23 NOTE — PROGRESS NOTES
Physical Therapy 2/23/23    Chart reviewed, conferred with RN. Patient currently undergoing HD at bedside, will defer and follow up as able.     Thank you for your consideration,    Roxanna Martinez, PT, DPT

## 2023-02-23 NOTE — ADVANCED PRACTICE NURSE
Hemodialysis / [phone]    Vitals Pre Post Assessment Pre Post   BP BP: (!) 172/84 (02/23/23 1100)   167/82 LOC A&O x 3 A&O x 3   HR Pulse (Heart Rate): 84 (02/23/23 1100) 87 Lungs clear clear   Resp Resp Rate: 20 (02/23/23 1100) 19 Cardiac regular regular   Temp Temp: 97.6 °F (36.4 °C) (02/23/23 1045) 97.6 Skin warm warm   Weight Pre-Dialysis Weight: 75.5 kg (166 lb 7.2 oz) (02/23/23 0925)  Edema No edema No edema   Tele status bedside bedside Pain Pain Intensity 1: 5 (02/23/23 0925) 10/10 H/A medicaided      Orders   Duration: Start: 0930 End: 1315 Total: 3.75 hr   Dialyzer: Dialyzer/Set Up Inspection: Troy Stands ([id number]) (02/23/23 0925)   K Bath: Dialysate K (mEq/L): 2 (02/23/23 0925)   Ca Bath: Dialysate CA (mEq/L): 2.5 (02/23/23 0925)   Na: Dialysate NA (mEq/L): 139 (02/23/23 0925)   Bicarb: Dialysate HCO3 (mEq/L): 38 (02/23/23 0925)   Target Fluid Removal: Goal/Amount of Fluid to Remove (mL): 2000 mL (02/23/23 0925)     Access   Type & Location: [facility] cath   Comments:            in place, patent, dressing dry and intact, no S/S of infection.                             Labs   HBsAg (Antigen) / date:             02/18/23 negative                                  HBsAb (Antibody) / date: 02/18/23 [facility]   Source: Connect care   Obtained/Reviewed  Critical Results Called HGB   Date Value Ref Range Status   02/23/2023 7.7 (L) 12.1 - 17.0 g/dL Final     Potassium   Date Value Ref Range Status   02/23/2023 4.3 3.5 - 5.1 mmol/L Final     Calcium   Date Value Ref Range Status   02/23/2023 8.0 (L) 8.5 - 10.1 MG/DL Final     BUN   Date Value Ref Range Status   02/23/2023 42 (H) 6 - 20 MG/DL Final     Creatinine   Date Value Ref Range Status   02/23/2023 9.42 (H) 0.70 - 1.30 MG/DL Final     Comment:     INVESTIGATED PER DELTA CHECK PROTOCOL        Meds Given   Name Dose Route                    Adequacy / Fluid    Total Liters Process: 82 L   Net Fluid Removed: 2000 ml      Comments   Time Out Done:   (Time) Yes, [id number]   Admitting Diagnosis: Renal failure   Consent obtained/signed: Informed Consent Verified: Yes (02/23/23 0925)   Machine / RO # Machine Number: [id number] (02/23/23 4041)   Primary Nurse Rpt Pre: Julianna Asp RN   Primary Nurse Rpt Post: Julianna Asp RN   Pt Education: Yes, r/t dialysis process   Care Plan: Continue HD as ordered   Pts outpatient clinic:      Tx Summary   Comments:         tolerated tx fair, at end, all blood in circuit returned with 300 ml NS, LSC cath in place, patent, dressing dry and intact, no S/S of infection

## 2023-02-23 NOTE — PROGRESS NOTES
Bedside and Verbal shift change report given to Rafaela Panda RN (oncoming nurse) by Sho Ferguson LPN (offgoing nurse). Report included the following information SBAR, Kardex, ED Summary, MAR, and Recent Results.

## 2023-02-23 NOTE — PROGRESS NOTES
Problem: Hemorrhagic Stroke: Day 5 through Discharge  Goal: *Tolerating diet  Outcome: Progressing Towards Goal     Problem: Hemorrhagic Stroke: Day 5 through Discharge  Goal: *Progressive mobility and function  Outcome: Progressing Towards Goal     Problem: Hemorrhagic Stroke: Day 5 through Discharge  Goal: *Optimal pain control at patient's stated goal  Outcome: Progressing Towards Goal     Problem: Patient Education: Go to Patient Education Activity  Goal: Patient/Family Education  Outcome: Progressing Towards Goal

## 2023-02-23 NOTE — PROGRESS NOTES
Nephrology Progress Note  Levi Kimbrough  Date of Admission : 2/18/2023    CC: Follow up for ESRD       Assessment and Plan     ESRD on HD:  - TTS [facility]  - HD for later today    SDH s/p craniotomy:  - per NSG    HTN:  - cont current meds    Anemia of CKD:  - ABI TTS    Secondary HPT       Interval History:  Seen and examined. Awake, alert. BP up.  C/o HAD. No cp, sob, n/v/d. For HD later today. Current Medications: all current  Medications have been eviewed in EPIC  Review of Systems: Pertinent items are noted in HPI. Objective:  Vitals:    Vitals:    02/23/23 1100 02/23/23 1115 02/23/23 1130 02/23/23 1145   BP: (!) 172/84 (!) 190/89 (!) 177/116 (!) 185/85   Pulse: 84 86 83 83   Resp: 20 18 20 20   Temp:       TempSrc:       SpO2: 97% 98% 97% 97%   Weight:       Height:         Intake and Output:  No intake/output data recorded. 02/21 1901 - 02/23 0700  In: -   Out: 48 [Urine:50]    Physical Examination:  Pt intubated    No  General: Awake, alert  Neck:  Supple, no mass  Resp:  Lungs CTA B/L, no wheezing , normal respiratory effort  CV:  RRR,  no murmur or rub, trace LE edema  GI:  Soft, NT, + Bowel sounds, no hepatosplenomegaly  Neurologic:  Non focal  Psych:             AAO x 3 appropriate affect   Skin:  No Rash  Access:           [facility]    []    High complexity decision making was performed  []    Patient is at high-risk of decompensation with multiple organ involvement    Lab Data Personally Reviewed: I have reviewed all the pertinent labs, microbiology data and radiology studies during assessment.     Recent Labs     02/23/23  0108 02/22/23  0328 02/21/23  0125   * 133* 134*   K 4.3 4.2 4.0   CL 94* 96* 98   CO2 28 26 26   * 107* 163*   BUN 42* 26* 46*   CREA 9.42* 7.24* 9.92*   CA 8.0* 8.8 8.6   ALB 2.6* 2.7*  2.7* 2.8*   ALT <6* <6*  <6* <6*       Recent Labs     02/23/23  0108 02/22/23  0328 02/21/23  0124   WBC 9.1 10.5 10.9   HGB 7.7* 8.1* 8.3*   HCT 23.1* 25.7* 24.9*  160 159       No results found for: SDES  Lab Results   Component Value Date/Time    Culture result: NO GROWTH 5 DAYS 05/18/2022 06:06 AM    Culture result: No growth (<1,000 CFU/ML) 03/22/2022 04:11 PM     Recent Results (from the past 24 hour(s))   METABOLIC PANEL, COMPREHENSIVE    Collection Time: 02/23/23  1:08 AM   Result Value Ref Range    Sodium 132 (L) 136 - 145 mmol/L    Potassium 4.3 3.5 - 5.1 mmol/L    Chloride 94 (L) 97 - 108 mmol/L    CO2 28 21 - 32 mmol/L    Anion gap 10 5 - 15 mmol/L    Glucose 106 (H) 65 - 100 mg/dL    BUN 42 (H) 6 - 20 MG/DL    Creatinine 9.42 (H) 0.70 - 1.30 MG/DL    BUN/Creatinine ratio 4 (L) 12 - 20      eGFR 6 (L) >60 ml/min/1.73m2    Calcium 8.0 (L) 8.5 - 10.1 MG/DL    Bilirubin, total 0.3 0.2 - 1.0 MG/DL    ALT (SGPT) <6 (L) 12 - 78 U/L    AST (SGOT) 8 (L) 15 - 37 U/L    Alk. phosphatase 66 45 - 117 U/L    Protein, total 6.4 6.4 - 8.2 g/dL    Albumin 2.6 (L) 3.5 - 5.0 g/dL    Globulin 3.8 2.0 - 4.0 g/dL    A-G Ratio 0.7 (L) 1.1 - 2.2     CBC W/O DIFF    Collection Time: 02/23/23  1:08 AM   Result Value Ref Range    WBC 9.1 4.1 - 11.1 K/uL    RBC 2.56 (L) 4.10 - 5.70 M/uL    HGB 7.7 (L) 12.1 - 17.0 g/dL    HCT 23.1 (L) 36.6 - 50.3 %    MCV 90.2 80.0 - 99.0 FL    MCH 30.1 26.0 - 34.0 PG    MCHC 33.3 30.0 - 36.5 g/dL    RDW 13.9 11.5 - 14.5 %    PLATELET 287 595 - 893 K/uL    MPV 11.5 8.9 - 12.9 FL    NRBC 0.4 (H) 0  WBC    ABSOLUTE NRBC 0.04 (H) 0.00 - 0.01 K/uL                 Emperatriz Hunter MD  [facility]   [address] [address]  Phone - [phone]   Fax - [phone]  [email or web address]

## 2023-02-24 LAB
ALBUMIN SERPL-MCNC: 2.9 G/DL (ref 3.5–5)
ALBUMIN SERPL-MCNC: 2.9 G/DL (ref 3.5–5)
ALBUMIN/GLOB SERPL: 0.7 (ref 1.1–2.2)
ALBUMIN/GLOB SERPL: 0.7 (ref 1.1–2.2)
ALP SERPL-CCNC: 74 U/L (ref 45–117)
ALP SERPL-CCNC: 75 U/L (ref 45–117)
ALT SERPL-CCNC: 7 U/L (ref 12–78)
ALT SERPL-CCNC: <6 U/L (ref 12–78)
ANION GAP SERPL CALC-SCNC: 8 MMOL/L (ref 5–15)
AST SERPL-CCNC: 10 U/L (ref 15–37)
AST SERPL-CCNC: 10 U/L (ref 15–37)
BILIRUB DIRECT SERPL-MCNC: <0.1 MG/DL (ref 0–0.2)
BILIRUB SERPL-MCNC: 0.3 MG/DL (ref 0.2–1)
BILIRUB SERPL-MCNC: 0.3 MG/DL (ref 0.2–1)
BUN SERPL-MCNC: 23 MG/DL (ref 6–20)
BUN/CREAT SERPL: 4 (ref 12–20)
CALCIUM SERPL-MCNC: 8.5 MG/DL (ref 8.5–10.1)
CHLORIDE SERPL-SCNC: 93 MMOL/L (ref 97–108)
CO2 SERPL-SCNC: 31 MMOL/L (ref 21–32)
CREAT SERPL-MCNC: 6.05 MG/DL (ref 0.7–1.3)
ERYTHROCYTE [DISTWIDTH] IN BLOOD BY AUTOMATED COUNT: 13.8 % (ref 11.5–14.5)
GLOBULIN SER CALC-MCNC: 4.1 G/DL (ref 2–4)
GLOBULIN SER CALC-MCNC: 4.1 G/DL (ref 2–4)
GLUCOSE SERPL-MCNC: 115 MG/DL (ref 65–100)
HCT VFR BLD AUTO: 23.9 % (ref 36.6–50.3)
HGB BLD-MCNC: 7.8 G/DL (ref 12.1–17)
MCH RBC QN AUTO: 29.8 PG (ref 26–34)
MCHC RBC AUTO-ENTMCNC: 32.6 G/DL (ref 30–36.5)
MCV RBC AUTO: 91.2 FL (ref 80–99)
NRBC # BLD: 0.06 K/UL (ref 0–0.01)
NRBC BLD-RTO: 0.6 PER 100 WBC
PLATELET # BLD AUTO: 216 K/UL (ref 150–400)
PMV BLD AUTO: 11.4 FL (ref 8.9–12.9)
POTASSIUM SERPL-SCNC: 4 MMOL/L (ref 3.5–5.1)
PROT SERPL-MCNC: 7 G/DL (ref 6.4–8.2)
PROT SERPL-MCNC: 7 G/DL (ref 6.4–8.2)
RBC # BLD AUTO: 2.62 M/UL (ref 4.1–5.7)
SODIUM SERPL-SCNC: 132 MMOL/L (ref 136–145)
WBC # BLD AUTO: 9.3 K/UL (ref 4.1–11.1)

## 2023-02-24 PROCEDURE — 80053 COMPREHEN METABOLIC PANEL: CPT

## 2023-02-24 PROCEDURE — 65270000046 HC RM TELEMETRY

## 2023-02-24 PROCEDURE — 74011250637 HC RX REV CODE- 250/637: Performed by: INTERNAL MEDICINE

## 2023-02-24 PROCEDURE — 74011250636 HC RX REV CODE- 250/636: Performed by: INTERNAL MEDICINE

## 2023-02-24 PROCEDURE — 80076 HEPATIC FUNCTION PANEL: CPT

## 2023-02-24 PROCEDURE — 74011250637 HC RX REV CODE- 250/637: Performed by: HOSPITALIST

## 2023-02-24 PROCEDURE — 74011000250 HC RX REV CODE- 250: Performed by: NEUROLOGICAL SURGERY

## 2023-02-24 PROCEDURE — 97535 SELF CARE MNGMENT TRAINING: CPT

## 2023-02-24 PROCEDURE — 74011250637 HC RX REV CODE- 250/637: Performed by: ANESTHESIOLOGY

## 2023-02-24 PROCEDURE — 97116 GAIT TRAINING THERAPY: CPT

## 2023-02-24 PROCEDURE — 97530 THERAPEUTIC ACTIVITIES: CPT

## 2023-02-24 PROCEDURE — 74011250637 HC RX REV CODE- 250/637: Performed by: STUDENT IN AN ORGANIZED HEALTH CARE EDUCATION/TRAINING PROGRAM

## 2023-02-24 PROCEDURE — 85027 COMPLETE CBC AUTOMATED: CPT

## 2023-02-24 PROCEDURE — 65660000001 HC RM ICU INTERMED STEPDOWN

## 2023-02-24 PROCEDURE — 36415 COLL VENOUS BLD VENIPUNCTURE: CPT

## 2023-02-24 PROCEDURE — 74011250637 HC RX REV CODE- 250/637: Performed by: PHYSICIAN ASSISTANT

## 2023-02-24 RX ORDER — HYDRALAZINE HYDROCHLORIDE 50 MG/1
100 TABLET, FILM COATED ORAL 3 TIMES DAILY
Status: DISCONTINUED | OUTPATIENT
Start: 2023-02-24 | End: 2023-02-26

## 2023-02-24 RX ORDER — BUTALBITAL, ACETAMINOPHEN AND CAFFEINE 50; 325; 40 MG/1; MG/1; MG/1
1 TABLET ORAL
Status: DISCONTINUED | OUTPATIENT
Start: 2023-02-24 | End: 2023-02-26

## 2023-02-24 RX ADMIN — SODIUM CHLORIDE, PRESERVATIVE FREE 10 ML: 5 INJECTION INTRAVENOUS at 22:04

## 2023-02-24 RX ADMIN — SODIUM CHLORIDE, PRESERVATIVE FREE 10 ML: 5 INJECTION INTRAVENOUS at 05:52

## 2023-02-24 RX ADMIN — LEVETIRACETAM 500 MG: 500 TABLET, FILM COATED ORAL at 08:01

## 2023-02-24 RX ADMIN — LABETALOL HYDROCHLORIDE 300 MG: 100 TABLET, FILM COATED ORAL at 17:13

## 2023-02-24 RX ADMIN — BUMETANIDE 2 MG: 1 TABLET ORAL at 07:55

## 2023-02-24 RX ADMIN — BUTALBITAL, ACETAMINOPHEN, AND CAFFEINE 1 TABLET: 50; 325; 40 TABLET ORAL at 18:02

## 2023-02-24 RX ADMIN — CLONIDINE HYDROCHLORIDE 0.3 MG: 0.1 TABLET ORAL at 22:02

## 2023-02-24 RX ADMIN — HYDRALAZINE HYDROCHLORIDE 100 MG: 50 TABLET, FILM COATED ORAL at 09:00

## 2023-02-24 RX ADMIN — HYDRALAZINE HYDROCHLORIDE 100 MG: 50 TABLET, FILM COATED ORAL at 17:13

## 2023-02-24 RX ADMIN — ACETAMINOPHEN 650 MG: 325 TABLET ORAL at 07:56

## 2023-02-24 RX ADMIN — NIFEDIPINE 90 MG: 30 TABLET, EXTENDED RELEASE ORAL at 07:55

## 2023-02-24 RX ADMIN — PANTOPRAZOLE SODIUM 40 MG: 40 TABLET, DELAYED RELEASE ORAL at 06:57

## 2023-02-24 RX ADMIN — SODIUM CHLORIDE, PRESERVATIVE FREE 10 ML: 5 INJECTION INTRAVENOUS at 13:16

## 2023-02-24 RX ADMIN — BUTALBITAL, ACETAMINOPHEN, AND CAFFEINE 1 TABLET: 50; 325; 40 TABLET ORAL at 13:15

## 2023-02-24 RX ADMIN — HYDRALAZINE HYDROCHLORIDE 10 MG: 20 INJECTION INTRAMUSCULAR; INTRAVENOUS at 06:17

## 2023-02-24 RX ADMIN — CLONIDINE HYDROCHLORIDE 0.3 MG: 0.1 TABLET ORAL at 07:57

## 2023-02-24 RX ADMIN — BUTALBITAL, ACETAMINOPHEN, AND CAFFEINE 1 TABLET: 50; 325; 40 TABLET ORAL at 22:30

## 2023-02-24 RX ADMIN — LABETALOL HYDROCHLORIDE 300 MG: 100 TABLET, FILM COATED ORAL at 05:52

## 2023-02-24 RX ADMIN — HYDRALAZINE HYDROCHLORIDE 100 MG: 50 TABLET, FILM COATED ORAL at 22:03

## 2023-02-24 NOTE — PROGRESS NOTES
Transition of Care Plan  RUR- High 24%  DISPOSITION: IPR - [facility] - pending insurance auth started 2/23  F/U with PCP/Specialist    Transport: [facility] - scheduled for 3pm    Emergency contact: Daughter, Dennie Quails [phone]    CM barriers to discharge: Insurance auth    [place] auth with [facility] is still pending at this time. Patient is medically stable for discharge if received today. [facility] scheduled for 3pm today, this may be adjusted pending insurance auth. CM will follow. ISAIAS Coppola.

## 2023-02-24 NOTE — PROGRESS NOTES
Problem: Mobility Impaired (Adult and Pediatric)  Goal: *Acute Goals and Plan of Care (Insert Text)  Description:   FUNCTIONAL STATUS PRIOR TO ADMISSION: Patient was independent and active without use of DME.    HOME SUPPORT PRIOR TO ADMISSION: The patient lived with family but did not require assist.    Physical Therapy Goals  Initiated 2/21/2023  1. Patient will move from supine to sit and sit to supine  and roll side to side in bed with supervision/set-up within 7 day(s). 2.  Patient will transfer from bed to chair and chair to bed with minimal assistance/contact guard assist using the least restrictive device within 7 day(s). 3.  Patient will perform sit to stand with minimal assistance within 7 day(s). 4.  Patient will ambulate with minimal assistance for 25 feet with the least restrictive device within 7 day(s). 5.  Patient will improve Olivas Balance score by 7 points within 7 days. Outcome: Progressing Towards Goal     PHYSICAL THERAPY TREATMENT  Patient: All Rosario (6 y.o. male)  Date: 2/24/2023  Diagnosis: Subdural hematoma [S06. 5XAA] <principal problem not specified>  Procedure(s) (LRB):  LEFT SUBDURAL CRANIOTOMY (Left) 6 Days Post-Op  Precautions: Fall (SBP <160)  Chart, physical therapy assessment, plan of care and goals were reviewed. ASSESSMENT  Patient continues with skilled PT services and is progressing towards goals. Patient received in bed with daughter at bedside. Per RN, had received his BP meds. Patient is initially lethargic however after closing blinds due to light sensitivity he is agreeable to get up. Patient required min A to manage LLE when moving to left to EOB. While sitting up noted with a head forward/down posture. Cues to sit upright. Patient noted to have R lateral LOB during dynamic sitting activities requiring assist to correct. Once standing, patient endorses lightheadedness. BP noted to be orthostatic.  Instructed to weight shift and elevate heels while standing. Reobtained BP after 2 minutes and it improved. Patient then agreeable to gait training to bathroom. Continues to demonstrate LLE lag, step to pattern. Cues required to keep walker closer to body. Slightly less lateral lean during gait today. Once standing at sink, continuous lateral lean noted with LLE knee flexion in standing, not buckling but lacking full extension. With VC, able to correct for several seconds at a time. With dual tasks, increased lean and lacks knee extension is noted. Easily distracted. Returned to sit on EOB, BP noted to be elevated. Patient continues to report fatigue. Assisted to return to bed, placed in modified chair position. BP reduced slightly. All needs met. Recommend up to chair later today when BP stabilizes. Conferred with RN. Will continue to follow. 02/24/23 1136 02/24/23 1140 02/24/23 1142   Vital Signs   Pulse (Heart Rate) 75 80 90   BP (!) 170/86 (!) 160/86 139/85   MAP (Calculated) 114 111 103   BP 1 Location Right upper arm Right upper arm Right upper arm   BP 1 Method Automatic Automatic Automatic   BP Patient Position Semi fowlers Sitting Standing      02/24/23 1144 02/24/23 1153 02/24/23 1156   Vital Signs   Pulse (Heart Rate) 90 76 74   BP (!) 155/97 (!) 185/99 (!) 173/99   MAP (Calculated) 116 128 124   BP 1 Location Right upper arm Right upper arm Right upper arm   BP 1 Method Automatic Automatic Automatic   BP Patient Position Standing; Other (Comment)  (after heel raises and weight shifting) Sitting  (after gait training) Other (Comment)  (modified chair position)       Current Level of Function Impacting Discharge (mobility/balance): min A for bed mobility, CGA to min A for transfers, min A for gait training with RW and vc/tactile cues up to 15 ft x 2    Other factors to consider for discharge: high PLOF, strong family support         PLAN :  Patient continues to benefit from skilled intervention to address the above impairments.   Continue treatment per established plan of care. to address goals. Recommendation for discharge: (in order for the patient to meet his/her long term goals)  Therapy 3 hours per day 5-7 days per week    This discharge recommendation:  Has been made in collaboration with the attending provider and/or case management    IF patient discharges home will need the following DME: to be determined (TBD)       SUBJECTIVE:   Patient stated I am sensitive to the light.     OBJECTIVE DATA SUMMARY:   Critical Behavior:  Neurologic State: Alert  Orientation Level: Oriented X4  Cognition: Appropriate decision making, Appropriate for age attention/concentration, Appropriate safety awareness, Follows commands  Safety/Judgement: Awareness of environment, Decreased awareness of need for assistance, Decreased awareness of need for safety, Decreased insight into deficits, Fall prevention  Functional Mobility Training:  Bed Mobility:  Rolling: Contact guard assistance  Supine to Sit: Minimum assistance  Sit to Supine: Minimum assistance  Scooting: Minimum assistance        Transfers:  Sit to Stand: Contact guard assistance;Minimum assistance  Stand to Sit: Contact guard assistance        Bed to Chair: Contact guard assistance;Minimum assistance       Balance:  Sitting: Impaired  Sitting - Static: Fair (occasional)  Sitting - Dynamic: Fair (occasional); Poor (constant support)  Standing: Impaired; With support  Standing - Static: Constant support; Fair  Standing - Dynamic : Constant support;Fair;Poor  Ambulation/Gait Training:  Distance (ft): 16 Feet (ft)  Assistive Device: Gait belt;Walker, rolling  Ambulation - Level of Assistance: Minimal assistance        Gait Abnormalities: Decreased step clearance; Step to gait; Path deviations;Trunk sway increased        Base of Support: Center of gravity altered;Narrowed; Shift to right     Speed/Shayna: Shuffled;Slow;Fluctuations  Step Length: Left shortened;Right shortened  Swing Pattern: Left asymmetrical       Pain Rating:  No pain    Activity Tolerance:   Fair, requires rest breaks, and signs and symptoms of orthostatic hypotension    After treatment patient left in no apparent distress:   Call bell within reach, Caregiver / family present, Side rails x 3, and modified chair position    COMMUNICATION/COLLABORATION:   The patients plan of care was discussed with: Occupational therapist and Registered nurse.      Andrei Patton, PT   Time Calculation: 26 mins

## 2023-02-24 NOTE — PROGRESS NOTES
Awake ambulatory  awaiting rehab placement speech good  incision healing well  good progress  follow up in office after discharge

## 2023-02-24 NOTE — PROGRESS NOTES
Nephrology Progress Note  Antonio Campos  Date of Admission : 2/18/2023    CC: Follow up for ESRD       Assessment and Plan     ESRD on HD:  -  [address]  - HD tomorrow per routine    SDH s/p craniotomy:  - per NSG    HTN:  - cont current meds    Anemia of CKD:  - ABI TTS    Secondary HPT       Interval History:  Seen and examined. Awake, alert. No complaints. Hd yesterday, no issues. Current Medications: all current  Medications have been eviewed in EPIC  Review of Systems: Pertinent items are noted in HPI. Objective:  Vitals:    Vitals:    02/24/23 0400 02/24/23 0604 02/24/23 1000 02/24/23 1006   BP:  (!) 177/81  (!) 168/84   Pulse: 76 79 70 74   Resp:  18  20   Temp:  98.1 °F (36.7 °C)  98.1 °F (36.7 °C)   TempSrc:       SpO2:  95%  98%   Weight:       Height:         Intake and Output:  No intake/output data recorded. 02/22 1901 - 02/24 0700  In: -   Out: 2000     Physical Examination:  Pt intubated    No  General: Awake, alert  Neck:  Supple, no mass  Resp:  Lungs CTA B/L, no wheezing , normal respiratory effort  CV:  RRR,  no murmur or rub, trace LE edema  GI:  Soft, NT, + Bowel sounds, no hepatosplenomegaly  Neurologic:  Non focal  Psych:             AAO x 3 appropriate affect   Skin:  No Rash  Access:           [facility]    []    High complexity decision making was performed  []    Patient is at high-risk of decompensation with multiple organ involvement    Lab Data Personally Reviewed: I have reviewed all the pertinent labs, microbiology data and radiology studies during assessment.     Recent Labs     02/24/23  0017 02/24/23  0016 02/23/23  0108 02/22/23  0328   NA  --  132* 132* 133*   K  --  4.0 4.3 4.2   CL  --  93* 94* 96*   CO2  --  31 28 26   GLU  --  115* 106* 107*   BUN  --  23* 42* 26*   CREA  --  6.05* 9.42* 7.24*   CA  --  8.5 8.0* 8.8   ALB 2.9* 2.9* 2.6* 2.7*  2.7*   ALT 7* <6* <6* <6*  <6*       Recent Labs     02/24/23  0016 02/23/23  0108 02/22/23  0328   WBC 9.3 9.1 10.5 HGB 7.8* 7.7* 8.1*   HCT 23.9* 23.1* 25.7*    179 160       No results found for: SDES  Lab Results   Component Value Date/Time    Culture result: NO GROWTH 5 DAYS 05/18/2022 06:06 AM    Culture result: No growth (<1,000 CFU/ML) 03/22/2022 04:11 PM     Recent Results (from the past 24 hour(s))   METABOLIC PANEL, COMPREHENSIVE    Collection Time: 02/24/23 12:16 AM   Result Value Ref Range    Sodium 132 (L) 136 - 145 mmol/L    Potassium 4.0 3.5 - 5.1 mmol/L    Chloride 93 (L) 97 - 108 mmol/L    CO2 31 21 - 32 mmol/L    Anion gap 8 5 - 15 mmol/L    Glucose 115 (H) 65 - 100 mg/dL    BUN 23 (H) 6 - 20 MG/DL    Creatinine 6.05 (H) 0.70 - 1.30 MG/DL    BUN/Creatinine ratio 4 (L) 12 - 20      eGFR 10 (L) >60 ml/min/1.73m2    Calcium 8.5 8.5 - 10.1 MG/DL    Bilirubin, total 0.3 0.2 - 1.0 MG/DL    ALT (SGPT) <6 (L) 12 - 78 U/L    AST (SGOT) 10 (L) 15 - 37 U/L    Alk. phosphatase 75 45 - 117 U/L    Protein, total 7.0 6.4 - 8.2 g/dL    Albumin 2.9 (L) 3.5 - 5.0 g/dL    Globulin 4.1 (H) 2.0 - 4.0 g/dL    A-G Ratio 0.7 (L) 1.1 - 2.2     CBC W/O DIFF    Collection Time: 02/24/23 12:16 AM   Result Value Ref Range    WBC 9.3 4.1 - 11.1 K/uL    RBC 2.62 (L) 4.10 - 5.70 M/uL    HGB 7.8 (L) 12.1 - 17.0 g/dL    HCT 23.9 (L) 36.6 - 50.3 %    MCV 91.2 80.0 - 99.0 FL    MCH 29.8 26.0 - 34.0 PG    MCHC 32.6 30.0 - 36.5 g/dL    RDW 13.8 11.5 - 14.5 %    PLATELET 245 528 - 817 K/uL    MPV 11.4 8.9 - 12.9 FL    NRBC 0.6 (H) 0  WBC    ABSOLUTE NRBC 0.06 (H) 0.00 - 0.01 K/uL   HEPATIC FUNCTION PANEL    Collection Time: 02/24/23 12:17 AM   Result Value Ref Range    Protein, total 7.0 6.4 - 8.2 g/dL    Albumin 2.9 (L) 3.5 - 5.0 g/dL    Globulin 4.1 (H) 2.0 - 4.0 g/dL    A-G Ratio 0.7 (L) 1.1 - 2.2      Bilirubin, total 0.3 0.2 - 1.0 MG/DL    Bilirubin, direct <0.1 0.0 - 0.2 MG/DL    Alk.  phosphatase 74 45 - 117 U/L    AST (SGOT) 10 (L) 15 - 37 U/L    ALT (SGPT) 7 (L) 12 - 78 U/L                 Alexandre Moffett MD  [facility]ph[facility]   [address] [address] [facility] [address]  Phone - [phone]   Fax - [phone]  [email or web address]

## 2023-02-24 NOTE — PROGRESS NOTES
Bedside and Verbal shift change report given to Mckinley Miller LPN (oncoming nurse) by Shravan Hanna LPN (offgoing nurse). Report included the following information SBAR, Kardex, ED Summary, MAR, and Recent Results.

## 2023-02-24 NOTE — PROGRESS NOTES
Problem: Self Care Deficits Care Plan (Adult)  Goal: *Acute Goals and Plan of Care (Insert Text)  Description: FUNCTIONAL STATUS PRIOR TO ADMISSION: Patient was independent and active without use of DME.    HOME SUPPORT: The patient lived with fiance but did not require assist.    Occupational Therapy Goals  Initiated 2/21/2023   1. Patient will perform grooming in unsupported sitting using L hand for GM/FM tasks with minimal assistance within 7 day(s). 2.  Patient will perform anterior neck to thigh bathing in unsupported sitting with minimal assistance within 7 day(s). 3.  Patient will perform toilet transfers to/from [facility] with minimal assistance within 7 day(s). 4.  Patient will perform all aspects of toileting with minimal assistance within 7 day(s). 5.  Patient will participate in upper extremity therapeutic exercise/activities with supervision/set-up within 7 day(s). 6.  Patient will utilize energy conservation techniques during functional activities with verbal cues within 7 day(s). 7.  Patient will improve their Fugl Dailey score by 6 points in prep for ADLs within 7 days. Outcome: Progressing Towards Goal   OCCUPATIONAL THERAPY TREATMENT  Patient: Delfina Murillo (85 y.o. male)  Date: 2/24/2023  Diagnosis: Subdural hematoma [S06. 5XAA] <principal problem not specified>  Procedure(s) (LRB):  LEFT SUBDURAL CRANIOTOMY (Left) 6 Days Post-Op  Precautions: Fall (SBP <160)  Chart, occupational therapy assessment, plan of care, and goals were reviewed. ASSESSMENT  Patient continues with skilled OT services and is progressing towards goals. Pt was received semi-fowlers in bed and agreeable to therapy, family member present during session, nurse okay'd. Pt required increased tc/vc and encouragement to keep eyes open to jose session. Pt was min A for bed mobility, cues provided for mobility.  Pt demonstrated decreased trunk stability when donning socks, increased postural lean when donning L comparative to R. Increased cues provided with functional mobility, cues for walker. Pt demonstrated decreased bilateral coordination for brushing teeth, pt able to tolerate sustained endurance of ~3 minutes for tooth  brushing task. Cues provided continually throughout session to maintain head upright while completing grooming. Increased L lean when completing grooming task, cues for straightening knee and standing up straight for task. Pt was min-mod for functional mobility back to bed, pt placed in chair position at the end of session. Pt is progressing towards goals but would still benefit from skilled acute care ot while admitted to the hospital. In addition pt is recommended for IPR at discharge to continue therapy. 02/24/23 1136 02/24/23 1140 02/24/23 1142   Vital Signs   Pulse (Heart Rate) 75 80 90   BP (!) 170/86 (!) 160/86 139/85   MAP (Calculated) 114 111 103   BP 1 Location Right upper arm Right upper arm Right upper arm   BP 1 Method Automatic Automatic Automatic   BP Patient Position Semi fowlers Sitting Standing      02/24/23 1144 02/24/23 1153 02/24/23 1156   Vital Signs   Pulse (Heart Rate) 90 76 74   BP (!) 155/97 (!) 185/99 (!) 173/99   MAP (Calculated) 116 128 124   BP 1 Location Right upper arm Right upper arm Right upper arm   BP 1 Method Automatic Automatic Automatic   BP Patient Position Standing; Other (Comment)  (after heel raises and weight shifting) Sitting  (after gait training) Other (Comment)  (modified chair position)       Current Level of Function Impacting Discharge (ADLs): decreased functional mobility, min-mod for adls for safety    Other factors to consider for discharge: BP, medical course of dc         PLAN :  Patient continues to benefit from skilled intervention to address the above impairments. Continue treatment per established plan of care to address goals. Recommend with staff: oob for meals x3 with assist x2 to chair as tolerated/appropriate.  Thank you for completing as allowed to allow for continued strength and endurance    Recommend next OT session: see goals     Recommendation for discharge: (in order for the patient to meet his/her long term goals)  Therapy 3 hours per day 5-7 days per week    This discharge recommendation:  Has not yet been discussed the attending provider and/or case management    IF patient discharges home will need the following DME: tbd       SUBJECTIVE:   Patient stated Im tired.     OBJECTIVE DATA SUMMARY:   Cognitive/Behavioral Status:  Neurologic State: Alert;Drowsy  Orientation Level: Oriented X4  Cognition: Appropriate decision making; Appropriate for age attention/concentration; Appropriate safety awareness; Follows commands             Functional Mobility and Transfers for ADLs:  Bed Mobility:  Rolling: Contact guard assistance  Supine to Sit: Minimum assistance  Sit to Supine: Minimum assistance  Scooting: Minimum assistance    Transfers:  Sit to Stand: Contact guard assistance;Minimum assistance  Functional Transfers  Bathroom Mobility: Minimum assistance; Moderate assistance  Bed to Chair: Contact guard assistance;Minimum assistance    Balance:  Sitting: Impaired  Sitting - Static: Fair (occasional)  Sitting - Dynamic: Fair (occasional); Poor (constant support)  Standing: Impaired; With support  Standing - Static: Constant support; Fair  Standing - Dynamic : Constant support;Fair;Poor    ADL Intervention:       Grooming  Grooming Assistance: Minimum assistance;Contact guard assistance  Position Performed: Standing  Washing Face: Set-up; Contact guard assistance  Brushing Teeth: Contact guard assistance;Set-up  Cues: Verbal cues provided; Tactile cues provided (for standing and left leaning posture)                         Lower Body Dressing Assistance  Dressing Assistance: Minimum assistance  Socks: Contact guard assistance  Leg Crossed Method Used: No  Position Performed: Seated edge of bed                  Pain:  No pain reported    Activity Tolerance:   Poor and requires rest breaks    After treatment patient left in no apparent distress:   Bed in chair posistion, Call bell within reach, Caregiver / family present, and Side rails x 3    COMMUNICATION/COLLABORATION:   The patients plan of care was discussed with: Physical therapist.       Travis Pena OT  Time Calculation: 27 mins

## 2023-02-24 NOTE — PROGRESS NOTES
Bedside and Verbal shift change report given to Artemio Flower (oncoming nurse) by Amada Vu (offgoing nurse). Report included the following information SBAR, Kardex, Procedure Summary, Intake/Output, Recent Results, Cardiac Rhythm NS, and Quality Measures.

## 2023-02-25 LAB
ALBUMIN SERPL-MCNC: 2.7 G/DL (ref 3.5–5)
ALBUMIN/GLOB SERPL: 0.6 (ref 1.1–2.2)
ALP SERPL-CCNC: 74 U/L (ref 45–117)
ALT SERPL-CCNC: <6 U/L (ref 12–78)
ANION GAP SERPL CALC-SCNC: 10 MMOL/L (ref 5–15)
AST SERPL-CCNC: 9 U/L (ref 15–37)
BILIRUB SERPL-MCNC: 0.3 MG/DL (ref 0.2–1)
BUN SERPL-MCNC: 41 MG/DL (ref 6–20)
BUN/CREAT SERPL: 4 (ref 12–20)
CALCIUM SERPL-MCNC: 8.7 MG/DL (ref 8.5–10.1)
CHLORIDE SERPL-SCNC: 92 MMOL/L (ref 97–108)
CO2 SERPL-SCNC: 26 MMOL/L (ref 21–32)
CREAT SERPL-MCNC: 9.21 MG/DL (ref 0.7–1.3)
ERYTHROCYTE [DISTWIDTH] IN BLOOD BY AUTOMATED COUNT: 14.2 % (ref 11.5–14.5)
GLOBULIN SER CALC-MCNC: 4.3 G/DL (ref 2–4)
GLUCOSE SERPL-MCNC: 90 MG/DL (ref 65–100)
HCT VFR BLD AUTO: 23.2 % (ref 36.6–50.3)
HGB BLD-MCNC: 7.2 G/DL (ref 12.1–17)
MCH RBC QN AUTO: 29 PG (ref 26–34)
MCHC RBC AUTO-ENTMCNC: 31 G/DL (ref 30–36.5)
MCV RBC AUTO: 93.5 FL (ref 80–99)
NRBC # BLD: 0.1 K/UL (ref 0–0.01)
NRBC BLD-RTO: 1 PER 100 WBC
PLATELET # BLD AUTO: 210 K/UL (ref 150–400)
PMV BLD AUTO: 11.3 FL (ref 8.9–12.9)
POTASSIUM SERPL-SCNC: 4.1 MMOL/L (ref 3.5–5.1)
PROT SERPL-MCNC: 7 G/DL (ref 6.4–8.2)
RBC # BLD AUTO: 2.48 M/UL (ref 4.1–5.7)
SODIUM SERPL-SCNC: 128 MMOL/L (ref 136–145)
WBC # BLD AUTO: 9.6 K/UL (ref 4.1–11.1)

## 2023-02-25 PROCEDURE — 74011250636 HC RX REV CODE- 250/636: Performed by: INTERNAL MEDICINE

## 2023-02-25 PROCEDURE — 74011250637 HC RX REV CODE- 250/637: Performed by: ANESTHESIOLOGY

## 2023-02-25 PROCEDURE — 74011250637 HC RX REV CODE- 250/637: Performed by: HOSPITALIST

## 2023-02-25 PROCEDURE — 65660000001 HC RM ICU INTERMED STEPDOWN

## 2023-02-25 PROCEDURE — 74011250637 HC RX REV CODE- 250/637: Performed by: STUDENT IN AN ORGANIZED HEALTH CARE EDUCATION/TRAINING PROGRAM

## 2023-02-25 PROCEDURE — 90935 HEMODIALYSIS ONE EVALUATION: CPT

## 2023-02-25 PROCEDURE — 74011000250 HC RX REV CODE- 250: Performed by: NEUROLOGICAL SURGERY

## 2023-02-25 PROCEDURE — 74011250637 HC RX REV CODE- 250/637: Performed by: PHYSICIAN ASSISTANT

## 2023-02-25 PROCEDURE — 36415 COLL VENOUS BLD VENIPUNCTURE: CPT

## 2023-02-25 PROCEDURE — 85027 COMPLETE CBC AUTOMATED: CPT

## 2023-02-25 PROCEDURE — 80053 COMPREHEN METABOLIC PANEL: CPT

## 2023-02-25 PROCEDURE — 74011250637 HC RX REV CODE- 250/637: Performed by: INTERNAL MEDICINE

## 2023-02-25 PROCEDURE — 74011000250 HC RX REV CODE- 250: Performed by: ANESTHESIOLOGY

## 2023-02-25 RX ORDER — IBUPROFEN 600 MG/1
600 TABLET ORAL ONCE
Status: COMPLETED | OUTPATIENT
Start: 2023-02-25 | End: 2023-02-25

## 2023-02-25 RX ADMIN — HYDRALAZINE HYDROCHLORIDE 10 MG: 20 INJECTION INTRAMUSCULAR; INTRAVENOUS at 00:07

## 2023-02-25 RX ADMIN — LABETALOL HYDROCHLORIDE 300 MG: 100 TABLET, FILM COATED ORAL at 06:14

## 2023-02-25 RX ADMIN — PANTOPRAZOLE SODIUM 40 MG: 40 TABLET, DELAYED RELEASE ORAL at 06:15

## 2023-02-25 RX ADMIN — SODIUM CHLORIDE, PRESERVATIVE FREE 10 ML: 5 INJECTION INTRAVENOUS at 21:30

## 2023-02-25 RX ADMIN — SODIUM CHLORIDE, PRESERVATIVE FREE 10 ML: 5 INJECTION INTRAVENOUS at 06:17

## 2023-02-25 RX ADMIN — HYDRALAZINE HYDROCHLORIDE 100 MG: 50 TABLET, FILM COATED ORAL at 08:26

## 2023-02-25 RX ADMIN — LABETALOL HYDROCHLORIDE 20 MG: 5 INJECTION INTRAVENOUS at 02:27

## 2023-02-25 RX ADMIN — CLONIDINE HYDROCHLORIDE 0.3 MG: 0.1 TABLET ORAL at 16:43

## 2023-02-25 RX ADMIN — IBUPROFEN 600 MG: 600 TABLET, FILM COATED ORAL at 16:43

## 2023-02-25 RX ADMIN — HYDRALAZINE HYDROCHLORIDE 100 MG: 50 TABLET, FILM COATED ORAL at 15:53

## 2023-02-25 RX ADMIN — HYDRALAZINE HYDROCHLORIDE 100 MG: 50 TABLET, FILM COATED ORAL at 21:27

## 2023-02-25 RX ADMIN — LABETALOL HYDROCHLORIDE 300 MG: 100 TABLET, FILM COATED ORAL at 15:53

## 2023-02-25 RX ADMIN — LEVETIRACETAM 500 MG: 500 TABLET, FILM COATED ORAL at 21:26

## 2023-02-25 RX ADMIN — BUTALBITAL, ACETAMINOPHEN, AND CAFFEINE 1 TABLET: 50; 325; 40 TABLET ORAL at 12:16

## 2023-02-25 RX ADMIN — BUTALBITAL, ACETAMINOPHEN, AND CAFFEINE 1 TABLET: 50; 325; 40 TABLET ORAL at 21:25

## 2023-02-25 RX ADMIN — BUMETANIDE 2 MG: 1 TABLET ORAL at 08:25

## 2023-02-25 RX ADMIN — NIFEDIPINE 90 MG: 30 TABLET, EXTENDED RELEASE ORAL at 08:25

## 2023-02-25 RX ADMIN — LABETALOL HYDROCHLORIDE 20 MG: 5 INJECTION INTRAVENOUS at 22:45

## 2023-02-25 RX ADMIN — LEVETIRACETAM 500 MG: 500 TABLET, FILM COATED ORAL at 08:26

## 2023-02-25 RX ADMIN — EPOETIN ALFA-EPBX 20000 UNITS: 20000 INJECTION, SOLUTION INTRAVENOUS; SUBCUTANEOUS at 22:52

## 2023-02-25 RX ADMIN — CLONIDINE HYDROCHLORIDE 0.3 MG: 0.1 TABLET ORAL at 08:25

## 2023-02-25 RX ADMIN — BUTALBITAL, ACETAMINOPHEN, AND CAFFEINE 1 TABLET: 50; 325; 40 TABLET ORAL at 15:52

## 2023-02-25 RX ADMIN — BUTALBITAL, ACETAMINOPHEN, AND CAFFEINE 1 TABLET: 50; 325; 40 TABLET ORAL at 02:25

## 2023-02-25 RX ADMIN — BUTALBITAL, ACETAMINOPHEN, AND CAFFEINE 1 TABLET: 50; 325; 40 TABLET ORAL at 08:26

## 2023-02-25 RX ADMIN — SODIUM CHLORIDE, PRESERVATIVE FREE 5 ML: 5 INJECTION INTRAVENOUS at 14:00

## 2023-02-25 RX ADMIN — HYDRALAZINE HYDROCHLORIDE 10 MG: 20 INJECTION INTRAMUSCULAR; INTRAVENOUS at 07:23

## 2023-02-25 NOTE — PROGRESS NOTES
[address] [facility]  Hospitalist Group                                                                                          Hospitalist Progress Note  Lis Hdez MD  Answering service: [phone] from in house phone        Date of Service:  2023  NAME:  Antonio Campos  :  1965  MRN:  [id number]       Admission Summary:   Antonio Campos is a 62 y.o. male with medical history of ESRD on hypertension, CKD, and parenchymal liver disease who was admitted on the 2023 after presenting with headache that started during dialysis, and diagnosed with subdural hemorrhage. He underwent evacuation urgently by neurosurgery, and was admitted to the ICU for further care. Surgical drains have currently been removed status post left craniotomy. He is receiving carvedilol, hydralazine, labetalol, and nifedipine for blood pressure management to maintain SBP less than 140 and has no residual focal deficits. Interval history / Subjective:   No headache today. Dose of hydralazine was increased to 100 mg 3 times daily, blood pressure better controlled.      Assessment & Plan:        SDH  -s/p left craniotomy and evacuation on ;   - Neurosurgery following  - goal SBP <140 mmHg;   - continue Keppra for seizure prophylaxis;    HTN:  - uncontrolled;   - discontinue Coreg as patient is already on Labetalol;   - he is on Clonidine, Nifedipine, Bumex;   - : add Hydralazine 50 mg TID;   -: Increase hydralazine to 100 mg 3 times daily;     Stress ulcer ppx  -continue PPI     ESRD, Hemodialysis dependent:  - appreciate Nephrology follow-up and rec's;         Code status: full  Prophylaxis: started on sqh by ICU team  Care Plan discussed with: Patient, family at the bedside, RN, CM;  Anticipated Disposition:  IPR ([facility]) today  or as soon as able to accept; patient is medically stable for discharge today;         Hospital Problems  Date Reviewed: 2023 Codes Class Noted POA    Subdural hematoma ICD-10-CM: S06. 5XAA  ICD-9-CM: 432.1  2/18/2023 Unknown         Social Determinants of Health     Tobacco Use: High Risk    Smoking Tobacco Use: Some Days    Smokeless Tobacco Use: Never    Passive Exposure: Not on file   Alcohol Use: Not on file   Financial Resource Strain: Not on file   Food Insecurity: Not on file   Transportation Needs: Not on file   Physical Activity: Not on file   Stress: Not on file   Social Connections: Not on file   Intimate Partner Violence: Not on file   Depression: Not at risk    PHQ-2 Score: 0   Housing Stability: Not on file       Review of Systems:   A comprehensive review of systems was negative except for that written in the HPI. Vital Signs:    Last 24hrs VS reviewed since prior progress note. Most recent are:  Visit Vitals  BP (!) 167/81 (BP 1 Location: Right upper arm, BP Patient Position: At rest)   Pulse 63   Temp 98.3 °F (36.8 °C)   Resp 15   Ht 5' 9\" (1.753 m)   Wt 75.5 kg (166 lb 7.2 oz)   SpO2 95%   BMI 24.58 kg/m²       No intake or output data in the 24 hours ending 02/24/23 1930       Physical Examination:     I had a face to face encounter with this patient and independently examined them on 2/24/2023 as outlined below:          General :NAD, drowsy  HEENT: anicteric sclerae, wound covered, L periorbital swelling  Neck: supple, no JVD, no meningeal signs  Chest: Clear to auscultation bilaterally   CVS: S1 S2 heard, Capillary refill less than 2 seconds  Abd: soft/ non tender, non distended, BS physiological,   Ext: no clubbing, no cyanosis, no edema  Neuro/Psych: grossly non-focal  Skin: warm     Data Review:    Review and/or order of clinical lab test  Review and/or order of tests in the radiology section of CPT      I have personally and independently reviewed all pertinent labs, diagnostic studies, imaging, and have provided independent interpretation of the same.      Labs:     Recent Labs     02/24/23  0016 02/23/23  0108   WBC 9.3 9.1   HGB 7.8* 7.7*   HCT 23.9* 23.1*    179       Recent Labs     02/24/23  0016 02/23/23  0108 02/22/23  0328   * 132* 133*   K 4.0 4.3 4.2   CL 93* 94* 96*   CO2 31 28 26   BUN 23* 42* 26*   CREA 6.05* 9.42* 7.24*   * 106* 107*   CA 8.5 8.0* 8.8       Recent Labs     02/24/23  0017 02/24/23  0016 02/23/23  0108   ALT 7* <6* <6*   AP 74 75 66   TBILI 0.3 0.3 0.3   TP 7.0 7.0 6.4   ALB 2.9* 2.9* 2.6*   GLOB 4.1* 4.1* 3.8       No results for input(s): INR, PTP, APTT, INREXT, INREXT in the last 72 hours. No results for input(s): FE, TIBC, PSAT, FERR in the last 72 hours. Lab Results   Component Value Date/Time    Folate 7.9 05/18/2022 12:18 PM        No results for input(s): PH, PCO2, PO2 in the last 72 hours. No results for input(s): CPK, CKNDX, TROIQ in the last 72 hours.     No lab exists for component: CPKMB  Lab Results   Component Value Date/Time    Cholesterol, total 170 06/29/2020 03:46 AM    HDL Cholesterol 49 06/29/2020 03:46 AM    LDL, calculated 73.4 06/29/2020 03:46 AM    Triglyceride 238 (H) 06/29/2020 03:46 AM    CHOL/HDL Ratio 3.5 06/29/2020 03:46 AM     Lab Results   Component Value Date/Time    Glucose (POC) 112 11/30/2022 12:08 PM    Glucose (POC) 82 11/30/2022 08:34 AM    Glucose (POC) 112 11/29/2022 09:35 PM    Glucose (POC) 145 (H) 11/29/2022 12:26 PM    Glucose (POC) 97 09/27/2022 06:48 AM     Lab Results   Component Value Date/Time    Color YELLOW/STRAW 03/22/2022 04:11 PM    Appearance TURBID (A) 03/22/2022 04:11 PM    Specific gravity 1.021 03/22/2022 04:11 PM    pH (UA) 7.5 03/22/2022 04:11 PM    Protein >300 (A) 03/22/2022 04:11 PM    Glucose 100 (A) 03/22/2022 04:11 PM    Ketone Negative 03/22/2022 04:11 PM    Bilirubin Negative 03/22/2022 04:11 PM    Urobilinogen 0.2 03/22/2022 04:11 PM    Nitrites Negative 03/22/2022 04:11 PM    Leukocyte Esterase MODERATE (A) 03/22/2022 04:11 PM    Epithelial cells MODERATE (A) 03/22/2022 04:11 PM Bacteria Negative 03/22/2022 04:11 PM    WBC 10-20 03/22/2022 04:11 PM    RBC 0-5 03/22/2022 04:11 PM       Notes reviewed from all clinical/nonclinical/nursing services involved in patient's clinical care. Care coordination discussions were held with appropriate clinical/nonclinical/ nursing providers based on care coordination needs. Patients current active Medications were reviewed, considered, added and adjusted based on the clinical condition today. Home Medications were reconciled to the best of my ability given all available resources at the time of admission. Route is PO if not otherwise noted.       Admission Status:[id number]:::1}      Medications Reviewed:     Current Facility-Administered Medications   Medication Dose Route Frequency    hydrALAZINE (APRESOLINE) tablet 100 mg  100 mg Oral TID    butalbital-acetaminophen-caffeine (FIORICET, ESGIC) -40 mg per tablet 1 Tablet  1 Tablet Oral Q4H PRN    hydrALAZINE (APRESOLINE) 20 mg/mL injection 10 mg  10 mg IntraVENous Q6H PRN    cloNIDine HCL (CATAPRES) tablet 0.3 mg  0.3 mg Oral BID    NIFEdipine ER (PROCARDIA XL) tablet 90 mg  90 mg Oral DAILY    levETIRAcetam (KEPPRA) tablet 500 mg  500 mg Oral DAILY    And    levETIRAcetam (KEPPRA) tablet 500 mg  500 mg Oral DIALYSIS TUE, THU & SAT    pantoprazole (PROTONIX) tablet 40 mg  40 mg Oral ACB    labetaloL (NORMODYNE) tablet 300 mg  300 mg Oral Q12H    0.9% sodium chloride infusion 250 mL  250 mL IntraVENous PRN    epoetin shawn-epbx (RETACRIT) injection 20,000 Units  20,000 Units SubCUTAneous Q TUE, THU & SAT    sodium chloride (NS) flush 5-40 mL  5-40 mL IntraVENous Q8H    ondansetron (ZOFRAN) injection 4 mg  4 mg IntraVENous Q4H PRN    acetaminophen (TYLENOL) tablet 650 mg  650 mg Oral Q6H PRN    Or    acetaminophen (TYLENOL) suppository 650 mg  650 mg Rectal Q6H PRN    polyethylene glycol (MIRALAX) packet 17 g  17 g Oral DAILY PRN    labetaloL (NORMODYNE;TRANDATE) injection 20 mg  20 mg IntraVENous Q4H PRN    bumetanide (BUMEX) tablet 2 mg  2 mg Oral DAILY     ______________________________________________________________________  EXPECTED LENGTH OF STAY: 4d 12h  ACTUAL LENGTH OF STAY:          6                 Blessing Grant MD

## 2023-02-25 NOTE — PROGRESS NOTES
POD 7  Afeb  BP elevated  WBC 9.6  Hgb 7.2  Na 128  Creatine 9.21  Lethargic  Incision C/D/I  S/P: crani for SDH  Hyponatremic- consider repeating labs and if continued to low consult nephrology for recommendations  Hypertensive - continue adjust BP meds for goal of

## 2023-02-25 NOTE — PROGRESS NOTES
[address] [facility] Adult  Hospitalist Group                                                                                          Hospitalist Progress Note  Laura Delcid MD  Answering service: [phone] from in house phone        Date of Service:  2023  NAME:  Lolis Carvajal  :  1965  MRN:  [id number]       Admission Summary:   Lolis Carvajal is a 62 y.o. male with medical history of ESRD on hypertension, CKD, and parenchymal liver disease who was admitted on the 2023 after presenting with headache that started during dialysis, and diagnosed with subdural hemorrhage. He underwent evacuation urgently by neurosurgery, and was admitted to the ICU for further care. Surgical drains have currently been removed status post left craniotomy. He is receiving carvedilol, hydralazine, labetalol, and nifedipine for blood pressure management to maintain SBP less than 140 and has no residual focal deficits. Interval history / Subjective:   Patient had a headache this morning. This is his dialysis day. Suspect the headaches may be due to elevated blood pressure, as his antihypertensive medications are held prior to dialysis.       Assessment & Plan:        SDH  - s/p left craniotomy and evacuation on ;   - Neurosurgery following  - goal SBP <140 mmHg;   - continue Keppra for seizure prophylaxis;    HTN:  - uncontrolled;   - discontinue Coreg as patient is already on Labetalol;   - he is on Clonidine, Nifedipine, Bumex;   - : add Hydralazine 50 mg TID;   -: Increase hydralazine to 100 mg 3 times daily;     Stress ulcer ppx  -continue PPI     ESRD, Hemodialysis dependent:  - appreciate Nephrology follow-up and rec's;         Code status: full  Prophylaxis: started on sqh by ICU team  Care Plan discussed with: Patient, family at the bedside, RN, CM;  Anticipated Disposition:  IPR ([facility]) today  or as soon as able to accept; patient is medically stable for discharge today;    Barriers to discharge:  -Awaiting placement; Hospital Problems  Date Reviewed: 2/18/2023            Codes Class Noted POA    Subdural hematoma ICD-10-CM: S06. 5XAA  ICD-9-CM: 432.1  2/18/2023 Unknown         Social Determinants of Health     Tobacco Use: High Risk    Smoking Tobacco Use: Some Days    Smokeless Tobacco Use: Never    Passive Exposure: Not on file   Alcohol Use: Not on file   Financial Resource Strain: Not on file   Food Insecurity: Not on file   Transportation Needs: Not on file   Physical Activity: Not on file   Stress: Not on file   Social Connections: Not on file   Intimate Partner Violence: Not on file   Depression: Not at risk    PHQ-2 Score: 0   Housing Stability: Not on file       Review of Systems:   A comprehensive review of systems was negative except for that written in the HPI. Vital Signs:    Last 24hrs VS reviewed since prior progress note.  Most recent are:  Visit Vitals  BP (!) 157/80   Pulse 73   Temp 97.7 °F (36.5 °C)   Resp 14   Ht 5' 9\" (1.753 m)   Wt 75.5 kg (166 lb 7.2 oz)   SpO2 94%   BMI 24.58 kg/m²         Intake/Output Summary (Last 24 hours) at 2/25/2023 1502  Last data filed at 2/25/2023 1302  Gross per 24 hour   Intake --   Output 2000 ml   Net -2000 ml          Physical Examination:     I had a face to face encounter with this patient and independently examined them on 2/25/2023 as outlined below:          General :NAD, drowsy  HEENT: anicteric sclerae, wound covered, L periorbital swelling  Neck: supple, no JVD, no meningeal signs  Chest: Clear to auscultation bilaterally   CVS: S1 S2 heard, Capillary refill less than 2 seconds  Abd: soft/ non tender, non distended, BS physiological,   Ext: no clubbing, no cyanosis, no edema  Neuro/Psych: grossly non-focal  Skin: warm     Data Review:    Review and/or order of clinical lab test  Review and/or order of tests in the radiology section of CPT      I have personally and independently reviewed all pertinent labs, diagnostic studies, imaging, and have provided independent interpretation of the same. Labs:     Recent Labs     02/25/23  0705 02/24/23  0016   WBC 9.6 9.3   HGB 7.2* 7.8*   HCT 23.2* 23.9*    216       Recent Labs     02/25/23  0705 02/24/23  0016 02/23/23  0108   * 132* 132*   K 4.1 4.0 4.3   CL 92* 93* 94*   CO2 26 31 28   BUN 41* 23* 42*   CREA 9.21* 6.05* 9.42*   GLU 90 115* 106*   CA 8.7 8.5 8.0*       Recent Labs     02/25/23  0705 02/24/23  0017 02/24/23  0016   ALT <6* 7* <6*   AP 74 74 75   TBILI 0.3 0.3 0.3   TP 7.0 7.0 7.0   ALB 2.7* 2.9* 2.9*   GLOB 4.3* 4.1* 4.1*       No results for input(s): INR, PTP, APTT, INREXT, INREXT in the last 72 hours. No results for input(s): FE, TIBC, PSAT, FERR in the last 72 hours. Lab Results   Component Value Date/Time    Folate 7.9 05/18/2022 12:18 PM        No results for input(s): PH, PCO2, PO2 in the last 72 hours. No results for input(s): CPK, CKNDX, TROIQ in the last 72 hours.     No lab exists for component: CPKMB  Lab Results   Component Value Date/Time    Cholesterol, total 170 06/29/2020 03:46 AM    HDL Cholesterol 49 06/29/2020 03:46 AM    LDL, calculated 73.4 06/29/2020 03:46 AM    Triglyceride 238 (H) 06/29/2020 03:46 AM    CHOL/HDL Ratio 3.5 06/29/2020 03:46 AM     Lab Results   Component Value Date/Time    Glucose (POC) 112 11/30/2022 12:08 PM    Glucose (POC) 82 11/30/2022 08:34 AM    Glucose (POC) 112 11/29/2022 09:35 PM    Glucose (POC) 145 (H) 11/29/2022 12:26 PM    Glucose (POC) 97 09/27/2022 06:48 AM     Lab Results   Component Value Date/Time    Color YELLOW/STRAW 03/22/2022 04:11 PM    Appearance TURBID (A) 03/22/2022 04:11 PM    Specific gravity 1.021 03/22/2022 04:11 PM    pH (UA) 7.5 03/22/2022 04:11 PM    Protein >300 (A) 03/22/2022 04:11 PM    Glucose 100 (A) 03/22/2022 04:11 PM    Ketone Negative 03/22/2022 04:11 PM    Bilirubin Negative 03/22/2022 04:11 PM    Urobilinogen 0.2 03/22/2022 04:11 PM    Nitrites Negative 03/22/2022 04:11 PM    Leukocyte Esterase MODERATE (A) 03/22/2022 04:11 PM    Epithelial cells MODERATE (A) 03/22/2022 04:11 PM    Bacteria Negative 03/22/2022 04:11 PM    WBC 10-20 03/22/2022 04:11 PM    RBC 0-5 03/22/2022 04:11 PM       Notes reviewed from all clinical/nonclinical/nursing services involved in patient's clinical care. Care coordination discussions were held with appropriate clinical/nonclinical/ nursing providers based on care coordination needs. Patients current active Medications were reviewed, considered, added and adjusted based on the clinical condition today. Home Medications were reconciled to the best of my ability given all available resources at the time of admission. Route is PO if not otherwise noted.       Admission Status:[id number]:::1}      Medications Reviewed:     Current Facility-Administered Medications   Medication Dose Route Frequency    hydrALAZINE (APRESOLINE) tablet 100 mg  100 mg Oral TID    butalbital-acetaminophen-caffeine (FIORICET, ESGIC) -40 mg per tablet 1 Tablet  1 Tablet Oral Q4H PRN    hydrALAZINE (APRESOLINE) 20 mg/mL injection 10 mg  10 mg IntraVENous Q6H PRN    cloNIDine HCL (CATAPRES) tablet 0.3 mg  0.3 mg Oral BID    NIFEdipine ER (PROCARDIA XL) tablet 90 mg  90 mg Oral DAILY    levETIRAcetam (KEPPRA) tablet 500 mg  500 mg Oral DAILY    And    levETIRAcetam (KEPPRA) tablet 500 mg  500 mg Oral DIALYSIS TUE, THU & SAT    pantoprazole (PROTONIX) tablet 40 mg  40 mg Oral ACB    labetaloL (NORMODYNE) tablet 300 mg  300 mg Oral Q12H    0.9% sodium chloride infusion 250 mL  250 mL IntraVENous PRN    epoetin shawn-epbx (RETACRIT) injection 20,000 Units  20,000 Units SubCUTAneous Q TUE, THU & SAT    sodium chloride (NS) flush 5-40 mL  5-40 mL IntraVENous Q8H    ondansetron (ZOFRAN) injection 4 mg  4 mg IntraVENous Q4H PRN    acetaminophen (TYLENOL) tablet 650 mg  650 mg Oral Q6H PRN    Or    acetaminophen (TYLENOL) suppository 650 mg  650 mg Rectal Q6H PRN    polyethylene glycol (MIRALAX) packet 17 g  17 g Oral DAILY PRN    labetaloL (NORMODYNE;TRANDATE) injection 20 mg  20 mg IntraVENous Q4H PRN    bumetanide (BUMEX) tablet 2 mg  2 mg Oral DAILY     ______________________________________________________________________  EXPECTED LENGTH OF STAY: 4d 12h  ACTUAL LENGTH OF STAY:          7                 Lis Hdez MD

## 2023-02-26 ENCOUNTER — APPOINTMENT (OUTPATIENT)
Dept: CT IMAGING | Age: 58
DRG: 025 | End: 2023-02-26
Attending: INTERNAL MEDICINE
Payer: MEDICARE

## 2023-02-26 LAB
ALBUMIN SERPL-MCNC: 2.7 G/DL (ref 3.5–5)
ALBUMIN/GLOB SERPL: 0.6 (ref 1.1–2.2)
ALP SERPL-CCNC: 74 U/L (ref 45–117)
ALT SERPL-CCNC: <6 U/L (ref 12–78)
ANION GAP SERPL CALC-SCNC: 8 MMOL/L (ref 5–15)
AST SERPL-CCNC: 13 U/L (ref 15–37)
BILIRUB SERPL-MCNC: 0.3 MG/DL (ref 0.2–1)
BUN SERPL-MCNC: 23 MG/DL (ref 6–20)
BUN/CREAT SERPL: 4 (ref 12–20)
CALCIUM SERPL-MCNC: 8.5 MG/DL (ref 8.5–10.1)
CHLORIDE SERPL-SCNC: 94 MMOL/L (ref 97–108)
CO2 SERPL-SCNC: 28 MMOL/L (ref 21–32)
CREAT SERPL-MCNC: 5.63 MG/DL (ref 0.7–1.3)
ERYTHROCYTE [DISTWIDTH] IN BLOOD BY AUTOMATED COUNT: 14.4 % (ref 11.5–14.5)
GLOBULIN SER CALC-MCNC: 4.2 G/DL (ref 2–4)
GLUCOSE SERPL-MCNC: 127 MG/DL (ref 65–100)
HCT VFR BLD AUTO: 22.1 % (ref 36.6–50.3)
HGB BLD-MCNC: 7.1 G/DL (ref 12.1–17)
HISTORY CHECKED?,CKHIST: NORMAL
MCH RBC QN AUTO: 30.1 PG (ref 26–34)
MCHC RBC AUTO-ENTMCNC: 32.1 G/DL (ref 30–36.5)
MCV RBC AUTO: 93.6 FL (ref 80–99)
NRBC # BLD: 0.04 K/UL (ref 0–0.01)
NRBC BLD-RTO: 0.5 PER 100 WBC
PLATELET # BLD AUTO: 201 K/UL (ref 150–400)
PMV BLD AUTO: 11.3 FL (ref 8.9–12.9)
POTASSIUM SERPL-SCNC: 3.5 MMOL/L (ref 3.5–5.1)
PROT SERPL-MCNC: 6.9 G/DL (ref 6.4–8.2)
RBC # BLD AUTO: 2.36 M/UL (ref 4.1–5.7)
SODIUM SERPL-SCNC: 130 MMOL/L (ref 136–145)
WBC # BLD AUTO: 8.2 K/UL (ref 4.1–11.1)

## 2023-02-26 PROCEDURE — 74011000250 HC RX REV CODE- 250: Performed by: ANESTHESIOLOGY

## 2023-02-26 PROCEDURE — 74011250637 HC RX REV CODE- 250/637: Performed by: PHYSICIAN ASSISTANT

## 2023-02-26 PROCEDURE — 36415 COLL VENOUS BLD VENIPUNCTURE: CPT

## 2023-02-26 PROCEDURE — 85027 COMPLETE CBC AUTOMATED: CPT

## 2023-02-26 PROCEDURE — 86900 BLOOD TYPING SEROLOGIC ABO: CPT

## 2023-02-26 PROCEDURE — 74011250637 HC RX REV CODE- 250/637: Performed by: HOSPITALIST

## 2023-02-26 PROCEDURE — 74011250637 HC RX REV CODE- 250/637: Performed by: STUDENT IN AN ORGANIZED HEALTH CARE EDUCATION/TRAINING PROGRAM

## 2023-02-26 PROCEDURE — 74011250637 HC RX REV CODE- 250/637: Performed by: INTERNAL MEDICINE

## 2023-02-26 PROCEDURE — 74011250636 HC RX REV CODE- 250/636: Performed by: INTERNAL MEDICINE

## 2023-02-26 PROCEDURE — 74011250637 HC RX REV CODE- 250/637: Performed by: ANESTHESIOLOGY

## 2023-02-26 PROCEDURE — 65660000001 HC RM ICU INTERMED STEPDOWN

## 2023-02-26 PROCEDURE — 86923 COMPATIBILITY TEST ELECTRIC: CPT

## 2023-02-26 PROCEDURE — 70450 CT HEAD/BRAIN W/O DYE: CPT

## 2023-02-26 PROCEDURE — 80053 COMPREHEN METABOLIC PANEL: CPT

## 2023-02-26 PROCEDURE — 74011000250 HC RX REV CODE- 250: Performed by: NEUROLOGICAL SURGERY

## 2023-02-26 RX ORDER — ALISKIREN 300 MG/1
300 TABLET, FILM COATED ORAL DAILY
Status: DISCONTINUED | OUTPATIENT
Start: 2023-02-26 | End: 2023-02-28 | Stop reason: HOSPADM

## 2023-02-26 RX ORDER — IBUPROFEN 600 MG/1
600 TABLET ORAL ONCE
Status: COMPLETED | OUTPATIENT
Start: 2023-02-26 | End: 2023-02-26

## 2023-02-26 RX ORDER — HYDRALAZINE HYDROCHLORIDE 20 MG/ML
20 INJECTION INTRAMUSCULAR; INTRAVENOUS
Status: DISCONTINUED | OUTPATIENT
Start: 2023-02-26 | End: 2023-02-28 | Stop reason: HOSPADM

## 2023-02-26 RX ORDER — MINOXIDIL 10 MG/1
10 TABLET ORAL 2 TIMES DAILY
Status: DISCONTINUED | OUTPATIENT
Start: 2023-02-26 | End: 2023-02-28 | Stop reason: HOSPADM

## 2023-02-26 RX ORDER — SODIUM CHLORIDE 9 MG/ML
250 INJECTION, SOLUTION INTRAVENOUS AS NEEDED
Status: DISCONTINUED | OUTPATIENT
Start: 2023-02-26 | End: 2023-02-28 | Stop reason: HOSPADM

## 2023-02-26 RX ORDER — DOXAZOSIN 2 MG/1
8 TABLET ORAL
Status: DISCONTINUED | OUTPATIENT
Start: 2023-02-26 | End: 2023-02-28 | Stop reason: HOSPADM

## 2023-02-26 RX ORDER — CARVEDILOL 12.5 MG/1
25 TABLET ORAL 2 TIMES DAILY WITH MEALS
Status: DISCONTINUED | OUTPATIENT
Start: 2023-02-26 | End: 2023-02-28 | Stop reason: HOSPADM

## 2023-02-26 RX ORDER — CLONIDINE HYDROCHLORIDE 0.1 MG/1
0.3 TABLET ORAL 2 TIMES DAILY
Status: DISCONTINUED | OUTPATIENT
Start: 2023-02-26 | End: 2023-02-26

## 2023-02-26 RX ORDER — LABETALOL 100 MG/1
400 TABLET, FILM COATED ORAL EVERY 12 HOURS
Status: DISCONTINUED | OUTPATIENT
Start: 2023-02-26 | End: 2023-02-26

## 2023-02-26 RX ORDER — CLONIDINE HYDROCHLORIDE 0.1 MG/1
0.3 TABLET ORAL EVERY 8 HOURS
Status: DISCONTINUED | OUTPATIENT
Start: 2023-02-26 | End: 2023-02-26

## 2023-02-26 RX ORDER — CLONIDINE HYDROCHLORIDE 0.1 MG/1
0.3 TABLET ORAL EVERY 8 HOURS
Status: DISCONTINUED | OUTPATIENT
Start: 2023-02-26 | End: 2023-02-28 | Stop reason: HOSPADM

## 2023-02-26 RX ADMIN — BUTALBITAL, ACETAMINOPHEN, AND CAFFEINE 1 TABLET: 50; 325; 40 TABLET ORAL at 10:32

## 2023-02-26 RX ADMIN — CARVEDILOL 25 MG: 12.5 TABLET, FILM COATED ORAL at 10:32

## 2023-02-26 RX ADMIN — ACETAMINOPHEN 650 MG: 325 TABLET ORAL at 06:57

## 2023-02-26 RX ADMIN — CLONIDINE HYDROCHLORIDE 0.3 MG: 0.1 TABLET ORAL at 10:33

## 2023-02-26 RX ADMIN — CLONIDINE HYDROCHLORIDE 0.3 MG: 0.1 TABLET ORAL at 22:23

## 2023-02-26 RX ADMIN — CARVEDILOL 25 MG: 12.5 TABLET, FILM COATED ORAL at 17:17

## 2023-02-26 RX ADMIN — LABETALOL HYDROCHLORIDE 20 MG: 5 INJECTION INTRAVENOUS at 17:21

## 2023-02-26 RX ADMIN — MINOXIDIL 10 MG: 10 TABLET ORAL at 17:17

## 2023-02-26 RX ADMIN — MINOXIDIL 10 MG: 10 TABLET ORAL at 10:33

## 2023-02-26 RX ADMIN — HYDRALAZINE HYDROCHLORIDE 10 MG: 20 INJECTION INTRAMUSCULAR; INTRAVENOUS at 06:53

## 2023-02-26 RX ADMIN — HYDRALAZINE HYDROCHLORIDE 10 MG: 20 INJECTION INTRAMUSCULAR; INTRAVENOUS at 13:48

## 2023-02-26 RX ADMIN — SODIUM CHLORIDE, PRESERVATIVE FREE 10 ML: 5 INJECTION INTRAVENOUS at 22:24

## 2023-02-26 RX ADMIN — DOXAZOSIN 8 MG: 2 TABLET ORAL at 22:23

## 2023-02-26 RX ADMIN — ALISKIREN HEMIFUMARATE 300 MG: 300 TABLET, FILM COATED ORAL at 10:33

## 2023-02-26 RX ADMIN — IBUPROFEN 600 MG: 600 TABLET, FILM COATED ORAL at 17:17

## 2023-02-26 RX ADMIN — BUTALBITAL, ACETAMINOPHEN, AND CAFFEINE 1 TABLET: 50; 325; 40 TABLET ORAL at 14:39

## 2023-02-26 RX ADMIN — BUTALBITAL, ACETAMINOPHEN, AND CAFFEINE 1 TABLET: 50; 325; 40 TABLET ORAL at 04:07

## 2023-02-26 RX ADMIN — SODIUM CHLORIDE, PRESERVATIVE FREE 10 ML: 5 INJECTION INTRAVENOUS at 17:25

## 2023-02-26 RX ADMIN — LABETALOL HYDROCHLORIDE 300 MG: 100 TABLET, FILM COATED ORAL at 04:06

## 2023-02-26 RX ADMIN — SODIUM CHLORIDE, PRESERVATIVE FREE 10 ML: 5 INJECTION INTRAVENOUS at 14:00

## 2023-02-26 RX ADMIN — PANTOPRAZOLE SODIUM 40 MG: 40 TABLET, DELAYED RELEASE ORAL at 06:57

## 2023-02-26 RX ADMIN — LABETALOL HYDROCHLORIDE 20 MG: 5 INJECTION INTRAVENOUS at 10:30

## 2023-02-26 RX ADMIN — BUMETANIDE 2 MG: 1 TABLET ORAL at 10:33

## 2023-02-26 RX ADMIN — CLONIDINE HYDROCHLORIDE 0.3 MG: 0.1 TABLET ORAL at 17:17

## 2023-02-26 RX ADMIN — LEVETIRACETAM 500 MG: 500 TABLET, FILM COATED ORAL at 10:34

## 2023-02-26 RX ADMIN — NIFEDIPINE 90 MG: 30 TABLET, EXTENDED RELEASE ORAL at 10:33

## 2023-02-26 RX ADMIN — SODIUM CHLORIDE, PRESERVATIVE FREE 10 ML: 5 INJECTION INTRAVENOUS at 06:58

## 2023-02-26 NOTE — PROGRESS NOTES
[facility] [facility] Adult  Hospitalist Group                                                                                          Hospitalist Progress Note  Kathy Vásquez MD  Answering service: [phone] from in house phone        Date of Service:  2023  NAME:  Justin Skinner  :  1965  MRN:  [id number]       Admission Summary:   Justin Skinner is a 62 y.o. male with medical history of ESRD on hypertension, CKD, and parenchymal liver disease who was admitted on the 2023 after presenting with headache that started during dialysis, and diagnosed with subdural hemorrhage. He underwent evacuation urgently by neurosurgery, and was admitted to the ICU for further care. Surgical drains have currently been removed status post left craniotomy. He is receiving carvedilol, hydralazine, labetalol, and nifedipine for blood pressure management to maintain SBP less than 140 and has no residual focal deficits. Interval history / Subjective:   Patient is feeling well this morning. He received Motrin yesterday for a headache and that seemed to help. Patient's BP remains elevated, he was on Minoxidil, Coreg, and Cardura at home , none of which have been continued. Restarted as of this morning.       Assessment & Plan:        SDH:  - s/p left craniotomy and evacuation on ;   - Neurosurgery following;  - goal SBP <140 mmHg;   - continue Keppra for seizure prophylaxis;    HTN:  - uncontrolled;   - discontinue Coreg as patient is already on Labetalol;   - he is on Clonidine, Nifedipine, Bumex;   - : add Hydralazine 50 mg TID;   - : Increase hydralazine to 100 mg 3 times daily;  - : changing antihypertensives back to home meds;      Stress ulcer ppx  -continue PPI     ESRD, Hemodialysis dependent:  - appreciate Nephrology follow-up and rec's;         Code status: full  Prophylaxis: started on sqh by ICU team  Care Plan discussed with: Patient, family at the bedside, RN, CM;  Anticipated Disposition:  IPR ([facility]) today 2/25 or as soon as able to accept; patient is medically stable for discharge today;    Barriers to discharge:  -Awaiting placement; Hospital Problems  Date Reviewed: 2/18/2023            Codes Class Noted POA    Subdural hematoma ICD-10-CM: S06. 5XAA  ICD-9-CM: 432.1  2/18/2023 Unknown         Social Determinants of Health     Tobacco Use: High Risk    Smoking Tobacco Use: Some Days    Smokeless Tobacco Use: Never    Passive Exposure: Not on file   Alcohol Use: Not on file   Financial Resource Strain: Not on file   Food Insecurity: Not on file   Transportation Needs: Not on file   Physical Activity: Not on file   Stress: Not on file   Social Connections: Not on file   Intimate Partner Violence: Not on file   Depression: Not at risk    PHQ-2 Score: 0   Housing Stability: Not on file       Review of Systems:   A comprehensive review of systems was negative except for that written in the HPI. Vital Signs:    Last 24hrs VS reviewed since prior progress note.  Most recent are:  Visit Vitals  BP (!) 154/81   Pulse 65   Temp 98.4 °F (36.9 °C)   Resp 16   Ht 5' 9\" (1.753 m)   Wt 75.5 kg (166 lb 7.2 oz)   SpO2 96%   BMI 24.58 kg/m²         Intake/Output Summary (Last 24 hours) at 2/26/2023 1990  Last data filed at 2/25/2023 1302  Gross per 24 hour   Intake --   Output 2000 ml   Net -2000 ml          Physical Examination:     I had a face to face encounter with this patient and independently examined them on 2/26/2023 as outlined below:          General :NAD, drowsy  HEENT: anicteric sclerae, wound covered, L periorbital swelling  Neck: supple, no JVD, no meningeal signs  Chest: Clear to auscultation bilaterally   CVS: S1 S2 heard, Capillary refill less than 2 seconds  Abd: soft/ non tender, non distended, BS physiological,   Ext: no clubbing, no cyanosis, no edema  Neuro/Psych: grossly non-focal  Skin: warm     Data Review:    Review and/or order of clinical lab test  Review and/or order of tests in the radiology section of [facility]      I have personally and independently reviewed all pertinent labs, diagnostic studies, imaging, and have provided independent interpretation of the same. Labs:     Recent Labs     02/26/23  0110 02/25/23  0705   WBC 8.2 9.6   HGB 7.1* 7.2*   HCT 22.1* 23.2*    210       Recent Labs     02/26/23  0110 02/25/23  0705 02/24/23  0016   * 128* 132*   K 3.5 4.1 4.0   CL 94* 92* 93*   CO2 28 26 31   BUN 23* 41* 23*   CREA 5.63* 9.21* 6.05*   * 90 115*   CA 8.5 8.7 8.5       Recent Labs     02/26/23 0110 02/25/23  0705 02/24/23  0017   ALT <6* <6* 7*   AP 74 74 74   TBILI 0.3 0.3 0.3   TP 6.9 7.0 7.0   ALB 2.7* 2.7* 2.9*   GLOB 4.2* 4.3* 4.1*       No results for input(s): INR, PTP, APTT, INREXT, INREXT in the last 72 hours. No results for input(s): FE, TIBC, PSAT, FERR in the last 72 hours. Lab Results   Component Value Date/Time    Folate 7.9 05/18/2022 12:18 PM        No results for input(s): PH, PCO2, PO2 in the last 72 hours. No results for input(s): CPK, CKNDX, TROIQ in the last 72 hours.     No lab exists for component: CPKMB  Lab Results   Component Value Date/Time    Cholesterol, total 170 06/29/2020 03:46 AM    HDL Cholesterol 49 06/29/2020 03:46 AM    LDL, calculated 73.4 06/29/2020 03:46 AM    Triglyceride 238 (H) 06/29/2020 03:46 AM    CHOL/HDL Ratio 3.5 06/29/2020 03:46 AM     Lab Results   Component Value Date/Time    Glucose (POC) 112 11/30/2022 12:08 PM    Glucose (POC) 82 11/30/2022 08:34 AM    Glucose (POC) 112 11/29/2022 09:35 PM    Glucose (POC) 145 (H) 11/29/2022 12:26 PM    Glucose (POC) 97 09/27/2022 06:48 AM     Lab Results   Component Value Date/Time    Color YELLOW/STRAW 03/22/2022 04:11 PM    Appearance TURBID (A) 03/22/2022 04:11 PM    Specific gravity 1.021 03/22/2022 04:11 PM    pH (UA) 7.5 03/22/2022 04:11 PM    Protein >300 (A) 03/22/2022 04:11 PM    Glucose 100 (A) 03/22/2022 04:11 PM    Ketone Negative 03/22/2022 04:11 PM    Bilirubin Negative 03/22/2022 04:11 PM    Urobilinogen 0.2 03/22/2022 04:11 PM    Nitrites Negative 03/22/2022 04:11 PM    Leukocyte Esterase MODERATE (A) 03/22/2022 04:11 PM    Epithelial cells MODERATE (A) 03/22/2022 04:11 PM    Bacteria Negative 03/22/2022 04:11 PM    WBC 10-20 03/22/2022 04:11 PM    RBC 0-5 03/22/2022 04:11 PM       Notes reviewed from all clinical/nonclinical/nursing services involved in patient's clinical care. Care coordination discussions were held with appropriate clinical/nonclinical/ nursing providers based on care coordination needs. Patients current active Medications were reviewed, considered, added and adjusted based on the clinical condition today. Home Medications were reconciled to the best of my ability given all available resources at the time of admission. Route is PO if not otherwise noted.       Admission Status:[id number]:::1}      Medications Reviewed:     Current Facility-Administered Medications   Medication Dose Route Frequency    carvediloL (COREG) tablet 25 mg  25 mg Oral BID WITH MEALS    aliskiren (TEKTURNA) tablet 300 mg  300 mg Oral DAILY    doxazosin (CARDURA) tablet 8 mg  8 mg Oral QHS    minoxidiL (LONITEN) tablet 10 mg  10 mg Oral BID    cloNIDine HCL (CATAPRES) tablet 0.3 mg  0.3 mg Oral BID    butalbital-acetaminophen-caffeine (FIORICET, ESGIC) -40 mg per tablet 1 Tablet  1 Tablet Oral Q4H PRN    hydrALAZINE (APRESOLINE) 20 mg/mL injection 10 mg  10 mg IntraVENous Q6H PRN    NIFEdipine ER (PROCARDIA XL) tablet 90 mg  90 mg Oral DAILY    levETIRAcetam (KEPPRA) tablet 500 mg  500 mg Oral DAILY    And    levETIRAcetam (KEPPRA) tablet 500 mg  500 mg Oral DIALYSIS TUE, THU & SAT    pantoprazole (PROTONIX) tablet 40 mg  40 mg Oral ACB    0.9% sodium chloride infusion 250 mL  250 mL IntraVENous PRN    epoetin shawn-epbx (RETACRIT) injection 20,000 Units  20,000 Units SubCUTAneous Q TUE, THU & SAT sodium chloride (NS) flush 5-40 mL  5-40 mL IntraVENous Q8H    ondansetron (ZOFRAN) injection 4 mg  4 mg IntraVENous Q4H PRN    acetaminophen (TYLENOL) tablet 650 mg  650 mg Oral Q6H PRN    Or    acetaminophen (TYLENOL) suppository 650 mg  650 mg Rectal Q6H PRN    polyethylene glycol (MIRALAX) packet 17 g  17 g Oral DAILY PRN    labetaloL (NORMODYNE;TRANDATE) injection 20 mg  20 mg IntraVENous Q4H PRN    bumetanide (BUMEX) tablet 2 mg  2 mg Oral DAILY     ______________________________________________________________________  EXPECTED LENGTH OF STAY: 4d 12h  ACTUAL LENGTH OF STAY:          8                 Yousif Robles MD

## 2023-02-26 NOTE — PROGRESS NOTES
CM received a call from [place] [address] who stated that pt's insurance is requested a Peer to Peer. Hospitalist will need to call [phone] option #5 before 12pm on Monday.  Informed hospitalist.  Susan eFrnandez, BSW, ACM

## 2023-02-26 NOTE — PROGRESS NOTES
POD 8  C/o headache  Report of drainage overnight - no drainage currently  Afeb  BP elevated  WBC 9.6  Hgb 7.1  Na 130  Creatine 5.63  [place]  Increased facial swelling on left  Incision C/D/I, edema without erythema, no discharge  S/P: crani for SDH  Hyponatremic  Hypertensive - continue adjust BP meds for goal of   Head CT for increased facial swelling

## 2023-02-26 NOTE — PROGRESS NOTES
Pt called RN for head bleeding. There was a small spot near the crani sutures swollen oozing. NP Rojelio was msged and came to bedside to take a look. Pt BP was also high at 187/88. Was told by NP to give scheduled hydralazine and then recheck and give PRN if still high. Pt was also complaining of HA which he has been getting Fioricet Q 4hrs PRN for. NP also said to called the neurosurgeon on call to address bleeding. Hydralazine and Fioricet given at 2127. Francisco Cooper was called and per Dr. Claudetta Standing apply dressing and she was going to take a look at it tomorrow. BP rechecked at 2245. 187/93. Labetalol PRN given. BP rechecked at 0000 144/81.

## 2023-02-27 LAB
ALBUMIN SERPL-MCNC: 2.8 G/DL (ref 3.5–5)
ALBUMIN/GLOB SERPL: 0.8 (ref 1.1–2.2)
ALP SERPL-CCNC: 75 U/L (ref 45–117)
ALT SERPL-CCNC: <6 U/L (ref 12–78)
AMPHET UR QL SCN: NEGATIVE
ANION GAP SERPL CALC-SCNC: 10 MMOL/L (ref 5–15)
AST SERPL-CCNC: 14 U/L (ref 15–37)
BARBITURATES UR QL SCN: POSITIVE
BENZODIAZ UR QL: NEGATIVE
BILIRUB DIRECT SERPL-MCNC: 0.1 MG/DL (ref 0–0.2)
BILIRUB SERPL-MCNC: 0.3 MG/DL (ref 0.2–1)
BUN SERPL-MCNC: 38 MG/DL (ref 6–20)
BUN/CREAT SERPL: 5 (ref 12–20)
CALCIUM SERPL-MCNC: 8.8 MG/DL (ref 8.5–10.1)
CANNABINOIDS UR QL SCN: NEGATIVE
CHLORIDE SERPL-SCNC: 90 MMOL/L (ref 97–108)
CO2 SERPL-SCNC: 27 MMOL/L (ref 21–32)
COCAINE UR QL SCN: NEGATIVE
CREAT SERPL-MCNC: 8 MG/DL (ref 0.7–1.3)
DRUG SCRN COMMENT,DRGCM: ABNORMAL
ERYTHROCYTE [DISTWIDTH] IN BLOOD BY AUTOMATED COUNT: 14.6 % (ref 11.5–14.5)
GLOBULIN SER CALC-MCNC: 3.6 G/DL (ref 2–4)
GLUCOSE SERPL-MCNC: 94 MG/DL (ref 65–100)
HCT VFR BLD AUTO: 23.1 % (ref 36.6–50.3)
HGB BLD-MCNC: 7.1 G/DL (ref 12.1–17)
IRON SATN MFR SERPL: 17 % (ref 20–50)
IRON SERPL-MCNC: 41 UG/DL (ref 35–150)
MAGNESIUM SERPL-MCNC: 2.3 MG/DL (ref 1.6–2.4)
MCH RBC QN AUTO: 28.6 PG (ref 26–34)
MCHC RBC AUTO-ENTMCNC: 30.7 G/DL (ref 30–36.5)
MCV RBC AUTO: 93.1 FL (ref 80–99)
METHADONE UR QL: NEGATIVE
NRBC # BLD: 0.04 K/UL (ref 0–0.01)
NRBC BLD-RTO: 0.5 PER 100 WBC
OPIATES UR QL: NEGATIVE
PCP UR QL: NEGATIVE
PHOSPHATE SERPL-MCNC: 5.8 MG/DL (ref 2.6–4.7)
PLATELET # BLD AUTO: 218 K/UL (ref 150–400)
PMV BLD AUTO: 10.6 FL (ref 8.9–12.9)
POTASSIUM SERPL-SCNC: 3.9 MMOL/L (ref 3.5–5.1)
PROT SERPL-MCNC: 6.4 G/DL (ref 6.4–8.2)
RBC # BLD AUTO: 2.48 M/UL (ref 4.1–5.7)
SODIUM SERPL-SCNC: 127 MMOL/L (ref 136–145)
TIBC SERPL-MCNC: 244 UG/DL (ref 250–450)
WBC # BLD AUTO: 8.6 K/UL (ref 4.1–11.1)

## 2023-02-27 PROCEDURE — 80048 BASIC METABOLIC PNL TOTAL CA: CPT

## 2023-02-27 PROCEDURE — 97535 SELF CARE MNGMENT TRAINING: CPT

## 2023-02-27 PROCEDURE — 74011250637 HC RX REV CODE- 250/637: Performed by: INTERNAL MEDICINE

## 2023-02-27 PROCEDURE — 85027 COMPLETE CBC AUTOMATED: CPT

## 2023-02-27 PROCEDURE — 83540 ASSAY OF IRON: CPT

## 2023-02-27 PROCEDURE — 80307 DRUG TEST PRSMV CHEM ANLYZR: CPT

## 2023-02-27 PROCEDURE — 74011000250 HC RX REV CODE- 250: Performed by: NEUROLOGICAL SURGERY

## 2023-02-27 PROCEDURE — 80076 HEPATIC FUNCTION PANEL: CPT

## 2023-02-27 PROCEDURE — 65660000001 HC RM ICU INTERMED STEPDOWN

## 2023-02-27 PROCEDURE — 74011250637 HC RX REV CODE- 250/637: Performed by: ANESTHESIOLOGY

## 2023-02-27 PROCEDURE — 97116 GAIT TRAINING THERAPY: CPT

## 2023-02-27 PROCEDURE — 74011250637 HC RX REV CODE- 250/637: Performed by: HOSPITALIST

## 2023-02-27 PROCEDURE — 74011250636 HC RX REV CODE- 250/636

## 2023-02-27 PROCEDURE — 36415 COLL VENOUS BLD VENIPUNCTURE: CPT

## 2023-02-27 PROCEDURE — 84100 ASSAY OF PHOSPHORUS: CPT

## 2023-02-27 PROCEDURE — 83735 ASSAY OF MAGNESIUM: CPT

## 2023-02-27 RX ORDER — PROCHLORPERAZINE EDISYLATE 5 MG/ML
5 INJECTION INTRAMUSCULAR; INTRAVENOUS ONCE
Status: COMPLETED | OUTPATIENT
Start: 2023-02-27 | End: 2023-02-27

## 2023-02-27 RX ORDER — IBUPROFEN 400 MG/1
400 TABLET ORAL
Status: DISCONTINUED | OUTPATIENT
Start: 2023-02-27 | End: 2023-02-28 | Stop reason: HOSPADM

## 2023-02-27 RX ORDER — MAGNESIUM SULFATE 1 G/100ML
1 INJECTION INTRAVENOUS ONCE
Status: DISPENSED | OUTPATIENT
Start: 2023-02-27 | End: 2023-02-28

## 2023-02-27 RX ORDER — DIPHENHYDRAMINE HYDROCHLORIDE 50 MG/ML
12.5 INJECTION, SOLUTION INTRAMUSCULAR; INTRAVENOUS ONCE
Status: COMPLETED | OUTPATIENT
Start: 2023-02-27 | End: 2023-02-27

## 2023-02-27 RX ORDER — DIPHENHYDRAMINE HCL 25 MG
25 CAPSULE ORAL
Status: DISCONTINUED | OUTPATIENT
Start: 2023-02-27 | End: 2023-02-28 | Stop reason: HOSPADM

## 2023-02-27 RX ORDER — HYDRALAZINE HYDROCHLORIDE 50 MG/1
100 TABLET, FILM COATED ORAL 3 TIMES DAILY
Status: DISCONTINUED | OUTPATIENT
Start: 2023-02-27 | End: 2023-02-28 | Stop reason: HOSPADM

## 2023-02-27 RX ADMIN — PROCHLORPERAZINE EDISYLATE 5 MG: 5 INJECTION INTRAMUSCULAR; INTRAVENOUS at 07:22

## 2023-02-27 RX ADMIN — MINOXIDIL 10 MG: 10 TABLET ORAL at 09:27

## 2023-02-27 RX ADMIN — ACETAMINOPHEN 650 MG: 325 TABLET ORAL at 02:25

## 2023-02-27 RX ADMIN — CARVEDILOL 25 MG: 12.5 TABLET, FILM COATED ORAL at 09:27

## 2023-02-27 RX ADMIN — CLONIDINE HYDROCHLORIDE 0.3 MG: 0.1 TABLET ORAL at 06:20

## 2023-02-27 RX ADMIN — NIFEDIPINE 90 MG: 30 TABLET, EXTENDED RELEASE ORAL at 09:27

## 2023-02-27 RX ADMIN — LEVETIRACETAM 500 MG: 500 TABLET, FILM COATED ORAL at 09:27

## 2023-02-27 RX ADMIN — IBUPROFEN 400 MG: 400 TABLET, FILM COATED ORAL at 13:01

## 2023-02-27 RX ADMIN — DOXAZOSIN 8 MG: 2 TABLET ORAL at 21:38

## 2023-02-27 RX ADMIN — BUMETANIDE 2 MG: 1 TABLET ORAL at 09:27

## 2023-02-27 RX ADMIN — CLONIDINE HYDROCHLORIDE 0.3 MG: 0.1 TABLET ORAL at 21:38

## 2023-02-27 RX ADMIN — SODIUM CHLORIDE, PRESERVATIVE FREE 10 ML: 5 INJECTION INTRAVENOUS at 21:39

## 2023-02-27 RX ADMIN — HYDRALAZINE HYDROCHLORIDE 100 MG: 50 TABLET, FILM COATED ORAL at 09:27

## 2023-02-27 RX ADMIN — LABETALOL HYDROCHLORIDE 20 MG: 5 INJECTION INTRAVENOUS at 19:30

## 2023-02-27 RX ADMIN — ALISKIREN HEMIFUMARATE 300 MG: 300 TABLET, FILM COATED ORAL at 09:33

## 2023-02-27 RX ADMIN — CLONIDINE HYDROCHLORIDE 0.3 MG: 0.1 TABLET ORAL at 13:01

## 2023-02-27 RX ADMIN — DIPHENHYDRAMINE HYDROCHLORIDE 25 MG: 25 CAPSULE ORAL at 21:46

## 2023-02-27 RX ADMIN — IBUPROFEN 400 MG: 400 TABLET, FILM COATED ORAL at 18:11

## 2023-02-27 RX ADMIN — LABETALOL HYDROCHLORIDE 20 MG: 5 INJECTION INTRAVENOUS at 06:20

## 2023-02-27 RX ADMIN — PANTOPRAZOLE SODIUM 40 MG: 40 TABLET, DELAYED RELEASE ORAL at 06:20

## 2023-02-27 RX ADMIN — SODIUM CHLORIDE 250 ML: 9 INJECTION, SOLUTION INTRAVENOUS at 07:25

## 2023-02-27 RX ADMIN — HYDRALAZINE HYDROCHLORIDE 100 MG: 50 TABLET, FILM COATED ORAL at 18:11

## 2023-02-27 RX ADMIN — DIPHENHYDRAMINE HYDROCHLORIDE 12.5 MG: 50 INJECTION, SOLUTION INTRAMUSCULAR; INTRAVENOUS at 07:22

## 2023-02-27 RX ADMIN — SODIUM CHLORIDE, PRESERVATIVE FREE 10 ML: 5 INJECTION INTRAVENOUS at 13:03

## 2023-02-27 RX ADMIN — MINOXIDIL 10 MG: 10 TABLET ORAL at 18:11

## 2023-02-27 RX ADMIN — HYDRALAZINE HYDROCHLORIDE 100 MG: 50 TABLET, FILM COATED ORAL at 21:38

## 2023-02-27 RX ADMIN — CARVEDILOL 25 MG: 12.5 TABLET, FILM COATED ORAL at 18:11

## 2023-02-27 RX ADMIN — SODIUM CHLORIDE, PRESERVATIVE FREE 10 ML: 5 INJECTION INTRAVENOUS at 06:20

## 2023-02-27 NOTE — PROGRESS NOTES
Problem: Patient Education: Go to Patient Education Activity  Goal: Patient/Family Education  Outcome: Progressing Towards Goal     Problem: Hemorrhagic Stroke: Admission Day (0-3 hours)  Goal: Off Pathway (Use only if patient is Off Pathway)  Outcome: Progressing Towards Goal     Problem: Hemorrhagic Stroke: Day 2  Goal: Off Pathway (Use only if patient is Off Pathway)  Outcome: Progressing Towards Goal  Goal: Activity/Safety  Outcome: Progressing Towards Goal  Goal: Diagnostic Test/Procedures  Outcome: Progressing Towards Goal  Goal: Nutrition/Diet  Outcome: Progressing Towards Goal  Goal: Medications  Outcome: Progressing Towards Goal  Goal: Respiratory  Outcome: Progressing Towards Goal  Goal: Treatments/Interventions/Procedures  Outcome: Progressing Towards Goal  Goal: Psychosocial  Outcome: Progressing Towards Goal  Goal: *Hemodynamically stable  Outcome: Progressing Towards Goal  Goal: *Verbalizes anxiety and depression are reduced or absent  Outcome: Progressing Towards Goal  Goal: *Absence of aspiration  Outcome: Progressing Towards Goal  Goal: *Absence of signs and symptoms of DVT  Outcome: Progressing Towards Goal  Goal: *Optimal pain control at patient's stated goal  Outcome: Progressing Towards Goal  Goal: *Tolerating diet  Outcome: Progressing Towards Goal  Goal: *Progressive mobility and function  Outcome: Progressing Towards Goal  Goal: *Rehabilitation readiness  Outcome: Progressing Towards Goal

## 2023-02-27 NOTE — PROGRESS NOTES
Problem: Self Care Deficits Care Plan (Adult)  Goal: *Acute Goals and Plan of Care (Insert Text)  Description: FUNCTIONAL STATUS PRIOR TO ADMISSION: Patient was independent and active without use of DME.    HOME SUPPORT: The patient lived with fiance but did not require assist.    Occupational Therapy Goals  Initiated 2/21/2023   1. Patient will perform grooming in unsupported sitting using L hand for GM/FM tasks with minimal assistance within 7 day(s). 2.  Patient will perform anterior neck to thigh bathing in unsupported sitting with minimal assistance within 7 day(s). 3.  Patient will perform toilet transfers to/from [facility] with minimal assistance within 7 day(s). 4.  Patient will perform all aspects of toileting with minimal assistance within 7 day(s). 5.  Patient will participate in upper extremity therapeutic exercise/activities with supervision/set-up within 7 day(s). 6.  Patient will utilize energy conservation techniques during functional activities with verbal cues within 7 day(s). 7.  Patient will improve their Fugl Dailey score by 6 points in prep for ADLs within 7 days. Outcome: Progressing Towards Goal   OCCUPATIONAL THERAPY TREATMENT  Patient: Byron Macias (33 y.o. male)  Date: 2/27/2023  Diagnosis: Subdural hematoma [S06. 5XAA] <principal problem not specified>  Procedure(s) (LRB):  LEFT SUBDURAL CRANIOTOMY (Left) 9 Days Post-Op  Precautions: Fall (SBP <160)  Chart, occupational therapy assessment, plan of care, and goals were reviewed. ASSESSMENT  Patient continues with skilled OT services and is progressing towards goals. Pt was received laying supine in bed sleeping, agreeable to therapy, okay'd to see by  nursing. Pt was min A for bed mobility, assistance provided with leg management and cues for using UE to push up into sitting, HOB elevated. Pt demonstrated decreased sitting balance with dynamic sitting tasks (fixing socks) sitting EOB.  Pt demonstrated significant drowsiness throughout session. Min A for functional mobility to chair, independent with eating food. Pt is progressing towards goals but is limited to decreased safety awareness and safety when completing adls. Pt is still a great candidate for IPR to increase overall safety, endurance and independence with badls. 02/27/23 0858 02/27/23 0903 02/27/23 0907   Vital Signs   /71 135/74 (!) 149/74   MAP (Calculated) 87 94 99   BP 1 Location Left upper arm Left upper arm Left upper arm   BP 1 Method Automatic Automatic Automatic   BP Patient Position Lying left side Sitting Standing      02/27/23 0911   Vital Signs   BP (!) 172/78   MAP (Calculated) 109   BP 1 Location Left upper arm   BP 1 Method Automatic   BP Patient Position Sitting; Other (Comment)  (post gait training/transfers)       Current Level of Function Impacting Discharge (ADLs): Min a to CGA for badls and functional mobility for safety, decreased trunk stability when donning lower body dressing    Other factors to consider for discharge: medical course of dc. PLAN :  Patient continues to benefit from skilled intervention to address the above impairments. Continue treatment per established plan of care to address goals. Recommend with staff: OOB for meals x2 Assist    Recommend next OT session: see goals    Recommendation for discharge: (in order for the patient to meet his/her long term goals)  Therapy 3 hours per day 5-7 days per week    This discharge recommendation:  Has been made in collaboration with the attending provider and/or case management    IF patient discharges home will need the following DME: tbd       SUBJECTIVE:   Patient stated I am tired.     OBJECTIVE DATA SUMMARY:   Cognitive/Behavioral Status:  Neurologic State: Alert;Drowsy  Orientation Level: Oriented X4  Cognition: Poor safety awareness; Follows commands             Functional Mobility and Transfers for ADLs:  Bed Mobility:  Rolling: Contact guard assistance  Supine to Sit: Minimum assistance    Transfers:  Sit to Stand: Contact guard assistance     Bed to Chair: Contact guard assistance;Minimum assistance    Balance:  Sitting: Impaired;High guard  Sitting - Static: Fair (occasional)  Sitting - Dynamic: Fair (occasional); Poor (constant support)  Standing: Impaired; With support  Standing - Static: Fair;Poor;Constant support  Standing - Dynamic : Fair;Poor;Constant support    ADL Intervention:  Feeding  Feeding Assistance: Set-up; Independent  Utensil Management: Set-up; Independent  Food to Mouth: Set-up; Independent  Drink to Mouth: Set-up; Independent                        Upper Body Dressing Assistance  Dressing Assistance: Stand-by assistance  Hospital Gown: Stand-by assistance    Lower Body Dressing Assistance  Dressing Assistance: Contact guard assistance;Minimum assistance  Socks: Contact guard assistance;Minimum assistance  Leg Crossed Method Used: No  Position Performed: Seated edge of bed             Pain:  No pain reported    Activity Tolerance:   Fair, Poor, and requires rest breaks    After treatment patient left in no apparent distress:   Sitting in chair, Call bell within reach, and Bed / chair alarm activated    COMMUNICATION/COLLABORATION:   The patients plan of care was discussed with: Physical therapist and Registered nurse.          Jaky Torres OT  Time Calculation: 17 mins

## 2023-02-27 NOTE — CONSULTS
Consult Date: 2/27/2023    Consults Physical Medicine and Rehabilitation    Subjective  Mr. Gilford Kleine is a 51-year-old male with a past medical history of end-stage renal disease on dialysis and hypertension as well as chronic liver disease who was initially taken to [facility] after developing a headache during dialysis. In the ED he rapidly became unresponsive and was intubated and subsequently transferred to [place] [address] revealed a large acute and chronic subdural hematoma. He was taken to the OR and underwent a left frontal temporal parietal craniotomy with evacuation of his acute and chronic subdural hematoma on 2/18/2023 with Dr. Rufus Howe. Postoperatively patient has been complaining of persistent headache and there was noted drainage at his surgical site on 2/25/2023. It has been dry the last few days. Blood pressure still running high. GAVINO drain was discontinued on 2/20/2023. He has been maintained on Keppra 500 mg with dialysis dosing. CT head on 2/23/2023 and 2/26/2023 showed stable to improving postsurgical changes. He is continuing with dialysis Tuesday Thursday Saturday and nephrology is following. Physical medicine and rehabilitation has been consulted for recommendations of IPR versus SNF. Patient was previously independent and active without use of DME. He did live with his family and did not require assistance. Physical therapy note on 2/27/2023: Noted to be lethargic throughout treatment requiring constant cues to maintain level of alertness. Patient did receive Benadryl this morning. Patient remaining impulsive with gait requiring min assist to slow down pattern with assistive ice. Noted to have shuffling gait with decreased foot clearance. No lateral leads noted during gait training which is an improvement. Blood pressure noted to be elevated above parameters at bathroom.   Physical therapy recommending inpatient rehab with 3 hours of therapy 5 to 7 days/week. Occupational Therapy note on 2023: Patient noted resting total.  This is for bed mobility. Needs assistance for leg management and cues using upper extremities to push up into sitting position. This was decreased sitting balance with dynamic sitting tasks. Min assist for functional mobility to chair. Independent for eating. Occupational Therapy is recommending inpatient rehabilitation with 3 hours of therapy 5 to 7 days/week. On my evaluation patient was seen and examined while sitting on edge of bed. Patient anxious during my exam required redirection on multiple occasions. Multiple family members present likely contributing to his anxiety. Patient significant other tells me that his sister recently passed away due to suicide causing him significant amount of stress prior to the development of his subdural hematoma. Patient reports being previously independent. He did have a transportation service to take him to dialysis. Was independent in managing his medications. Stop smoking many years ago. Rarely drinks alcohol. States he is sleeping okay at night. Past Medical History:   Diagnosis Date    Chronic kidney disease     Hypertension     Liver disease       No past surgical history on file. No family history on file.    Social History     Tobacco Use    Smoking status: Some Days     Packs/day: 1.00     Types: Cigarettes     Last attempt to quit: 2020     Years since quittin.5    Smokeless tobacco: Never    Tobacco comments:     started back smoking in last couple months   Substance Use Topics    Alcohol use: Not Currently       Current Facility-Administered Medications   Medication Dose Route Frequency Provider Last Rate Last Admin    hydrALAZINE (APRESOLINE) tablet 100 mg  100 mg Oral TID Humble Bai MD        carvediloL (COREG) tablet 25 mg  25 mg Oral BID WITH MEALS Gm Ridley MD   25 mg at 23 1717    aliskiren (TEKTURNA) tablet 300 mg  300 mg Oral DAILY Russel Langston MD   300 mg at 02/26/23 1033    doxazosin (CARDURA) tablet 8 mg  8 mg Oral QHS Carlitos GROVES MD   8 mg at 02/26/23 2223    minoxidiL (LONITEN) tablet 10 mg  10 mg Oral BID Russel Langston MD   10 mg at 02/26/23 1717    hydrALAZINE (APRESOLINE) 20 mg/mL injection 20 mg  20 mg IntraVENous Q6H PRN Russel Langston MD        cloNIDine HCL (CATAPRES) tablet 0.3 mg  0.3 mg Oral Q8H Caroline Burns MD   0.3 mg at 02/27/23 0273    0.9% sodium chloride infusion 250 mL  250 mL IntraVENous PRN Russel Langston MD        NIFEdipine ER (PROCARDIA XL) tablet 90 mg  90 mg Oral DAILY Bailey Lara MD   90 mg at 02/26/23 1033    levETIRAcetam (KEPPRA) tablet 500 mg  500 mg Oral DAILY Bailey Lara MD   500 mg at 02/26/23 1034    And    levETIRAcetam (KEPPRA) tablet 500 mg  500 mg Oral DIALYSIS TUE, THU & SAT Bailey Lara MD   500 mg at 02/25/23 2126    pantoprazole (PROTONIX) tablet 40 mg  40 mg Oral ACB Bailey Lara MD   40 mg at 02/27/23 3365    0.9% sodium chloride infusion 250 mL  250 mL IntraVENous PRN Baron Tanvi STEVENSON NP-ERIC        epoetin shawn-epbx (RETACRIT) injection 20,000 Units  20,000 Units SubCUTAneous Q TUE, THU & SAT Dalia STEVENSON MD   20,000 Units at 02/25/23 2252    sodium chloride (NS) flush 5-40 mL  5-40 mL IntraVENous Q8H Lizzie HALL MD   10 mL at 02/27/23 0620    [facility]ron [facility] injection 4 mg  4 mg IntraVENous Q4H PRN Gloria Crowe MD        acetaminophen (TYLENOL) tablet 650 mg  650 mg Oral Q6H PRN Jeannette Tolentino MD   650 mg at 02/27/23 0225    Or    acetaminophen (TYLENOL) suppository 650 mg  650 mg Rectal Q6H PRN Jeannette Tolentino MD        polyethylene glycol (MIRALAX) packet 17 g  17 g Oral DAILY PRN Jeannette Tolentino MD        labetaloL (NORMODYNE;TRANDATE) injection 20 mg  20 mg IntraVENous Q4H PRN Jeannette Tolentino MD   20 mg at 02/27/23 9667    bumetanide (BUMEX) tablet 2 mg  2 mg Oral DAILY Jeannette Tolentino, MD   2 mg at 02/26/23 1033        Review of Systems   Constitutional:  Positive for activity change. HENT:          Headache and left facial swelling   Eyes: Negative. Respiratory: Negative. Cardiovascular: Negative. Gastrointestinal: Negative. Genitourinary: Negative. Musculoskeletal: Negative. Skin: Negative. Neurological:  Positive for headaches. Hematological: Negative. Psychiatric/Behavioral:          Anxious     Objective     Vital signs for last 24 hours:  Visit Vitals  BP (!) 173/78 (BP 1 Location: Left upper arm, BP Patient Position: Sitting)   Pulse 83   Temp 97.9 °F (36.6 °C)   Resp 17   Ht 5' 9\" (1.753 m)   Wt 78.4 kg (172 lb 13.5 oz)   SpO2 96%   BMI 25.52 kg/m²       Intake/Output this shift:  Current Shift: No intake/output data recorded. Last 3 Shifts: No intake/output data recorded. Data Review:   Recent Results (from the past 24 hour(s))   RBC, ALLOCATE    Collection Time: 02/26/23  3:30 PM   Result Value Ref Range    HISTORY CHECKED? Historical check performed    TYPE & SCREEN    Collection Time: 02/26/23  4:47 PM   Result Value Ref Range    Crossmatch Expiration 03/01/2023,2359     ABO/Rh(D) B POSITIVE     Antibody screen NEG     Unit number [id number]     Blood component type RC LR     Unit division 00     Status of unit ALLOCATED     Crossmatch result Compatible    HEPATIC FUNCTION PANEL    Collection Time: 02/27/23  2:24 AM   Result Value Ref Range    Protein, total 6.4 6.4 - 8.2 g/dL    Albumin 2.8 (L) 3.5 - 5.0 g/dL    Globulin 3.6 2.0 - 4.0 g/dL    A-G Ratio 0.8 (L) 1.1 - 2.2      Bilirubin, total 0.3 0.2 - 1.0 MG/DL    Bilirubin, direct 0.1 0.0 - 0.2 MG/DL    Alk.  phosphatase 75 45 - 117 U/L    AST (SGOT) 14 (L) 15 - 37 U/L    ALT (SGPT) <6 (L) 12 - 78 U/L   CBC W/O DIFF    Collection Time: 02/27/23  2:24 AM   Result Value Ref Range    WBC 8.6 4.1 - 11.1 K/uL    RBC 2.48 (L) 4.10 - 5.70 M/uL    HGB 7.1 (L) 12.1 - 17.0 g/dL    HCT 23.1 (L) 36.6 - 50.3 % MCV 93.1 80.0 - 99.0 FL    MCH 28.6 26.0 - 34.0 PG    MCHC 30.7 30.0 - 36.5 g/dL    RDW 14.6 (H) 11.5 - 14.5 %    PLATELET 437 144 - 285 K/uL    MPV 10.6 8.9 - 12.9 FL    NRBC 0.5 (H) 0  WBC    ABSOLUTE NRBC 0.04 (H) 0.00 - 0.01 K/uL   PHOSPHORUS    Collection Time: 02/27/23  2:24 AM   Result Value Ref Range    Phosphorus 5.8 (H) 2.6 - 4.7 MG/DL   MAGNESIUM    Collection Time: 02/27/23  2:24 AM   Result Value Ref Range    Magnesium 2.3 1.6 - 2.4 mg/dL   METABOLIC PANEL, BASIC    Collection Time: 02/27/23  2:24 AM   Result Value Ref Range    Sodium 127 (L) 136 - 145 mmol/L    Potassium 3.9 3.5 - 5.1 mmol/L    Chloride 90 (L) 97 - 108 mmol/L    CO2 27 21 - 32 mmol/L    Anion gap 10 5 - 15 mmol/L    Glucose 94 65 - 100 mg/dL    BUN 38 (H) 6 - 20 MG/DL    Creatinine 8.00 (H) 0.70 - 1.30 MG/DL    BUN/Creatinine ratio 5 (L) 12 - 20      eGFR 7 (L) >60 ml/min/1.73m2    Calcium 8.8 8.5 - 10.1 MG/DL   IRON PROFILE    Collection Time: 02/27/23  2:24 AM   Result Value Ref Range    Iron 41 35 - 150 ug/dL    TIBC 244 (L) 250 - 450 ug/dL    Iron % saturation 17 (L) 20 - 50 %       Physical Exam  HENT:      Head: Normocephalic. Comments: Surgical incision with sutures at left frontal temporal scalp. Eyes:      Extraocular Movements: Extraocular movements intact. Pupils: Pupils are equal, round, and reactive to light. Comments: Swelling of the left side of the face and eyelid   Cardiovascular:      Rate and Rhythm: Normal rate and regular rhythm. Pulmonary:      Effort: Pulmonary effort is normal.      Breath sounds: Normal breath sounds. Abdominal:      General: Bowel sounds are normal.      Palpations: Abdomen is soft. Musculoskeletal:         General: Normal range of motion. Cervical back: Normal range of motion and neck supple. Skin:     General: Skin is warm and dry. Neurological:      Mental Status: He is alert. Comments: Alert and oriented x4.   Impulsive during my exam with decreased attention to task. Impulsively standing multiple times during my exam.  Required redirection. Overall strength is out of 5 left. Finger to chin is within normal limits. Hyperreflexic. Sensation intact. Does demonstrate decreased insight into some of his deficits. Psychiatric:         Mood and Affect: Mood normal.         Behavior: Behavior normal.       Impression/recommendations: This is a single male found to have a left acute and chronic subdural hematoma in the setting of dialysis now status post evacuation who is below his functional baseline. It is my recommendation that this patient would benefit from acute inpatient rehabilitation where he will receive 3 hours of therapy 5 to 7 days a week with 24/7 nursing care and physician oversight of at least 3 days/week. Physician will manage ongoing issues with hypertension to further reduce his risk of recurrent subdural hematoma. This will be done in the setting of hemodialysis which poses a risk while initiating antihypertensives for blood pressure management of causing episodes of hypotension for which the patient will need to be monitored regularly and adjustments to medications made. Patient does demonstrate decreased safety awareness, insight, impulsivity with decreased balance and ability to perform his ADLs which further necessitates acute inpatient rehabilitation. Patient is expected to make reasonable gains within the length of stay. Thank you for this consult. Please let me know if you have any questions or concerns.   Devin Tong, [id number]

## 2023-02-27 NOTE — PROGRESS NOTES
Neurosurgery Progress Note  Namrata Leung PA-C    Admit Date: 2023   LOS: 9 days        Daily Progress Note: 2023    HPI: Mr. Michelle Muñoz is a 60yo gentleman with a PMH of HTN, ESRD on HD, PUD and chronic hepatitis who presented to the [id number] [place] [address] ED with a HA. He rapidly became unresponsive and was intubated and transferred to [facility]. Imaging revealed a large acute and chronic subdural hematoma. Pt was taken to the OR for left frontotemporal parietal craniotomy w evacuation of acute & chronic SDH on 23 by Dr. Donaldo Baig. Subjective:     Complaint of persistent HA. Apparently there was some drainage from the incision Saturday; it has been dry for the last 2 days. BP still high at times    Objective:     Vital signs  Temp (24hrs), Av.2 °F (36.8 °C), Min:97.8 °F (36.6 °C), Max:98.5 °F (36.9 °C)   No intake/output data recorded. No intake/output data recorded. Visit Vitals  BP (!) 172/78 (BP 1 Location: Left upper arm, BP Patient Position: Sitting; Other (Comment)) Comment (BP Patient Position): post gait training/transfers   Pulse 98   Temp 97.9 °F (36.6 °C)   Resp 17   Ht 5' 9\" (1.753 m)   Wt 78.4 kg (172 lb 13.5 oz)   SpO2 96%   BMI 25.52 kg/m²    O2 Flow Rate (L/min): 4 l/min O2 Device: None (Room air)     Output (mL)  Urine Voided: 50 ml (23 0400)  Last Bowel Movement Date: 23 (23 0910)  Unmeasurable Output  Urine Occurrence(s): 1 (23 0700)     Pain control  Pain Assessment  Pain Scale 1: Numeric (0 - 10)  Pain Intensity 1: 5  Pain Location 1: Head  Pain Orientation 1: Anterior  Pain Description 1: Aching  Pain Intervention(s) 1: Medication (see MAR)    PT/OT  Gait     Gait  Base of Support: Center of gravity altered, Narrowed, Shift to right  Speed/Shayna: Shuffled, Slow, Fluctuations  Step Length: Left shortened, Right shortened  Swing Pattern: Left asymmetrical  Stance: Left increased, Right decreased  Gait Abnormalities: Decreased step clearance, Step to gait, Path deviations, Trunk sway increased  Ambulation - Level of Assistance: Minimal assistance  Distance (ft): 16 Feet (ft)  Assistive Device: Gait belt, Walker, rolling        Physical Exam:  Gen: No acute distress. Neuro: A&Ox4. Follows commands. PERRL. Face symmetric. Left forehead swelling; slightly fluctuant; not warm. No drainage  HERNANDEZ spontaneously and purposefully. Generalized weakness. Sensation intact. Negative drift. Gait deferred. Skin: Incision C/D/I    24 hour results:    Recent Results (from the past 24 hour(s))   RBC, ALLOCATE    Collection Time: 02/26/23  3:30 PM   Result Value Ref Range    HISTORY CHECKED? Historical check performed    TYPE & SCREEN    Collection Time: 02/26/23  4:47 PM   Result Value Ref Range    Crossmatch Expiration 03/01/2023,2359     ABO/Rh(D) B POSITIVE     Antibody screen NEG     Unit number [id number]     Blood component type RC LR     Unit division 00     Status of unit ALLOCATED     Crossmatch result Compatible    HEPATIC FUNCTION PANEL    Collection Time: 02/27/23  2:24 AM   Result Value Ref Range    Protein, total 6.4 6.4 - 8.2 g/dL    Albumin 2.8 (L) 3.5 - 5.0 g/dL    Globulin 3.6 2.0 - 4.0 g/dL    A-G Ratio 0.8 (L) 1.1 - 2.2      Bilirubin, total 0.3 0.2 - 1.0 MG/DL    Bilirubin, direct 0.1 0.0 - 0.2 MG/DL    Alk.  phosphatase 75 45 - 117 U/L    AST (SGOT) 14 (L) 15 - 37 U/L    ALT (SGPT) <6 (L) 12 - 78 U/L   CBC W/O DIFF    Collection Time: 02/27/23  2:24 AM   Result Value Ref Range    WBC 8.6 4.1 - 11.1 K/uL    RBC 2.48 (L) 4.10 - 5.70 M/uL    HGB 7.1 (L) 12.1 - 17.0 g/dL    HCT 23.1 (L) 36.6 - 50.3 %    MCV 93.1 80.0 - 99.0 FL    MCH 28.6 26.0 - 34.0 PG    MCHC 30.7 30.0 - 36.5 g/dL    RDW 14.6 (H) 11.5 - 14.5 %    PLATELET 483 616 - 571 K/uL    MPV 10.6 8.9 - 12.9 FL    NRBC 0.5 (H) 0  WBC    ABSOLUTE NRBC 0.04 (H) 0.00 - 0.01 K/uL   PHOSPHORUS    Collection Time: 02/27/23  2:24 AM   Result Value Ref Range    Phosphorus 5.8 (H) 2.6 - 4.7 MG/DL   MAGNESIUM Collection Time: 02/27/23  2:24 AM   Result Value Ref Range    Magnesium 2.3 1.6 - 2.4 mg/dL   METABOLIC PANEL, BASIC    Collection Time: 02/27/23  2:24 AM   Result Value Ref Range    Sodium 127 (L) 136 - 145 mmol/L    Potassium 3.9 3.5 - 5.1 mmol/L    Chloride 90 (L) 97 - 108 mmol/L    CO2 27 21 - 32 mmol/L    Anion gap 10 5 - 15 mmol/L    Glucose 94 65 - 100 mg/dL    BUN 38 (H) 6 - 20 MG/DL    Creatinine 8.00 (H) 0.70 - 1.30 MG/DL    BUN/Creatinine ratio 5 (L) 12 - 20      eGFR 7 (L) >60 ml/min/1.73m2    Calcium 8.8 8.5 - 10.1 MG/DL   IRON PROFILE    Collection Time: 02/27/23  2:24 AM   Result Value Ref Range    Iron 41 35 - 150 ug/dL    TIBC 244 (L) 250 - 450 ug/dL    Iron % saturation 17 (L) 20 - 50 %        Assessment:     Active Problems:    Subdural hematoma (2/18/2023)    Plan:     s/p Left frontotemporal parietal craniotomy, evacuation of acute and chronic subdural hematoma 2/18   -GAVINO drain d/c'd 2/20  -PT/OT/SLP as able  -BP control per primary team  -Keppra 500mg; dialysis dosing   -CT head 2/23, 2/26 stable/improving    -q4hr neuro checks   -HD TTS; neprhology following   -Medical mgmt per hospitalist team    D/c to IPR when medically stable per primary  Plan d/w Dr. Sherwin Dunn, PA

## 2023-02-27 NOTE — PROGRESS NOTES
[facility]  Hospitalist Group                                                                                          Hospitalist Progress Note  Blessing Grant MD  Answering service: [phone] OR 36 from in house phone        Date of Service:  2023  NAME:  Sunni Feliz  :  1965  MRN:  [id number]       Admission Summary:   Sunni Feliz is a 62 y.o. male with medical history of ESRD on hypertension, CKD, and parenchymal liver disease who was admitted on the 2023 after presenting with headache that started during dialysis, and diagnosed with subdural hemorrhage. He underwent evacuation urgently by neurosurgery, and was admitted to the ICU for further care. Surgical drains have currently been removed status post left craniotomy. He is receiving carvedilol, hydralazine, labetalol, and nifedipine for blood pressure management to maintain SBP less than 140 and has no residual focal deficits. Interval history / Subjective:   BP better controlled, still complaining of headaches.        Assessment & Plan:        SDH:  - s/p left craniotomy and evacuation on ;   - Neurosurgery following;  - goal SBP <140 mmHg;   - continue Keppra for seizure prophylaxis;    HTN:  - uncontrolled;   - discontinue Coreg as patient is already on Labetalol;   - he is on Clonidine, Nifedipine, Bumex;   - : add Hydralazine 50 mg TID;   - : Increase hydralazine to 100 mg 3 times daily;  - : changing antihypertensives back to home meds;   -: Patient is back on his home medications, blood pressure slightly better controlled today;     Stress ulcer ppx  -continue PPI     ESRD, Hemodialysis dependent:  - appreciate Nephrology follow-up and rec's;         Code status: full  Prophylaxis: started on sqh by ICU team  Care Plan discussed with: Patient, family at the bedside, RN, YULI;  Anticipated Disposition:    -Completed peer to peer review today, patient approved for inpatient rehab, anticipate discharge tomorrow;       Hospital Problems  Date Reviewed: 2/18/2023            Codes Class Noted POA    Subdural hematoma ICD-10-CM: S06. 5XAA  ICD-9-CM: 432.1  2/18/2023 Unknown         Social Determinants of Health     Tobacco Use: High Risk    Smoking Tobacco Use: Some Days    Smokeless Tobacco Use: Never    Passive Exposure: Not on file   Alcohol Use: Not on file   Financial Resource Strain: Not on file   Food Insecurity: Not on file   Transportation Needs: Not on file   Physical Activity: Not on file   Stress: Not on file   Social Connections: Not on file   Intimate Partner Violence: Not on file   Depression: Not at risk    PHQ-2 Score: 0   Housing Stability: Not on file       Review of Systems:   A comprehensive review of systems was negative except for that written in the HPI. Vital Signs:    Last 24hrs VS reviewed since prior progress note.  Most recent are:  Visit Vitals  /64 (BP 1 Location: Left upper arm, BP Patient Position: At rest)   Pulse 97   Temp 97.8 °F (36.6 °C)   Resp 11   Ht 5' 9\" (1.753 m)   Wt 80.1 kg (176 lb 9.4 oz)   SpO2 96%   BMI 26.08 kg/m²       No intake or output data in the 24 hours ending 02/27/23 1838       Physical Examination:     I had a face to face encounter with this patient and independently examined them on 2/27/2023 as outlined below:          General :NAD, drowsy  HEENT: anicteric sclerae, wound covered, L periorbital swelling  Neck: supple, no JVD, no meningeal signs  Chest: Clear to auscultation bilaterally   CVS: S1 S2 heard, Capillary refill less than 2 seconds  Abd: soft/ non tender, non distended, BS physiological,   Ext: no clubbing, no cyanosis, no edema  Neuro/Psych: grossly non-focal  Skin: warm     Data Review:    Review and/or order of clinical lab test  Review and/or order of tests in the radiology section of CPT      I have personally and independently reviewed all pertinent labs, diagnostic studies, imaging, and have provided independent interpretation of the same. Labs:     Recent Labs     02/27/23 0224 02/26/23 0110   WBC 8.6 8.2   HGB 7.1* 7.1*   HCT 23.1* 22.1*    201       Recent Labs     02/27/23 0224 02/26/23 0110 02/25/23  0705   * 130* 128*   K 3.9 3.5 4.1   CL 90* 94* 92*   CO2 27 28 26   BUN 38* 23* 41*   CREA 8.00* 5.63* 9.21*   GLU 94 127* 90   CA 8.8 8.5 8.7   MG 2.3  --   --    PHOS 5.8*  --   --        Recent Labs     02/27/23 0224 02/26/23 0110 02/25/23  0705   ALT <6* <6* <6*   AP 75 74 74   TBILI 0.3 0.3 0.3   TP 6.4 6.9 7.0   ALB 2.8* 2.7* 2.7*   GLOB 3.6 4.2* 4.3*       No results for input(s): INR, PTP, APTT, INREXT, INREXT in the last 72 hours. Recent Labs     02/27/23 0224   TIBC 244*   PSAT 17*        Lab Results   Component Value Date/Time    Folate 7.9 05/18/2022 12:18 PM        No results for input(s): PH, PCO2, PO2 in the last 72 hours. No results for input(s): CPK, CKNDX, TROIQ in the last 72 hours.     No lab exists for component: CPKMB  Lab Results   Component Value Date/Time    Cholesterol, total 170 06/29/2020 03:46 AM    HDL Cholesterol 49 06/29/2020 03:46 AM    LDL, calculated 73.4 06/29/2020 03:46 AM    Triglyceride 238 (H) 06/29/2020 03:46 AM    CHOL/HDL Ratio 3.5 06/29/2020 03:46 AM     Lab Results   Component Value Date/Time    Glucose (POC) 112 11/30/2022 12:08 PM    Glucose (POC) 82 11/30/2022 08:34 AM    Glucose (POC) 112 11/29/2022 09:35 PM    Glucose (POC) 145 (H) 11/29/2022 12:26 PM    Glucose (POC) 97 09/27/2022 06:48 AM     Lab Results   Component Value Date/Time    Color YELLOW/STRAW 03/22/2022 04:11 PM    Appearance TURBID (A) 03/22/2022 04:11 PM    Specific gravity 1.021 03/22/2022 04:11 PM    pH (UA) 7.5 03/22/2022 04:11 PM    Protein >300 (A) 03/22/2022 04:11 PM    Glucose 100 (A) 03/22/2022 04:11 PM    Ketone Negative 03/22/2022 04:11 PM    Bilirubin Negative 03/22/2022 04:11 PM    Urobilinogen 0.2 03/22/2022 04:11 PM    Nitrites Negative 03/22/2022 04:11 PM    Leukocyte Esterase MODERATE (A) 03/22/2022 04:11 PM    Epithelial cells MODERATE (A) 03/22/2022 04:11 PM    Bacteria Negative 03/22/2022 04:11 PM    WBC 10-20 03/22/2022 04:11 PM    RBC 0-5 03/22/2022 04:11 PM       Notes reviewed from all clinical/nonclinical/nursing services involved in patient's clinical care. Care coordination discussions were held with appropriate clinical/nonclinical/ nursing providers based on care coordination needs. Patients current active Medications were reviewed, considered, added and adjusted based on the clinical condition today. Home Medications were reconciled to the best of my ability given all available resources at the time of admission. Route is PO if not otherwise noted.       Admission Status:[id number]:::1}      Medications Reviewed:     Current Facility-Administered Medications   Medication Dose Route Frequency    hydrALAZINE (APRESOLINE) tablet 100 mg  100 mg Oral TID    ibuprofen (MOTRIN) tablet 400 mg  400 mg Oral Q6H PRN    magnesium sulfate 1 g/100 ml IVPB (premix or compounded)  1 g IntraVENous ONCE    diphenhydrAMINE (BENADRYL) capsule 25 mg  25 mg Oral Q6H PRN    carvediloL (COREG) tablet 25 mg  25 mg Oral BID WITH MEALS    aliskiren (TEKTURNA) tablet 300 mg  300 mg Oral DAILY    doxazosin (CARDURA) tablet 8 mg  8 mg Oral QHS    minoxidiL (LONITEN) tablet 10 mg  10 mg Oral BID    hydrALAZINE (APRESOLINE) 20 mg/mL injection 20 mg  20 mg IntraVENous Q6H PRN    cloNIDine HCL (CATAPRES) tablet 0.3 mg  0.3 mg Oral Q8H    0.9% sodium chloride infusion 250 mL  250 mL IntraVENous PRN    NIFEdipine ER (PROCARDIA XL) tablet 90 mg  90 mg Oral DAILY    levETIRAcetam (KEPPRA) tablet 500 mg  500 mg Oral DAILY    And    levETIRAcetam (KEPPRA) tablet 500 mg  500 mg Oral DIALYSIS TUE, THU & SAT    pantoprazole (PROTONIX) tablet 40 mg  40 mg Oral ACB    0.9% sodium chloride infusion 250 mL  250 mL IntraVENous PRN    epoetin shawn-epbx (RETACRIT) injection 20,000 Units  20,000 Units SubCUTAneous Q TUE, THU & SAT    sodium chloride (NS) flush 5-40 mL  5-40 mL IntraVENous Q8H    ondansetron (ZOFRAN) injection 4 mg  4 mg IntraVENous Q4H PRN    acetaminophen (TYLENOL) tablet 650 mg  650 mg Oral Q6H PRN    Or    acetaminophen (TYLENOL) suppository 650 mg  650 mg Rectal Q6H PRN    polyethylene glycol (MIRALAX) packet 17 g  17 g Oral DAILY PRN    labetaloL (NORMODYNE;TRANDATE) injection 20 mg  20 mg IntraVENous Q4H PRN    bumetanide (BUMEX) tablet 2 mg  2 mg Oral DAILY     ______________________________________________________________________  EXPECTED LENGTH OF STAY: 4d 12h  ACTUAL LENGTH OF STAY:          9                 Viki Moreno MD

## 2023-02-27 NOTE — PROGRESS NOTES
Problem: Mobility Impaired (Adult and Pediatric)  Goal: *Acute Goals and Plan of Care (Insert Text)  Description:   FUNCTIONAL STATUS PRIOR TO ADMISSION: Patient was independent and active without use of DME.    HOME SUPPORT PRIOR TO ADMISSION: The patient lived with family but did not require assist.    Physical Therapy Goals  Initiated 2/21/2023  1. Patient will move from supine to sit and sit to supine  and roll side to side in bed with supervision/set-up within 7 day(s). 2.  Patient will transfer from bed to chair and chair to bed with minimal assistance/contact guard assist using the least restrictive device within 7 day(s). 3.  Patient will perform sit to stand with minimal assistance within 7 day(s). 4.  Patient will ambulate with minimal assistance for 25 feet with the least restrictive device within 7 day(s). 5.  Patient will improve Olivas Balance score by 7 points within 7 days. Outcome: Progressing Towards Goal     PHYSICAL THERAPY TREATMENT  Patient: Mary Paulino (88 y.o. male)  Date: 2/27/2023  Diagnosis: Subdural hematoma [S06. 5XAA] <principal problem not specified>  Procedure(s) (LRB):  LEFT SUBDURAL CRANIOTOMY (Left) 9 Days Post-Op  Precautions: Fall (SBP <160)  Chart, physical therapy assessment, plan of care and goals were reviewed. ASSESSMENT  Patient continues with skilled PT services and is progressing towards goals. Patient received in bed asleep but easily wakes up and agreeable to treatment. He is very lethargic throughout treatment, requires constant cues to remain alert/awake. Per RN, patient received Benadryl this AM so may be drowsy from this. Patient BP is stable in lying and sitting. With standing, pressure increases a bit. No c/o of HA or dizziness. Agreeable to gait training in room. Patient remains impulsive with gait, required min A to slow down pattern with AD, pushes AD far in front of him. Shuffling gait and decreased step clearance.  No lateral leans noted today in standing or gait training which is an improvement. BP noted to be slightly elevated above parameters immediately after in room mobility. Patient is up in chair on alarm with all needs met. Eating breakfast. Alerted RN to session. Patient remains well below functional baseline and is an excellent candidate for IPR at discharge. Will continue to follow. Vitals:     02/27/23 0858 02/27/23 0903 02/27/23 0907 02/27/23 0911   BP: 120/71 135/74 (!) 149/74 (!) 172/78   BP 1 Location: Left upper arm Left upper arm Left upper arm Left upper arm   BP Patient Position: Lying left side Sitting Standing Sitting; Other (Comment)  Comment: post gait training/transfers   Pulse: 82 87 90 90   Temp:           TempSrc:           Resp:           Height:           Weight:           SpO2:                Current Level of Function Impacting Discharge (mobility/balance): min A for supine to sit, CGA for transfers, CGA to min A for gait training with RW up to 15ft    Other factors to consider for discharge: high PLOF         PLAN :  Patient continues to benefit from skilled intervention to address the above impairments. Continue treatment per established plan of care. to address goals. Recommendation for discharge: (in order for the patient to meet his/her long term goals)  Therapy 3 hours per day 5-7 days per week    This discharge recommendation:  Has been made in collaboration with the attending provider and/or case management    IF patient discharges home will need the following DME: to be determined (TBD)       SUBJECTIVE:   Patient stated Elis Postal is my meat?  Disappointed that he has no meat on his breakfast tray. Slightly irritable.     OBJECTIVE DATA SUMMARY:   Critical Behavior:  Neurologic State: Alert, Drowsy  Orientation Level: Oriented X4  Cognition: Poor safety awareness, Follows commands  Safety/Judgement: Awareness of environment, Decreased awareness of need for assistance, Decreased awareness of need for safety, Decreased insight into deficits, Fall prevention  Functional Mobility Training:  Bed Mobility:  Rolling: Contact guard assistance  Supine to Sit: Minimum assistance for management of BLE (L>R)       Transfers:  Sit to Stand: Contact guard assistance  Stand to Sit: Contact guard assistance        Bed to Chair: Contact guard assistance;Minimum assistance    Balance:  Sitting: Impaired;High guard  Sitting - Static: Fair (occasional)  Sitting - Dynamic: Fair (occasional); Poor (constant support)  Standing: Impaired; With support  Standing - Static: Fair;Poor;Constant support  Standing - Dynamic : Fair;Poor;Constant support  Ambulation/Gait Training:  Distance (ft): 15 Feet (ft)  Assistive Device: Walker, rolling;Gait belt  Ambulation - Level of Assistance: Contact guard assistance;Minimal assistance        Gait Abnormalities: Decreased step clearance; Path deviations; Shuffling gait        Base of Support: Center of gravity altered; Widened  Stance: Right increased; Left increased  Speed/Shayna: Pace decreased (<100 feet/min); Slow;Shuffled  Step Length: Right shortened;Left shortened          Pain Rating:  No pain    Activity Tolerance:   Fair, SpO2 stable on RA, and requires rest breaks    After treatment patient left in no apparent distress:   Sitting in chair, Call bell within reach, and Bed / chair alarm activated    COMMUNICATION/COLLABORATION:   The patients plan of care was discussed with: Occupational therapist, Registered nurse, and Case management. Patient was educated regarding His deficit(s) of imbalance, gait impairments, LLE weakness as this relates to His diagnosis of SDH/crani. He demonstrated Fair understanding as evidenced by discussion of deficits. Patient and/or family was verbally educated on the BE FAST acronym for signs/symptoms of CVA and TIA. BE FAST was written on patient's communication board  for visual education and reinforcement.   All questions answered with patient indicating poor understanding.      Olaf Farooq, PT   Time Calculation: 18 mins

## 2023-02-27 NOTE — PROGRESS NOTES
Nephrology Progress Note  Vaibhav Hogan  Date of Admission : 2/18/2023    CC: Follow up for ESRD       Assessment and Plan     ESRD on HD:  -  [address]  - HD tomorrow per routine    Hyponatremia :  - advised FR     SDH s/p craniotomy:  - per NSG    Malignant Resistant HTN:  - prior secondary work up negative   - check UDS again   - added Hydralazine   - may need to consider B/L nephrectomy vs sympathetic denervation     Anemia of CKD:  - ABI TTS  - check iron profile   - transfuse if Hb < 7 g    Secondary HPT  - continue home Phos binders        Interval History:  Seen and examined. Reports feeling better. BP still elevated   Na low and addressed w/ pt     Current Medications: all current  Medications have been eviewed in EPIC  Review of Systems: Pertinent items are noted in HPI. Objective:  Vitals:    Vitals:    02/27/23 0216 02/27/23 0400 02/27/23 0540 02/27/23 0611   BP:    (!) 173/78   Pulse: 80 70 74 83   Resp:    17   Temp:    97.9 °F (36.6 °C)   TempSrc:       SpO2:       Weight:       Height:         Intake and Output:  No intake/output data recorded. No intake/output data recorded. Physical Examination:    General: Awake, alert  Neck:  Supple, no mass  Resp:  Lungs CTA B/L, no wheezing , normal respiratory effort  CV:  RRR,  no murmur or rub, trace LE edema  GI:  Soft, NT, + Bowel sounds, no hepatosplenomegaly  Neurologic:  Non focal  Psych:             AAO x 3 appropriate affect   Skin:  No Rash  Access:           [facility]    []    High complexity decision making was performed  []    Patient is at high-risk of decompensation with multiple organ involvement    Lab Data Personally Reviewed: I have reviewed all the pertinent labs, microbiology data and radiology studies during assessment.     Recent Labs     02/27/23  0224 02/26/23  0110 02/25/23  0705   * 130* 128*   K 3.9 3.5 4.1   CL 90* 94* 92*   CO2 27 28 26   GLU 94 127* 90   BUN 38* 23* 41*   CREA 8.00* 5.63* 9.21*   CA 8.8 8.5 8.7   MG 2.3  --   --    PHOS 5.8*  --   --    ALB 2.8* 2.7* 2.7*   ALT <6* <6* <6*       Recent Labs     02/27/23  0224 02/26/23  0110 02/25/23  0705   WBC 8.6 8.2 9.6   HGB 7.1* 7.1* 7.2*   HCT 23.1* 22.1* 23.2*    201 210       No results found for: SDES  Lab Results   Component Value Date/Time    Culture result: NO GROWTH 5 DAYS 05/18/2022 06:06 AM    Culture result: No growth (<1,000 CFU/ML) 03/22/2022 04:11 PM     Recent Results (from the past 24 hour(s))   RBC, ALLOCATE    Collection Time: 02/26/23  3:30 PM   Result Value Ref Range    HISTORY CHECKED? Historical check performed    TYPE & SCREEN    Collection Time: 02/26/23  4:47 PM   Result Value Ref Range    Crossmatch Expiration 03/01/2023,2359     ABO/Rh(D) B POSITIVE     Antibody screen NEG     Unit number [id number]     Blood component type  LR     Unit division 00     Status of unit ALLOCATED     Crossmatch result Compatible    HEPATIC FUNCTION PANEL    Collection Time: 02/27/23  2:24 AM   Result Value Ref Range    Protein, total 6.4 6.4 - 8.2 g/dL    Albumin 2.8 (L) 3.5 - 5.0 g/dL    Globulin 3.6 2.0 - 4.0 g/dL    A-G Ratio 0.8 (L) 1.1 - 2.2      Bilirubin, total 0.3 0.2 - 1.0 MG/DL    Bilirubin, direct 0.1 0.0 - 0.2 MG/DL    Alk.  phosphatase 75 45 - 117 U/L    AST (SGOT) 14 (L) 15 - 37 U/L    ALT (SGPT) <6 (L) 12 - 78 U/L   CBC W/O DIFF    Collection Time: 02/27/23  2:24 AM   Result Value Ref Range    WBC 8.6 4.1 - 11.1 K/uL    RBC 2.48 (L) 4.10 - 5.70 M/uL    HGB 7.1 (L) 12.1 - 17.0 g/dL    HCT 23.1 (L) 36.6 - 50.3 %    MCV 93.1 80.0 - 99.0 FL    MCH 28.6 26.0 - 34.0 PG    MCHC 30.7 30.0 - 36.5 g/dL    RDW 14.6 (H) 11.5 - 14.5 %    PLATELET 293 047 - 656 K/uL    MPV 10.6 8.9 - 12.9 FL    NRBC 0.5 (H) 0  WBC    ABSOLUTE NRBC 0.04 (H) 0.00 - 0.01 K/uL   PHOSPHORUS    Collection Time: 02/27/23  2:24 AM   Result Value Ref Range    Phosphorus 5.8 (H) 2.6 - 4.7 MG/DL   MAGNESIUM    Collection Time: 02/27/23  2:24 AM   Result Value Ref Range    Magnesium 2.3 1.6 - 2.4 mg/dL   METABOLIC PANEL, BASIC    Collection Time: 02/27/23  2:24 AM   Result Value Ref Range    Sodium 127 (L) 136 - 145 mmol/L    Potassium 3.9 3.5 - 5.1 mmol/L    Chloride 90 (L) 97 - 108 mmol/L    CO2 27 21 - 32 mmol/L    Anion gap 10 5 - 15 mmol/L    Glucose 94 65 - 100 mg/dL    BUN 38 (H) 6 - 20 MG/DL    Creatinine 8.00 (H) 0.70 - 1.30 MG/DL    BUN/Creatinine ratio 5 (L) 12 - 20      eGFR 7 (L) >60 ml/min/1.73m2    Calcium 8.8 8.5 - 10.1 MG/DL                 Myrna Toussaint MD  [address]  Phone - [phone]   Fax - [phone]  [email or web address]

## 2023-02-27 NOTE — PROGRESS NOTES
Problem: Patient Education: Go to Patient Education Activity  Goal: Patient/Family Education  Outcome: Progressing Towards Goal     Problem: Hemorrhagic Stroke: Admission Day (0-3 hours)  Goal: Off Pathway (Use only if patient is Off Pathway)  Outcome: Progressing Towards Goal     Problem: Hemorrhagic Stroke: Day 2  Goal: Off Pathway (Use only if patient is Off Pathway)  Outcome: Progressing Towards Goal  Goal: Activity/Safety  Outcome: Progressing Towards Goal  Goal: Diagnostic Test/Procedures  Outcome: Progressing Towards Goal  Goal: Nutrition/Diet  Outcome: Progressing Towards Goal  Goal: Medications  Outcome: Progressing Towards Goal  Goal: Respiratory  Outcome: Progressing Towards Goal  Goal: Treatments/Interventions/Procedures  Outcome: Progressing Towards Goal  Goal: Psychosocial  Outcome: Progressing Towards Goal  Goal: *Hemodynamically stable  Outcome: Progressing Towards Goal  Goal: *Verbalizes anxiety and depression are reduced or absent  Outcome: Progressing Towards Goal  Goal: *Absence of aspiration  Outcome: Progressing Towards Goal  Goal: *Absence of signs and symptoms of DVT  Outcome: Progressing Towards Goal  Goal: *Optimal pain control at patient's stated goal  Outcome: Progressing Towards Goal  Goal: *Tolerating diet  Outcome: Progressing Towards Goal  Goal: *Progressive mobility and function  Outcome: Progressing Towards Goal  Goal: *Rehabilitation readiness  Outcome: Progressing Towards Goal     Problem: Hemorrhagic Stroke: Day 3  Goal: Off Pathway (Use only if patient is Off Pathway)  Outcome: Progressing Towards Goal  Goal: Activity/Safety  Outcome: Progressing Towards Goal  Goal: Consults, if ordered  Outcome: Progressing Towards Goal  Goal: Diagnostic Test/Procedures  Outcome: Progressing Towards Goal  Goal: Nutrition/Diet  Outcome: Progressing Towards Goal  Goal: Medications  Outcome: Progressing Towards Goal  Goal: Respiratory  Outcome: Progressing Towards Goal  Goal: Treatments/Interventions/Procedures  Outcome: Progressing Towards Goal  Goal: Psychosocial  Outcome: Progressing Towards Goal  Goal: *Hemodynamically stable  Outcome: Progressing Towards Goal  Goal: *Verbalizes anxiety and depression are reduced or absent  Outcome: Progressing Towards Goal  Goal: *Absence of aspiration  Outcome: Progressing Towards Goal  Goal: *Absence of signs and symptoms of DVT  Outcome: Progressing Towards Goal  Goal: *Optimal pain control at patient's stated goal  Outcome: Progressing Towards Goal  Goal: *Tolerating diet  Outcome: Progressing Towards Goal  Goal: *Progressive mobility and function  Outcome: Progressing Towards Goal  Goal: *Rehabilitation readiness  Outcome: Progressing Towards Goal     Problem: Hemorrhagic Stroke: Day 4  Goal: Off Pathway (Use only if patient is Off Pathway)  Outcome: Progressing Towards Goal  Goal: Activity/Safety  Outcome: Progressing Towards Goal  Goal: Consults, if ordered  Outcome: Progressing Towards Goal  Goal: Diagnostic Test/Procedures  Outcome: Progressing Towards Goal  Goal: Nutrition/Diet  Outcome: Progressing Towards Goal  Goal: Medications  Outcome: Progressing Towards Goal  Goal: Respiratory  Outcome: Progressing Towards Goal  Goal: Treatments/Interventions/Procedures  Outcome: Progressing Towards Goal  Goal: Psychosocial  Outcome: Progressing Towards Goal  Goal: *Hemodynamically stable  Outcome: Progressing Towards Goal  Goal: *Verbalizes anxiety and depression are reduced or absent  Outcome: Progressing Towards Goal  Goal: *Absence of aspiration  Outcome: Progressing Towards Goal  Goal: *Absence of signs and symptoms of DVT  Outcome: Progressing Towards Goal  Goal: *Optimal pain control at patient's stated goal  Outcome: Progressing Towards Goal  Goal: *Tolerating diet  Outcome: Progressing Towards Goal  Goal: *Progressive mobility and function  Outcome: Progressing Towards Goal  Goal: *Rehabilitation readiness  Outcome: Progressing Towards Goal     Problem: Hemorrhagic Stroke: Day 5 through Discharge  Goal: Off Pathway (Use only if patient is Off Pathway)  Outcome: Progressing Towards Goal  Goal: Activity/Safety  Outcome: Progressing Towards Goal  Goal: Consults, if ordered  Outcome: Progressing Towards Goal  Goal: Diagnostic Test/Procedures  Outcome: Progressing Towards Goal  Goal: Nutrition/Diet  Outcome: Progressing Towards Goal  Goal: Medications  Outcome: Progressing Towards Goal  Goal: Respiratory  Outcome: Progressing Towards Goal  Goal: Treatments/Interventions/Procedures  Outcome: Progressing Towards Goal  Goal: Psychosocial  Outcome: Progressing Towards Goal  Goal: *Hemodynamically stable  Outcome: Progressing Towards Goal  Goal: *Verbalizes anxiety and depression are reduced or absent  Outcome: Progressing Towards Goal  Goal: *Absence of aspiration  Outcome: Progressing Towards Goal  Goal: *Absence of signs and symptoms of DVT  Outcome: Progressing Towards Goal  Goal: *Optimal pain control at patient's stated goal  Outcome: Progressing Towards Goal  Goal: *Tolerating diet  Outcome: Progressing Towards Goal  Goal: *Progressive mobility and function  Outcome: Progressing Towards Goal  Goal: *Rehabilitation readiness  Outcome: Progressing Towards Goal

## 2023-02-27 NOTE — PROGRESS NOTES
Transition of Care Plan  RUR- High 23%  DISPOSITION: IPR - [facility] - P2P approved, likely DC 2/28  F/U with PCP/Specialist    [facility]: [facility] call    Emergency contact: DaughterLuis Fernando [phone]    CM barriers to discharge: Insurance auth    Patient's P2P has been approved for IPR at [facility]. Not medically stable today per Dr. Rayo Taveras, plan for DC tomorrow. CM will follow. Bg Model) ISAIAS Medina.

## 2023-02-28 VITALS
BODY MASS INDEX: 26.64 KG/M2 | WEIGHT: 179.9 LBS | DIASTOLIC BLOOD PRESSURE: 56 MMHG | RESPIRATION RATE: 18 BRPM | OXYGEN SATURATION: 99 % | TEMPERATURE: 98.9 F | HEART RATE: 108 BPM | SYSTOLIC BLOOD PRESSURE: 108 MMHG | HEIGHT: 69 IN

## 2023-02-28 LAB
ALBUMIN SERPL-MCNC: 2.8 G/DL (ref 3.5–5)
ALBUMIN SERPL-MCNC: 2.9 G/DL (ref 3.5–5)
ALBUMIN/GLOB SERPL: 0.7 (ref 1.1–2.2)
ALBUMIN/GLOB SERPL: 0.8 (ref 1.1–2.2)
ALP SERPL-CCNC: 91 U/L (ref 45–117)
ALP SERPL-CCNC: 92 U/L (ref 45–117)
ALT SERPL-CCNC: 6 U/L (ref 12–78)
ALT SERPL-CCNC: 6 U/L (ref 12–78)
ANION GAP SERPL CALC-SCNC: 14 MMOL/L (ref 5–15)
AST SERPL-CCNC: 14 U/L (ref 15–37)
AST SERPL-CCNC: 14 U/L (ref 15–37)
BILIRUB DIRECT SERPL-MCNC: <0.1 MG/DL (ref 0–0.2)
BILIRUB SERPL-MCNC: 0.3 MG/DL (ref 0.2–1)
BILIRUB SERPL-MCNC: 0.3 MG/DL (ref 0.2–1)
BUN SERPL-MCNC: 48 MG/DL (ref 6–20)
BUN/CREAT SERPL: 5 (ref 12–20)
CALCIUM SERPL-MCNC: 8.6 MG/DL (ref 8.5–10.1)
CHLORIDE SERPL-SCNC: 87 MMOL/L (ref 97–108)
CO2 SERPL-SCNC: 24 MMOL/L (ref 21–32)
CREAT SERPL-MCNC: 10.4 MG/DL (ref 0.7–1.3)
ERYTHROCYTE [DISTWIDTH] IN BLOOD BY AUTOMATED COUNT: 14.7 % (ref 11.5–14.5)
GLOBULIN SER CALC-MCNC: 3.7 G/DL (ref 2–4)
GLOBULIN SER CALC-MCNC: 3.9 G/DL (ref 2–4)
GLUCOSE SERPL-MCNC: 101 MG/DL (ref 65–100)
HCT VFR BLD AUTO: 22.9 % (ref 36.6–50.3)
HGB BLD-MCNC: 7.4 G/DL (ref 12.1–17)
MCH RBC QN AUTO: 29.2 PG (ref 26–34)
MCHC RBC AUTO-ENTMCNC: 32.3 G/DL (ref 30–36.5)
MCV RBC AUTO: 90.5 FL (ref 80–99)
NRBC # BLD: 0.05 K/UL (ref 0–0.01)
NRBC BLD-RTO: 0.5 PER 100 WBC
PLATELET # BLD AUTO: 256 K/UL (ref 150–400)
PMV BLD AUTO: 10.8 FL (ref 8.9–12.9)
POTASSIUM SERPL-SCNC: 4.2 MMOL/L (ref 3.5–5.1)
PROT SERPL-MCNC: 6.6 G/DL (ref 6.4–8.2)
PROT SERPL-MCNC: 6.7 G/DL (ref 6.4–8.2)
RBC # BLD AUTO: 2.53 M/UL (ref 4.1–5.7)
SODIUM SERPL-SCNC: 125 MMOL/L (ref 136–145)
WBC # BLD AUTO: 9.1 K/UL (ref 4.1–11.1)

## 2023-02-28 PROCEDURE — 90935 HEMODIALYSIS ONE EVALUATION: CPT

## 2023-02-28 PROCEDURE — 74011250637 HC RX REV CODE- 250/637: Performed by: INTERNAL MEDICINE

## 2023-02-28 PROCEDURE — 36415 COLL VENOUS BLD VENIPUNCTURE: CPT

## 2023-02-28 PROCEDURE — 80053 COMPREHEN METABOLIC PANEL: CPT

## 2023-02-28 PROCEDURE — 80076 HEPATIC FUNCTION PANEL: CPT

## 2023-02-28 PROCEDURE — 74011250637 HC RX REV CODE- 250/637: Performed by: HOSPITALIST

## 2023-02-28 PROCEDURE — 74011250637 HC RX REV CODE- 250/637: Performed by: ANESTHESIOLOGY

## 2023-02-28 PROCEDURE — 85027 COMPLETE CBC AUTOMATED: CPT

## 2023-02-28 PROCEDURE — 74011000250 HC RX REV CODE- 250: Performed by: NEUROLOGICAL SURGERY

## 2023-02-28 RX ORDER — HYDRALAZINE HYDROCHLORIDE 100 MG/1
100 TABLET, FILM COATED ORAL 3 TIMES DAILY
Qty: 90 TABLET | Refills: 0 | Status: SHIPPED | OUTPATIENT
Start: 2023-02-28 | End: 2023-03-30

## 2023-02-28 RX ORDER — LEVETIRACETAM 500 MG/1
500 TABLET ORAL
Qty: 12 TABLET | Refills: 0 | Status: SHIPPED | OUTPATIENT
Start: 2023-02-28 | End: 2023-03-30

## 2023-02-28 RX ORDER — LEVETIRACETAM 500 MG/1
500 TABLET ORAL DAILY
Qty: 30 TABLET | Refills: 0 | Status: SHIPPED | OUTPATIENT
Start: 2023-02-28 | End: 2023-03-30

## 2023-02-28 RX ORDER — NIFEDIPINE 90 MG/1
90 TABLET, EXTENDED RELEASE ORAL DAILY
Qty: 30 TABLET | Refills: 0 | Status: SHIPPED | OUTPATIENT
Start: 2023-02-28 | End: 2023-03-30

## 2023-02-28 RX ORDER — CLONIDINE HYDROCHLORIDE 0.3 MG/1
0.3 TABLET ORAL EVERY 8 HOURS
Qty: 90 TABLET | Refills: 0 | Status: SHIPPED | OUTPATIENT
Start: 2023-02-28 | End: 2023-03-30

## 2023-02-28 RX ORDER — PANTOPRAZOLE SODIUM 40 MG/1
40 TABLET, DELAYED RELEASE ORAL DAILY
Qty: 30 TABLET | Refills: 0 | Status: SHIPPED | OUTPATIENT
Start: 2023-02-28 | End: 2023-03-30

## 2023-02-28 RX ADMIN — LEVETIRACETAM 500 MG: 500 TABLET, FILM COATED ORAL at 10:16

## 2023-02-28 RX ADMIN — HYDRALAZINE HYDROCHLORIDE 100 MG: 50 TABLET, FILM COATED ORAL at 10:16

## 2023-02-28 RX ADMIN — SODIUM CHLORIDE, PRESERVATIVE FREE 10 ML: 5 INJECTION INTRAVENOUS at 05:29

## 2023-02-28 RX ADMIN — BUMETANIDE 2 MG: 1 TABLET ORAL at 10:16

## 2023-02-28 RX ADMIN — CLONIDINE HYDROCHLORIDE 0.3 MG: 0.1 TABLET ORAL at 14:41

## 2023-02-28 RX ADMIN — MINOXIDIL 10 MG: 10 TABLET ORAL at 10:16

## 2023-02-28 RX ADMIN — ALISKIREN HEMIFUMARATE 300 MG: 300 TABLET, FILM COATED ORAL at 10:16

## 2023-02-28 RX ADMIN — MINOXIDIL 10 MG: 10 TABLET ORAL at 18:20

## 2023-02-28 RX ADMIN — SODIUM CHLORIDE, PRESERVATIVE FREE 10 ML: 5 INJECTION INTRAVENOUS at 14:00

## 2023-02-28 RX ADMIN — PANTOPRAZOLE SODIUM 40 MG: 40 TABLET, DELAYED RELEASE ORAL at 10:15

## 2023-02-28 RX ADMIN — CARVEDILOL 25 MG: 12.5 TABLET, FILM COATED ORAL at 10:16

## 2023-02-28 RX ADMIN — NIFEDIPINE 90 MG: 30 TABLET, EXTENDED RELEASE ORAL at 10:16

## 2023-02-28 RX ADMIN — CARVEDILOL 25 MG: 12.5 TABLET, FILM COATED ORAL at 18:20

## 2023-02-28 NOTE — PROGRESS NOTES
I have reviewed discharge instructions with the patient. The patient and caregiver verbalized understanding. Pt being transferred to [facility] and writer called report to Sandy the accepting  nurse. Vitals stable and pt transported to [place] via wheelchair without incident.

## 2023-02-28 NOTE — DISCHARGE SUMMARY
Physician Discharge Summary     Patient ID:    Darleen Bonds  [id number]  :  1965    Admit date: 2023    Discharge date and time: 2023    DISCHARGE CONDITION: Stable    Hospital Diagnoses and Treatment Rendered:   HPI:  Darleen Bonds is a 62 y.o. male with medical history of ESRD on hypertension, CKD, and parenchymal liver disease who was admitted on , after presenting with headache that started during dialysis, and diagnosed with subdural hemorrhage. He underwent evacuation urgently by neurosurgery, and was admitted to the ICU for further care. Once his condition stabilized, he was transferred to the hospitalist team.     Surgical drains have currently been removed status post left craniotomy. He is receiving carvedilol, hydralazine, labetalol, and nifedipine for blood pressure management to maintain SBP less than 140 mmHg. HOSPITAL COURSE:   SDH:  - s/p left craniotomy and evacuation on ;   - Neurosurgery following;  - goal SBP <140 mmHg;   - continue Keppra for seizure prophylaxis;  - Outpatient follow-up with neurosurgery;     HTN:  - malignant, resistant;   - Patient is back on his home medications;  - added Hydralazine, and Procardia;  - outpatient follow-up with nephrology may need to consider bilateral nephrectomy versus sympathetic denervation for better control of hypertension;     Stress ulcer ppx  -continue PPI     ESRD, Hemodialysis dependent:  - appreciate Nephrology follow-up and rec's;   -Outpatient dialysis schedule: Tuesday/Thursday/Saturday;          Chronic Diagnoses:    Problem List as of 2023 Date Reviewed: 2023            Codes Class Noted - Resolved    Subdural hematoma ICD-10-CM: S06. 5XAA  ICD-9-CM: 432.1  2023 - Present        Hypertensive urgency, malignant ICD-10-CM: I16.0  ICD-9-CM: 401.0  2022 - Present        HTN (hypertension), malignant ICD-10-CM: I10  ICD-9-CM: 401.0  2022 - Present Seizures ([facility] ICD-10-CM: R56.9  ICD-9-CM: 780.39  5/30/2022 - Present        Malignant hypertension ICD-10-CM: I10  ICD-9-CM: 401.0  5/17/2022 - Present        Enteritis ICD-10-CM: K52.9  ICD-9-CM: 558.9  5/17/2022 - Present        Bone metastasis ([place] [address].) ICD-10-CM: C79.51  ICD-9-CM: 198.5  5/17/2022 - Present        ESRD needing dialysis ([facility] ICD-10-CM: N18.6, Z99.2  ICD-9-CM: 585.6  3/29/2022 - Present        Duodenitis ICD-10-CM: K29.80  ICD-9-CM: 535.60  3/25/2022 - Present        Peptic ulcer disease ICD-10-CM: K27.9  ICD-9-CM: 533.90  3/22/2022 - Present        Intractable abdominal pain ICD-10-CM: R10.9  ICD-9-CM: 789.00  3/22/2022 - Present        ESRD (end stage renal disease) ([facility] ICD-10-CM: N18.6  ICD-9-CM: 585.6  3/22/2022 - Present        Non-compliance ICD-10-CM: Z91.199  ICD-9-CM: V15.81  3/12/2022 - Present        ESRD (end stage renal disease) on dialysis [facility]) ICD-10-CM: N18.6, Z99.2  ICD-9-CM: 585.6, V45.11  3/12/2022 - Present        Hypertensive urgency ICD-10-CM: I16.0  ICD-9-CM: 401.9  3/12/2022 - Present        Hypertensive emergency ICD-10-CM: I16.1  ICD-9-CM: 401.9  1/15/2022 - Present        Iron deficiency anemia ICD-10-CM: D50.9  ICD-9-CM: 280.9  2/15/2021 - Present        Chronic hepatitis C ([facility] ICD-10-CM: B18.2  ICD-9-CM: 070.54  1/15/2021 - Present        CKD (chronic kidney disease) ICD-10-CM: N18.9  ICD-9-CM: 585.9  6/13/2020 - Present        Chest pain ICD-10-CM: R07.9  ICD-9-CM: 786.50  5/10/2019 - Present        Hypertension ICD-10-CM: I10  ICD-9-CM: 401.9  5/10/2019 - Present        RESOLVED: Acute renal failure (ARF) ([facility] ICD-10-CM: N17.9  ICD-9-CM: 584.9  6/28/2020 - 1/15/2021        RESOLVED: Elevated troponin ICD-10-CM: R77.8  ICD-9-CM: 790.6  6/13/2020 - 1/15/2021        RESOLVED: Cocaine abuse ([facility] ICD-10-CM: F14.10  ICD-9-CM: 305.60  5/10/2019 - 5/24/2019           Discharge Medications:     Current Discharge Medication List        START taking these medications Details   hydrALAZINE (APRESOLINE) 100 mg tablet Take 1 Tablet by mouth three (3) times daily for 30 days. Qty: 90 Tablet, Refills: 0  Start date: 2/28/2023, End date: 3/30/2023      !! levETIRAcetam (KEPPRA) 500 mg tablet Take 1 Tablet by mouth Every Tuesday, Thursday and Saturday for 30 days. Qty: 12 Tablet, Refills: 0  Start date: 2/28/2023, End date: 3/30/2023      NIFEdipine ER (PROCARDIA XL) 90 mg ER tablet Take 1 Tablet by mouth daily for 30 days. Qty: 30 Tablet, Refills: 0  Start date: 2/28/2023, End date: 3/30/2023      !! levETIRAcetam (KEPPRA) 500 mg tablet Take 1 Tablet by mouth daily for 30 days. Qty: 30 Tablet, Refills: 0  Start date: 2/28/2023, End date: 3/30/2023       !! - Potential duplicate medications found. Please discuss with provider. CONTINUE these medications which have CHANGED    Details   pantoprazole (Protonix) 40 mg tablet Take 1 Tablet by mouth daily for 30 days. Indications: an ulcer of the duodenum  Qty: 30 Tablet, Refills: 0  Start date: 2/28/2023, End date: 3/30/2023      cloNIDine HCL (CATAPRES) 0.3 mg tablet Take 1 Tablet by mouth every eight (8) hours for 30 days. Qty: 90 Tablet, Refills: 0  Start date: 2/28/2023, End date: 3/30/2023           CONTINUE these medications which have NOT CHANGED    Details   aliskiren (TEKTURNA) 300 mg tablet Take 1 Tablet by mouth daily. Qty: 60 Tablet, Refills: 0      minoxidiL (LONITEN) 10 mg tablet Take 1 Tablet by mouth two (2) times a day. Qty: 60 Tablet, Refills: 0      bumetanide (BUMEX) 2 mg tablet Take 1 Tablet by mouth daily. Qty: 30 Tablet, Refills: 0      carvediloL (COREG) 25 mg tablet Take 1 Tablet by mouth two (2) times daily (with meals). Qty: 60 Tablet, Refills: 0      doxazosin (CARDURA) 8 mg tablet Take 1 Tablet by mouth nightly. Qty: 30 Tablet, Refills: 0      sevelamer carbonate (RENVELA) 800 mg tab tab Take 800 mg by mouth three (3) times daily.  New prescription as of 8/16/22, prescribed by Dr. Marian Gaspar calcium carbonate (TUMS) 200 mg calcium (500 mg) chew Take 3 Tablets by mouth three (3) times daily (after meals). ascorbic acid, vitamin C, (VITAMIN C) 500 mg tablet Take 500 mg by mouth daily. cholecalciferol (VITAMIN D3) 25 mcg (1,000 unit) cap Take 1,000 Units by mouth daily. STOP taking these medications       buprenorphine-naloxone (SUBOXONE) 8-2 mg film sublingaul film Comments:   Reason for Stopping: Follow up Care:    1. Zeeshan Carbajal NP in 1-2 weeks. Please call to set up an appointment shortly after discharge. Diet:  Renal Diet    Disposition:  Inpatient rehab. Advanced Directive:   FULL x   DNR      Discharge Exam:  I had a face to face encounter with this patient and independently examined them on 2/28/2023 as outlined below:                                                    General: NAD;   HEENT: anicteric sclerae, wound covered, L periorbital swelling; Healed scalp surgical incisions with staples in place; Neck: supple, no JVD, no meningeal signs  Chest: Clear to auscultation bilaterally, No Rales, rhonchi, or wheezing;  CVS: S1 S2, Capillary refill less than 2 seconds  Abd: soft/ non tender, non distended, BS normoactive;  Ext: no clubbing, no cyanosis, no edema  Neuro/Psych: grossly non-focal  Skin: warm     CONSULTATIONS: Nephrology, Rehabilitation Medicine, and neurosurgery;    Significant Diagnostic Studies:   2/18/2023:  MG/DL (H; Ref range: 6 - 20 MG/DL);  MG/DL (H; Ref range: 6 - 20 MG/DL);  MG/DL (H; Ref range: 6 - 20 MG/DL); Calcium 8.7 MG/DL (Ref range: 8.5 - 10.1 MG/DL); Calcium 8.1 MG/DL (L; Ref range: 8.5 - 10.1 MG/DL); Calcium 8.3 MG/DL (L; Ref range: 8.5 - 10.1 MG/DL); CO2 20 mmol/L (L; Ref range: 21 - 32 mmol/L); CO2 19 mmol/L (L; Ref range: 21 - 32 mmol/L); CO2 23 mmol/L (Ref range: 21 - 32 mmol/L); Creatinine 19.00 MG/DL (H; Ref range: 0.70 - 1.30 MG/DL);  Creatinine 18.80 MG/DL (H; Ref range: 0.70 - 1.30 MG/DL); Creatinine 19.20 MG/DL (H; Ref range: 0.70 - 1.30 MG/DL); Glucose 151 mg/dL (H; Ref range: 65 - 100 mg/dL); Glucose 127 mg/dL (H; Ref range: 65 - 100 mg/dL); Glucose 81 mg/dL (Ref range: 65 - 100 mg/dL); HCT 24.3 % (L; Ref range: 36.6 - 50.3 %); HCT 23.5 % (L; Ref range: 36.6 - 50.3 %); HGB 8.0 g/dL (L; Ref range: 12.1 - 17.0 g/dL); HGB 7.5 g/dL (L; Ref range: 12.1 - 17.0 g/dL); Potassium 4.4 mmol/L (Ref range: 3.5 - 5.1 mmol/L); Potassium 6.1 mmol/L (H; Ref range: 3.5 - 5.1 mmol/L); Potassium 4.1 mmol/L (Ref range: 3.5 - 5.1 mmol/L); Sodium 139 mmol/L (Ref range: 136 - 145 mmol/L); Sodium 136 mmol/L (Ref range: 136 - 145 mmol/L); Sodium 142 mmol/L (Ref range: 136 - 145 mmol/L)  2/19/2023: BUN 50 MG/DL (H; Ref range: 6 - 20 MG/DL); Calcium 8.9 MG/DL (Ref range: 8.5 - 10.1 MG/DL); CO2 28 mmol/L (Ref range: 21 - 32 mmol/L); Creatinine 9.77 MG/DL (H; Ref range: 0.70 - 1.30 MG/DL); Glucose 82 mg/dL (Ref range: 65 - 100 mg/dL); HCT 19.8 % (L; Ref range: 36.6 - 50.3 %); HGB 6.6 g/dL (L; Ref range: 12.1 - 17.0 g/dL); Potassium 5.0 mmol/L (Ref range: 3.5 - 5.1 mmol/L); Sodium 135 mmol/L (L; Ref range: 136 - 145 mmol/L)  Recent Labs     02/28/23 0430 02/27/23 0224   WBC 9.1 8.6   HGB 7.4* 7.1*   HCT 22.9* 23.1*    218     Recent Labs     02/28/23 0430 02/27/23 0224 02/26/23 0110   * 127* 130*   K 4.2 3.9 3.5   CL 87* 90* 94*   CO2 24 27 28   BUN 48* 38* 23*   CREA 10.40* 8.00* 5.63*   * 94 127*   CA 8.6 8.8 8.5   MG  --  2.3  --    PHOS  --  5.8*  --      Recent Labs     02/28/23 0430 02/27/23 0224 02/26/23 0110   AP 91  92 75 74   TP 6.7  6.6 6.4 6.9   ALB 2.8*  2.9* 2.8* 2.7*   GLOB 3.9  3.7 3.6 4.2*     No results for input(s): INR, PTP, APTT, INREXT in the last 72 hours. Recent Labs     02/27/23 0224   TIBC 244*   PSAT 17*      No results for input(s): PH, PCO2, PO2 in the last 72 hours. No results for input(s): CPK, CKMB in the last 72 hours.     No lab exists for component: TROPONINI  No components found for: [place]      Discharge time: Greater than 35 minutes;     Signed:  Maykel Wells MD  2/28/2023  3:50 PM  .

## 2023-02-28 NOTE — PROGRESS NOTES
Physical Therapy 2/28/23    0905: Chart reviewed, patient currently undergoing HD at bedside. Per chart, plan for patient to discharge to [facility] later today. Will defer and follow up as appropriate.     Thank you for your consideration,    Vero Britton, PT, DPT

## 2023-02-28 NOTE — PROGRESS NOTES
Nephrology Progress Note  Byron Macias  Date of Admission : 2/18/2023    CC: Follow up for ESRD       Assessment and Plan     ESRD on HD:  - TTS [place] Notice  - next HD Thursday     Hyponatremia :  - advised FR   - UF challenge w/ HD    SDH s/p craniotomy:  - per NSG    Malignant Resistant HTN:  - prior secondary work up negative   - continue current meds   - may need to consider B/L nephrectomy vs sympathetic denervation-- d/w wife at bedside      Anemia of CKD:  - ABI TTS  - check iron profile   - transfuse if Hb < 7 g    Secondary HPT  - continue home Phos binders        Interval History:  Seen and examined. BP improving. Discyussed BP issues w/ wife   Dialyzed last night and had 2.5Kg removed     Current Medications: all current  Medications have been eviewed in EPIC  Review of Systems: Pertinent items are noted in HPI. Objective:  Vitals:    Vitals:    02/28/23 0915 02/28/23 0930 02/28/23 1016 02/28/23 1023   BP: (!) 141/66 (!) 165/68 (!) 146/70    Pulse: (!) 103 (!) 108 (!) 110 (!) 113   Resp: 18 20     Temp:       TempSrc:       SpO2:       Weight:       Height:         Intake and Output:  02/28 0701 - 02/28 1900  In: -   Out: 2500   No intake/output data recorded. Physical Examination:    General: Awake, alert  Neck:  Supple, no mass  Resp:  Lungs CTA B/L, no wheezing , normal respiratory effort  CV:  RRR,  no murmur or rub, trace LE edema  GI:  Soft, NT, + Bowel sounds, no hepatosplenomegaly  Neurologic:  Non focal  Psych:             AAO x 3 appropriate affect   Skin:  No Rash  Access:           [facility]    []    High complexity decision making was performed  []    Patient is at high-risk of decompensation with multiple organ involvement    Lab Data Personally Reviewed: I have reviewed all the pertinent labs, microbiology data and radiology studies during assessment.     Recent Labs     02/28/23  0430 02/27/23  0224 02/26/23  0110   * 127* 130*   K 4.2 3.9 3.5   CL 87* 90* 94*   CO2 24 27 28 * 94 127*   BUN 48* 38* 23*   CREA 10.40* 8.00* 5.63*   CA 8.6 8.8 8.5   MG  --  2.3  --    PHOS  --  5.8*  --    ALB 2.8*  2.9* 2.8* 2.7*   ALT 6*  6* <6* <6*       Recent Labs     02/28/23  0430 02/27/23  0224 02/26/23  0110   WBC 9.1 8.6 8.2   HGB 7.4* 7.1* 7.1*   HCT 22.9* 23.1* 22.1*    218 201       No results found for: SDES  Lab Results   Component Value Date/Time    Culture result: NO GROWTH 5 DAYS 05/18/2022 06:06 AM    Culture result: No growth (<1,000 CFU/ML) 03/22/2022 04:11 PM     Recent Results (from the past 24 hour(s))   HEPATIC FUNCTION PANEL    Collection Time: 02/28/23  4:30 AM   Result Value Ref Range    Protein, total 6.7 6.4 - 8.2 g/dL    Albumin 2.8 (L) 3.5 - 5.0 g/dL    Globulin 3.9 2.0 - 4.0 g/dL    A-G Ratio 0.7 (L) 1.1 - 2.2      Bilirubin, total 0.3 0.2 - 1.0 MG/DL    Bilirubin, direct <0.1 0.0 - 0.2 MG/DL    Alk. phosphatase 91 45 - 117 U/L    AST (SGOT) 14 (L) 15 - 37 U/L    ALT (SGPT) 6 (L) 12 - 78 U/L   METABOLIC PANEL, COMPREHENSIVE    Collection Time: 02/28/23  4:30 AM   Result Value Ref Range    Sodium 125 (L) 136 - 145 mmol/L    Potassium 4.2 3.5 - 5.1 mmol/L    Chloride 87 (L) 97 - 108 mmol/L    CO2 24 21 - 32 mmol/L    Anion gap 14 5 - 15 mmol/L    Glucose 101 (H) 65 - 100 mg/dL    BUN 48 (H) 6 - 20 MG/DL    Creatinine 10.40 (H) 0.70 - 1.30 MG/DL    BUN/Creatinine ratio 5 (L) 12 - 20      eGFR 5 (L) >60 ml/min/1.73m2    Calcium 8.6 8.5 - 10.1 MG/DL    Bilirubin, total 0.3 0.2 - 1.0 MG/DL    ALT (SGPT) 6 (L) 12 - 78 U/L    AST (SGOT) 14 (L) 15 - 37 U/L    Alk.  phosphatase 92 45 - 117 U/L    Protein, total 6.6 6.4 - 8.2 g/dL    Albumin 2.9 (L) 3.5 - 5.0 g/dL    Globulin 3.7 2.0 - 4.0 g/dL    A-G Ratio 0.8 (L) 1.1 - 2.2     CBC W/O DIFF    Collection Time: 02/28/23  4:30 AM   Result Value Ref Range    WBC 9.1 4.1 - 11.1 K/uL    RBC 2.53 (L) 4.10 - 5.70 M/uL    HGB 7.4 (L) 12.1 - 17.0 g/dL    HCT 22.9 (L) 36.6 - 50.3 %    MCV 90.5 80.0 - 99.0 FL    MCH 29.2 26.0 - 34.0 PG    MCHC 32.3 30.0 - 36.5 g/dL    RDW 14.7 (H) 11.5 - 14.5 %    PLATELET 378 590 - 066 K/uL    MPV 10.8 8.9 - 12.9 FL    NRBC 0.5 (H) 0  WBC    ABSOLUTE NRBC 0.05 (H) 0.00 - 0.01 K/uL                 ELVA Paredes [address]   [address] [address]  Phone - [phone]   Fax - [phone]  [email or web address]

## 2023-02-28 NOTE — PROGRESS NOTES
Asked to see pt per RN. Small poke hole with bloody drainage anterior to incision. Incision is healing well; no drainage. Pressure applied to oozing poke hole; drainage stopped. No evidence of infection. Pt feels well. Forehead/eye swelling is improved. Pt has no complaints. Outpt follow up with Dr. Rufus Howe in 2 weeks. D/c to IPR today.     AYLEEN Gong

## 2023-02-28 NOTE — PROGRESS NOTES
Consult received for transportation. Nursing informed CM of delayed [facility] transport. Patient is ambulatory. Call placed to [facility] will have w/c ride available at 1830 to  and transport to [facility]. Updates provided to Melinda Kaye RN informed Nursing will need to call report. No addition needs at this time.      Trip 70.35

## 2023-02-28 NOTE — PROGRESS NOTES
Transition of Care Plan  RUR- High 23%  DISPOSITION: IPR - [facility] - Today  RN to call report to #[phone]. F/U with PCP/Specialist    Transport: [facility] ETA 5:45    Emergency contact: DaughterSteven Feeling [phone]    CM barriers to discharge: None    Patient stable for discharge today per Dr. Mary Siddiqui. [facility] is able to accept patient today. CM Met with patient and partner at bedside and they are in agreement with discharge plan. Preference for BLS transport. RN to call report to #[phone]. [facility] requested for 3pm.     4:07pm: [facility] updated ETA 5:45pm. RN informed. Kylie Velázquez) Maribeth Silver M.S.RAFAEL.

## 2023-02-28 NOTE — ADVANCED PRACTICE NURSE
Hemodialysis / [phone]    Vitals Pre Post Assessment Pre Post   BP BP: (!) 126/52 (02/28/23 0700)   146/78 LOC A&O x 3 A&O x 3   HR Pulse (Heart Rate): 88 (02/28/23 0700) 91 Lungs clear clear   Resp Resp Rate: 14 (02/28/23 0700) 20 Cardiac Tachycardia  tachycardia   Temp Temp: 97.7 °F (36.5 °C) (02/28/23 0525) 98.4 Skin warm warm   Weight Pre-Dialysis Weight: 81.6 kg (179 lb 14.3 oz) (02/28/23 0525)  Edema generalized No edema   Tele status bedside bedside Pain Pain Intensity 1: 3 (02/28/23 0525) 3/10     Orders   Duration: Start: 0530 End: 0930 Total: 4 hr   Dialyzer: Dialyzer/Set Up Inspection: Jono Gonzalez ([id number]) (02/28/23 0525)   K Bath: Dialysate K (mEq/L): 2 (02/28/23 0525)   Ca Bath: Dialysate CA (mEq/L): 2.5 (02/28/23 0525)   Na: Dialysate NA (mEq/L): 139 (02/28/23 0525)   Bicarb: Dialysate HCO3 (mEq/L): 38 (02/28/23 0525)   Target Fluid Removal: Goal/Amount of Fluid to Remove (mL): 3000 mL (02/28/23 0525)     Access   Type & Location: [facility] cath   Comments:             in place, patent, dressing dry and intact, no S/S of infection                           Labs   HBsAg (Antigen) / date:   02/18/23 negative                                            HBsAb (Antibody) / date: 02/18/23 [facility]    Source: [facility] care   Obtained/Reviewed  Critical Results Called HGB   Date Value Ref Range Status   02/28/2023 7.4 (L) 12.1 - 17.0 g/dL Final     Potassium   Date Value Ref Range Status   02/28/2023 4.2 3.5 - 5.1 mmol/L Final     Calcium   Date Value Ref Range Status   02/28/2023 8.6 8.5 - 10.1 MG/DL Final     BUN   Date Value Ref Range Status   02/28/2023 48 (H) 6 - 20 MG/DL Final     Creatinine   Date Value Ref Range Status   02/28/2023 10.40 (H) 0.70 - 1.30 MG/DL Final        Meds Given   Name Dose Route                    Adequacy / Fluid    Total Liters Process: 86 L   Net Fluid Removed: 2500 ml      Comments   Time Out Done:   (Time) Yes, 1635   Admitting Diagnosis: Renal failure   Consent obtained/signed: Informed Consent Verified: Yes (02/28/23 9429)   Machine / RO # Machine Number: [id number] (02/28/23 6740)   Primary Nurse Rpt Pre: Conchita Alvarez RN   Primary Nurse Rpt Post: Donna Cortez RN   Pt Education: Yes, r/t dialysis process   Care Plan: Continue HD as ordered   Pts outpatient clinic:      Tx Summary   Comments:      tolerated tx well, at end, all blood in circuit returned with 300 ml NS, LSC cath in place, patent, dressing dry and intact, no S/S of infection

## 2023-02-28 NOTE — PROGRESS NOTES
Occupational Therapy  02/28/23    Chart reviewed. Attempted OT treatment however patient currently on bedside HD, will follow up as able/appropriate.     Thank you,   Kendra Pradhan, OTD, OTR/L

## 2023-03-01 LAB
ABO + RH BLD: NORMAL
BLD PROD TYP BPU: NORMAL
BLOOD GROUP ANTIBODIES SERPL: NORMAL
BPU ID: NORMAL
CROSSMATCH RESULT,%XM: NORMAL
SPECIMEN EXP DATE BLD: NORMAL
STATUS OF UNIT,%ST: NORMAL
UNIT DIVISION, %UDIV: 0

## 2023-03-01 NOTE — ADT AUTH CERT NOTES
Comment          Patient Demographics    Patient Name   Vandana Burns   [id number] Legal Sex   Male    1965 Address   [address] [address] Phone   [phone] (Home)   [phone] (Mobile)     Patient Demographics    Patient Name   Vandana Burns   [id number] Legal Sex   Male    1965 Address   [address] [address] Phone   [phone] (Home)   [phone] (Mobile)     CSN:   [id number]     [address] [place] [address] Date: Admit Time Room Bed   2023 12:28 PM [id number]     Attending Providers    Provider Pager From To   Carlos Moreno MD   23   Mone Olvera MD  23   Arnold Reinoso DO  23   Oskar Grace MD  23   Charlene iFelds MD  23   Rosamaria Cavazos MD  23     Patient Contacts    Name [address]ara [address] Sister [phone]   Estelita Lewis Daughter [phone]   Jodene Epley Sister [phone]     Utilization Reviews       Craniotomy for Traumatic Brain Injury or Intracerebral Hemorrhage - Care Day 10 (2023) by Percilla Line       Review Entered Review Status   2023 1554 Completed      Criteria Review      Care Day: 10 Care Date: 2023 Level of Care: Intermediate Care    Guideline Day 3    Clinical Status    ( ) * ICP stable or improved    (X) * No need for further surgery identified    (X) * Pain absent or managed    2023 15:54:48 EST by Percilla Line      Tylenol 650mg x1 PO,    (X) * Seizures absent or controlled    Routes    (X) IV fluids, medications    2023 15:54:48 EST by Percilla Line      NS bolus 250 ml x1 IV. Interventions    (X) Neurologic checks    (X) Physical therapy    Medications    (X) Possible anticonvulsants    2023 15:54:48 EST by Percilla Line      Keppra 500mg PO daily.     * Milestone   Additional Notes    DATE:  Relevant baselines: (lab values, vitals, o2 amount/delivery, etc.)   2/27/23 02:24   HGB: 7.1 (L)   HCT: 23.1 (L)      2/27/23 02:24   Sodium: 127 (L)   2/27/23 10:41   BARBITURATES: Positive ! Pertinent Updates:   PT OT SLP    Keppra 500mg dialysis dosing   Q 4 hour newuro check   HD TTS          Vitals:   (!) 172/78 (BP 1 Location: Left upper arm, BP Patient Position: Sitting; Other (Comment)) Comment (BP Patient Position): post gait training/transfers   Pulse 98   Temp 97.9 °F (36.6 °C)   Resp 17   Ht 5' 9\" (1.753 m)   Wt 78.4 kg (172 lb 13.5 oz)   SpO2 96%   BMI 25.52 kg/m²    O2 Flow Rate (L/min): 4 l/min          Physical Exam:   Physical Examination:       General:          Awake, alert   Neck:               Supple, no mass   Resp:               Lungs CTA B/L, no wheezing , normal respiratory effort   CV:                  RRR,  no murmur or rub, trace LE edema   GI:                   Soft, NT, + Bowel sounds, no hepatosplenomegaly   Neurologic:       Non focal   Psych:             AAO x 3 appropriate affect    Skin:                No Rash   Access:           ELIZABETH SHULTZ MD Consults/Assessments & Plans:   Plan Neprho:   ESRD on HD:   - TTS [facility]   - HD tomorrow per routine       Hyponatremia :   - advised FR        SDH s/p craniotomy:   - per NSG       Malignant Resistant HTN:   - prior secondary work up negative    - check UDS again    - added Hydralazine    - may need to consider B/L nephrectomy vs sympathetic denervation        Anemia of CKD:   - ABI TTS   - check iron profile    - transfuse if Hb < 7 g       Secondary HPT   Neuro surgery plan:   s/p Left frontotemporal parietal craniotomy, evacuation of acute and chronic subdural hematoma 2/18               -GAVINO drain d/c'd 2/20   -PT/OT/SLP as able   -BP control per primary team   -Keppra 500mg; dialysis dosing               -CT head 2/23, 2/26 stable/improving               -q4hr neuro checks               -HD TTS; neprhology following -Medical mgmt per hospitalist team       D/c to IPR when medically stable per primary   Plan d/w Dr. Brooke Chinchilla         Orders:   Full code. PT/OT/SLP/CM Assessments or Notes:   PT:   Patient continues to benefit from skilled intervention to address the above impairments. Continue treatment per established plan of care. to address goals. Recommendation for discharge: (in order for the patient to meet his/her long term goals)   Therapy 3 hours per day 5-7 days per week      OT   PLAN :   Patient continues to benefit from skilled intervention to address the above impairments. Continue treatment per established plan of care to address goals.        Recommend with staff: OOB for meals x2 Assist       Recommend next OT session: see goals       Recommendation for discharge: (in order for the patient to meet his/her long term goals)   Therapy 3 hours per day 5-7 days per week                Craniotomy for Traumatic Brain Injury or Intracerebral Hemorrhage - Care Day 5 (2/22/2023) by Kassy Love       Review Entered Review Status   2/23/2023 1625 Completed      Criteria Review      Care Day: 5 Care Date: 2/22/2023 Level of Care: Intermediate Care    Guideline Day 3    Level Of Care    ( ) ICU    2/23/2023 16:21:25 EST by Kassy Love      2/22 intermediate care    Clinical Status    ( ) * ICP stable or improved    2/23/2023 16:21:25 EST by Kassy Love      still c/o headache and generalized weakness    (X) * No need for further surgery identified    (X) * Pain absent or managed    2/23/2023 16:21:25 EST by Shirin Hay      headache: 10,5,10/10 goal: 5/10  managed with po analgesic    (X) * Seizures absent or controlled    2/23/2023 16:21:25 EST by Shirin Hay      none noted    Activity    ( ) Bed rest    2/23/2023 16:21:25 EST by Shirin Hay      Up with Assistance;    Routes    (X) IV fluids, medications    2/23/2023 16:24:12 EST by Shirin Hay      iv normodyne 20 mg q4h prn given x2,  iv apresoline 20 mg q6h prn given x1,  iv protonix 40 mg daily    ( ) Possible parenteral nutrition or tube feeding    2/23/2023 16:21:25 EST by Shirin Hay      ADULT DIET Easy to Chew; 3 carb choices (45 gm/meal); Low Fat/Low Chol/High Fiber/CAPRI; Low Sodium (2 gm); Low Potassium (Less than 3000 mg/day); Low Phosphorus (Less than 1000 mg); Less than 60 gm    Interventions    (X) Neurologic checks    2/23/2023 16:21:25 EST by Shirin Hay      q4h    (X) DVT prophylaxis    2/23/2023 16:24:12 EST by Shirin Hay      sc heparin 5000U q12h    (X) Physical therapy    Medications    ( ) Possible mannitol, IV diuretics    2/23/2023 16:24:12 EST by Shirin Hay      po bumex 2 mg daily    (X) Possible anticonvulsants    2/23/2023 16:24:12 EST by Shirin Hay      iv keppra 500 mg daily-dcd    * Milestone   Additional Notes   2/22/23 LOC IP Intermediate Care       Pertinent Updates:   C/o is left face swelling      Vitals:   99 92 179/92 24 96% ra 72 kg    BP: 164/84,170/85,1439/73,161/88,136/77   RR: 18,15,13,23,12,20,17      Abnl/Pertinent Labs:   NRBC: 0.6 (H)   RBC: 2.79 (L)   HGB: 8.1 (L)   HCT: 25.7 (L)   ABSOLUTE NRBC: 0.06 (H)   Sodium: 133 (L)   Potassium: 4.2   Chloride: 96 (L)   Glucose: 107 (H)   BUN: 26 (H)   Creatinine: 7.24 (H)   BUN/Creatinine ratio: 4 (L)   eGFR: 8 (L)   Bilirubin, total: 0.2 ,  0.3   Bilirubin, direct: 0.2   Protein, total: 7.6 , 7.5   Albumin: 2.7 (L) , 2.7 (L)   Globulin: 4.9 (H),4.8 (H)   A-G Ratio: 0.6 (L) ,  0.6 (L)   ALT: <6 (L), <6 (L)   AST: 16,  14 (L)   Alk. phosphatase: 73,  71      Physical Exam:   HERNANDEZ spontaneously and purposefully. Generalized weakness. Sensation intact.    Skin: Incision C/D/I   Left eye swelling      MD Consults/Assessments & Plans:   Per Internal medicine:   POD#4:left craniotomy and evacuation    -maintain SBP <140 - increased meds for today as BP was above goal   stress ulcer ppx   -continue PPI       Per Nephrology:   ESRD on HD:   - TTS [facility]   - HD tomorrow per routine   HTN:   - cont current meds   Anemia of CKD:   - ABI TTS       Per Neurosurgery:   -PT/OT/SLP as able   -Keppra 500mg    -Awaiting transfer to [facility]       Medications:   Po tylenol 650 mg q6h prn given x3   Po catapres 0.2 mg changed to 0.3 mg BID   Po normodyne 300 mg q12h   Po procardia xl 30 mg given x1   Po procardia xl 60 mg daily-dcd      Orders:   weight daily, SCDs, I&O q8h, float heels, skin assessment, turn pt q2h      Per PT:   Patient noted to have slight L knee buckling today (lacking full extension) however did not require blocking of L knee today. Strong initial L lateral lean in standing, able to shift weight R. Able to ambulate to sink with HHA and while standing, L lateral lean as he fatigued. Able to ambulated further in room distances with bilateral HHA and min A x 2 before resting. Elevated BP once resting after gait training. Rested for several minutes, able to tolerate second trial of gait training with RW. Required mod A x 2 for gait training with AD. Patient required almost constant cues to widen APPLE, shift weight R, keep AD close to body (patient pushing AD way ahead of him despite cues). Per OT:   Remains limited by L hemiparesis (LUE overall 4- to 4/5), mild L dysmetria (undershooting), impaired vision (L eye swollen shut, able to open eyelid slightly), mild-moderate L neglect (assistance to avoid L obstacles, cuing to maintain L  on RW), impulsivity with mobility, and impaired safety awareness. Despite progress he remains significantly below functional baseline and would benefit from inpatient rehab at d/c.

## 2023-03-01 NOTE — PROGRESS NOTES
[facility]   [address] [facility], [address] [place] [address]  Phone: [phone]   [phone]    [email or web address]     Nephrology Progress Note    Patient Name : Krishna Barragan      : 1965     MRN : [id number]  Date of Admission : 2022  Date of Servive : 22    CC:  Follow up for ESRD       Assessment and Plan   ESRD-HD :  - dialyzes TTS at [facility] [address]   - next HD tomorrow   - daily labs      Hypertensive Urgency   Hypertensive heart dz w/ LVH  - improved   - Stress test today      Anemia in CKD   - hold ABI until BP better      Sec HPTH   - continue Phos binders        Interval History:  Doing better. 4 kg removed w/ HD yesterday   No new sx. No CP/SOB/cough   Noted plans for NST    Review of Systems: A comprehensive review of systems was negative except for that written in the HPI.     Current Medications:   Current Facility-Administered Medications   Medication Dose Route Frequency    sodium chloride (NS) flush 5-40 mL  5-40 mL IntraVENous Q8H    sodium chloride (NS) flush 5-40 mL  5-40 mL IntraVENous PRN    acetaminophen (TYLENOL) tablet 650 mg  650 mg Oral Q6H PRN    Or    acetaminophen (TYLENOL) suppository 650 mg  650 mg Rectal Q6H PRN    polyethylene glycol (MIRALAX) packet 17 g  17 g Oral DAILY PRN    ondansetron (ZOFRAN ODT) tablet 4 mg  4 mg Oral Q8H PRN    Or    ondansetron (ZOFRAN) injection 4 mg  4 mg IntraVENous Q6H PRN    heparin (porcine) injection 5,000 Units  5,000 Units SubCUTAneous Q8H    bumetanide (BUMEX) tablet 2 mg  2 mg Oral DAILY    buprenorphine-naloxone (SUBOXONE) 8-2mg SL tablet  1 Tablet SubLINGual BID    carvediloL (COREG) tablet 25 mg  25 mg Oral BID WITH MEALS    cloNIDine HCL (CATAPRES) tablet 0.1 mg  0.1 mg Oral BID    hydrALAZINE (APRESOLINE) tablet 100 mg  100 mg Oral TID    losartan (COZAAR) tablet 100 mg  100 mg Oral DAILY    NIFEdipine ER (PROCARDIA XL) tablet 60 mg  60 mg Oral BID    heparin (porcine) 1,000 unit/mL injection 2,100 Units  2,100 Units [facility] DIALYSIS PRN    And    heparin (porcine) 1,000 unit/mL injection 2,000 Units  2,000 Units InterCATH[facility] DIALYSIS PRN    hydrALAZINE (APRESOLINE) 20 mg/mL injection 20 mg  20 mg IntraVENous Q6H PRN    labetaloL (NORMODYNE;TRANDATE) injection 10 mg  10 mg IntraVENous Q6H PRN      Allergies   Allergen Reactions    Baclofen Rash     All over    Losartan Hives, Shortness of Breath and Rash       Objective:  Vitals:    Vitals:    07/01/22 0416 07/01/22 0802 07/01/22 0904 07/01/22 0911   BP: (!) 141/72 (!) 151/64 (!) 156/86 (!) 156/105   Pulse: 78 72 73 73   Resp: 20 15 16    Temp: 98.4 °F (36.9 °C) 98.1 °F (36.7 °C)     TempSrc:       SpO2: 97% 95% 96% 93%   Weight: 74.3 kg (163 lb 12.8 oz)      Height:         Intake and Output:  No intake/output data recorded. 06/29 1901 - 07/01 0700  In: -   Out: 4000     Physical Examination:        General: NAD,Conversant   Neck:  Supple, no mass  Resp:  Lungs CTA B/L, no wheezing , normal respiratory effort  CV:  RRR,  no murmur or rub, LE edema  GI:  Soft, NT, + BS, no HS megaly  Neurologic:  Non focal  Psych:             AAO x 3 appropriate affect   Dialysis Access : Left IJ permacath    []    High complexity decision making was performed  []    Patient is at high-risk of decompensation with multiple organ involvement    Lab Data Personally Reviewed: I have reviewed all the pertinent labs, microbiology data and radiology studies during assessment.     Recent Labs     07/01/22 0413 06/30/22  0500    133*   K 4.0 4.5    98   CO2 27 27   GLU 83 99   BUN 32* 54*   CREA 6.66* 10.60*   CA 8.7 10.0   ALB  --  3.1*   ALT  --  8*     Recent Labs     07/01/22  0413 06/30/22  0500   WBC 7.5 10.2   HGB 9.3* 9.2*   HCT 29.7* 29.9*    212     No results found for: SDES  Lab Results   Component Value Date/Time    Culture result: NO GROWTH 5 DAYS 05/18/2022 06:06 AM    Culture result: No growth (<1,000 CFU/ML) 03/22/2022 04:11 PM     Recent Results (from the past 24 hour(s))   TROPONIN-HIGH SENSITIVITY    Collection Time: 06/30/22 10:50 AM   Result Value Ref Range    Troponin-High Sensitivity 48 0 - 76 ng/L   DRUG SCREEN, URINE    Collection Time: 06/30/22  8:59 PM   Result Value Ref Range    AMPHETAMINES Negative NEG      BARBITURATES Negative NEG      BENZODIAZEPINES Negative NEG      COCAINE Negative NEG      METHADONE Negative NEG      OPIATES Negative NEG      PCP(PHENCYCLIDINE) Negative NEG      THC (TH-CANNABINOL) Negative NEG      Drug screen comment (NOTE)    METABOLIC PANEL, BASIC    Collection Time: 07/01/22  4:13 AM   Result Value Ref Range    Sodium 137 136 - 145 mmol/L    Potassium 4.0 3.5 - 5.1 mmol/L    Chloride 101 97 - 108 mmol/L    CO2 27 21 - 32 mmol/L    Anion gap 9 5 - 15 mmol/L    Glucose 83 65 - 100 mg/dL    BUN 32 (H) 6 - 20 MG/DL    Creatinine 6.66 (H) 0.70 - 1.30 MG/DL    BUN/Creatinine ratio 5 (L) 12 - 20      GFR est AA 11 (L) >60 ml/min/1.73m2    GFR est non-AA 9 (L) >60 ml/min/1.73m2    Calcium 8.7 8.5 - 10.1 MG/DL   CBC W/O DIFF    Collection Time: 07/01/22  4:13 AM   Result Value Ref Range    WBC 7.5 4.1 - 11.1 K/uL    RBC 3.54 (L) 4.10 - 5.70 M/uL    HGB 9.3 (L) 12.1 - 17.0 g/dL    HCT 29.7 (L) 36.6 - 50.3 %    MCV 83.9 80.0 - 99.0 FL    MCH 26.3 26.0 - 34.0 PG    MCHC 31.3 30.0 - 36.5 g/dL    RDW 19.9 (H) 11.5 - 14.5 %    PLATELET 143 066 - 210 K/uL    MPV 10.8 8.9 - 12.9 FL    NRBC 0.0 0  WBC    ABSOLUTE NRBC 0.00 0.00 - 0.01 K/uL   NUCLEAR CARDIAC STRESS TEST    Collection Time: 07/01/22  8:08 AM   Result Value Ref Range    Stress Target  bpm           I have reviewed the flowsheets. Chart and Pertinent Notes have been reviewed. No change in PMH ,family and social history from Consult note.       Reid Leonard [facility] Expected Date Of Service: 03/01/2023 Bill For Surgical Tray: no Billing Type: [employer] Performing Laboratory: [id number]

## 2023-03-02 ENCOUNTER — HOSPITAL ENCOUNTER (OUTPATIENT)
Dept: LAB | Age: 58
Discharge: HOME | End: 2023-03-02

## 2023-03-02 LAB
BASOPHILS # BLD: 0.1 K/UL (ref 0–0.1)
BASOPHILS NFR BLD: 1 % (ref 0–1)
DIFFERENTIAL METHOD BLD: ABNORMAL
EOSINOPHIL # BLD: 0.3 K/UL (ref 0–0.4)
EOSINOPHIL NFR BLD: 3 % (ref 0–7)
ERYTHROCYTE [DISTWIDTH] IN BLOOD BY AUTOMATED COUNT: 15.5 % (ref 11.5–14.5)
HCT VFR BLD AUTO: 21.6 % (ref 36.6–50.3)
HGB BLD-MCNC: 7 G/DL (ref 12.1–17)
HISTORY CHECKED?,CKHIST: NORMAL
IMM GRANULOCYTES # BLD AUTO: 0 K/UL (ref 0–0.04)
IMM GRANULOCYTES NFR BLD AUTO: 0 % (ref 0–0.5)
LYMPHOCYTES # BLD: 1.3 K/UL (ref 0.8–3.5)
LYMPHOCYTES NFR BLD: 17 % (ref 12–49)
MCH RBC QN AUTO: 30.4 PG (ref 26–34)
MCHC RBC AUTO-ENTMCNC: 32.4 G/DL (ref 30–36.5)
MCV RBC AUTO: 93.9 FL (ref 80–99)
MONOCYTES # BLD: 0.9 K/UL (ref 0–1)
MONOCYTES NFR BLD: 11 % (ref 5–13)
NEUTS SEG # BLD: 5.4 K/UL (ref 1.8–8)
NEUTS SEG NFR BLD: 68 % (ref 32–75)
NRBC # BLD: 0 K/UL (ref 0–0.01)
NRBC BLD-RTO: 0 PER 100 WBC
PLATELET # BLD AUTO: 201 K/UL (ref 150–400)
PMV BLD AUTO: 12.6 FL (ref 8.9–12.9)
RBC # BLD AUTO: 2.3 M/UL (ref 4.1–5.7)
WBC # BLD AUTO: 7.9 K/UL (ref 4.1–11.1)

## 2023-03-02 PROCEDURE — 86900 BLOOD TYPING SEROLOGIC ABO: CPT

## 2023-03-02 PROCEDURE — 36415 COLL VENOUS BLD VENIPUNCTURE: CPT

## 2023-03-02 PROCEDURE — 85025 COMPLETE CBC W/AUTO DIFF WBC: CPT

## 2023-03-02 PROCEDURE — 86920 COMPATIBILITY TEST SPIN: CPT

## 2023-03-03 PROCEDURE — P9016 RBC LEUKOCYTES REDUCED: HCPCS

## 2023-03-06 ENCOUNTER — APPOINTMENT (OUTPATIENT)
Dept: CT IMAGING | Age: 58
End: 2023-03-06
Attending: EMERGENCY MEDICINE
Payer: MEDICARE

## 2023-03-06 ENCOUNTER — HOSPITAL ENCOUNTER (INPATIENT)
Age: 58
LOS: 4 days | Discharge: HOME HEALTH | End: 2023-03-10
Attending: EMERGENCY MEDICINE | Admitting: INTERNAL MEDICINE
Payer: MEDICARE

## 2023-03-06 ENCOUNTER — APPOINTMENT (OUTPATIENT)
Dept: MRI IMAGING | Age: 58
End: 2023-03-06
Attending: INTERNAL MEDICINE
Payer: MEDICARE

## 2023-03-06 ENCOUNTER — APPOINTMENT (OUTPATIENT)
Dept: GENERAL RADIOLOGY | Age: 58
End: 2023-03-06
Attending: EMERGENCY MEDICINE
Payer: MEDICARE

## 2023-03-06 DIAGNOSIS — R29.90 STROKE-LIKE SYMPTOMS: Primary | ICD-10-CM

## 2023-03-06 PROBLEM — G93.40 ACUTE ENCEPHALOPATHY: Status: ACTIVE | Noted: 2023-03-06

## 2023-03-06 LAB
ALBUMIN SERPL-MCNC: 2.9 G/DL (ref 3.5–5)
ALBUMIN SERPL-MCNC: 3 G/DL (ref 3.5–5)
ALBUMIN/GLOB SERPL: 0.6 (ref 1.1–2.2)
ALP SERPL-CCNC: 85 U/L (ref 45–117)
ALT SERPL-CCNC: 6 U/L (ref 12–78)
AMMONIA PLAS-SCNC: 29 UMOL/L
ANION GAP SERPL CALC-SCNC: 8 MMOL/L (ref 5–15)
ANION GAP SERPL CALC-SCNC: 9 MMOL/L (ref 5–15)
APPEARANCE UR: CLEAR
APTT PPP: 23.3 SEC (ref 22.1–31)
AST SERPL-CCNC: 9 U/L (ref 15–37)
BACTERIA URNS QL MICRO: NEGATIVE /HPF
BASOPHILS # BLD: 0.1 K/UL (ref 0–0.1)
BASOPHILS NFR BLD: 1 % (ref 0–1)
BILIRUB SERPL-MCNC: 0.3 MG/DL (ref 0.2–1)
BILIRUB UR QL: NEGATIVE
BUN SERPL-MCNC: 34 MG/DL (ref 6–20)
BUN SERPL-MCNC: 35 MG/DL (ref 6–20)
BUN/CREAT SERPL: 3 (ref 12–20)
BUN/CREAT SERPL: 3 (ref 12–20)
CALCIUM SERPL-MCNC: 8.6 MG/DL (ref 8.5–10.1)
CALCIUM SERPL-MCNC: 8.9 MG/DL (ref 8.5–10.1)
CHLORIDE SERPL-SCNC: 97 MMOL/L (ref 97–108)
CHLORIDE SERPL-SCNC: 97 MMOL/L (ref 97–108)
CO2 SERPL-SCNC: 28 MMOL/L (ref 21–32)
CO2 SERPL-SCNC: 29 MMOL/L (ref 21–32)
COLOR UR: ABNORMAL
COMMENT, HOLDF: NORMAL
CREAT SERPL-MCNC: 10.3 MG/DL (ref 0.7–1.3)
CREAT SERPL-MCNC: 10.3 MG/DL (ref 0.7–1.3)
DIFFERENTIAL METHOD BLD: ABNORMAL
EOSINOPHIL # BLD: 0.4 K/UL (ref 0–0.4)
EOSINOPHIL NFR BLD: 4 % (ref 0–7)
EPITH CASTS URNS QL MICRO: ABNORMAL /LPF
ERYTHROCYTE [DISTWIDTH] IN BLOOD BY AUTOMATED COUNT: 13.5 % (ref 11.5–14.5)
FERRITIN SERPL-MCNC: 112 NG/ML (ref 26–388)
FOLATE SERPL-MCNC: 6.7 NG/ML (ref 5–21)
GLOBULIN SER CALC-MCNC: 4.7 G/DL (ref 2–4)
GLUCOSE SERPL-MCNC: 102 MG/DL (ref 65–100)
GLUCOSE SERPL-MCNC: 104 MG/DL (ref 65–100)
GLUCOSE UR STRIP.AUTO-MCNC: 100 MG/DL
HCT VFR BLD AUTO: 24 % (ref 36.6–50.3)
HGB BLD-MCNC: 7.9 G/DL (ref 12.1–17)
HGB UR QL STRIP: NEGATIVE
IMM GRANULOCYTES # BLD AUTO: 0 K/UL (ref 0–0.04)
IMM GRANULOCYTES NFR BLD AUTO: 0 % (ref 0–0.5)
INR PPP: 1.1 (ref 0.9–1.1)
IRON SATN MFR SERPL: 9 % (ref 20–50)
IRON SERPL-MCNC: 25 UG/DL (ref 35–150)
KETONES UR QL STRIP.AUTO: NEGATIVE MG/DL
LEUKOCYTE ESTERASE UR QL STRIP.AUTO: NEGATIVE
LYMPHOCYTES # BLD: 1.7 K/UL (ref 0.8–3.5)
LYMPHOCYTES NFR BLD: 18 % (ref 12–49)
MCH RBC QN AUTO: 30 PG (ref 26–34)
MCHC RBC AUTO-ENTMCNC: 32.9 G/DL (ref 30–36.5)
MCV RBC AUTO: 91.3 FL (ref 80–99)
MONOCYTES # BLD: 1.4 K/UL (ref 0–1)
MONOCYTES NFR BLD: 15 % (ref 5–13)
NEUTS SEG # BLD: 5.9 K/UL (ref 1.8–8)
NEUTS SEG NFR BLD: 62 % (ref 32–75)
NITRITE UR QL STRIP.AUTO: NEGATIVE
NRBC # BLD: 0 K/UL (ref 0–0.01)
NRBC BLD-RTO: 0 PER 100 WBC
PH UR STRIP: 8 (ref 5–8)
PHOSPHATE SERPL-MCNC: 5.5 MG/DL (ref 2.6–4.7)
PLATELET # BLD AUTO: 211 K/UL (ref 150–400)
PMV BLD AUTO: 9.8 FL (ref 8.9–12.9)
POTASSIUM SERPL-SCNC: 3.9 MMOL/L (ref 3.5–5.1)
POTASSIUM SERPL-SCNC: 4 MMOL/L (ref 3.5–5.1)
PROT SERPL-MCNC: 7.6 G/DL (ref 6.4–8.2)
PROT UR STRIP-MCNC: 300 MG/DL
PROTHROMBIN TIME: 11.4 SEC (ref 9–11.1)
RBC # BLD AUTO: 2.63 M/UL (ref 4.1–5.7)
RBC #/AREA URNS HPF: ABNORMAL /HPF (ref 0–5)
SAMPLES BEING HELD,HOLD: NORMAL
SODIUM SERPL-SCNC: 133 MMOL/L (ref 136–145)
SODIUM SERPL-SCNC: 135 MMOL/L (ref 136–145)
SP GR UR REFRACTOMETRY: 1.01 (ref 1–1.03)
THERAPEUTIC RANGE,PTTT: NORMAL SECS (ref 58–77)
TIBC SERPL-MCNC: 278 UG/DL (ref 250–450)
TROPONIN I SERPL HS-MCNC: 12 NG/L (ref 0–57)
UR CULT HOLD, URHOLD: NORMAL
UROBILINOGEN UR QL STRIP.AUTO: 0.2 EU/DL (ref 0.2–1)
VIT B12 SERPL-MCNC: 353 PG/ML (ref 193–986)
WBC # BLD AUTO: 9.5 K/UL (ref 4.1–11.1)
WBC URNS QL MICRO: ABNORMAL /HPF (ref 0–4)

## 2023-03-06 PROCEDURE — 85610 PROTHROMBIN TIME: CPT

## 2023-03-06 PROCEDURE — 83540 ASSAY OF IRON: CPT

## 2023-03-06 PROCEDURE — 95816 EEG AWAKE AND DROWSY: CPT | Performed by: INTERNAL MEDICINE

## 2023-03-06 PROCEDURE — 85025 COMPLETE CBC W/AUTO DIFF WBC: CPT

## 2023-03-06 PROCEDURE — 81001 URINALYSIS AUTO W/SCOPE: CPT

## 2023-03-06 PROCEDURE — 84484 ASSAY OF TROPONIN QUANT: CPT

## 2023-03-06 PROCEDURE — 74011250636 HC RX REV CODE- 250/636: Performed by: INTERNAL MEDICINE

## 2023-03-06 PROCEDURE — 80053 COMPREHEN METABOLIC PANEL: CPT

## 2023-03-06 PROCEDURE — 85730 THROMBOPLASTIN TIME PARTIAL: CPT

## 2023-03-06 PROCEDURE — 82746 ASSAY OF FOLIC ACID SERUM: CPT

## 2023-03-06 PROCEDURE — 96374 THER/PROPH/DIAG INJ IV PUSH: CPT

## 2023-03-06 PROCEDURE — 70544 MR ANGIOGRAPHY HEAD W/O DYE: CPT

## 2023-03-06 PROCEDURE — 36415 COLL VENOUS BLD VENIPUNCTURE: CPT

## 2023-03-06 PROCEDURE — 82607 VITAMIN B-12: CPT

## 2023-03-06 PROCEDURE — 82728 ASSAY OF FERRITIN: CPT

## 2023-03-06 PROCEDURE — 80069 RENAL FUNCTION PANEL: CPT

## 2023-03-06 PROCEDURE — 74011250636 HC RX REV CODE- 250/636: Performed by: EMERGENCY MEDICINE

## 2023-03-06 PROCEDURE — 99285 EMERGENCY DEPT VISIT HI MDM: CPT

## 2023-03-06 PROCEDURE — 71045 X-RAY EXAM CHEST 1 VIEW: CPT

## 2023-03-06 PROCEDURE — 65270000029 HC RM PRIVATE

## 2023-03-06 PROCEDURE — 70450 CT HEAD/BRAIN W/O DYE: CPT

## 2023-03-06 PROCEDURE — 65270000046 HC RM TELEMETRY

## 2023-03-06 PROCEDURE — 82140 ASSAY OF AMMONIA: CPT

## 2023-03-06 PROCEDURE — 70551 MRI BRAIN STEM W/O DYE: CPT

## 2023-03-06 RX ORDER — LORAZEPAM 2 MG/ML
1 INJECTION, SOLUTION INTRAMUSCULAR; INTRAVENOUS
Status: COMPLETED | OUTPATIENT
Start: 2023-03-06 | End: 2023-03-06

## 2023-03-06 RX ORDER — LEVETIRACETAM 500 MG/5ML
1000 INJECTION, SOLUTION, CONCENTRATE INTRAVENOUS ONCE
Status: COMPLETED | OUTPATIENT
Start: 2023-03-06 | End: 2023-03-06

## 2023-03-06 RX ADMIN — LEVETIRACETAM 1000 MG: 100 INJECTION INTRAVENOUS at 11:55

## 2023-03-06 RX ADMIN — LORAZEPAM 1 MG: 2 INJECTION, SOLUTION INTRAMUSCULAR; INTRAVENOUS at 18:12

## 2023-03-06 NOTE — PROGRESS NOTES
Speech pathology  Orders received. Note patient here from [facility] with drooling on the R side and weakness of L arm. There is currently no completed H&P available. Note patient with recent subdural hematoma s/p crani a few weeks ago. Patient has not previously been seen in a [facility] by SLP. SLP will plan to follow up when further information is available.     Jesica Carvajal M.S. [facility]-SLP

## 2023-03-06 NOTE — ED TRIAGE NOTES
Patient arrived via EMS from [facility] with drooling on the right side and weakness to the left arm. LKW - Friday.  /86,

## 2023-03-06 NOTE — ED PROVIDER NOTES
Mr. Desirae Gomes is a 58yo male who presents to the ER as a possible code stroke. He had a subdural hematoma several weeks ago with evacuation and craniotomy. He has been at a rehab facility. He was noted last Friday to be somewhat drowsier than normal.  Did ask him again this morning because he was having drooling. He was noted to have drooling and new left upper extremity weakness. Is unclear exactly when his symptoms started. Is unclear if he had any symptoms over the weekend. Patient says he feels tired and does not feel well. No chest pain. No trouble breathing. No changes with his urine or bowel movements. He denies any other complaints. Past Medical History:   Diagnosis Date    Chronic kidney disease     Hypertension     Liver disease        No past surgical history on file. No family history on file.     Social History     Socioeconomic History    Marital status: SINGLE     Spouse name: Not on file    Number of children: Not on file    Years of education: Not on file    Highest education level: Not on file   Occupational History    Not on file   Tobacco Use    Smoking status: Some Days     Packs/day: 1.00     Types: Cigarettes     Last attempt to quit: 2020     Years since quittin.5    Smokeless tobacco: Never    Tobacco comments:     started back smoking in last couple months   Vaping Use    Vaping Use: Never used   Substance and Sexual Activity    Alcohol use: Not Currently    Drug use: Yes     Types: Marijuana, Cocaine, Heroin     Comment: Herion & Cocaine 10 years ago as of 3/23/22, Marijuana last used 1 week ago    Sexual activity: Not Currently   Other Topics Concern    Not on file   Social History Narrative    Not on file     Social Determinants of Health     Financial Resource Strain: Not on file   Food Insecurity: Not on file   Transportation Needs: Not on file   Physical Activity: Not on file   Stress: Not on file   Social Connections: Not on file   Intimate Partner Violence: Not on file   Housing Stability: Not on file         ALLERGIES: Baclofen and Losartan    Review of Systems   Constitutional:  Positive for fatigue. Negative for chills and fever. HENT:  Negative for rhinorrhea and sore throat. Respiratory:  Negative for cough and shortness of breath. Cardiovascular:  Negative for chest pain. Gastrointestinal:  Negative for abdominal pain, diarrhea, nausea and vomiting. Genitourinary:  Negative for dysuria and hematuria. Musculoskeletal:  Negative for arthralgias and myalgias. Skin:  Negative for pallor and rash. Neurological:  Positive for weakness. Negative for dizziness and light-headedness. Drooling   All other systems reviewed and are negative. There were no vitals filed for this visit. Physical Exam     Vital signs reviewed. Nursing notes reviewed. Const:  No acute distress, well developed, well nourished  Head: Swelling on the left side of the face, (patient's doctor said that this has been there since the surgery)  Eyes: Swelling over the left eye and around the left eye  Neck:  Supple, trachea midline  Cardiovascular: Normal rate   resp:  No resp distress, no increased work of breathing  Abd:  Soft, non-tender, non-distended  MSK:  No pedal edema, normal ROM  Neuro:  Alert and oriented x3, no cranial nerve defecits+ out of 5 strength in left upper extremity, 5 out of 5 strength in the bilateral lower extremities and right upper extremity  Skin:  Warm, dry, intact  Psych: normal mood and affect, behavior is normal          Medical Decision Making  Amount and/or Complexity of Data Reviewed  Independent Historian: caregiver and EMS  Labs: ordered. Radiology: ordered. Risk  Prescription drug management. Decision regarding hospitalization. Perfect Serve Consult for Admission  1:12 PM    ED Room Number: C/HC  Patient Name and age:  Florence Rangel 62 y.o.  male  Working Diagnosis:   1.  Stroke-like symptoms        COVID-19 Suspicion:  no  Sepsis present:  no  Reassessment needed: no  Code Status:  Full Code  Readmission: no  Isolation Requirements:  no  Recommended Level of Care:  telemetry  Department: [facility] Adult ED - 21   Other:  seen by teleneuro, concern for stroke v. Seizure v. metabolic        Mr. Barbara Davis is a 58yo male who presents to the ER with drowsiness and possible stroke-like symptoms. No new subdural today. Concern for metabolic cause v. Seizures v. Stroke? ??  Pt.  To be evaluated for admission by the hospitalist.      Procedures

## 2023-03-06 NOTE — CONSULTS
[facility]   [address] [facility], [address] [facility]  Phone: [phone]   [phone]    [email or web address]     Nephrology Progress Note    Patient Name : Anne Godoy      : 1965     MRN : [id number]  Date of Admission : 3/6/2023  Date of Servive : 23    CC: Follow up for ESRD     Assessment and Plan   ESRD on hemodialysis  followed by  outpatient  - Outpatient [facility]  - Outpatient HD schedule:TTS  - Last HD treatment: 3/4  - Access: Permacath  - UA pending  - CXR stable  - no need for emergent dialysis labs are stable  - [facility] team notified of orders  - Renally dose medications    Anemia of CKD  - Hgb 7.3  - start ABI 20k TTS  - check iron stores previously low      MBD of CKD  - corCa stable  - Phos   5.8  -  Binder: (renvela) will hold while NPO     HTN  AMS  - CT completed  - MRI pending  Subdural hematoma s/p craniotomy on 23     Interval History: This 63 yo male that was sent to the ER from [facility] due AMS. The wife states that the patient has become more lethargic over the weekend and this morning he was noted of arm weakness with drooling. The patient's PMH is significant for ESRD on HD, HTN, hyperphosphatemia, Anemia, and s/p left craniotomy 2/2 subdural hematoma on . The wife states that his home antihypertensives had been decrease/ removed due to periods of hypotension. She states he did complete a full treatment on Saturday with ~3 kg removed. The patient is resting quietly on stretcher in NAD. VSS with O2 sat 98% on room air. The patient denies any SOB, CP, headache, nausea or vomiting. Afebrile no leukocytosis. CXR: Cardiomegaly with minimal interstitial pulmonary edema. CT head: Small left frontal subdural subacute hematoma  Nephrology was consulted for the management of ESRD and dialysis. Review of Systems: Pertinent items are noted in HPI.     Current Medications:   No current facility-administered medications for this encounter. Current Outpatient Medications   Medication Sig    pantoprazole (Protonix) 40 mg tablet Take 1 Tablet by mouth daily for 30 days. Indications: an ulcer of the duodenum    hydrALAZINE (APRESOLINE) 100 mg tablet Take 1 Tablet by mouth three (3) times daily for 30 days. levETIRAcetam (KEPPRA) 500 mg tablet Take 1 Tablet by mouth Every Tuesday, Thursday and Saturday for 30 days. NIFEdipine ER (PROCARDIA XL) 90 mg ER tablet Take 1 Tablet by mouth daily for 30 days. levETIRAcetam (KEPPRA) 500 mg tablet Take 1 Tablet by mouth daily for 30 days. cloNIDine HCL (CATAPRES) 0.3 mg tablet Take 1 Tablet by mouth every eight (8) hours for 30 days. aliskiren (TEKTURNA) 300 mg tablet Take 1 Tablet by mouth daily. minoxidiL (LONITEN) 10 mg tablet Take 1 Tablet by mouth two (2) times a day. bumetanide (BUMEX) 2 mg tablet Take 1 Tablet by mouth daily. carvediloL (COREG) 25 mg tablet Take 1 Tablet by mouth two (2) times daily (with meals). doxazosin (CARDURA) 8 mg tablet Take 1 Tablet by mouth nightly. sevelamer carbonate (RENVELA) 800 mg tab tab Take 800 mg by mouth three (3) times daily. New prescription as of 8/16/22, prescribed by Dr. Reece Code    calcium carbonate (TUMS) 200 mg calcium (500 mg) chew Take 3 Tablets by mouth three (3) times daily (after meals). ascorbic acid, vitamin C, (VITAMIN C) 500 mg tablet Take 500 mg by mouth daily. cholecalciferol (VITAMIN D3) 25 mcg (1,000 unit) cap Take 1,000 Units by mouth daily.       Allergies   Allergen Reactions    Baclofen Rash     All over    Losartan Hives, Shortness of Breath and Rash     Verified allergy with patient       Objective:  Vitals:    Vitals:    03/06/23 1507 03/06/23 1515 03/06/23 1530 03/06/23 1545   BP:  (!) 144/75 132/83 (!) 184/85   Pulse:  78 81 94   Resp:  12 15 24   Temp:       SpO2:  97% 98% 100%   Weight: 75.4 kg (166 lb 3.6 oz)        Intake and Output:  No intake/output data recorded. No intake/output data recorded. Physical Examination:  General: Lethargic but follow commands  Neck:  Supple, no mass  Resp:  Lungs CTA B/L, no wheezing , normal respiratory effort  CV:  RRR,  trace bilateral LE edema  GI:  Soft, NT,   Psych:             Oriented to person  Skin:  No Rash  :  patient still urinates  Access :   Perma cath in place      Lab Data Personally Reviewed: I have reviewed all the pertinent labs, microbiology data and radiology studies during assessment. Recent Labs     03/06/23  1059   *   K 3.9   CL 97   CO2 28   *   BUN 34*   CREA 10.30*   CA 8.9   ALB 2.9*   ALT 6*   INR 1.1     Recent Labs     03/06/23  1059   WBC 9.5   HGB 7.9*   HCT 24.0*        No results found for: SDES  Lab Results   Component Value Date/Time    Culture result: NO GROWTH 5 DAYS 05/18/2022 06:06 AM    Culture result: No growth (<1,000 CFU/ML) 03/22/2022 04:11 PM     Recent Results (from the past 24 hour(s))   CBC WITH AUTOMATED DIFF    Collection Time: 03/06/23 10:59 AM   Result Value Ref Range    WBC 9.5 4.1 - 11.1 K/uL    RBC 2.63 (L) 4.10 - 5.70 M/uL    HGB 7.9 (L) 12.1 - 17.0 g/dL    HCT 24.0 (L) 36.6 - 50.3 %    MCV 91.3 80.0 - 99.0 FL    MCH 30.0 26.0 - 34.0 PG    MCHC 32.9 30.0 - 36.5 g/dL    RDW 13.5 11.5 - 14.5 %    PLATELET 717 924 - 943 K/uL    MPV 9.8 8.9 - 12.9 FL    NRBC 0.0 0  WBC    ABSOLUTE NRBC 0.00 0.00 - 0.01 K/uL    NEUTROPHILS 62 32 - 75 %    LYMPHOCYTES 18 12 - 49 %    MONOCYTES 15 (H) 5 - 13 %    EOSINOPHILS 4 0 - 7 %    BASOPHILS 1 0 - 1 %    IMMATURE GRANULOCYTES 0 0.0 - 0.5 %    ABS. NEUTROPHILS 5.9 1.8 - 8.0 K/UL    ABS. LYMPHOCYTES 1.7 0.8 - 3.5 K/UL    ABS. MONOCYTES 1.4 (H) 0.0 - 1.0 K/UL    ABS. EOSINOPHILS 0.4 0.0 - 0.4 K/UL    ABS. BASOPHILS 0.1 0.0 - 0.1 K/UL    ABS. IMM.  GRANS. 0.0 0.00 - 0.04 K/UL    DF AUTOMATED     METABOLIC PANEL, COMPREHENSIVE    Collection Time: 03/06/23 10:59 AM   Result Value Ref Range Sodium 133 (L) 136 - 145 mmol/L    Potassium 3.9 3.5 - 5.1 mmol/L    Chloride 97 97 - 108 mmol/L    CO2 28 21 - 32 mmol/L    Anion gap 8 5 - 15 mmol/L    Glucose 104 (H) 65 - 100 mg/dL    BUN 34 (H) 6 - 20 MG/DL    Creatinine 10.30 (H) 0.70 - 1.30 MG/DL    BUN/Creatinine ratio 3 (L) 12 - 20      eGFR 5 (L) >60 ml/min/1.73m2    Calcium 8.9 8.5 - 10.1 MG/DL    Bilirubin, total 0.3 0.2 - 1.0 MG/DL    ALT (SGPT) 6 (L) 12 - 78 U/L    AST (SGOT) 9 (L) 15 - 37 U/L    Alk. phosphatase 85 45 - 117 U/L    Protein, total 7.6 6.4 - 8.2 g/dL    Albumin 2.9 (L) 3.5 - 5.0 g/dL    Globulin 4.7 (H) 2.0 - 4.0 g/dL    A-G Ratio 0.6 (L) 1.1 - 2.2     TROPONIN-HIGH SENSITIVITY    Collection Time: 03/06/23 10:59 AM   Result Value Ref Range    Troponin-High Sensitivity 12 0 - 57 ng/L   SAMPLES BEING HELD    Collection Time: 03/06/23 10:59 AM   Result Value Ref Range    SAMPLES BEING HELD 1red 1blue     COMMENT        Add-on orders for these samples will be processed based on acceptable specimen integrity and analyte stability, which may vary by analyte. PTT    Collection Time: 03/06/23 10:59 AM   Result Value Ref Range    aPTT 23.3 22.1 - 31.0 sec    aPTT, therapeutic range     58.0 - 77.0 SECS   PROTHROMBIN TIME + INR    Collection Time: 03/06/23 10:59 AM   Result Value Ref Range    INR 1.1 0.9 - 1.1      Prothrombin time 11.4 (H) 9.0 - 11.1 sec   AMMONIA    Collection Time: 03/06/23 11:35 AM   Result Value Ref Range    Ammonia, plasma 29 <32 UMOL/L       I have reviewed the flowsheets. Chart and Pertinent Notes have been reviewed.        Annabel Patterson NP  [facility]

## 2023-03-06 NOTE — H&P
[address] [facility] Adult  Hospitalist Group  History and Physical    Date of Service:  3/6/2023  Primary Care Provider: Rebeca De La Rosa NP  Source of information: The patient and Chart review    Chief Complaint: Lethargy      History of Presenting Illness:   62 y.o. male with ESRD on TTS HD, hypertension, liver disease status post recent left frontal subdural hematoma post emergent evacuation by neurosurgery on 2/18/2023 and left frontal scalp hematoma presented from [facility] with confusion, new left upper extremity weakness. Patient really denies any complaints but history seems to be somewhat limited by patient's somnolence. Pt states he was scheduled for discharge this week from rehab. CT head in the ED showed subacute left frontal subdural hematoma and unchanged left frontal scalp hematoma and no new process. CXR showed minimal pulmonary edema and cardiomegaly. In the ED patient was given Keppra 1 g IV x1.     REVIEW OF SYSTEMS:  limited due to patient's somnolence    Past Medical History:   Diagnosis Date    Chronic kidney disease     Hypertension     Liver disease       No past surgical history on file. Prior to Admission medications    Medication Sig Start Date End Date Taking? Authorizing Provider   pantoprazole (Protonix) 40 mg tablet Take 1 Tablet by mouth daily for 30 days. Indications: an ulcer of the duodenum 2/28/23 3/30/23  Fabiola Rucker MD   hydrALAZINE (APRESOLINE) 100 mg tablet Take 1 Tablet by mouth three (3) times daily for 30 days. 2/28/23 3/30/23  Fabiola Rucker MD   levETIRAcetam (KEPPRA) 500 mg tablet Take 1 Tablet by mouth Every Tuesday, Thursday and Saturday for 30 days. 2/28/23 3/30/23  Fabiola Rucker MD   NIFEdipine ER (PROCARDIA XL) 90 mg ER tablet Take 1 Tablet by mouth daily for 30 days. 2/28/23 3/30/23  Fabiola Rucker MD   levETIRAcetam (KEPPRA) 500 mg tablet Take 1 Tablet by mouth daily for 30 days.  2/28/23 3/30/23  Fabiola Rucker MD   cloNIDine HCL (CATAPRES) 0.3 mg tablet Take 1 Tablet by mouth every eight (8) hours for 30 days. 2/28/23 3/30/23  Niranjan Cm MD   aliskiren (TEKTURNA) 300 mg tablet Take 1 Tablet by mouth daily. 10/3/22   Janis Lopez MD   minoxidiL (LONITEN) 10 mg tablet Take 1 Tablet by mouth two (2) times a day. 10/2/22   Janis Lopez MD   bumetanide (BUMEX) 2 mg tablet Take 1 Tablet by mouth daily. 9/29/22   Janis Lopez MD   carvediloL (COREG) 25 mg tablet Take 1 Tablet by mouth two (2) times daily (with meals). 9/28/22   Janis Lopez MD   doxazosin (CARDURA) 8 mg tablet Take 1 Tablet by mouth nightly. 9/28/22   Janis Lopez MD   sevelamer carbonate (RENVELA) 800 mg tab tab Take 800 mg by mouth three (3) times daily. New prescription as of 8/16/22, prescribed by Dr. Asa Montoya    Provider, Historical   calcium carbonate (TUMS) 200 mg calcium (500 mg) chew Take 3 Tablets by mouth three (3) times daily (after meals). Provider, Historical   ascorbic acid, vitamin C, (VITAMIN C) 500 mg tablet Take 500 mg by mouth daily. Provider, Historical   cholecalciferol (VITAMIN D3) 25 mcg (1,000 unit) cap Take 1,000 Units by mouth daily. Provider, Historical     Allergies   Allergen Reactions    Baclofen Rash     All over    Losartan Hives, Shortness of Breath and Rash     Verified allergy with patient      No family history on file. Social History:  reports that he has been smoking. He has been smoking an average of 1 pack per day. He has never used smokeless tobacco. He reports that he does not currently use alcohol. He reports current drug use. Drugs: Marijuana, Cocaine, and Heroin.    Social Determinants of Health     Tobacco Use: High Risk    Smoking Tobacco Use: Some Days    Smokeless Tobacco Use: Never    Passive Exposure: Not on file   Alcohol Use: Not on file   Financial Resource Strain: Not on file   Food Insecurity: Not on file   Transportation Needs: Not on file   Physical Activity: Not on file   Stress: Not on file   Social Connections: Not on file   Intimate Partner Violence: Not on file   Depression: Not at risk    PHQ-2 Score: 0   Housing Stability: Not on file        Medications were reconciled to the best of my ability given all available resources at the time of admission. Route is PO if not otherwise noted. Family and social history were personally reviewed, all pertinent and relevant details are outlined as above. Objective:   Visit Vitals  BP (!) 148/83 (BP 1 Location: Right arm, BP Patient Position: At rest)   Pulse 77   Temp 98.2 °F (36.8 °C)   Resp 18   SpO2 94%      O2 Device: None (Room air)    PHYSICAL EXAM:   General: somnolent but arousable, NAD, pleasant   HEENT: NCAT, PERRL, EOMI, L upper eyelid edema, MMM, OP benign  Neck: supple, no masses  Lungs: CTAB, no w/r/r   CVS: regular rhythm, normal rate, no m/r/g appreciated, no peripheral edema, +pulses  GI: +BS, soft, NT, ND   MSK: LUE/LLE 4/5, RUE/RLE 5l5  Neuro/Psych: AOx3, CN II-XII grossly intact, responds appropriately to questions and commands, pleasant mood and affect, LUE and LLE mild hemiparesis  Skin: Warm, dry, no rashes or lesions noted    Data Review:   I have independently reviewed and interpreted patient's lab and all other diagnostic data    Abnormal Labs Reviewed   CBC WITH AUTOMATED DIFF - Abnormal; Notable for the following components:       Result Value    RBC 2.63 (*)     HGB 7.9 (*)     HCT 24.0 (*)     MONOCYTES 15 (*)     ABS.  MONOCYTES 1.4 (*)     All other components within normal limits   METABOLIC PANEL, COMPREHENSIVE - Abnormal; Notable for the following components:    Sodium 133 (*)     Glucose 104 (*)     BUN 34 (*)     Creatinine 10.30 (*)     BUN/Creatinine ratio 3 (*)     eGFR 5 (*)     ALT (SGPT) 6 (*)     AST (SGOT) 9 (*)     Albumin 2.9 (*)     Globulin 4.7 (*)     A-G Ratio 0.6 (*)     All other components within normal limits   PROTHROMBIN TIME + INR - Abnormal; Notable for the following components:    Prothrombin time 11.4 (*)     All other components within normal limits       All Micro Results       None            IMAGING:   XR CHEST PORT   Final Result   Cardiomegaly with minimal interstitial pulmonary edema. CT CODE NEURO HEAD WO CONTRAST   Final Result   There is no new intracranial hemorrhage. Small left frontal subdural subacute hematoma. Mild mass effect. Unchanged left   frontal scalp hematoma. ECG/ECHO:    Results for orders placed or performed during the hospital encounter of 02/18/23   EKG, 12 LEAD, INITIAL   Result Value Ref Range    Ventricular Rate 94 BPM    Atrial Rate 94 BPM    P-R Interval 148 ms    QRS Duration 98 ms    Q-T Interval 370 ms    QTC Calculation (Bezet) 462 ms    Calculated P Axis 75 degrees    Calculated R Axis 37 degrees    Calculated T Axis 121 degrees    Diagnosis       Normal sinus rhythm  Voltage criteria for left ventricular hypertrophy  T wave abnormality, consider lateral ischemia  Prolonged QT  When compared with ECG of 28-NOV-2022 09:43,  Vent. rate has increased BY  35 BPM  Inverted T waves have replaced nonspecific T wave abnormality in Lateral   leads  Confirmed by Ralf Lema M.D., Adrian Germain ([id number]) on 2/21/2023 3:13:04 PM            Notes reviewed from all clinical/nonclinical/nursing services involved in patient's clinical care. Care coordination discussions were held with appropriate clinical/nonclinical/ nursing providers based on care coordination needs. Assessment/Plan:   Given the patient's current clinical presentation, there is a high level of concern for decompensation if discharged from the emergency department. Complex decision making was performed, which includes reviewing the patient's available past medical records, laboratory results, and imaging studies.     Acute encephalopathy (3/6/2023)  Unclear etiology, possible seizure versus drug effect  CT head shows subacute left frontal subdural hematoma with unchanged left frontal scalp hematoma and no acute process  Opponent and ammonia are both within normal limits  Check MRI brain  Check EEG; patient received 1 g IV Keppra in ED  Consider neurology consult    Subacute L frontal subdural hematoma with unchanged L frontal scalp hematoma  Patient was scheduled to have follow-up with neurosurgery Dr. Jerry Mcbride; will consult neurosurgery inpatient for follow-up    LUE/LLE hemiparesis, new onset  CT head unremarkable for new process  Check MRI brain  PT/OT evaluations    ESRD on TTS HD  Consult Renal for HD    Anemia of CKD  Chronic and stable; H/H improved from previous    HTN  Chronic and stable on home meds    Liver disease  Chronic and stable, LFFTs WNL    Chronic hyponatremia  Mild and improved from previous    DIET: ADULT DIET Regular; Low Fat/Low Chol/High Fiber/2 gm Na   ISOLATION PRECAUTIONS: There are currently no Active Isolations  CODE STATUS: Full Code   DVT PROPHYLAXIS: SCDs  FUNCTIONAL STATUS PRIOR TO HOSPITALIZATION: Ambulatory and capable of self-care but relies on assistive devices (rolling walker/cane). Ambulatory status/function: Ambulates with assistance:  Unable to ambulate   EARLY MOBILITY ASSESSMENT: Recommend an assessment from physical therapy and/or occupational therapy  ANTICIPATED DISCHARGE: Greater than 48 hours.   ANTICIPATED DISPOSITION: SNF  EMERGENCY CONTACT/SURROGATE DECISION MAKER:       Signed By: Ara Jalloh MD     March 6, 2023

## 2023-03-07 ENCOUNTER — APPOINTMENT (OUTPATIENT)
Dept: CT IMAGING | Age: 58
End: 2023-03-07
Attending: NURSE PRACTITIONER
Payer: MEDICARE

## 2023-03-07 LAB
ALBUMIN SERPL-MCNC: 3.1 G/DL (ref 3.5–5)
ANION GAP SERPL CALC-SCNC: 9 MMOL/L (ref 5–15)
BUN SERPL-MCNC: 40 MG/DL (ref 6–20)
BUN/CREAT SERPL: 3 (ref 12–20)
CALCIUM SERPL-MCNC: 8.7 MG/DL (ref 8.5–10.1)
CHLORIDE SERPL-SCNC: 98 MMOL/L (ref 97–108)
CHOLEST SERPL-MCNC: 122 MG/DL
CO2 SERPL-SCNC: 26 MMOL/L (ref 21–32)
COMMENT, HOLDF: NORMAL
CREAT SERPL-MCNC: 11.5 MG/DL (ref 0.7–1.3)
ERYTHROCYTE [DISTWIDTH] IN BLOOD BY AUTOMATED COUNT: 13.4 % (ref 11.5–14.5)
EST. AVERAGE GLUCOSE BLD GHB EST-MCNC: ABNORMAL MG/DL
GLUCOSE SERPL-MCNC: 107 MG/DL (ref 65–100)
HBA1C MFR BLD: <3.8 % (ref 4–5.6)
HCT VFR BLD AUTO: 28 % (ref 36.6–50.3)
HDLC SERPL-MCNC: 42 MG/DL
HDLC SERPL: 2.9 (ref 0–5)
HGB BLD-MCNC: 9 G/DL (ref 12.1–17)
LDLC SERPL CALC-MCNC: 47.4 MG/DL (ref 0–100)
MCH RBC QN AUTO: 29.7 PG (ref 26–34)
MCHC RBC AUTO-ENTMCNC: 32.1 G/DL (ref 30–36.5)
MCV RBC AUTO: 92.4 FL (ref 80–99)
NRBC # BLD: 0 K/UL (ref 0–0.01)
NRBC BLD-RTO: 0 PER 100 WBC
PHOSPHATE SERPL-MCNC: 5.5 MG/DL (ref 2.6–4.7)
PLATELET # BLD AUTO: 292 K/UL (ref 150–400)
PMV BLD AUTO: 9.7 FL (ref 8.9–12.9)
POTASSIUM SERPL-SCNC: 3.8 MMOL/L (ref 3.5–5.1)
RBC # BLD AUTO: 3.03 M/UL (ref 4.1–5.7)
SAMPLES BEING HELD,HOLD: NORMAL
SODIUM SERPL-SCNC: 133 MMOL/L (ref 136–145)
TRIGL SERPL-MCNC: 163 MG/DL (ref ?–150)
VLDLC SERPL CALC-MCNC: 32.6 MG/DL
WBC # BLD AUTO: 9.7 K/UL (ref 4.1–11.1)

## 2023-03-07 PROCEDURE — 97165 OT EVAL LOW COMPLEX 30 MIN: CPT | Performed by: OCCUPATIONAL THERAPIST

## 2023-03-07 PROCEDURE — 65270000046 HC RM TELEMETRY

## 2023-03-07 PROCEDURE — 80069 RENAL FUNCTION PANEL: CPT

## 2023-03-07 PROCEDURE — 36415 COLL VENOUS BLD VENIPUNCTURE: CPT

## 2023-03-07 PROCEDURE — 74011250637 HC RX REV CODE- 250/637: Performed by: NURSE PRACTITIONER

## 2023-03-07 PROCEDURE — 74176 CT ABD & PELVIS W/O CONTRAST: CPT

## 2023-03-07 PROCEDURE — 85027 COMPLETE CBC AUTOMATED: CPT

## 2023-03-07 PROCEDURE — 83036 HEMOGLOBIN GLYCOSYLATED A1C: CPT

## 2023-03-07 PROCEDURE — 74011250636 HC RX REV CODE- 250/636: Performed by: NURSE PRACTITIONER

## 2023-03-07 PROCEDURE — 90935 HEMODIALYSIS ONE EVALUATION: CPT

## 2023-03-07 PROCEDURE — 92610 EVALUATE SWALLOWING FUNCTION: CPT | Performed by: SPEECH-LANGUAGE PATHOLOGIST

## 2023-03-07 PROCEDURE — 99223 1ST HOSP IP/OBS HIGH 75: CPT | Performed by: PSYCHIATRY & NEUROLOGY

## 2023-03-07 PROCEDURE — 97535 SELF CARE MNGMENT TRAINING: CPT | Performed by: OCCUPATIONAL THERAPIST

## 2023-03-07 PROCEDURE — 5A1D70Z PERFORMANCE OF URINARY FILTRATION, INTERMITTENT, LESS THAN 6 HOURS PER DAY: ICD-10-PCS | Performed by: NURSE PRACTITIONER

## 2023-03-07 PROCEDURE — 97161 PT EVAL LOW COMPLEX 20 MIN: CPT

## 2023-03-07 PROCEDURE — 80061 LIPID PANEL: CPT

## 2023-03-07 PROCEDURE — 74011250637 HC RX REV CODE- 250/637: Performed by: PSYCHIATRY & NEUROLOGY

## 2023-03-07 PROCEDURE — 95816 EEG AWAKE AND DROWSY: CPT | Performed by: PSYCHIATRY & NEUROLOGY

## 2023-03-07 RX ORDER — SEVELAMER CARBONATE 800 MG/1
800 TABLET, FILM COATED ORAL 3 TIMES DAILY
Status: DISCONTINUED | OUTPATIENT
Start: 2023-03-07 | End: 2023-03-10 | Stop reason: HOSPADM

## 2023-03-07 RX ORDER — DOXAZOSIN 2 MG/1
8 TABLET ORAL
Status: DISCONTINUED | OUTPATIENT
Start: 2023-03-07 | End: 2023-03-10 | Stop reason: HOSPADM

## 2023-03-07 RX ORDER — NIFEDIPINE 30 MG/1
90 TABLET, EXTENDED RELEASE ORAL DAILY
Status: DISCONTINUED | OUTPATIENT
Start: 2023-03-07 | End: 2023-03-10 | Stop reason: HOSPADM

## 2023-03-07 RX ORDER — LEVETIRACETAM 500 MG/1
500 TABLET ORAL
Status: DISCONTINUED | OUTPATIENT
Start: 2023-03-07 | End: 2023-03-10 | Stop reason: HOSPADM

## 2023-03-07 RX ORDER — CARVEDILOL 12.5 MG/1
25 TABLET ORAL 2 TIMES DAILY WITH MEALS
Status: DISCONTINUED | OUTPATIENT
Start: 2023-03-07 | End: 2023-03-10 | Stop reason: HOSPADM

## 2023-03-07 RX ORDER — CLONIDINE HYDROCHLORIDE 0.1 MG/1
0.3 TABLET ORAL EVERY 8 HOURS
Status: DISCONTINUED | OUTPATIENT
Start: 2023-03-07 | End: 2023-03-10 | Stop reason: HOSPADM

## 2023-03-07 RX ORDER — LEVETIRACETAM 500 MG/1
500 TABLET ORAL DAILY
Status: DISCONTINUED | OUTPATIENT
Start: 2023-03-07 | End: 2023-03-10 | Stop reason: HOSPADM

## 2023-03-07 RX ORDER — PANTOPRAZOLE SODIUM 40 MG/1
40 TABLET, DELAYED RELEASE ORAL DAILY
Status: DISCONTINUED | OUTPATIENT
Start: 2023-03-07 | End: 2023-03-10 | Stop reason: HOSPADM

## 2023-03-07 RX ORDER — HYDRALAZINE HYDROCHLORIDE 50 MG/1
100 TABLET, FILM COATED ORAL 3 TIMES DAILY
Status: DISCONTINUED | OUTPATIENT
Start: 2023-03-07 | End: 2023-03-10 | Stop reason: HOSPADM

## 2023-03-07 RX ORDER — ALBUMIN HUMAN 250 G/1000ML
25 SOLUTION INTRAVENOUS
Status: DISCONTINUED | OUTPATIENT
Start: 2023-03-07 | End: 2023-03-10 | Stop reason: HOSPADM

## 2023-03-07 RX ORDER — OXYCODONE HYDROCHLORIDE 5 MG/1
5 TABLET ORAL
Status: DISCONTINUED | OUTPATIENT
Start: 2023-03-07 | End: 2023-03-10 | Stop reason: HOSPADM

## 2023-03-07 RX ADMIN — DOXAZOSIN 8 MG: 2 TABLET ORAL at 21:19

## 2023-03-07 RX ADMIN — LEVETIRACETAM 500 MG: 500 TABLET, FILM COATED ORAL at 14:22

## 2023-03-07 RX ADMIN — HYDRALAZINE HYDROCHLORIDE 100 MG: 50 TABLET, FILM COATED ORAL at 11:01

## 2023-03-07 RX ADMIN — EPOETIN ALFA-EPBX 20000 UNITS: 20000 INJECTION, SOLUTION INTRAVENOUS; SUBCUTANEOUS at 22:25

## 2023-03-07 RX ADMIN — LEVETIRACETAM 500 MG: 500 TABLET, FILM COATED ORAL at 21:19

## 2023-03-07 RX ADMIN — HEPARIN SODIUM 2100 UNITS: 1000 INJECTION INTRAVENOUS; SUBCUTANEOUS at 12:02

## 2023-03-07 RX ADMIN — CLONIDINE HYDROCHLORIDE 0.3 MG: 0.1 TABLET ORAL at 11:01

## 2023-03-07 RX ADMIN — CLONIDINE HYDROCHLORIDE 0.3 MG: 0.1 TABLET ORAL at 19:09

## 2023-03-07 RX ADMIN — SEVELAMER CARBONATE 800 MG: 800 TABLET, FILM COATED ORAL at 19:06

## 2023-03-07 RX ADMIN — HEPARIN SODIUM 2000 UNITS: 1000 INJECTION INTRAVENOUS; SUBCUTANEOUS at 12:02

## 2023-03-07 RX ADMIN — CARVEDILOL 25 MG: 12.5 TABLET, FILM COATED ORAL at 11:01

## 2023-03-07 RX ADMIN — HYDRALAZINE HYDROCHLORIDE 100 MG: 50 TABLET, FILM COATED ORAL at 19:08

## 2023-03-07 RX ADMIN — IRON SUCROSE 300 MG: 20 INJECTION, SOLUTION INTRAVENOUS at 12:49

## 2023-03-07 NOTE — PROGRESS NOTES
Neurosurgery consult received re: subdural hematoma  Please review the CT head of Feb 18, his pre op scan. There was a large acute left sided SDH. Most recent scan shows near complete removal/resolution of that SDH  the brain has re-expanded and no longer shows any mass effect. There is no neurosurgical intervention necessary.   As long as he has chronic renal disease and they utilize anticoagulants during dialysis, he will be at risk of developing a SDH

## 2023-03-07 NOTE — PROCEDURES
Hemodialysis / [phone]    Vitals Pre Post Assessment Pre Post   BP BP: (!) 218/102 (03/07/23 0939)   206/92 LOC AxOx4 AxOx4   HR Pulse (Heart Rate): (!) 105 (03/07/23 0939)   103 Lungs clear clear   Resp Resp Rate: 20 (03/07/23 0939) 16 Cardiac ST ST   Temp Temp: 98.9 °F (37.2 °C) (03/07/23 0939) 99 Skin Warm, dry, wound on L side of head Warm, dry, wound on L side of head   Weight  Unable to obtain Unable to obtain Edema +1 LLE +1 LLE   Tele status Bedside monitor Bedside monitor Pain 0 0     Orders   Duration: Start: 7640 End: 1145 Total: 2hr 6min   Dialyzer: Dialyzer/Set Up Inspection: Leyla Poole (03/07/23 0939)   K Bath: Dialysate K (mEq/L): 3 (03/07/23 0939)   Ca Bath: Dialysate CA (mEq/L): 2.5 (03/07/23 0939)   Na: Dialysate NA (mEq/L): 138 (03/07/23 0939)   Bicarb: Dialysate HCO3 (mEq/L): 35 (03/07/23 0939)   Target Fluid Removal: Goal/Amount of Fluid to Remove (mL): 3000 mL (03/07/23 0939)     Access   Type & Location: L SC PC   Comments:                L PC :   Pre- Assessment: Dressing CDI dated 3/7/23. No s/s of infection. Both lumens aspirate & flush well. Running well at . Dressing changed per hospital policy. Post assessment: Dressing CDI. No changes.                          Labs   HBsAg (Antigen) / date:        Negative 2/18/23                                       HBsAb (Antibody) / date:       Susceptible 2/18/23   Source: Epic   Obtained/Reviewed  Critical Results Called HGB   Date Value Ref Range Status   03/07/2023 9.0 (L) 12.1 - 17.0 g/dL Final     Potassium   Date Value Ref Range Status   03/07/2023 3.8 3.5 - 5.1 mmol/L Final     Calcium   Date Value Ref Range Status   03/07/2023 8.7 8.5 - 10.1 MG/DL Final     BUN   Date Value Ref Range Status   03/07/2023 40 (H) 6 - 20 MG/DL Final     Creatinine   Date Value Ref Range Status   03/07/2023 11.50 (H) 0.70 - 1.30 MG/DL Final        Meds Given   Name Dose Route   Heparin 1000u 2.1mL/ 2mL ICD               Adequacy / Fluid    Total Liters Process: 46.3 L   Net Fluid Removed: 1379 mL      Comments   Time Out Done:   (Time) 0969   Admitting Diagnosis: Acute encephalopathy   Consent obtained/signed:  Chronic consent applies   Machine / RO # Machine Number: [id number] (03/07/23 9811)   Primary Nurse Rpt Pre: Regla Evansr RN   Primary Nurse Rpt Post: Regla Haver RN   Pt Education: [facility] care   Care Plan: Continue HD treatments as ordered   Pts outpatient [facility]: [facility]     Tx Summary   Comments:                         SBAR received from Primary RN. Pt A&Ox4. Chronic consent applies. 7784: Each catheter limb disinfected per p&p, caps removed, hubs disinfected per p&p. Each lumen aspirated for blood return and flushed with Normal Saline per policy. VSS w/ extreme HTN. Primary RN and MD aware. Dialysis Tx initiated. 1000: NP at bedside to assess pt, spoke with wife regarding importance of BP meds; wife refusing to allow BP meds to be administered at this time. Primary RN also continuing to encourage administration of BP meds. 1100: Neurologist at bedside for assessment. Spoke with wife regarding importance of BP meds. Wife to allow BP meds to be given at this time. 1137: Pt c/o nausea. 100mL NS given, UF off. Intervention unsuccessful. Pt wanting to end treatment. Omari Andrew NP notified of pt's request. Attempts to keep patient on machine for 1 more hour unsuccessful. OK to end treatment early per NP. Pt notified he will most likely have HD again tomorrow per NP.    1145: Tx ended. VSS. Each dialysis catheter limb disinfected per p&p, all possible blood returned per p&p, and each dialysis hub disinfected per p&p. Each lumen flushed, post dialysis catheter Heparin dwell instilled per order, and caps applied. Bed locked and in the lowest position, call bell and belongings in reach. SBAR given to Primary, RN. Patient is stable at time of my departure. All Dialysis related medications have been reviewed.

## 2023-03-07 NOTE — PROGRESS NOTES
Orders received, chart reviewed and patient evaluated by physical therapy. Patient and his caregivers do not want him to return to [facility]. Recommend home with HHPT/ OT for home safety assessment/ equipment needs followed by transition to OP PT for balance, gait, strength. Full evaluation to follow.

## 2023-03-07 NOTE — ED NOTES
Nephrology NP at bedside. Informed the provider that the patient's wife refusing to let us give him antihypertensive po medication.  Np spoke with the family and states to give the blood pressure medications about [facility] through his dialysis treatment

## 2023-03-07 NOTE — PROCEDURES
PROCEDURE: ROUTINE INPATIENT EEG  NAME:   Wiley Fernandez NUMBER : [de-identified]  MRN:   [id number]  DATE OF SERVICE: 3/7/2023     HISTORY/INDICATION: Pt with h/o seizure, presenting with increased fatigue and worsening of left arm weakness, now completely resolved. EEG to assess for evidence of seizure as an etiology.     MEDICATIONS:   Current Facility-Administered Medications   Medication Dose Route Frequency Provider Last Rate Last Admin    carvediloL (COREG) tablet 25 mg  25 mg Oral BID WITH MEALS Guy Robles, NP   25 mg at 03/07/23 1101    cloNIDine HCL (CATAPRES) tablet 0.3 mg  0.3 mg Oral Q8H Nena Campos NP   0.3 mg at 03/07/23 1101    doxazosin (CARDURA) tablet 8 mg  8 mg Oral QHS Guy Robles, NP        hydrALAZINE (APRESOLINE) tablet 100 mg  100 mg Oral TID Guy Robles, NP   100 mg at 03/07/23 1101    NIFEdipine ER (PROCARDIA XL) tablet 90 mg  90 mg Oral DAILY Guy Robles, NP        pantoprazole (PROTONIX) tablet 40 mg  40 mg Oral DAILY Guy Robles, NP        sevelamer carbonate (RENVELA) tab 800 mg  800 mg Oral TID Guy Robles, NP        heparin (porcine) 1,000 unit/mL injection 2,000 Units  2,000 Units InterCATHeter DIALYSIS PRN Celia Garsia, NP   2,000 Units at 03/07/23 1202    And    heparin (porcine) 1,000 unit/mL injection 2,100 Units  2,100 Units InterCATHeter DIALYSIS PRN Celia Egronald, NP   2,100 Units at 03/07/23 1202    albumin human 25% (BUMINATE) solution 25 g  25 g IntraVENous DIALYSIS PRN eClia Ego, NP        iron sucrose (VENOFER) 300 mg in 0.9% sodium chloride 250 mL IVPB  300 mg IntraVENous Q24H Celia Garsia .7 mL/hr at 03/07/23 1249 300 mg at 03/07/23 1249    levETIRAcetam (KEPPRA) tablet 500 mg  500 mg Oral DIALYSIS TING SANCHEZ & SAT Trenton Nath MD        levETIRAcetam (KEPPRA) tablet 500 mg  500 mg Oral DAILY Trenton Nath MD        epoetin shawn-epbx (RETACRIT) injection 20,000 Units  20,000 Units SubCUTAneous Q TUE, THU & SAT Celia Garsia, NP Current Outpatient Medications   Medication Sig Dispense Refill    pantoprazole (Protonix) 40 mg tablet Take 1 Tablet by mouth daily for 30 days. Indications: an ulcer of the duodenum 30 Tablet 0    hydrALAZINE (APRESOLINE) 100 mg tablet Take 1 Tablet by mouth three (3) times daily for 30 days. 90 Tablet 0    levETIRAcetam (KEPPRA) 500 mg tablet Take 1 Tablet by mouth Every Tuesday, Thursday and Saturday for 30 days. 12 Tablet 0    NIFEdipine ER (PROCARDIA XL) 90 mg ER tablet Take 1 Tablet by mouth daily for 30 days. 30 Tablet 0    levETIRAcetam (KEPPRA) 500 mg tablet Take 1 Tablet by mouth daily for 30 days. 30 Tablet 0    cloNIDine HCL (CATAPRES) 0.3 mg tablet Take 1 Tablet by mouth every eight (8) hours for 30 days. 90 Tablet 0    aliskiren (TEKTURNA) 300 mg tablet Take 1 Tablet by mouth daily. 60 Tablet 0    minoxidiL (LONITEN) 10 mg tablet Take 1 Tablet by mouth two (2) times a day. 60 Tablet 0    bumetanide (BUMEX) 2 mg tablet Take 1 Tablet by mouth daily. 30 Tablet 0    carvediloL (COREG) 25 mg tablet Take 1 Tablet by mouth two (2) times daily (with meals). 60 Tablet 0    doxazosin (CARDURA) 8 mg tablet Take 1 Tablet by mouth nightly. 30 Tablet 0    sevelamer carbonate (RENVELA) 800 mg tab tab Take 800 mg by mouth three (3) times daily. New prescription as of 8/16/22, prescribed by Dr. Gabino Gaytan      calcium carbonate (TUMS) 200 mg calcium (500 mg) chew Take 3 Tablets by mouth three (3) times daily (after meals). ascorbic acid, vitamin C, (VITAMIN C) 500 mg tablet Take 500 mg by mouth daily. cholecalciferol (VITAMIN D3) 25 mcg (1,000 unit) cap Take 1,000 Units by mouth daily. CONDITIONS OF RECORDING: This is a routine 21-channel EEG recording performed in accordance with the international 10-20 system with one channel devoted to limited EKG. This study was done during a state of wakefulness. No activating procedures were performed.     DESCRIPTION:   Upon maximal arousal the posterior dominant rhythm has a frequency of 10Hz with an amplitude of 20uV. This activity is symmetric over the bilateral posterior derivations and attenuates with eye opening. Photic stimulation did not significantly alter the tracing. No sleep is seen. There are no focal abnormalities, epileptiform discharges, or electrographic seizures seen. INTERPRETATION: Normal awake EEG    CLINICAL CORRELATION: A normal EEG does not definitively exclude a diagnosis of epilepsy if clinical suspicion is high consider sleep deprived EEG or more prolonged monitoring.      Dhara Ferreira MD

## 2023-03-07 NOTE — PROGRESS NOTES
[address] [facility]  Hospitalist Group                                                                                          Hospitalist Progress Note  Roxanne Bar NP  Answering service: [phone] OR [phone] from in house phone        Date of Service:  3/7/2023  NAME:  Jannet Pollard  :  1965  MRN:  [id number]       Admission Summary:   Per H&P, 62 y.o. male with ESRD on TTS HD, hypertension, liver disease status post recent left frontal subdural hematoma post emergent evacuation by neurosurgery on 2023 and left frontal scalp hematoma presented from [facility] with confusion, new left upper extremity weakness. Patient really denies any complaints but history seems to be somewhat limited by patient's somnolence. Pt states he was scheduled for discharge this week from rehab. CT head in the ED showed subacute left frontal subdural hematoma and unchanged left frontal scalp hematoma and no new process. CXR showed minimal pulmonary edema and cardiomegaly. In the ED patient was given Keppra 1 g IV x1. Interval history / Subjective:   I saw the patient this morning on rounds. Was receiving hemodialysis at the moment of the encounter. Wife was at the bedside at that time.   Patient significantly hypertensive, home medications reordered     Assessment & Plan:         Acute encephalopathy (/CVA  CT head shows subacute left frontal subdural hematoma with unchanged left frontal   scalp hematoma and no acute process  MRI with acute left basal infarct  neurology evaluated, appreciate recommendations  EEG done, results pending  Continuing levetiracetam  PT/OT       Subacute L frontal subdural hematoma with unchanged L frontal scalp hematoma  Has been evaluated by neurosurgery, appreciate recommendations  No neurosurgical intervention necessary    LUE/LLE hemiparesis,   CT head unremarkable for new process  MRI as above  Possibly related to seizure per neurology  Continuing levetiracetam 500 mg daily and 500 mg after hemodialysis     ESRD on TTS HD  Nephrology following, continuing sevelamer     Anemia of CKD  Chronic and stable; H/H improved from previous     HTN  Patient hypertensive  Restarting clonidine, carvedilol doxazosin, hydralazine, nifedipine        Liver disease  Chronic and stable, LFFTs WNL     Chronic hyponatremia  Mild and improved from previous          Code status: FULL  Prophylaxis: SCD  Care Plan discussed with: Patient, nurse, care manager  Anticipated Disposition: Back to rehab possibly today  Inpatient  Cardiac monitoring: Telemetry     Hospital Problems  Date Reviewed: 2/18/2023            Codes Class Noted POA    Acute encephalopathy ICD-10-CM: G93.40  ICD-9-CM: 348.30  3/6/2023 Unknown           Social Determinants of Health     Tobacco Use: High Risk    Smoking Tobacco Use: Some Days    Smokeless Tobacco Use: Never    Passive Exposure: Not on file   Alcohol Use: Not on file   Financial Resource Strain: Not on file   Food Insecurity: Not on file   Transportation Needs: Not on file   Physical Activity: Not on file   Stress: Not on file   Social Connections: Not on file   Intimate Partner Violence: Not on file   Depression: Not at risk    PHQ-2 Score: 0   Housing Stability: Not on file       Review of Systems:   A comprehensive review of systems was negative except for that written in the HPI. Vital Signs:    Last 24hrs VS reviewed since prior progress note.  Most recent are:  Visit Vitals  BP (!) 215/104 (BP 1 Location: Right upper arm, BP Patient Position: At rest)   Pulse (!) 103   Temp 98.2 °F (36.8 °C)   Resp 18   Wt 75.4 kg (166 lb 3.6 oz)   SpO2 97%   BMI 24.55 kg/m²       No intake or output data in the 24 hours ending 03/07/23 0840     Physical Examination:     I had a face to face encounter with this patient and independently examined them on 3/7/2023 as outlined below:          General : alert x 3, awake,  HEENT: PEERL, moist mucus membrane, =  Neck: supple, no JVD,   Chest: Clear to auscultation bilaterally   CVS: S1 S2 heard, Capillary refill less than 2 seconds  Abd: soft/ non tender, non distended, BS physiological,   Ext: no clubbing, no cyanosis, no edema, brisk 2+ DP pulses  Neuro/Psych: pleasant mood and affect, Strength 5/5 in all extremities, D  Skin: warm     Data Review:    Review and/or order of clinical lab test  Review and/or order of tests in the radiology section of CPT      I have personally and independently reviewed all pertinent labs, diagnostic studies, imaging, and have provided independent interpretation of the same. Labs:     Recent Labs     03/07/23  0109 03/06/23  1059   WBC 9.7 9.5   HGB 9.0* 7.9*   HCT 28.0* 24.0*    211     Recent Labs     03/07/23  0109 03/06/23  1059   * 135*  133*   K 3.8 4.0  3.9   CL 98 97  97   CO2 26 29  28   BUN 40* 35*  34*   CREA 11.50* 10.30*  10.30*   * 102*  104*   CA 8.7 8.6  8.9   PHOS 5.5* 5.5*     Recent Labs     03/07/23  0109 03/06/23  1059   ALT  --  6*   AP  --  85   TBILI  --  0.3   TP  --  7.6   ALB 3.1* 3.0*  2.9*   GLOB  --  4.7*     Recent Labs     03/06/23  1059   INR 1.1   PTP 11.4*   APTT 23.3      Recent Labs     03/06/23  1059   TIBC 278   PSAT 9*   FERR 112      Lab Results   Component Value Date/Time    Folate 6.7 03/06/2023 10:59 AM      No results for input(s): PH, PCO2, PO2 in the last 72 hours. No results for input(s): CPK, CKNDX, TROIQ in the last 72 hours.     No lab exists for component: CPKMB  Lab Results   Component Value Date/Time    Cholesterol, total 122 03/07/2023 01:09 AM    HDL Cholesterol 42 03/07/2023 01:09 AM    LDL, calculated 47.4 03/07/2023 01:09 AM    Triglyceride 163 (H) 03/07/2023 01:09 AM    CHOL/HDL Ratio 2.9 03/07/2023 01:09 AM     Lab Results   Component Value Date/Time    Glucose (POC) 112 11/30/2022 12:08 PM    Glucose (POC) 82 11/30/2022 08:34 AM    Glucose (POC) 112 11/29/2022 09:35 PM    Glucose (POC) 145 (H) 11/29/2022 12:26 PM    Glucose (POC) 97 09/27/2022 06:48 AM     Lab Results   Component Value Date/Time    Color YELLOW/STRAW 03/06/2023 09:20 PM    Appearance CLEAR 03/06/2023 09:20 PM    Specific gravity 1.013 03/06/2023 09:20 PM    pH (UA) 8.0 03/06/2023 09:20 PM    Protein 300 (A) 03/06/2023 09:20 PM    Glucose 100 (A) 03/06/2023 09:20 PM    Ketone Negative 03/06/2023 09:20 PM    Bilirubin Negative 03/06/2023 09:20 PM    Urobilinogen 0.2 03/06/2023 09:20 PM    Nitrites Negative 03/06/2023 09:20 PM    Leukocyte Esterase Negative 03/06/2023 09:20 PM    Epithelial cells FEW 03/06/2023 09:20 PM    Bacteria Negative 03/06/2023 09:20 PM    WBC 0-4 03/06/2023 09:20 PM    RBC 0-5 03/06/2023 09:20 PM       Notes reviewed from all clinical/nonclinical/nursing services involved in patient's clinical care. Care coordination discussions were held with appropriate clinical/nonclinical/ nursing providers based on care coordination needs. Patients current active Medications were reviewed, considered, added and adjusted based on the clinical condition today. Home Medications were reconciled to the best of my ability given all available resources at the time of admission. Route is PO if not otherwise noted. Admission Status:[id number]:::1}      Medications Reviewed:     Current Facility-Administered Medications   Medication Dose Route Frequency    epoetin shawn-epbx (RETACRIT) injection 20,000 Units  20,000 Units SubCUTAneous Q TUE, THU & SAT     Current Outpatient Medications   Medication Sig    pantoprazole (Protonix) 40 mg tablet Take 1 Tablet by mouth daily for 30 days. Indications: an ulcer of the duodenum    hydrALAZINE (APRESOLINE) 100 mg tablet Take 1 Tablet by mouth three (3) times daily for 30 days. levETIRAcetam (KEPPRA) 500 mg tablet Take 1 Tablet by mouth Every Tuesday, Thursday and Saturday for 30 days. NIFEdipine ER (PROCARDIA XL) 90 mg ER tablet Take 1 Tablet by mouth daily for 30 days. levETIRAcetam (KEPPRA) 500 mg tablet Take 1 Tablet by mouth daily for 30 days. cloNIDine HCL (CATAPRES) 0.3 mg tablet Take 1 Tablet by mouth every eight (8) hours for 30 days. aliskiren (TEKTURNA) 300 mg tablet Take 1 Tablet by mouth daily. minoxidiL (LONITEN) 10 mg tablet Take 1 Tablet by mouth two (2) times a day. bumetanide (BUMEX) 2 mg tablet Take 1 Tablet by mouth daily. carvediloL (COREG) 25 mg tablet Take 1 Tablet by mouth two (2) times daily (with meals). doxazosin (CARDURA) 8 mg tablet Take 1 Tablet by mouth nightly. sevelamer carbonate (RENVELA) 800 mg tab tab Take 800 mg by mouth three (3) times daily. New prescription as of 8/16/22, prescribed by Dr. Laura Swift    calcium carbonate (TUMS) 200 mg calcium (500 mg) chew Take 3 Tablets by mouth three (3) times daily (after meals). ascorbic acid, vitamin C, (VITAMIN C) 500 mg tablet Take 500 mg by mouth daily. cholecalciferol (VITAMIN D3) 25 mcg (1,000 unit) cap Take 1,000 Units by mouth daily.      ______________________________________________________________________  EXPECTED LENGTH OF STAY: - - -  ACTUAL LENGTH OF STAY:          [address], NP

## 2023-03-07 NOTE — PROGRESS NOTES
Occupational Therapy Note:  Orders received and appreciated. Chart reviewed. Pt currently getting started with his HD and will be unavailable for the next 4 hours. Will continue to follow. Thank you for the consult.   Edgardo Chowdhury OTR/L, CBIS

## 2023-03-07 NOTE — PROGRESS NOTES
[facility]   [address] [facility], [id number] [place] [address]  Phone: [phone]   [phone]    [email or web address]     Nephrology Progress Note    Patient Name : Cuca Martinez      : 1965     MRN : [id number]  Date of Admission : 3/6/2023  Date of Servive : 23    CC: Follow up for ESRD     Assessment and Plan   ESRD on hemodialysis  followed by  outpatient  - Outpatient Joey Papo  - Outpatient HD schedule:TTS  - Last HD treatment: 3/7  - Access: Permacath  - Attempting to pulling 3.6 kg today will monitor BP  - Renally dose medications    Anemia of CKD  - Hgb 9.0  - ABI 20k TTS  - 3/6 Tsat 9 with Ferritin 112- will plan to give IV iron    Hyponatremia  - will correct with HD    MBD of CKD  - corCa stable  - Phos   stable  -  Binder: Renvela 800 mg TID    HTN  - currently hypertensive  - requested to give BP meds 1/2 through treatment and monitor for hypotension    AMS  - CT completed  - 3/6 MRI: small left anterior basal infarct, with small left subdural hematoma  Subdural hematoma s/p craniotomy on 23     Interval History:  Patient seen and examined on HD- Per RN wife refused for him to have BP meds prior to HD- recommended giving BP med with SBP >200. Discussed with wife we can give albumin for hypotension and will monitor BP q 15 minutes. MRI results noted. Review of Systems: Pertinent items are noted in HPI.     Current Medications:   Current Facility-Administered Medications   Medication Dose Route Frequency    carvediloL (COREG) tablet 25 mg  25 mg Oral BID WITH MEALS    cloNIDine HCL (CATAPRES) tablet 0.3 mg  0.3 mg Oral Q8H    doxazosin (CARDURA) tablet 8 mg  8 mg Oral QHS    hydrALAZINE (APRESOLINE) tablet 100 mg  100 mg Oral TID    NIFEdipine ER (PROCARDIA XL) tablet 90 mg  90 mg Oral DAILY    pantoprazole (PROTONIX) tablet 40 mg  40 mg Oral DAILY    sevelamer carbonate (RENVELA) tab 800 mg  800 mg Oral TID    heparin (porcine) 1,000 unit/mL injection 2,000 Units  2,000 Units InterCATHeter DIALYSIS PRN    And    heparin (porcine) 1,000 unit/mL injection 2,100 Units  2,100 Units InterCATHeter DIALYSIS PRN    albumin human 25% (BUMINATE) solution 25 g  25 g IntraVENous DIALYSIS PRN    epoetin shawn-epbx (RETACRIT) injection 20,000 Units  20,000 Units SubCUTAneous Q TUE, THU & SAT     Current Outpatient Medications   Medication Sig    pantoprazole (Protonix) 40 mg tablet Take 1 Tablet by mouth daily for 30 days. Indications: an ulcer of the duodenum    hydrALAZINE (APRESOLINE) 100 mg tablet Take 1 Tablet by mouth three (3) times daily for 30 days. levETIRAcetam (KEPPRA) 500 mg tablet Take 1 Tablet by mouth Every Tuesday, Thursday and Saturday for 30 days. NIFEdipine ER (PROCARDIA XL) 90 mg ER tablet Take 1 Tablet by mouth daily for 30 days. levETIRAcetam (KEPPRA) 500 mg tablet Take 1 Tablet by mouth daily for 30 days. cloNIDine HCL (CATAPRES) 0.3 mg tablet Take 1 Tablet by mouth every eight (8) hours for 30 days. aliskiren (TEKTURNA) 300 mg tablet Take 1 Tablet by mouth daily. minoxidiL (LONITEN) 10 mg tablet Take 1 Tablet by mouth two (2) times a day. bumetanide (BUMEX) 2 mg tablet Take 1 Tablet by mouth daily. carvediloL (COREG) 25 mg tablet Take 1 Tablet by mouth two (2) times daily (with meals). doxazosin (CARDURA) 8 mg tablet Take 1 Tablet by mouth nightly. sevelamer carbonate (RENVELA) 800 mg tab tab Take 800 mg by mouth three (3) times daily. New prescription as of 8/16/22, prescribed by Dr. Jean Claude Long    calcium carbonate (TUMS) 200 mg calcium (500 mg) chew Take 3 Tablets by mouth three (3) times daily (after meals). ascorbic acid, vitamin C, (VITAMIN C) 500 mg tablet Take 500 mg by mouth daily. cholecalciferol (VITAMIN D3) 25 mcg (1,000 unit) cap Take 1,000 Units by mouth daily.       Allergies   Allergen Reactions    Baclofen Rash     All over    Losartan Hives, Shortness of Breath and Rash     Verified allergy with patient       Objective:  Vitals:    Vitals:    03/07/23 0605 03/07/23 0640 03/07/23 0806 03/07/23 0939   BP: (!) 195/108 (!) 211/108 (!) 215/104 (!) 218/102   Pulse: (!) 104 (!) 109 (!) 103 (!) 105   Resp: 21 17 18 20   Temp:    98.9 °F (37.2 °C)   SpO2: 99% 98% 97% 98%   Weight:         Intake and Output:  No intake/output data recorded. No intake/output data recorded. Physical Examination:  General: Lethargic but follow commands  Neck:  Supple, no mass  Resp:  Lungs CTA B/L, normal respiratory effort  CV:  Mild tachycardia,  trace bilateral LE edema  GI:  Soft, NT,   Psych:             Oriented to person  Skin:  No Rash  :  patient still urinates  Access :   Perma cath in place      Lab Data Personally Reviewed: I have reviewed all the pertinent labs, microbiology data and radiology studies during assessment.     Recent Labs     03/07/23  0109 03/06/23  1059   * 135*  133*   K 3.8 4.0  3.9   CL 98 97  97   CO2 26 29  28   * 102*  104*   BUN 40* 35*  34*   CREA 11.50* 10.30*  10.30*   CA 8.7 8.6  8.9   PHOS 5.5* 5.5*   ALB 3.1* 3.0*  2.9*   ALT  --  6*   INR  --  1.1       Recent Labs     03/07/23  0109 03/06/23  1059   WBC 9.7 9.5   HGB 9.0* 7.9*   HCT 28.0* 24.0*    211       No results found for: SDES  Lab Results   Component Value Date/Time    Culture result: NO GROWTH 5 DAYS 05/18/2022 06:06 AM    Culture result: No growth (<1,000 CFU/ML) 03/22/2022 04:11 PM     Recent Results (from the past 24 hour(s))   CBC WITH AUTOMATED DIFF    Collection Time: 03/06/23 10:59 AM   Result Value Ref Range    WBC 9.5 4.1 - 11.1 K/uL    RBC 2.63 (L) 4.10 - 5.70 M/uL    HGB 7.9 (L) 12.1 - 17.0 g/dL    HCT 24.0 (L) 36.6 - 50.3 %    MCV 91.3 80.0 - 99.0 FL    MCH 30.0 26.0 - 34.0 PG    MCHC 32.9 30.0 - 36.5 g/dL    RDW 13.5 11.5 - 14.5 %    PLATELET 325 915 - 974 K/uL    MPV 9.8 8.9 - 12.9 FL    NRBC 0.0 0  WBC    ABSOLUTE NRBC 0.00 0.00 - 0.01 K/uL NEUTROPHILS 62 32 - 75 %    LYMPHOCYTES 18 12 - 49 %    MONOCYTES 15 (H) 5 - 13 %    EOSINOPHILS 4 0 - 7 %    BASOPHILS 1 0 - 1 %    IMMATURE GRANULOCYTES 0 0.0 - 0.5 %    ABS. NEUTROPHILS 5.9 1.8 - 8.0 K/UL    ABS. LYMPHOCYTES 1.7 0.8 - 3.5 K/UL    ABS. MONOCYTES 1.4 (H) 0.0 - 1.0 K/UL    ABS. EOSINOPHILS 0.4 0.0 - 0.4 K/UL    ABS. BASOPHILS 0.1 0.0 - 0.1 K/UL    ABS. IMM. GRANS. 0.0 0.00 - 0.04 K/UL    DF AUTOMATED     METABOLIC PANEL, COMPREHENSIVE    Collection Time: 03/06/23 10:59 AM   Result Value Ref Range    Sodium 133 (L) 136 - 145 mmol/L    Potassium 3.9 3.5 - 5.1 mmol/L    Chloride 97 97 - 108 mmol/L    CO2 28 21 - 32 mmol/L    Anion gap 8 5 - 15 mmol/L    Glucose 104 (H) 65 - 100 mg/dL    BUN 34 (H) 6 - 20 MG/DL    Creatinine 10.30 (H) 0.70 - 1.30 MG/DL    BUN/Creatinine ratio 3 (L) 12 - 20      eGFR 5 (L) >60 ml/min/1.73m2    Calcium 8.9 8.5 - 10.1 MG/DL    Bilirubin, total 0.3 0.2 - 1.0 MG/DL    ALT (SGPT) 6 (L) 12 - 78 U/L    AST (SGOT) 9 (L) 15 - 37 U/L    Alk. phosphatase 85 45 - 117 U/L    Protein, total 7.6 6.4 - 8.2 g/dL    Albumin 2.9 (L) 3.5 - 5.0 g/dL    Globulin 4.7 (H) 2.0 - 4.0 g/dL    A-G Ratio 0.6 (L) 1.1 - 2.2     TROPONIN-HIGH SENSITIVITY    Collection Time: 03/06/23 10:59 AM   Result Value Ref Range    Troponin-High Sensitivity 12 0 - 57 ng/L   SAMPLES BEING HELD    Collection Time: 03/06/23 10:59 AM   Result Value Ref Range    SAMPLES BEING HELD 1red 1blue     COMMENT        Add-on orders for these samples will be processed based on acceptable specimen integrity and analyte stability, which may vary by analyte.    PTT    Collection Time: 03/06/23 10:59 AM   Result Value Ref Range    aPTT 23.3 22.1 - 31.0 sec    aPTT, therapeutic range     58.0 - 77.0 SECS   PROTHROMBIN TIME + INR    Collection Time: 03/06/23 10:59 AM   Result Value Ref Range    INR 1.1 0.9 - 1.1      Prothrombin time 11.4 (H) 9.0 - 11.1 sec   RENAL FUNCTION PANEL    Collection Time: 03/06/23 10:59 AM   Result Value Ref Range    Sodium 135 (L) 136 - 145 mmol/L    Potassium 4.0 3.5 - 5.1 mmol/L    Chloride 97 97 - 108 mmol/L    CO2 29 21 - 32 mmol/L    Anion gap 9 5 - 15 mmol/L    Glucose 102 (H) 65 - 100 mg/dL    BUN 35 (H) 6 - 20 MG/DL    Creatinine 10.30 (H) 0.70 - 1.30 MG/DL    BUN/Creatinine ratio 3 (L) 12 - 20      eGFR 5 (L) >60 ml/min/1.73m2    Calcium 8.6 8.5 - 10.1 MG/DL    Phosphorus 5.5 (H) 2.6 - 4.7 MG/DL    Albumin 3.0 (L) 3.5 - 5.0 g/dL   FERRITIN    Collection Time: 03/06/23 10:59 AM   Result Value Ref Range    Ferritin 112 26 - 388 NG/ML   IRON PROFILE    Collection Time: 03/06/23 10:59 AM   Result Value Ref Range    Iron 25 (L) 35 - 150 ug/dL    TIBC 278 250 - 450 ug/dL    Iron % saturation 9 (L) 20 - 50 %   VITAMIN B12    Collection Time: 03/06/23 10:59 AM   Result Value Ref Range    Vitamin B12 353 193 - 986 pg/mL   FOLATE    Collection Time: 03/06/23 10:59 AM   Result Value Ref Range    Folate 6.7 5.0 - 21.0 ng/mL   AMMONIA    Collection Time: 03/06/23 11:35 AM   Result Value Ref Range    Ammonia, plasma 29 <32 UMOL/L   URINALYSIS W/MICROSCOPIC    Collection Time: 03/06/23  9:20 PM   Result Value Ref Range    Color YELLOW/STRAW      Appearance CLEAR CLEAR      Specific gravity 1.013 1.003 - 1.030      pH (UA) 8.0 5.0 - 8.0      Protein 300 (A) NEG mg/dL    Glucose 100 (A) NEG mg/dL    Ketone Negative NEG mg/dL    Bilirubin Negative NEG      Blood Negative NEG      Urobilinogen 0.2 0.2 - 1.0 EU/dL    Nitrites Negative NEG      Leukocyte Esterase Negative NEG      WBC 0-4 0 - 4 /hpf    RBC 0-5 0 - 5 /hpf    Epithelial cells FEW FEW /lpf    Bacteria Negative NEG /hpf   URINE CULTURE HOLD SAMPLE    Collection Time: 03/06/23  9:20 PM    Specimen: Urine   Result Value Ref Range    Urine culture hold        Urine on hold in Microbiology dept for 2 days. If unpreserved urine is submitted, it cannot be used for addtional testing after 24 hours, recollection will be required.    LIPID PANEL    Collection Time: 03/07/23 1:09 AM   Result Value Ref Range    Cholesterol, total 122 <200 MG/DL    Triglyceride 163 (H) <150 MG/DL    HDL Cholesterol 42 MG/DL    LDL, calculated 47.4 0 - 100 MG/DL    VLDL, calculated 32.6 MG/DL    CHOL/HDL Ratio 2.9 0.0 - 5.0     HEMOGLOBIN A1C WITH EAG    Collection Time: 03/07/23  1:09 AM   Result Value Ref Range    Hemoglobin A1c <3.8 (L) 4.0 - 5.6 %    Est. average glucose Cannot be calculated mg/dL   CBC W/O DIFF    Collection Time: 03/07/23  1:09 AM   Result Value Ref Range    WBC 9.7 4.1 - 11.1 K/uL    RBC 3.03 (L) 4.10 - 5.70 M/uL    HGB 9.0 (L) 12.1 - 17.0 g/dL    HCT 28.0 (L) 36.6 - 50.3 %    MCV 92.4 80.0 - 99.0 FL    MCH 29.7 26.0 - 34.0 PG    MCHC 32.1 30.0 - 36.5 g/dL    RDW 13.4 11.5 - 14.5 %    PLATELET 358 598 - 635 K/uL    MPV 9.7 8.9 - 12.9 FL    NRBC 0.0 0  WBC    ABSOLUTE NRBC 0.00 0.00 - 0.01 K/uL   RENAL FUNCTION PANEL    Collection Time: 03/07/23  1:09 AM   Result Value Ref Range    Sodium 133 (L) 136 - 145 mmol/L    Potassium 3.8 3.5 - 5.1 mmol/L    Chloride 98 97 - 108 mmol/L    CO2 26 21 - 32 mmol/L    Anion gap 9 5 - 15 mmol/L    Glucose 107 (H) 65 - 100 mg/dL    BUN 40 (H) 6 - 20 MG/DL    Creatinine 11.50 (H) 0.70 - 1.30 MG/DL    BUN/Creatinine ratio 3 (L) 12 - 20      eGFR 5 (L) >60 ml/min/1.73m2    Calcium 8.7 8.5 - 10.1 MG/DL    Phosphorus 5.5 (H) 2.6 - 4.7 MG/DL    Albumin 3.1 (L) 3.5 - 5.0 g/dL   SAMPLES BEING HELD    Collection Time: 03/07/23  1:09 AM   Result Value Ref Range    SAMPLES BEING HELD 1red 1blu     COMMENT        Add-on orders for these samples will be processed based on acceptable specimen integrity and analyte stability, which may vary by analyte. I have reviewed the flowsheets. Chart and Pertinent Notes have been reviewed.        Deja Mathews NP  [facility]

## 2023-03-07 NOTE — PROGRESS NOTES
Problem: Self Care Deficits Care Plan (Adult)  Goal: *Acute Goals and Plan of Care (Insert Text)  Description: FUNCTIONAL STATUS PRIOR TO ADMISSION: Prior to SDH s/p crani 2/18/23 pt was independent, active, and working construction. Pt has been at [facility] since discharged and was to be discharged home 3/8/23. HOME SUPPORT: The patient lived with fiance and daughter but did not require assist.    Occupational Therapy Goals  Initiated 3/7/2023  1. Patient will perform grooming standing at sink with supervision/set-up within 7 day(s). 2.  Patient will perform lower body dressing including gathering clothing with supervision/set-up within 7 day(s). 3.  Patient will perform toilet transfers with supervision/set-up within 7 day(s). 4.  Patient will perform all aspects of toileting with supervision/set-up within 7 day(s). 5.  Patient will participate in upper extremity therapeutic exercise/activities with modified independence for 10 minutes within 7 day(s). 6.  Patient will utilize energy conservation techniques during functional activities with verbal and visual cues within 7 day(s). Outcome: Progressing Towards Goal     OCCUPATIONAL THERAPY EVALUATION  Patient: Xuan Almazan (84 y.o. male)  Date: 3/7/2023  Primary Diagnosis: Acute encephalopathy [G93.40]       Precautions: Fall    ASSESSMENT  Based on the objective data described below, the patient presents with mild L side hemiparesis s/p crani from SDH 2/18/23, ptosis L eye, impaired balance and safety following admission for possible CVA. MRI positive for small acute left anterior basal again infarct is suspected and small left subdural hematoma with postsurgical changes. Pt is at an overall SBA and set-up level with ADLs and functional mobility. Family states they are not interested in pt returning to [facility] and are very apprehensive about any further therapy services due to issues they stated they experienced at [facility].   Recommend [facility] therapy for safety eval and then OP neuro OT for UE neuro re-ed. Current Level of Function Impacting Discharge (ADLs/self-care): SBA and set-up level with ADLs and functional mobility    Functional Outcome Measure: The patient scored Total A-D  Total A-D (Motor Function): 65/66 on the Fugl-Dailey Assessment which is indicative of mild impairment in upper extremity functional status. Other factors to consider for discharge: see above     Patient will benefit from skilled therapy intervention to address the above noted impairments. PLAN :  Recommendations and Planned Interventions: self care training, functional mobility training, therapeutic exercise, balance training, visual/perceptual training, therapeutic activities, cognitive retraining, endurance activities, neuromuscular re-education, patient education, home safety training, and family training/education    Frequency/Duration: Patient will be followed by occupational therapy 5 times a week to address goals. Recommendation for discharge: (in order for the patient to meet his/her long term goals)  Occupational therapy at least 2 days/week in the home AND ensure assist and/or supervision for safety then OP neuro OT    This discharge recommendation:  Has been made in collaboration with the attending provider and/or case management    IF patient discharges home will need the following DME: TBD by [facility] therapy       SUBJECTIVE:   Patient stated I am ok and fine with all of this.     OBJECTIVE DATA SUMMARY:   HISTORY:   Past Medical History:   Diagnosis Date    Cocaine abuse ([address]     Last +UDS here was 6/2022 (as of 3/7/23)    ESRD (end stage renal disease) on dialysis [facility])     History of epilepsy     Hypertension     Liver disease     SDH (subdural hematoma) 02/18/2023    s/p crani   No past surgical history on file.   Expanded or extensive additional review of patient history:     Home Situation  Home Environment: Private residence  # Steps to Enter: 4  Rails to Enter: Yes  One/Two Story Residence: Two story, live on 1st floor (pt will be staying on 1st floor- 1/2 bath only on 1st floor)  Living Alone: No  Support Systems: Spouse/Significant Other, Child(hraish) (fiance and daughter live with pt. Fmily close and pt will be splitting time at sister's home too)  Patient Expects to be Discharged to[de-identified] Home with home health  Current DME Used/Available at Home: None  Tub or Shower Type:  (1/2 bath only and will sponge bathe)    Hand dominance: Right    EXAMINATION OF PERFORMANCE DEFICITS:  Cognitive/Behavioral Status:  Neurologic State: Alert  Orientation Level: Oriented X4  Cognition: Follows commands  Perception: Appears intact  Perseveration: No perseveration noted  Safety/Judgement: Awareness of environment; Fall prevention    Hearing: Auditory  Auditory Impairment: None    Vision/Perceptual:    Acuity: Able to read clock/calendar on wall without difficulty; Able to read employee name badge without difficulty (imp L eye- edema and ptosis noted)         Range of Motion:  AROM: Generally decreased, functional  PROM: Generally decreased, functional                      Strength:  Strength: Generally decreased, functional (RUE 5/5 and LUE shoulder 3-/5, E, W H 5/5)                Coordination:  Coordination: Generally decreased, functional (mild imp LUE)  Fine Motor Skills-Upper: Left Impaired;Right Intact    Gross Motor Skills-Upper: Left Impaired;Right Intact    Tone & Sensation:  Tone: Normal  Sensation: Intact                      Balance:  Sitting: Intact  Standing: Intact; With support    Functional Mobility and Transfers for ADLs:  Bed Mobility:  Rolling: Modified independent  Supine to Sit: Modified independent  Sit to Supine: Modified independent  Scooting: Modified independent    Transfers:  Sit to Stand: Stand-by assistance  Stand to Sit: Stand-by assistance  Bed to Chair: Stand-by assistance  Bathroom Mobility: Stand-by assistance  Toilet Transfer : Stand-by assistance  Assistive Device : Gait Belt    ADL Assessment and intervention:  Feeding: Modified independent    Oral Facial Hygiene/Grooming: Stand-by assistance (standing)    Bathing: Stand-by assistance; Additional time (seated and standing)    Upper Body Dressing: Supervision;Setup    Lower Body Dressing: Stand-by assistance;Setup    Toileting: Moderate assistance (urgency and with bowel incontinence due to urgency)    Cognitive Retraining  Safety/Judgement: Awareness of environment; Fall prevention      Functional Measure:  Fugl-Dailey Assessment of Motor Recovery after Stroke: LUE  Upper Extremity Assessment: Yes    Reflex Activity  Flexors/Biceps/Fingers: Can be elicited  Extensors/Triceps: Can be elicited  Reflex Subtotal: 4    Volitional Movement Within Synergies  Shoulder Retraction: Full  Shoulder Elevation: Full  Shoulder Abduction (90 degrees): Full  Shoulder External Rotation: Full  Elbow Flexion: Full  Forearm Supination: Full  Shoulder Adduction/Internal Rotation: Full  Elbow Extension: Full  Forearm Pronation: Full  Subtotal: 18    Volitional Movement Mixing Synergies  Hand to Lumbar Spine: Full  Shoulder Flexion (0-90 degrees): Full  Pronation-Supination: Full  Subtotal: 6    Volitional Movement With Little or No Synergy  Shoulder Abduction (0-90 degrees): Full  Shoulder Flexion ( degrees): Partial  Pronation/Supination: Full  Subtotal : 5    Normal Reflex Activity  Biceps, Triceps, Finger Flexors:  Full  Subtotal : 2    Upper Extremity Total   Upper Extremity Total: 35    Wrist  Stability at 15 Degree Dorsiflexion: Full  Repeated Dorsiflexion/ Volar Flexion: Full  Stability at 15 Degree Dorsiflexion: Full  Repeated Dorsiflexion/ Volar Flexion: Full  Circumduction: Full  Wrist Total: 10    Hand  Mass Flexion: Full  Mass Extension: Full  Grasp A: Full  Grasp B: Full  Grasp C: Full  Grasp D: Full  Grasp E: Full  Hand Total: 14    Coordination/Speed  Tremor: None  Dysmetria: None  Time: <1s (LUE 5 sec and RUE 4 sec)  Coordination/Speed Total : 6    Total A-D  Total A-D (Motor Function): 65/66     This is a reliable/valid measure of arm function after a neurological event. It has established value to characterize functional status and for measuring spontaneous and therapy-induced recovery; tests proximal and distal motor functions. Fugl-Dailey Assessment - UE scores recorded between five and 30 days post neurologic event can be used to predict UE recovery at six months post neurologic event. Severe = 0-21 points   Moderately Severe = 22-33 points   Moderate = 34-47 points   Mild = 48-66 points  LIBERTAD Barbosa, REYNOLD Grossman, & MARCELLA Silva (1992). Measurement of motor recovery after stroke: Outcome assessment and sample size requirements. Stroke, 23, pp. 1089-7236.   ------------------------------------------------------------------------------------------------------------------------------------------------------------------  MCID:  Stroke:   Fantasma Lau et al, 2001; n = 171; mean age 79 (6) years; assessed within 16 (12) days of stroke, Acute Stroke)  FMA Motor Scores from Admission to Discharge   10 point increase in FMA Upper Extremity = 1.5 change in discharge FIM   10 point increase in FMA Lower Extremity = 1.9 change in discharge FIM  MDC:   Stroke:   Bill Chau et al, 2008, n = 14, mean age = 59.9 (14.6) years, assessed on average 14 (6.5) months post stroke, Chronic Stroke)   FMA = 5.2 points for the Upper Extremity portion of the assessment     Occupational Therapy Evaluation Charge Determination   History Examination Decision-Making   LOW Complexity : Brief history review  MEDIUM Complexity : 3-5 performance deficits relating to physical, cognitive , or psychosocial skils that result in activity limitations and / or participation restrictions MEDIUM Complexity : Patient may present with comorbidities that affect occupational performnce.  Miniml to moderate modification of tasks or assistance (eg, physical or verbal ) with assesment(s) is necessary to enable patient to complete evaluation       Based on the above components, the patient evaluation is determined to be of the following complexity level: LOW   Pain Ratin/10    Activity Tolerance:   Good    After treatment patient left in no apparent distress:    Supine in bed, Call bell within reach, and Caregiver / family present    COMMUNICATION/EDUCATION:   The patients plan of care was discussed with: Physical therapist, Registered nurse, and Case management. Patient was educated regarding his deficit(s) of mild L side hemiparesis s/p crani from SDH 23, impaired balance and safety following admission for possible CVA as this relates to his diagnosis of MRI positive for small acute left anterior basal again infarct is suspected and small left subdural hematoma with postsurgical changes. He demonstrated Fair understanding as evidenced by awareness of situation. Patient and/or family was verbally educated on the BE FAST acronym for signs/symptoms of CVA and TIA. BE FAST was written on patient's communication board  for visual education and reinforcement. All questions answered with patient indicating fair understanding. Home safety education was provided and the patient/caregiver indicated understanding., Patient/family have participated as able in goal setting and plan of care. , and Patient/family agree to work toward stated goals and plan of care. This patients plan of care is appropriate for delegation to \[facility] [place] [facility]\"".     Thank you for this referral.  Karyle Spates, OT

## 2023-03-07 NOTE — ED NOTES
Patients wife refuses to allow patient to take his blood pressure medication states \"once this dialysis gets going it will be better, no pills\"

## 2023-03-07 NOTE — CONSULTS
Neurology Consult Note     NAME: Osbaldo Guzman   :  1965   MRN:  [id number]   DATE:  3/7/2023       HPI:  Pt is a 58yo RH male recently admitted 23 -23 for SDH s/p crani after presenting with HA and /94 from HD. He was discharged to rehab on Keppra 500mg daily and 500mg after HD for seizure prophylaxis x 30 days. He presented 3/6/23 from rehab due to drooling and left UE weakness, noted to be drowsier than normal on 3/3/23. CTH -small left frontal SDH. MRI brain with punctate acute left BG infarct, small left SDH and post-surgical changes, CIWM changes. MRA head neg. LDL 47.4, HgbA1C <3.8. Pt was seen by me in May, 2022 for AMS and left facial droop, /140, with witnessed sz in ED lasting 30 seconds, UDS +cocaine, had missed HD x 2, was started on dilantin and had EEG that was normal. MRI brain and HD were recommended, but pt signed out AMA. Pt is seen with his wife at the bedside. Pt is not a forthcoming historian and his wife answers most questions for him. She reports that he has had left sided weakness ever since his SDH and evacuation and that is what they have been working on in rehab. Yesterday the PT at rehab felt his left arm was weaker than usual. The pt cannot tell me if he has weakness or not. Cannot tell me why he is here. PMH:  ESRD on HD  SDH 23 s/p emergent crani  Epilepsy  HTN  Liver disease  Tobacco use  Marijuana use  Cocaine use    ROS:  Per HPI o/w neg, but very limited due to pt factors.     MEDS:    Current Facility-Administered Medications:     carvediloL (COREG) tablet 25 mg, 25 mg, Oral, BID WITH MEALS, Dominic Campos NP    cloNIDine HCL (CATAPRES) tablet 0.3 mg, 0.3 mg, Oral, Q8H, Dominic Campos, EDIS    doxazosin (CARDURA) tablet 8 mg, 8 mg, Oral, QHS, Dominic Campos, EDIS    hydrALAZINE (APRESOLINE) tablet 100 mg, 100 mg, Oral, TID, Jordy Avila, NP    NIFEdipine ER (PROCARDIA XL) tablet 90 mg, 90 mg, Oral, DAILY, Dominic Campos NP    pantoprazole (PROTONIX) tablet 40 mg, 40 mg, Oral, DAILY, Dominic Campos NP    sevelamer carbonate (RENVELA) tab 800 mg, 800 mg, Oral, TID, Dominic Campos, NP    heparin (porcine) 1,000 unit/mL injection 2,000 Units, 2,000 Units, InterCATHeter, DIALYSIS PRN **AND** heparin (porcine) 1,000 unit/mL injection 2,100 Units, 2,100 Units, InterCATHeter, DIALYSIS PRN, Melanie Mahmood NP    albumin human 25% (BUMINATE) solution 25 g, 25 g, IntraVENous, DIALYSIS PRN, Melanie Mahmood NP    iron sucrose (VENOFER) 300 mg in 0.9% sodium chloride 250 mL IVPB, 300 mg, IntraVENous, Q24H, Melanie Mahmood NP    epoetin shawn-epbx (RETACRIT) injection 20,000 Units, 20,000 Units, SubCUTAneous, Q TUE, THU & SAT, Melanie Mahmood NP    Current Outpatient Medications:     pantoprazole (Protonix) 40 mg tablet, Take 1 Tablet by mouth daily for 30 days. Indications: an ulcer of the duodenum, Disp: 30 Tablet, Rfl: 0    hydrALAZINE (APRESOLINE) 100 mg tablet, Take 1 Tablet by mouth three (3) times daily for 30 days. , Disp: 90 Tablet, Rfl: 0    levETIRAcetam (KEPPRA) 500 mg tablet, Take 1 Tablet by mouth Every Tuesday, Thursday and Saturday for 30 days. , Disp: 12 Tablet, Rfl: 0    NIFEdipine ER (PROCARDIA XL) 90 mg ER tablet, Take 1 Tablet by mouth daily for 30 days. , Disp: 30 Tablet, Rfl: 0    levETIRAcetam (KEPPRA) 500 mg tablet, Take 1 Tablet by mouth daily for 30 days. , Disp: 30 Tablet, Rfl: 0    cloNIDine HCL (CATAPRES) 0.3 mg tablet, Take 1 Tablet by mouth every eight (8) hours for 30 days. , Disp: 90 Tablet, Rfl: 0    aliskiren (TEKTURNA) 300 mg tablet, Take 1 Tablet by mouth daily. , Disp: 60 Tablet, Rfl: 0    minoxidiL (LONITEN) 10 mg tablet, Take 1 Tablet by mouth two (2) times a day., Disp: 60 Tablet, Rfl: 0    bumetanide (BUMEX) 2 mg tablet, Take 1 Tablet by mouth daily. , Disp: 30 Tablet, Rfl: 0    carvediloL (COREG) 25 mg tablet, Take 1 Tablet by mouth two (2) times daily (with meals). , Disp: 60 Tablet, Rfl: 0    doxazosin (CARDURA) 8 mg tablet, Take 1 Tablet by mouth nightly., Disp: 30 Tablet, Rfl: 0    sevelamer carbonate (RENVELA) 800 mg tab tab, Take 800 mg by mouth three (3) times daily. New prescription as of 22, prescribed by Dr. Kelsey Galeana, Disp: , Rfl:     calcium carbonate (TUMS) 200 mg calcium (500 mg) chew, Take 3 Tablets by mouth three (3) times daily (after meals). , Disp: , Rfl:     ascorbic acid, vitamin C, (VITAMIN C) 500 mg tablet, Take 500 mg by mouth daily. , Disp: , Rfl:     cholecalciferol (VITAMIN D3) 25 mcg (1,000 unit) cap, Take 1,000 Units by mouth daily. , Disp: , Rfl:       Allergies   Allergen Reactions    Baclofen Rash     All over    Losartan Hives, Shortness of Breath and Rash     Verified allergy with patient         SH:    +Tob    FH:  Not pertinent       PHYSICAL EXAM:    Visit Vitals  BP (!) 220/97 (BP Patient Position: At rest)   Pulse (!) 101   Temp 98.9 °F (37.2 °C)   Resp 8   Wt 166 lb 3.6 oz (75.4 kg)   SpO2 98%   BMI 24.55 kg/m²     Temp (24hrs), Av.6 °F (37 °C), Min:98.2 °F (36.8 °C), Max:98.9 °F (37.2 °C)    General: Well developed well nourished patient in no apparent distress. HEENT: left Periorbital swelling  Cardiac: Regular rhythm, tachycardic, with no murmurs. Carotids: 2+ symmetric, no bruits  Extremities: 2+ Radial pulses, no cyanosis or edema    Neurological Exam:  Mental Status: Oriented to time, place and person. Speech and language intact. Attention and fund of knowledge appropriate. Normal recent and remote memory. Cranial Nerves:   PERRL, EOM- dysconjugate movements on left - baseline, no nystagmus, no diplopia, no ptosis. Facial sensation is normal. Facial movement is symmetric. Palate is midline. Tongue is midline. Hearing is intact. Motor:  5/5 strength in upper and lower proximal and distal muscles. Normal bulk and tone. No PD.  No tremors   Reflexes:   Deep tendon reflexes 2+ and symmetric. Toes downgoing. Sensory:   Intact to LT and PP   Gait:     Cerebellar:  Intact FTN, HUMA          STUDIES AND REPORTS:  Recent Results (from the past 24 hour(s))   CBC WITH AUTOMATED DIFF    Collection Time: 03/06/23 10:59 AM   Result Value Ref Range    WBC 9.5 4.1 - 11.1 K/uL    RBC 2.63 (L) 4.10 - 5.70 M/uL    HGB 7.9 (L) 12.1 - 17.0 g/dL    HCT 24.0 (L) 36.6 - 50.3 %    MCV 91.3 80.0 - 99.0 FL    MCH 30.0 26.0 - 34.0 PG    MCHC 32.9 30.0 - 36.5 g/dL    RDW 13.5 11.5 - 14.5 %    PLATELET 174 749 - 875 K/uL    MPV 9.8 8.9 - 12.9 FL    NRBC 0.0 0  WBC    ABSOLUTE NRBC 0.00 0.00 - 0.01 K/uL    NEUTROPHILS 62 32 - 75 %    LYMPHOCYTES 18 12 - 49 %    MONOCYTES 15 (H) 5 - 13 %    EOSINOPHILS 4 0 - 7 %    BASOPHILS 1 0 - 1 %    IMMATURE GRANULOCYTES 0 0.0 - 0.5 %    ABS. NEUTROPHILS 5.9 1.8 - 8.0 K/UL    ABS. LYMPHOCYTES 1.7 0.8 - 3.5 K/UL    ABS. MONOCYTES 1.4 (H) 0.0 - 1.0 K/UL    ABS. EOSINOPHILS 0.4 0.0 - 0.4 K/UL    ABS. BASOPHILS 0.1 0.0 - 0.1 K/UL    ABS. IMM. GRANS. 0.0 0.00 - 0.04 K/UL    DF AUTOMATED     METABOLIC PANEL, COMPREHENSIVE    Collection Time: 03/06/23 10:59 AM   Result Value Ref Range    Sodium 133 (L) 136 - 145 mmol/L    Potassium 3.9 3.5 - 5.1 mmol/L    Chloride 97 97 - 108 mmol/L    CO2 28 21 - 32 mmol/L    Anion gap 8 5 - 15 mmol/L    Glucose 104 (H) 65 - 100 mg/dL    BUN 34 (H) 6 - 20 MG/DL    Creatinine 10.30 (H) 0.70 - 1.30 MG/DL    BUN/Creatinine ratio 3 (L) 12 - 20      eGFR 5 (L) >60 ml/min/1.73m2    Calcium 8.9 8.5 - 10.1 MG/DL    Bilirubin, total 0.3 0.2 - 1.0 MG/DL    ALT (SGPT) 6 (L) 12 - 78 U/L    AST (SGOT) 9 (L) 15 - 37 U/L    Alk.  phosphatase 85 45 - 117 U/L    Protein, total 7.6 6.4 - 8.2 g/dL    Albumin 2.9 (L) 3.5 - 5.0 g/dL    Globulin 4.7 (H) 2.0 - 4.0 g/dL    A-G Ratio 0.6 (L) 1.1 - 2.2     TROPONIN-HIGH SENSITIVITY    Collection Time: 03/06/23 10:59 AM   Result Value Ref Range    Troponin-High Sensitivity 12 0 - 57 ng/L   SAMPLES BEING HELD    Collection Time: 03/06/23 10:59 AM   Result Value Ref Range    SAMPLES BEING HELD 1red 1blue     COMMENT        Add-on orders for these samples will be processed based on acceptable specimen integrity and analyte stability, which may vary by analyte.    PTT    Collection Time: 03/06/23 10:59 AM   Result Value Ref Range    aPTT 23.3 22.1 - 31.0 sec    aPTT, therapeutic range     58.0 - 77.0 SECS   PROTHROMBIN TIME + INR    Collection Time: 03/06/23 10:59 AM   Result Value Ref Range    INR 1.1 0.9 - 1.1      Prothrombin time 11.4 (H) 9.0 - 11.1 sec   RENAL FUNCTION PANEL    Collection Time: 03/06/23 10:59 AM   Result Value Ref Range    Sodium 135 (L) 136 - 145 mmol/L    Potassium 4.0 3.5 - 5.1 mmol/L    Chloride 97 97 - 108 mmol/L    CO2 29 21 - 32 mmol/L    Anion gap 9 5 - 15 mmol/L    Glucose 102 (H) 65 - 100 mg/dL    BUN 35 (H) 6 - 20 MG/DL    Creatinine 10.30 (H) 0.70 - 1.30 MG/DL    BUN/Creatinine ratio 3 (L) 12 - 20      eGFR 5 (L) >60 ml/min/1.73m2    Calcium 8.6 8.5 - 10.1 MG/DL    Phosphorus 5.5 (H) 2.6 - 4.7 MG/DL    Albumin 3.0 (L) 3.5 - 5.0 g/dL   FERRITIN    Collection Time: 03/06/23 10:59 AM   Result Value Ref Range    Ferritin 112 26 - 388 NG/ML   IRON PROFILE    Collection Time: 03/06/23 10:59 AM   Result Value Ref Range    Iron 25 (L) 35 - 150 ug/dL    TIBC 278 250 - 450 ug/dL    Iron % saturation 9 (L) 20 - 50 %   VITAMIN B12    Collection Time: 03/06/23 10:59 AM   Result Value Ref Range    Vitamin B12 353 193 - 986 pg/mL   FOLATE    Collection Time: 03/06/23 10:59 AM   Result Value Ref Range    Folate 6.7 5.0 - 21.0 ng/mL   AMMONIA    Collection Time: 03/06/23 11:35 AM   Result Value Ref Range    Ammonia, plasma 29 <32 UMOL/L   URINALYSIS W/MICROSCOPIC    Collection Time: 03/06/23  9:20 PM   Result Value Ref Range    Color YELLOW/STRAW      Appearance CLEAR CLEAR      Specific gravity 1.013 1.003 - 1.030      pH (UA) 8.0 5.0 - 8.0      Protein 300 (A) NEG mg/dL Glucose 100 (A) NEG mg/dL    Ketone Negative NEG mg/dL    Bilirubin Negative NEG      Blood Negative NEG      Urobilinogen 0.2 0.2 - 1.0 EU/dL    Nitrites Negative NEG      Leukocyte Esterase Negative NEG      WBC 0-4 0 - 4 /hpf    RBC 0-5 0 - 5 /hpf    Epithelial cells FEW FEW /lpf    Bacteria Negative NEG /hpf   URINE CULTURE HOLD SAMPLE    Collection Time: 03/06/23  9:20 PM    Specimen: Urine   Result Value Ref Range    Urine culture hold        Urine on hold in Microbiology dept for 2 days. If unpreserved urine is submitted, it cannot be used for addtional testing after 24 hours, recollection will be required.    LIPID PANEL    Collection Time: 03/07/23  1:09 AM   Result Value Ref Range    Cholesterol, total 122 <200 MG/DL    Triglyceride 163 (H) <150 MG/DL    HDL Cholesterol 42 MG/DL    LDL, calculated 47.4 0 - 100 MG/DL    VLDL, calculated 32.6 MG/DL    CHOL/HDL Ratio 2.9 0.0 - 5.0     HEMOGLOBIN A1C WITH EAG    Collection Time: 03/07/23  1:09 AM   Result Value Ref Range    Hemoglobin A1c <3.8 (L) 4.0 - 5.6 %    Est. average glucose Cannot be calculated mg/dL   CBC W/O DIFF    Collection Time: 03/07/23  1:09 AM   Result Value Ref Range    WBC 9.7 4.1 - 11.1 K/uL    RBC 3.03 (L) 4.10 - 5.70 M/uL    HGB 9.0 (L) 12.1 - 17.0 g/dL    HCT 28.0 (L) 36.6 - 50.3 %    MCV 92.4 80.0 - 99.0 FL    MCH 29.7 26.0 - 34.0 PG    MCHC 32.1 30.0 - 36.5 g/dL    RDW 13.4 11.5 - 14.5 %    PLATELET 479 754 - 513 K/uL    MPV 9.7 8.9 - 12.9 FL    NRBC 0.0 0  WBC    ABSOLUTE NRBC 0.00 0.00 - 0.01 K/uL   RENAL FUNCTION PANEL    Collection Time: 03/07/23  1:09 AM   Result Value Ref Range    Sodium 133 (L) 136 - 145 mmol/L    Potassium 3.8 3.5 - 5.1 mmol/L    Chloride 98 97 - 108 mmol/L    CO2 26 21 - 32 mmol/L    Anion gap 9 5 - 15 mmol/L    Glucose 107 (H) 65 - 100 mg/dL    BUN 40 (H) 6 - 20 MG/DL    Creatinine 11.50 (H) 0.70 - 1.30 MG/DL    BUN/Creatinine ratio 3 (L) 12 - 20      eGFR 5 (L) >60 ml/min/1.73m2    Calcium 8.7 8.5 - 10.1 MG/DL    Phosphorus 5.5 (H) 2.6 - 4.7 MG/DL    Albumin 3.1 (L) 3.5 - 5.0 g/dL   SAMPLES BEING HELD    Collection Time: 03/07/23  1:09 AM   Result Value Ref Range    SAMPLES BEING HELD 1red 1blu     COMMENT        Add-on orders for these samples will be processed based on acceptable specimen integrity and analyte stability, which may vary by analyte. MRI Results (most recent):  Results from [place] encounter on 03/06/23    MRA BRAIN WO CONT    Narrative  Clinical indication: Subdural hematoma. MRA of the [place] obtained without intravenous contrast with review of  the raw data and MIP reconstructions are. There is no definite evidence for a large vessel occlusion, vascular  malformation or aneurysm. Impression  No significant vascular abnormalities demonstrated. CT Results (most recent):  Results from Hospital Encounter encounter on 03/06/23    CT CODE NEURO HEAD WO CONTRAST    Narrative  EXAM: CT CODE NEURO HEAD WO CONTRAST    INDICATION: Left subdural hematoma treated with craniotomy and drainage. Removal  of drain. Evaluate for change. COMPARISON: 2/26/2023    TECHNIQUE: Noncontrast head CT. Coronal and sagittal reformats. CT dose  reduction was achieved through the use of a standardized protocol tailored for  this examination and automatic exposure control for dose modulation. FINDINGS: Chronic left frontal subdural hematoma is unchanged and measures  approximately or m in depth. Left right subfalcine shift measures 4 mm,  unchanged. No new intracranial hemorrhage. No CT evidence of acute infarct. No  hydrocephalus. Vertebral vascular calcifications. The postsurgical calvarium is unchanged. No surgical drain. Left frontal scalp  hematoma not significantly changed. .    Impression  There is no new intracranial hemorrhage. Small left frontal subdural subacute hematoma. Mild mass effect. Unchanged left  frontal scalp hematoma.         Assessment and Plan:   Pt is a 58yo RH male with distant h/o seizures, details not known, SDH 2/18/23 s/p crani with pt's wife reporting residual left sided weakness, presenting with reported increased fatigue and left sided weakness from rehab. CTH -small left frontal SDH. MRI brain with punctate acute left BG infarct, small left SDH and post-surgical changes, CIWM changes. MRA head neg. LDL 47.4, HgbA1C <3.8. SBP since presentation in 210-220's. UA neg. Exam with left eye periorbital swelling, left eye dysconjugate eye movements - baseline since childhood, 5/5 strength, no PD. Tiny DWI hyperintensity in the left BG would not explain presentation and could be artifact, but may indeed be a tiny lacunar infarct given his ongoing severe uncontrolled hypertension and inability to take APT. No further stroke work up needed at this time. Please treat his hypertension. I have ordered a urine drug screen to assess for cocaine. If the patient had left-sided weakness and decreased level of alertness at rehab, it may have been secondary to a seizure. EEG has been done, will review. Restart home dose of Keppra , unclear why this was held, 500 mg daily with an extra 500 mg after HD, max dose in ESRD. Likely can be d/c back to rehab today. Signed: Reji Dixon MD

## 2023-03-07 NOTE — PROGRESS NOTES
Physical Therapy - defer  Orders received and appreciated. Chart reviewed. Pt currently getting started with his HD and will be unavailable for the next 4 hours. Will follow. O-Z Flap Text: The defect edges were debeveled with a #15 scalpel blade.  Given the location of the defect, shape of the defect and the proximity to free margins an O-Z flap was deemed most appropriate.  Using a sterile surgical marker, an appropriate transposition flap was drawn incorporating the defect and placing the expected incisions within the relaxed skin tension lines where possible. The area thus outlined was incised deep to adipose tissue with a #15 scalpel blade.  The skin margins were undermined to an appropriate distance in all directions utilizing iris scissors.

## 2023-03-07 NOTE — ED NOTES
Report given to Curry caldwell RN. They were informed of patient chief complaint, current status, orders completed (to include IV access/medications/radiology testing), outstanding orders that still need to be completed, and the treatment plan. Ensured no questions or concerns regarding the patient prior to departure.

## 2023-03-07 NOTE — PROGRESS NOTES
Problem: Dysphagia (Adult)  Goal: *Acute Goals and Plan of Care (Insert Text)  Description: Speech therapy goals  Initiated 3/7/2023   1. Patient will tolerate regular diet without s/s of aspiration within 7 days   Outcome: Progressing Towards Goal     SPEECH LANGUAGE PATHOLOGY BEDSIDE SWALLOW EVALUATION  Patient: Robert Todd (49 y.o. male)  Date: 3/7/2023  Primary Diagnosis: Acute encephalopathy [G93.40]       Precautions:        ASSESSMENT :  Based on the objective data described below, the patient presents with swallow appearing functional, however trials were limited as patient was on dialysis and with limited interest in PO intake. Tolerated small amount of solids with timely and complete mastication. No s/s of aspiration with thin liquids via straw. Per family bedside, seems appropriate in regards to mental status when dialysis not being completed. Will follow to ensure diet tolerance and determine need for integrated language evaluation pending further neuro workup. Patient will benefit from skilled intervention to address the above impairments. Patients rehabilitation potential is considered to be Good     PLAN :  Recommendations and Planned Interventions:  --recommend regular diet. Will follow x1 to ensure tolerance  --will follow for possible integrated language evaluation pending further neuro workup if not back to baseline status  Frequency/Duration: Patient will be followed by speech-language pathology 1 time a week to address goals. Discharge Recommendations: To Be Determined     SUBJECTIVE:   Patient alert, on dialysis but cleared for small amount of PO for evaluation.     OBJECTIVE:     Past Medical History:   Diagnosis Date    Cocaine abuse ([address]     Last +UDS here was 6/2022 (as of 3/7/23)    ESRD (end stage renal disease) on dialysis [facility])     History of epilepsy     Hypertension     Liver disease     SDH (subdural hematoma) 02/18/2023    s/p crani   No past surgical history on file.  Prior Level of Function/Home Situation:   Home Situation  Patient Expects to be Discharged to[de-identified] Rehab hospital/unit acute  Diet prior to admission: regular  Current Diet:  regular   Cognitive and Communication Status:  Neurologic State: Alert  Orientation Level: Oriented X4  Cognition: Follows commands  Perception: Appears intact  Perseveration: No perseveration noted  Safety/Judgement: Awareness of environment  Oral Assessment:  Oral Assessment  Labial: No impairment  Dentition: Natural  Oral Hygiene: moist mucosa  Lingual: No impairment  Mandible: No impairment  P.O. Trials:  Patient Position: upright in stretcher  Vocal quality prior to P.O.: No impairment  Consistency Presented: Thin liquid; Solid  How Presented: Self-fed/presented;SLP-fed/presented;Straw;Successive swallows     Bolus Acceptance: No impairment  Bolus Formation/Control: No impairment     Propulsion: No impairment  Oral Residue: None  Initiation of Swallow: No impairment  Laryngeal Elevation: Functional  Aspiration Signs/Symptoms: None  Pharyngeal Phase Characteristics: No impairment, issues, or problems              Oral Phase Severity: No impairment  Pharyngeal Phase Severity : No impairment    NOMS:   The NOMS functional outcome measure was used to quantify this patient's level of swallowing impairment. Based on the NOMS, the patient was determined to be at level 7 for swallow function       NOMS Swallowing Levels:  Level 1 (CN): NPO  Level 2 (CM): NPO but takes consistency in therapy  Level 3 (CL): Takes less than 50% of nutrition p.o. and continues with nonoral feedings; and/or safe with mod cues; and/or max diet restriction  Level 4 (CK):  Safe swallow but needs mod cues; and/or mod diet restriction; and/or still requires some nonoral feeding/supplements  Level 5 (CJ): Safe swallow with min diet restriction; and/or needs min cues  Level 6 (CI): Independent with p.o.; rare cues; usually self cues; may need to avoid some foods or needs extra time  Level 7 ([address] [place] [address]): Independent for all p.o.  EMILY. (2003). National Outcomes Measurement System (NOMS): Adult Speech-Language Pathology User's Guide. Pain:             After treatment:   Patient left in no apparent distress in bed, Call bell within reach, Nursing notified, and Caregiver / family present    COMMUNICATION/EDUCATION:   Patient was educated regarding his functional swallow as this relates to his diagnosis of SDH. He demonstrated Good understanding as evidenced by verbalization of understanding. The patient's plan of care including recommendations, planned interventions, and recommended diet changes were discussed with: Registered nurse. Patient/family have participated as able in goal setting and plan of care. Patient/family agree to work toward stated goals and plan of care. Thank you for this referral.  Alina Cardoza M.CD.  CCC-SLP   Time Calculation: 8 mins

## 2023-03-07 NOTE — ED NOTES
Bedside shift change report given to Elsa Castillo (oncoming nurse) by Gerard Russ (offgoing nurse). Report included the following information SBAR, Kardex, ED Summary, Intake/Output, MAR, and Quality Measures.

## 2023-03-07 NOTE — PROGRESS NOTES
Transition of Care Plan  RUR- 27%  DISPOSITION: TBD/subject to change pending review and recommendations; pending medical progression  Anticipate Home with [facility] 3/10/23 and outpatient services with Philip Mata/Thur/Sat (transported by JOSE ENRIQUE Joseph)  Referral placed to agencies who are in net work with Jey Madrigal. CM awaiting receipt of referral and updates on who is able to accept and accommodate patient for services at this time. Dialysis agency will need to be notified of patient return. Patient will be transitioning to Angeli Card [phone] home located at [facility] [place], [address] Sister's still need to remain as primary contacts  F/U with PCP/Specialist    Will need follow-up appointment scheduled with PCP prior to discharge. Transport: Family    CM will continue to follow and assist with LALY needs as they arise. Reason for Readmission:     Acute encephalopathy         RUR Score/Risk Level:    27%     PCP: YES First and Last name:  Lela Dickerson NP   Name of Practice: [facility]   Are you a current patient: Yes/No: Yes   Approximate date of last visit: Family reports 1st week of Feb.   Can you participate in a virtual visit with your PCP: No    Is a Care Conference indicated:  Not at this time      Did you attend your follow up appointment (s): If not, why not: No, discharged to Acute rehab         Resources/supports as identified by patient/family:   Family       Top Challenges facing patient (as identified by patient/family and CM):     Current health challenges    Finances/Medication cost?     Receives disability as source of income. No reported significant financial stressors. Insurance verified: Samara Jiménez  Transportation    Family    Support system or lack thereof? Family  Living arrangements? Patient resides between his sister,  Kevin medina and Meg Ray.   Plan at this time is for patient to transition to [address] [place] [address] at discharge Self-care/ADLs/Cognition? AOx3. Requires assistance with ADL's and IADL's at this time. Patient is stand by to contact with assistance. No DME utilized. Current Advanced Directive/Advance Care Plan: Full Code/ No ACP on file. LNOK and 1st Point of Contact for all LALY planning are patient's sister's, Myrtle Martinez [phone] and Omari Nice [phone]. Other identified supports: Daughter-Ruthy Fraser [phone] and Fransico Bryan [phone]           Plan for utilizing home health:   Recommendation is for home health at this time. Referral placed to agencies within patient's insurance network. Awaiting updates at this time. Previous Hx of IPR ([facility])             Care Management Interventions  PCP Verified by CM: Yes  Palliative Care Criteria Met (RRAT>21 & CHF Dx)?: No  Mode of Transport at Discharge:  Other (see comment) (Family)  Transition of Care Consult (CM Consult): Discharge Planning  Discharge Durable Medical Equipment: No  Physical Therapy Consult: Yes  Occupational Therapy Consult: Yes  Support Systems: Spouse/Significant Other, Other Family Member(s), Child(harish)  Confirm Follow Up Transport: Family  Discharge Location  Patient Expects to be Discharged to[de-identified] Home with home health    Readmission Assessment  Number of days since last admission?: 8-30 days  Previous disposition: Acute Rehab  Who is being interviewed?: Patient, Caregiver  What was the patient's/caregiver's perception as to why they think they needed to return back to the hospital?: Other (Comment)  Did you visit your Primary Care Physician after you left the hospital, before you returned this time?: No  Why weren't you able to visit your PCP?: Other (Comment)  Did you see a specialist, such as Cardiac, Pulmonary, Orthopedic Physician, etc. after you left the hospital?: No  Who advised the patient to return to the hospital?: Acute Rehab  Does the patient report anything that got in the way of taking their medications?: No  In our efforts to provide the best possible care to you and others like you, can you think of anything that we could have done to help you after you left the hospital the first time, so that you might not have needed to return so soon?: Teaching during hospitalization regarding your illness, Additional Community resources available for illness support, Other (Comment), Education on how to continue taking medications upon discharge, Discharge instructions that are concise, clear, and non contradictory, Teach back instructions regarding management of illness, Improved written discharge instructions, Arrange for more help when leaving the hospital    VANI Jones/LISETH  Care Management  5:11 PM

## 2023-03-07 NOTE — PROGRESS NOTES
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 Pt has been at [facility] since discharge and was to be discharged home 3/8/23. HOME SUPPORT: The patient lived with his fiance and daughter but did not require assist.    Physical Therapy Goals  Initiated 3/7/2023  1. Patient will perform sit to stand with modified independence within 7 day(s). 2.  Patient will ambulate with modified independence for 300 feet with the least restrictive device within 7 day(s). 3.  Patient will ascend/descend 12 stairs with  handrail(s) with modified independence within 7 day(s). Outcome: Not Met     PHYSICAL THERAPY EVALUATION  Patient: Morris Valenzuela (53 y.o. male)  Date: 3/7/2023  Primary Diagnosis: Acute encephalopathy [G93.40]       Precautions:  Fall    ASSESSMENT  Based on the objective data described below, the patient presents with impaired balance, mild left sided weakness// hemiparesis and left eye ptosis s/p SDH 2023 f/b craniotomy. Pt discharged to [facility] with discharge planned 3/8/23. Patient presented to the ED from [facility] with increased  weakness and drooling on the right side. MRI shows the followin. Small acute left anterior basal again infarct is suspected. 2. Small left subdural hematoma with postsurgical changes. 3. Mild nonspecific white matter changes. Received pt sitting EOB with two caregivers in the room having discussions with OT and care management. Patient is quiet, not participating in discussions until asked direct questions outside of the presence of his finance, and A&O to person, place, situation. Patint is generally SBA to CGA mobilizing in the ED.   He ambulated around the unit with slow steady gait on level terrain w/o obstacles or perturbations and completed sit<->stand transfers from low chairs w/o UE push off. Acute therapy will follow to continue pt's rehab while admitted. Current Level of Function Impacting Discharge (mobility/balance): SBA sit<->stand; SBA to CGA ambulating w/o AD    Functional Outcome Measure: The patient scored 55/100 on the Barthel outcome measure. (Unable to complete [facility] Assessment d/t constraints with caregivers in the room and discussions)    Other factors to consider for discharge: family prefer to take pt home vs returning to [facility]     Patient will benefit from skilled therapy intervention to address the above noted impairments. PLAN :  Recommendations and Planned Interventions: transfer training, gait training, therapeutic exercises, neuromuscular re-education, patient and family training/education, and therapeutic activities      Frequency/Duration: Patient will be followed by physical therapy:  5 times a week to address goals. Recommendation for discharge: (in order for the patient to meet his/her long term goals)  To be determined: Recommending home with HHPT/OT for safety and equipment recommendations and caregivers assisting f/b transition to OP PT for higher level balance and gait. This discharge recommendation:  Has been made in collaboration with the attending provider and/or case management    IF patient discharges home will need the following DME: to be determined (TBD) by home health PT/ OT         SUBJECTIVE:   Patient stated I'm okay with that.  referring to the POC    OBJECTIVE DATA SUMMARY:   HISTORY:    Past Medical History:   Diagnosis Date    Cocaine abuse ([facility] [address]     Last +UDS here was 6/2022 (as of 3/7/23)    ESRD (end stage renal disease) on dialysis [facility])     History of epilepsy     Hypertension     Liver disease     SDH (subdural hematoma) 02/18/2023    s/p crani   No past surgical history on file.       Home Situation  Home Environment: Private residence  # Steps to Enter: 4  Rails to Enter: Yes  One/Two Story Residence: Two story, live on 1st floor (pt will be staying on 1st floor- 1/2 bath only on 1st floor)  Living Alone: No  Support Systems: Spouse/Significant Other, Other Family Member(s), Child(harish)  Patient Expects to be Discharged to[de-identified] Home with home health  Current DME Used/Available at Home: None  Tub or Shower Type:  (1/2 bath only and will sponge bathe)    EXAMINATION/PRESENTATION/DECISION MAKING:   Critical Behavior:  Neurologic State: Alert  Orientation Level: Oriented X4  Cognition: Follows commands  Safety/Judgement: Awareness of environment, Fall prevention  Hearing: Auditory  Auditory Impairment: None    Range Of Motion:  AROM: Generally decreased, functional           PROM: Generally decreased, functional           Strength:    Strength: Generally decreased, functional (RUE 5/5 and LUE shoulder 3-/5, E, W H 5/5)                    Tone & Sensation:   Tone: Normal              Sensation: Intact               Coordination:  Coordination: Generally decreased, functional (mild imp LUE)  Vision:   Acuity: Able to read clock/calendar on wall without difficulty; Able to read employee name badge without difficulty (imp L eye- edema and ptosis noted)  Functional Mobility:  Bed Mobility:  Rolling: Modified independent  Supine to Sit: Modified independent  Sit to Supine: Modified independent  Scooting: Modified independent  Transfers:  Sit to Stand: Stand-by assistance  Stand to Sit: Stand-by assistance        Bed to Chair: Stand-by assistance              Balance:   Sitting: Intact  Standing: Intact; With support  Ambulation/Gait Training:  Distance (ft): 200 Feet (ft)  Assistive Device: Gait belt  Ambulation - Level of Assistance: Contact guard assistance (did not challenge w/ perturbations, obstacles or head movement)     Gait Description (WDL): Exceptions to WDL  Gait Abnormalities: Decreased step clearance              Speed/Shayna: Slow  Step Length: Right shortened;Left shortened      Functional Measure:  Barthel Index:    Bathin  Bladder: 5  Bowels: 5  Groomin  Dressin  Feeding: 10  Mobility: 10  Stairs: 5  Toilet Use: 5  Transfer (Bed to Chair and Back): 10  Total: 55/100       The Barthel ADL Index: Guidelines  1. The index should be used as a record of what a patient does, not as a record of what a patient could do. 2. The main aim is to establish degree of independence from any help, physical or verbal, however minor and for whatever reason. 3. The need for supervision renders the patient not independent. 4. A patient's performance should be established using the best available evidence. Asking the patient, friends/relatives and nurses are the usual sources, but direct observation and common sense are also important. However direct testing is not needed. 5. Usually the patient's performance over the preceding 24-48 hours is important, but occasionally longer periods will be relevant. 6. Middle categories imply that the patient supplies over 50 per cent of the effort. 7. Use of aids to be independent is allowed. Score Interpretation (from [address] [address])    Independent   60-79 Minimally independent   40-59 Partially dependent   20-39 Very dependent   <20 Totally dependent     -Chuy Watts., Barthel, DShellW. (1965). Functional evaluation: the Barthel Index. [facility] ([address] (1997). The Barthel activities of daily living index: self-reporting versus actual performance in the old (> or = 75 years). Journal [address] 45(7), [facility], J.J.M.F, Lyssa Still, Herrera Lund. (1999). Measuring the change in disability after inpatient rehabilitation; comparison of the responsiveness of the Barthel Index and Functional Wilmot Measure. Journal of Neurology, Neurosurgery, and Psychiatry, 66(4), 205-414.   -Jewels Saunders, N.J.A, KASEY Viera, & Yinka Chaidez MAMADEO. (2004) Assessment of post-stroke quality of life in cost-effectiveness studies: The usefulness of the Barthel Index and the EuroQoL-5D. Quality of Life Research, 15, 589-44            Physical Therapy Evaluation Charge Determination   History Examination Presentation Decision-Making   LOW Complexity : Zero comorbidities / personal factors that will impact the outcome / POC LOW Complexity : 1-2 Standardized tests and measures addressing body structure, function, activity limitation and / or participation in recreation  LOW Complexity : Stable, uncomplicated  MEDIUM Complexity : FOTO score of 26-74      Based on the above components, the patient evaluation is determined to be of the following complexity level: LOW     Pain Rating:  None voiced    Activity Tolerance:   Good    After treatment patient left in no apparent distress:   Supine in bed, Call bell within reach, Caregiver / family present, and Side rails x 3    COMMUNICATION/EDUCATION:   The patients plan of care was discussed with: Occupational therapist and Case management. Patient/family have participated as able in goal setting and plan of care. and Patient/family agree to work toward stated goals and plan of care.     Thank you for this referral.  Courtney Barney, PT   Time Calculation: 14 mins

## 2023-03-08 LAB
ANION GAP SERPL CALC-SCNC: 8 MMOL/L (ref 5–15)
BUN SERPL-MCNC: 34 MG/DL (ref 6–20)
BUN/CREAT SERPL: 3 (ref 12–20)
CALCIUM SERPL-MCNC: 9.4 MG/DL (ref 8.5–10.1)
CHLORIDE SERPL-SCNC: 98 MMOL/L (ref 97–108)
CO2 SERPL-SCNC: 27 MMOL/L (ref 21–32)
CREAT SERPL-MCNC: 10.7 MG/DL (ref 0.7–1.3)
ERYTHROCYTE [DISTWIDTH] IN BLOOD BY AUTOMATED COUNT: 13 % (ref 11.5–14.5)
GLUCOSE BLD STRIP.AUTO-MCNC: 107 MG/DL (ref 65–117)
GLUCOSE BLD STRIP.AUTO-MCNC: 138 MG/DL (ref 65–117)
GLUCOSE BLD STRIP.AUTO-MCNC: 84 MG/DL (ref 65–117)
GLUCOSE SERPL-MCNC: 99 MG/DL (ref 65–100)
HCT VFR BLD AUTO: 25.7 % (ref 36.6–50.3)
HGB BLD-MCNC: 8.4 G/DL (ref 12.1–17)
MCH RBC QN AUTO: 29.8 PG (ref 26–34)
MCHC RBC AUTO-ENTMCNC: 32.7 G/DL (ref 30–36.5)
MCV RBC AUTO: 91.1 FL (ref 80–99)
NRBC # BLD: 0 K/UL (ref 0–0.01)
NRBC BLD-RTO: 0 PER 100 WBC
PLATELET # BLD AUTO: 255 K/UL (ref 150–400)
PMV BLD AUTO: 9.9 FL (ref 8.9–12.9)
POTASSIUM SERPL-SCNC: 4.2 MMOL/L (ref 3.5–5.1)
RBC # BLD AUTO: 2.82 M/UL (ref 4.1–5.7)
SERVICE CMNT-IMP: ABNORMAL
SERVICE CMNT-IMP: NORMAL
SERVICE CMNT-IMP: NORMAL
SODIUM SERPL-SCNC: 133 MMOL/L (ref 136–145)
WBC # BLD AUTO: 6.5 K/UL (ref 4.1–11.1)

## 2023-03-08 PROCEDURE — 92523 SPEECH SOUND LANG COMPREHEN: CPT

## 2023-03-08 PROCEDURE — 65270000046 HC RM TELEMETRY

## 2023-03-08 PROCEDURE — 92526 ORAL FUNCTION THERAPY: CPT

## 2023-03-08 PROCEDURE — 74011250637 HC RX REV CODE- 250/637: Performed by: NURSE PRACTITIONER

## 2023-03-08 PROCEDURE — 82962 GLUCOSE BLOOD TEST: CPT

## 2023-03-08 PROCEDURE — 97530 THERAPEUTIC ACTIVITIES: CPT

## 2023-03-08 PROCEDURE — 99232 SBSQ HOSP IP/OBS MODERATE 35: CPT | Performed by: PSYCHIATRY & NEUROLOGY

## 2023-03-08 PROCEDURE — 85027 COMPLETE CBC AUTOMATED: CPT

## 2023-03-08 PROCEDURE — 74011250637 HC RX REV CODE- 250/637: Performed by: PSYCHIATRY & NEUROLOGY

## 2023-03-08 PROCEDURE — 74011250636 HC RX REV CODE- 250/636: Performed by: NURSE PRACTITIONER

## 2023-03-08 PROCEDURE — 36415 COLL VENOUS BLD VENIPUNCTURE: CPT

## 2023-03-08 PROCEDURE — 97116 GAIT TRAINING THERAPY: CPT

## 2023-03-08 PROCEDURE — 97535 SELF CARE MNGMENT TRAINING: CPT

## 2023-03-08 PROCEDURE — 80048 BASIC METABOLIC PNL TOTAL CA: CPT

## 2023-03-08 RX ORDER — SPIRONOLACTONE 100 MG/1
TABLET, FILM COATED ORAL DAILY
COMMUNITY
End: 2023-03-10

## 2023-03-08 RX ORDER — AMLODIPINE BESYLATE AND ATORVASTATIN CALCIUM 10; 10 MG/1; MG/1
1 TABLET, FILM COATED ORAL DAILY
COMMUNITY
End: 2023-03-10

## 2023-03-08 RX ORDER — CLONIDINE 0.3 MG/24H
1 PATCH, EXTENDED RELEASE TRANSDERMAL
COMMUNITY
End: 2023-03-10

## 2023-03-08 RX ORDER — BUPRENORPHINE AND NALOXONE 8; 2 MG/1; MG/1
FILM, SOLUBLE BUCCAL; SUBLINGUAL DAILY
COMMUNITY
End: 2023-03-10

## 2023-03-08 RX ORDER — TADALAFIL 5 MG/1
5 TABLET ORAL
COMMUNITY

## 2023-03-08 RX ORDER — LOSARTAN POTASSIUM 100 MG/1
100 TABLET ORAL DAILY
COMMUNITY
End: 2023-03-10

## 2023-03-08 RX ADMIN — CARVEDILOL 25 MG: 12.5 TABLET, FILM COATED ORAL at 17:20

## 2023-03-08 RX ADMIN — SEVELAMER CARBONATE 800 MG: 800 TABLET, FILM COATED ORAL at 08:56

## 2023-03-08 RX ADMIN — IRON SUCROSE 300 MG: 20 INJECTION, SOLUTION INTRAVENOUS at 12:00

## 2023-03-08 RX ADMIN — PANTOPRAZOLE SODIUM 40 MG: 40 TABLET, DELAYED RELEASE ORAL at 08:57

## 2023-03-08 RX ADMIN — CLONIDINE HYDROCHLORIDE 0.3 MG: 0.1 TABLET ORAL at 06:11

## 2023-03-08 RX ADMIN — HYDRALAZINE HYDROCHLORIDE 100 MG: 50 TABLET, FILM COATED ORAL at 15:54

## 2023-03-08 RX ADMIN — NIFEDIPINE 90 MG: 30 TABLET, EXTENDED RELEASE ORAL at 08:56

## 2023-03-08 RX ADMIN — SEVELAMER CARBONATE 800 MG: 800 TABLET, FILM COATED ORAL at 21:43

## 2023-03-08 RX ADMIN — LEVETIRACETAM 500 MG: 500 TABLET, FILM COATED ORAL at 08:57

## 2023-03-08 RX ADMIN — CARVEDILOL 25 MG: 12.5 TABLET, FILM COATED ORAL at 08:57

## 2023-03-08 RX ADMIN — HYDRALAZINE HYDROCHLORIDE 100 MG: 50 TABLET, FILM COATED ORAL at 08:57

## 2023-03-08 RX ADMIN — DOXAZOSIN 8 MG: 2 TABLET ORAL at 21:42

## 2023-03-08 RX ADMIN — SEVELAMER CARBONATE 800 MG: 800 TABLET, FILM COATED ORAL at 15:54

## 2023-03-08 RX ADMIN — HYDRALAZINE HYDROCHLORIDE 100 MG: 50 TABLET, FILM COATED ORAL at 21:43

## 2023-03-08 RX ADMIN — CLONIDINE HYDROCHLORIDE 0.3 MG: 0.1 TABLET ORAL at 21:42

## 2023-03-08 NOTE — PROGRESS NOTES
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 Pt has been at [facility] since discharge and was to be discharged home 3/8/23. HOME SUPPORT: The patient lived with his fiance and daughter but did not require assist.    Physical Therapy Goals  Initiated 3/7/2023  1. Patient will perform sit to stand with modified independence within 7 day(s). 2.  Patient will ambulate with modified independence for 300 feet with the least restrictive device within 7 day(s). 3.  Patient will ascend/descend 12 stairs with  handrail(s) with modified independence within 7 day(s). Outcome: Not Met     PHYSICAL THERAPY TREATMENT  Patient: Gris Alicea (42 y.o. male)  Date: 3/8/2023  Diagnosis: Acute encephalopathy [G93.40] <principal problem not specified>      Precautions: Fall  Chart, physical therapy assessment, plan of care and goals were reviewed. ASSESSMENT  Patient continues with skilled PT services and is progressing towards goals. Received pt sleeping in bed, step daughter in the room, rodrick' on the phone. Pt's rodrick' requested to speak with this PT on the phone. She provided this PT with recommendations re: how to interact with the patient and requested this PT be patient with the patient (??). Allowed her the time she needed to talk and offer suggestions. Pt was difficult to wake requiring verbal and tactile stimulation and extra time. He agreed to therapy for transfer training to allow him to sit in the chair to eat. He refused balance and gait training. Pt remained up in the chair with his step daughter in the room assisting him with his meal. Encouraged her to allow pt to complete tasks on his own as much as able for independence (observed her wiping his mouth w a napkin). Pt agreed to gait training tomorrow. Current Level of Function Impacting Discharge (mobility/balance): SBA            PLAN :  Patient continues to benefit from skilled intervention to address the above impairments. Continue treatment per established plan of care. to address goals. Recommendation for discharge: (in order for the patient to meet his/her long term goals)  Physical therapy at least 2 days/week in the home AND ensure assist and/or supervision for safety with mobility and adls    This discharge recommendation:  Has been made in collaboration with the attending provider and/or case management    IF patient discharges home will need the following DME: none       SUBJECTIVE:   Patient stated I will for ice cream.    OBJECTIVE DATA SUMMARY:   Critical Behavior:  Neurologic State: Alert  Orientation Level: Oriented X4  Cognition: Follows commands  Safety/Judgement: Awareness of environment  Functional Mobility Training:  Bed Mobility:  Rolling: Modified independent  Supine to Sit: Modified independent  Sit to Supine: Other (comment) (remained sitting in chair)  Scooting: Modified independent        Transfers:  Sit to Stand: Stand-by assistance  Stand to Sit: Stand-by assistance  Verbal cues  Bed to Chair: Stand-by assistance                    Balance:  Sitting: Intact  Standing: Impaired; Without support  Standing - Static: Good  Standing - Dynamic : Good;Fair;Occasional  Ambulation/Gait Training:  Pt refused                                             Therapeutic Education:   Benefit of getting OOB for all meals and participating in balance and gait training with therapy.       Pain Rating:  None indicated    Activity Tolerance:   Fair and sleepy today taking extra time to wake    After treatment patient left in no apparent distress:   Sitting in chair, Call bell within reach, Bed / chair alarm activated, and Caregiver / family present    COMMUNICATION/COLLABORATION:   The patients plan of care was discussed with: Occupational therapist and Registered nurse.      Chad Mendez, PT   Time Calculation: 27 mins

## 2023-03-08 NOTE — PROGRESS NOTES
[facility] [facility] Adult  Hospitalist Group                                                                                          Hospitalist Progress Note  Jimmie Grossman NP  Answering service: [phone] from in house phone        Date of Service:  3/8/2023  NAME:  Jeanine Fall  :  1965  MRN:  [id number]       Admission Summary:   Per H&P, 62 y.o. male with ESRD on TTS HD, hypertension, liver disease status post recent left frontal subdural hematoma post emergent evacuation by neurosurgery on 2023 and left frontal scalp hematoma presented from [facility] with confusion, new left upper extremity weakness. Patient really denies any complaints but history seems to be somewhat limited by patient's somnolence. Pt states he was scheduled for discharge this week from rehab. CT head in the ED showed subacute left frontal subdural hematoma and unchanged left frontal scalp hematoma and no new process. CXR showed minimal pulmonary edema and cardiomegaly. In the ED patient was given Keppra 1 g IV x1. Interval history / Subjective:   Prior to evaluating the patient today I reviewed consult notes, nursing notes well his laboratory values.     I saw the patient this morning on rounds, no complaints this morning       Assessment & Plan:         Acute encephalopathy (/CVA  CT head shows subacute left frontal subdural hematoma with unchanged left frontal   scalp hematoma and no acute process  MRI with acute left basal infarct  neurology evaluated, appreciate recommendations  Discussed case with neurology, symptoms likely due to seizure even in the setting of normal EEG versus hypertension  EEG done, results without seizure  Continuing levetiracetam is 500 mg daily and another 500 mg after every dialysis  PT/OT       Subacute L frontal subdural hematoma with unchanged L frontal scalp hematoma  Has been evaluated by neurosurgery, appreciate recommendations  No neurosurgical intervention necessary    LUE/LLE hemiparesis,   CT head unremarkable for new process  MRI as above  Possibly related to seizure per neurology  Continuing levetiracetam 500 mg daily and 500 mg after hemodialysis     ESRD on TTS HD  Nephrology following and managing hemodialysis,   continuing sevelamer     Anemia of CKD  Chronic and stable; H/H improved from previous  Iron level low, continuing iron sucrose, today is dose 2 of 3     HTN  Patient hypertensive  Restarting clonidine, carvedilol doxazosin, hydralazine, nifedipine        Liver disease  Chronic and stable, LFFTs WNL     Chronic hyponatremia  Mild and improved from previous          Code status: FULL  Prophylaxis: SCD  Care Plan discussed with: Patient, nurse, care manager  Anticipated Disposition: I did spend extensive discussion with fiancé as well as sister. Not wishing to continue disposition back to rehab, preferring home health. Family members making arrangements in the meantime we will continue with iron infusions, today is day 2 of 3. Final infusion on Thursday, family will be prepared to take patient home first thing Friday morning  Inpatient  Cardiac monitoring: Telemetry     Hospital Problems  Date Reviewed: 2/18/2023            Codes Class Noted POA    Acute encephalopathy ICD-10-CM: G93.40  ICD-9-CM: 348.30  3/6/2023 Unknown         Social Determinants of Health     Tobacco Use: High Risk    Smoking Tobacco Use: Some Days    Smokeless Tobacco Use: Never    Passive Exposure: Not on file   Alcohol Use: Not on file   Financial Resource Strain: Not on file   Food Insecurity: Not on file   Transportation Needs: Not on file   Physical Activity: Not on file   Stress: Not on file   Social Connections: Not on file   Intimate Partner Violence: Not on file   Depression: Not at risk    PHQ-2 Score: 0   Housing Stability: Not on file       Review of Systems:   A comprehensive review of systems was negative except for that written in the HPI.        Vital Signs:    Last 24hrs VS reviewed since prior progress note. Most recent are:  Visit Vitals  BP (!) 144/85   Pulse 77   Temp 98.6 °F (37 °C)   Resp 16   Wt 78 kg (171 lb 15.3 oz)   SpO2 95%   BMI 25.39 kg/m²         Intake/Output Summary (Last 24 hours) at 3/8/2023 0739  Last data filed at 3/8/2023 0417  Gross per 24 hour   Intake --   Output 1429 ml   Net -1429 ml          Physical Examination:     I had a face to face encounter with this patient and independently examined them on 3/8/2023 as outlined below:          General : alert x 3, awake,  HEENT: PEERL, moist mucus membrane, =  Neck: supple, no JVD,   Chest: Clear to auscultation bilaterally   CVS: S1 S2 heard, Capillary refill less than 2 seconds  Abd: soft/ non tender, non distended, BS physiological,   Ext: no clubbing, no cyanosis, no edema, brisk 2+ DP pulses  Neuro/Psych: pleasant mood and affect, Strength 5/5 in all extremities, D  Skin: warm     Data Review:    Review and/or order of clinical lab test  Review and/or order of tests in the radiology section of CPT      I have personally and independently reviewed all pertinent labs, diagnostic studies, imaging, and have provided independent interpretation of the same.      Labs:     Recent Labs     03/07/23  0109 03/06/23  1059   WBC 9.7 9.5   HGB 9.0* 7.9*   HCT 28.0* 24.0*    211       Recent Labs     03/07/23  0109 03/06/23  1059   * 135*  133*   K 3.8 4.0  3.9   CL 98 97  97   CO2 26 29  28   BUN 40* 35*  34*   CREA 11.50* 10.30*  10.30*   * 102*  104*   CA 8.7 8.6  8.9   PHOS 5.5* 5.5*       Recent Labs     03/07/23  0109 03/06/23  1059   ALT  --  6*   AP  --  85   TBILI  --  0.3   TP  --  7.6   ALB 3.1* 3.0*  2.9*   GLOB  --  4.7*       Recent Labs     03/06/23  1059   INR 1.1   PTP 11.4*   APTT 23.3        Recent Labs     03/06/23  1059   TIBC 278   PSAT 9*   FERR 112        Lab Results   Component Value Date/Time    Folate 6.7 03/06/2023 10:59 AM        No results for input(s): PH, PCO2, PO2 in the last 72 hours. No results for input(s): CPK, CKNDX, TROIQ in the last 72 hours. No lab exists for component: CPKMB  Lab Results   Component Value Date/Time    Cholesterol, total 122 03/07/2023 01:09 AM    HDL Cholesterol 42 03/07/2023 01:09 AM    LDL, calculated 47.4 03/07/2023 01:09 AM    Triglyceride 163 (H) 03/07/2023 01:09 AM    CHOL/HDL Ratio 2.9 03/07/2023 01:09 AM     Lab Results   Component Value Date/Time    Glucose (POC) 112 11/30/2022 12:08 PM    Glucose (POC) 82 11/30/2022 08:34 AM    Glucose (POC) 112 11/29/2022 09:35 PM    Glucose (POC) 145 (H) 11/29/2022 12:26 PM    Glucose (POC) 97 09/27/2022 06:48 AM     Lab Results   Component Value Date/Time    Color YELLOW/STRAW 03/06/2023 09:20 PM    Appearance CLEAR 03/06/2023 09:20 PM    Specific gravity 1.013 03/06/2023 09:20 PM    pH (UA) 8.0 03/06/2023 09:20 PM    Protein 300 (A) 03/06/2023 09:20 PM    Glucose 100 (A) 03/06/2023 09:20 PM    Ketone Negative 03/06/2023 09:20 PM    Bilirubin Negative 03/06/2023 09:20 PM    Urobilinogen 0.2 03/06/2023 09:20 PM    Nitrites Negative 03/06/2023 09:20 PM    Leukocyte Esterase Negative 03/06/2023 09:20 PM    Epithelial cells FEW 03/06/2023 09:20 PM    Bacteria Negative 03/06/2023 09:20 PM    WBC 0-4 03/06/2023 09:20 PM    RBC 0-5 03/06/2023 09:20 PM       Notes reviewed from all clinical/nonclinical/nursing services involved in patient's clinical care. Care coordination discussions were held with appropriate clinical/nonclinical/ nursing providers based on care coordination needs. Patients current active Medications were reviewed, considered, added and adjusted based on the clinical condition today. Home Medications were reconciled to the best of my ability given all available resources at the time of admission. Route is PO if not otherwise noted.       Admission Status:[id number]:::1}      Medications Reviewed:     Current Facility-Administered Medications   Medication Dose Route Frequency    carvediloL (COREG) tablet 25 mg  25 mg Oral BID WITH MEALS    cloNIDine HCL (CATAPRES) tablet 0.3 mg  0.3 mg Oral Q8H    doxazosin (CARDURA) tablet 8 mg  8 mg Oral QHS    hydrALAZINE (APRESOLINE) tablet 100 mg  100 mg Oral TID    NIFEdipine ER (PROCARDIA XL) tablet 90 mg  90 mg Oral DAILY    pantoprazole (PROTONIX) tablet 40 mg  40 mg Oral DAILY    sevelamer carbonate (RENVELA) tab 800 mg  800 mg Oral TID    heparin (porcine) 1,000 unit/mL injection 2,000 Units  2,000 Units InterCATHeter DIALYSIS PRN    And    heparin (porcine) 1,000 unit/mL injection 2,100 Units  2,100 Units InterCATHeter DIALYSIS PRN    albumin human 25% (BUMINATE) solution 25 g  25 g IntraVENous DIALYSIS PRN    iron sucrose (VENOFER) 300 mg in 0.9% sodium chloride 250 mL IVPB  300 mg IntraVENous Q24H    levETIRAcetam (KEPPRA) tablet 500 mg  500 mg Oral DIALYSIS TUE, THU & SAT    levETIRAcetam (KEPPRA) tablet 500 mg  500 mg Oral DAILY    oxyCODONE IR (ROXICODONE) tablet 5 mg  5 mg Oral Q8H PRN    epoetin shawn-epbx (RETACRIT) injection 20,000 Units  20,000 Units SubCUTAneous Q TUE, THU & SAT     ______________________________________________________________________  EXPECTED LENGTH OF STAY: - - -  ACTUAL LENGTH OF STAY:          2                 Emiliana Garcia NP

## 2023-03-08 NOTE — PROGRESS NOTES
Problem: Self Care Deficits Care Plan (Adult)  Goal: *Acute Goals and Plan of Care (Insert Text)  Description: FUNCTIONAL STATUS PRIOR TO ADMISSION: Prior to SDH s/p crani 2/18/23 pt was independent, active, and working construction. Pt has been at [facility] since discharged and was to be discharged home 3/8/23. HOME SUPPORT: The patient lived with fiance and daughter but did not require assist.    Occupational Therapy Goals  Initiated 3/7/2023  1. Patient will perform grooming standing at sink with supervision/set-up within 7 day(s). 2.  Patient will perform lower body dressing including gathering clothing with supervision/set-up within 7 day(s). 3.  Patient will perform toilet transfers with supervision/set-up within 7 day(s). 4.  Patient will perform all aspects of toileting with supervision/set-up within 7 day(s). 5.  Patient will participate in upper extremity therapeutic exercise/activities with modified independence for 10 minutes within 7 day(s). 6.  Patient will utilize energy conservation techniques during functional activities with verbal and visual cues within 7 day(s). Outcome: Progressing Towards Goal  OCCUPATIONAL THERAPY TREATMENT  Patient: Anne Godoy (37 y.o. male)  Date: 3/8/2023  Diagnosis: Acute encephalopathy [G93.40] <principal problem not specified>      Precautions: Fall  Chart, occupational therapy assessment, plan of care, and goals were reviewed. ASSESSMENT  Patient continues with skilled OT services and is progressing towards goals. Pt was received sleeping semi-reclined in bed with family in room. Pt required increased vc for awaking to participate in OT session. Pt was mod i for bed mobility, HOB elevated and use of handrails noted. Pt reported no dizziness or lightheadedness. Pt was able to don and doff socks independently, with piriformis sitting no difficulties noted, or loss of trunk control.  Pt was SBA for sit to stand and able to take 2-3 steps to chair in preparation for breakfast. Pt was left with all needs met family still present in room. Pt is progressing towards goals but would benefit from skilled ot while admitted hospital.     Current Level of Function Impacting Discharge (ADLs): Pt is currently sba for functional/ bed mobility    Other factors to consider for discharge: medical course of dc         PLAN :  Patient continues to benefit from skilled intervention to address the above impairments. Continue treatment per established plan of care to address goals. Recommend with staff: Oob for meals x3 with SBA. Thank you for completing as able to ensure patients strength and endurance is ensured while they are in the hospital.     Recommend next OT session: dynamic grooming    Recommendation for discharge: (in order for the patient to meet his/her long term goals)  Occupational therapy at least 2 days/week in the home     This discharge recommendation:  Has not yet been discussed the attending provider and/or case management    IF patient discharges home will need the following DME: tbd? SUBJECTIVE:   Patient stated I want chocolate ice cream.    OBJECTIVE DATA SUMMARY:   Cognitive/Behavioral Status:  Neurologic State: Alert;Drowsy  Orientation Level: Oriented X4                Functional Mobility and Transfers for ADLs:  Bed Mobility:  Rolling: Modified independent  Supine to Sit: Modified independent  Sit to Supine: Modified independent  Scooting: Modified independent    Transfers:  Sit to Stand: Stand-by assistance     Bed to Chair: Stand-by assistance    Balance:  Sitting: Intact  Standing: Intact; With support    ADL Intervention:                 Type of Bath: Chlorhexidine (CHG); Partial (HD line cleansed)              Lower Body Dressing Assistance  Dressing Assistance: Independent  Socks:  Independent  Leg Crossed Method Used: Yes  Position Performed: Seated edge of bed                  Pain:  none    Activity Tolerance:   Fair    After treatment patient left in no apparent distress:   Sitting in chair, Call bell within reach, Bed / chair alarm activated, and Caregiver / family present    COMMUNICATION/COLLABORATION:   The patients plan of care was discussed with: Registered nurse.      Davis Wilson OT  Time Calculation: 19 mins

## 2023-03-08 NOTE — PROGRESS NOTES
Problem: Dysphagia (Adult)  Goal: *Acute Goals and Plan of Care (Insert Text)  Description: Speech therapy goals  Initiated 3/7/2023   1. Patient will tolerate regular diet without s/s of aspiration within 7 days   Outcome: Resolved/Met     Nurme 2 EVALUATION WITH DISCHARGE  Patient: Leno Johnson (44 y.o. male)  Date: 3/8/2023  Primary Diagnosis: Acute encephalopathy [G93.40]       Precautions:   Fall    ASSESSMENT :  Patient has been tolerating regular diet/thin liquids with no difficulties or adverse effects. Bedside, patient with no difficulties or s/s of aspiration with any consistencies. Given tolerance and bedside presentation, recommend continue with regular diet/thin liquids. Suspect that patient is at baseline swallow function, therefore further acute intervention for swallowing not indicated at this time. Cognitive communication were also assessed this session. Patient scored 27/30 on orientation log above the functional cut off of 25/30. Patient able to participate in basic problem solving and verbal reasoning tasks with min cues. Patient limited by poor sustained attention to certain tasks. Daughter bedside reports patient is at his functional baseline for cognitive communication. Patient presents with functional neurolinguistic skills to communicate his wants and needs and access his environment. Therefore acute SLP treatment not indicated at this time. SLP will sign off. Please reconsult with any changes or if SLP can be of any further assistance. PLAN :  Recommendations and Planned Interventions:  -- regular diet/ thin liquids  -- general aspiration precautions including completely upright for all PO   -- SLP will sign off     Frequency/Duration: SLP will sign off  Discharge Recommendations: None     SUBJECTIVE:   Patient stated Yeah, chocolate ice cream when asked if there was anything else patient needed.     OBJECTIVE: Past Medical History:   Diagnosis Date    Cocaine abuse [facility])     Last +UDS here was 6/2022 (as of 3/7/23)    ESRD (end stage renal disease) on dialysis [facility])     History of epilepsy     Hypertension     Liver disease     SDH (subdural hematoma) 02/18/2023    s/p crani   No past surgical history on file. Prior Level of Function/Home Situation:   Home Situation  Home Environment: Private residence  # Steps to Enter: 2  Rails to Enter: Yes  One/Two Story Residence: Two story  # of Interior Steps: 16  Height of Each Step (in): 3 inches  Interior Rails: Left  Lift Chair Available: No  Living Alone: No  Support Systems: Spouse/Significant Other, Other Family Member(s)  Patient Expects to be Discharged to[de-identified] Home with home health  Current DME Used/Available at Home: None  Tub or Shower Type:  (1/2 bath only and will sponge bathe)  Diet prior to admission: regular/thin  Current Diet:  regular/thin   Cognitive and Communication Status:  Neurologic State: Alert  Orientation Level: Oriented X4  Cognition: Follows commands  Perception: Appears intact  Perseveration: No perseveration noted  Safety/Judgement: Awareness of environment  Swallowing Evaluation:   Oral Assessment:  Oral Assessment  Labial: No impairment  Dentition: Intact  Oral Hygiene: moist oral mucosa  Lingual: No impairment  Velum: No impairment  Mandible: No impairment  P.O. Trials:  Patient Position: upright in bed  Vocal quality prior to P.O.: No impairment  Consistency Presented: Thin liquid; Solid  How Presented: SLP-fed/presented     Bolus Acceptance: No impairment  Bolus Formation/Control: No impairment     Propulsion: No impairment  Oral Residue: None  Initiation of Swallow: No impairment  Laryngeal Elevation: Functional  Aspiration Signs/Symptoms: None  Pharyngeal Phase Characteristics: No impairment, issues, or problems              Oral Phase Severity: No impairment  Pharyngeal Phase Severity : No impairment    NOMS:   The NOMS functional outcome measure was used to quantify this patient's level of swallowing impairment. Based on the NOMS, the patient was determined to be at level 7 for swallow function       NOMS Swallowing Levels:  Level 1 (CN): NPO  Level 2 (CM): NPO but takes consistency in therapy  Level 3 (CL): Takes less than 50% of nutrition p.o. and continues with nonoral feedings; and/or safe with mod cues; and/or max diet restriction  Level 4 (CK): Safe swallow but needs mod cues; and/or mod diet restriction; and/or still requires some nonoral feeding/supplements  Level 5 (CJ): Safe swallow with min diet restriction; and/or needs min cues  Level 6 (CI): Independent with p.o.; rare cues; usually self cues; may need to avoid some foods or needs extra time  Level 7 ([address] [place] [address]): Independent for all p.o.  ANDRE (2003). National Outcomes Measurement System (NOMS): Adult Speech-Language Pathology User's Guide. Speech/Language Evaluation  Motor Speech:  Oral-Motor Structure/Motor Speech  Labial: No impairment  Dentition: Intact  Oral Hygiene: moist oral mucosa  Lingual: No impairment  Velum: No impairment  Mandible: No impairment       Neuro-Linguistics:         Orientation Log (O-Log)    City: 3  Kind of Place: 3  Name of Hospital: 3  Month: 3  Date: 2  Year: 3  Day of the Week: 3  Clock Time: 3  Etiology/Event: 3   Pathology/Deficits: 1  Raw Score:  27/30      O-Log Scoring Key:  3 = correct spontaneously or upon first free recall attempt;  2 = correct upon logical cueing (e.g., \"That was yesterday, so today must be \");  1 = correct upon multiple choice or phonemic cuing; and  0 = incorrect despite cueing, inappropriate response, or unable to respond. Vocal Quality: No impairment                                 NOMS: The NOMS functional outcome measure was used to quantify this patient's level of attention impairment. Based on the NOMS, the patient was determined to be at level 5 for attention.          NOMS Attention:  Level 1 (CN): attention is nonfunctional. Generally unresponsive to most stimuli  Level 2 (CM): can briefly attend with max stimulation, but not for long enough to complete even simple living tasks   Level 3 (CL): maintains attention to complete simple living tasks of short duration with consistent max cueing in the absence of distracting stimuli  Level 4 (CK): maintains attention during simple living tasks of multiple steps and long duration in a minimally distracting environment with minimal cueing    Level 5 (CJ): maintains attention within simple living activities with occasional min cues within distracting environments. Requires increased cueing to start, continue and change attention during complex activities  Level 6 (CI): maintains attention within complex activities and can attend simultaneously to multiple demands with rare min cues. Usually uses compensatory strategies. Has mild difficulty or takes more than a reasonable amount of time to attend to multiple tasks/stimuli  Level 7 ([address] [place] [address]): ability to participate in vocational, avocational, or social activities is not limited by attentional abilities. May occasionally include the use of compensatory strategies  ANDRE (2003). National Outcomes Measurement System (NOMS): Adult Speech-Language Pathology User's Guide. Pain:  Pain Scale 1: Numeric (0 - 10)  Pain Intensity 1: 0       After treatment:   Patient left in no apparent distress in bed, Call bell within reach, Nursing notified, and Caregiver / family present    COMMUNICATION/EDUCATION:     The patient's plan of care including recommendations, planned interventions, and recommended diet changes were discussed with: Registered nurse. Patient understands intent and goals of therapy, but is neutral about his/her participation.     Thank you for this referral.  Samy Lopez M.S. Cira Flores Pathologist     Time Calculation: 14 mins

## 2023-03-08 NOTE — PROGRESS NOTES
Neurology Progress Note     NAME: Florence Rangel   :  1965   MRN:  [id number]   DATE:  3/8/2023    Assessment:     Active Problems:    Acute encephalopathy (3/6/2023)      Pt is a 60yo RH male with distant h/o seizures, details not known, SDH 23 s/p crani with pt's wife reporting residual left sided weakness, presenting with reported increased fatigue and left sided weakness from rehab. CTH -small left frontal SDH. MRI brain with punctate acute left BG infarct, small left SDH and post-surgical changes, CIWM changes. MRA head neg. LDL 47.4, HgbA1C <3.8. SBP since presentation in 210-220's. UA neg. Exam with left eye periorbital swelling, left eye dysconjugate eye movements - baseline since childhood, 5/5 strength except 5-/5 left , no PD.      -Tiny DWI hyperintensity in the left BG is not congruent with presentation and could be artifact, but may indeed be a tiny lacunar infarct given his ongoing severe uncontrolled hypertension and inability to take APT. No further stroke work up needed at this time.  -If the patient had left-sided weakness and decreased level of alertness at rehab, it may have been secondary to a seizure. Will not escalate ASDs, would need second med b/c Keppra at max dose for ESRD, unless he were to have ongoing stereotyped spells or witnessed seizure activity. Plan:   -UDS was not done.   -Continue Keppra 500mg daily with extra 500mg after HD. -Please call if needed. Subjective:   Pt is seen with his daughter at bedside, pt states he is sleepy and doesn't open eyes and participates minimally in visit. No spells overnight.      Objective:   Chart reviewed since last seen    Current Facility-Administered Medications   Medication Dose Route Frequency    carvediloL (COREG) tablet 25 mg  25 mg Oral BID WITH MEALS    cloNIDine HCL (CATAPRES) tablet 0.3 mg  0.3 mg Oral Q8H    doxazosin (CARDURA) tablet 8 mg  8 mg Oral QHS    hydrALAZINE (APRESOLINE) tablet 100 mg  100 mg Oral TID    NIFEdipine ER (PROCARDIA XL) tablet 90 mg  90 mg Oral DAILY    pantoprazole (PROTONIX) tablet 40 mg  40 mg Oral DAILY    sevelamer carbonate (RENVELA) tab 800 mg  800 mg Oral TID    heparin (porcine) 1,000 unit/mL injection 2,000 Units  2,000 Units InterCATHeter DIALYSIS PRN    And    heparin (porcine) 1,000 unit/mL injection 2,100 Units  2,100 Units InterCATHeter DIALYSIS PRN    albumin human 25% (BUMINATE) solution 25 g  25 g IntraVENous DIALYSIS PRN    iron sucrose (VENOFER) 300 mg in 0.9% sodium chloride 250 mL IVPB  300 mg IntraVENous Q24H    levETIRAcetam (KEPPRA) tablet 500 mg  500 mg Oral DIALYSIS TUE, THU & SAT    levETIRAcetam (KEPPRA) tablet 500 mg  500 mg Oral DAILY    oxyCODONE IR (ROXICODONE) tablet 5 mg  5 mg Oral Q8H PRN    epoetin shawn-epbx (RETACRIT) injection 20,000 Units  20,000 Units SubCUTAneous Q TUE, THU & SAT       Visit Vitals  /62 (BP 1 Location: Right upper arm, BP Patient Position: Semi fowlers)   Pulse 68   Temp 98.6 °F (37 °C)   Resp 16   Wt 171 lb 15.3 oz (78 kg)   SpO2 98%   BMI 25.39 kg/m²     Temp (24hrs), Av.5 °F (36.9 °C), Min:98 °F (36.7 °C), Max:99.1 °F (37.3 °C)      No intake/output data recorded.  1901 -  0700  In: -   Out: 1429 [Urine:50]      Physical Exam:  General: Well developed well nourished patient in no apparent distress who appears asleep. HEENT: left periorbital swelling  Cardiac: Regular rate and rhythm with no murmurs. Extremities: 2+ Radial pulses, no cyanosis or edema    Neurological Exam:  Mental Status: Does not open eyes during my visit. Answers questions with one word answers, follows commands. Cranial Nerves:   PERRL, EOMI, no nystagmus, no ptosis. Facial movement is symmetric. Palate is midline. Tongue is midline. Hearing is intact bilaterally.    Motor:  5/5 strength except left  5-/5. Normal bulk and tone. No PD. No tremors   Reflexes:   Remainder deferred. Sensory:      Gait:     Cerebellar:           Lab Review   Recent Results (from the past 24 hour(s))   GLUCOSE, POC    Collection Time: 03/08/23  8:46 AM   Result Value Ref Range    Glucose (POC) 84 65 - 117 mg/dL    Performed by Chente Wilkerson    CBC W/O DIFF    Collection Time: 03/08/23 10:00 AM   Result Value Ref Range    WBC 6.5 4.1 - 11.1 K/uL    RBC 2.82 (L) 4.10 - 5.70 M/uL    HGB 8.4 (L) 12.1 - 17.0 g/dL    HCT 25.7 (L) 36.6 - 50.3 %    MCV 91.1 80.0 - 99.0 FL    MCH 29.8 26.0 - 34.0 PG    MCHC 32.7 30.0 - 36.5 g/dL    RDW 13.0 11.5 - 14.5 %    PLATELET 481 274 - 693 K/uL    MPV 9.9 8.9 - 12.9 FL    NRBC 0.0 0  WBC    ABSOLUTE NRBC 0.00 0.00 - 1.50 K/uL   METABOLIC PANEL, BASIC    Collection Time: 03/08/23 10:00 AM   Result Value Ref Range    Sodium 133 (L) 136 - 145 mmol/L    Potassium 4.2 3.5 - 5.1 mmol/L    Chloride 98 97 - 108 mmol/L    CO2 27 21 - 32 mmol/L    Anion gap 8 5 - 15 mmol/L    Glucose 99 65 - 100 mg/dL    BUN 34 (H) 6 - 20 MG/DL    Creatinine 10.70 (H) 0.70 - 1.30 MG/DL    BUN/Creatinine ratio 3 (L) 12 - 20      eGFR 5 (L) >60 ml/min/1.73m2    Calcium 9.4 8.5 - 10.1 MG/DL   GLUCOSE, POC    Collection Time: 03/08/23 11:39 AM   Result Value Ref Range    Glucose (POC) 107 65 - 117 mg/dL    Performed by Ezra Castellon CON        Imaging Review   MRI Results (most recent):  Results from Hospital Encounter encounter on 03/06/23    MRA BRAIN WO CONT    Narrative  Clinical indication: Subdural hematoma. MRA of the [place] obtained without intravenous contrast with review of  the raw data and MIP reconstructions are. There is no definite evidence for a large vessel occlusion, vascular  malformation or aneurysm. Impression  No significant vascular abnormalities demonstrated.       CT Results (most recent):  Results from Hospital Encounter encounter on 03/06/23    CT ABD PELV WO CONT    Narrative  EXAM: CT ABD PELV WO CONT    INDICATION: abd pain, lower abd    COMPARISON: September 30, 2022    IV CONTRAST: None. ORAL CONTRAST: None    TECHNIQUE:  Thin axial images were obtained through the abdomen and pelvis. Coronal and  sagittal reformats were generated. CT dose reduction was achieved through use of  a standardized protocol tailored for this examination and automatic exposure  control for dose modulation. The absence of intravenous contrast material reduces the sensitivity for  evaluation of the vasculature and solid organs. FINDINGS:  LOWER THORAX: No significant abnormality in the incidentally imaged lower chest.  LIVER: No mass. BILIARY TREE: Small stone in the gallbladder. No significant distention or wall  thickening. CBD is not dilated. SPLEEN: within normal limits. PANCREAS: No focal abnormality. ADRENALS: Unremarkable. KIDNEYS/URETERS: No calculus or hydronephrosis. STOMACH: Unremarkable. SMALL BOWEL: No dilatation or wall thickening. COLON: No dilatation or wall thickening. APPENDIX: Normal  PERITONEUM: No ascites or pneumoperitoneum. RETROPERITONEUM: No lymphadenopathy or aortic aneurysm. REPRODUCTIVE ORGANS: Within normal limits. URINARY BLADDER: No mass or calculus. BONES: No destructive bone lesion. ABDOMINAL WALL: Diffuse body wall edema. ADDITIONAL COMMENTS: N/A    Impression  1. Diffuse body wall edema. 2.  No acute intra-abdominal pathology. 3.  Tiny stone in the gallbladder. Care Plan discussed with:  Patient x   Family x   RN    Care Manager    Consultant/Specialist:       Signed: Nathan Aguero MD

## 2023-03-08 NOTE — PROGRESS NOTES
1940-Bedside shift change report given to Osito Grijalva (oncoming nurse) by SUNNY Granados   (offgoing nurse). Report included the following information SBAR, Kardex, Procedure Summary, MAR, and Recent Results.

## 2023-03-08 NOTE — PROGRESS NOTES
Transition of Care  RUR 28% High  Disposition Home with home health-[facility]  DME None PTA  Transportation Family  Follow Up PCP, Specialist    Outpatient Dialysis TTS:   [facility] [address] [place], [address]  [phone]  Fax: [phone]    CM reviewed chart. Patient admitted from [facility] with confusion, new left upper extremity weakness. Patient was planned for DC from [facility] this week. Previous CM initiated home health referrals with [facility], [facility] and neither provider can accept patient due to staffing and out of network insurance. CM has sent additional referrals today and will follow for acceptance. Patient has plans to DC to his THI Lewis [phone] home located at [facility]. [address], [address] Patient has a daughter Christen Vaughn [phone]. His sisters Nia Gregory [phone] and [facility] [phone] are supportive and assist with decision making. Anticipate DC Friday. Kalin Kaplan MS    12:40 [facility] has accepted patient with [facility] of 3/12/23. AVS updated.      Kalin Kaplan MS

## 2023-03-08 NOTE — PROGRESS NOTES
I agree with all of Gomez's RN charting for this patient as she has been orienting with me for the last 12 hrs.

## 2023-03-08 NOTE — PROGRESS NOTES
[facility]   [address] [facility], [address] [facility]  Phone: [phone]   [phone]    [email or web address]     Nephrology Progress Note    Patient Name : Jose Maria Salas      : 1965     MRN : [id number]  Date of Admission : 3/6/2023  Date of Servive : 23    CC: Follow up for ESRD     Assessment and Plan   ESRD on hemodialysis  followed by  outpatient  - Outpatient Clinic:[facility]  - Outpatient HD schedule:TTS  - Last HD treatment: 3/7- did not complete full treatment, removed 1.3 kg  - Access: Permacath  - Will check AM labs to see if additional HD needed today  - Renally dose medications    Anemia of CKD  - Hgb 9.0  - ABI 20k TTS  - 3/6 Tsat 9 with Ferritin 112- will plan to give IV iron    Hyponatremia  - will correct with HD    MBD of CKD  - corCa stable  - Phos   stable  -  Binder: Renvela 800 mg TID    HTN  -BP improved     AMS  - CT completed  - 3/6 MRI: small left anterior basal infarct, with small left subdural hematoma improved  Subdural hematoma s/p craniotomy on 23     Interval History:  Patient seen and examined this morning . He refused to complete his full treatment yesterday- removed 1.3L only had ~2 hours. He is resting in bed in [facility] on room air. Neurology and neurosurgery notes reviewed. No labs this AM - requested labs. Review of Systems: Pertinent items are noted in HPI.     Current Medications:   Current Facility-Administered Medications   Medication Dose Route Frequency    carvediloL (COREG) tablet 25 mg  25 mg Oral BID WITH MEALS    cloNIDine HCL (CATAPRES) tablet 0.3 mg  0.3 mg Oral Q8H    doxazosin (CARDURA) tablet 8 mg  8 mg Oral QHS    hydrALAZINE (APRESOLINE) tablet 100 mg  100 mg Oral TID    NIFEdipine ER (PROCARDIA XL) tablet 90 mg  90 mg Oral DAILY    pantoprazole (PROTONIX) tablet 40 mg  40 mg Oral DAILY    sevelamer carbonate (RENVELA) tab 800 mg  800 mg Oral TID    heparin (porcine) 1,000 unit/mL injection 2,000 Units  2,000 Units InterCATHeter DIALYSIS PRN    And    heparin (porcine) 1,000 unit/mL injection 2,100 Units  2,100 Units InterCATHeter DIALYSIS PRN    albumin human 25% (BUMINATE) solution 25 g  25 g IntraVENous DIALYSIS PRN    iron sucrose (VENOFER) 300 mg in 0.9% sodium chloride 250 mL IVPB  300 mg IntraVENous Q24H    levETIRAcetam (KEPPRA) tablet 500 mg  500 mg Oral DIALYSIS TUE, THU & SAT    levETIRAcetam (KEPPRA) tablet 500 mg  500 mg Oral DAILY    oxyCODONE IR (ROXICODONE) tablet 5 mg  5 mg Oral Q8H PRN    epoetin shawn-epbx (RETACRIT) injection 20,000 Units  20,000 Units SubCUTAneous Q TUE, THU & SAT      Allergies   Allergen Reactions    Baclofen Rash     All over    Losartan Hives, Shortness of Breath and Rash     Verified allergy with patient       Objective:  Vitals:    Vitals:    03/08/23 0400 03/08/23 0600 03/08/23 0611 03/08/23 0805   BP:   (!) 144/85 130/64   Pulse: 89 79 77    Resp:    13   Temp:    98.4 °F (36.9 °C)   SpO2:    96%   Weight:         Intake and Output:  No intake/output data recorded. 03/06 1901 - 03/08 0700  In: -   Out: 1429 [Urine:50]    Physical Examination:  General: Sleepy but answers questions   Neck:  Supple, no mass  Resp:  Lungs CTA B/L, normal respiratory effort  CV:  Mild tachycardia,  trace bilateral L>R  LE edema  GI:  Soft, NT,   Psych:             Oriented to person  Skin:  No Rash  :  patient still urinates  Access :   Perma cath in place      Lab Data Personally Reviewed: I have reviewed all the pertinent labs, microbiology data and radiology studies during assessment.     Recent Labs     03/07/23  0109 03/06/23  1059   * 135*  133*   K 3.8 4.0  3.9   CL 98 97  97   CO2 26 29  28   * 102*  104*   BUN 40* 35*  34*   CREA 11.50* 10.30*  10.30*   CA 8.7 8.6  8.9   PHOS 5.5* 5.5*   ALB 3.1* 3.0*  2.9*   ALT  --  6*   INR  --  1.1       Recent Labs     03/07/23  0109 03/06/23  1059   WBC 9.7 9.5   HGB 9.0* 7.9*   HCT 28.0* 24.0*  211       No results found for: SDES  Lab Results   Component Value Date/Time    Culture result: NO GROWTH 5 DAYS 05/18/2022 06:06 AM    Culture result: No growth (<1,000 CFU/ML) 03/22/2022 04:11 PM     Recent Results (from the past 24 hour(s))   GLUCOSE, POC    Collection Time: 03/08/23  8:46 AM   Result Value Ref Range    Glucose (POC) 84 65 - 117 mg/dL    Performed by Red Torres I have reviewed the flowsheets. Chart and Pertinent Notes have been reviewed.        Daksha Cabello NP  [facility]

## 2023-03-09 LAB
ALBUMIN SERPL-MCNC: 2.8 G/DL (ref 3.5–5)
ANION GAP SERPL CALC-SCNC: 7 MMOL/L (ref 5–15)
BUN SERPL-MCNC: 42 MG/DL (ref 6–20)
BUN/CREAT SERPL: 3 (ref 12–20)
CALCIUM SERPL-MCNC: 9.1 MG/DL (ref 8.5–10.1)
CHLORIDE SERPL-SCNC: 101 MMOL/L (ref 97–108)
CO2 SERPL-SCNC: 26 MMOL/L (ref 21–32)
CREAT SERPL-MCNC: 12.1 MG/DL (ref 0.7–1.3)
ERYTHROCYTE [DISTWIDTH] IN BLOOD BY AUTOMATED COUNT: 13 % (ref 11.5–14.5)
GLUCOSE BLD STRIP.AUTO-MCNC: 118 MG/DL (ref 65–117)
GLUCOSE BLD STRIP.AUTO-MCNC: 86 MG/DL (ref 65–117)
GLUCOSE SERPL-MCNC: 90 MG/DL (ref 65–100)
HCT VFR BLD AUTO: 26.3 % (ref 36.6–50.3)
HGB BLD-MCNC: 8.2 G/DL (ref 12.1–17)
MCH RBC QN AUTO: 29 PG (ref 26–34)
MCHC RBC AUTO-ENTMCNC: 31.2 G/DL (ref 30–36.5)
MCV RBC AUTO: 92.9 FL (ref 80–99)
NRBC # BLD: 0 K/UL (ref 0–0.01)
NRBC BLD-RTO: 0 PER 100 WBC
PHOSPHATE SERPL-MCNC: 5.2 MG/DL (ref 2.6–4.7)
PLATELET # BLD AUTO: 239 K/UL (ref 150–400)
PMV BLD AUTO: 10.1 FL (ref 8.9–12.9)
POTASSIUM SERPL-SCNC: 4.6 MMOL/L (ref 3.5–5.1)
RBC # BLD AUTO: 2.83 M/UL (ref 4.1–5.7)
SERVICE CMNT-IMP: ABNORMAL
SERVICE CMNT-IMP: NORMAL
SODIUM SERPL-SCNC: 134 MMOL/L (ref 136–145)
WBC # BLD AUTO: 7.1 K/UL (ref 4.1–11.1)

## 2023-03-09 PROCEDURE — 74011250637 HC RX REV CODE- 250/637: Performed by: PSYCHIATRY & NEUROLOGY

## 2023-03-09 PROCEDURE — 85027 COMPLETE CBC AUTOMATED: CPT

## 2023-03-09 PROCEDURE — 82962 GLUCOSE BLOOD TEST: CPT

## 2023-03-09 PROCEDURE — 90935 HEMODIALYSIS ONE EVALUATION: CPT

## 2023-03-09 PROCEDURE — 74011250637 HC RX REV CODE- 250/637: Performed by: NURSE PRACTITIONER

## 2023-03-09 PROCEDURE — 65270000046 HC RM TELEMETRY

## 2023-03-09 PROCEDURE — 80069 RENAL FUNCTION PANEL: CPT

## 2023-03-09 PROCEDURE — 36415 COLL VENOUS BLD VENIPUNCTURE: CPT

## 2023-03-09 PROCEDURE — 74011250636 HC RX REV CODE- 250/636: Performed by: NURSE PRACTITIONER

## 2023-03-09 RX ADMIN — LEVETIRACETAM 500 MG: 500 TABLET, FILM COATED ORAL at 22:23

## 2023-03-09 RX ADMIN — HYDRALAZINE HYDROCHLORIDE 100 MG: 50 TABLET, FILM COATED ORAL at 17:07

## 2023-03-09 RX ADMIN — CLONIDINE HYDROCHLORIDE 0.3 MG: 0.1 TABLET ORAL at 22:23

## 2023-03-09 RX ADMIN — SEVELAMER CARBONATE 800 MG: 800 TABLET, FILM COATED ORAL at 22:23

## 2023-03-09 RX ADMIN — CARVEDILOL 25 MG: 12.5 TABLET, FILM COATED ORAL at 17:07

## 2023-03-09 RX ADMIN — EPOETIN ALFA-EPBX 20000 UNITS: 20000 INJECTION, SOLUTION INTRAVENOUS; SUBCUTANEOUS at 22:34

## 2023-03-09 RX ADMIN — HYDRALAZINE HYDROCHLORIDE 100 MG: 50 TABLET, FILM COATED ORAL at 22:23

## 2023-03-09 RX ADMIN — IRON SUCROSE 300 MG: 20 INJECTION, SOLUTION INTRAVENOUS at 12:36

## 2023-03-09 RX ADMIN — DOXAZOSIN 8 MG: 2 TABLET ORAL at 22:23

## 2023-03-09 RX ADMIN — CLONIDINE HYDROCHLORIDE 0.3 MG: 0.1 TABLET ORAL at 14:49

## 2023-03-09 RX ADMIN — LEVETIRACETAM 500 MG: 500 TABLET, FILM COATED ORAL at 08:16

## 2023-03-09 RX ADMIN — OXYCODONE 5 MG: 5 TABLET ORAL at 11:17

## 2023-03-09 RX ADMIN — SEVELAMER CARBONATE 800 MG: 800 TABLET, FILM COATED ORAL at 08:16

## 2023-03-09 RX ADMIN — NIFEDIPINE 90 MG: 30 TABLET, EXTENDED RELEASE ORAL at 08:16

## 2023-03-09 RX ADMIN — SEVELAMER CARBONATE 800 MG: 800 TABLET, FILM COATED ORAL at 17:07

## 2023-03-09 RX ADMIN — HYDRALAZINE HYDROCHLORIDE 100 MG: 50 TABLET, FILM COATED ORAL at 08:16

## 2023-03-09 RX ADMIN — CARVEDILOL 25 MG: 12.5 TABLET, FILM COATED ORAL at 08:16

## 2023-03-09 RX ADMIN — PANTOPRAZOLE SODIUM 40 MG: 40 TABLET, DELAYED RELEASE ORAL at 08:16

## 2023-03-09 NOTE — PROGRESS NOTES
[address] [facility]  Hospitalist Group                                                                                          Hospitalist Progress Note  Trevor Silva MD  Answering service: [phone] from in house phone        Date of Service:  3/9/2023  NAME:  Emmie Del Cid  :  1965  MRN:  [id number]       Admission Summary:   Per H&P, 62 y.o. male with ESRD on TTS HD, hypertension, liver disease status post recent left frontal subdural hematoma post emergent evacuation by neurosurgery on 2023 and left frontal scalp hematoma presented from [facility] with confusion, new left upper extremity weakness. Patient really denies any complaints but history seems to be somewhat limited by patient's somnolence. Pt states he was scheduled for discharge this week from rehab. CT head in the ED showed subacute left frontal subdural hematoma and unchanged left frontal scalp hematoma and no new process. CXR showed minimal pulmonary edema and cardiomegaly. In the ED patient was given Keppra 1 g IV x1. Interval history / Subjective:   Patient is seen and examined at bedside this AM. He feels ok. No overnight events or new complaints.  Plan to discharge home tomorrow   Discussed with nursing, cm      Assessment & Plan:     Acute CVA   LUE/LLE hemiparesis  Acute encephalopathy - improving   - CT head shows subacute left frontal subdural hematoma with unchanged left frontal   scalp hematoma and no acute process  - MRI with acute left basal infarct  - neurology evaluated, appreciate recommendations  - Discussed case with neurology, symptoms likely due to seizure even in the setting of normal EEG versus hypertension  - EEG done, results without seizure  - Continuing levetiracetam is 500 mg daily and another 500 mg after every dialysis  - PT/OT    Subacute L frontal subdural hematoma with unchanged L frontal scalp hematoma  - Has been evaluated by neurosurgery, appreciate recommendations  No neurosurgical intervention necessary     ESRD on TTS HD  - Nephrology following and managing hemodialysis  - pt not completing full cycles of HD  - continuing sevelamer     Anemia of CKD  - Chronic and stable; H/H improved from previous  -iron level low, continuing iron sucrose - x 3 days - last date 3/9     HTN  - BP elevated   - c/w clonidine, carvedilol, doxazosin, hydralazine, nifedipine      Liver disease  Chronic and stable, LFFTs WNL     Chronic hyponatremia  Mild and improved from previous      Code status: FULL  Prophylaxis: SCD  Care Plan discussed with: Patient, nurse, care manager  Anticipated Disposition: Home with home health tomorrow   Inpatient  Cardiac monitoring: Telemetry     Hospital Problems  Date Reviewed: 2/18/2023            Codes Class Noted POA    Acute encephalopathy ICD-10-CM: G93.40  ICD-9-CM: 348.30  3/6/2023 Unknown         Social Determinants of Health     Tobacco Use: High Risk    Smoking Tobacco Use: Some Days    Smokeless Tobacco Use: Never    Passive Exposure: Not on file   Alcohol Use: Not on file   Financial Resource Strain: Not on file   Food Insecurity: Not on file   Transportation Needs: Not on file   Physical Activity: Not on file   Stress: Not on file   Social Connections: Not on file   Intimate Partner Violence: Not on file   Depression: Not at risk    PHQ-2 Score: 0   Housing Stability: Not on file       Review of Systems:   A comprehensive review of systems was negative except for that written in the HPI. Vital Signs:    Last 24hrs VS reviewed since prior progress note.  Most recent are:  Visit Vitals  BP (!) 150/81 (BP 1 Location: Right upper arm, BP Patient Position: At rest)   Pulse 77   Temp 99.1 °F (37.3 °C)   Resp 8   Wt 80.2 kg (176 lb 12.9 oz)   SpO2 93%   BMI 26.11 kg/m²         Intake/Output Summary (Last 24 hours) at 3/9/2023 1425  Last data filed at 3/9/2023 1236  Gross per 24 hour   Intake 795 ml   Output 470 ml   Net 325 ml Physical Examination:     I had a face to face encounter with this patient and independently examined them on 3/9/2023 as outlined below:          General : alert x 3, awake,  HEENT: PEERL, moist mucus membrane  Neck: supple, no JVD,   Chest: Clear to auscultation bilaterally   CVS: S1 S2 heard, Capillary refill less than 2 seconds  Abd: soft/ non tender, non distended, BS physiological,   Ext: no clubbing, no cyanosis, no edema, brisk 2+ DP pulses  Neuro/Psych: pleasant mood and affect, Strength 5/5 in all extremities  Skin: warm     Data Review:    Review and/or order of clinical lab test  Review and/or order of tests in the radiology section of CPT      I have personally and independently reviewed all pertinent labs, diagnostic studies, imaging, and have provided independent interpretation of the same. Labs:     Recent Labs     03/09/23 0343 03/08/23  1000   WBC 7.1 6.5   HGB 8.2* 8.4*   HCT 26.3* 25.7*    255       Recent Labs     03/09/23  0343 03/08/23  1000 03/07/23  0109   * 133* 133*   K 4.6 4.2 3.8    98 98   CO2 26 27 26   BUN 42* 34* 40*   CREA 12.10* 10.70* 11.50*   GLU 90 99 107*   CA 9.1 9.4 8.7   PHOS 5.2*  --  5.5*       Recent Labs     03/09/23  0343 03/07/23  0109   ALB 2.8* 3.1*       No results for input(s): INR, PTP, APTT, INREXT, INREXT in the last 72 hours. No results for input(s): FE, TIBC, PSAT, FERR in the last 72 hours. Lab Results   Component Value Date/Time    Folate 6.7 03/06/2023 10:59 AM        No results for input(s): PH, PCO2, PO2 in the last 72 hours. No results for input(s): CPK, CKNDX, TROIQ in the last 72 hours.     No lab exists for component: CPKMB  Lab Results   Component Value Date/Time    Cholesterol, total 122 03/07/2023 01:09 AM    HDL Cholesterol 42 03/07/2023 01:09 AM    LDL, calculated 47.4 03/07/2023 01:09 AM    Triglyceride 163 (H) 03/07/2023 01:09 AM    CHOL/HDL Ratio 2.9 03/07/2023 01:09 AM     Lab Results   Component Value Date/Time    Glucose (POC) 118 (H) 03/09/2023 11:54 AM    Glucose (POC) 86 03/09/2023 08:23 AM    Glucose (POC) 138 (H) 03/08/2023 04:46 PM    Glucose (POC) 107 03/08/2023 11:39 AM    Glucose (POC) 84 03/08/2023 08:46 AM     Lab Results   Component Value Date/Time    Color YELLOW/STRAW 03/06/2023 09:20 PM    Appearance CLEAR 03/06/2023 09:20 PM    Specific gravity 1.013 03/06/2023 09:20 PM    pH (UA) 8.0 03/06/2023 09:20 PM    Protein 300 (A) 03/06/2023 09:20 PM    Glucose 100 (A) 03/06/2023 09:20 PM    Ketone Negative 03/06/2023 09:20 PM    Bilirubin Negative 03/06/2023 09:20 PM    Urobilinogen 0.2 03/06/2023 09:20 PM    Nitrites Negative 03/06/2023 09:20 PM    Leukocyte Esterase Negative 03/06/2023 09:20 PM    Epithelial cells FEW 03/06/2023 09:20 PM    Bacteria Negative 03/06/2023 09:20 PM    WBC 0-4 03/06/2023 09:20 PM    RBC 0-5 03/06/2023 09:20 PM       Notes reviewed from all clinical/nonclinical/nursing services involved in patient's clinical care. Care coordination discussions were held with appropriate clinical/nonclinical/ nursing providers based on care coordination needs. Patients current active Medications were reviewed, considered, added and adjusted based on the clinical condition today. Home Medications were reconciled to the best of my ability given all available resources at the time of admission. Route is PO if not otherwise noted.       Admission Status:[id number]:::1}      Medications Reviewed:     Current Facility-Administered Medications   Medication Dose Route Frequency    carvediloL (COREG) tablet 25 mg  25 mg Oral BID WITH MEALS    cloNIDine HCL (CATAPRES) tablet 0.3 mg  0.3 mg Oral Q8H    doxazosin (CARDURA) tablet 8 mg  8 mg Oral QHS    hydrALAZINE (APRESOLINE) tablet 100 mg  100 mg Oral TID    NIFEdipine ER (PROCARDIA XL) tablet 90 mg  90 mg Oral DAILY    pantoprazole (PROTONIX) tablet 40 mg  40 mg Oral DAILY    sevelamer carbonate (RENVELA) tab 800 mg  800 mg Oral TID heparin (porcine) 1,000 unit/mL injection 2,000 Units  2,000 Units InterCATHeter DIALYSIS PRN    And    heparin (porcine) 1,000 unit/mL injection 2,100 Units  2,100 Units InterCATHeter DIALYSIS PRN    albumin human 25% (BUMINATE) solution 25 g  25 g IntraVENous DIALYSIS PRN    levETIRAcetam (KEPPRA) tablet 500 mg  500 mg Oral DIALYSIS TUE, THU & SAT    levETIRAcetam (KEPPRA) tablet 500 mg  500 mg Oral DAILY    oxyCODONE IR (ROXICODONE) tablet 5 mg  5 mg Oral Q8H PRN    epoetin shawn-epbx (RETACRIT) injection 20,000 Units  20,000 Units SubCUTAneous Q TUE, THU & SAT     ______________________________________________________________________  EXPECTED LENGTH OF STAY: 6d 14h  ACTUAL LENGTH OF STAY:          3                 Liliana Coker MD

## 2023-03-09 NOTE — PROGRESS NOTES
2030-Bedside shift change report given to [facility] (oncoming nurse) by SUNNY Granados (offgoing nurse). Report included the following information SBAR, Kardex, Procedure Summary, MAR, and Recent Results.

## 2023-03-09 NOTE — PROGRESS NOTES
0800: Bedside and Verbal shift change report given to [address] [place] [address] (oncoming nurse) by Prashant Rojas (offgoing nurse). Report included the following information SBAR, Kardex, and Cardiac Rhythm ST .     0810: Pt returned from dialysis suite via transport, pt did not complete treatment with c/o SOB. Pt assessed, O2 sat: 99% on room air, lungs clear, RR: 12.     1630: Bedside and Verbal shift change report given to Lobito (oncoming nurse) by [address] [place] [address] (offgoing nurse). Report included the following information SBAR and Kardex.

## 2023-03-09 NOTE — PROGRESS NOTES
Physician Progress Note      PATIENT:               Froylan Ulrich  CSN #:                  [id number]  :                       1965  ADMIT DATE:       3/6/2023 11:00 AM  [address] [place] DATE:  RESPONDING  PROVIDER #:        Darlene Johnson MD          QUERY TEXT:    Patient admitted with small Lacunar Infarct. Noted documentation of Acute Encephalopathy, however pt is also documented as being alert and oriented x3. In order to support the diagnosis of Acute Encephalopathy, please include additional clinical indicators in your documentation, or please document if the diagnosis has been ruled out after further study. The medical record reflects the following:  Risk Factors: small lacunar infarct  Clinical Indicators: admitted with increased drowsiness, drooling and new LUE weakness. Pt found to have Acute CVA, and Acute Encephalopathy is documented. Pt is documented as being A&O x3 in the H&P and subsequent progress notes. GCS 14-15 since admission. Treatment: neuro checks, Neurology consult was made, med mgmt of CVA    Thank you,  Mandy Burk RN  Clinical Documentation  [phone], or via [address] [facility] [address] provided:  -- Acute Encephalopathy present as evidenced by, Please document evidence.   -- Acute Encephalopathy was ruled out  -- Other - I will add my own diagnosis  -- Disagree - Not applicable / Not valid  -- Disagree - Clinically unable to determine / Unknown  -- Refer to Clinical Documentation Reviewer    PROVIDER RESPONSE TEXT:    Acute Encephalopathy is present as evidenced by neurology notes\"noted to be drowsier than normal\"    Query created by: Amy Peres on 3/9/2023 4:48 PM      Electronically signed by:  Darlene Johnson MD 3/9/2023 5:03 PM

## 2023-03-09 NOTE — ADVANCED PRACTICE NURSE
11  Hemodialysis / [phone]    Vitals Pre Post Assessment Pre Post   BP BP: (!) 182/92 (03/09/23 0700)   231/113 LOC A&O x 3 A&O x 3   HR Pulse (Heart Rate): 87 (03/09/23 0700) 100 Lungs clear clear   Resp Resp Rate: 18 (03/09/23 0700) 20 Cardiac regular regular   Temp Temp: 98.3 °F (36.8 °C) (03/09/23 0525) 98.3 Skin warm warm   Weight  80.2 kg  Edema No edema No edema   Tele status N/A N/A Pain Pain Intensity 1: 1 (03/09/23 0625) No pain     Orders   Duration: Start: 0630 End: 10730 Total: 1 hr   Dialyzer: Dialyzer/Set Up Inspection: Bree Bradley ([id number]) (03/09/23 0525)   K Bath: Dialysate K (mEq/L): 3 (03/09/23 0525)   Ca Bath: Dialysate CA (mEq/L): 2.5 (03/09/23 0525)   Na: Dialysate NA (mEq/L): 138 (03/09/23 0525)   Bicarb: Dialysate HCO3 (mEq/L): 35 (03/09/23 0525)   Target Fluid Removal: Goal/Amount of Fluid to Remove (mL): 3000 mL (03/09/23 0525)     Access   Type & Location: [facility] cath   Comments:                           in place, patent, dressing dry and intact, no S/S of infection             Labs   HBsAg (Antigen) / date:        02/18/23 negative                                       HBsAb (Antibody) / date: 02/18/23 [facility]    Source: Connect care   Obtained/Reviewed  Critical Results Called HGB   Date Value Ref Range Status   03/09/2023 8.2 (L) 12.1 - 17.0 g/dL Final     Potassium   Date Value Ref Range Status   03/09/2023 4.6 3.5 - 5.1 mmol/L Final     Calcium   Date Value Ref Range Status   03/09/2023 9.1 8.5 - 10.1 MG/DL Final     BUN   Date Value Ref Range Status   03/09/2023 42 (H) 6 - 20 MG/DL Final     Creatinine   Date Value Ref Range Status   03/09/2023 12.10 (H) 0.70 - 1.30 MG/DL Final        Meds Given   Name Dose Route                    Adequacy / Fluid    Total Liters Process: 21 L   Net Fluid Removed: 400ml      Comments   Time Out Done:   (Time) Yes, 7159   Admitting Diagnosis: Renal failure   Consent obtained/signed: Informed Consent Verified: Yes (03/09/23 0525)   Machine / Ros Rosales # Machine Number: [id number] (03/09/23 9417)   Primary Nurse Rpt Pre: Dottie Jacobs RN   Primary Nurse Rpt Post: Macy Bailey RN   Pt Education: Yes, r/t dialysis process   Care Plan: Continue HD as ordered   Pts outpatient clinic:      Tx Summary   Comments:     Patient requested to stop the tx after one hour, /98 P91, O2 sats 98 on room air, no S/S of distress, somehow anxious, stating, doesn't feel good. MD contacted, order to stop the tx after one hour. at end, all blood in circuit returned with 300 ml NS, LSC cath in place, patent, dressing dry and inytact, no S/S of infection

## 2023-03-09 NOTE — PROGRESS NOTES
Problem: Patient Education: Go to Patient Education Activity  Goal: Patient/Family Education  Outcome: Progressing Towards Goal     Problem: Patient Education: Go to Patient Education Activity  Goal: Patient/Family Education  Outcome: Progressing Towards Goal     Problem: Discharge Planning  Goal: *Discharge to safe environment  Outcome: Progressing Towards Goal     Problem: Patient Education: Go to Patient Education Activity  Goal: Patient/Family Education  Outcome: Progressing Towards Goal     Problem: Falls - Risk of  Goal: *Absence of Falls  Description: Document [place] Fall Risk and appropriate interventions in the flowsheet.   Outcome: Progressing Towards Goal  Note: Fall Risk Interventions:                                Problem: Patient Education: Go to Patient Education Activity  Goal: Patient/Family Education  Outcome: Progressing Towards Goal

## 2023-03-09 NOTE — PROGRESS NOTES
[facility]   [address] [facility], [address] [facility]  Phone: [phone]   [phone]    [email or web address]     Nephrology Progress Note    Patient Name : Barry Rae      : 1965     MRN : [id number]  Date of Admission : 3/6/2023  Date of Servive : 23    CC: Follow up for ESRD     Assessment and Plan   ESRD on hemodialysis  followed by  outpatient  - Outpatient [facility]  - Outpatient HD schedule:TTS  - Last HD treatment: 3/9 - did not complete full treatment, removed 400cc  - I counseled extensively regarding avoiding curtailing HD session for risk of clinical deterioration, pt acknowledged and will try for full session next time; likely anxiety and could benefit from pre-medication prior to HD  - Access: Permacath LIJ  - Renally dose medications    Anemia of CKD  - Hgb 9.0  - ABI 20k TTS  - 3/6 Tsat 9 with Ferritin 112- will plan to give IV iron    Hyponatremia, mild  - will correct with adequate HD    MBD of CKD  - corCa stable  - Phos  stable  - on Renvela 800 mg TID    HTN  -BP improved     AMS  - CT completed  - 3/6 MRI: small left anterior basal infarct, with small left subdural hematoma improved  Subdural hematoma s/p craniotomy on 23       Interval History:  Patient seen and examined this morning. He refused  again to complete his full treatment today. only had ~1 hour. He is resting in bed in NAD on room air. Dog in room    Review of Systems: Pertinent items are noted in HPI.     Current Medications:   Current Facility-Administered Medications   Medication Dose Route Frequency    carvediloL (COREG) tablet 25 mg  25 mg Oral BID WITH MEALS    cloNIDine HCL (CATAPRES) tablet 0.3 mg  0.3 mg Oral Q8H    doxazosin (CARDURA) tablet 8 mg  8 mg Oral QHS    hydrALAZINE (APRESOLINE) tablet 100 mg  100 mg Oral TID    NIFEdipine ER (PROCARDIA XL) tablet 90 mg  90 mg Oral DAILY    pantoprazole (PROTONIX) tablet 40 mg  40 mg Oral DAILY    sevelamer carbonate (RENVELA) tab 800 mg  800 mg Oral TID    heparin (porcine) 1,000 unit/mL injection 2,000 Units  2,000 Units InterCATHeter DIALYSIS PRN    And    heparin (porcine) 1,000 unit/mL injection 2,100 Units  2,100 Units InterCATHeter DIALYSIS PRN    albumin human 25% (BUMINATE) solution 25 g  25 g IntraVENous DIALYSIS PRN    iron sucrose (VENOFER) 300 mg in 0.9% sodium chloride 250 mL IVPB  300 mg IntraVENous Q24H    levETIRAcetam (KEPPRA) tablet 500 mg  500 mg Oral DIALYSIS TUE, THU & SAT    levETIRAcetam (KEPPRA) tablet 500 mg  500 mg Oral DAILY    oxyCODONE IR (ROXICODONE) tablet 5 mg  5 mg Oral Q8H PRN    epoetin shawn-epbx (RETACRIT) injection 20,000 Units  20,000 Units SubCUTAneous Q TUE, THU & SAT      Allergies   Allergen Reactions    Baclofen Rash     All over    Losartan Hives, Shortness of Breath and Rash     Verified allergy with patient       Objective:  Vitals:    Vitals:    03/09/23 0700 03/09/23 0715 03/09/23 0730 03/09/23 0801   BP: (!) 182/92 (!) 190/98 (!) 231/113 (!) 196/82   Pulse: 87 91 100 94   Resp: 18 17 20 12   Temp:    98.6 °F (37 °C)   SpO2:   98% 97%   Weight:         Intake and Output:  03/09 0701 - 03/09 1900  In: -   Out: 470   03/07 1901 - 03/09 0700  In: -   Out: 50 [Urine:50]    Physical Examination:  General: Answers questions , no distress  Neck:  Supple, no mass  Resp:  Lungs CTA B/L, normal respiratory effort  CV:  Mild tachycardia,  trace bilateral L>R  LE edema  GI:  Soft, NT,   Psych:             Normal affect  Skin:  No Rash  :  patient still urinates  Access :   Perma cath LIJ  in place      Lab Data Personally Reviewed: I have reviewed all the pertinent labs, microbiology data and radiology studies during assessment.     Recent Labs     03/09/23  0343 03/08/23  1000 03/07/23  0109 03/06/23  1059   * 133* 133* 135*  133*   K 4.6 4.2 3.8 4.0  3.9    98 98 97  97   CO2 26 27 26 29  28   GLU 90 99 107* 102*  104*   BUN 42* 34* 40* 35* 34*   CREA 12.10* 10.70* 11.50* 10.30*  10.30*   CA 9.1 9.4 8.7 8.6  8.9   PHOS 5.2*  --  5.5* 5.5*   ALB 2.8*  --  3.1* 3.0*  2.9*   ALT  --   --   --  6*   INR  --   --   --  1.1     Recent Labs     03/09/23  0343 03/08/23  1000 03/07/23  0109 03/06/23  1059   WBC 7.1 6.5 9.7 9.5   HGB 8.2* 8.4* 9.0* 7.9*   HCT 26.3* 25.7* 28.0* 24.0*    255 292 211     No results found for: SDES  Lab Results   Component Value Date/Time    Culture result: NO GROWTH 5 DAYS 05/18/2022 06:06 AM    Culture result: No growth (<1,000 CFU/ML) 03/22/2022 04:11 PM     Recent Results (from the past 24 hour(s))   GLUCOSE, POC    Collection Time: 03/08/23 11:39 AM   Result Value Ref Range    Glucose (POC) 107 65 - 117 mg/dL    Performed by Gilbert Vidal [address]Cristobal, POC    Collection Time: 03/08/23  4:46 PM   Result Value Ref Range    Glucose (POC) 138 (H) 65 - 117 mg/dL    Performed by Kristian ATKINS    RENAL FUNCTION PANEL    Collection Time: 03/09/23  3:43 AM   Result Value Ref Range    Sodium 134 (L) 136 - 145 mmol/L    Potassium 4.6 3.5 - 5.1 mmol/L    Chloride 101 97 - 108 mmol/L    CO2 26 21 - 32 mmol/L    Anion gap 7 5 - 15 mmol/L    Glucose 90 65 - 100 mg/dL    BUN 42 (H) 6 - 20 MG/DL    Creatinine 12.10 (H) 0.70 - 1.30 MG/DL    BUN/Creatinine ratio 3 (L) 12 - 20      eGFR 4 (L) >60 ml/min/1.73m2    Calcium 9.1 8.5 - 10.1 MG/DL    Phosphorus 5.2 (H) 2.6 - 4.7 MG/DL    Albumin 2.8 (L) 3.5 - 5.0 g/dL   CBC W/O DIFF    Collection Time: 03/09/23  3:43 AM   Result Value Ref Range    WBC 7.1 4.1 - 11.1 K/uL    RBC 2.83 (L) 4.10 - 5.70 M/uL    HGB 8.2 (L) 12.1 - 17.0 g/dL    HCT 26.3 (L) 36.6 - 50.3 %    MCV 92.9 80.0 - 99.0 FL    MCH 29.0 26.0 - 34.0 PG    MCHC 31.2 30.0 - 36.5 g/dL    RDW 13.0 11.5 - 14.5 %    PLATELET 088 611 - 434 K/uL    MPV 10.1 8.9 - 12.9 FL    NRBC 0.0 0  WBC    ABSOLUTE NRBC 0.00 0.00 - 0.01 K/uL   GLUCOSE, POC    Collection Time: 03/09/23  8:23 AM   Result Value Ref Range    Glucose (POC) 86 65 - 117 mg/dL    Performed by Carmel Nagel        I have reviewed the flowsheets. Chart and Pertinent Notes have been reviewed.        [facility]

## 2023-03-10 VITALS
RESPIRATION RATE: 20 BRPM | OXYGEN SATURATION: 99 % | TEMPERATURE: 98.9 F | WEIGHT: 174.82 LBS | BODY MASS INDEX: 25.82 KG/M2 | DIASTOLIC BLOOD PRESSURE: 89 MMHG | SYSTOLIC BLOOD PRESSURE: 135 MMHG | HEART RATE: 72 BPM

## 2023-03-10 LAB
ANION GAP SERPL CALC-SCNC: 10 MMOL/L (ref 5–15)
BUN SERPL-MCNC: 43 MG/DL (ref 6–20)
BUN/CREAT SERPL: 4 (ref 12–20)
CALCIUM SERPL-MCNC: 9.4 MG/DL (ref 8.5–10.1)
CHLORIDE SERPL-SCNC: 99 MMOL/L (ref 97–108)
CO2 SERPL-SCNC: 26 MMOL/L (ref 21–32)
CREAT SERPL-MCNC: 11.9 MG/DL (ref 0.7–1.3)
ERYTHROCYTE [DISTWIDTH] IN BLOOD BY AUTOMATED COUNT: 12.8 % (ref 11.5–14.5)
GLUCOSE BLD STRIP.AUTO-MCNC: 91 MG/DL (ref 65–117)
GLUCOSE SERPL-MCNC: 89 MG/DL (ref 65–100)
HCT VFR BLD AUTO: 24.9 % (ref 36.6–50.3)
HGB BLD-MCNC: 8 G/DL (ref 12.1–17)
MCH RBC QN AUTO: 29.7 PG (ref 26–34)
MCHC RBC AUTO-ENTMCNC: 32.1 G/DL (ref 30–36.5)
MCV RBC AUTO: 92.6 FL (ref 80–99)
NRBC # BLD: 0 K/UL (ref 0–0.01)
NRBC BLD-RTO: 0 PER 100 WBC
PLATELET # BLD AUTO: 260 K/UL (ref 150–400)
PMV BLD AUTO: 10.2 FL (ref 8.9–12.9)
POTASSIUM SERPL-SCNC: 4.4 MMOL/L (ref 3.5–5.1)
RBC # BLD AUTO: 2.69 M/UL (ref 4.1–5.7)
SERVICE CMNT-IMP: NORMAL
SODIUM SERPL-SCNC: 135 MMOL/L (ref 136–145)
WBC # BLD AUTO: 7.8 K/UL (ref 4.1–11.1)

## 2023-03-10 PROCEDURE — 82962 GLUCOSE BLOOD TEST: CPT

## 2023-03-10 PROCEDURE — 80048 BASIC METABOLIC PNL TOTAL CA: CPT

## 2023-03-10 PROCEDURE — 74011250637 HC RX REV CODE- 250/637: Performed by: NURSE PRACTITIONER

## 2023-03-10 PROCEDURE — 36415 COLL VENOUS BLD VENIPUNCTURE: CPT

## 2023-03-10 PROCEDURE — 85027 COMPLETE CBC AUTOMATED: CPT

## 2023-03-10 RX ORDER — DOXAZOSIN 8 MG/1
8 TABLET ORAL
Qty: 30 TABLET | Refills: 0 | Status: SHIPPED | OUTPATIENT
Start: 2023-03-10

## 2023-03-10 RX ORDER — NIFEDIPINE 90 MG/1
90 TABLET, EXTENDED RELEASE ORAL DAILY
Qty: 30 TABLET | Refills: 0 | Status: SHIPPED | OUTPATIENT
Start: 2023-03-10 | End: 2023-04-09

## 2023-03-10 RX ORDER — DIPHENHYDRAMINE HCL 25 MG
25 CAPSULE ORAL
Qty: 20 CAPSULE | Refills: 0 | Status: SHIPPED | OUTPATIENT
Start: 2023-03-10 | End: 2023-03-20

## 2023-03-10 RX ORDER — LEVETIRACETAM 500 MG/1
500 TABLET ORAL
Qty: 12 TABLET | Refills: 0 | Status: SHIPPED | OUTPATIENT
Start: 2023-03-11 | End: 2023-04-10

## 2023-03-10 RX ORDER — LEVETIRACETAM 500 MG/1
500 TABLET ORAL DAILY
Qty: 30 TABLET | Refills: 0 | Status: SHIPPED | OUTPATIENT
Start: 2023-03-10 | End: 2023-04-09

## 2023-03-10 RX ORDER — DIPHENHYDRAMINE HCL 25 MG
25 CAPSULE ORAL DAILY PRN
Status: DISCONTINUED | OUTPATIENT
Start: 2023-03-10 | End: 2023-03-10 | Stop reason: HOSPADM

## 2023-03-10 RX ORDER — PANTOPRAZOLE SODIUM 40 MG/1
40 TABLET, DELAYED RELEASE ORAL DAILY
Qty: 30 TABLET | Refills: 0 | Status: SHIPPED | OUTPATIENT
Start: 2023-03-10 | End: 2023-04-09

## 2023-03-10 RX ADMIN — CLONIDINE HYDROCHLORIDE 0.3 MG: 0.1 TABLET ORAL at 05:23

## 2023-03-10 NOTE — PROGRESS NOTES
Bedside shift change report given to Kennedy Mchugh RN (oncoming nurse) by Lina Mora RN (offgoing nurse). Report included the following information SBAR, Kardex, ED Summary, Procedure Summary, Intake/Output, MAR, Recent Results, Med Rec Status, and Cardiac Rhythm NSR .     2140 Patient's fiance is upset that 13year old daughter cannot stay overnight as she has the past few nights (meeting happened during the day.) Patient requesting to be discharged. Sent message to Bry Shepard NP. NP came to bedside and spoke to fiance and patient. Everyone okay to stay at bedside tonight with likely discharge tomorrow. 0800 Bedside shift change report given to Elaine Kimble RN (oncoming nurse) by Kennedy Mchugh RN (offgoing nurse). Report included the following information SBAR, Kardex, ED Summary, Procedure Summary, Intake/Output, MAR, Recent Results, Med Rec Status, and Cardiac Rhythm NSR .

## 2023-03-10 NOTE — PROGRESS NOTES
0900-attempted to complete shift assessment and administer morning meds. Patient is resting quietly with daughter and fiancee bedside. Meds were refused and discharge paperwork requested. MD notified. 1015- I have reviewed discharge instructions with the patient and fiancee. The patient and fiancee verbalized understanding. Discharge medications reviewed with patient and fiancee and appropriate educational materials and side effects teaching were provided. If you experience chest pain call 911. Do not drive yourself.

## 2023-03-10 NOTE — PROGRESS NOTES
Patient's wife requested to speak with a nursing supervisor. Wife is upset that 13year old daughter cannot spend the night with the patient for emotional support (states that an adult family member fell through) and would like to start the discharge process. Advised nursing staff to contact hospitalist to discuss discharge plans.

## 2023-03-10 NOTE — PROGRESS NOTES
Transition of Care  RUR 26% High  Disposition Home with [facility]  DME None PTA  Transportation Round Trip pending for 10:45 am  Follow Up PCP, Specialist    Outpatient Dialysis TTS:   [facility], [address]  [phone]  Fax: [phone]    Medicare pt has received, reviewed, and signed 2nd IM letter informing them of their right to appeal the discharge. Signed copy has been placed on pt bedside chart.     Elton Santos MS

## 2023-03-10 NOTE — DISCHARGE INSTRUCTIONS
Please bring this form with you to show your primary care provider at your follow-up appointment. Primary care provider:   @[email or web address]@    Discharging provider:  Chandan Morgan MD    You have been admitted to the hospital with the following diagnoses:  Acute encephalopathy [G93.40]    FOLLOW-UP CARE RECOMMENDATIONS:    APPOINTMENTS:  Follow-up with primary care provider,  [unfilled]  -  Please call [unfilled] shortly after discharge to set up an appointment to be seen in  1 week    Nephrology as needed     FOLLOW-UP TESTS recommended: none     PENDING TEST RESULTS:  At the time of your discharge the following test results are still pending: none   Please make sure you review these results with your outpatient follow-up provider(s). SYMPTOMS to watch for: chest pain, shortness of breath, fever, chills, nausea, vomiting, diarrhea, change in mentation, falling, weakness, bleeding. DIET/what to eat:  Cardiac Diet    ACTIVITY:  Activity as tolerated    What to do if new or unexpected symptoms occur? If you experience any of the above symptoms (or should other concerns or questions arise after discharge) please call your primary care physician. Return to the emergency room if you cannot get hold of your doctor. It is very important that you keep your follow-up appointment(s). Please bring discharge papers, medication list (and/or medication bottles) to your follow-up appointments for review by your outpatient provider(s). Please check the list of medications and be sure it includes every medication (even non-prescription medications) that your provider wants you to take. It is important that you take the medication exactly as they are prescribed. Keep your medication in the bottles provided by the pharmacist and keep a list of the medication names, dosages, and times to be taken in your wallet. Do not take other medications without consulting your doctor.    If you have any questions about your medications or other instructions, please talk to your nurse or care provider before you leave the hospital.    I understand that if any problems occur once I am at home I am to contact my physician. These instructions were explained to me and I had the opportunity to ask questions.

## 2023-03-10 NOTE — DISCHARGE SUMMARY
Discharge Summary     Patient:  Robert Todd       MRN: [id number]       YOB: 1965       Age: 62 y.o. Date of admission:  3/6/2023    Date of discharge:  3/10/2023    Primary care provider: Dr. Virgilio Lynne, NP    Admitting provider:  Melony Mccall MD    Discharging provider:  Jason Lawrence [address].: [phone]. If unavailable, call [phone] and ask the  to page the triage hospitalist.    Consultations  IP CONSULT TO NEPHROLOGY  IP CONSULT TO NEUROLOGY  IP CONSULT TO NEUROSURGERY    Procedures  * No surgery found *    Discharge destination: Home with [facility]. The patient is stable for discharge. Admission diagnosis  Acute encephalopathy [G93.40]    Current Discharge Medication List        START taking these medications    Details   diphenhydrAMINE (BENADRYL) 25 mg capsule Take 1 Capsule by mouth daily as needed (before HD for anxiety) for up to 10 days. Qty: 20 Capsule, Refills: 0  Start date: 3/10/2023, End date: 3/20/2023           CONTINUE these medications which have CHANGED    Details   !! levETIRAcetam (KEPPRA) 500 mg tablet Take 1 Tablet by mouth Every Tuesday, Thursday and Saturday for 30 days. Qty: 12 Tablet, Refills: 0  Start date: 3/11/2023, End date: 4/10/2023      !! levETIRAcetam (KEPPRA) 500 mg tablet Take 1 Tablet by mouth daily for 30 days. Qty: 30 Tablet, Refills: 0  Start date: 3/10/2023, End date: 4/9/2023      NIFEdipine ER (PROCARDIA XL) 90 mg ER tablet Take 1 Tablet by mouth daily for 30 days. Qty: 30 Tablet, Refills: 0  Start date: 3/10/2023, End date: 4/9/2023      doxazosin (CARDURA) 8 mg tablet Take 1 Tablet by mouth nightly. Qty: 30 Tablet, Refills: 0  Start date: 3/10/2023      pantoprazole (Protonix) 40 mg tablet Take 1 Tablet by mouth daily for 30 days.  Indications: an ulcer of the duodenum  Qty: 30 Tablet, Refills: 0  Start date: 3/10/2023, End date: 4/9/2023       !! - Potential duplicate medications found. Please discuss with provider. CONTINUE these medications which have NOT CHANGED    Details   tadalafiL (Cialis) 5 mg tablet Take 5 mg by mouth daily as needed for Erectile Dysfunction. hydrALAZINE (APRESOLINE) 100 mg tablet Take 1 Tablet by mouth three (3) times daily for 30 days. Qty: 90 Tablet, Refills: 0      cloNIDine HCL (CATAPRES) 0.3 mg tablet Take 1 Tablet by mouth every eight (8) hours for 30 days. Qty: 90 Tablet, Refills: 0      carvediloL (COREG) 25 mg tablet Take 1 Tablet by mouth two (2) times daily (with meals). Qty: 60 Tablet, Refills: 0      sevelamer carbonate (RENVELA) 800 mg tab tab Take 800 mg by mouth three (3) times daily. New prescription as of 8/16/22, prescribed by Dr. Ludwig Henry      calcium carbonate (TUMS) 200 mg calcium (500 mg) chew Take 3 Tablets by mouth three (3) times daily (after meals). ascorbic acid, vitamin C, (VITAMIN C) 500 mg tablet Take 500 mg by mouth daily. cholecalciferol (VITAMIN D3) 25 mcg (1,000 unit) cap Take 1,000 Units by mouth daily. STOP taking these medications       amLODIPine-atorvastatin (CADUET) 10-10 mg per tablet Comments:   Reason for Stopping:         cloNIDine (CATAPRES) 0.3 mg/24 hr Comments:   Reason for Stopping:         losartan (Cozaar) 100 mg tablet Comments:   Reason for Stopping:         spironolactone (ALDACTONE) 100 mg tablet Comments:   Reason for Stopping:         buprenorphine-naloxone (Suboxone) 8-2 mg film sublingaul film Comments:   Reason for Stopping:         aliskiren (TEKTURNA) 300 mg tablet Comments:   Reason for Stopping:         minoxidiL (LONITEN) 10 mg tablet Comments:   Reason for Stopping:         bumetanide (BUMEX) 2 mg tablet Comments:   Reason for Stopping:                Follow-up Information       Follow up With Specialties Details Why Contact Info    Art Duval NP Nurse Practitioner In 2 days  [address] [facility] [address] [address] [phone]      [facility] EMERGENCY DEP Emergency Medicine  If symptoms worsen Krystle Castillo [id number]  [address]  Follow up on 3/12/2023 [address] [facility] Evan Zheng [address] [place] [address], [facility] [address]            Final discharge diagnoses and brief hospital course  Per H&P, 62 y.o. male with ESRD on TTS HD, hypertension, liver disease status post recent left frontal subdural hematoma post emergent evacuation by neurosurgery on 2/18/2023 and left frontal scalp hematoma presented from [facility] with confusion, new left upper extremity weakness. Patient really denies any complaints but history seems to be somewhat limited by patient's somnolence. Pt states he was scheduled for discharge this week from rehab. CT head in the ED showed subacute left frontal subdural hematoma and unchanged left frontal scalp hematoma and no new process. CXR showed minimal pulmonary edema and cardiomegaly. In the ED patient was given Keppra 1 g IV x1.     Acute CVA   LUE/LLE hemiparesis  Acute encephalopathy - improving   - CT head shows subacute left frontal subdural hematoma with unchanged left frontal   scalp hematoma and no acute process  - MRI with acute left basal infarct  - neurology evaluated, appreciate recommendations  - Discussed case with neurology, symptoms likely due to seizure even in the setting of normal EEG versus hypertension  - EEG done, results without seizure  - Continuing levetiracetam is 500 mg daily and another 500 mg after every dialysis  - PT/OT     Subacute L frontal subdural hematoma with unchanged L frontal scalp hematoma  - Has been evaluated by neurosurgery, appreciate recommendations  No neurosurgical intervention necessary     ESRD on TTS HD  - Nephrology following and managing hemodialysis  - pt not completing full cycles of HD  - continuing sevelamer     Anemia of CKD  - Chronic and stable; H/H improved from previous  -iron level low, continuing iron sucrose - x 3 days - last date 3/9     HTN  - BP better controlled    - c/w clonidine, carvedilol, doxazosin, hydralazine, nifedipine      Liver disease  Chronic and stable, LFFTs WNL     Chronic hyponatremia  Mild and improved from previous    Discharge recommendations:  PCP in 1 week  Nephro and Neuro as needed   Monitor BP    Discharge plan explained in detail to patient and pt's family at bedside. They understood and agreed       Physical examination at discharge  Visit Vitals  /89 (BP 1 Location: Left upper arm, BP Patient Position: At rest)   Pulse 72   Temp 98.9 °F (37.2 °C)   Resp 20   Wt 79.3 kg (174 lb 13.2 oz)   SpO2 99%   BMI 25.82 kg/m²     General : alert x 3, awake,  HEENT: PEERL, moist mucus membrane  Neck: supple, no JVD,   Chest: Clear to auscultation bilaterally   CVS: S1 S2 heard, Capillary refill less than 2 seconds  Abd: soft/ non tender, non distended, BS physiological,   Ext: no clubbing, no cyanosis, no edema, brisk 2+ DP pulses  Neuro/Psych: pleasant mood and affect, Strength 5/5 in all extremities  Skin: warm     Pertinent imaging studies:    Per EMR     Recent Labs     03/10/23  0500 03/09/23  0343 03/08/23  1000   WBC 7.8 7.1 6.5   HGB 8.0* 8.2* 8.4*   HCT 24.9* 26.3* 25.7*    239 255     Recent Labs     03/10/23  0500 03/09/23  0343 03/08/23  1000   * 134* 133*   K 4.4 4.6 4.2   CL 99 101 98   CO2 26 26 27   BUN 43* 42* 34*   CREA 11.90* 12.10* 10.70*   GLU 89 90 99   CA 9.4 9.1 9.4   PHOS  --  5.2*  --      Recent Labs     03/09/23  0343   ALB 2.8*     No results for input(s): INR, PTP, APTT, INREXT in the last 72 hours. No results for input(s): FE, TIBC, PSAT, FERR in the last 72 hours. No results for input(s): PH, PCO2, PO2 in the last 72 hours. No results for input(s): CPK, CKMB in the last 72 hours.     No lab exists for component: TROPONINI  No components found for: [place]    Chronic Diagnoses:    Problem List as of 3/10/2023 Date Reviewed: 2/18/2023            Codes Class Noted - Resolved    Acute encephalopathy ICD-10-CM: G93.40  ICD-9-CM: 348.30  3/6/2023 - Present        Subdural hematoma ICD-10-CM: S06. 5XAA  ICD-9-CM: 432.1  2/18/2023 - Present        Hypertensive urgency, malignant ICD-10-CM: I16.0  ICD-9-CM: 401.0  11/28/2022 - Present        HTN (hypertension), malignant ICD-10-CM: I10  ICD-9-CM: 401.0  6/30/2022 - Present        Seizures ([facility] ICD-10-CM: R56.9  ICD-9-CM: 780.39  5/30/2022 - Present        Malignant hypertension ICD-10-CM: I10  ICD-9-CM: 401.0  5/17/2022 - Present        Enteritis ICD-10-CM: K52.9  ICD-9-CM: 558.9  5/17/2022 - Present        Bone metastasis ([facility] ICD-10-CM: C79.51  ICD-9-CM: 198.5  5/17/2022 - Present        ESRD needing dialysis ([facility] ICD-10-CM: N18.6, Z99.2  ICD-9-CM: 585.6  3/29/2022 - Present        Duodenitis ICD-10-CM: K29.80  ICD-9-CM: 535.60  3/25/2022 - Present        Peptic ulcer disease ICD-10-CM: K27.9  ICD-9-CM: 533.90  3/22/2022 - Present        Intractable abdominal pain ICD-10-CM: R10.9  ICD-9-CM: 789.00  3/22/2022 - Present        ESRD (end stage renal disease) ([facility] ICD-10-CM: N18.6  ICD-9-CM: 585.6  3/22/2022 - Present        Non-compliance ICD-10-CM: Z91.199  ICD-9-CM: V15.81  3/12/2022 - Present        ESRD (end stage renal disease) on dialysis [facility]) ICD-10-CM: N18.6, Z99.2  ICD-9-CM: 585.6, V45.11  3/12/2022 - Present        Hypertensive urgency ICD-10-CM: I16.0  ICD-9-CM: 401.9  3/12/2022 - Present        Hypertensive emergency ICD-10-CM: I16.1  ICD-9-CM: 401.9  1/15/2022 - Present        Iron deficiency anemia ICD-10-CM: D50.9  ICD-9-CM: 280.9  2/15/2021 - Present        Chronic hepatitis C ([facility] ICD-10-CM: B18.2  ICD-9-CM: 070.54  1/15/2021 - Present        CKD (chronic kidney disease) ICD-10-CM: N18.9  ICD-9-CM: 585.9  6/13/2020 - Present        Chest pain ICD-10-CM: R07.9  ICD-9-CM: 786.50  5/10/2019 - Present        Hypertension ICD-10-CM: I10  ICD-9-CM: 401.9  5/10/2019 - Present        RESOLVED: Acute renal failure (ARF) (HCC) ICD-10-CM: N17.9  ICD-9-CM: 584.9  6/28/2020 - 1/15/2021        RESOLVED: Elevated troponin ICD-10-CM: R77.8  ICD-9-CM: 790.6  6/13/2020 - 1/15/2021        RESOLVED: Cocaine abuse ([facility] ICD-10-CM: F14.10  ICD-9-CM: 305.60  5/10/2019 - 5/24/2019           Time spent on discharge related activities today greater than 30 minutes.       Signed:  Lupe Bueno MD                 Hospitalist, Internal Medicine      Cc: Uday To NP

## 2023-03-10 NOTE — PROGRESS NOTES
[facility]   [address] [facility], [address] [facility]  Phone: [phone]   [phone]    [email or web address]     Nephrology Progress Note    Patient Name : Florence Rangel      : 1965     MRN : [id number]  Date of Admission : 3/6/2023  Date of Servive : 03/10/23    CC: Follow up for ESRD     Assessment and Plan   ESRD on hemodialysis  followed by  outpatient  - Outpatient [facility]  - Outpatient HD schedule:TTS  - Last HD treatment: 3/9 - did not complete full treatment, removed 400cc  - no need for extra HD today  - I again counseled extensively the pt and family regarding avoiding curtailing HD session for risk of clinical deterioration, they acknowledged and will try for full session next time; likely anxiety and could benefit from pre-medication prior to HD (?benadryl)  - Access: Permacath [facility]  - Renally dose medications    Anemia of CKD  - Hgb 8  - ABI 20k TTS  - 3/6 Tsat 9 with Ferritin 112 - given IV iron    Hyponatremia, mild  - improved 135    MBD of CKD  - corCa stable  - Phos  stable  - on Renvela 800 mg TID    HTN  -BP improved     AMS  - CT completed  - 3/6 MRI: small left anterior basal infarct, with small left subdural hematoma improved  Subdural hematoma s/p craniotomy on 23       Interval History:  Patient seen and examined this morning. He is resting in bed in NAD on room air. Family in room    Review of Systems: Pertinent items are noted in HPI.     Current Medications:   Current Facility-Administered Medications   Medication Dose Route Frequency    diphenhydrAMINE (BENADRYL) capsule 25 mg  25 mg Oral DAILY PRN    carvediloL (COREG) tablet 25 mg  25 mg Oral BID WITH MEALS    cloNIDine HCL (CATAPRES) tablet 0.3 mg  0.3 mg Oral Q8H    doxazosin (CARDURA) tablet 8 mg  8 mg Oral QHS    hydrALAZINE (APRESOLINE) tablet 100 mg  100 mg Oral TID    NIFEdipine ER (PROCARDIA XL) tablet 90 mg  90 mg Oral DAILY    pantoprazole (PROTONIX) tablet 40 mg  40 mg Oral DAILY    sevelamer carbonate (RENVELA) tab 800 mg  800 mg Oral TID    heparin (porcine) 1,000 unit/mL injection 2,000 Units  2,000 Units InterCATHeter DIALYSIS PRN    And    heparin (porcine) 1,000 unit/mL injection 2,100 Units  2,100 Units InterCATHeter DIALYSIS PRN    albumin human 25% (BUMINATE) solution 25 g  25 g IntraVENous DIALYSIS PRN    levETIRAcetam (KEPPRA) tablet 500 mg  500 mg Oral DIALYSIS TUE, THU & SAT    levETIRAcetam (KEPPRA) tablet 500 mg  500 mg Oral DAILY    oxyCODONE IR (ROXICODONE) tablet 5 mg  5 mg Oral Q8H PRN    epoetin shawn-epbx (RETACRIT) injection 20,000 Units  20,000 Units SubCUTAneous Q TUE, THU & SAT     Current Outpatient Medications   Medication Sig    [START ON 3/11/2023] levETIRAcetam (KEPPRA) 500 mg tablet Take 1 Tablet by mouth Every Tuesday, Thursday and Saturday for 30 days. levETIRAcetam (KEPPRA) 500 mg tablet Take 1 Tablet by mouth daily for 30 days. NIFEdipine ER (PROCARDIA XL) 90 mg ER tablet Take 1 Tablet by mouth daily for 30 days. doxazosin (CARDURA) 8 mg tablet Take 1 Tablet by mouth nightly. pantoprazole (Protonix) 40 mg tablet Take 1 Tablet by mouth daily for 30 days. Indications: an ulcer of the duodenum    diphenhydrAMINE (BENADRYL) 25 mg capsule Take 1 Capsule by mouth daily as needed (before HD for anxiety) for up to 10 days. tadalafiL (Cialis) 5 mg tablet Take 5 mg by mouth daily as needed for Erectile Dysfunction. hydrALAZINE (APRESOLINE) 100 mg tablet Take 1 Tablet by mouth three (3) times daily for 30 days. cloNIDine HCL (CATAPRES) 0.3 mg tablet Take 1 Tablet by mouth every eight (8) hours for 30 days. carvediloL (COREG) 25 mg tablet Take 1 Tablet by mouth two (2) times daily (with meals). sevelamer carbonate (RENVELA) 800 mg tab tab Take 800 mg by mouth three (3) times daily.  New prescription as of 8/16/22, prescribed by Dr. Bartolo Baptise    calcium carbonate (TUMS) 200 mg calcium (500 mg) chew Take 3 Tablets by mouth three (3) times daily (after meals). ascorbic acid, vitamin C, (VITAMIN C) 500 mg tablet Take 500 mg by mouth daily. cholecalciferol (VITAMIN D3) 25 mcg (1,000 unit) cap Take 1,000 Units by mouth daily. Allergies   Allergen Reactions    Baclofen Rash     All over    Losartan Hives, Shortness of Breath and Rash     Verified allergy with patient       Objective:  Vitals:    Vitals:    03/10/23 0433 03/10/23 0600 03/10/23 0651 03/10/23 0803   BP: (!) 189/86  (!) 123/58 135/89   Pulse: 92 75  72   Resp:    20   Temp:    98.9 °F (37.2 °C)   SpO2:    99%   Weight:         Intake and Output:  No intake/output data recorded. 03/08 1901 - 03/10 0700  In: 795 [I.V.:795]  Out: 470     Physical Examination:  General: Answers questions , no distress  Neck:  Supple, no mass  Resp:  Lungs CTA B/L, normal respiratory effort  CV:  Mild tachycardia,  improved LE edema  GI:  Soft, NT,   Psych:             Normal affect  Skin:  No Rash  :  patient still urinates  Access :   SuiteyJ  in place      Lab Data Personally Reviewed: I have reviewed all the pertinent labs, microbiology data and radiology studies during assessment.     Recent Labs     03/10/23  0500 03/09/23 0343 03/08/23  1000   * 134* 133*   K 4.4 4.6 4.2   CL 99 101 98   CO2 26 26 27   GLU 89 90 99   BUN 43* 42* 34*   CREA 11.90* 12.10* 10.70*   CA 9.4 9.1 9.4   PHOS  --  5.2*  --    ALB  --  2.8*  --      Recent Labs     03/10/23  0500 03/09/23  0343 03/08/23  1000   WBC 7.8 7.1 6.5   HGB 8.0* 8.2* 8.4*   HCT 24.9* 26.3* 25.7*    239 255     No results found for: SDES  Lab Results   Component Value Date/Time    Culture result: NO GROWTH 5 DAYS 05/18/2022 06:06 AM    Culture result: No growth (<1,000 CFU/ML) 03/22/2022 04:11 PM     Recent Results (from the past 24 hour(s))   GLUCOSE, POC    Collection Time: 03/09/23 11:54 AM   Result Value Ref Range    Glucose (POC) 118 (H) 65 - 117 mg/dL    Performed by Wendy Castro CBC W/O DIFF    Collection Time: 03/10/23  5:00 AM   Result Value Ref Range    WBC 7.8 4.1 - 11.1 K/uL    RBC 2.69 (L) 4.10 - 5.70 M/uL    HGB 8.0 (L) 12.1 - 17.0 g/dL    HCT 24.9 (L) 36.6 - 50.3 %    MCV 92.6 80.0 - 99.0 FL    MCH 29.7 26.0 - 34.0 PG    MCHC 32.1 30.0 - 36.5 g/dL    RDW 12.8 11.5 - 14.5 %    PLATELET 638 242 - 811 K/uL    MPV 10.2 8.9 - 12.9 FL    NRBC 0.0 0  WBC    ABSOLUTE NRBC 0.00 0.00 - 4.31 K/uL   METABOLIC PANEL, BASIC    Collection Time: 03/10/23  5:00 AM   Result Value Ref Range    Sodium 135 (L) 136 - 145 mmol/L    Potassium 4.4 3.5 - 5.1 mmol/L    Chloride 99 97 - 108 mmol/L    CO2 26 21 - 32 mmol/L    Anion gap 10 5 - 15 mmol/L    Glucose 89 65 - 100 mg/dL    BUN 43 (H) 6 - 20 MG/DL    Creatinine 11.90 (H) 0.70 - 1.30 MG/DL    BUN/Creatinine ratio 4 (L) 12 - 20      eGFR 4 (L) >60 ml/min/1.73m2    Calcium 9.4 8.5 - 10.1 MG/DL   GLUCOSE, POC    Collection Time: 03/10/23  8:35 AM   Result Value Ref Range    Glucose (POC) 91 65 - 117 mg/dL    Performed by Jovan Justice        I have reviewed the flowsheets. Chart and Pertinent Notes have been reviewed.        [facility]

## 2023-04-04 ENCOUNTER — TRANSCRIBE ORDER (OUTPATIENT)
Dept: SCHEDULING | Age: 58
End: 2023-04-04

## 2023-04-21 ENCOUNTER — HOSPITAL ENCOUNTER (OUTPATIENT)
Dept: CT IMAGING | Age: 58
Discharge: HOME | End: 2023-04-21
Attending: NEUROLOGICAL SURGERY
Payer: MEDICARE

## 2023-04-21 DIAGNOSIS — S06.5XAA SUBDURAL HEMATOMA (HCC): ICD-10-CM

## 2023-04-21 DIAGNOSIS — Z98.890 S/P CRANIOTOMY: ICD-10-CM

## 2023-04-21 PROCEDURE — 70450 CT HEAD/BRAIN W/O DYE: CPT

## 2023-04-23 DIAGNOSIS — S06.5XAA SUBDURAL HEMATOMA (HCC): Primary | ICD-10-CM

## 2023-04-23 DIAGNOSIS — Z98.890 S/P CRANIOTOMY: ICD-10-CM

## 2023-06-02 NOTE — PERIOP NOTES
Spoke with patients sister Ela Del Rio and updated her on start of procedure. Surgeon aware. Mitral valve regurgitation Mitral valve regurgitation Mitral valve regurgitation Mitral valve regurgitation

## 2023-12-09 ENCOUNTER — HOSPITAL ENCOUNTER (OUTPATIENT)
Facility: HOSPITAL | Age: 58
Setting detail: SPECIMEN
Discharge: HOME | End: 2023-12-12

## 2023-12-09 LAB
HCT VFR BLD AUTO: 20.7 % (ref 36.6–50.3)
HGB BLD-MCNC: 6.3 G/DL (ref 12.1–17)

## 2023-12-09 PROCEDURE — 85018 HEMOGLOBIN: CPT

## 2023-12-09 PROCEDURE — 85014 HEMATOCRIT: CPT

## 2023-12-09 PROCEDURE — 36415 COLL VENOUS BLD VENIPUNCTURE: CPT

## 2025-02-01 ENCOUNTER — HOSPITAL ENCOUNTER (EMERGENCY)
Facility: HOSPITAL | Age: 60
Discharge: HOME | End: 2025-02-01
Attending: EMERGENCY MEDICINE
Payer: MEDICARE

## 2025-02-01 VITALS
HEIGHT: 69 IN | OXYGEN SATURATION: 99 % | HEART RATE: 80 BPM | TEMPERATURE: 98 F | DIASTOLIC BLOOD PRESSURE: 92 MMHG | RESPIRATION RATE: 20 BRPM | WEIGHT: 174 LBS | BODY MASS INDEX: 25.77 KG/M2 | SYSTOLIC BLOOD PRESSURE: 196 MMHG

## 2025-02-01 DIAGNOSIS — T30.0 BURN: Primary | ICD-10-CM

## 2025-02-01 PROCEDURE — 99283 EMERGENCY DEPT VISIT LOW MDM: CPT

## 2025-02-01 PROCEDURE — 6370000000 HC RX 637 (ALT 250 FOR IP): Performed by: EMERGENCY MEDICINE

## 2025-02-01 RX ORDER — GINSENG 100 MG
CAPSULE ORAL
Status: COMPLETED | OUTPATIENT
Start: 2025-02-01 | End: 2025-02-01

## 2025-02-01 RX ORDER — CEPHALEXIN 500 MG/1
1000 CAPSULE ORAL
Status: COMPLETED | OUTPATIENT
Start: 2025-02-01 | End: 2025-02-01

## 2025-02-01 RX ORDER — CEPHALEXIN 500 MG/1
500 CAPSULE ORAL 3 TIMES DAILY
Qty: 21 CAPSULE | Refills: 0 | Status: SHIPPED | OUTPATIENT
Start: 2025-02-01 | End: 2025-02-08

## 2025-02-01 RX ADMIN — CEPHALEXIN 1000 MG: 500 CAPSULE ORAL at 21:31

## 2025-02-01 RX ADMIN — BACITRACIN: 500 OINTMENT TOPICAL at 21:31
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
## (undated) DEVICE — Device

## (undated) DEVICE — TUBING HYDR IRR --

## (undated) DEVICE — AGENT HEMSTAT W2XL14IN OXIDIZED REGENERATED CELOS ABSRB FOR

## (undated) DEVICE — GLOVE SURG ALOE LF PF 6.5 -- SENSICARE

## (undated) DEVICE — SET ADMIN 16ML TBNG L100IN 2 Y INJ SITE IV PIGGY BK DISP

## (undated) DEVICE — CATH IV AUTOGRD BC PNK 20GA 25 -- INSYTE

## (undated) DEVICE — SYR 10ML LUER LOK 1/5ML GRAD --

## (undated) DEVICE — SYR 3ML LL TIP 1/10ML GRAD --

## (undated) DEVICE — HYPODERMIC SAFETY NEEDLE: Brand: MAGELLAN

## (undated) DEVICE — 1200 GUARD II KIT W/5MM TUBE W/O VAC TUBE: Brand: GUARDIAN

## (undated) DEVICE — YANKAUER,TAPERED BULBOUS TIP,W/O VENT: Brand: MEDLINE

## (undated) DEVICE — SOLIDIFIER FLD 2OZ 1500CC N DISINF IN BTL DISP SAFESORB

## (undated) DEVICE — CONTAINER SPEC 20 ML LID NEUT BUFF FORMALIN 10 % POLYPR STS

## (undated) DEVICE — RESERVOIR,SUCTION,100CC,SILICONE: Brand: MEDLINE

## (undated) DEVICE — KIT CG8901 CLIP GUN 10 PK: Brand: CLIP GUN

## (undated) DEVICE — SOLUTION SURG PREP 26 CC PURPREP

## (undated) DEVICE — SOLUTION IRRIG 1000ML 0.9% SOD CHL USP POUR PLAS BTL

## (undated) DEVICE — CANN VES 3MM 6.4CM --

## (undated) DEVICE — GLOVE ORANGE PI 7 1/2   MSG9075

## (undated) DEVICE — SURGICAL PROCEDURE KIT CRANIOTOMY

## (undated) DEVICE — GLOVE SURG SZ 65 L12IN FNGR THK79MIL GRN LTX FREE

## (undated) DEVICE — SUT VCRL 2-0 18IN CP2 MP UD --

## (undated) DEVICE — FLOSEAL HEMOSTATIC MATRIX, 10ML: Brand: FLOSEAL HEMOSTATIC MATRIX

## (undated) DEVICE — FORCEPS BX L160CM DIA8MM GRSP DISECT CUP TIP NONLOCKING ROT

## (undated) DEVICE — DRAPE THERMABASIN INTRATEMP --

## (undated) DEVICE — GARMENT,MEDLINE,DVT,INT,CALF,MED, GEN2: Brand: MEDLINE

## (undated) DEVICE — DRAIN SURG L10MM WND FLAT FULL FLUT W/ TRCR NO RESVR STD

## (undated) DEVICE — TOWEL 4 PLY TISS 19X30 SUE WHT

## (undated) DEVICE — BLOCK BITE ENDOSCP AD 21 MM W/ DIL BLU LF DISP

## (undated) DEVICE — BASIN EMSIS 16OZ GRAPHITE PLAS KID SHP MOLD GRAD FOR ORAL

## (undated) DEVICE — NEONATAL-ADULT SPO2 SENSOR: Brand: NELLCOR

## (undated) DEVICE — DRAIN SURG FLAT W7MMXL20CM FULL PERF

## (undated) DEVICE — SURGIFOAM SPNG SZ 100

## (undated) DEVICE — Z DISCONTINUED PER MEDLINE LINE GAS SAMPLING O2/CO2 LNG AD 13 FT NSL W/ TBNG FILTERLINE

## (undated) DEVICE — BAG SPEC BIOHZRD 10 X 10 IN --

## (undated) DEVICE — COVER LT HNDL RIG -- 1/PK

## (undated) DEVICE — ELECTRODE,RADIOTRANSLUCENT,FOAM,5PK: Brand: MEDLINE

## (undated) DEVICE — SOL INJ SOD CL 0.9% 250ML BG --